# Patient Record
Sex: FEMALE | Race: WHITE | NOT HISPANIC OR LATINO | Employment: UNEMPLOYED | URBAN - NONMETROPOLITAN AREA
[De-identification: names, ages, dates, MRNs, and addresses within clinical notes are randomized per-mention and may not be internally consistent; named-entity substitution may affect disease eponyms.]

---

## 2017-08-22 ENCOUNTER — APPOINTMENT (OUTPATIENT)
Dept: ULTRASOUND IMAGING | Facility: HOSPITAL | Age: 27
End: 2017-08-22

## 2017-08-22 ENCOUNTER — HOSPITAL ENCOUNTER (EMERGENCY)
Facility: HOSPITAL | Age: 27
Discharge: HOME | End: 2017-08-22
Admitting: NURSE PRACTITIONER

## 2017-08-22 VITALS
DIASTOLIC BLOOD PRESSURE: 77 MMHG | BODY MASS INDEX: 31.25 KG/M2 | HEIGHT: 59 IN | WEIGHT: 155 LBS | TEMPERATURE: 98.3 F | SYSTOLIC BLOOD PRESSURE: 110 MMHG | OXYGEN SATURATION: 99 % | HEART RATE: 73 BPM | RESPIRATION RATE: 16 BRPM

## 2017-08-22 DIAGNOSIS — R10.11 RUQ ABDOMINAL PAIN: Primary | ICD-10-CM

## 2017-08-22 LAB
ALBUMIN SERPL-MCNC: 4.5 G/DL (ref 3.5–5)
ALBUMIN/GLOB SERPL: 1.3 G/DL (ref 1.5–2.5)
ALP SERPL-CCNC: 67 U/L (ref 35–104)
ALT SERPL W P-5'-P-CCNC: 19 U/L (ref 10–36)
AMYLASE SERPL-CCNC: 75 U/L (ref 28–100)
ANION GAP SERPL CALCULATED.3IONS-SCNC: 5.4 MMOL/L (ref 3.6–11.2)
AST SERPL-CCNC: 17 U/L (ref 10–30)
B-HCG UR QL: NEGATIVE
BACTERIA UR QL AUTO: ABNORMAL /HPF
BASOPHILS # BLD AUTO: 0.03 10*3/MM3 (ref 0–0.3)
BASOPHILS NFR BLD AUTO: 0.3 % (ref 0–2)
BILIRUB SERPL-MCNC: 0.2 MG/DL (ref 0.2–1.8)
BILIRUB UR QL STRIP: NEGATIVE
BUN BLD-MCNC: 12 MG/DL (ref 7–21)
BUN/CREAT SERPL: 11.7 (ref 7–25)
CALCIUM SPEC-SCNC: 10 MG/DL (ref 7.7–10)
CHLORIDE SERPL-SCNC: 108 MMOL/L (ref 99–112)
CLARITY UR: ABNORMAL
CO2 SERPL-SCNC: 24.6 MMOL/L (ref 24.3–31.9)
COLOR UR: YELLOW
CREAT BLD-MCNC: 1.03 MG/DL (ref 0.43–1.29)
DEPRECATED RDW RBC AUTO: 38.2 FL (ref 37–54)
EOSINOPHIL # BLD AUTO: 0.37 10*3/MM3 (ref 0–0.7)
EOSINOPHIL NFR BLD AUTO: 3.5 % (ref 0–5)
ERYTHROCYTE [DISTWIDTH] IN BLOOD BY AUTOMATED COUNT: 11.7 % (ref 11.5–14.5)
GFR SERPL CREATININE-BSD FRML MDRD: 63 ML/MIN/1.73
GLOBULIN UR ELPH-MCNC: 3.5 GM/DL
GLUCOSE BLD-MCNC: 88 MG/DL (ref 70–110)
GLUCOSE UR STRIP-MCNC: NEGATIVE MG/DL
HCT VFR BLD AUTO: 39.9 % (ref 37–47)
HGB BLD-MCNC: 13.5 G/DL (ref 12–16)
HGB UR QL STRIP.AUTO: NEGATIVE
HYALINE CASTS UR QL AUTO: ABNORMAL /LPF
IMM GRANULOCYTES # BLD: 0.02 10*3/MM3 (ref 0–0.03)
IMM GRANULOCYTES NFR BLD: 0.2 % (ref 0–0.5)
KETONES UR QL STRIP: NEGATIVE
LEUKOCYTE ESTERASE UR QL STRIP.AUTO: ABNORMAL
LIPASE SERPL-CCNC: 37 U/L (ref 13–60)
LYMPHOCYTES # BLD AUTO: 4.75 10*3/MM3 (ref 1–3)
LYMPHOCYTES NFR BLD AUTO: 44.6 % (ref 21–51)
MCH RBC QN AUTO: 30.4 PG (ref 27–33)
MCHC RBC AUTO-ENTMCNC: 33.8 G/DL (ref 33–37)
MCV RBC AUTO: 89.9 FL (ref 80–94)
MONOCYTES # BLD AUTO: 0.55 10*3/MM3 (ref 0.1–0.9)
MONOCYTES NFR BLD AUTO: 5.2 % (ref 0–10)
NEUTROPHILS # BLD AUTO: 4.92 10*3/MM3 (ref 1.4–6.5)
NEUTROPHILS NFR BLD AUTO: 46.2 % (ref 30–70)
NITRITE UR QL STRIP: NEGATIVE
OSMOLALITY SERPL CALC.SUM OF ELEC: 274.9 MOSM/KG (ref 273–305)
PH UR STRIP.AUTO: 6.5 [PH] (ref 5–8)
PLATELET # BLD AUTO: 273 10*3/MM3 (ref 130–400)
PMV BLD AUTO: 9.7 FL (ref 6–10)
POTASSIUM BLD-SCNC: 3.8 MMOL/L (ref 3.5–5.3)
PROT SERPL-MCNC: 8 G/DL (ref 6–8)
PROT UR QL STRIP: NEGATIVE
RBC # BLD AUTO: 4.44 10*6/MM3 (ref 4.2–5.4)
RBC # UR: ABNORMAL /HPF
REF LAB TEST METHOD: ABNORMAL
SODIUM BLD-SCNC: 138 MMOL/L (ref 135–153)
SP GR UR STRIP: 1.01 (ref 1–1.03)
SQUAMOUS #/AREA URNS HPF: ABNORMAL /HPF
UROBILINOGEN UR QL STRIP: ABNORMAL
WBC NRBC COR # BLD: 10.64 10*3/MM3 (ref 4.5–12.5)
WBC UR QL AUTO: ABNORMAL /HPF

## 2017-08-22 PROCEDURE — 25010000002 ONDANSETRON PER 1 MG: Performed by: NURSE PRACTITIONER

## 2017-08-22 PROCEDURE — 76705 ECHO EXAM OF ABDOMEN: CPT

## 2017-08-22 PROCEDURE — 25010000002 KETOROLAC TROMETHAMINE PER 15 MG: Performed by: NURSE PRACTITIONER

## 2017-08-22 PROCEDURE — 80053 COMPREHEN METABOLIC PANEL: CPT | Performed by: NURSE PRACTITIONER

## 2017-08-22 PROCEDURE — 76705 ECHO EXAM OF ABDOMEN: CPT | Performed by: RADIOLOGY

## 2017-08-22 PROCEDURE — 85025 COMPLETE CBC W/AUTO DIFF WBC: CPT | Performed by: NURSE PRACTITIONER

## 2017-08-22 PROCEDURE — 81001 URINALYSIS AUTO W/SCOPE: CPT | Performed by: NURSE PRACTITIONER

## 2017-08-22 PROCEDURE — 81025 URINE PREGNANCY TEST: CPT | Performed by: NURSE PRACTITIONER

## 2017-08-22 PROCEDURE — 96374 THER/PROPH/DIAG INJ IV PUSH: CPT

## 2017-08-22 PROCEDURE — 82150 ASSAY OF AMYLASE: CPT | Performed by: NURSE PRACTITIONER

## 2017-08-22 PROCEDURE — 83690 ASSAY OF LIPASE: CPT | Performed by: NURSE PRACTITIONER

## 2017-08-22 PROCEDURE — 96375 TX/PRO/DX INJ NEW DRUG ADDON: CPT

## 2017-08-22 PROCEDURE — 99283 EMERGENCY DEPT VISIT LOW MDM: CPT

## 2017-08-22 PROCEDURE — 96361 HYDRATE IV INFUSION ADD-ON: CPT

## 2017-08-22 PROCEDURE — 87086 URINE CULTURE/COLONY COUNT: CPT | Performed by: NURSE PRACTITIONER

## 2017-08-22 RX ORDER — ONDANSETRON 4 MG/1
4 TABLET, ORALLY DISINTEGRATING ORAL EVERY 6 HOURS PRN
Qty: 12 TABLET | Refills: 0 | Status: SHIPPED | OUTPATIENT
Start: 2017-08-22 | End: 2018-01-22

## 2017-08-22 RX ORDER — KETOROLAC TROMETHAMINE 30 MG/ML
30 INJECTION, SOLUTION INTRAMUSCULAR; INTRAVENOUS ONCE
Status: COMPLETED | OUTPATIENT
Start: 2017-08-22 | End: 2017-08-22

## 2017-08-22 RX ORDER — HYDROCODONE BITARTRATE AND ACETAMINOPHEN 7.5; 325 MG/1; MG/1
1 TABLET ORAL EVERY 6 HOURS PRN
Qty: 10 TABLET | Refills: 0 | Status: SHIPPED | OUTPATIENT
Start: 2017-08-22 | End: 2018-01-31

## 2017-08-22 RX ORDER — SODIUM CHLORIDE 0.9 % (FLUSH) 0.9 %
10 SYRINGE (ML) INJECTION AS NEEDED
Status: DISCONTINUED | OUTPATIENT
Start: 2017-08-22 | End: 2017-08-22 | Stop reason: HOSPADM

## 2017-08-22 RX ORDER — ONDANSETRON 2 MG/ML
4 INJECTION INTRAMUSCULAR; INTRAVENOUS ONCE
Status: COMPLETED | OUTPATIENT
Start: 2017-08-22 | End: 2017-08-22

## 2017-08-22 RX ADMIN — SODIUM CHLORIDE 1000 ML: 9 INJECTION, SOLUTION INTRAVENOUS at 14:01

## 2017-08-22 RX ADMIN — KETOROLAC TROMETHAMINE 30 MG: 30 INJECTION, SOLUTION INTRAMUSCULAR at 14:03

## 2017-08-22 RX ADMIN — ONDANSETRON 4 MG: 2 INJECTION, SOLUTION INTRAMUSCULAR; INTRAVENOUS at 14:02

## 2017-08-22 NOTE — ED NOTES
Discharge instructions reviewed with patient, patient instructed to return to ED if symptoms worsen or if any new problems arise. Patient verbalizes understanding of discharge instructions, patient ambulatory out of ED. No acute distress noted.       Dunia Rubio RN  08/22/17 [id number]

## 2017-08-22 NOTE — DISCHARGE INSTRUCTIONS
Stop Tramadol while taking Norco. You may return to taking Tramadol as prescribed when you stop taking Norco.    Follow up with Dr. Overton or Dr. Madrid in 1-2 days.    Return to the emergency room for worsening symptoms.

## 2017-08-22 NOTE — ED PROVIDER NOTES
Subjective   Patient is a 27 y.o. female presenting with abdominal pain.   History provided by:  Patient   used: No    Abdominal Pain   Pain location:  RUQ  Pain quality: aching    Pain radiates to:  Does not radiate  Pain severity:  Moderate  Onset quality:  Sudden  Timing:  Intermittent  Progression:  Waxing and waning  Chronicity:  New  Context: not alcohol use, not awakening from sleep, not diet changes, not medication withdrawal, not previous surgeries, not recent illness, not recent sexual activity, not recent travel, not retching, not sick contacts and not trauma    Relieved by:  Nothing  Worsened by:  Eating and movement  Ineffective treatments:  None tried  Associated symptoms: chills, diarrhea, fever, nausea and vomiting    Associated symptoms: no anorexia, no belching, no dysuria, no fatigue and no melena    Risk factors: no alcohol abuse, no aspirin use, has not had multiple surgeries, not obese, not pregnant and no recent hospitalization        Review of Systems   Constitutional: Positive for chills and fever. Negative for fatigue.   HENT: Negative.    Eyes: Negative.    Respiratory: Negative.    Cardiovascular: Negative.    Gastrointestinal: Positive for abdominal pain, diarrhea, nausea and vomiting. Negative for anorexia and melena.   Endocrine: Negative.    Genitourinary: Negative.  Negative for dysuria.   Musculoskeletal: Negative.    Skin: Negative.    Allergic/Immunologic: Negative.    Neurological: Negative.    Hematological: Negative.    Psychiatric/Behavioral: Negative.        Past Medical History:   Diagnosis Date   • Constipation    • Hemorrhoids        Allergies   Allergen Reactions   • Sulfa Antibiotics        Past Surgical History:   Procedure Laterality Date   • COLONOSCOPY         Family History   Problem Relation Age of Onset   • Family history unknown: Yes       Social History     Social History   • Marital status:      Spouse name: N/A   • Number of  children: N/A   • Years of education: N/A     Social History Main Topics   • Smoking status: Never Smoker   • Smokeless tobacco: None   • Alcohol use No   • Drug use: No   • Sexual activity: Not Asked     Other Topics Concern   • None     Social History Narrative           Objective   Physical Exam   Constitutional: She is oriented to person, place, and time. She appears well-developed and well-nourished.   HENT:   Head: Normocephalic.   Right Ear: External ear normal.   Left Ear: External ear normal.   Mouth/Throat: Oropharynx is clear and moist.   Eyes: EOM are normal. Pupils are equal, round, and reactive to light.   Neck: Normal range of motion. Neck supple.   Cardiovascular: Normal rate and regular rhythm.    Pulmonary/Chest: Effort normal and breath sounds normal.   Abdominal: Soft. Bowel sounds are normal. There is tenderness (Moderate RUQ).   Musculoskeletal: Normal range of motion.   Neurological: She is alert and oriented to person, place, and time.   Skin: Skin is warm and dry.   Psychiatric: She has a normal mood and affect. Her behavior is normal.   Nursing note and vitals reviewed.      Procedures         ED Course  ED Course                  MDM    Final diagnoses:   RUQ abdominal pain            Anatoliy Hagan, APRN  08/22/17 1533

## 2017-08-24 ENCOUNTER — OFFICE VISIT (OUTPATIENT)
Dept: SURGERY | Facility: CLINIC | Age: 27
End: 2017-08-24

## 2017-08-24 VITALS — BODY MASS INDEX: 31.25 KG/M2 | WEIGHT: 155 LBS | HEIGHT: 59 IN

## 2017-08-24 DIAGNOSIS — K81.9 CHOLECYSTITIS: Primary | ICD-10-CM

## 2017-08-24 LAB — BACTERIA SPEC AEROBE CULT: NORMAL

## 2017-08-24 PROCEDURE — 99214 OFFICE O/P EST MOD 30 MIN: CPT | Performed by: SURGERY

## 2017-08-24 NOTE — PROGRESS NOTES
Subjective   Pili Webster is a 27 y.o. female.     History of Present Illness She has had pain in her RUQ for a couple of weeks. She also has a lot of nausea and vomiting. She had a CT that was negative in the past and had a gallbladder US recently that showed a contracted gallbladder. She has occasional constipation but no bleeding.    The following portions of the patient's history were reviewed and updated as appropriate: allergies, current medications, past family history, past medical history, past social history, past surgical history and problem list.    Review of Systems   Constitutional: Negative for activity change, appetite change, chills, fever and unexpected weight change.   HENT: Negative for congestion, facial swelling and sore throat.    Eyes: Negative for photophobia and visual disturbance.   Respiratory: Negative for chest tightness, shortness of breath and wheezing.    Cardiovascular: Negative for chest pain, palpitations and leg swelling.   Gastrointestinal: Positive for abdominal distention, abdominal pain, nausea and vomiting. Negative for anal bleeding, blood in stool, constipation, diarrhea and rectal pain.   Endocrine: Negative for cold intolerance, heat intolerance, polydipsia and polyuria.   Genitourinary: Negative for difficulty urinating, dysuria, flank pain and urgency.   Musculoskeletal: Negative for back pain and myalgias.   Skin: Negative for rash and wound.   Allergic/Immunologic: Negative for immunocompromised state.   Neurological: Negative for dizziness, seizures, syncope, light-headedness, numbness and headaches.   Hematological: Negative for adenopathy. Does not bruise/bleed easily.   Psychiatric/Behavioral: Negative for behavioral problems and confusion. The patient is not nervous/anxious.        Objective   Physical Exam   Constitutional: She is oriented to person, place, and time. She appears well-developed and well-nourished. She does not appear ill. No  distress.       HENT:   Head: Normocephalic. Head is without laceration. Hair is normal.   Right Ear: Hearing and ear canal normal.   Left Ear: Hearing and ear canal normal.   Nose: Nose normal. No sinus tenderness. No epistaxis. Right sinus exhibits no maxillary sinus tenderness and no frontal sinus tenderness. Left sinus exhibits no maxillary sinus tenderness and no frontal sinus tenderness.   Eyes: Conjunctivae and lids are normal. Pupils are equal, round, and reactive to light.   Neck: Normal range of motion. No JVD present. No tracheal tenderness present. No tracheal deviation present. No thyroid mass and no thyromegaly present.   Cardiovascular: Normal rate and regular rhythm.  Exam reveals no gallop.    No murmur heard.  Pulmonary/Chest: Effort normal and breath sounds normal. No stridor. She has no wheezes. She exhibits no tenderness.   Abdominal: Soft. Bowel sounds are normal. She exhibits no distension, no ascites and no mass. There is tenderness. There is no rebound and no guarding. No hernia.   Musculoskeletal: She exhibits no edema or deformity.   Lymphadenopathy:     She has no cervical adenopathy.     She has no axillary adenopathy.        Right: No inguinal and no supraclavicular adenopathy present.        Left: No inguinal and no supraclavicular adenopathy present.   Neurological: She is alert and oriented to person, place, and time. She exhibits normal muscle tone.   Skin: Skin is warm, dry and intact. No rash noted. No erythema. No pallor.   Psychiatric: She has a normal mood and affect. Her behavior is normal. Thought content normal.   Vitals reviewed.      Assessment/Plan   Pili was seen today for follow-up.    Diagnoses and all orders for this visit:    Cholecystitis    Lap tyrone

## 2017-08-25 ENCOUNTER — HOSPITAL ENCOUNTER (OUTPATIENT)
Facility: HOSPITAL | Age: 27
Setting detail: HOSPITAL OUTPATIENT SURGERY
Discharge: HOME | End: 2017-08-25
Attending: SURGERY | Admitting: SURGERY

## 2017-08-25 ENCOUNTER — ANESTHESIA EVENT (OUTPATIENT)
Dept: PERIOP | Facility: HOSPITAL | Age: 27
End: 2017-08-25

## 2017-08-25 ENCOUNTER — APPOINTMENT (OUTPATIENT)
Dept: PREADMISSION TESTING | Facility: HOSPITAL | Age: 27
End: 2017-08-25

## 2017-08-25 ENCOUNTER — ANESTHESIA (OUTPATIENT)
Dept: PERIOP | Facility: HOSPITAL | Age: 27
End: 2017-08-25

## 2017-08-25 VITALS
WEIGHT: 155 LBS | DIASTOLIC BLOOD PRESSURE: 86 MMHG | RESPIRATION RATE: 18 BRPM | TEMPERATURE: 98.1 F | SYSTOLIC BLOOD PRESSURE: 129 MMHG | BODY MASS INDEX: 31.25 KG/M2 | HEIGHT: 59 IN | HEART RATE: 69 BPM | OXYGEN SATURATION: 100 %

## 2017-08-25 DIAGNOSIS — K81.9 CHOLECYSTITIS: ICD-10-CM

## 2017-08-25 PROCEDURE — 25010000002 FENTANYL CITRATE (PF) 100 MCG/2ML SOLUTION: Performed by: NURSE ANESTHETIST, CERTIFIED REGISTERED

## 2017-08-25 PROCEDURE — 25010000002 ONDANSETRON PER 1 MG: Performed by: ANESTHESIOLOGY

## 2017-08-25 PROCEDURE — 25010000002 ONDANSETRON PER 1 MG: Performed by: NURSE ANESTHETIST, CERTIFIED REGISTERED

## 2017-08-25 PROCEDURE — 47562 LAPAROSCOPIC CHOLECYSTECTOMY: CPT | Performed by: SURGERY

## 2017-08-25 PROCEDURE — 25010000002 MIDAZOLAM PER 1 MG: Performed by: ANESTHESIOLOGY

## 2017-08-25 PROCEDURE — 25010000002 PROPOFOL 10 MG/ML EMULSION: Performed by: NURSE ANESTHETIST, CERTIFIED REGISTERED

## 2017-08-25 PROCEDURE — 25010000002 DEXAMETHASONE PER 1 MG: Performed by: NURSE ANESTHETIST, CERTIFIED REGISTERED

## 2017-08-25 PROCEDURE — 25010000002 HYDROMORPHONE PER 4 MG: Performed by: SURGERY

## 2017-08-25 PROCEDURE — 25010000002 NEOSTIGMINE 10 MG/10ML SOLUTION: Performed by: NURSE ANESTHETIST, CERTIFIED REGISTERED

## 2017-08-25 RX ORDER — MIDAZOLAM HYDROCHLORIDE 1 MG/ML
1 INJECTION INTRAMUSCULAR; INTRAVENOUS
Status: DISCONTINUED | OUTPATIENT
Start: 2017-08-25 | End: 2017-08-25 | Stop reason: HOSPADM

## 2017-08-25 RX ORDER — ROCURONIUM BROMIDE 10 MG/ML
INJECTION, SOLUTION INTRAVENOUS AS NEEDED
Status: DISCONTINUED | OUTPATIENT
Start: 2017-08-25 | End: 2017-08-25 | Stop reason: SURG

## 2017-08-25 RX ORDER — ONDANSETRON 2 MG/ML
4 INJECTION INTRAMUSCULAR; INTRAVENOUS EVERY 6 HOURS PRN
Status: DISCONTINUED | OUTPATIENT
Start: 2017-08-25 | End: 2017-08-25 | Stop reason: HOSPADM

## 2017-08-25 RX ORDER — PROPOFOL 10 MG/ML
VIAL (ML) INTRAVENOUS AS NEEDED
Status: DISCONTINUED | OUTPATIENT
Start: 2017-08-25 | End: 2017-08-25 | Stop reason: SURG

## 2017-08-25 RX ORDER — LIDOCAINE HYDROCHLORIDE 20 MG/ML
INJECTION, SOLUTION EPIDURAL; INFILTRATION; INTRACAUDAL; PERINEURAL AS NEEDED
Status: DISCONTINUED | OUTPATIENT
Start: 2017-08-25 | End: 2017-08-25 | Stop reason: SURG

## 2017-08-25 RX ORDER — OXYCODONE HYDROCHLORIDE AND ACETAMINOPHEN 5; 325 MG/1; MG/1
1 TABLET ORAL ONCE AS NEEDED
Status: DISCONTINUED | OUTPATIENT
Start: 2017-08-25 | End: 2017-08-25 | Stop reason: HOSPADM

## 2017-08-25 RX ORDER — MEPERIDINE HYDROCHLORIDE 25 MG/ML
12.5 INJECTION INTRAMUSCULAR; INTRAVENOUS; SUBCUTANEOUS
Status: DISCONTINUED | OUTPATIENT
Start: 2017-08-25 | End: 2017-08-25 | Stop reason: HOSPADM

## 2017-08-25 RX ORDER — HYDROCODONE BITARTRATE AND ACETAMINOPHEN 5; 325 MG/1; MG/1
1 TABLET ORAL EVERY 4 HOURS PRN
Qty: 30 TABLET | Refills: 0 | Status: SHIPPED | OUTPATIENT
Start: 2017-08-25 | End: 2017-09-04

## 2017-08-25 RX ORDER — FENTANYL CITRATE 50 UG/ML
INJECTION, SOLUTION INTRAMUSCULAR; INTRAVENOUS AS NEEDED
Status: DISCONTINUED | OUTPATIENT
Start: 2017-08-25 | End: 2017-08-25 | Stop reason: SURG

## 2017-08-25 RX ORDER — SODIUM CHLORIDE 0.9 % (FLUSH) 0.9 %
1-10 SYRINGE (ML) INJECTION AS NEEDED
Status: DISCONTINUED | OUTPATIENT
Start: 2017-08-25 | End: 2017-08-25 | Stop reason: HOSPADM

## 2017-08-25 RX ORDER — FAMOTIDINE 10 MG/ML
INJECTION, SOLUTION INTRAVENOUS AS NEEDED
Status: DISCONTINUED | OUTPATIENT
Start: 2017-08-25 | End: 2017-08-25 | Stop reason: SURG

## 2017-08-25 RX ORDER — HYDROCODONE BITARTRATE AND ACETAMINOPHEN 5; 325 MG/1; MG/1
1 TABLET ORAL EVERY 4 HOURS PRN
Status: DISCONTINUED | OUTPATIENT
Start: 2017-08-25 | End: 2017-08-25 | Stop reason: HOSPADM

## 2017-08-25 RX ORDER — GLYCOPYRROLATE 0.2 MG/ML
INJECTION INTRAMUSCULAR; INTRAVENOUS AS NEEDED
Status: DISCONTINUED | OUTPATIENT
Start: 2017-08-25 | End: 2017-08-25 | Stop reason: SURG

## 2017-08-25 RX ORDER — MAGNESIUM HYDROXIDE 1200 MG/15ML
LIQUID ORAL AS NEEDED
Status: DISCONTINUED | OUTPATIENT
Start: 2017-08-25 | End: 2017-08-25 | Stop reason: HOSPADM

## 2017-08-25 RX ORDER — ONDANSETRON 2 MG/ML
4 INJECTION INTRAMUSCULAR; INTRAVENOUS ONCE AS NEEDED
Status: COMPLETED | OUTPATIENT
Start: 2017-08-25 | End: 2017-08-25

## 2017-08-25 RX ORDER — SCOLOPAMINE TRANSDERMAL SYSTEM 1 MG/1
1 PATCH, EXTENDED RELEASE TRANSDERMAL ONCE
Status: DISCONTINUED | OUTPATIENT
Start: 2017-08-25 | End: 2017-08-25 | Stop reason: HOSPADM

## 2017-08-25 RX ORDER — FENTANYL CITRATE 50 UG/ML
50 INJECTION, SOLUTION INTRAMUSCULAR; INTRAVENOUS
Status: DISCONTINUED | OUTPATIENT
Start: 2017-08-25 | End: 2017-08-25 | Stop reason: HOSPADM

## 2017-08-25 RX ORDER — ONDANSETRON 2 MG/ML
4 INJECTION INTRAMUSCULAR; INTRAVENOUS ONCE AS NEEDED
Status: DISCONTINUED | OUTPATIENT
Start: 2017-08-25 | End: 2017-08-25 | Stop reason: HOSPADM

## 2017-08-25 RX ORDER — MIDAZOLAM HYDROCHLORIDE 1 MG/ML
2 INJECTION INTRAMUSCULAR; INTRAVENOUS
Status: DISCONTINUED | OUTPATIENT
Start: 2017-08-25 | End: 2017-08-25 | Stop reason: HOSPADM

## 2017-08-25 RX ORDER — DEXAMETHASONE SODIUM PHOSPHATE 10 MG/ML
INJECTION INTRAMUSCULAR; INTRAVENOUS AS NEEDED
Status: DISCONTINUED | OUTPATIENT
Start: 2017-08-25 | End: 2017-08-25 | Stop reason: SURG

## 2017-08-25 RX ORDER — SODIUM CHLORIDE 9 MG/ML
INJECTION, SOLUTION INTRAVENOUS AS NEEDED
Status: DISCONTINUED | OUTPATIENT
Start: 2017-08-25 | End: 2017-08-25 | Stop reason: HOSPADM

## 2017-08-25 RX ORDER — BUPIVACAINE HYDROCHLORIDE AND EPINEPHRINE 2.5; 5 MG/ML; UG/ML
INJECTION, SOLUTION EPIDURAL; INFILTRATION; INTRACAUDAL; PERINEURAL AS NEEDED
Status: DISCONTINUED | OUTPATIENT
Start: 2017-08-25 | End: 2017-08-25 | Stop reason: HOSPADM

## 2017-08-25 RX ORDER — SODIUM CHLORIDE, SODIUM LACTATE, POTASSIUM CHLORIDE, CALCIUM CHLORIDE 600; 310; 30; 20 MG/100ML; MG/100ML; MG/100ML; MG/100ML
125 INJECTION, SOLUTION INTRAVENOUS CONTINUOUS
Status: DISCONTINUED | OUTPATIENT
Start: 2017-08-25 | End: 2017-08-25 | Stop reason: HOSPADM

## 2017-08-25 RX ORDER — NEOSTIGMINE METHYLSULFATE 1 MG/ML
INJECTION, SOLUTION INTRAVENOUS AS NEEDED
Status: DISCONTINUED | OUTPATIENT
Start: 2017-08-25 | End: 2017-08-25 | Stop reason: SURG

## 2017-08-25 RX ORDER — HYDROMORPHONE HYDROCHLORIDE 1 MG/ML
0.5 INJECTION, SOLUTION INTRAMUSCULAR; INTRAVENOUS; SUBCUTANEOUS
Status: DISCONTINUED | OUTPATIENT
Start: 2017-08-25 | End: 2017-08-25 | Stop reason: HOSPADM

## 2017-08-25 RX ORDER — ONDANSETRON 2 MG/ML
INJECTION INTRAMUSCULAR; INTRAVENOUS AS NEEDED
Status: DISCONTINUED | OUTPATIENT
Start: 2017-08-25 | End: 2017-08-25 | Stop reason: SURG

## 2017-08-25 RX ORDER — IPRATROPIUM BROMIDE AND ALBUTEROL SULFATE 2.5; .5 MG/3ML; MG/3ML
3 SOLUTION RESPIRATORY (INHALATION) ONCE AS NEEDED
Status: DISCONTINUED | OUTPATIENT
Start: 2017-08-25 | End: 2017-08-25 | Stop reason: HOSPADM

## 2017-08-25 RX ADMIN — OXYCODONE HYDROCHLORIDE AND ACETAMINOPHEN 1 TABLET: 5; 325 TABLET ORAL at 10:35

## 2017-08-25 RX ADMIN — FAMOTIDINE 20 MG: 10 INJECTION, SOLUTION INTRAVENOUS at 09:26

## 2017-08-25 RX ADMIN — FENTANYL CITRATE 100 MCG: 50 INJECTION INTRAMUSCULAR; INTRAVENOUS at 09:33

## 2017-08-25 RX ADMIN — HYDROMORPHONE HYDROCHLORIDE 0.5 MG: 1 INJECTION, SOLUTION INTRAMUSCULAR; INTRAVENOUS; SUBCUTANEOUS at 09:58

## 2017-08-25 RX ADMIN — ONDANSETRON 4 MG: 2 INJECTION, SOLUTION INTRAMUSCULAR; INTRAVENOUS at 09:26

## 2017-08-25 RX ADMIN — FENTANYL CITRATE 50 MCG: 50 INJECTION INTRAMUSCULAR; INTRAVENOUS at 10:09

## 2017-08-25 RX ADMIN — ONDANSETRON 4 MG: 2 INJECTION, SOLUTION INTRAMUSCULAR; INTRAVENOUS at 09:58

## 2017-08-25 RX ADMIN — ONDANSETRON 4 MG: 2 INJECTION, SOLUTION INTRAMUSCULAR; INTRAVENOUS at 09:10

## 2017-08-25 RX ADMIN — PROPOFOL 120 MG: 10 INJECTION, EMULSION INTRAVENOUS at 09:30

## 2017-08-25 RX ADMIN — MIDAZOLAM HYDROCHLORIDE 2 MG: 1 INJECTION, SOLUTION INTRAMUSCULAR; INTRAVENOUS at 09:11

## 2017-08-25 RX ADMIN — SODIUM CHLORIDE, POTASSIUM CHLORIDE, SODIUM LACTATE AND CALCIUM CHLORIDE 125 ML/HR: 600; 310; 30; 20 INJECTION, SOLUTION INTRAVENOUS at 09:10

## 2017-08-25 RX ADMIN — GLYCOPYRROLATE 0.8 MG: 0.2 INJECTION INTRAMUSCULAR; INTRAVENOUS at 09:46

## 2017-08-25 RX ADMIN — FENTANYL CITRATE 50 MCG: 50 INJECTION INTRAMUSCULAR; INTRAVENOUS at 10:04

## 2017-08-25 RX ADMIN — LIDOCAINE HYDROCHLORIDE 100 MG: 20 INJECTION, SOLUTION EPIDURAL; INFILTRATION; INTRACAUDAL; PERINEURAL at 09:30

## 2017-08-25 RX ADMIN — SCOPALAMINE 1 PATCH: 1 PATCH, EXTENDED RELEASE TRANSDERMAL at 09:10

## 2017-08-25 RX ADMIN — NEOSTIGMINE METHYLSULFATE 5 MG: 1 INJECTION, SOLUTION INTRAVENOUS at 09:46

## 2017-08-25 RX ADMIN — DEXAMETHASONE SODIUM PHOSPHATE 4 MG: 10 INJECTION INTRAMUSCULAR; INTRAVENOUS at 09:26

## 2017-08-25 RX ADMIN — ROCURONIUM BROMIDE 30 MG: 10 INJECTION INTRAVENOUS at 09:30

## 2017-08-25 NOTE — OP NOTE
CHOLECYSTECTOMY LAPAROSCOPIC  Procedure Note    Pili Webster  8/25/2017    Pre-op Diagnosis:   Cholecystitis [K81.9]    Post-op Diagnosis: same        Procedure(s):  CHOLECYSTECTOMY LAPAROSCOPIC    Surgeon(s):  Franco Mari MD    Anesthesia: Choice    Staff:   Circulator: Alysa Florence RN  Scrub Person: Sidney Pierce  Assistant: Evens Menjivar    Estimated Blood Loss: *No blood loss documented*    Specimens:                  Order Name Source Comment Collection Info Order Time   SURGICAL PATHOLOGY EXAM Gallbladder  Collected By: Franco aMri MD 8/25/2017  9:40 AM    Collection Date  8/25/2017       Collection Time   9:40 AM            Drains:           Procedure: An uncomplicated laparoscopic cholecystectomy was done with 3 5 mm ports. The cystic duct and artery were dissected out, clipped and divided. The gallbladder was dissected off the liver bed with cautery and removed from the upper midline incision. The incisions were closed with vicryl.     Findings: cholecystitis    Complications: none       Franco Mari MD     Date: 8/25/2017  Time: 9:49 AM

## 2017-08-25 NOTE — ANESTHESIA PREPROCEDURE EVALUATION
Anesthesia Evaluation     history of anesthetic complications: PONV  NPO Solid Status: > 8 hours  NPO Liquid Status: > 8 hours     Airway   Mallampati: II  TM distance: >3 FB  Neck ROM: full  no difficulty expected  Dental    (+) edentulous    Pulmonary - normal exam   Cardiovascular - normal exam        Neuro/Psych  (+) psychiatric history Anxiety,    GI/Hepatic/Renal/Endo    (+) obesity,      Musculoskeletal     Abdominal  - normal exam   Substance History      OB/GYN          Other                                        Anesthesia Plan    ASA 2     general     intravenous induction   Anesthetic plan and risks discussed with patient.

## 2017-08-25 NOTE — ANESTHESIA PROCEDURE NOTES
Airway  Urgency: elective    Date/Time: 8/25/2017 9:26 AM  Airway not difficult    General Information and Staff    Patient location during procedure: OR  Anesthesiologist: ALYSA SALEH  CRNA: ZINA LOPEZ    Indications and Patient Condition  Indications for airway management: airway protection    Preoxygenated: yes  MILS not maintained throughout  Mask difficulty assessment: 0 - not attempted    Final Airway Details  Final airway type: endotracheal airway      Successful airway: ETT  Cuffed: yes   Successful intubation technique: direct laryngoscopy  Endotracheal tube insertion site: oral  Blade: Paulino  Blade size: #2  ETT size: 7.5 mm  Cormack-Lehane Classification: grade I - full view of glottis  Placement verified by: chest auscultation and capnometry   Measured from: lips  ETT to lips (cm): 22  Number of attempts at approach: 1    Additional Comments  Atraumatic ETT placement, dentition unchanged.

## 2017-08-25 NOTE — PLAN OF CARE
Problem: Patient Care Overview (Adult)  Goal: Discharge Needs Assessment  Outcome: Ongoing (interventions implemented as appropriate)    08/25/17 0803   Discharge Needs Assessment   Readmission Within The Last 30 Days no previous admission in last 30 days

## 2017-08-25 NOTE — PLAN OF CARE
Problem: Perioperative Period (Adult)  Goal: Signs and Symptoms of Listed Potential Problems Will be Absent or Manageable (Perioperative Period)  Outcome: Ongoing (interventions implemented as appropriate)    08/25/17 0847   Perioperative Period   Problems Assessed (Perioperative Period) pain   Problems Present (Perioperative Period) pain

## 2017-08-25 NOTE — ANESTHESIA POSTPROCEDURE EVALUATION
Patient: Pili Webster    Procedure Summary     Date Anesthesia Start Anesthesia Stop Room / Location    08/25/17 0926 0950  [facility] OR 01 / [facility]       Procedure Diagnosis Surgeon Provider    CHOLECYSTECTOMY LAPAROSCOPIC (N/A Abdomen) Cholecystitis  (Cholecystitis [K81.9]) MD Zoltan Evans MD          Anesthesia Type: general  Last vitals  BP   118/76 (08/25/17 1020)    Temp   97.9 °F (36.6 °C) (08/25/17 1020)    Pulse   79 (08/25/17 1020)   Resp   16 (08/25/17 1020)    SpO2   99 % (08/25/17 1020)      Post Anesthesia Care and Evaluation    Patient location during evaluation: bedside  Patient participation: complete - patient participated  Level of consciousness: awake and alert  Pain score: 1  Pain management: adequate  Airway patency: patent  Anesthetic complications: No anesthetic complications  PONV Status: none  Cardiovascular status: acceptable  Respiratory status: acceptable  Hydration status: acceptable

## 2017-08-29 LAB
LAB AP CASE REPORT: NORMAL
Lab: NORMAL
PATH REPORT.FINAL DX SPEC: NORMAL

## 2017-08-31 ENCOUNTER — OFFICE VISIT (OUTPATIENT)
Dept: SURGERY | Facility: CLINIC | Age: 27
End: 2017-08-31

## 2017-08-31 VITALS — WEIGHT: 155 LBS | BODY MASS INDEX: 31.25 KG/M2 | HEIGHT: 59 IN

## 2017-08-31 DIAGNOSIS — Z90.49 STATUS POST LAPAROSCOPIC CHOLECYSTECTOMY: Primary | ICD-10-CM

## 2017-08-31 PROCEDURE — 99024 POSTOP FOLLOW-UP VISIT: CPT | Performed by: SURGERY

## 2017-12-27 ENCOUNTER — HOSPITAL ENCOUNTER (EMERGENCY)
Facility: HOSPITAL | Age: 27
Discharge: HOME | End: 2017-12-27
Attending: EMERGENCY MEDICINE | Admitting: EMERGENCY MEDICINE

## 2017-12-27 ENCOUNTER — APPOINTMENT (OUTPATIENT)
Dept: ULTRASOUND IMAGING | Facility: HOSPITAL | Age: 27
End: 2017-12-27

## 2017-12-27 VITALS
DIASTOLIC BLOOD PRESSURE: 85 MMHG | HEIGHT: 59 IN | OXYGEN SATURATION: 98 % | HEART RATE: 110 BPM | TEMPERATURE: 98 F | RESPIRATION RATE: 16 BRPM | SYSTOLIC BLOOD PRESSURE: 140 MMHG | BODY MASS INDEX: 31.25 KG/M2 | WEIGHT: 155 LBS

## 2017-12-27 DIAGNOSIS — Z3A.01 LESS THAN 8 WEEKS GESTATION OF PREGNANCY: Primary | ICD-10-CM

## 2017-12-27 LAB
ALBUMIN SERPL-MCNC: 4.6 G/DL (ref 3.5–5)
ALBUMIN/GLOB SERPL: 1.4 G/DL (ref 1.5–2.5)
ALP SERPL-CCNC: 64 U/L (ref 35–104)
ALT SERPL W P-5'-P-CCNC: 18 U/L (ref 10–36)
ANION GAP SERPL CALCULATED.3IONS-SCNC: 6.7 MMOL/L (ref 3.6–11.2)
AST SERPL-CCNC: 19 U/L (ref 10–30)
BACTERIA UR QL AUTO: ABNORMAL /HPF
BASOPHILS # BLD AUTO: 0.02 10*3/MM3 (ref 0–0.3)
BASOPHILS NFR BLD AUTO: 0.1 % (ref 0–2)
BILIRUB SERPL-MCNC: 0.4 MG/DL (ref 0.2–1.8)
BILIRUB UR QL STRIP: NEGATIVE
BUN BLD-MCNC: 8 MG/DL (ref 7–21)
BUN/CREAT SERPL: 9.9 (ref 7–25)
CALCIUM SPEC-SCNC: 10.2 MG/DL (ref 7.7–10)
CHLORIDE SERPL-SCNC: 109 MMOL/L (ref 99–112)
CLARITY UR: ABNORMAL
CO2 SERPL-SCNC: 21.3 MMOL/L (ref 24.3–31.9)
COLOR UR: YELLOW
CREAT BLD-MCNC: 0.81 MG/DL (ref 0.43–1.29)
DEPRECATED RDW RBC AUTO: 40.1 FL (ref 37–54)
EOSINOPHIL # BLD AUTO: 0.19 10*3/MM3 (ref 0–0.7)
EOSINOPHIL NFR BLD AUTO: 1.4 % (ref 0–5)
ERYTHROCYTE [DISTWIDTH] IN BLOOD BY AUTOMATED COUNT: 12.1 % (ref 11.5–14.5)
GFR SERPL CREATININE-BSD FRML MDRD: 84 ML/MIN/1.73
GLOBULIN UR ELPH-MCNC: 3.3 GM/DL
GLUCOSE BLD-MCNC: 88 MG/DL (ref 70–110)
GLUCOSE UR STRIP-MCNC: NEGATIVE MG/DL
HCG INTACT+B SERPL-ACNC: ABNORMAL MIU/ML (ref 0–5)
HCT VFR BLD AUTO: 41.6 % (ref 37–47)
HGB BLD-MCNC: 14.2 G/DL (ref 12–16)
HGB UR QL STRIP.AUTO: NEGATIVE
HYALINE CASTS UR QL AUTO: ABNORMAL /LPF
IMM GRANULOCYTES # BLD: 0.05 10*3/MM3 (ref 0–0.03)
IMM GRANULOCYTES NFR BLD: 0.4 % (ref 0–0.5)
KETONES UR QL STRIP: NEGATIVE
LEUKOCYTE ESTERASE UR QL STRIP.AUTO: ABNORMAL
LYMPHOCYTES # BLD AUTO: 3.22 10*3/MM3 (ref 1–3)
LYMPHOCYTES NFR BLD AUTO: 24.1 % (ref 21–51)
MCH RBC QN AUTO: 31.2 PG (ref 27–33)
MCHC RBC AUTO-ENTMCNC: 34.1 G/DL (ref 33–37)
MCV RBC AUTO: 91.4 FL (ref 80–94)
MONOCYTES # BLD AUTO: 0.56 10*3/MM3 (ref 0.1–0.9)
MONOCYTES NFR BLD AUTO: 4.2 % (ref 0–10)
NEUTROPHILS # BLD AUTO: 9.32 10*3/MM3 (ref 1.4–6.5)
NEUTROPHILS NFR BLD AUTO: 69.8 % (ref 30–70)
NITRITE UR QL STRIP: NEGATIVE
OSMOLALITY SERPL CALC.SUM OF ELEC: 271.6 MOSM/KG (ref 273–305)
PH UR STRIP.AUTO: 7.5 [PH] (ref 5–8)
PLATELET # BLD AUTO: 289 10*3/MM3 (ref 130–400)
PMV BLD AUTO: 9.5 FL (ref 6–10)
POTASSIUM BLD-SCNC: 4 MMOL/L (ref 3.5–5.3)
PROT SERPL-MCNC: 7.9 G/DL (ref 6–8)
PROT UR QL STRIP: NEGATIVE
RBC # BLD AUTO: 4.55 10*6/MM3 (ref 4.2–5.4)
RBC # UR: ABNORMAL /HPF
REF LAB TEST METHOD: ABNORMAL
SODIUM BLD-SCNC: 137 MMOL/L (ref 135–153)
SP GR UR STRIP: 1.01 (ref 1–1.03)
SQUAMOUS #/AREA URNS HPF: ABNORMAL /HPF
UROBILINOGEN UR QL STRIP: ABNORMAL
WBC NRBC COR # BLD: 13.36 10*3/MM3 (ref 4.5–12.5)
WBC UR QL AUTO: ABNORMAL /HPF

## 2017-12-27 PROCEDURE — 36415 COLL VENOUS BLD VENIPUNCTURE: CPT

## 2017-12-27 PROCEDURE — 81001 URINALYSIS AUTO W/SCOPE: CPT | Performed by: PHYSICIAN ASSISTANT

## 2017-12-27 PROCEDURE — 80053 COMPREHEN METABOLIC PANEL: CPT | Performed by: PHYSICIAN ASSISTANT

## 2017-12-27 PROCEDURE — 84702 CHORIONIC GONADOTROPIN TEST: CPT | Performed by: PHYSICIAN ASSISTANT

## 2017-12-27 PROCEDURE — 99283 EMERGENCY DEPT VISIT LOW MDM: CPT

## 2017-12-27 PROCEDURE — 25010000002 PROMETHAZINE PER 50 MG: Performed by: PHYSICIAN ASSISTANT

## 2017-12-27 PROCEDURE — 76801 OB US < 14 WKS SINGLE FETUS: CPT

## 2017-12-27 PROCEDURE — 76801 OB US < 14 WKS SINGLE FETUS: CPT | Performed by: RADIOLOGY

## 2017-12-27 PROCEDURE — 85025 COMPLETE CBC W/AUTO DIFF WBC: CPT | Performed by: PHYSICIAN ASSISTANT

## 2017-12-27 PROCEDURE — 96361 HYDRATE IV INFUSION ADD-ON: CPT

## 2017-12-27 PROCEDURE — 96374 THER/PROPH/DIAG INJ IV PUSH: CPT

## 2017-12-27 RX ORDER — SODIUM CHLORIDE 0.9 % (FLUSH) 0.9 %
10 SYRINGE (ML) INJECTION AS NEEDED
Status: DISCONTINUED | OUTPATIENT
Start: 2017-12-27 | End: 2017-12-27 | Stop reason: HOSPADM

## 2017-12-27 RX ORDER — PROMETHAZINE HYDROCHLORIDE 25 MG/ML
12.5 INJECTION, SOLUTION INTRAMUSCULAR; INTRAVENOUS ONCE
Status: COMPLETED | OUTPATIENT
Start: 2017-12-27 | End: 2017-12-27

## 2017-12-27 RX ADMIN — PROMETHAZINE HYDROCHLORIDE 12.5 MG: 25 INJECTION INTRAMUSCULAR; INTRAVENOUS at 18:37

## 2017-12-27 RX ADMIN — SODIUM CHLORIDE 1000 ML: 9 INJECTION, SOLUTION INTRAVENOUS at 18:37

## 2018-01-22 ENCOUNTER — HOSPITAL ENCOUNTER (EMERGENCY)
Facility: HOSPITAL | Age: 28
Discharge: HOME | End: 2018-01-22
Attending: EMERGENCY MEDICINE | Admitting: EMERGENCY MEDICINE

## 2018-01-22 VITALS
BODY MASS INDEX: 31.25 KG/M2 | HEART RATE: 75 BPM | DIASTOLIC BLOOD PRESSURE: 79 MMHG | OXYGEN SATURATION: 98 % | TEMPERATURE: 98.1 F | RESPIRATION RATE: 18 BRPM | SYSTOLIC BLOOD PRESSURE: 117 MMHG | WEIGHT: 155 LBS | HEIGHT: 59 IN

## 2018-01-22 DIAGNOSIS — Z98.890 STATUS POST DILATATION AND CURETTAGE: ICD-10-CM

## 2018-01-22 DIAGNOSIS — R10.30 LOWER ABDOMINAL PAIN: Primary | ICD-10-CM

## 2018-01-22 LAB
ALBUMIN SERPL-MCNC: 4.4 G/DL (ref 3.5–5)
ALBUMIN/GLOB SERPL: 1.4 G/DL (ref 1.5–2.5)
ALP SERPL-CCNC: 62 U/L (ref 35–104)
ALT SERPL W P-5'-P-CCNC: 24 U/L (ref 10–36)
ANION GAP SERPL CALCULATED.3IONS-SCNC: 6.6 MMOL/L (ref 3.6–11.2)
AST SERPL-CCNC: 22 U/L (ref 10–30)
BACTERIA UR QL AUTO: ABNORMAL /HPF
BASOPHILS # BLD AUTO: 0.04 10*3/MM3 (ref 0–0.3)
BASOPHILS NFR BLD AUTO: 0.3 % (ref 0–2)
BILIRUB SERPL-MCNC: 0.2 MG/DL (ref 0.2–1.8)
BILIRUB UR QL STRIP: NEGATIVE
BUN BLD-MCNC: 12 MG/DL (ref 7–21)
BUN/CREAT SERPL: 16.7 (ref 7–25)
CALCIUM SPEC-SCNC: 9.5 MG/DL (ref 7.7–10)
CHLORIDE SERPL-SCNC: 106 MMOL/L (ref 99–112)
CLARITY UR: ABNORMAL
CO2 SERPL-SCNC: 24.4 MMOL/L (ref 24.3–31.9)
COLOR UR: YELLOW
CREAT BLD-MCNC: 0.72 MG/DL (ref 0.43–1.29)
DEPRECATED RDW RBC AUTO: 38.5 FL (ref 37–54)
EOSINOPHIL # BLD AUTO: 0.38 10*3/MM3 (ref 0–0.7)
EOSINOPHIL NFR BLD AUTO: 2.5 % (ref 0–5)
ERYTHROCYTE [DISTWIDTH] IN BLOOD BY AUTOMATED COUNT: 11.8 % (ref 11.5–14.5)
GFR SERPL CREATININE-BSD FRML MDRD: 96 ML/MIN/1.73
GLOBULIN UR ELPH-MCNC: 3.1 GM/DL
GLUCOSE BLD-MCNC: 86 MG/DL (ref 70–110)
GLUCOSE UR STRIP-MCNC: NEGATIVE MG/DL
HCT VFR BLD AUTO: 39.3 % (ref 37–47)
HGB BLD-MCNC: 13.4 G/DL (ref 12–16)
HGB UR QL STRIP.AUTO: NEGATIVE
HYALINE CASTS UR QL AUTO: ABNORMAL /LPF
IMM GRANULOCYTES # BLD: 0.05 10*3/MM3 (ref 0–0.03)
IMM GRANULOCYTES NFR BLD: 0.3 % (ref 0–0.5)
KETONES UR QL STRIP: ABNORMAL
LEUKOCYTE ESTERASE UR QL STRIP.AUTO: ABNORMAL
LYMPHOCYTES # BLD AUTO: 4.52 10*3/MM3 (ref 1–3)
LYMPHOCYTES NFR BLD AUTO: 30.1 % (ref 21–51)
MCH RBC QN AUTO: 31.2 PG (ref 27–33)
MCHC RBC AUTO-ENTMCNC: 34.1 G/DL (ref 33–37)
MCV RBC AUTO: 91.6 FL (ref 80–94)
MONOCYTES # BLD AUTO: 0.97 10*3/MM3 (ref 0.1–0.9)
MONOCYTES NFR BLD AUTO: 6.5 % (ref 0–10)
NEUTROPHILS # BLD AUTO: 9.07 10*3/MM3 (ref 1.4–6.5)
NEUTROPHILS NFR BLD AUTO: 60.3 % (ref 30–70)
NITRITE UR QL STRIP: NEGATIVE
OSMOLALITY SERPL CALC.SUM OF ELEC: 272.9 MOSM/KG (ref 273–305)
PH UR STRIP.AUTO: 5.5 [PH] (ref 5–8)
PLATELET # BLD AUTO: 308 10*3/MM3 (ref 130–400)
PMV BLD AUTO: 9 FL (ref 6–10)
POTASSIUM BLD-SCNC: 4 MMOL/L (ref 3.5–5.3)
PROT SERPL-MCNC: 7.5 G/DL (ref 6–8)
PROT UR QL STRIP: NEGATIVE
RBC # BLD AUTO: 4.29 10*6/MM3 (ref 4.2–5.4)
RBC # UR: ABNORMAL /HPF
REF LAB TEST METHOD: ABNORMAL
SODIUM BLD-SCNC: 137 MMOL/L (ref 135–153)
SP GR UR STRIP: 1.02 (ref 1–1.03)
SQUAMOUS #/AREA URNS HPF: ABNORMAL /HPF
UROBILINOGEN UR QL STRIP: ABNORMAL
WBC NRBC COR # BLD: 15.03 10*3/MM3 (ref 4.5–12.5)
WBC UR QL AUTO: ABNORMAL /HPF

## 2018-01-22 PROCEDURE — 36415 COLL VENOUS BLD VENIPUNCTURE: CPT

## 2018-01-22 PROCEDURE — 99284 EMERGENCY DEPT VISIT MOD MDM: CPT

## 2018-01-22 PROCEDURE — 96372 THER/PROPH/DIAG INJ SC/IM: CPT

## 2018-01-22 PROCEDURE — 81001 URINALYSIS AUTO W/SCOPE: CPT | Performed by: PHYSICIAN ASSISTANT

## 2018-01-22 PROCEDURE — 25010000002 CEFTRIAXONE PER 250 MG: Performed by: PHYSICIAN ASSISTANT

## 2018-01-22 PROCEDURE — 85025 COMPLETE CBC W/AUTO DIFF WBC: CPT | Performed by: PHYSICIAN ASSISTANT

## 2018-01-22 PROCEDURE — 80053 COMPREHEN METABOLIC PANEL: CPT | Performed by: PHYSICIAN ASSISTANT

## 2018-01-22 PROCEDURE — 87086 URINE CULTURE/COLONY COUNT: CPT | Performed by: PHYSICIAN ASSISTANT

## 2018-01-22 PROCEDURE — 25010000002 KETOROLAC TROMETHAMINE PER 15 MG: Performed by: PHYSICIAN ASSISTANT

## 2018-01-22 RX ORDER — KETOROLAC TROMETHAMINE 30 MG/ML
60 INJECTION, SOLUTION INTRAMUSCULAR; INTRAVENOUS ONCE
Status: COMPLETED | OUTPATIENT
Start: 2018-01-22 | End: 2018-01-22

## 2018-01-22 RX ORDER — LIDOCAINE HYDROCHLORIDE 10 MG/ML
2.1 INJECTION, SOLUTION EPIDURAL; INFILTRATION; INTRACAUDAL; PERINEURAL ONCE
Status: COMPLETED | OUTPATIENT
Start: 2018-01-22 | End: 2018-01-22

## 2018-01-22 RX ORDER — PHENAZOPYRIDINE HYDROCHLORIDE 200 MG/1
200 TABLET, FILM COATED ORAL 3 TIMES DAILY PRN
Qty: 21 TABLET | Refills: 0 | Status: SHIPPED | OUTPATIENT
Start: 2018-01-22 | End: 2018-09-07

## 2018-01-22 RX ORDER — ONDANSETRON 4 MG/1
4 TABLET, ORALLY DISINTEGRATING ORAL ONCE
Status: COMPLETED | OUTPATIENT
Start: 2018-01-22 | End: 2018-01-22

## 2018-01-22 RX ORDER — AMOXICILLIN AND CLAVULANATE POTASSIUM 875; 125 MG/1; MG/1
1 TABLET, FILM COATED ORAL EVERY 12 HOURS SCHEDULED
Qty: 20 TABLET | Refills: 0 | Status: SHIPPED | OUTPATIENT
Start: 2018-01-22 | End: 2018-09-07

## 2018-01-22 RX ORDER — CEFTRIAXONE 1 G/1
1000 INJECTION, POWDER, FOR SOLUTION INTRAMUSCULAR; INTRAVENOUS ONCE
Status: COMPLETED | OUTPATIENT
Start: 2018-01-22 | End: 2018-01-22

## 2018-01-22 RX ORDER — ONDANSETRON 4 MG/1
4 TABLET, ORALLY DISINTEGRATING ORAL EVERY 6 HOURS PRN
Qty: 20 TABLET | Refills: 0 | Status: SHIPPED | OUTPATIENT
Start: 2018-01-22 | End: 2018-09-07

## 2018-01-22 RX ADMIN — CEFTRIAXONE 1000 MG: 1 INJECTION, POWDER, FOR SOLUTION INTRAMUSCULAR; INTRAVENOUS at 22:06

## 2018-01-22 RX ADMIN — ONDANSETRON 4 MG: 4 TABLET, ORALLY DISINTEGRATING ORAL at 21:31

## 2018-01-22 RX ADMIN — KETOROLAC TROMETHAMINE 60 MG: 60 INJECTION, SOLUTION INTRAMUSCULAR at 21:32

## 2018-01-22 RX ADMIN — LIDOCAINE HYDROCHLORIDE 2.1 ML: 10 INJECTION, SOLUTION EPIDURAL; INFILTRATION; INTRACAUDAL; PERINEURAL at 22:05

## 2018-01-23 NOTE — ED PROVIDER NOTES
Subjective   HPI Comments: 27-year-old white female presents to ER complaining of lower abdominal pain.  Patient admits that she underwent a D&C performed by Dr. Espinal 6 days prior to arrival.  Patient is denying any vaginal discharge or vaginal bleeding.    Patient is a 27 y.o. female presenting with abdominal pain.   History provided by:  Patient  Abdominal Pain   Pain location:  Suprapubic  Pain quality: aching and cramping    Pain radiates to:  Does not radiate  Pain severity:  Moderate  Onset quality:  Sudden  Duration:  1 day  Timing:  Constant  Progression:  Worsening  Chronicity:  New  Context: previous surgery    Relieved by:  Nothing  Ineffective treatments:  None tried  Associated symptoms: nausea    Associated symptoms: no chest pain, no dysuria, no fever, no vaginal bleeding, no vaginal discharge and no vomiting        Review of Systems   Constitutional: Negative.  Negative for fever.   HENT: Negative.    Respiratory: Negative.    Cardiovascular: Negative.  Negative for chest pain.   Gastrointestinal: Positive for abdominal pain and nausea. Negative for vomiting.   Endocrine: Negative.    Genitourinary: Negative.  Negative for dysuria, vaginal bleeding and vaginal discharge.   Skin: Negative.    Neurological: Negative.    Psychiatric/Behavioral: Negative.    All other systems reviewed and are negative.      Past Medical History:   Diagnosis Date   • Abdominal pain    • Anxiety and depression    • Arthritis    • Cholecystitis    • Constipation    • Frequent UTI    • Hemorrhoids    • Kidney stones    • Nausea & vomiting    • PCOS (polycystic ovarian syndrome)    • PONV (postoperative nausea and vomiting)    • Tachycardia        Allergies   Allergen Reactions   • Latex Hives   • Sulfa Antibiotics Rash       Past Surgical History:   Procedure Laterality Date   • ABDOMINAL SURGERY     • CHOLECYSTECTOMY N/A 8/25/2017    Procedure: CHOLECYSTECTOMY LAPAROSCOPIC;  Surgeon: Franco Mari MD;  Location: [facility]  OR;  Service:    • DIAGNOSTIC LAPAROSCOPY      x2   • DILATATION AND CURETTAGE     • WISDOM TOOTH EXTRACTION         Family History   Problem Relation Age of Onset   • Family history unknown: Yes       Social History     Social History   • Marital status:      Spouse name: N/A   • Number of children: N/A   • Years of education: N/A     Social History Main Topics   • Smoking status: Current Every Day Smoker     Packs/day: 0.25     Years: 10.00     Types: Cigarettes   • Smokeless tobacco: Never Used   • Alcohol use No   • Drug use: No   • Sexual activity: Defer     Other Topics Concern   • None     Social History Narrative   • None           Objective   Physical Exam   Constitutional: She is oriented to person, place, and time. She appears well-developed and well-nourished. No distress.   HENT:   Head: Normocephalic and atraumatic.   Nose: Nose normal.   Eyes: Conjunctivae are normal.   Neck: Normal range of motion. Neck supple. No JVD present. No tracheal deviation present.   Cardiovascular: Normal rate, regular rhythm and normal heart sounds.    No murmur heard.  Tachycardic.    Pulmonary/Chest: Effort normal and breath sounds normal. No respiratory distress. She has no wheezes.   Abdominal: Soft. Bowel sounds are normal. There is tenderness (suprapubic).   Musculoskeletal: Normal range of motion. She exhibits no edema or deformity.   Neurological: She is alert and oriented to person, place, and time. No cranial nerve deficit.   Skin: Skin is warm and dry. No rash noted. She is not diaphoretic. No erythema. No pallor.   Psychiatric: She has a normal mood and affect. Her behavior is normal. Thought content normal.   Nursing note and vitals reviewed.      Procedures         ED Course  ED Course                  MDM  Number of Diagnoses or Management Options  Lower abdominal pain: new and requires workup  Status post dilatation and curettage: established and worsening     Amount and/or Complexity of Data  Reviewed  Clinical lab tests: reviewed  Decide to obtain previous medical records or to obtain history from someone other than the patient: yes    Risk of Complications, Morbidity, and/or Mortality  Presenting problems: low  Diagnostic procedures: low  Management options: low    Patient Progress  Patient progress: stable      Final diagnoses:   Lower abdominal pain   Status post dilatation and curettage            RUBIO Dolan  01/22/18 [id number]

## 2018-01-23 NOTE — ED NOTES
Pt still awaiting room placement due to high pt volume, report to becca ivey in triage     Denise Patterson RN  01/22/18 1948

## 2018-01-25 ENCOUNTER — APPOINTMENT (OUTPATIENT)
Dept: PHYSICAL THERAPY | Facility: HOSPITAL | Age: 28
End: 2018-01-25

## 2018-01-25 LAB — BACTERIA SPEC AEROBE CULT: NORMAL

## 2018-01-30 ENCOUNTER — HOSPITAL ENCOUNTER (OUTPATIENT)
Dept: PHYSICAL THERAPY | Facility: HOSPITAL | Age: 28
Setting detail: THERAPIES SERIES
Discharge: HOME | End: 2018-01-30

## 2018-01-30 DIAGNOSIS — G89.29 CHRONIC PAIN OF RIGHT KNEE: Primary | ICD-10-CM

## 2018-01-30 DIAGNOSIS — M25.561 CHRONIC PAIN OF RIGHT KNEE: Primary | ICD-10-CM

## 2018-01-30 PROCEDURE — 97163 PT EVAL HIGH COMPLEX 45 MIN: CPT
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
## 2018-02-01 ENCOUNTER — APPOINTMENT (OUTPATIENT)
Dept: PHYSICAL THERAPY | Facility: HOSPITAL | Age: 28
End: 2018-02-01

## 2018-02-06 ENCOUNTER — HOSPITAL ENCOUNTER (OUTPATIENT)
Dept: PHYSICAL THERAPY | Facility: HOSPITAL | Age: 28
Setting detail: THERAPIES SERIES
Discharge: HOME | End: 2018-02-06

## 2018-02-06 DIAGNOSIS — G89.29 CHRONIC PAIN OF RIGHT KNEE: Primary | ICD-10-CM

## 2018-02-06 DIAGNOSIS — M25.561 CHRONIC PAIN OF RIGHT KNEE: Primary | ICD-10-CM

## 2018-02-06 PROCEDURE — 97110 THERAPEUTIC EXERCISES: CPT

## 2018-02-06 PROCEDURE — G0283 ELEC STIM OTHER THAN WOUND: HCPCS

## 2018-02-06 PROCEDURE — 97035 APP MDLTY 1+ULTRASOUND EA 15: CPT

## 2018-02-06 NOTE — THERAPY TREATMENT NOTE
Outpatient Physical Therapy Ortho Treatment Note   [place]     Patient Name: Pili Webster  : 1990  MRN: [id number]  Today's Date: 2018      Visit Date: 2018    Visit Dx:    ICD-10-CM ICD-9-CM   1. Chronic pain of right knee M25.561 719.46    G89.29 338.29       Patient Active Problem List   Diagnosis   • External hemorrhoid, thrombosed   • Status post laparoscopic cholecystectomy        Past Medical History:   Diagnosis Date   • Abdominal pain    • Anxiety and depression    • Arthritis    • Cholecystitis    • Constipation    • Frequent UTI    • Hemorrhoids    • Kidney stones    • Nausea & vomiting    • PCOS (polycystic ovarian syndrome)    • PONV (postoperative nausea and vomiting)    • Tachycardia         Past Surgical History:   Procedure Laterality Date   • ABDOMINAL SURGERY     • CHOLECYSTECTOMY N/A 2017    Procedure: CHOLECYSTECTOMY LAPAROSCOPIC;  Surgeon: Franco Mari MD;  Location: [facility];  Service:    • DIAGNOSTIC LAPAROSCOPY      x2   • DILATATION AND CURETTAGE     • WISDOM TOOTH EXTRACTION                               PT Assessment/Plan       18 1354       PT Assessment    Assessment Comments Tx today consisted of mh and estim to right med knee followed by ther ex and ended with US.  Pt reported mild pain with knee extension yet demonstrated good effort and reported decreased pain following session.  -RT     PT Plan    PT Plan Comments Will follow progressing knee stability and decreased pain in order to improve ADLs.  -RT       User Key  (r) = Recorded By, (t) = Taken By, (c) = Cosigned By    Initials Name Provider Type    RT Peter S William, PT Physical Therapist                Modalities       18 1300          Subjective Comments    Subjective Comments Pt reports her knee pain is less today.  Pt cont to have pain with knee extension  -RT      Subjective Pain    Able to rate subjective pain? yes  -RT      Pre-Treatment Pain Level 3  -RT       Post-Treatment Pain Level 2  -RT      Moist Heat    MH Applied Yes  -RT      Location right knee  -RT      Rx Minutes 10 mins  -RT      MH Prior to Rx Yes  -RT      Ultrasound 61151    Location right med knee  -RT      Rx Minutes 8  -RT      Duty Cycle 50  -RT      Frequency --   3.3  -RT      Intensity - Wts/cm 1.2  -RT      ELECTRICAL STIMULATION    Attended/Unattended Unattended  -RT      Stimulation Type Pre-Mod  -RT      Rx Minutes 10 mins  -RT        User Key  (r) = Recorded By, (t) = Taken By, (c) = Cosigned By    Initials Name Provider Type    RT Peter Thomas PT Physical Therapist                Exercises       02/06/18 1300          Subjective Comments    Subjective Comments Pt reports her knee pain is less today.  Pt cont to have pain with knee extension  -RT      Subjective Pain    Able to rate subjective pain? yes  -RT      Pre-Treatment Pain Level 3  -RT      Post-Treatment Pain Level 2  -RT      Exercise 1    Exercise Name 1 gastroc stretch 20sec x3, hs stretch, qs x20, saq 20, slr 20, laq 20, bike 5min lv 2  -RT      Cueing 1 Verbal;Tactile;Demo  -RT      Time (Minutes) 1 30  -RT        User Key  (r) = Recorded By, (t) = Taken By, (c) = Cosigned By    Initials Name Provider Type    RT Peter Thomas PT Physical Therapist                             Therapy Education  Given: HEP, Symptoms/condition management, Pain management, Posture/body mechanics  Program: Reinforced  How Provided: Verbal, Demonstration, Written  Provided to: Patient  Level of Understanding: Teach back education performed, Verbalized, Demonstrated              Time Calculation:   Start Time: 1302  Stop Time: 1355  Time Calculation (min): 53 min    Therapy Charges for Today     Code Description Service Date Service Provider Modifiers Qty    98571041928  PT THER PROC EA 15 MIN 2/6/2018 Peter Thomas, PT GP 2    09790040048 HC PT ELECTRICAL STIM UNATTENDED 2/6/2018 Peter Thomas, PT  1    [id number]  PT ULTRASOUND EA 15  MIN 2/6/2018 Peter Thomas, PT GP 1                    Peter Thomas, PT  2/6/2018

## 2018-02-13 ENCOUNTER — HOSPITAL ENCOUNTER (OUTPATIENT)
Dept: PHYSICAL THERAPY | Facility: HOSPITAL | Age: 28
Setting detail: THERAPIES SERIES
Discharge: HOME | End: 2018-02-13

## 2018-02-13 DIAGNOSIS — M25.561 CHRONIC PAIN OF RIGHT KNEE: Primary | ICD-10-CM

## 2018-02-13 DIAGNOSIS — G89.29 CHRONIC PAIN OF RIGHT KNEE: Primary | ICD-10-CM

## 2018-02-13 PROCEDURE — G0283 ELEC STIM OTHER THAN WOUND: HCPCS

## 2018-02-13 PROCEDURE — 97035 APP MDLTY 1+ULTRASOUND EA 15: CPT

## 2018-02-13 PROCEDURE — 97110 THERAPEUTIC EXERCISES: CPT

## 2018-02-13 NOTE — THERAPY TREATMENT NOTE
Outpatient Physical Therapy Ortho Treatment Note  [facility]     Patient Name: Pili Webster  : 1990  MRN: [id number]  Today's Date: 2018      Visit Date: 2018    Visit Dx:    ICD-10-CM ICD-9-CM   1. Chronic pain of right knee M25.561 719.46    G89.29 338.29       Patient Active Problem List   Diagnosis   • External hemorrhoid, thrombosed   • Status post laparoscopic cholecystectomy        Past Medical History:   Diagnosis Date   • Abdominal pain    • Anxiety and depression    • Arthritis    • Cholecystitis    • Constipation    • Frequent UTI    • Hemorrhoids    • Kidney stones    • Nausea & vomiting    • PCOS (polycystic ovarian syndrome)    • PONV (postoperative nausea and vomiting)    • Tachycardia         Past Surgical History:   Procedure Laterality Date   • ABDOMINAL SURGERY     • CHOLECYSTECTOMY N/A 2017    Procedure: CHOLECYSTECTOMY LAPAROSCOPIC;  Surgeon: Franco Mari MD;  Location: [facility];  Service:    • DIAGNOSTIC LAPAROSCOPY      x2   • DILATATION AND CURETTAGE     • WISDOM TOOTH EXTRACTION               PT Ortho       18 1400    Subjective Comments    Subjective Comments Patient arrives to therapy w/ reports of 6/10 R) knee pain.  Pt states on Saturday she got her foot tangled up in a rug, and aggravated her knee.  Pt states her knee is feeling better today.  -JEANNINE    Subjective Pain    Able to rate subjective pain? yes  -JEANNINE    Pre-Treatment Pain Level 6  -JEANNINE    Post-Treatment Pain Level 4  -JEANNINE      User Key  (r) = Recorded By, (t) = Taken By, (c) = Cosigned By    Initials Name Provider Type    JEANNINE Parikh PTA Physical Therapy Assistant                            PT Assessment/Plan       18 8157       PT Assessment    Assessment Comments Patient responded well to today's session w/ reports of decreased pain following, /10.  Therex progressed with additional range of motion and strengthening activities added to program w/ good response.   "Pt received cues as needed throughout session for improved feedback and mechanics.  Pt continues to progress w/ therapy as tolerated.  No complaints or adverse reactions observed during and/ or following tx.    -JEANNINE     PT Plan    PT Plan Comments Continue with PT's POC and progress tx as tolerated by patient.   -JEANNINE       User Key  (r) = Recorded By, (t) = Taken By, (c) = Cosigned By    Initials Name Provider Type    JEANNINE Parikh PTA Physical Therapy Assistant                Modalities       02/13/18 1400          Moist Heat    MH Applied Yes   no redness noted following MH  -JEANNINE      Location right knee  -JEANNINE      Rx Minutes 10 mins  -JEANNINE      MH Prior to Rx Yes   w/ estim  -JEANNINE      Ultrasound 19895    Location R) medial knee  -JEANNINE      Rx Minutes 8 min  -JEANNINE      Duty Cycle 50  -JEANNINE      Frequency --   3.3MHz  -JEANNINE      Intensity - Wts/cm 1.2  -JEANNINE      ELECTRICAL STIMULATION    Attended/Unattended Unattended   no skin irritation observed following modalities  -JEANNINE      Stimulation Type Pre-Mod  -JEANNINE      Max mAmp --   as to pt's tolerance  -JEANNINE      Location/Electrode Placement/Other R) knee  -JEANNINE      Rx Minutes 10 mins  -JEANNINE        User Key  (r) = Recorded By, (t) = Taken By, (c) = Cosigned By    Initials Name Provider Type    JEANNINE Parikh PTA Physical Therapy Assistant                Exercises       02/13/18 1400          Subjective Comments    Subjective Comments Patient arrives to therapy w/ reports of 6/10 R) knee pain.  Pt states on Saturday she got her foot tangled up in a rug, and aggrevated her R) knee.  Pt states it is feeling better today.  -JEANNINE      Subjective Pain    Able to rate subjective pain? yes  -JEANNINE      Pre-Treatment Pain Level 6  -JEANNINE      Post-Treatment Pain Level 4  -JEANNINE      Exercise 1    Exercise Name 1 gastroc stretch 3x20\", ham stretch 3x20\", QS 10x2, SAQ 10x2, SLR 10x2, LAQ 10x2, LE bike LV 2.2 x 5 min, AP 10x2, heel slides 10x2  -JEANNINE      Cueing 1 Verbal;Tactile;Demo  -JEANNINE   "    Time (Minutes) 1 35 min  -JEANNINE        User Key  (r) = Recorded By, (t) = Taken By, (c) = Cosigned By    Initials Name Provider Type    JEANNINE Parikh PTA Physical Therapy Assistant                             Therapy Education  Given: HEP, Symptoms/condition management, Pain management  Program: Reinforced  How Provided: Verbal, Demonstration  Provided to: Patient  Level of Understanding: Verbalized, Demonstrated              Time Calculation:   Start Time: 1300  Stop Time: 1400  Time Calculation (min): 60 min    Therapy Charges for Today     Code Description Service Date Service Provider Modifiers Qty    03643898046 HC PT THER PROC EA 15 MIN 2/13/2018 Janet Parikh PTA GP 2    35542827408 HC PT ULTRASOUND EA 15 MIN 2/13/2018 Janet Parikh PTA GP 1    83884673645 HC PT ELECTRICAL STIM UNATTENDED 2/13/2018 Janet Parikh PTA  1                    Janet Parikh PTA  2/13/2018

## 2018-02-15 ENCOUNTER — HOSPITAL ENCOUNTER (OUTPATIENT)
Dept: PHYSICAL THERAPY | Facility: HOSPITAL | Age: 28
Setting detail: THERAPIES SERIES
Discharge: HOME | End: 2018-02-15

## 2018-02-15 DIAGNOSIS — M25.561 CHRONIC PAIN OF RIGHT KNEE: Primary | ICD-10-CM

## 2018-02-15 DIAGNOSIS — G89.29 CHRONIC PAIN OF RIGHT KNEE: Primary | ICD-10-CM

## 2018-02-15 PROCEDURE — G0283 ELEC STIM OTHER THAN WOUND: HCPCS

## 2018-02-15 PROCEDURE — 97110 THERAPEUTIC EXERCISES: CPT

## 2018-02-15 NOTE — THERAPY TREATMENT NOTE
Outpatient Physical Therapy Ortho Treatment Note  [facility]     Patient Name: Pili Webster  : 1990  MRN: [id number]  Today's Date: 2/15/2018      Visit Date: 02/15/2018    Visit Dx:    ICD-10-CM ICD-9-CM   1. Chronic pain of right knee M25.561 719.46    G89.29 338.29       Patient Active Problem List   Diagnosis   • External hemorrhoid, thrombosed   • Status post laparoscopic cholecystectomy        Past Medical History:   Diagnosis Date   • Abdominal pain    • Anxiety and depression    • Arthritis    • Cholecystitis    • Constipation    • Frequent UTI    • Hemorrhoids    • Kidney stones    • Nausea & vomiting    • PCOS (polycystic ovarian syndrome)    • PONV (postoperative nausea and vomiting)    • Tachycardia         Past Surgical History:   Procedure Laterality Date   • ABDOMINAL SURGERY     • CHOLECYSTECTOMY N/A 2017    Procedure: CHOLECYSTECTOMY LAPAROSCOPIC;  Surgeon: Franco Mari MD;  Location: [facility];  Service:    • DIAGNOSTIC LAPAROSCOPY      x2   • DILATATION AND CURETTAGE     • WISDOM TOOTH EXTRACTION               PT Ortho       02/15/18 1400    Subjective Comments    Subjective Comments Patient reports 3-4/10 R) knee pain upon arrival to therapy.  Otherwise pt states of no complaints.   -JEANNINE    Subjective Pain    Able to rate subjective pain? yes  -JEANNINE    Pre-Treatment Pain Level 4  -JAENNINE    Post-Treatment Pain Level 2  -JEANNINE      18 1400    Subjective Comments    Subjective Comments Patient arrives to therapy w/ reports of 6/10 R) knee pain.  Pt states on Saturday she got her foot tangled up in a rug, and aggrevated her R) knee.  Pt states it is feeling better today.  -JEANNINE    Subjective Pain    Able to rate subjective pain? yes  -JEANNINE    Pre-Treatment Pain Level 6  -JEANNINE    Post-Treatment Pain Level 4  -JEANNINE      User Key  (r) = Recorded By, (t) = Taken By, (c) = Cosigned By    Initials Name Provider Type    JEANNINE Parikh PTA Physical Therapy Assistant     "                        PT Assessment/Plan       02/15/18 0106       PT Assessment    Assessment Comments Patient tolerated treatment well today w/ additional LE strengthening activities added to program.  Pt initiated standing strengthening actvities w/ good response.  Pt continues to perform program w/ focus on improved R) knee ROM, strength, stability, and improved pain control.  No adverse reactions observed following modalities.   -JEANNINE     PT Plan    PT Plan Comments Continue with PT's POC and will progress tx as tolerated by patient.   -JEANNINE       User Key  (r) = Recorded By, (t) = Taken By, (c) = Cosigned By    Initials Name Provider Type    JEANNINE Parikh PTA Physical Therapy Assistant                Modalities       02/15/18 1400          Moist Heat    MH Applied Yes   no redness noted following MH  -JEANNINE      Location right knee  -JEANNINE      Rx Minutes 10 mins  -JEANNINE      MH Prior to Rx Yes   w/ estim in supine position  -JEANNINE      Ultrasound 78583    Location R) medial knee  -JEANNINE      Rx Minutes 8 min  -JEANNINE      Duty Cycle 50  -JEANNINE      Frequency --   3.3MHz  -JEANNINE      Intensity - Wts/cm 1.2  -JEANNINE      ELECTRICAL STIMULATION    Attended/Unattended Unattended   no skin irritation observed following estim  -JEANNINE      Stimulation Type Pre-Mod  -JEANNINE      Max mAmp --   as to pt's tolerance  -JEANNINE      Location/Electrode Placement/Other R) knee  -JEANNINE      Rx Minutes 10 mins  -JEANNINE        User Key  (r) = Recorded By, (t) = Taken By, (c) = Cosigned By    Initials Name Provider Type    JEANNINE Parikh PTA Physical Therapy Assistant                Exercises       02/15/18 1400          Subjective Comments    Subjective Comments Patient reports 3-4/10 R) knee pain.  Otherwise pt states of no complaints.   -JEANNINE      Subjective Pain    Able to rate subjective pain? yes  -JEANNINE      Pre-Treatment Pain Level 4  -JEANNINE      Post-Treatment Pain Level 2  -JEANNINE      Exercise 1    Exercise Name 1 gastroc stretch 3x20\", ham stretch 3x20\", " QS 15x2, SAQ 15x2, SLR 10x2, LAQ 15x2, LE bike LV 2.5 x6 min, hip abd w/ tband (red) 10x2, heel slides 15x2, ball squeeze 15x2, knee flex w/ tband (red) 10x2  -JEANNINE      Cueing 1 Verbal;Tactile;Demo  -JEANNINE      Time (Minutes) 1 40 min  -JEANNINE        User Key  (r) = Recorded By, (t) = Taken By, (c) = Cosigned By    Initials Name Provider Type    JEANNINE Parikh PTA Physical Therapy Assistant                                            Time Calculation:   Start Time: 1300  Stop Time: 1404  Time Calculation (min): 64 min    Therapy Charges for Today     Code Description Service Date Service Provider Modifiers Qty    05551213767 HC PT THER PROC EA 15 MIN 2/15/2018 Janet Parikh PTA GP 3    71138988019 HC PT ELECTRICAL STIM UNATTENDED 2/15/2018 Janet Parikh PTA  1                    Janet Parikh PTA  2/15/2018

## 2018-02-20 ENCOUNTER — HOSPITAL ENCOUNTER (OUTPATIENT)
Dept: PHYSICAL THERAPY | Facility: HOSPITAL | Age: 28
Setting detail: THERAPIES SERIES
Discharge: HOME | End: 2018-02-20

## 2018-02-20 DIAGNOSIS — G89.29 CHRONIC PAIN OF RIGHT KNEE: Primary | ICD-10-CM

## 2018-02-20 DIAGNOSIS — M25.561 CHRONIC PAIN OF RIGHT KNEE: Primary | ICD-10-CM

## 2018-02-20 PROCEDURE — 97110 THERAPEUTIC EXERCISES: CPT | Performed by: PHYSICAL THERAPIST

## 2018-02-20 PROCEDURE — G0283 ELEC STIM OTHER THAN WOUND: HCPCS | Performed by: PHYSICAL THERAPIST

## 2018-02-22 ENCOUNTER — HOSPITAL ENCOUNTER (OUTPATIENT)
Dept: PHYSICAL THERAPY | Facility: HOSPITAL | Age: 28
Setting detail: THERAPIES SERIES
Discharge: HOME | End: 2018-02-22

## 2018-02-22 DIAGNOSIS — M25.561 CHRONIC PAIN OF RIGHT KNEE: Primary | ICD-10-CM

## 2018-02-22 DIAGNOSIS — G89.29 CHRONIC PAIN OF RIGHT KNEE: Primary | ICD-10-CM

## 2018-02-22 PROCEDURE — G0283 ELEC STIM OTHER THAN WOUND: HCPCS | Performed by: PHYSICAL THERAPIST

## 2018-02-22 PROCEDURE — 97110 THERAPEUTIC EXERCISES: CPT | Performed by: PHYSICAL THERAPIST

## 2018-02-22 NOTE — PROGRESS NOTES
Outpatient Physical Therapy Ortho Treatment Note  [facility]     Patient Name: Pili Webster  : 1990  MRN: [id number]  Today's Date: 2018      Visit Date: 2018    Visit Dx:    ICD-10-CM ICD-9-CM   1. Chronic pain of right knee M25.561 719.46    G89.29 338.29       Patient Active Problem List   Diagnosis   • External hemorrhoid, thrombosed   • Status post laparoscopic cholecystectomy        Past Medical History:   Diagnosis Date   • Abdominal pain    • Anxiety and depression    • Arthritis    • Cholecystitis    • Constipation    • Frequent UTI    • Hemorrhoids    • Kidney stones    • Nausea & vomiting    • PCOS (polycystic ovarian syndrome)    • PONV (postoperative nausea and vomiting)    • Tachycardia         Past Surgical History:   Procedure Laterality Date   • ABDOMINAL SURGERY     • CHOLECYSTECTOMY N/A 2017    Procedure: CHOLECYSTECTOMY LAPAROSCOPIC;  Surgeon: Franco Mari MD;  Location: [facility];  Service:    • DIAGNOSTIC LAPAROSCOPY      x2   • DILATATION AND CURETTAGE     • WISDOM TOOTH EXTRACTION               PT Ortho       18 1400    Subjective Comments    Subjective Comments Pt reports increased right knee pain prior to today's treatment session. She stated she is unsure what caused the increase in pain.  -AD    Subjective Pain    Able to rate subjective pain? yes  -AD    Pre-Treatment Pain Level 4  -AD    Post-Treatment Pain Level 2  -AD      18 1500    Subjective Comments    Subjective Comments Patient notes 3/10 pain today; she reports that therapy has been helping with her pain.  -BE    Subjective Pain    Able to rate subjective pain? yes  -BE    Pre-Treatment Pain Level 3  -BE    Post-Treatment Pain Level 3  -BE      User Key  (r) = Recorded By, (t) = Taken By, (c) = Cosigned By    Initials Name Provider Type    AD Ashley Claudene Dalton, PT Physical Therapist    BE Pili Walker, PT Physical Therapist                            PT  "Assessment/Plan       02/22/18 1440       PT Assessment    Assessment Comments Today's session began with moist heat combined with premod to the right knee, with no skin irritation observed. Therapeutic exercises addressed right knee range of motion and strength, with no pain reported. She tolerated 6 minutes on the LE bicycle, level 2.5, with reports of \"fatigue\" but not \"pain\". The session concluded with therapeutic ultrasound to the medial aspect of the left knee, with no skin irritation observed. She will be progressed as tolerated.  -AD     PT Plan    PT Plan Comments Progress as tolerated per POC.  -AD       User Key  (r) = Recorded By, (t) = Taken By, (c) = Cosigned By    Initials Name Provider Type    AD Ashley Claudene Dalton, PT Physical Therapist                Modalities       02/22/18 1400          Moist Heat    MH Applied Yes   With premod, no skin irritation observed  -AD      Location right knee  -AD      Rx Minutes 10 mins  -AD      MH Prior to Rx Yes   w/ estim in supine position  -AD      Ultrasound 98203    Location R) medial knee  -AD      Rx Minutes 8 min  -AD      Duty Cycle 50  -AD      Frequency --   3.3MHz  -AD      Intensity - Wts/cm 1.2  -AD      ELECTRICAL STIMULATION    Attended/Unattended Unattended   no skin irritation observed following estim  -AD      Stimulation Type Pre-Mod  -AD      Max mAmp --   as to pt's tolerance  -AD      Location/Electrode Placement/Other R) knee  -AD      Rx Minutes 10 mins  -AD        User Key  (r) = Recorded By, (t) = Taken By, (c) = Cosigned By    Initials Name Provider Type    AD Ashley Claudene Dalton, PT Physical Therapist                Exercises       02/22/18 1400          Subjective Comments    Subjective Comments Pt reports increased right knee pain prior to today's treatment session. She stated she is unsure what caused the increase in pain.  -AD      Subjective Pain    Able to rate subjective pain? yes  -AD      Pre-Treatment Pain Level 4  -AD "      Post-Treatment Pain Level 2  -AD      Exercise 1    Exercise Name 1 Gastroc stretch, hamstring stretch, soleus stretch, QS, SAQ, SLR, LAQ, LE bicycle lv 2.5 for 6 minutes, RTB hip abduction, RTB knee flexion  -AD      Cueing 1 Verbal;Tactile  -AD      Time (Minutes) 1 43 minutes  -AD        User Key  (r) = Recorded By, (t) = Taken By, (c) = Cosigned By    Initials Name Provider Type    AD Ashley Claudene Dalton, PT Physical Therapist                             Therapy Education  Given: HEP, Symptoms/condition management, Pain management  Program: Reinforced  How Provided: Verbal, Demonstration  Provided to: Patient  Level of Understanding: Verbalized, Demonstrated              Time Calculation:   Start Time: 1300  Stop Time: 1405  Time Calculation (min): 65 min    Therapy Charges for Today     Code Description Service Date Service Provider Modifiers Qty    53980952823  PT ELECTRICAL STIM UNATTENDED 2/22/2018 Ashley Claudene Dalton, PT  1    [id number]  PT THER PROC EA 15 MIN 2/22/2018 Ashley Claudene Dalton, PT GP 3                    Ashley Claudene Dalton, PT  2/22/2018

## 2018-02-27 ENCOUNTER — HOSPITAL ENCOUNTER (OUTPATIENT)
Dept: PHYSICAL THERAPY | Facility: HOSPITAL | Age: 28
Setting detail: THERAPIES SERIES
Discharge: HOME | End: 2018-02-27

## 2018-02-27 DIAGNOSIS — G89.29 CHRONIC PAIN OF RIGHT KNEE: Primary | ICD-10-CM

## 2018-02-27 DIAGNOSIS — M25.561 CHRONIC PAIN OF RIGHT KNEE: Primary | ICD-10-CM

## 2018-02-27 PROCEDURE — 97035 APP MDLTY 1+ULTRASOUND EA 15: CPT

## 2018-02-27 PROCEDURE — 97110 THERAPEUTIC EXERCISES: CPT

## 2018-02-27 PROCEDURE — G0283 ELEC STIM OTHER THAN WOUND: HCPCS

## 2018-02-27 NOTE — THERAPY DISCHARGE NOTE
Outpatient Physical Therapy Ortho Treatment Note/Discharge Summary   [place]     Patient Name: Pili Webster  : 1990  MRN: [id number]  Today's Date: 2018      Visit Date: 2018    Visit Dx:    ICD-10-CM ICD-9-CM   1. Chronic pain of right knee M25.561 719.46    G89.29 338.29       Patient Active Problem List   Diagnosis   • External hemorrhoid, thrombosed   • Status post laparoscopic cholecystectomy        Past Medical History:   Diagnosis Date   • Abdominal pain    • Anxiety and depression    • Arthritis    • Cholecystitis    • Constipation    • Frequent UTI    • Hemorrhoids    • Kidney stones    • Nausea & vomiting    • PCOS (polycystic ovarian syndrome)    • PONV (postoperative nausea and vomiting)    • Tachycardia         Past Surgical History:   Procedure Laterality Date   • ABDOMINAL SURGERY     • CHOLECYSTECTOMY N/A 2017    Procedure: CHOLECYSTECTOMY LAPAROSCOPIC;  Surgeon: Franco Mari MD;  Location: [facility];  Service:    • DIAGNOSTIC LAPAROSCOPY      x2   • DILATATION AND CURETTAGE     • WISDOM TOOTH EXTRACTION               PT Ortho       18 1200    Subjective Comments    Subjective Comments Patient arrives to therapy w/ reports of 5/10 R) knee pain.  Pt states she wishes to discharge from therapy following today's session and f/u with referring MD.  Pt states she feels comfortable w/ home program.   -JEANNINE    Subjective Pain    Able to rate subjective pain? yes  -JEANNINE    Pre-Treatment Pain Level 5  -JEANNINE    Post-Treatment Pain Level 3  -JEANNINE    Myotomal Screen- Lower Quarter Clearing    Hip flexion (L2) Right:;4 (Good)  -JEANNINE    Knee extension (L3) Right:;4- (Good -)  -JEANNINE    Knee flexion (S2) Right:;4- (Good -)  -JEANNINE      User Key  (r) = Recorded By, (t) = Taken By, (c) = Cosigned By    Initials Name Provider Type    JEANNINE Parikh, PTA Physical Therapy Assistant                            PT Assessment/Plan       18 3644       PT Assessment     Assessment Comments Patient tolerated treatment well today w/ reports of decreased pain following session, 3/10.  Patient wishes to discharge therapy services following today's session, continue home program, and f/u with referring MD.  Patient achieved 2/3 STG and 1/3 LTG while in therapy.  Pt displayed good mechanics during today's session, and reports being independent w/ home program.  Pt educated to perform home program w/ in pain free range; pt verbalized understanding.  Patient attended at total of 7 visits including Initial Evaluation.  Thank you for your referral.   -JEANNINE     PT Plan    PT Plan Comments Patient discharged from therapy services at this time per patient request, and secondary to pt utilizing approved insurance visits.   -JEANNINE       User Key  (r) = Recorded By, (t) = Taken By, (c) = Cosigned By    Initials Name Provider Type    JEANNINE Parikh PTA Physical Therapy Assistant                Modalities       02/27/18 1200          Moist Heat    MH Applied Yes   no redness noted following mH  -JEANNINE      Location right knee  -JEANNINE      Rx Minutes 10 mins  -JEANNINE      MH Prior to Rx Yes   w/ estim in supine   -JEANNINE      Ultrasound 38524    Location R) medial knee  -JEANNINE      Rx Minutes 8 min  -JEANNINE      Duty Cycle 50  -JEANNINE      Frequency --   3.3MHz  -JEANNINE      Intensity - Wts/cm 1.2  -JEANNINE      ELECTRICAL STIMULATION    Attended/Unattended Unattended   no skin irritation observed following estim  -JEANNINE      Stimulation Type Pre-Mod  -JEANNINE      Max mAmp --   as to pt's tolerance  -JEANNINE      Location/Electrode Placement/Other R) knee  -JEANNINE      Rx Minutes 10 mins  -JEANNINE        User Key  (r) = Recorded By, (t) = Taken By, (c) = Cosigned By    Initials Name Provider Type    JEANNINE Parikh PTA Physical Therapy Assistant                Exercises       02/27/18 1200          Subjective Comments    Subjective Comments Patient arrives to therapy w/ reports of 5/10 R) knee pain.  Pt states she wishes to discharge from  "therapy following today's session and f/u with referring MD.  Pt states she feels comfortable w/ home program.   -JEANNINE      Subjective Pain    Able to rate subjective pain? yes  -JEANNINE      Pre-Treatment Pain Level 5  -JEANNINE      Post-Treatment Pain Level 3  -JEANNINE      Exercise 1    Exercise Name 1 gastroc stretch 3x20\", ham stretch 3x20\", QS 15x2, SLR 10x2, LAQ 15x2, ball squeeze 15x2, knee flex w/ tband (red) 15x2  -JEANNINE      Cueing 1 Verbal;Tactile;Demo  -JEANNINE      Time (Minutes) 1 35 min  -JEANNINE        User Key  (r) = Recorded By, (t) = Taken By, (c) = Cosigned By    Initials Name Provider Type    JEANNINE Parikh PTA Physical Therapy Assistant                               PT OP Goals       02/27/18 1200       PT Short Term Goals    STG Date to Achieve 02/13/18  -JEANNINE     STG 1 Pt will be instructed in a HEP.  -JEANNINE     STG 1 Progress Met  -JEANNINE     STG 2 Pt will improve right knee flexion to 130degrees.  -JEANNINE     STG 2 Progress Met  -JEANNINE     STG 2 Progress Comments Patient displayed 135 degrees of R) knee flexion AROM.  -JEANNINE     STG 3 Pt will report pain no greater than 5/10.  -JEANNINE     STG 3 Progress Not Met  -JEANNINE     STG 3 Progress Comments Patient reports 5/10 current pain, 3/10 pain at best, and 9/10 pain at worst.   -JEANNINE     Long Term Goals    LTG 1 Pt will be able to stand for 30min without pain.  -JEANNINE     LTG 1 Progress Met  -JEANNINE     LTG 1 Progress Comments Patient reports she is able to stand approx 30-45 minutes before increase in knee pain.   -JEANNINE     LTG 2 Pt will improve LEFS to 30% or less to demonstrate improved mobility.  -JEANNINE     LTG 2 Progress Not Met  -JEANNINE     LTG 3 Pt will report pain no greater than 3/10.  -JEANNINE     LTG 3 Progress Not Met  -JEANNINE       User Key  (r) = Recorded By, (t) = Taken By, (c) = Cosigned By    Initials Name Provider Type    JEANNINE Parikh PTA Physical Therapy Assistant          Therapy Education  Given: HEP, Symptoms/condition management, Pain management  Program: Reinforced  How " Provided: Verbal, Demonstration  Provided to: Patient  Level of Understanding: Verbalized, Demonstrated, Teach back education performed    Outcome Measure Options: Lower Extremity Functional Scale (LEFS)  Lower Extremity Functional Index  Any of your usual work, housework or school activities: A little bit of difficulty  Your usual hobbies, recreational or sporting activities: Moderate difficulty  Getting into or out of the bath: A little bit of difficulty  Walking between rooms: No difficulty  Putting on your shoes or socks: A little bit of difficulty  Squatting: Moderate difficulty  Lifting an object, like a bag of groceries from the floor: Moderate difficulty  Performing light activities around your home: A little bit of difficulty  Performing heavy activities around your home: Quite a bit of difficulty  Getting into or out of a car: A little bit of difficulty  Walking 2 blocks: Moderate difficulty  Walking a mile: Quite a bit of difficulty  Going up or down 10 stairs (about 1 flight of stairs): Quite a bit of difficulty  Standing for 1 hour: Quite a bit of difficulty  Sitting for 1 hour: A little bit of difficulty  Running on even ground: Quite a bit of difficulty  Running on uneven ground: Quite a bit of difficulty  Making sharp turns while running fast: Quite a bit of difficulty  Hopping: Moderate difficulty  Rolling over in bed: A little bit of difficulty  Total: 42      Time Calculation:   Start Time: 1100  Stop Time: 1155  Time Calculation (min): 55 min    Therapy Charges for Today     Code Description Service Date Service Provider Modifiers Qty    64452515229 HC PT THER PROC EA 15 MIN 2/27/2018 Janet Parikh PTA GP 2    44878023554 HC PT ULTRASOUND EA 15 MIN 2/27/2018 Janet Parikh PTA GP 1    39087539046 HC PT ELECTRICAL STIM UNATTENDED 2/27/2018 Janet Parikh PTA  1          PT G-Codes  Outcome Measure Options: Lower Extremity Functional Scale (LEFS)     OP PT Discharge  Summary  Date of Discharge: 02/27/18  Reason for Discharge: Patient/Caregiver request, other (comment) (Patient utilized approved insurance visits)  Outcomes Achieved: Patient able to partially acheive established goals  Discharge Destination: Home with home program, Other (comment) (home with f/u with referring MD)      Janet Lisa. Kati, PTA  2/27/2018

## 2018-09-07 ENCOUNTER — HOSPITAL ENCOUNTER (EMERGENCY)
Facility: HOSPITAL | Age: 28
Discharge: HOME | End: 2018-09-07
Attending: EMERGENCY MEDICINE | Admitting: NURSE PRACTITIONER

## 2018-09-07 ENCOUNTER — APPOINTMENT (OUTPATIENT)
Dept: CT IMAGING | Facility: HOSPITAL | Age: 28
End: 2018-09-07

## 2018-09-07 VITALS
DIASTOLIC BLOOD PRESSURE: 80 MMHG | BODY MASS INDEX: 33.06 KG/M2 | SYSTOLIC BLOOD PRESSURE: 130 MMHG | HEART RATE: 90 BPM | HEIGHT: 59 IN | WEIGHT: 164 LBS | TEMPERATURE: 98.1 F | RESPIRATION RATE: 18 BRPM | OXYGEN SATURATION: 98 %

## 2018-09-07 DIAGNOSIS — N83.202 LEFT OVARIAN CYST: Primary | ICD-10-CM

## 2018-09-07 LAB
6-ACETYL MORPHINE: NEGATIVE
ALBUMIN SERPL-MCNC: 5 G/DL (ref 3.5–5)
ALBUMIN/GLOB SERPL: 1.4 G/DL (ref 1.5–2.5)
ALP SERPL-CCNC: 49 U/L (ref 35–104)
ALT SERPL W P-5'-P-CCNC: 15 U/L (ref 10–36)
AMPHET+METHAMPHET UR QL: NEGATIVE
ANION GAP SERPL CALCULATED.3IONS-SCNC: 6.2 MMOL/L (ref 3.6–11.2)
AST SERPL-CCNC: 45 U/L (ref 10–30)
B-HCG UR QL: NEGATIVE
BACTERIA UR QL AUTO: ABNORMAL /HPF
BARBITURATES UR QL SCN: NEGATIVE
BASOPHILS # BLD AUTO: 0.03 10*3/MM3 (ref 0–0.3)
BASOPHILS NFR BLD AUTO: 0.2 % (ref 0–2)
BENZODIAZ UR QL SCN: NEGATIVE
BILIRUB SERPL-MCNC: 0.4 MG/DL (ref 0.2–1.8)
BILIRUB UR QL STRIP: NEGATIVE
BUN BLD-MCNC: 10 MG/DL (ref 7–21)
BUN/CREAT SERPL: 11.4 (ref 7–25)
BUPRENORPHINE SERPL-MCNC: NEGATIVE NG/ML
CALCIUM SPEC-SCNC: 9.9 MG/DL (ref 7.7–10)
CANNABINOIDS SERPL QL: NEGATIVE
CHLORIDE SERPL-SCNC: 112 MMOL/L (ref 99–112)
CLARITY UR: ABNORMAL
CO2 SERPL-SCNC: 16.8 MMOL/L (ref 24.3–31.9)
COCAINE UR QL: NEGATIVE
COLOR UR: YELLOW
CREAT BLD-MCNC: 0.88 MG/DL (ref 0.43–1.29)
CRP SERPL-MCNC: 1.12 MG/DL (ref 0–0.99)
DEPRECATED RDW RBC AUTO: 38.7 FL (ref 37–54)
EOSINOPHIL # BLD AUTO: 0.28 10*3/MM3 (ref 0–0.7)
EOSINOPHIL NFR BLD AUTO: 2.2 % (ref 0–5)
ERYTHROCYTE [DISTWIDTH] IN BLOOD BY AUTOMATED COUNT: 11.8 % (ref 11.5–14.5)
GFR SERPL CREATININE-BSD FRML MDRD: 75 ML/MIN/1.73
GLOBULIN UR ELPH-MCNC: 3.5 GM/DL
GLUCOSE BLD-MCNC: 97 MG/DL (ref 70–110)
GLUCOSE UR STRIP-MCNC: NEGATIVE MG/DL
HCT VFR BLD AUTO: 40.4 % (ref 37–47)
HGB BLD-MCNC: 14.3 G/DL (ref 12–16)
HGB UR QL STRIP.AUTO: ABNORMAL
HYALINE CASTS UR QL AUTO: ABNORMAL /LPF
IMM GRANULOCYTES # BLD: 0.03 10*3/MM3 (ref 0–0.03)
IMM GRANULOCYTES NFR BLD: 0.2 % (ref 0–0.5)
KETONES UR QL STRIP: NEGATIVE
LEUKOCYTE ESTERASE UR QL STRIP.AUTO: ABNORMAL
LYMPHOCYTES # BLD AUTO: 4.32 10*3/MM3 (ref 1–3)
LYMPHOCYTES NFR BLD AUTO: 33.2 % (ref 21–51)
MCH RBC QN AUTO: 32.4 PG (ref 27–33)
MCHC RBC AUTO-ENTMCNC: 35.4 G/DL (ref 33–37)
MCV RBC AUTO: 91.4 FL (ref 80–94)
METHADONE UR QL SCN: NEGATIVE
MONOCYTES # BLD AUTO: 0.68 10*3/MM3 (ref 0.1–0.9)
MONOCYTES NFR BLD AUTO: 5.2 % (ref 0–10)
NEUTROPHILS # BLD AUTO: 7.67 10*3/MM3 (ref 1.4–6.5)
NEUTROPHILS NFR BLD AUTO: 59 % (ref 30–70)
NITRITE UR QL STRIP: NEGATIVE
OPIATES UR QL: NEGATIVE
OSMOLALITY SERPL CALC.SUM OF ELEC: 269.1 MOSM/KG (ref 273–305)
OXYCODONE UR QL SCN: NEGATIVE
PCP UR QL SCN: NEGATIVE
PH UR STRIP.AUTO: <=5 [PH] (ref 5–8)
PLATELET # BLD AUTO: 278 10*3/MM3 (ref 130–400)
PMV BLD AUTO: 9.4 FL (ref 6–10)
POTASSIUM BLD-SCNC: 5.7 MMOL/L (ref 3.5–5.3)
PROT SERPL-MCNC: 8.5 G/DL (ref 6–8)
PROT UR QL STRIP: NEGATIVE
RBC # BLD AUTO: 4.42 10*6/MM3 (ref 4.2–5.4)
RBC # UR: ABNORMAL /HPF
REF LAB TEST METHOD: ABNORMAL
SODIUM BLD-SCNC: 135 MMOL/L (ref 135–153)
SP GR UR STRIP: 1.01 (ref 1–1.03)
SQUAMOUS #/AREA URNS HPF: ABNORMAL /HPF
UROBILINOGEN UR QL STRIP: ABNORMAL
WBC NRBC COR # BLD: 13.01 10*3/MM3 (ref 4.5–12.5)
WBC UR QL AUTO: ABNORMAL /HPF

## 2018-09-07 PROCEDURE — 80307 DRUG TEST PRSMV CHEM ANLYZR: CPT | Performed by: NURSE PRACTITIONER

## 2018-09-07 PROCEDURE — 99283 EMERGENCY DEPT VISIT LOW MDM: CPT

## 2018-09-07 PROCEDURE — 96375 TX/PRO/DX INJ NEW DRUG ADDON: CPT

## 2018-09-07 PROCEDURE — 25010000002 BUTORPHANOL PER 1 MG: Performed by: NURSE PRACTITIONER

## 2018-09-07 PROCEDURE — 96374 THER/PROPH/DIAG INJ IV PUSH: CPT

## 2018-09-07 PROCEDURE — 74176 CT ABD & PELVIS W/O CONTRAST: CPT

## 2018-09-07 PROCEDURE — 36415 COLL VENOUS BLD VENIPUNCTURE: CPT

## 2018-09-07 PROCEDURE — 96361 HYDRATE IV INFUSION ADD-ON: CPT

## 2018-09-07 PROCEDURE — 25010000002 ONDANSETRON PER 1 MG: Performed by: NURSE PRACTITIONER

## 2018-09-07 PROCEDURE — 81025 URINE PREGNANCY TEST: CPT | Performed by: NURSE PRACTITIONER

## 2018-09-07 PROCEDURE — 25010000002 KETOROLAC TROMETHAMINE PER 15 MG: Performed by: NURSE PRACTITIONER

## 2018-09-07 PROCEDURE — 85025 COMPLETE CBC W/AUTO DIFF WBC: CPT | Performed by: NURSE PRACTITIONER

## 2018-09-07 PROCEDURE — 74176 CT ABD & PELVIS W/O CONTRAST: CPT | Performed by: RADIOLOGY

## 2018-09-07 PROCEDURE — 81001 URINALYSIS AUTO W/SCOPE: CPT | Performed by: NURSE PRACTITIONER

## 2018-09-07 PROCEDURE — 86140 C-REACTIVE PROTEIN: CPT | Performed by: NURSE PRACTITIONER

## 2018-09-07 PROCEDURE — 80053 COMPREHEN METABOLIC PANEL: CPT | Performed by: NURSE PRACTITIONER

## 2018-09-07 RX ORDER — ONDANSETRON 4 MG/1
4 TABLET, ORALLY DISINTEGRATING ORAL EVERY 6 HOURS PRN
Qty: 10 TABLET | Refills: 0 | Status: SHIPPED | OUTPATIENT
Start: 2018-09-07 | End: 2018-11-11

## 2018-09-07 RX ORDER — SODIUM CHLORIDE 0.9 % (FLUSH) 0.9 %
10 SYRINGE (ML) INJECTION AS NEEDED
Status: DISCONTINUED | OUTPATIENT
Start: 2018-09-07 | End: 2018-09-08 | Stop reason: HOSPADM

## 2018-09-07 RX ORDER — ONDANSETRON 2 MG/ML
4 INJECTION INTRAMUSCULAR; INTRAVENOUS ONCE
Status: COMPLETED | OUTPATIENT
Start: 2018-09-07 | End: 2018-09-07

## 2018-09-07 RX ORDER — KETOROLAC TROMETHAMINE 30 MG/ML
30 INJECTION, SOLUTION INTRAMUSCULAR; INTRAVENOUS ONCE
Status: COMPLETED | OUTPATIENT
Start: 2018-09-07 | End: 2018-09-07

## 2018-09-07 RX ORDER — BUTORPHANOL TARTRATE 1 MG/ML
0.5 INJECTION, SOLUTION INTRAMUSCULAR; INTRAVENOUS ONCE
Status: COMPLETED | OUTPATIENT
Start: 2018-09-07 | End: 2018-09-07

## 2018-09-07 RX ADMIN — SODIUM CHLORIDE 1000 ML: 9 INJECTION, SOLUTION INTRAVENOUS at 20:39

## 2018-09-07 RX ADMIN — ONDANSETRON 4 MG: 2 INJECTION INTRAMUSCULAR; INTRAVENOUS at 20:42

## 2018-09-07 RX ADMIN — KETOROLAC TROMETHAMINE 30 MG: 30 INJECTION, SOLUTION INTRAMUSCULAR; INTRAVENOUS at 20:40

## 2018-09-07 RX ADMIN — BUTORPHANOL TARTRATE 0.5 MG: 1 INJECTION, SOLUTION INTRAMUSCULAR; INTRAVENOUS at 21:58

## 2018-09-08 NOTE — ED PROVIDER NOTES
Subjective   Patient is a 28-year-old female that presents to the ED for complaint of left lower quadrant pain and left flank pain.  She said that this started 3 days ago but is gradually worsened in intensity.  She reports a history of kidney stones and diverticulitis.  She says that it feels similar to previous kidney stone.  She says that she has noticed blood in her urine and has had moderate nausea.  She denies any vomiting or diarrhea.  She has had intermittent mucousy stools but says that that is no different than baseline.  Her last menstrual period was one week ago.  She denies any vaginal discharge or dyspareunia.        History provided by:  Patient   used: No    Abdominal Pain   Pain location:  L flank and LLQ  Pain quality: cramping and sharp    Pain radiates to:  Does not radiate  Pain severity:  Moderate  Onset quality:  Gradual  Duration:  3 days  Timing:  Intermittent  Progression:  Waxing and waning  Chronicity:  New  Context: not alcohol use, not awakening from sleep, not medication withdrawal, not recent illness, not recent sexual activity, not retching and not sick contacts    Relieved by:  Nothing  Worsened by:  Nothing  Ineffective treatments:  None tried  Associated symptoms: chills, dysuria, fever, hematuria and nausea    Associated symptoms: no anorexia, no belching, no chest pain, no constipation, no vaginal bleeding, no vaginal discharge and no vomiting    Risk factors: no alcohol abuse, no aspirin use and not pregnant        Review of Systems   Constitutional: Positive for chills and fever.   HENT: Negative.    Eyes: Negative.    Respiratory: Negative.    Cardiovascular: Negative for chest pain.   Gastrointestinal: Positive for abdominal pain and nausea. Negative for anorexia, constipation and vomiting.   Genitourinary: Positive for dysuria, flank pain and hematuria. Negative for menstrual problem, pelvic pain, vaginal bleeding and vaginal discharge.   Skin: Negative.     Allergic/Immunologic: Negative.    Neurological: Negative.    Hematological: Negative.    Psychiatric/Behavioral: Negative.    All other systems reviewed and are negative.      Past Medical History:   Diagnosis Date   • Abdominal pain    • Anxiety and depression    • Arthritis    • Cholecystitis    • Constipation    • Frequent UTI    • Hemorrhoids    • Kidney stones    • Nausea & vomiting    • PCOS (polycystic ovarian syndrome)    • PONV (postoperative nausea and vomiting)    • Tachycardia        Allergies   Allergen Reactions   • Latex Hives   • Sulfa Antibiotics Rash       Past Surgical History:   Procedure Laterality Date   • ABDOMINAL SURGERY     • CHOLECYSTECTOMY N/A 8/25/2017    Procedure: CHOLECYSTECTOMY LAPAROSCOPIC;  Surgeon: Franco Mari MD;  Location: [facility];  Service:    • DIAGNOSTIC LAPAROSCOPY      x2   • DILATATION AND CURETTAGE     • WISDOM TOOTH EXTRACTION         Family History   Problem Relation Age of Onset   • Family history unknown: Yes       Social History     Social History   • Marital status:      Social History Main Topics   • Smoking status: Current Every Day Smoker     Packs/day: 0.25     Years: 10.00     Types: Cigarettes   • Smokeless tobacco: Never Used   • Alcohol use No   • Drug use: No   • Sexual activity: Defer     Other Topics Concern   • Not on file           Objective   Physical Exam   Constitutional: She is oriented to person, place, and time. She appears well-developed and well-nourished.   HENT:   Head: Normocephalic.   Eyes: Pupils are equal, round, and reactive to light. EOM are normal.   Neck: Normal range of motion. Neck supple.   Cardiovascular: Normal rate, regular rhythm, normal heart sounds and intact distal pulses.    Pulmonary/Chest: Effort normal and breath sounds normal.   Abdominal: Soft. Bowel sounds are normal. There is tenderness. There is guarding.   Tenderness LLQ   Musculoskeletal: Normal range of motion.   Neurological: She is alert and  oriented to person, place, and time.   Skin: Skin is warm and dry. Capillary refill takes less than 2 seconds.   Psychiatric: She has a normal mood and affect. Her behavior is normal. Judgment and thought content normal.   Nursing note and vitals reviewed.      Procedures           ED Course  ED Course as of Sep 07 2219   Fri Sep 07, 2018   2217 3.2 x 2.7 x 2.8 cm low density left ovarian simple cyst.  Trace free fluid in the pelvis.  No hydronephrosis.  Nonobstructing calcified stones in the lower pole of right kidney CT Abdomen Pelvis Without Contrast [KK]      ED Course User Index  [KK] Casie Bates, IVELISSE                  MDM  Number of Diagnoses or Management Options  Left ovarian cyst: new and requires workup     Amount and/or Complexity of Data Reviewed  Clinical lab tests: reviewed and ordered  Tests in the radiology section of CPT®: reviewed and ordered  Tests in the medicine section of CPT®: ordered and reviewed    Risk of Complications, Morbidity, and/or Mortality  Presenting problems: moderate  Diagnostic procedures: moderate  Management options: moderate    Patient Progress  Patient progress: improved        Final diagnoses:   Left ovarian cyst            Casie Bates APRN  09/07/18 2214

## 2018-09-08 NOTE — ED NOTES
Kathia in pharmacy was called to inform him we didn't have any Stadol in the accudose.  Aristides states to go to ER #2 and get medication.     Ceferino Bee RN  09/07/18 5828

## 2018-10-13 ENCOUNTER — APPOINTMENT (OUTPATIENT)
Dept: CT IMAGING | Facility: HOSPITAL | Age: 28
End: 2018-10-13

## 2018-10-13 ENCOUNTER — HOSPITAL ENCOUNTER (EMERGENCY)
Facility: HOSPITAL | Age: 28
Discharge: HOME | End: 2018-10-13
Attending: FAMILY MEDICINE | Admitting: FAMILY MEDICINE

## 2018-10-13 VITALS
TEMPERATURE: 98 F | WEIGHT: 159 LBS | SYSTOLIC BLOOD PRESSURE: 128 MMHG | HEART RATE: 78 BPM | DIASTOLIC BLOOD PRESSURE: 78 MMHG | BODY MASS INDEX: 31.22 KG/M2 | RESPIRATION RATE: 16 BRPM | HEIGHT: 60 IN | OXYGEN SATURATION: 97 %

## 2018-10-13 DIAGNOSIS — N20.0 KIDNEY STONE: Primary | ICD-10-CM

## 2018-10-13 LAB
ALBUMIN SERPL-MCNC: 4.6 G/DL (ref 3.5–5)
ALBUMIN/GLOB SERPL: 1.4 G/DL (ref 1.5–2.5)
ALP SERPL-CCNC: 59 U/L (ref 35–104)
ALT SERPL W P-5'-P-CCNC: 16 U/L (ref 10–36)
ANION GAP SERPL CALCULATED.3IONS-SCNC: 8.5 MMOL/L (ref 3.6–11.2)
AST SERPL-CCNC: 19 U/L (ref 10–30)
B-HCG UR QL: NEGATIVE
BACTERIA UR QL AUTO: ABNORMAL /HPF
BASOPHILS # BLD AUTO: 0.05 10*3/MM3 (ref 0–0.3)
BASOPHILS NFR BLD AUTO: 0.5 % (ref 0–2)
BILIRUB SERPL-MCNC: 0.3 MG/DL (ref 0.2–1.8)
BILIRUB UR QL STRIP: NEGATIVE
BUN BLD-MCNC: 13 MG/DL (ref 7–21)
BUN/CREAT SERPL: 13.7 (ref 7–25)
CALCIUM SPEC-SCNC: 9.4 MG/DL (ref 7.7–10)
CHLORIDE SERPL-SCNC: 111 MMOL/L (ref 99–112)
CLARITY UR: ABNORMAL
CO2 SERPL-SCNC: 19.5 MMOL/L (ref 24.3–31.9)
COD CRY URNS QL: ABNORMAL /HPF
COLOR UR: ABNORMAL
CREAT BLD-MCNC: 0.95 MG/DL (ref 0.43–1.29)
DEPRECATED RDW RBC AUTO: 38.9 FL (ref 37–54)
EOSINOPHIL # BLD AUTO: 0.15 10*3/MM3 (ref 0–0.7)
EOSINOPHIL NFR BLD AUTO: 1.4 % (ref 0–5)
ERYTHROCYTE [DISTWIDTH] IN BLOOD BY AUTOMATED COUNT: 11.6 % (ref 11.5–14.5)
GFR SERPL CREATININE-BSD FRML MDRD: 69 ML/MIN/1.73
GLOBULIN UR ELPH-MCNC: 3.2 GM/DL
GLUCOSE BLD-MCNC: 84 MG/DL (ref 70–110)
GLUCOSE UR STRIP-MCNC: NEGATIVE MG/DL
HCT VFR BLD AUTO: 38.5 % (ref 37–47)
HGB BLD-MCNC: 13.1 G/DL (ref 12–16)
HGB UR QL STRIP.AUTO: ABNORMAL
HYALINE CASTS UR QL AUTO: ABNORMAL /LPF
IMM GRANULOCYTES # BLD: 0.03 10*3/MM3 (ref 0–0.03)
IMM GRANULOCYTES NFR BLD: 0.3 % (ref 0–0.5)
KETONES UR QL STRIP: ABNORMAL
LEUKOCYTE ESTERASE UR QL STRIP.AUTO: ABNORMAL
LYMPHOCYTES # BLD AUTO: 3.61 10*3/MM3 (ref 1–3)
LYMPHOCYTES NFR BLD AUTO: 32.6 % (ref 21–51)
MCH RBC QN AUTO: 31.7 PG (ref 27–33)
MCHC RBC AUTO-ENTMCNC: 34 G/DL (ref 33–37)
MCV RBC AUTO: 93.2 FL (ref 80–94)
MONOCYTES # BLD AUTO: 0.54 10*3/MM3 (ref 0.1–0.9)
MONOCYTES NFR BLD AUTO: 4.9 % (ref 0–10)
MUCOUS THREADS URNS QL MICRO: ABNORMAL /HPF
NEUTROPHILS # BLD AUTO: 6.71 10*3/MM3 (ref 1.4–6.5)
NEUTROPHILS NFR BLD AUTO: 60.3 % (ref 30–70)
NITRITE UR QL STRIP: NEGATIVE
OSMOLALITY SERPL CALC.SUM OF ELEC: 276.8 MOSM/KG (ref 273–305)
PH UR STRIP.AUTO: <=5 [PH] (ref 5–8)
PLATELET # BLD AUTO: 282 10*3/MM3 (ref 130–400)
PMV BLD AUTO: 10.2 FL (ref 6–10)
POTASSIUM BLD-SCNC: 3.9 MMOL/L (ref 3.5–5.3)
PROT SERPL-MCNC: 7.8 G/DL (ref 6–8)
PROT UR QL STRIP: ABNORMAL
RBC # BLD AUTO: 4.13 10*6/MM3 (ref 4.2–5.4)
RBC # UR: ABNORMAL /HPF
REF LAB TEST METHOD: ABNORMAL
SODIUM BLD-SCNC: 139 MMOL/L (ref 135–153)
SP GR UR STRIP: >1.03 (ref 1–1.03)
SQUAMOUS #/AREA URNS HPF: ABNORMAL /HPF
UROBILINOGEN UR QL STRIP: ABNORMAL
WBC NRBC COR # BLD: 11.09 10*3/MM3 (ref 4.5–12.5)
WBC UR QL AUTO: ABNORMAL /HPF

## 2018-10-13 PROCEDURE — 96361 HYDRATE IV INFUSION ADD-ON: CPT

## 2018-10-13 PROCEDURE — 81001 URINALYSIS AUTO W/SCOPE: CPT | Performed by: FAMILY MEDICINE

## 2018-10-13 PROCEDURE — 74176 CT ABD & PELVIS W/O CONTRAST: CPT | Performed by: RADIOLOGY

## 2018-10-13 PROCEDURE — 80053 COMPREHEN METABOLIC PANEL: CPT | Performed by: FAMILY MEDICINE

## 2018-10-13 PROCEDURE — 85025 COMPLETE CBC W/AUTO DIFF WBC: CPT | Performed by: FAMILY MEDICINE

## 2018-10-13 PROCEDURE — 25010000002 KETOROLAC TROMETHAMINE PER 15 MG: Performed by: FAMILY MEDICINE

## 2018-10-13 PROCEDURE — 81025 URINE PREGNANCY TEST: CPT | Performed by: FAMILY MEDICINE

## 2018-10-13 PROCEDURE — 99284 EMERGENCY DEPT VISIT MOD MDM: CPT

## 2018-10-13 PROCEDURE — 25010000002 PROMETHAZINE PER 50 MG: Performed by: FAMILY MEDICINE

## 2018-10-13 PROCEDURE — 96376 TX/PRO/DX INJ SAME DRUG ADON: CPT

## 2018-10-13 PROCEDURE — 25010000002 MORPHINE SULFATE (PF) 2 MG/ML SOLUTION: Performed by: FAMILY MEDICINE

## 2018-10-13 PROCEDURE — 74176 CT ABD & PELVIS W/O CONTRAST: CPT

## 2018-10-13 PROCEDURE — 96375 TX/PRO/DX INJ NEW DRUG ADDON: CPT

## 2018-10-13 PROCEDURE — 25010000002 MORPHINE PER 10 MG: Performed by: FAMILY MEDICINE

## 2018-10-13 PROCEDURE — 96374 THER/PROPH/DIAG INJ IV PUSH: CPT

## 2018-10-13 RX ORDER — IBUPROFEN 800 MG/1
800 TABLET ORAL EVERY 6 HOURS PRN
Qty: 30 TABLET | Refills: 0 | Status: SHIPPED | OUTPATIENT
Start: 2018-10-13 | End: 2018-11-11

## 2018-10-13 RX ORDER — MORPHINE SULFATE 2 MG/ML
4 INJECTION, SOLUTION INTRAMUSCULAR; INTRAVENOUS ONCE
Status: COMPLETED | OUTPATIENT
Start: 2018-10-13 | End: 2018-10-13

## 2018-10-13 RX ORDER — PROMETHAZINE HYDROCHLORIDE 25 MG/1
25 TABLET ORAL EVERY 6 HOURS PRN
Qty: 30 TABLET | Refills: 0 | Status: SHIPPED | OUTPATIENT
Start: 2018-10-13 | End: 2018-11-11

## 2018-10-13 RX ORDER — ASPIRIN 81 MG/1
81 TABLET ORAL DAILY
COMMUNITY
End: 2022-07-12

## 2018-10-13 RX ORDER — MORPHINE SULFATE 2 MG/ML
2 INJECTION, SOLUTION INTRAMUSCULAR; INTRAVENOUS ONCE
Status: COMPLETED | OUTPATIENT
Start: 2018-10-13 | End: 2018-10-13

## 2018-10-13 RX ORDER — PRENATAL VIT/IRON FUM/FOLIC AC 27MG-0.8MG
TABLET ORAL DAILY
COMMUNITY
End: 2018-11-28

## 2018-10-13 RX ORDER — KETOROLAC TROMETHAMINE 30 MG/ML
30 INJECTION, SOLUTION INTRAMUSCULAR; INTRAVENOUS ONCE
Status: DISCONTINUED | OUTPATIENT
Start: 2018-10-13 | End: 2018-10-14 | Stop reason: HOSPADM

## 2018-10-13 RX ADMIN — MORPHINE SULFATE 4 MG: 2 INJECTION, SOLUTION INTRAMUSCULAR; INTRAVENOUS at 20:36

## 2018-10-13 RX ADMIN — PROMETHAZINE HYDROCHLORIDE 12.5 MG: 25 INJECTION, SOLUTION INTRAMUSCULAR; INTRAVENOUS at 20:23

## 2018-10-13 RX ADMIN — MORPHINE SULFATE 2 MG: 2 INJECTION, SOLUTION INTRAMUSCULAR; INTRAVENOUS at 21:42

## 2018-10-13 RX ADMIN — SODIUM CHLORIDE 1000 ML: 9 INJECTION, SOLUTION INTRAVENOUS at 20:24

## 2018-10-14 NOTE — ED NOTES
Informed dr chan that patient had received previous dose of toradol prior to arrival; order changed to morphine.      Lara Cueto, DEBBIE  10/13/18 2037

## 2018-10-14 NOTE — ED PROVIDER NOTES
Subjective   Patient is a 28-year-old female presents emergency department for right-sided flank pain that began around 3 PM this afternoon.  Patient states she went to an urgent care center to address the symptoms and was given 60 mg of IM Toradol.  Patient was advised to come to the emergency department if symptoms not improved.  She has had associated nausea without vomiting and cannot find a comfortable position.  She states that she's had many kidney stones in the past and is having similar symptoms now.  She denies any dysuria, fever, chills.  She does admit to some urinary hesitancy.            Review of Systems   Constitutional: Negative for activity change, appetite change, chills, diaphoresis and fever.   HENT: Negative for drooling, postnasal drip, rhinorrhea and sore throat.    Eyes: Negative for discharge and itching.   Respiratory: Negative for cough, choking, chest tightness, shortness of breath and wheezing.    Cardiovascular: Negative for chest pain and leg swelling.   Gastrointestinal: Positive for abdominal pain and nausea. Negative for abdominal distention, constipation, diarrhea and vomiting.   Endocrine: Negative for cold intolerance and heat intolerance.   Genitourinary: Negative for dysuria, flank pain and hematuria.   Musculoskeletal: Negative for back pain and neck pain.   Skin: Negative for color change and pallor.   Neurological: Negative for dizziness and light-headedness.   Psychiatric/Behavioral: Negative for agitation and behavioral problems.       Past Medical History:   Diagnosis Date   • Abdominal pain    • Anxiety and depression    • Arthritis    • Cholecystitis    • Constipation    • Frequent UTI    • Hemorrhoids    • Kidney stones    • Nausea & vomiting    • PCOS (polycystic ovarian syndrome)    • PONV (postoperative nausea and vomiting)    • Tachycardia        Allergies   Allergen Reactions   • Latex Hives   • Sulfa Antibiotics Rash       Past Surgical History:   Procedure  Laterality Date   • ABDOMINAL SURGERY     • CHOLECYSTECTOMY N/A 8/25/2017    Procedure: CHOLECYSTECTOMY LAPAROSCOPIC;  Surgeon: Fracno Mari MD;  Location: [facility];  Service:    • DIAGNOSTIC LAPAROSCOPY      x2   • DILATATION AND CURETTAGE     • WISDOM TOOTH EXTRACTION         Family History   Problem Relation Age of Onset   • Family history unknown: Yes       Social History     Social History   • Marital status:      Social History Main Topics   • Smoking status: Current Every Day Smoker     Packs/day: 0.25     Years: 10.00     Types: Cigarettes   • Smokeless tobacco: Never Used   • Alcohol use No   • Drug use: No   • Sexual activity: Defer     Other Topics Concern   • Not on file           Objective   Physical Exam   Constitutional: She is oriented to person, place, and time. She appears well-developed and well-nourished. No distress.   HENT:   Head: Normocephalic and atraumatic.   Right Ear: External ear normal.   Left Ear: External ear normal.   Mouth/Throat: Oropharynx is clear and moist. No oropharyngeal exudate.   Eyes: Pupils are equal, round, and reactive to light. Conjunctivae are normal. Right eye exhibits no discharge. Left eye exhibits no discharge. No scleral icterus.   Neck: Normal range of motion. Neck supple. No JVD present. No tracheal deviation present. No thyromegaly present.   Cardiovascular: Normal rate, regular rhythm and normal heart sounds.  Exam reveals no gallop and no friction rub.    No murmur heard.  Pulmonary/Chest: Effort normal and breath sounds normal. No stridor. No respiratory distress. She has no wheezes. She has no rales.   Abdominal: Soft. Bowel sounds are normal. She exhibits no distension. There is no tenderness. There is CVA tenderness. There is no rebound and no guarding.   Musculoskeletal: Normal range of motion. She exhibits no edema or deformity.   Neurological: She is alert and oriented to person, place, and time.   Skin: Skin is warm and dry. Capillary  refill takes less than 2 seconds. She is not diaphoretic. No erythema. No pallor.   Psychiatric: She has a normal mood and affect. Her behavior is normal.   Nursing note and vitals reviewed.      Procedures           ED Course  ED Course as of Oct 14 0535   Sun Oct 14, 2018   0534 Nonobstructing right renal calculus.  No acute abnormalities CT Abdomen Pelvis Without Contrast [EG]      ED Course User Index  [EG] Idalmis Phoenix, DO                  MDM  Number of Diagnoses or Management Options  Kidney stone: new and requires workup     Amount and/or Complexity of Data Reviewed  Clinical lab tests: ordered and reviewed  Tests in the radiology section of CPT®: ordered and reviewed  Tests in the medicine section of CPT®: ordered and reviewed  Obtain history from someone other than the patient: yes  Review and summarize past medical records: yes  Independent visualization of images, tracings, or specimens: yes    Risk of Complications, Morbidity, and/or Mortality  Presenting problems: high  Diagnostic procedures: high  Management options: high  General comments: Patient is a 28-year-old female presents emergency department for right-sided flank pain.  Patient has a history of kidney stones.  Patient was found to have close RBCs on urinalysis however CT scan of the abdomen pelvis showed no kidney stone.  It appears to be that she has passed the kidney stone while in the emergency department.  She is no longer any pain by the end of the visit and was discharged home in stable condition.    Critical Care  Total time providing critical care: < 30 minutes    Patient Progress  Patient progress: improved        Final diagnoses:   Kidney stone            Idalmis Phoenix DO  10/14/18 0218       Idalmis Phoenix,   10/14/18 0535

## 2018-11-05 ENCOUNTER — HOSPITAL ENCOUNTER (OUTPATIENT)
Dept: GENERAL RADIOLOGY | Facility: HOSPITAL | Age: 28
Discharge: HOME | End: 2018-11-05
Attending: UROLOGY | Admitting: UROLOGY

## 2018-11-05 ENCOUNTER — OFFICE VISIT (OUTPATIENT)
Dept: UROLOGY | Facility: CLINIC | Age: 28
End: 2018-11-05

## 2018-11-05 VITALS — BODY MASS INDEX: 31.22 KG/M2 | HEIGHT: 60 IN | WEIGHT: 159 LBS

## 2018-11-05 DIAGNOSIS — N20.0 KIDNEY STONE: ICD-10-CM

## 2018-11-05 DIAGNOSIS — N20.0 KIDNEY STONE: Primary | ICD-10-CM

## 2018-11-05 PROCEDURE — 74018 RADEX ABDOMEN 1 VIEW: CPT | Performed by: RADIOLOGY

## 2018-11-05 PROCEDURE — 99214 OFFICE O/P EST MOD 30 MIN: CPT | Performed by: UROLOGY

## 2018-11-05 PROCEDURE — 74018 RADEX ABDOMEN 1 VIEW: CPT

## 2018-11-05 RX ORDER — TAMSULOSIN HYDROCHLORIDE 0.4 MG/1
1 CAPSULE ORAL NIGHTLY
Qty: 30 CAPSULE | Refills: 3 | Status: SHIPPED | OUTPATIENT
Start: 2018-11-05 | End: 2018-11-20

## 2018-11-05 NOTE — PROGRESS NOTES
Chief Complaint:          Chief Complaint   Patient presents with   • Nephrolithiasis       HPI:   28 y.o. female.  28-year-old white female referred from the emergency room for follow-up, this is her 10th stone in 3 years, she is always passed them, she most recently had a right 4 mm lower pole stone characterized with no hydronephrosis and nausea, no vomiting and recurrent pain.  She is a  5 para 0 toe Maral 5.  She's had 3 laparoscopic surgeries for endometriosis.  I reviewed KUB obtained today that showed 2 punctate stones and a large amount of fecal material overlying the renal shadow I'm going to recommend observation, see her back in a month I don't believe she needs a surgical intervention at this time I discussed the indications for intervention    Past Medical History:        Past Medical History:   Diagnosis Date   • Abdominal pain    • Anxiety and depression    • Arthritis    • Cholecystitis    • Constipation    • Frequent UTI    • Hemorrhoids    • Kidney stones    • Nausea & vomiting    • PCOS (polycystic ovarian syndrome)    • PONV (postoperative nausea and vomiting)    • Tachycardia          Current Meds:     Current Outpatient Prescriptions   Medication Sig Dispense Refill   • aspirin 81 MG EC tablet Take 81 mg by mouth Daily.     • ibuprofen (ADVIL,MOTRIN) 800 MG tablet Take 1 tablet by mouth Every 6 (Six) Hours As Needed for Mild Pain . 30 tablet 0   • loratadine (CLARITIN) 10 MG tablet      • ondansetron ODT (ZOFRAN-ODT) 4 MG disintegrating tablet Take 1 tablet by mouth Every 6 (Six) Hours As Needed for Nausea or Vomiting. 10 tablet 0   • Prenatal Vit-Fe Fumarate-FA (PRENATAL VITAMIN 27-0.8) 27-0.8 MG tablet tablet Take  by mouth Daily.     • promethazine (PHENERGAN) 25 MG tablet Take 1 tablet by mouth Every 6 (Six) Hours As Needed for Nausea or Vomiting. 30 tablet 0     No current facility-administered medications for this visit.         Allergies:      Allergies   Allergen Reactions   •  Latex Hives   • Sulfa Antibiotics Rash        Past Surgical History:     Past Surgical History:   Procedure Laterality Date   • ABDOMINAL SURGERY     • CHOLECYSTECTOMY N/A 8/25/2017    Procedure: CHOLECYSTECTOMY LAPAROSCOPIC;  Surgeon: Franco Mari MD;  Location: [facility];  Service:    • DIAGNOSTIC LAPAROSCOPY      x2   • DILATATION AND CURETTAGE     • WISDOM TOOTH EXTRACTION           Social History:     Social History     Social History   • Marital status:      Spouse name: N/A   • Number of children: N/A   • Years of education: N/A     Occupational History   • Not on file.     Social History Main Topics   • Smoking status: Current Every Day Smoker     Packs/day: 0.25     Years: 10.00     Types: Cigarettes   • Smokeless tobacco: Never Used   • Alcohol use No   • Drug use: No   • Sexual activity: Defer     Other Topics Concern   • Not on file     Social History Narrative   • No narrative on file       Family History:     Family History   Problem Relation Age of Onset   • Family history unknown: Yes       Review of Systems:     Review of Systems   Constitutional: Positive for fatigue. Negative for chills and fever.   Respiratory: Negative for cough, shortness of breath and wheezing.    Cardiovascular: Negative for leg swelling.   Gastrointestinal: Positive for abdominal pain. Negative for nausea and vomiting.   Musculoskeletal: Positive for joint swelling. Negative for back pain.   Neurological: Negative for dizziness.   Psychiatric/Behavioral: Negative for confusion.       Physical Exam:     Physical Exam   Constitutional: She appears well-developed and well-nourished.   HENT:   Head: Normocephalic and atraumatic.   Right Ear: External ear normal.   Left Ear: External ear normal.   Mouth/Throat: Oropharynx is clear and moist.   Eyes: Pupils are equal, round, and reactive to light. Conjunctivae are normal.   Cardiovascular: Normal rate, regular rhythm, normal heart sounds and intact distal pulses.     Pulmonary/Chest: Effort normal and breath sounds normal.   Abdominal: Soft. Bowel sounds are normal. She exhibits no distension and no mass. There is no tenderness. There is no rebound and no guarding.   Genitourinary: No vaginal discharge found.   Musculoskeletal: Normal range of motion.   Neurological: She is alert. She has normal reflexes.   Skin: Skin is warm and dry.   Psychiatric: She has a normal mood and affect. Her behavior is normal. Judgment and thought content normal.       I have reviewed the following portions of the patient's history: allergies, current medications, past family history, past medical history, past social history, past surgical history, problem list and ROS and confirm it's accurate.      Procedure:       Assessment/Plan:   Renal calculus-we discussed the presence of the stone we discussed the various therapeutic options available including percutaneous nephrostolithotomy, lithotripsy.  We discussed the risks of lithotripsy including the passage of stones the development of a large string of stones in the distal ureter known as Steinstrasse.  In the 3% incidence of that we will need to proceed with a ureteroscopy for obstructing fragments.  Extremely rare incidence of renal hematoma.  And the significance of this.  We discussed the absolute relative indicators for intervention including the presence of sepsis, and pain we cannot control is the primary need for urgent intervention.  We discussed placement of a stent if indicated and the management of the stent as well.     Patient's Body mass index is 31.05 kg/m². BMI is above normal parameters. Recommendations include: educational material.          This document has been electronically signed by IMELDA THOMSON MD November 5, 2018 8:53 AM

## 2018-11-06 ENCOUNTER — OFFICE VISIT (OUTPATIENT)
Dept: UROLOGY | Facility: CLINIC | Age: 28
End: 2018-11-06

## 2018-11-06 ENCOUNTER — HOSPITAL ENCOUNTER (OUTPATIENT)
Dept: GENERAL RADIOLOGY | Facility: HOSPITAL | Age: 28
Discharge: HOME | End: 2018-11-06
Attending: UROLOGY | Admitting: UROLOGY

## 2018-11-06 VITALS — HEIGHT: 60 IN | BODY MASS INDEX: 31.22 KG/M2 | WEIGHT: 159 LBS

## 2018-11-06 DIAGNOSIS — N20.0 KIDNEY STONE: ICD-10-CM

## 2018-11-06 DIAGNOSIS — N20.0 KIDNEY STONE: Primary | ICD-10-CM

## 2018-11-06 DIAGNOSIS — N20.0 RENAL CALCULUS: Primary | ICD-10-CM

## 2018-11-06 PROCEDURE — 99213 OFFICE O/P EST LOW 20 MIN: CPT | Performed by: UROLOGY

## 2018-11-06 PROCEDURE — 74018 RADEX ABDOMEN 1 VIEW: CPT | Performed by: RADIOLOGY

## 2018-11-06 PROCEDURE — 74018 RADEX ABDOMEN 1 VIEW: CPT

## 2018-11-06 RX ORDER — HYDROCODONE BITARTRATE AND ACETAMINOPHEN 5; 325 MG/1; MG/1
TABLET ORAL
Qty: 12 TABLET | Refills: 0 | Status: SHIPPED | OUTPATIENT
Start: 2018-11-06 | End: 2018-11-11

## 2018-11-06 NOTE — PROGRESS NOTES
Chief Complaint:          Chief Complaint   Patient presents with   • Nephrolithiasis       HPI:   28 y.o. female.  28-year-old white female returns today having been seen yesterday.  I repeated her KUB .  I do not see a radio opaque stone in the renal fossa.  She says she's had nausea no vomiting chills but no fever and right flank pain I'm want to give her pain medication and alpha blockade see her back in a week if I can't see it again I'm I recommend a stone CT and probable intervention if she persists with pain    Past Medical History:        Past Medical History:   Diagnosis Date   • Abdominal pain    • Anxiety and depression    • Arthritis    • Cholecystitis    • Constipation    • Frequent UTI    • Hemorrhoids    • Kidney stones    • Nausea & vomiting    • PCOS (polycystic ovarian syndrome)    • PONV (postoperative nausea and vomiting)    • Tachycardia          Current Meds:     Current Outpatient Prescriptions   Medication Sig Dispense Refill   • aspirin 81 MG EC tablet Take 81 mg by mouth Daily.     • ibuprofen (ADVIL,MOTRIN) 800 MG tablet Take 1 tablet by mouth Every 6 (Six) Hours As Needed for Mild Pain . 30 tablet 0   • loratadine (CLARITIN) 10 MG tablet      • ondansetron ODT (ZOFRAN-ODT) 4 MG disintegrating tablet Take 1 tablet by mouth Every 6 (Six) Hours As Needed for Nausea or Vomiting. 10 tablet 0   • Prenatal Vit-Fe Fumarate-FA (PRENATAL VITAMIN 27-0.8) 27-0.8 MG tablet tablet Take  by mouth Daily.     • promethazine (PHENERGAN) 25 MG tablet Take 1 tablet by mouth Every 6 (Six) Hours As Needed for Nausea or Vomiting. 30 tablet 0   • tamsulosin (FLOMAX) 0.4 MG capsule 24 hr capsule Take 1 capsule by mouth Every Night. 30 capsule 3     No current facility-administered medications for this visit.         Allergies:      Allergies   Allergen Reactions   • Latex Hives   • Sulfa Antibiotics Rash        Past Surgical History:     Past Surgical History:   Procedure Laterality Date   • ABDOMINAL SURGERY      • CHOLECYSTECTOMY N/A 8/25/2017    Procedure: CHOLECYSTECTOMY LAPAROSCOPIC;  Surgeon: Franco Mari MD;  Location: [facility] OR;  Service:    • DIAGNOSTIC LAPAROSCOPY      x2   • DILATATION AND CURETTAGE     • WISDOM TOOTH EXTRACTION           Social History:     Social History     Social History   • Marital status:      Spouse name: N/A   • Number of children: N/A   • Years of education: N/A     Occupational History   • Not on file.     Social History Main Topics   • Smoking status: Current Every Day Smoker     Packs/day: 0.25     Years: 10.00     Types: Cigarettes   • Smokeless tobacco: Never Used   • Alcohol use No   • Drug use: No   • Sexual activity: Defer     Other Topics Concern   • Not on file     Social History Narrative   • No narrative on file       Family History:     Family History   Problem Relation Age of Onset   • Family history unknown: Yes       Review of Systems:     Review of Systems   Constitutional: Negative.  Negative for activity change, appetite change, chills, diaphoresis, fatigue and unexpected weight change.   HENT: Negative for congestion, dental problem, drooling, ear discharge, ear pain, facial swelling, hearing loss, mouth sores, nosebleeds, postnasal drip, rhinorrhea, sinus pressure, sneezing, sore throat, tinnitus, trouble swallowing and voice change.    Eyes: Negative.  Negative for photophobia, pain, discharge, redness, itching and visual disturbance.   Respiratory: Negative.  Negative for apnea, cough, choking, chest tightness, shortness of breath, wheezing and stridor.    Cardiovascular: Negative.  Negative for chest pain, palpitations and leg swelling.   Gastrointestinal: Negative.  Negative for abdominal distention, abdominal pain, anal bleeding, blood in stool, constipation, diarrhea, nausea, rectal pain and vomiting.   Endocrine: Negative.  Negative for cold intolerance, heat intolerance, polydipsia, polyphagia and polyuria.   Musculoskeletal: Negative.  Negative for  arthralgias, back pain, gait problem, joint swelling, myalgias, neck pain and neck stiffness.   Skin: Negative.  Negative for color change, pallor, rash and wound.   Allergic/Immunologic: Negative.  Negative for environmental allergies, food allergies and immunocompromised state.   Neurological: Negative.  Negative for dizziness, tremors, seizures, syncope, facial asymmetry, speech difficulty, weakness, light-headedness, numbness and headaches.   Hematological: Negative.  Negative for adenopathy. Does not bruise/bleed easily.   Psychiatric/Behavioral: Negative for agitation, behavioral problems, confusion, decreased concentration, dysphoric mood, hallucinations, self-injury, sleep disturbance and suicidal ideas. The patient is not nervous/anxious and is not hyperactive.    All other systems reviewed and are negative.      Physical Exam:     Physical Exam   Constitutional: She appears well-developed and well-nourished.   HENT:   Head: Normocephalic and atraumatic.   Right Ear: External ear normal.   Left Ear: External ear normal.   Mouth/Throat: Oropharynx is clear and moist.   Eyes: Pupils are equal, round, and reactive to light. Conjunctivae are normal.   Cardiovascular: Normal rate, regular rhythm, normal heart sounds and intact distal pulses.    Pulmonary/Chest: Effort normal and breath sounds normal.   Abdominal: Soft. Bowel sounds are normal. She exhibits no distension and no mass. There is no tenderness. There is no rebound and no guarding.   Genitourinary: No vaginal discharge found.   Musculoskeletal: Normal range of motion.   Neurological: She is alert. She has normal reflexes.   Skin: Skin is warm and dry.   Psychiatric: She has a normal mood and affect. Her behavior is normal. Judgment and thought content normal.       I have reviewed the following portions of the patient's history: allergies, current medications, past family history, past medical history, past social history, past surgical history,  problem list and ROS and confirm it's accurate.      Procedure:       Assessment/Plan:   Renal calculus-we discussed the presence of the stone we discussed the various therapeutic options available including percutaneous nephrostolithotomy, lithotripsy.  We discussed the risks of lithotripsy including the passage of stones the development of a large string of stones in the distal ureter known as Steinstrasse.  In the 3% incidence of that we will need to proceed with a ureteroscopy for obstructing fragments.  Extremely rare incidence of renal hematoma.  And the significance of this.  We discussed the absolute relative indicators for intervention including the presence of sepsis, and pain we cannot control is the primary need for urgent intervention.  We discussed placement of a stent if indicated and the management of the stent as well.     Patient's Body mass index is 31.05 kg/m². BMI is above normal parameters. Recommendations include: educational material.          This document has been electronically signed by IMELDA THOMSON MD November 6, 2018 1:30 PM

## 2018-11-11 ENCOUNTER — APPOINTMENT (OUTPATIENT)
Dept: CT IMAGING | Facility: HOSPITAL | Age: 28
End: 2018-11-11

## 2018-11-11 ENCOUNTER — HOSPITAL ENCOUNTER (EMERGENCY)
Facility: HOSPITAL | Age: 28
Discharge: HOME | End: 2018-11-12
Attending: EMERGENCY MEDICINE | Admitting: EMERGENCY MEDICINE

## 2018-11-11 DIAGNOSIS — G89.29 CHRONIC ABDOMINAL PAIN: Primary | ICD-10-CM

## 2018-11-11 DIAGNOSIS — R10.9 CHRONIC ABDOMINAL PAIN: Primary | ICD-10-CM

## 2018-11-11 DIAGNOSIS — R10.31 RIGHT LOWER QUADRANT ABDOMINAL PAIN: ICD-10-CM

## 2018-11-11 LAB
6-ACETYL MORPHINE: NEGATIVE
ALBUMIN SERPL-MCNC: 4.6 G/DL (ref 3.5–5)
ALBUMIN/GLOB SERPL: 1.4 G/DL (ref 1.5–2.5)
ALP SERPL-CCNC: 47 U/L (ref 35–104)
ALT SERPL W P-5'-P-CCNC: 20 U/L (ref 10–36)
AMPHET+METHAMPHET UR QL: NEGATIVE
ANION GAP SERPL CALCULATED.3IONS-SCNC: 6.1 MMOL/L (ref 3.6–11.2)
AST SERPL-CCNC: 50 U/L (ref 10–30)
B-HCG UR QL: NEGATIVE
BACTERIA UR QL AUTO: ABNORMAL /HPF
BARBITURATES UR QL SCN: NEGATIVE
BASOPHILS # BLD AUTO: 0.11 10*3/MM3 (ref 0–0.3)
BASOPHILS NFR BLD AUTO: 1.2 % (ref 0–2)
BENZODIAZ UR QL SCN: NEGATIVE
BILIRUB SERPL-MCNC: 0.3 MG/DL (ref 0.2–1.8)
BILIRUB UR QL STRIP: NEGATIVE
BUN BLD-MCNC: 11 MG/DL (ref 7–21)
BUN/CREAT SERPL: 11 (ref 7–25)
BUPRENORPHINE SERPL-MCNC: NEGATIVE NG/ML
CALCIUM SPEC-SCNC: 9.4 MG/DL (ref 7.7–10)
CANNABINOIDS SERPL QL: NEGATIVE
CHLORIDE SERPL-SCNC: 114 MMOL/L (ref 99–112)
CLARITY UR: CLEAR
CO2 SERPL-SCNC: 16.9 MMOL/L (ref 24.3–31.9)
COCAINE UR QL: NEGATIVE
COLOR UR: YELLOW
CREAT BLD-MCNC: 1 MG/DL (ref 0.43–1.29)
DEPRECATED RDW RBC AUTO: 39 FL (ref 37–54)
EOSINOPHIL # BLD AUTO: 0.29 10*3/MM3 (ref 0–0.7)
EOSINOPHIL NFR BLD AUTO: 3.3 % (ref 0–5)
ERYTHROCYTE [DISTWIDTH] IN BLOOD BY AUTOMATED COUNT: 11.5 % (ref 11.5–14.5)
GFR SERPL CREATININE-BSD FRML MDRD: 65 ML/MIN/1.73
GLOBULIN UR ELPH-MCNC: 3.2 GM/DL
GLUCOSE BLD-MCNC: 80 MG/DL (ref 70–110)
GLUCOSE UR STRIP-MCNC: NEGATIVE MG/DL
HCT VFR BLD AUTO: 39.7 % (ref 37–47)
HGB BLD-MCNC: 13.2 G/DL (ref 12–16)
HGB UR QL STRIP.AUTO: ABNORMAL
HYALINE CASTS UR QL AUTO: ABNORMAL /LPF
IMM GRANULOCYTES # BLD: 0.02 10*3/MM3 (ref 0–0.03)
IMM GRANULOCYTES NFR BLD: 0.2 % (ref 0–0.5)
KETONES UR QL STRIP: ABNORMAL
LEUKOCYTE ESTERASE UR QL STRIP.AUTO: ABNORMAL
LYMPHOCYTES # BLD AUTO: 3.59 10*3/MM3 (ref 1–3)
LYMPHOCYTES NFR BLD AUTO: 40.5 % (ref 21–51)
MCH RBC QN AUTO: 31.7 PG (ref 27–33)
MCHC RBC AUTO-ENTMCNC: 33.2 G/DL (ref 33–37)
MCV RBC AUTO: 95.4 FL (ref 80–94)
METHADONE UR QL SCN: NEGATIVE
MONOCYTES # BLD AUTO: 0.44 10*3/MM3 (ref 0.1–0.9)
MONOCYTES NFR BLD AUTO: 5 % (ref 0–10)
NEUTROPHILS # BLD AUTO: 4.41 10*3/MM3 (ref 1.4–6.5)
NEUTROPHILS NFR BLD AUTO: 49.8 % (ref 30–70)
NITRITE UR QL STRIP: NEGATIVE
OPIATES UR QL: NEGATIVE
OSMOLALITY SERPL CALC.SUM OF ELEC: 272.2 MOSM/KG (ref 273–305)
OXYCODONE UR QL SCN: NEGATIVE
PCP UR QL SCN: NEGATIVE
PH UR STRIP.AUTO: 5.5 [PH] (ref 5–8)
PLATELET # BLD AUTO: 261 10*3/MM3 (ref 130–400)
PMV BLD AUTO: 9.7 FL (ref 6–10)
POTASSIUM BLD-SCNC: 3.7 MMOL/L (ref 3.5–5.3)
POTASSIUM BLD-SCNC: 6.4 MMOL/L (ref 3.5–5.3)
PROT SERPL-MCNC: 7.8 G/DL (ref 6–8)
PROT UR QL STRIP: NEGATIVE
RBC # BLD AUTO: 4.16 10*6/MM3 (ref 4.2–5.4)
RBC # UR: ABNORMAL /HPF
REF LAB TEST METHOD: ABNORMAL
SODIUM BLD-SCNC: 137 MMOL/L (ref 135–153)
SP GR UR STRIP: >=1.03 (ref 1–1.03)
SQUAMOUS #/AREA URNS HPF: ABNORMAL /HPF
UROBILINOGEN UR QL STRIP: ABNORMAL
WBC NRBC COR # BLD: 8.86 10*3/MM3 (ref 4.5–12.5)
WBC UR QL AUTO: ABNORMAL /HPF

## 2018-11-11 PROCEDURE — 25010000002 MORPHINE PER 10 MG: Performed by: EMERGENCY MEDICINE

## 2018-11-11 PROCEDURE — 80307 DRUG TEST PRSMV CHEM ANLYZR: CPT | Performed by: NURSE PRACTITIONER

## 2018-11-11 PROCEDURE — 84132 ASSAY OF SERUM POTASSIUM: CPT | Performed by: NURSE PRACTITIONER

## 2018-11-11 PROCEDURE — 96375 TX/PRO/DX INJ NEW DRUG ADDON: CPT

## 2018-11-11 PROCEDURE — 80053 COMPREHEN METABOLIC PANEL: CPT | Performed by: NURSE PRACTITIONER

## 2018-11-11 PROCEDURE — 81025 URINE PREGNANCY TEST: CPT | Performed by: NURSE PRACTITIONER

## 2018-11-11 PROCEDURE — 96374 THER/PROPH/DIAG INJ IV PUSH: CPT

## 2018-11-11 PROCEDURE — 81001 URINALYSIS AUTO W/SCOPE: CPT | Performed by: NURSE PRACTITIONER

## 2018-11-11 PROCEDURE — 25010000002 ONDANSETRON PER 1 MG: Performed by: NURSE PRACTITIONER

## 2018-11-11 PROCEDURE — 74176 CT ABD & PELVIS W/O CONTRAST: CPT | Performed by: RADIOLOGY

## 2018-11-11 PROCEDURE — 74176 CT ABD & PELVIS W/O CONTRAST: CPT

## 2018-11-11 PROCEDURE — 99284 EMERGENCY DEPT VISIT MOD MDM: CPT

## 2018-11-11 PROCEDURE — 96361 HYDRATE IV INFUSION ADD-ON: CPT

## 2018-11-11 PROCEDURE — 25010000002 MORPHINE PER 10 MG

## 2018-11-11 PROCEDURE — 25010000002 KETOROLAC TROMETHAMINE PER 15 MG: Performed by: NURSE PRACTITIONER

## 2018-11-11 PROCEDURE — 85025 COMPLETE CBC W/AUTO DIFF WBC: CPT | Performed by: NURSE PRACTITIONER

## 2018-11-11 RX ORDER — MORPHINE SULFATE 2 MG/ML
INJECTION, SOLUTION INTRAMUSCULAR; INTRAVENOUS
Status: COMPLETED
Start: 2018-11-11 | End: 2018-11-11

## 2018-11-11 RX ORDER — DICLOFENAC SODIUM 75 MG/1
75 TABLET, DELAYED RELEASE ORAL 2 TIMES DAILY PRN
Qty: 20 TABLET | Refills: 0 | Status: SHIPPED | OUTPATIENT
Start: 2018-11-11 | End: 2018-11-28

## 2018-11-11 RX ORDER — ONDANSETRON 2 MG/ML
4 INJECTION INTRAMUSCULAR; INTRAVENOUS ONCE
Status: COMPLETED | OUTPATIENT
Start: 2018-11-11 | End: 2018-11-11

## 2018-11-11 RX ORDER — SODIUM CHLORIDE 0.9 % (FLUSH) 0.9 %
10 SYRINGE (ML) INJECTION AS NEEDED
Status: DISCONTINUED | OUTPATIENT
Start: 2018-11-11 | End: 2018-11-12 | Stop reason: HOSPADM

## 2018-11-11 RX ORDER — ONDANSETRON 4 MG/1
4 TABLET, ORALLY DISINTEGRATING ORAL EVERY 6 HOURS PRN
Qty: 10 TABLET | Refills: 0 | Status: SHIPPED | OUTPATIENT
Start: 2018-11-11 | End: 2019-08-13 | Stop reason: ALTCHOICE

## 2018-11-11 RX ORDER — KETOROLAC TROMETHAMINE 30 MG/ML
30 INJECTION, SOLUTION INTRAMUSCULAR; INTRAVENOUS ONCE
Status: COMPLETED | OUTPATIENT
Start: 2018-11-11 | End: 2018-11-11

## 2018-11-11 RX ADMIN — MORPHINE SULFATE 4 MG: 4 INJECTION, SOLUTION INTRAMUSCULAR; INTRAVENOUS at 22:13

## 2018-11-11 RX ADMIN — MORPHINE SULFATE 4 MG: 2 INJECTION, SOLUTION INTRAMUSCULAR; INTRAVENOUS at 22:10

## 2018-11-11 RX ADMIN — SODIUM CHLORIDE 1000 ML: 9 INJECTION, SOLUTION INTRAVENOUS at 20:56

## 2018-11-11 RX ADMIN — ONDANSETRON 4 MG: 2 INJECTION, SOLUTION INTRAMUSCULAR; INTRAVENOUS at 20:57

## 2018-11-11 RX ADMIN — KETOROLAC TROMETHAMINE 30 MG: 30 INJECTION, SOLUTION INTRAMUSCULAR; INTRAVENOUS at 20:56

## 2018-11-12 VITALS
OXYGEN SATURATION: 99 % | HEART RATE: 71 BPM | HEIGHT: 59 IN | RESPIRATION RATE: 18 BRPM | DIASTOLIC BLOOD PRESSURE: 88 MMHG | WEIGHT: 158 LBS | TEMPERATURE: 98.1 F | SYSTOLIC BLOOD PRESSURE: 123 MMHG | BODY MASS INDEX: 31.85 KG/M2

## 2018-11-12 NOTE — ED PROVIDER NOTES
Subjective     History provided by:  Patient   used: No    Flank Pain   Pain location:  R flank  Pain quality: aching, sharp and shooting    Pain radiates to:  RLQ  Pain severity:  Moderate  Onset quality:  Gradual  Duration:  4 weeks  Timing:  Intermittent  Chronicity:  Recurrent  Context: not alcohol use, not awakening from sleep, not medication withdrawal, not recent illness, not retching and not sick contacts    Relieved by:  Nothing  Worsened by:  Nothing  Ineffective treatments:  None tried  Associated symptoms: chills, dysuria and nausea    Risk factors: no alcohol abuse, no aspirin use, has not had multiple surgeries and no recent hospitalization        Review of Systems   Constitutional: Positive for chills.   HENT: Negative.    Eyes: Negative.    Respiratory: Negative.    Cardiovascular: Negative.    Gastrointestinal: Positive for nausea.   Endocrine: Negative.    Genitourinary: Positive for dysuria, flank pain and frequency.   Skin: Negative.    Allergic/Immunologic: Negative.    Neurological: Negative.    Hematological: Negative.    Psychiatric/Behavioral: Negative.    All other systems reviewed and are negative.      Past Medical History:   Diagnosis Date   • Abdominal pain    • Anxiety and depression    • Arthritis    • Cholecystitis    • Constipation    • Frequent UTI    • Hemorrhoids    • Kidney stones    • Nausea & vomiting    • PCOS (polycystic ovarian syndrome)    • PONV (postoperative nausea and vomiting)    • Tachycardia        Allergies   Allergen Reactions   • Latex Hives   • Sulfa Antibiotics Rash       Past Surgical History:   Procedure Laterality Date   • ABDOMINAL SURGERY     • DIAGNOSTIC LAPAROSCOPY      x2   • DILATATION AND CURETTAGE     • WISDOM TOOTH EXTRACTION         Family History   Family history unknown: Yes       Social History     Socioeconomic History   • Marital status:      Spouse name: Not on file   • Number of children: Not on file   • Years of  education: Not on file   • Highest education level: Not on file   Tobacco Use   • Smoking status: Current Every Day Smoker     Packs/day: 0.25     Years: 10.00     Pack years: 2.50     Types: Cigarettes   • Smokeless tobacco: Never Used   Substance and Sexual Activity   • Alcohol use: No   • Drug use: No   • Sexual activity: Defer     Birth control/protection: None           Objective   Physical Exam   Constitutional: She is oriented to person, place, and time. She appears well-developed and well-nourished.   HENT:   Head: Normocephalic.   Eyes: Pupils are equal, round, and reactive to light.   Neck: Normal range of motion. Neck supple.   Cardiovascular: Normal rate, regular rhythm, normal heart sounds and intact distal pulses.   Pulmonary/Chest: Effort normal and breath sounds normal.   Abdominal: Soft. Bowel sounds are normal. There is tenderness.   Right CVA tenderness   Musculoskeletal: Normal range of motion.   Neurological: She is alert and oriented to person, place, and time.   Skin: Skin is warm and dry. Capillary refill takes less than 2 seconds.   Psychiatric: She has a normal mood and affect. Her behavior is normal. Judgment and thought content normal.   Nursing note and vitals reviewed.      Procedures           ED Course  ED Course as of Nov 11 2338   Sun Nov 11, 2018   2333 Non obstructing right sided nephrolithiasis is noted. Single nonobstructing calyceal stone measures approximately 4 x 2 mm.  CT Abdomen Pelvis Stone Protocol [KK]      ED Course User Index  [KK] Casie Bates, IVELISSE                  MDM  Number of Diagnoses or Management Options  Chronic abdominal pain: new and requires workup  Right lower quadrant abdominal pain: new and requires workup     Amount and/or Complexity of Data Reviewed  Clinical lab tests: reviewed and ordered  Tests in the radiology section of CPT®: reviewed and ordered  Tests in the medicine section of CPT®: reviewed and ordered    Risk of Complications,  Morbidity, and/or Mortality  Presenting problems: moderate  Diagnostic procedures: moderate  Management options: moderate    Patient Progress  Patient progress: improved        Final diagnoses:   Chronic abdominal pain   Right lower quadrant abdominal pain            Casie Bates, APRN  11/11/18 9304

## 2018-11-14 ENCOUNTER — HOSPITAL ENCOUNTER (OUTPATIENT)
Dept: MAMMOGRAPHY | Facility: HOSPITAL | Age: 28
Discharge: HOME | End: 2018-11-14
Admitting: RADIOLOGY

## 2018-11-14 ENCOUNTER — HOSPITAL ENCOUNTER (OUTPATIENT)
Dept: ULTRASOUND IMAGING | Facility: HOSPITAL | Age: 28
Discharge: HOME | End: 2018-11-14

## 2018-11-14 DIAGNOSIS — N63.0 LUMP OR MASS IN BREAST: ICD-10-CM

## 2018-11-14 PROCEDURE — 77062 BREAST TOMOSYNTHESIS BI: CPT | Performed by: RADIOLOGY

## 2018-11-14 PROCEDURE — G0279 TOMOSYNTHESIS, MAMMO: HCPCS

## 2018-11-14 PROCEDURE — 77066 DX MAMMO INCL CAD BI: CPT

## 2018-11-14 PROCEDURE — 76642 ULTRASOUND BREAST LIMITED: CPT | Performed by: RADIOLOGY

## 2018-11-14 PROCEDURE — 76642 ULTRASOUND BREAST LIMITED: CPT

## 2018-11-14 PROCEDURE — 77066 DX MAMMO INCL CAD BI: CPT | Performed by: RADIOLOGY

## 2018-11-20 ENCOUNTER — OFFICE VISIT (OUTPATIENT)
Dept: GASTROENTEROLOGY | Facility: CLINIC | Age: 28
End: 2018-11-20

## 2018-11-20 ENCOUNTER — LAB (OUTPATIENT)
Dept: LAB | Facility: HOSPITAL | Age: 28
End: 2018-11-20

## 2018-11-20 VITALS
WEIGHT: 152.6 LBS | HEART RATE: 88 BPM | OXYGEN SATURATION: 98 % | DIASTOLIC BLOOD PRESSURE: 100 MMHG | SYSTOLIC BLOOD PRESSURE: 135 MMHG | BODY MASS INDEX: 29.96 KG/M2 | HEIGHT: 60 IN

## 2018-11-20 DIAGNOSIS — R11.2 NON-INTRACTABLE VOMITING WITH NAUSEA, UNSPECIFIED VOMITING TYPE: Primary | ICD-10-CM

## 2018-11-20 DIAGNOSIS — R19.7 DIARRHEA, UNSPECIFIED TYPE: ICD-10-CM

## 2018-11-20 DIAGNOSIS — R63.4 WEIGHT LOSS, ABNORMAL: ICD-10-CM

## 2018-11-20 DIAGNOSIS — K21.9 GASTROESOPHAGEAL REFLUX DISEASE, ESOPHAGITIS PRESENCE NOT SPECIFIED: ICD-10-CM

## 2018-11-20 DIAGNOSIS — R11.2 NON-INTRACTABLE VOMITING WITH NAUSEA, UNSPECIFIED VOMITING TYPE: ICD-10-CM

## 2018-11-20 DIAGNOSIS — N20.0 NEPHROLITHIASIS: ICD-10-CM

## 2018-11-20 LAB
ALBUMIN SERPL-MCNC: 4.6 G/DL (ref 3.5–5)
ALBUMIN/GLOB SERPL: 1.5 G/DL (ref 1.5–2.5)
ALP SERPL-CCNC: 64 U/L (ref 35–104)
ALT SERPL W P-5'-P-CCNC: 17 U/L (ref 10–36)
AMYLASE SERPL-CCNC: 84 U/L (ref 28–100)
ANION GAP SERPL CALCULATED.3IONS-SCNC: 6.1 MMOL/L (ref 3.6–11.2)
AST SERPL-CCNC: 13 U/L (ref 10–30)
BASOPHILS # BLD AUTO: 0.03 10*3/MM3 (ref 0–0.3)
BASOPHILS NFR BLD AUTO: 0.3 % (ref 0–2)
BILIRUB SERPL-MCNC: 0.3 MG/DL (ref 0.2–1.8)
BUN BLD-MCNC: 11 MG/DL (ref 7–21)
BUN/CREAT SERPL: 11.5 (ref 7–25)
CALCIUM SPEC-SCNC: 9.4 MG/DL (ref 7.7–10)
CHLORIDE SERPL-SCNC: 109 MMOL/L (ref 99–112)
CO2 SERPL-SCNC: 24.9 MMOL/L (ref 24.3–31.9)
CREAT BLD-MCNC: 0.96 MG/DL (ref 0.43–1.29)
CRP SERPL-MCNC: <0.5 MG/DL (ref 0–0.99)
DEPRECATED RDW RBC AUTO: 38.8 FL (ref 37–54)
EOSINOPHIL # BLD AUTO: 0.31 10*3/MM3 (ref 0–0.7)
EOSINOPHIL NFR BLD AUTO: 3.4 % (ref 0–5)
ERYTHROCYTE [DISTWIDTH] IN BLOOD BY AUTOMATED COUNT: 11.6 % (ref 11.5–14.5)
GFR SERPL CREATININE-BSD FRML MDRD: 68 ML/MIN/1.73
GLOBULIN UR ELPH-MCNC: 3 GM/DL
GLUCOSE BLD-MCNC: 94 MG/DL (ref 70–110)
HCT VFR BLD AUTO: 40.6 % (ref 37–47)
HGB BLD-MCNC: 13.7 G/DL (ref 12–16)
IMM GRANULOCYTES # BLD: 0.02 10*3/MM3 (ref 0–0.03)
IMM GRANULOCYTES NFR BLD: 0.2 % (ref 0–0.5)
LIPASE SERPL-CCNC: 55 U/L (ref 13–60)
LYMPHOCYTES # BLD AUTO: 3.22 10*3/MM3 (ref 1–3)
LYMPHOCYTES NFR BLD AUTO: 35.7 % (ref 21–51)
MCH RBC QN AUTO: 31.6 PG (ref 27–33)
MCHC RBC AUTO-ENTMCNC: 33.7 G/DL (ref 33–37)
MCV RBC AUTO: 93.5 FL (ref 80–94)
MONOCYTES # BLD AUTO: 0.42 10*3/MM3 (ref 0.1–0.9)
MONOCYTES NFR BLD AUTO: 4.7 % (ref 0–10)
NEUTROPHILS # BLD AUTO: 5.02 10*3/MM3 (ref 1.4–6.5)
NEUTROPHILS NFR BLD AUTO: 55.7 % (ref 30–70)
OSMOLALITY SERPL CALC.SUM OF ELEC: 278.6 MOSM/KG (ref 273–305)
PLATELET # BLD AUTO: 281 10*3/MM3 (ref 130–400)
PMV BLD AUTO: 10.1 FL (ref 6–10)
POTASSIUM BLD-SCNC: 3.9 MMOL/L (ref 3.5–5.3)
PROT SERPL-MCNC: 7.6 G/DL (ref 6–8)
RBC # BLD AUTO: 4.34 10*6/MM3 (ref 4.2–5.4)
SODIUM BLD-SCNC: 140 MMOL/L (ref 135–153)
WBC NRBC COR # BLD: 9.02 10*3/MM3 (ref 4.5–12.5)

## 2018-11-20 PROCEDURE — 99204 OFFICE O/P NEW MOD 45 MIN: CPT | Performed by: PHYSICIAN ASSISTANT

## 2018-11-20 PROCEDURE — 83516 IMMUNOASSAY NONANTIBODY: CPT

## 2018-11-20 PROCEDURE — 86003 ALLG SPEC IGE CRUDE XTRC EA: CPT

## 2018-11-20 PROCEDURE — 86255 FLUORESCENT ANTIBODY SCREEN: CPT

## 2018-11-20 PROCEDURE — 86140 C-REACTIVE PROTEIN: CPT

## 2018-11-20 PROCEDURE — 85025 COMPLETE CBC W/AUTO DIFF WBC: CPT

## 2018-11-20 PROCEDURE — 80053 COMPREHEN METABOLIC PANEL: CPT

## 2018-11-20 PROCEDURE — 82150 ASSAY OF AMYLASE: CPT

## 2018-11-20 PROCEDURE — 82784 ASSAY IGA/IGD/IGG/IGM EACH: CPT

## 2018-11-20 PROCEDURE — 36415 COLL VENOUS BLD VENIPUNCTURE: CPT

## 2018-11-20 PROCEDURE — 83690 ASSAY OF LIPASE: CPT

## 2018-11-20 RX ORDER — PANTOPRAZOLE SODIUM 40 MG/1
40 TABLET, DELAYED RELEASE ORAL DAILY
COMMUNITY
End: 2020-03-16

## 2018-11-20 NOTE — PROGRESS NOTES
: 1990    Chief Complaint   Patient presents with   • Nausea   • Vomiting   • Abdominal Pain   • Weight Loss       Pili Webster is a 28 y.o. female who presents to the office today as a new patient for evaluation of Nausea; Vomiting; Abdominal Pain; and Weight Loss.    History of Present Illness:  Over the past 6 weeks, she has lost her appetite and has diarrhea or vomiting within a few minutes of eating. She has lost about 10 lbs over the past 6 weeks. She was seen in the ED for evaluation recently and was told that she was dehydrated and related it to her kidney stones. She follows with Dr. Barrow and has planned follow up in a few weeks. Bowel movements are described as watery in consistency. During this time of her illness, she has skipped 1-2 days between bowel movements intermittently. No obvious rectal bleeding. Sometimes the stool will be very light or very dark. She will have 2-3 episodes of vomiting per day intermittently. Usually, the vomitus is the food that she has eaten. She takes Zofran as needed for relief of nausea and vomiting with only mild relief. Protonix 40 mg once daily was given by her PCP only a few days ago. Previously, she was taking ranitidine daily without good relief. Reports heartburn which is constant and worse with any PO intake. She states that she has had GERD complaints since age 10. No recent stool testing. S/p cholecystectomy over 1 year ago. Abdominal pain is generalized but mostly located on right side, described as stabbing in nature and constant. She points to the right flank as the area of the most discomfort. Bloating seems to cause the most discomfort per her report. Associated symptoms are back pain, chills, fatigue and intermittent fever. No known family history of GI disease including IBD, color or stomach cancer. No personal history of EGD or colonoscopy. She had miscarriage in 2018. She had surgery in May 2018 due to endometriosis and she was  told that her bowels were connected in areas which was abnormal and this was repaired. She does not currently take any oral contraceptive.    CT abd/pelv 11/11/2018:  1. Nonobstructing right intrarenal stone.  2. Appendicolith but no evidence of appendicitis.    Labs 11/11/2018:  elev AST 50  Hgb normal  WBC normal    Review of Systems   Constitutional: Positive for appetite change, chills, fatigue and fever.   HENT: Positive for ear pain. Negative for sinus pressure, sinus pain and sore throat.    Eyes: Negative.    Respiratory: Positive for cough, chest tightness and shortness of breath. Negative for choking.    Cardiovascular: Positive for palpitations. Negative for chest pain.   Gastrointestinal: Positive for abdominal distention, abdominal pain, anal bleeding, constipation, diarrhea, nausea, rectal pain and vomiting. Negative for blood in stool.   Endocrine: Negative.    Genitourinary: Positive for difficulty urinating.   Musculoskeletal: Positive for back pain and neck pain. Negative for joint swelling.   Skin: Positive for color change and rash.   Allergic/Immunologic: Positive for environmental allergies.   Neurological: Positive for weakness, light-headedness and headaches. Negative for tremors, speech difficulty and numbness.   Hematological: Bruises/bleeds easily.   Psychiatric/Behavioral: Positive for agitation. Negative for suicidal ideas. The patient is nervous/anxious.        Past Medical History:   Diagnosis Date   • Abdominal pain    • Anxiety and depression    • Arthritis    • Cholecystitis    • Constipation    • Frequent UTI    • Hemorrhoids    • Kidney stones    • Nausea & vomiting    • PCOS (polycystic ovarian syndrome)    • PONV (postoperative nausea and vomiting)    • Tachycardia        Past Surgical History:   Procedure Laterality Date   • ABDOMINAL SURGERY     • DIAGNOSTIC LAPAROSCOPY      x2   • DILATATION AND CURETTAGE     • WISDOM TOOTH EXTRACTION         Family History   Problem Relation  "Age of Onset   • Breast cancer Maternal Aunt    • Alcohol abuse Paternal Grandfather        Social History     Socioeconomic History   • Marital status:      Spouse name: Not on file   • Number of children: Not on file   • Years of education: Not on file   • Highest education level: Not on file   Tobacco Use   • Smoking status: Current Every Day Smoker     Packs/day: 0.25     Years: 10.00     Pack years: 2.50     Types: Cigarettes   • Smokeless tobacco: Never Used   Substance and Sexual Activity   • Alcohol use: No   • Drug use: No   • Sexual activity: Defer     Birth control/protection: None       Current Outpatient Medications:   •  aspirin 81 MG EC tablet, Take 81 mg by mouth Daily., Disp: , Rfl:   •  diclofenac (VOLTAREN) 75 MG EC tablet, Take 1 tablet by mouth 2 (Two) Times a Day As Needed (moderate pain)., Disp: 20 tablet, Rfl: 0  •  loratadine (CLARITIN) 10 MG tablet, , Disp: , Rfl:   •  ondansetron ODT (ZOFRAN-ODT) 4 MG disintegrating tablet, Take 1 tablet by mouth Every 6 (Six) Hours As Needed for Nausea or Vomiting., Disp: 10 tablet, Rfl: 0  •  pantoprazole (PROTONIX) 40 MG EC tablet, Take 40 mg by mouth Daily., Disp: , Rfl:   •  Prenatal Vit-Fe Fumarate-FA (PRENATAL VITAMIN 27-0.8) 27-0.8 MG tablet tablet, Take  by mouth Daily., Disp: , Rfl:     Allergies:   Latex and Sulfa antibiotics    Vitals:  /100 (BP Location: Right arm, Patient Position: Sitting, Cuff Size: Adult)   Pulse 88   Ht 152.4 cm (60\")   Wt 69.2 kg (152 lb 9.6 oz)   LMP 11/08/2018   SpO2 98%   BMI 29.80 kg/m²     Physical Exam   Constitutional: She is oriented to person, place, and time. She appears well-developed and well-nourished. No distress.   HENT:   Head: Normocephalic and atraumatic.   Nose: Nose normal.   Mouth/Throat: Oropharynx is clear and moist.   Eyes: Conjunctivae are normal. Right eye exhibits no discharge. Left eye exhibits no discharge. No scleral icterus.   Neck: Normal range of motion. No JVD present. "   Cardiovascular: Normal rate, regular rhythm and normal heart sounds. Exam reveals no gallop and no friction rub.   No murmur heard.  Pulmonary/Chest: Effort normal and breath sounds normal. No respiratory distress. She has no wheezes. She has no rales. She exhibits no tenderness.   Abdominal: Soft. Bowel sounds are normal. She exhibits no mass. There is tenderness (generalized (mild), moderate in RLQ and right flank). There is guarding (voluntary).   Musculoskeletal: Normal range of motion. She exhibits no edema or deformity.   Neurological: She is alert and oriented to person, place, and time. Coordination normal.   Skin: Skin is warm and dry. No rash noted. She is not diaphoretic. No erythema.   Psychiatric: She has a normal mood and affect. Her behavior is normal. Judgment and thought content normal.   Vitals reviewed.      Assessment/Plan:  1. Non-intractable vomiting with nausea, unspecified vomiting type    2. Gastroesophageal reflux disease, esophagitis presence not specified    3. Diarrhea, unspecified type    4. Nephrolithiasis    5. Weight loss, abnormal      Orders Placed This Encounter   Procedures   • Clostridium Difficile Toxin, PCR - Stool, Per Rectum   • Stool Culture - Stool, Per Rectum   • Ova & Parasite Examination - Stool, Per Rectum   • Amylase   • Comprehensive Metabolic Panel   • C-reactive Protein   • Lipase   • Celiac Comprehensive Panel   • Food Allergy Profile   • Calprotectin, Fecal - Stool, Per Rectum   • Fecal Lactoferrin - Stool, Per Rectum   • Occult Blood X 1, Stool - Stool, Per Rectum   • Pancreatic Elastase, Fecal - Stool, Per Rectum   • Follow Anesthesia Guidelines / Standing Orders   • CBC & Differential     ESOPHAGOGASTRODUODENOSCOPY WITH BIOPSY CPT CODE: 33693 (N/A), COLONOSCOPY CPT CODE: 13115 (N/A)  She will need an esophagogastroduodenoscopy and colonoscopy performed with IV general sedation. All of the risks, benefits and alternatives of these procedures have been  discussed with her, all of her questions have been answered and she has elected to proceed. She should follow up in the office after these procedures to discuss the results and further recommendations can be made at that time.    New Medications Ordered This Visit   Medications   • Pancrelipase, Lip-Prot-Amyl, (CREON) 42103 units capsule delayed-release particles     Sig: Take 2 caps with meals and 1 with snacks.     Dispense:  240 capsule     Refill:  3   • polyethylene glycol (GoLYTELY) 236 g solution     Sig: Starting at 6pm the day before procedure, drink 8 ounces every 30 minutes until all gone or stools are clear. May add flavor packet.     Dispense:  4000 mL     Refill:  0     I have asked her to complete labs and stool studies as directed. We will arrange procedures for a few weeks and pending other tests are normal, she will proceed with EGD and colonoscopy. I am concerned about her appendix but recent CT showed no signs of inflammation in that area, only appendicolith. If she worsens, she will report back to ED. Some symptoms may still be related to her kidney stone on right which is also to be considered. She will take Creon for now with all food intake as directed.           Return in about 3 weeks (around 12/11/2018) for recheck n/v/d.      Electronically signed 11/20/2018 4:37 PM  Naomi Aguiar PA-C, [facility]

## 2018-11-21 ENCOUNTER — OFFICE VISIT (OUTPATIENT)
Dept: SURGERY | Facility: CLINIC | Age: 28
End: 2018-11-21

## 2018-11-21 ENCOUNTER — LAB (OUTPATIENT)
Dept: LAB | Facility: HOSPITAL | Age: 28
End: 2018-11-21

## 2018-11-21 VITALS
SYSTOLIC BLOOD PRESSURE: 130 MMHG | HEART RATE: 72 BPM | BODY MASS INDEX: 29.84 KG/M2 | DIASTOLIC BLOOD PRESSURE: 74 MMHG | WEIGHT: 152 LBS | HEIGHT: 60 IN

## 2018-11-21 DIAGNOSIS — R11.2 NON-INTRACTABLE VOMITING WITH NAUSEA, UNSPECIFIED VOMITING TYPE: ICD-10-CM

## 2018-11-21 DIAGNOSIS — N20.0 NEPHROLITHIASIS: ICD-10-CM

## 2018-11-21 DIAGNOSIS — R19.7 DIARRHEA, UNSPECIFIED TYPE: ICD-10-CM

## 2018-11-21 DIAGNOSIS — K21.9 GASTROESOPHAGEAL REFLUX DISEASE, ESOPHAGITIS PRESENCE NOT SPECIFIED: ICD-10-CM

## 2018-11-21 DIAGNOSIS — N63.0 BREAST NODULE: Primary | ICD-10-CM

## 2018-11-21 LAB
027 TOXIN: NORMAL
C DIFF TOX GENS STL QL NAA+PROBE: NEGATIVE
HEMOCCULT STL QL: NEGATIVE
LACTOFERRIN STL QL LA: NEGATIVE

## 2018-11-21 PROCEDURE — 87493 C DIFF AMPLIFIED PROBE: CPT

## 2018-11-21 PROCEDURE — 82272 OCCULT BLD FECES 1-3 TESTS: CPT

## 2018-11-21 PROCEDURE — 87046 STOOL CULTR AEROBIC BACT EA: CPT

## 2018-11-21 PROCEDURE — 87899 AGENT NOS ASSAY W/OPTIC: CPT

## 2018-11-21 PROCEDURE — 83631 LACTOFERRIN FECAL (QUANT): CPT

## 2018-11-21 PROCEDURE — 87045 FECES CULTURE AEROBIC BACT: CPT

## 2018-11-21 PROCEDURE — 99406 BEHAV CHNG SMOKING 3-10 MIN: CPT | Performed by: SURGERY

## 2018-11-21 PROCEDURE — 83993 ASSAY FOR CALPROTECTIN FECAL: CPT

## 2018-11-21 PROCEDURE — 87209 SMEAR COMPLEX STAIN: CPT

## 2018-11-21 PROCEDURE — 99213 OFFICE O/P EST LOW 20 MIN: CPT | Performed by: SURGERY

## 2018-11-21 PROCEDURE — 87177 OVA AND PARASITES SMEARS: CPT

## 2018-11-21 PROCEDURE — 82656 EL-1 FECAL QUAL/SEMIQ: CPT

## 2018-11-21 NOTE — PROGRESS NOTES
Subjective   Pili Webster is a 28 y.o. female is being seen for consultation today at the request of Osvaldo Madrid MD    Pili Webster is a 28 y.o. female With palpable breast nodule that on imaging is not concerning for malignancy but is bothersome to the patient and causes mild tenderness.  No family history of breast cancer.  No personal history of breast cancer.  No previous lumpectomy reported.  No nipple discharge noted.        Past Medical History:   Diagnosis Date   • Abdominal pain    • Anxiety and depression    • Arthritis    • Cholecystitis    • Constipation    • Frequent UTI    • Hemorrhoids    • Kidney stones    • Nausea & vomiting    • PCOS (polycystic ovarian syndrome)    • PONV (postoperative nausea and vomiting)    • Tachycardia        Family History   Problem Relation Age of Onset   • Breast cancer Maternal Aunt    • Alcohol abuse Paternal Grandfather        Social History     Socioeconomic History   • Marital status:      Spouse name: Not on file   • Number of children: Not on file   • Years of education: Not on file   • Highest education level: Not on file   Social Needs   • Financial resource strain: Not on file   • Food insecurity - worry: Not on file   • Food insecurity - inability: Not on file   • Transportation needs - medical: Not on file   • Transportation needs - non-medical: Not on file   Occupational History   • Not on file   Tobacco Use   • Smoking status: Current Every Day Smoker     Packs/day: 0.25     Years: 10.00     Pack years: 2.50     Types: Cigarettes   • Smokeless tobacco: Never Used   Substance and Sexual Activity   • Alcohol use: No   • Drug use: No   • Sexual activity: Defer     Birth control/protection: None   Other Topics Concern   • Not on file   Social History Narrative   • Not on file       Past Surgical History:   Procedure Laterality Date   • ABDOMINAL SURGERY     • DIAGNOSTIC LAPAROSCOPY      x2   • DILATATION AND CURETTAGE     •  "WISDOM TOOTH EXTRACTION         Review of Systems   Constitutional: Negative for activity change, appetite change, chills and fever.   HENT: Negative for sore throat and trouble swallowing.    Eyes: Negative for visual disturbance.   Respiratory: Negative for cough and shortness of breath.    Cardiovascular: Negative for chest pain and palpitations.   Gastrointestinal: Negative for abdominal distention, abdominal pain, blood in stool, constipation, diarrhea, nausea and vomiting.   Endocrine: Negative for cold intolerance and heat intolerance.   Genitourinary: Negative for dysuria.   Musculoskeletal: Negative for joint swelling.   Skin: Negative for color change, rash and wound.   Allergic/Immunologic: Negative for immunocompromised state.   Neurological: Negative for dizziness, seizures, weakness and headaches.   Hematological: Negative for adenopathy. Does not bruise/bleed easily.   Psychiatric/Behavioral: Negative for agitation and confusion.         /74   Pulse 72   Ht 152.4 cm (60\")   Wt 68.9 kg (152 lb)   LMP 11/08/2018   BMI 29.69 kg/m²   Objective   Physical Exam   Constitutional: She is oriented to person, place, and time. She appears well-developed and well-nourished.   HENT:   Head: Normocephalic and atraumatic.   Mouth/Throat: Mucous membranes are normal.   Eyes: Conjunctivae and EOM are normal. Pupils are equal, round, and reactive to light. No scleral icterus.   Neck: Neck supple. No JVD present. No tracheal deviation present. No thyromegaly present.   Cardiovascular: Normal rate and regular rhythm. Exam reveals no gallop and no friction rub.   No murmur heard.  Pulmonary/Chest: Effort normal and breath sounds normal. She exhibits no mass, no tenderness and no retraction. Right breast exhibits no mass, no nipple discharge, no skin change and no tenderness. Left breast exhibits no mass, no nipple discharge, no skin change and no tenderness. Breasts are symmetrical.   BB sized firm palpable " nontender mobile nodule just below the nipple areolar complex at the 10 o'clock position.  Right breast       Abdominal: Soft. Bowel sounds are normal. She exhibits no distension and no mass. There is no splenomegaly or hepatomegaly. There is no tenderness. No hernia.   Musculoskeletal: Normal range of motion. She exhibits no deformity.   Lymphadenopathy:     She has no cervical adenopathy.     She has no axillary adenopathy.   Neurological: She is alert and oriented to person, place, and time.   Skin: Skin is warm and dry. No rash noted.   Psychiatric: She has a normal mood and affect.   Vitals reviewed.        Pili was seen today for right breast mass.    Diagnoses and all orders for this visit:    Breast nodule  -     Case Request; Standing  -     ceFAZolin (ANCEF) 1 g/100 mL 0.9% NS IVPB (mbp); Infuse 100 mL into a venous catheter 1 (One) Time.  -     Case Request    Other orders  -     Follow anesthesia standing orders.  -     Follow anesthesia standing orders.; Standing  -     Verify NPO Status; Standing  -     Obtain informed consent; Standing  -     SCD (sequential compression device)- to be placed on patient in Pre-op; Standing        Assessment     Pili Webster is a 28 y.o. female with palpable nodule of the right breast in the retroareolar region at the 10 o'clock position.  Excisional biopsy has been recommended and will be performed in the operating room.  Patient understands risks and benefits of surgery and we'll proceed.    Patient's Body mass index is 29.69 kg/m². BMI is above normal parameters. Recommendations include: educational material.    I advised Pili of the risks of continuing to use tobacco, and I provided her with tobacco cessation educational materials in the After Visit Summary.     During this visit, I spent 3 minutes counseling the patient regarding tobacco cessation.

## 2018-11-23 LAB
BACTERIA SPEC AEROBE CULT: NORMAL
ENDOMYSIUM IGA SER QL: NEGATIVE
GLIADIN PEPTIDE IGA SER-ACNC: 3 UNITS (ref 0–19)
GLIADIN PEPTIDE IGG SER-ACNC: 1 UNITS (ref 0–19)
IGA SERPL-MCNC: 240 MG/DL (ref 87–352)
TTG IGA SER-ACNC: <2 U/ML (ref 0–3)
TTG IGG SER-ACNC: <2 U/ML (ref 0–5)

## 2018-11-24 LAB
CALIF WALNUT POLN IGE QN: 0.17 KU/L
CLAM IGE QN: <0.1 KU/L
CODFISH IGE QN: 0.11 KU/L
CONV CLASS DESCRIPTION: ABNORMAL
CORN IGE QN: <0.1 KU/L
COW MILK IGE QN: 1.3 KU/L
EGG WHITE IGE QN: 0.17 KU/L
PEANUT IGE QN: 0.64 KU/L
SCALLOP IGE QN: 0.11 KU/L
SESAME SEED IGE: 0.12 KU/L
SHRIMP IGE: 4.24 KU/L
SOYBEAN IGE QN: <0.1 KU/L
WHEAT IGE QN: 0.19 KU/L

## 2018-11-26 LAB — ELASTASE PANC STL-MCNT: >500 UG ELAST./G

## 2018-11-27 ENCOUNTER — TELEPHONE (OUTPATIENT)
Dept: GASTROENTEROLOGY | Facility: CLINIC | Age: 28
End: 2018-11-27

## 2018-11-27 DIAGNOSIS — Z91.018 FOOD ALLERGY: Primary | ICD-10-CM

## 2018-11-28 ENCOUNTER — APPOINTMENT (OUTPATIENT)
Dept: PREADMISSION TESTING | Facility: HOSPITAL | Age: 28
End: 2018-11-28

## 2018-11-28 LAB
O+P SPEC MICRO: NORMAL
OVA + PARASITE RESULT 1: NORMAL

## 2018-11-29 ENCOUNTER — HOSPITAL ENCOUNTER (OUTPATIENT)
Facility: HOSPITAL | Age: 28
Setting detail: HOSPITAL OUTPATIENT SURGERY
Discharge: HOME | End: 2018-11-29
Attending: SURGERY | Admitting: SURGERY

## 2018-11-29 ENCOUNTER — ANESTHESIA EVENT (OUTPATIENT)
Dept: PERIOP | Facility: HOSPITAL | Age: 28
End: 2018-11-29

## 2018-11-29 ENCOUNTER — ANESTHESIA (OUTPATIENT)
Dept: PERIOP | Facility: HOSPITAL | Age: 28
End: 2018-11-29

## 2018-11-29 VITALS
OXYGEN SATURATION: 100 % | SYSTOLIC BLOOD PRESSURE: 130 MMHG | BODY MASS INDEX: 30.44 KG/M2 | RESPIRATION RATE: 16 BRPM | TEMPERATURE: 98 F | HEART RATE: 82 BPM | WEIGHT: 151 LBS | DIASTOLIC BLOOD PRESSURE: 76 MMHG | HEIGHT: 59 IN

## 2018-11-29 DIAGNOSIS — N63.0 BREAST NODULE: ICD-10-CM

## 2018-11-29 LAB
B-HCG UR QL: NEGATIVE
CALPROTECTIN STL-MCNT: <16 UG/G (ref 0–120)
INTERNAL NEGATIVE CONTROL: NEGATIVE
INTERNAL POSITIVE CONTROL: POSITIVE
Lab: NORMAL

## 2018-11-29 PROCEDURE — 25010000002 ONDANSETRON PER 1 MG: Performed by: NURSE ANESTHETIST, CERTIFIED REGISTERED

## 2018-11-29 PROCEDURE — 25010000002 FENTANYL CITRATE (PF) 100 MCG/2ML SOLUTION: Performed by: NURSE ANESTHETIST, CERTIFIED REGISTERED

## 2018-11-29 PROCEDURE — 19120 REMOVAL OF BREAST LESION: CPT | Performed by: SURGERY

## 2018-11-29 PROCEDURE — 25010000002 MIDAZOLAM PER 1 MG: Performed by: NURSE ANESTHETIST, CERTIFIED REGISTERED

## 2018-11-29 PROCEDURE — 81025 URINE PREGNANCY TEST: CPT | Performed by: ANESTHESIOLOGY

## 2018-11-29 PROCEDURE — 25010000002 ONDANSETRON PER 1 MG: Performed by: ANESTHESIOLOGY

## 2018-11-29 PROCEDURE — 25010000002 PROPOFOL 10 MG/ML EMULSION: Performed by: NURSE ANESTHETIST, CERTIFIED REGISTERED

## 2018-11-29 RX ORDER — ONDANSETRON 2 MG/ML
4 INJECTION INTRAMUSCULAR; INTRAVENOUS ONCE AS NEEDED
Status: DISCONTINUED | OUTPATIENT
Start: 2018-11-29 | End: 2018-11-29 | Stop reason: HOSPADM

## 2018-11-29 RX ORDER — OXYCODONE HYDROCHLORIDE AND ACETAMINOPHEN 5; 325 MG/1; MG/1
1 TABLET ORAL ONCE AS NEEDED
Status: DISCONTINUED | OUTPATIENT
Start: 2018-11-29 | End: 2018-11-29 | Stop reason: HOSPADM

## 2018-11-29 RX ORDER — ONDANSETRON 2 MG/ML
4 INJECTION INTRAMUSCULAR; INTRAVENOUS ONCE
Status: COMPLETED | OUTPATIENT
Start: 2018-11-29 | End: 2018-11-29

## 2018-11-29 RX ORDER — SODIUM CHLORIDE, SODIUM LACTATE, POTASSIUM CHLORIDE, CALCIUM CHLORIDE 600; 310; 30; 20 MG/100ML; MG/100ML; MG/100ML; MG/100ML
125 INJECTION, SOLUTION INTRAVENOUS CONTINUOUS
Status: DISCONTINUED | OUTPATIENT
Start: 2018-11-29 | End: 2018-11-29 | Stop reason: HOSPADM

## 2018-11-29 RX ORDER — MIDAZOLAM HYDROCHLORIDE 1 MG/ML
INJECTION INTRAMUSCULAR; INTRAVENOUS AS NEEDED
Status: DISCONTINUED | OUTPATIENT
Start: 2018-11-29 | End: 2018-11-29 | Stop reason: SURG

## 2018-11-29 RX ORDER — LIDOCAINE HYDROCHLORIDE 20 MG/ML
INJECTION, SOLUTION EPIDURAL; INFILTRATION; INTRACAUDAL; PERINEURAL AS NEEDED
Status: DISCONTINUED | OUTPATIENT
Start: 2018-11-29 | End: 2018-11-29 | Stop reason: SURG

## 2018-11-29 RX ORDER — FAMOTIDINE 10 MG/ML
INJECTION, SOLUTION INTRAVENOUS AS NEEDED
Status: DISCONTINUED | OUTPATIENT
Start: 2018-11-29 | End: 2018-11-29 | Stop reason: SURG

## 2018-11-29 RX ORDER — IBUPROFEN 600 MG/1
600 TABLET ORAL EVERY 6 HOURS PRN
Qty: 30 TABLET | Refills: 0 | Status: SHIPPED | OUTPATIENT
Start: 2018-11-29 | End: 2019-01-08

## 2018-11-29 RX ORDER — ONDANSETRON 2 MG/ML
INJECTION INTRAMUSCULAR; INTRAVENOUS AS NEEDED
Status: DISCONTINUED | OUTPATIENT
Start: 2018-11-29 | End: 2018-11-29 | Stop reason: SURG

## 2018-11-29 RX ORDER — SODIUM CHLORIDE 0.9 % (FLUSH) 0.9 %
3-10 SYRINGE (ML) INJECTION AS NEEDED
Status: DISCONTINUED | OUTPATIENT
Start: 2018-11-29 | End: 2018-11-29 | Stop reason: HOSPADM

## 2018-11-29 RX ORDER — MAGNESIUM HYDROXIDE 1200 MG/15ML
LIQUID ORAL AS NEEDED
Status: DISCONTINUED | OUTPATIENT
Start: 2018-11-29 | End: 2018-11-29 | Stop reason: HOSPADM

## 2018-11-29 RX ORDER — FENTANYL CITRATE 50 UG/ML
50 INJECTION, SOLUTION INTRAMUSCULAR; INTRAVENOUS
Status: DISCONTINUED | OUTPATIENT
Start: 2018-11-29 | End: 2018-11-29 | Stop reason: HOSPADM

## 2018-11-29 RX ORDER — SODIUM CHLORIDE 0.9 % (FLUSH) 0.9 %
3 SYRINGE (ML) INJECTION EVERY 12 HOURS SCHEDULED
Status: DISCONTINUED | OUTPATIENT
Start: 2018-11-29 | End: 2018-11-29 | Stop reason: HOSPADM

## 2018-11-29 RX ORDER — PROPOFOL 10 MG/ML
VIAL (ML) INTRAVENOUS AS NEEDED
Status: DISCONTINUED | OUTPATIENT
Start: 2018-11-29 | End: 2018-11-29 | Stop reason: SURG

## 2018-11-29 RX ORDER — FENTANYL CITRATE 50 UG/ML
INJECTION, SOLUTION INTRAMUSCULAR; INTRAVENOUS AS NEEDED
Status: DISCONTINUED | OUTPATIENT
Start: 2018-11-29 | End: 2018-11-29 | Stop reason: SURG

## 2018-11-29 RX ORDER — BUPIVACAINE HYDROCHLORIDE AND EPINEPHRINE 2.5; 5 MG/ML; UG/ML
INJECTION, SOLUTION EPIDURAL; INFILTRATION; INTRACAUDAL; PERINEURAL AS NEEDED
Status: DISCONTINUED | OUTPATIENT
Start: 2018-11-29 | End: 2018-11-29 | Stop reason: HOSPADM

## 2018-11-29 RX ORDER — IPRATROPIUM BROMIDE AND ALBUTEROL SULFATE 2.5; .5 MG/3ML; MG/3ML
3 SOLUTION RESPIRATORY (INHALATION) ONCE AS NEEDED
Status: DISCONTINUED | OUTPATIENT
Start: 2018-11-29 | End: 2018-11-29 | Stop reason: HOSPADM

## 2018-11-29 RX ORDER — MEPERIDINE HYDROCHLORIDE 25 MG/ML
12.5 INJECTION INTRAMUSCULAR; INTRAVENOUS; SUBCUTANEOUS
Status: DISCONTINUED | OUTPATIENT
Start: 2018-11-29 | End: 2018-11-29 | Stop reason: HOSPADM

## 2018-11-29 RX ADMIN — PROPOFOL 100 MG: 10 INJECTION, EMULSION INTRAVENOUS at 12:50

## 2018-11-29 RX ADMIN — SODIUM CHLORIDE, POTASSIUM CHLORIDE, SODIUM LACTATE AND CALCIUM CHLORIDE 125 ML/HR: 600; 310; 30; 20 INJECTION, SOLUTION INTRAVENOUS at 11:57

## 2018-11-29 RX ADMIN — FAMOTIDINE 20 MG: 10 INJECTION, SOLUTION INTRAVENOUS at 12:52

## 2018-11-29 RX ADMIN — ONDANSETRON 4 MG: 2 INJECTION, SOLUTION INTRAMUSCULAR; INTRAVENOUS at 12:00

## 2018-11-29 RX ADMIN — MIDAZOLAM HYDROCHLORIDE 2 MG: 1 INJECTION, SOLUTION INTRAMUSCULAR; INTRAVENOUS at 12:40

## 2018-11-29 RX ADMIN — FENTANYL CITRATE 50 MCG: 50 INJECTION INTRAMUSCULAR; INTRAVENOUS at 13:32

## 2018-11-29 RX ADMIN — LIDOCAINE HYDROCHLORIDE 50 MG: 20 INJECTION, SOLUTION EPIDURAL; INFILTRATION; INTRACAUDAL; PERINEURAL at 12:50

## 2018-11-29 RX ADMIN — ONDANSETRON 4 MG: 2 INJECTION, SOLUTION INTRAMUSCULAR; INTRAVENOUS at 12:40

## 2018-11-29 RX ADMIN — FENTANYL CITRATE 50 MCG: 50 INJECTION INTRAMUSCULAR; INTRAVENOUS at 12:42

## 2018-11-29 RX ADMIN — CEFAZOLIN 1 G: 1 INJECTION, POWDER, FOR SOLUTION INTRAMUSCULAR; INTRAVENOUS; PARENTERAL at 12:40

## 2018-11-29 RX ADMIN — FENTANYL CITRATE 50 MCG: 50 INJECTION INTRAMUSCULAR; INTRAVENOUS at 12:46

## 2018-11-29 NOTE — ANESTHESIA POSTPROCEDURE EVALUATION
Patient: Pili Webster    Procedure Summary     Date:  11/29/18 Room / Location:   [facility] 02 /  [facility]    Anesthesia Start:  1240 Anesthesia Stop:  1306    Procedure:  breast biopsy  (Right Breast) Diagnosis:       Breast nodule      (Breast nodule [N63.0])    Surgeon:  Shiv Overton MD Provider:  Zoltan Steve MD    Anesthesia Type:  general ASA Status:  2          Anesthesia Type: general  Last vitals  BP   121/80 (11/29/18 1105)   Temp   97.9 °F (36.6 °C) (11/29/18 1105)   Pulse   81 (11/29/18 1105)   Resp   18 (11/29/18 1105)     SpO2   100 % (11/29/18 1105)     Post Anesthesia Care and Evaluation    Patient location during evaluation: PHASE II  Patient participation: complete - patient participated  Level of consciousness: awake and alert  Pain score: 0  Pain management: adequate  Airway patency: patent  Anesthetic complications: No anesthetic complications    Cardiovascular status: acceptable  Respiratory status: acceptable  Hydration status: acceptable

## 2018-11-29 NOTE — ANESTHESIA PREPROCEDURE EVALUATION
Anesthesia Evaluation     history of anesthetic complications: PONV  NPO Solid Status: > 8 hours  NPO Liquid Status: > 8 hours           Airway   Mallampati: II  TM distance: >3 FB  Neck ROM: full  no difficulty expected  Dental    (+) edentulous    Pulmonary - normal exam   (+) a smoker Current Abstained day of surgery,   Cardiovascular - normal exam        Neuro/Psych  (+) psychiatric history Anxiety,     GI/Hepatic/Renal/Endo    (+) obesity,       Musculoskeletal     Abdominal  - normal exam   Substance History      OB/GYN          Other                        Anesthesia Plan    ASA 2     general     intravenous induction   Anesthetic plan, all risks, benefits, and alternatives have been provided, discussed and informed consent has been obtained with: patient.

## 2018-12-04 ENCOUNTER — HOSPITAL ENCOUNTER (OUTPATIENT)
Dept: GENERAL RADIOLOGY | Facility: HOSPITAL | Age: 28
Discharge: HOME | End: 2018-12-04
Attending: UROLOGY | Admitting: UROLOGY

## 2018-12-04 ENCOUNTER — OFFICE VISIT (OUTPATIENT)
Dept: UROLOGY | Facility: CLINIC | Age: 28
End: 2018-12-04

## 2018-12-04 VITALS — WEIGHT: 151 LBS | HEIGHT: 59 IN | BODY MASS INDEX: 30.44 KG/M2

## 2018-12-04 DIAGNOSIS — N20.0 KIDNEY STONE: Primary | ICD-10-CM

## 2018-12-04 DIAGNOSIS — N20.0 KIDNEY STONE: ICD-10-CM

## 2018-12-04 LAB
LAB AP CASE REPORT: NORMAL
PATH REPORT.FINAL DX SPEC: NORMAL

## 2018-12-04 PROCEDURE — 74018 RADEX ABDOMEN 1 VIEW: CPT | Performed by: RADIOLOGY

## 2018-12-04 PROCEDURE — 74018 RADEX ABDOMEN 1 VIEW: CPT

## 2018-12-04 PROCEDURE — 99213 OFFICE O/P EST LOW 20 MIN: CPT | Performed by: UROLOGY

## 2018-12-04 NOTE — PROGRESS NOTES
Chief Complaint:          Chief Complaint   Patient presents with   • Nephrolithiasis       HPI:   28 y.o. female.  28-year-old white female returns today having been seen yesterday.  I repeated her KUB .  I do not see a radio opaque stone in the renal fossa.  She says she's had nausea no vomiting chills but no fever and right flank pain I'm want to give her pain medication and alpha blockade see her back in a week if I can't see it again I'm I recommend a stone CT and probable intervention if she persists with pain.  She returns today.  She still says she has nausea and occasional vomiting, no fevers, no chills, there is no right flank pain and negative CT scan and negative KUB a month ago a recent breast biopsy I went ahead and repeated the KUB which is again negative I'll notify her of the results by telephone.  I don't believe this is related to the urological system    Past Medical History:        Past Medical History:   Diagnosis Date   • Abdominal pain    • Anxiety and depression    • Arthritis    • Cholecystitis    • Constipation    • Frequent UTI    • GERD (gastroesophageal reflux disease)    • Heartburn    • Hemorrhoids    • Kidney stones    • Lesion of breast    • Lump     RIGHT BREAST   • Nausea & vomiting    • PCOS (polycystic ovarian syndrome)    • PONV (postoperative nausea and vomiting)    • Tachycardia          Current Meds:     Current Outpatient Medications   Medication Sig Dispense Refill   • aspirin 81 MG EC tablet Take 81 mg by mouth Daily. LAS T DOSE 11/26/2018     • ibuprofen (ADVIL,MOTRIN) 600 MG tablet Take 1 tablet by mouth Every 6 (Six) Hours As Needed for Mild Pain . 30 tablet 0   • loratadine (CLARITIN) 10 MG tablet      • ondansetron ODT (ZOFRAN-ODT) 4 MG disintegrating tablet Take 1 tablet by mouth Every 6 (Six) Hours As Needed for Nausea or Vomiting. 10 tablet 0   • Pancrelipase, Lip-Prot-Amyl, (CREON) 14613 units capsule delayed-release particles Take 2 caps with meals and 1 with  snacks. 240 capsule 3   • pantoprazole (PROTONIX) 40 MG EC tablet Take 40 mg by mouth Daily.     • polyethylene glycol (GoLYTELY) 236 g solution Starting at 6pm the day before procedure, drink 8 ounces every 30 minutes until all gone or stools are clear. May add flavor packet. 4000 mL 0     No current facility-administered medications for this visit.         Allergies:      Allergies   Allergen Reactions   • Latex Hives   • Sulfa Antibiotics Rash        Past Surgical History:     Past Surgical History:   Procedure Laterality Date   • ABDOMINAL SURGERY     • DIAGNOSTIC LAPAROSCOPY      x2   • DILATATION AND CURETTAGE     • WISDOM TOOTH EXTRACTION           Social History:     Social History     Socioeconomic History   • Marital status:      Spouse name: Not on file   • Number of children: Not on file   • Years of education: Not on file   • Highest education level: Not on file   Social Needs   • Financial resource strain: Not on file   • Food insecurity - worry: Not on file   • Food insecurity - inability: Not on file   • Transportation needs - medical: Not on file   • Transportation needs - non-medical: Not on file   Occupational History   • Not on file   Tobacco Use   • Smoking status: Current Every Day Smoker     Packs/day: 0.25     Years: 12.00     Pack years: 3.00     Types: Cigarettes   • Smokeless tobacco: Never Used   Substance and Sexual Activity   • Alcohol use: No   • Drug use: No   • Sexual activity: Defer     Birth control/protection: None   Other Topics Concern   • Not on file   Social History Narrative   • Not on file       Family History:     Family History   Problem Relation Age of Onset   • Breast cancer Maternal Aunt    • Alcohol abuse Paternal Grandfather        Review of Systems:     Review of Systems   Constitutional: Negative.  Negative for activity change, appetite change, chills, diaphoresis, fatigue, fever and unexpected weight change.   HENT: Negative for congestion, dental problem,  drooling, ear discharge, ear pain, facial swelling, hearing loss, mouth sores, nosebleeds, postnasal drip, rhinorrhea, sinus pressure, sneezing, sore throat, tinnitus, trouble swallowing and voice change.    Eyes: Negative.  Negative for photophobia, pain, discharge, redness, itching and visual disturbance.   Respiratory: Negative.  Negative for apnea, cough, choking, chest tightness, shortness of breath, wheezing and stridor.    Cardiovascular: Negative.  Negative for chest pain, palpitations and leg swelling.   Gastrointestinal: Positive for abdominal pain and nausea. Negative for abdominal distention, anal bleeding, blood in stool, constipation, diarrhea, rectal pain and vomiting.   Endocrine: Negative.  Negative for cold intolerance, heat intolerance, polydipsia, polyphagia and polyuria.   Musculoskeletal: Negative.  Negative for arthralgias, back pain, gait problem, joint swelling, myalgias, neck pain and neck stiffness.   Skin: Negative.  Negative for color change, pallor, rash and wound.   Allergic/Immunologic: Negative.  Negative for environmental allergies, food allergies and immunocompromised state.   Neurological: Negative.  Negative for dizziness, tremors, seizures, syncope, facial asymmetry, speech difficulty, weakness, light-headedness, numbness and headaches.   Hematological: Negative.  Negative for adenopathy. Does not bruise/bleed easily.   Psychiatric/Behavioral: Negative for agitation, behavioral problems, confusion, decreased concentration, dysphoric mood, hallucinations, self-injury, sleep disturbance and suicidal ideas. The patient is not nervous/anxious and is not hyperactive.    All other systems reviewed and are negative.      Physical Exam:     Physical Exam   Constitutional: She appears well-developed and well-nourished.   HENT:   Head: Normocephalic and atraumatic.   Right Ear: External ear normal.   Left Ear: External ear normal.   Mouth/Throat: Oropharynx is clear and moist.   Eyes:  Conjunctivae are normal. Pupils are equal, round, and reactive to light.   Cardiovascular: Normal rate, regular rhythm, normal heart sounds and intact distal pulses.   Pulmonary/Chest: Effort normal and breath sounds normal.   Abdominal: Soft. Bowel sounds are normal. She exhibits no distension and no mass. There is no tenderness. There is no rebound and no guarding.   Genitourinary: No vaginal discharge found.   Genitourinary Comments: She has abdominal pain with no rigidity, guarding or tenderness.  The pain is in her right lower quadrant.   Musculoskeletal: Normal range of motion.   Neurological: She is alert. She has normal reflexes.   Skin: Skin is warm and dry.   Psychiatric: She has a normal mood and affect. Her behavior is normal. Judgment and thought content normal.       I have reviewed the following portions of the patient's history: allergies, current medications, past family history, past medical history, past social history, past surgical history, problem list and ROS and confirm it's accurate.      Procedure:       Assessment/Plan:   Renal calculus-I don't believe this is the source of her recurrent nausea and lower abdominal pain.  I will see her back on an as needed basis.     Patient's Body mass index is 30.48 kg/m². BMI is above normal parameters. Recommendations include: educational material.          This document has been electronically signed by IMELDA THOMSON MD December 4, 2018 8:12 AM

## 2018-12-11 ENCOUNTER — OFFICE VISIT (OUTPATIENT)
Dept: SURGERY | Facility: CLINIC | Age: 28
End: 2018-12-11

## 2018-12-11 VITALS
DIASTOLIC BLOOD PRESSURE: 74 MMHG | HEIGHT: 59 IN | BODY MASS INDEX: 30.44 KG/M2 | WEIGHT: 151 LBS | SYSTOLIC BLOOD PRESSURE: 120 MMHG | HEART RATE: 85 BPM

## 2018-12-11 DIAGNOSIS — N63.0 BREAST NODULE: Primary | ICD-10-CM

## 2018-12-11 PROCEDURE — 99024 POSTOP FOLLOW-UP VISIT: CPT | Performed by: SURGERY

## 2018-12-12 NOTE — PROGRESS NOTES
Subjective   Plii Webster is a 28 y.o. female  is here today for follow-up.         Pili Webster is a 28 y.o. female here for follow up after right breast lumpectomy.  A periareolar lesion was removed and final pathology is consistent with complex fibroadenoma in the setting of fibrocystic disease.  No evidence of atypia or malignancy.  Her incision has healed well.          Assessment     Pili was seen today for s/p right breast biopsy.    Diagnoses and all orders for this visit:    Breast nodule      Pili Webster is a 28 y.o. female status post right breast lumpectomy.  Final pathology shows complex fibroadenoma in the setting of fibrocystic disease.  No atypia or malignancy has been identified.  She will have new baseline mammogram in 6 months in follow-up as needed.  She will need to resume routine breast screening.

## 2018-12-19 ENCOUNTER — OFFICE VISIT (OUTPATIENT)
Dept: SURGERY | Facility: CLINIC | Age: 28
End: 2018-12-19

## 2018-12-19 VITALS — HEIGHT: 59 IN | BODY MASS INDEX: 30.44 KG/M2 | WEIGHT: 151 LBS

## 2018-12-19 DIAGNOSIS — N63.0 BREAST NODULE: Primary | ICD-10-CM

## 2018-12-19 PROCEDURE — 99024 POSTOP FOLLOW-UP VISIT: CPT | Performed by: SURGERY

## 2018-12-21 NOTE — PROGRESS NOTES
Subjective   Pili Webster is a 28 y.o. female  is here today for follow-up.         Pili Webster is a 28 y.o. female here for follow up after right breast biopsy.  Pathology was benign consistent with fibroadenoma.  She's had partial skin dehiscence but the underlying wound is closed with no drainage or complication.  No infection evident.  She is a smoker which makes this a high risk for wound dehiscence given her poor wound healing as a smoker.        Assessment     Pili was seen today for wound check.    Diagnoses and all orders for this visit:    Breast nodule      Pili Webster is a 28 y.o. female with superficial skin dehiscence of her incision on her right breast after lumpectomy.  The patient is doing well and will keep covered with a bandage daily.  She will follow-up as needed.

## 2018-12-28 ENCOUNTER — OFFICE VISIT (OUTPATIENT)
Dept: GASTROENTEROLOGY | Facility: CLINIC | Age: 28
End: 2018-12-28

## 2018-12-28 VITALS
OXYGEN SATURATION: 98 % | WEIGHT: 150.2 LBS | HEART RATE: 92 BPM | BODY MASS INDEX: 30.28 KG/M2 | DIASTOLIC BLOOD PRESSURE: 81 MMHG | SYSTOLIC BLOOD PRESSURE: 121 MMHG | HEIGHT: 59 IN

## 2018-12-28 DIAGNOSIS — R19.7 OVERFLOW DIARRHEA: Primary | ICD-10-CM

## 2018-12-28 DIAGNOSIS — K21.9 GASTROESOPHAGEAL REFLUX DISEASE, ESOPHAGITIS PRESENCE NOT SPECIFIED: ICD-10-CM

## 2018-12-28 DIAGNOSIS — R11.0 NAUSEA: ICD-10-CM

## 2018-12-28 PROCEDURE — 99213 OFFICE O/P EST LOW 20 MIN: CPT | Performed by: PHYSICIAN ASSISTANT

## 2018-12-28 NOTE — PROGRESS NOTES
: 1990    Chief Complaint   Patient presents with   • Nausea   • Diarrhea   • Abdominal Pain       Pili Webster is a 28 y.o. female who presents to the office today as a follow up appointment regarding Nausea; Diarrhea; and Abdominal Pain.    History of Present Illness:  She reports that she is still having fecal urgency with 3 stools per day which are soft and loose (points to #6 on the BSS). She will not have skip days between stools regularly, but may skip 1 day intermittently. Drinks 1-2 bottles of water daily. Diet is ok in fiber. Nausea is present at least 4 days of the week but without vomiting usually and will take Zofran only as needed. She has been taking Creon as directed with meals with some mild relief. She feels that sometimes her symptoms are related to her anxiety because it seems worse with stress or being busy. She has also noticed some improvement with avoiding milk. Still has some back pain which she relates to her previous kidney stones but was told at last urology visit that she had passed the larger one per her report. Takes protonix 40 mg once daily which has dramatically helps with her heartburn.     Labs 2018:  CBC normal, CMP normal, amylase/lipase normal, CRP normal, food allergy panel positive with several (peanuts, milk, shrimp)    Stool 2018:  Calprotectin normal  Pancreatic elastase normal  O&P negative  Stool culture negative  C diff neg    KUB 18 moderate stool in the colon    Review of Systems   Constitutional: Positive for appetite change, chills, fatigue and fever.   HENT: Positive for ear pain. Negative for sinus pressure, sinus pain and sore throat.    Eyes: Negative.    Respiratory: Positive for cough, chest tightness and shortness of breath. Negative for choking.    Cardiovascular: Positive for palpitations. Negative for chest pain.   Gastrointestinal: Positive for abdominal distention, abdominal pain, anal bleeding, constipation, diarrhea,  nausea, rectal pain and vomiting. Negative for blood in stool.   Endocrine: Negative.    Genitourinary: Positive for difficulty urinating.   Musculoskeletal: Positive for back pain and neck pain. Negative for joint swelling.   Skin: Positive for color change and rash.   Allergic/Immunologic: Positive for environmental allergies.   Neurological: Positive for weakness, light-headedness and headaches. Negative for tremors, speech difficulty and numbness.   Hematological: Bruises/bleeds easily.   Psychiatric/Behavioral: Positive for agitation. Negative for suicidal ideas. The patient is nervous/anxious.        Past Medical History:   Diagnosis Date   • Abdominal pain    • Anxiety and depression    • Arthritis    • Cholecystitis    • Constipation    • Frequent UTI    • GERD (gastroesophageal reflux disease)    • Heartburn    • Hemorrhoids    • Kidney stones    • Lesion of breast    • Lump     RIGHT BREAST   • Nausea & vomiting    • PCOS (polycystic ovarian syndrome)    • PONV (postoperative nausea and vomiting)    • Tachycardia        Past Surgical History:   Procedure Laterality Date   • ABDOMINAL SURGERY     • BREAST BIOPSY Right 11/29/2018    Procedure: breast biopsy ;  Surgeon: Shiv Overton MD;  Location: [facility] OR;  Service: General   • CHOLECYSTECTOMY N/A 8/25/2017    Procedure: CHOLECYSTECTOMY LAPAROSCOPIC;  Surgeon: Franco Mari MD;  Location: [facility] OR;  Service:    • DIAGNOSTIC LAPAROSCOPY      x2   • DILATATION AND CURETTAGE     • WISDOM TOOTH EXTRACTION         Family History   Problem Relation Age of Onset   • Breast cancer Maternal Aunt    • Alcohol abuse Paternal Grandfather        Social History     Socioeconomic History   • Marital status:      Spouse name: Not on file   • Number of children: Not on file   • Years of education: Not on file   • Highest education level: Not on file   Tobacco Use   • Smoking status: Current Every Day Smoker     Packs/day: 0.25     Years: 12.00     Pack  "years: 3.00     Types: Cigarettes   • Smokeless tobacco: Never Used   Substance and Sexual Activity   • Alcohol use: No   • Drug use: No   • Sexual activity: Defer     Birth control/protection: None       Current Outpatient Medications:   •  aspirin 81 MG EC tablet, Take 81 mg by mouth Daily. LAS T DOSE 11/26/2018, Disp: , Rfl:   •  ibuprofen (ADVIL,MOTRIN) 600 MG tablet, Take 1 tablet by mouth Every 6 (Six) Hours As Needed for Mild Pain ., Disp: 30 tablet, Rfl: 0  •  loratadine (CLARITIN) 10 MG tablet, , Disp: , Rfl:   •  ondansetron ODT (ZOFRAN-ODT) 4 MG disintegrating tablet, Take 1 tablet by mouth Every 6 (Six) Hours As Needed for Nausea or Vomiting., Disp: 10 tablet, Rfl: 0  •  Pancrelipase, Lip-Prot-Amyl, (CREON) 76488 units capsule delayed-release particles, Take 2 caps with meals and 1 with snacks., Disp: 240 capsule, Rfl: 3  •  pantoprazole (PROTONIX) 40 MG EC tablet, Take 40 mg by mouth Daily., Disp: , Rfl:     Allergies:   Latex and Sulfa antibiotics    Vitals:  /81 (BP Location: Left arm, Patient Position: Sitting, Cuff Size: Adult)   Pulse 92   Ht 149.9 cm (59.02\")   Wt 68.1 kg (150 lb 3.2 oz)   LMP 12/04/2018 (Exact Date)   SpO2 98%   BMI 30.32 kg/m²     Physical Exam   Constitutional: She is oriented to person, place, and time. She appears well-developed and well-nourished. No distress.   HENT:   Head: Normocephalic and atraumatic.   Nose: Nose normal.   Mouth/Throat: Oropharynx is clear and moist.   Eyes: Conjunctivae are normal. Right eye exhibits no discharge. Left eye exhibits no discharge. No scleral icterus.   Neck: Normal range of motion. No JVD present.   Cardiovascular: Normal rate, regular rhythm and normal heart sounds. Exam reveals no gallop and no friction rub.   No murmur heard.  Pulmonary/Chest: Effort normal and breath sounds normal. No respiratory distress. She has no wheezes. She has no rales. She exhibits no tenderness.   Abdominal: Soft. Bowel sounds are normal. She " exhibits no mass. There is no tenderness.   Musculoskeletal: Normal range of motion. She exhibits no edema or deformity.   Neurological: She is alert and oriented to person, place, and time. Coordination normal.   Skin: Skin is warm and dry. No rash noted. She is not diaphoretic. No erythema.   Psychiatric: She has a normal mood and affect. Her behavior is normal. Judgment and thought content normal.   Vitals reviewed.      Assessment/Plan:  1. Overflow diarrhea    2. Nausea    3. Gastroesophageal reflux disease, esophagitis presence not specified      Reviewed all of her recent results with her. Recent KUB ordered by urology shows constipation. She will complete magnesium citrate bowel cleanse then stop Creon for a few days, if no improvement in symptoms, she will resume the Creon for non-specific nausea and abdominal pain. Still consider EGD and colonoscopy for further evaluation. Continue Protonix 40 mg once daily for treatment of GERD.     Keep appointment with allergist for further food allergy testing due to abnormal serum testing.    New Medications Ordered This Visit   Medications   • magnesium citrate solution     Sig: Take 296 mL by mouth Take As Directed. Take 1 bottle now than 2nd bottle approx 6 hours after for clean out.     Dispense:  592 mL     Refill:  0           Return in about 1 month (around 1/28/2019) for recheck abdominal pain.      Electronically signed 12/28/2018 4:07 PM  Naomi Aguiar PA-C, [facility]

## 2018-12-30 ENCOUNTER — HOSPITAL ENCOUNTER (EMERGENCY)
Facility: HOSPITAL | Age: 28
Discharge: HOME | End: 2018-12-30
Attending: GENERAL ACUTE CARE HOSPITAL | Admitting: GENERAL ACUTE CARE HOSPITAL

## 2018-12-30 ENCOUNTER — APPOINTMENT (OUTPATIENT)
Dept: CT IMAGING | Facility: HOSPITAL | Age: 28
End: 2018-12-30

## 2018-12-30 VITALS
HEIGHT: 59 IN | BODY MASS INDEX: 30.04 KG/M2 | RESPIRATION RATE: 18 BRPM | WEIGHT: 149 LBS | DIASTOLIC BLOOD PRESSURE: 70 MMHG | TEMPERATURE: 98.1 F | OXYGEN SATURATION: 100 % | SYSTOLIC BLOOD PRESSURE: 117 MMHG | HEART RATE: 88 BPM

## 2018-12-30 DIAGNOSIS — N20.0 RENAL STONE: Primary | ICD-10-CM

## 2018-12-30 LAB
B-HCG UR QL: NEGATIVE
BACTERIA UR QL AUTO: ABNORMAL /HPF
BILIRUB UR QL STRIP: NEGATIVE
CLARITY UR: ABNORMAL
COLOR UR: YELLOW
GLUCOSE UR STRIP-MCNC: NEGATIVE MG/DL
HGB UR QL STRIP.AUTO: ABNORMAL
HYALINE CASTS UR QL AUTO: ABNORMAL /LPF
KETONES UR QL STRIP: NEGATIVE
LEUKOCYTE ESTERASE UR QL STRIP.AUTO: NEGATIVE
NITRITE UR QL STRIP: NEGATIVE
PH UR STRIP.AUTO: 5.5 [PH] (ref 5–8)
PROT UR QL STRIP: NEGATIVE
RBC # UR: ABNORMAL /HPF
REF LAB TEST METHOD: ABNORMAL
SP GR UR STRIP: 1.03 (ref 1–1.03)
SQUAMOUS #/AREA URNS HPF: ABNORMAL /HPF
UROBILINOGEN UR QL STRIP: ABNORMAL
WBC UR QL AUTO: ABNORMAL /HPF

## 2018-12-30 PROCEDURE — 99284 EMERGENCY DEPT VISIT MOD MDM: CPT

## 2018-12-30 PROCEDURE — 81025 URINE PREGNANCY TEST: CPT | Performed by: GENERAL ACUTE CARE HOSPITAL

## 2018-12-30 PROCEDURE — 96372 THER/PROPH/DIAG INJ SC/IM: CPT

## 2018-12-30 PROCEDURE — 74176 CT ABD & PELVIS W/O CONTRAST: CPT

## 2018-12-30 PROCEDURE — 81001 URINALYSIS AUTO W/SCOPE: CPT | Performed by: GENERAL ACUTE CARE HOSPITAL

## 2018-12-30 PROCEDURE — 25010000002 KETOROLAC TROMETHAMINE PER 15 MG: Performed by: GENERAL ACUTE CARE HOSPITAL

## 2018-12-30 PROCEDURE — 87086 URINE CULTURE/COLONY COUNT: CPT | Performed by: GENERAL ACUTE CARE HOSPITAL

## 2018-12-30 PROCEDURE — 74176 CT ABD & PELVIS W/O CONTRAST: CPT | Performed by: RADIOLOGY

## 2018-12-30 RX ORDER — KETOROLAC TROMETHAMINE 30 MG/ML
30 INJECTION, SOLUTION INTRAMUSCULAR; INTRAVENOUS ONCE
Status: COMPLETED | OUTPATIENT
Start: 2018-12-30 | End: 2018-12-30

## 2018-12-30 RX ORDER — DOCUSATE SODIUM 100 MG/1
100 CAPSULE, LIQUID FILLED ORAL ONCE
Status: COMPLETED | OUTPATIENT
Start: 2018-12-30 | End: 2018-12-30

## 2018-12-30 RX ORDER — OXYCODONE HYDROCHLORIDE AND ACETAMINOPHEN 5; 325 MG/1; MG/1
1 TABLET ORAL EVERY 6 HOURS PRN
Qty: 6 TABLET | Refills: 0 | Status: SHIPPED | OUTPATIENT
Start: 2018-12-30 | End: 2019-01-08

## 2018-12-30 RX ORDER — OXYCODONE HYDROCHLORIDE AND ACETAMINOPHEN 5; 325 MG/1; MG/1
1 TABLET ORAL ONCE
Status: DISCONTINUED | OUTPATIENT
Start: 2018-12-30 | End: 2018-12-30 | Stop reason: HOSPADM

## 2018-12-30 RX ORDER — ONDANSETRON 4 MG/1
4 TABLET, ORALLY DISINTEGRATING ORAL ONCE
Status: COMPLETED | OUTPATIENT
Start: 2018-12-30 | End: 2018-12-30

## 2018-12-30 RX ADMIN — KETOROLAC TROMETHAMINE 30 MG: 30 INJECTION, SOLUTION INTRAMUSCULAR; INTRAVENOUS at 05:17

## 2018-12-30 RX ADMIN — ONDANSETRON 4 MG: 4 TABLET, ORALLY DISINTEGRATING ORAL at 05:17

## 2018-12-30 RX ADMIN — DOCUSATE SODIUM 100 MG: 100 CAPSULE ORAL at 04:33

## 2018-12-30 RX ADMIN — GLYCERIN 1 G: 1 SUPPOSITORY RECTAL at 04:33

## 2019-01-01 LAB — BACTERIA SPEC AEROBE CULT: NORMAL

## 2019-01-04 ENCOUNTER — OFFICE VISIT (OUTPATIENT)
Dept: UROLOGY | Facility: CLINIC | Age: 29
End: 2019-01-04

## 2019-01-04 VITALS — WEIGHT: 149 LBS | HEIGHT: 59 IN | BODY MASS INDEX: 30.04 KG/M2

## 2019-01-04 DIAGNOSIS — N20.1 URETERAL CALCULUS: Primary | ICD-10-CM

## 2019-01-04 PROCEDURE — 99214 OFFICE O/P EST MOD 30 MIN: CPT | Performed by: UROLOGY

## 2019-01-04 RX ORDER — GENTAMICIN SULFATE 80 MG/100ML
80 INJECTION, SOLUTION INTRAVENOUS ONCE
Status: CANCELLED | OUTPATIENT
Start: 2019-01-09 | End: 2019-01-04

## 2019-01-04 RX ORDER — OXYCODONE AND ACETAMINOPHEN 10; 325 MG/1; MG/1
1 TABLET ORAL EVERY 6 HOURS PRN
Qty: 12 TABLET | Refills: 0 | Status: SHIPPED | OUTPATIENT
Start: 2019-01-04 | End: 2019-01-28

## 2019-01-04 RX ORDER — TAMSULOSIN HYDROCHLORIDE 0.4 MG/1
1 CAPSULE ORAL NIGHTLY
Qty: 30 CAPSULE | Refills: 0 | Status: SHIPPED | OUTPATIENT
Start: 2019-01-04 | End: 2019-01-28

## 2019-01-04 NOTE — H&P (VIEW-ONLY)
Chief Complaint:          Chief Complaint   Patient presents with   • Nephrolithiasis     E.R. f/u       HPI:   28 y.o. female.  28-year-old white female returns today having been seen yesterday.  I repeated her KUB .  I do not see a radio opaque stone in the renal fossa.  She says she's had nausea no vomiting chills but no fever and right flank pain I'm want to give her pain medication and alpha blockade see her back in a week if I can't see it again I'm I recommend a stone CT and probable intervention if she persists with pain.  She returns today.  She still says she has nausea and occasional vomiting, no fevers, no chills, there is no right flank pain and negative CT scan and negative KUB a month ago a recent breast biopsy I went ahead and repeated the KUB which is again negative I'll notify her of the results by telephone.  I don't believe this is related to the urological system.  She had severe pain and went to the emergency room she had a CT scan that showed a right proximal 4 mm stone.  She is desirous of surgical intervention.  I discussed the surgical treatment with her and well set this up for her as soon as possible she has both alpha blockade and pain medication for control until the time of the surgery.    Past Medical History:        Past Medical History:   Diagnosis Date   • Abdominal pain    • Anxiety and depression    • Arthritis    • Cholecystitis    • Constipation    • Frequent UTI    • GERD (gastroesophageal reflux disease)    • Heartburn    • Hemorrhoids    • Kidney stones    • Lesion of breast    • Lump     RIGHT BREAST   • Nausea & vomiting    • PCOS (polycystic ovarian syndrome)    • PONV (postoperative nausea and vomiting)    • Tachycardia          Current Meds:     Current Outpatient Medications   Medication Sig Dispense Refill   • aspirin 81 MG EC tablet Take 81 mg by mouth Daily. LAS T DOSE 11/26/2018     • ibuprofen (ADVIL,MOTRIN) 600 MG tablet Take 1 tablet by mouth Every 6 (Six) Hours  As Needed for Mild Pain . 30 tablet 0   • loratadine (CLARITIN) 10 MG tablet      • magnesium citrate solution Take 296 mL by mouth Take As Directed. Take 1 bottle now than 2nd bottle approx 6 hours after for clean out. 592 mL 0   • ondansetron ODT (ZOFRAN-ODT) 4 MG disintegrating tablet Take 1 tablet by mouth Every 6 (Six) Hours As Needed for Nausea or Vomiting. 10 tablet 0   • oxyCODONE-acetaminophen (PERCOCET) 5-325 MG per tablet Take 1 tablet by mouth Every 6 (Six) Hours As Needed for Severe Pain . 6 tablet 0   • Pancrelipase, Lip-Prot-Amyl, (CREON) 79437 units capsule delayed-release particles Take 2 caps with meals and 1 with snacks. 240 capsule 3   • pantoprazole (PROTONIX) 40 MG EC tablet Take 40 mg by mouth Daily.       No current facility-administered medications for this visit.         Allergies:      Allergies   Allergen Reactions   • Latex Hives   • Sulfa Antibiotics Rash        Past Surgical History:     Past Surgical History:   Procedure Laterality Date   • ABDOMINAL SURGERY     • BREAST BIOPSY Right 11/29/2018    Procedure: breast biopsy ;  Surgeon: Shiv Overton MD;  Location: [facility];  Service: General   • CHOLECYSTECTOMY N/A 8/25/2017    Procedure: CHOLECYSTECTOMY LAPAROSCOPIC;  Surgeon: Franco Mari MD;  Location: [facility];  Service:    • DIAGNOSTIC LAPAROSCOPY      x2   • DILATATION AND CURETTAGE     • WISDOM TOOTH EXTRACTION           Social History:     Social History     Socioeconomic History   • Marital status:      Spouse name: Not on file   • Number of children: Not on file   • Years of education: Not on file   • Highest education level: Not on file   Social Needs   • Financial resource strain: Not on file   • Food insecurity - worry: Not on file   • Food insecurity - inability: Not on file   • Transportation needs - medical: Not on file   • Transportation needs - non-medical: Not on file   Occupational History   • Not on file   Tobacco Use   • Smoking status: Current  Every Day Smoker     Packs/day: 0.25     Years: 12.00     Pack years: 3.00     Types: Cigarettes   • Smokeless tobacco: Never Used   Substance and Sexual Activity   • Alcohol use: No   • Drug use: No   • Sexual activity: Defer     Birth control/protection: None   Other Topics Concern   • Not on file   Social History Narrative   • Not on file       Family History:     Family History   Problem Relation Age of Onset   • Breast cancer Maternal Aunt    • Alcohol abuse Paternal Grandfather        Review of Systems:     Review of Systems   Constitutional: Negative.  Negative for activity change, appetite change, chills, diaphoresis, fatigue and unexpected weight change.   HENT: Negative for congestion, dental problem, drooling, ear discharge, ear pain, facial swelling, hearing loss, mouth sores, nosebleeds, postnasal drip, rhinorrhea, sinus pressure, sneezing, sore throat, tinnitus, trouble swallowing and voice change.    Eyes: Negative.  Negative for photophobia, pain, discharge, redness, itching and visual disturbance.   Respiratory: Negative.  Negative for apnea, cough, choking, chest tightness, shortness of breath, wheezing and stridor.    Cardiovascular: Negative.  Negative for chest pain, palpitations and leg swelling.   Gastrointestinal: Negative.  Negative for abdominal distention, abdominal pain, anal bleeding, blood in stool, constipation, diarrhea, nausea, rectal pain and vomiting.   Endocrine: Negative.  Negative for cold intolerance, heat intolerance, polydipsia, polyphagia and polyuria.   Musculoskeletal: Negative.  Negative for arthralgias, back pain, gait problem, joint swelling, myalgias, neck pain and neck stiffness.   Skin: Negative.  Negative for color change, pallor, rash and wound.   Allergic/Immunologic: Negative.  Negative for environmental allergies, food allergies and immunocompromised state.   Neurological: Negative.  Negative for dizziness, tremors, seizures, syncope, facial asymmetry, speech  difficulty, weakness, light-headedness, numbness and headaches.   Hematological: Negative.  Negative for adenopathy. Does not bruise/bleed easily.   Psychiatric/Behavioral: Negative for agitation, behavioral problems, confusion, decreased concentration, dysphoric mood, hallucinations, self-injury, sleep disturbance and suicidal ideas. The patient is not nervous/anxious and is not hyperactive.    All other systems reviewed and are negative.      Physical Exam:     Physical Exam   Constitutional: She appears well-developed and well-nourished.   HENT:   Head: Normocephalic and atraumatic.   Right Ear: External ear normal.   Left Ear: External ear normal.   Mouth/Throat: Oropharynx is clear and moist.   Eyes: Conjunctivae are normal. Pupils are equal, round, and reactive to light.   Cardiovascular: Normal rate, regular rhythm, normal heart sounds and intact distal pulses.   Pulmonary/Chest: Effort normal and breath sounds normal.   Abdominal: Soft. Bowel sounds are normal. She exhibits no distension and no mass. There is no tenderness. There is no rebound and no guarding.   Genitourinary: No vaginal discharge found.   Musculoskeletal: Normal range of motion.   Neurological: She is alert. She has normal reflexes.   Skin: Skin is warm and dry.   Psychiatric: She has a normal mood and affect. Her behavior is normal. Judgment and thought content normal.       I have reviewed the following portions of the patient's history: allergies, current medications, past family history, past medical history, past social history, past surgical history, problem list and ROS and confirm it's accurate.      Procedure:       Assessment/Plan:   Ureteral calculus-patient has been diagnosed with a ureteral calculus.  We have discussed the various parameters regarding spontaneous passage including the notion that a 2 mm stone has a high likelihood of spontaneous passage versus a larger stone being caught up in the upper areas of the urinary  tract.  We also discussed the medical management of stone disease and the use of medical expulsive therapy in the form of Flomax.  This is used in an off label setting.  We discussed the indicators for intervention including  absolute indicators such as sepsis and uncontrollable severe pain as well as  the relative indicators of moderate pain that is well-controlled with various analgesia.  I also talked about nonoperative management including ambulation and increasing fluids and hot tub as being an effective adjuncts in the treatment of a ureteral stone.  For ureteroscopy on 19     Patient's Body mass index is 30.09 kg/m². BMI is above normal parameters. Recommendations include: educational material.          This document has been electronically signed by IMELDA THOMSON MD January 4, 2019 8:44 AM

## 2019-01-04 NOTE — PROGRESS NOTES
Chief Complaint:          Chief Complaint   Patient presents with   • Nephrolithiasis     E.R. f/u       HPI:   28 y.o. female.  28-year-old white female returns today having been seen yesterday.  I repeated her KUB .  I do not see a radio opaque stone in the renal fossa.  She says she's had nausea no vomiting chills but no fever and right flank pain I'm want to give her pain medication and alpha blockade see her back in a week if I can't see it again I'm I recommend a stone CT and probable intervention if she persists with pain.  She returns today.  She still says she has nausea and occasional vomiting, no fevers, no chills, there is no right flank pain and negative CT scan and negative KUB a month ago a recent breast biopsy I went ahead and repeated the KUB which is again negative I'll notify her of the results by telephone.  I don't believe this is related to the urological system.  She had severe pain and went to the emergency room she had a CT scan that showed a right proximal 4 mm stone.  She is desirous of surgical intervention.  I discussed the surgical treatment with her and well set this up for her as soon as possible she has both alpha blockade and pain medication for control until the time of the surgery.    Past Medical History:        Past Medical History:   Diagnosis Date   • Abdominal pain    • Anxiety and depression    • Arthritis    • Cholecystitis    • Constipation    • Frequent UTI    • GERD (gastroesophageal reflux disease)    • Heartburn    • Hemorrhoids    • Kidney stones    • Lesion of breast    • Lump     RIGHT BREAST   • Nausea & vomiting    • PCOS (polycystic ovarian syndrome)    • PONV (postoperative nausea and vomiting)    • Tachycardia          Current Meds:     Current Outpatient Medications   Medication Sig Dispense Refill   • aspirin 81 MG EC tablet Take 81 mg by mouth Daily. LAS T DOSE 11/26/2018     • ibuprofen (ADVIL,MOTRIN) 600 MG tablet Take 1 tablet by mouth Every 6 (Six) Hours  As Needed for Mild Pain . 30 tablet 0   • loratadine (CLARITIN) 10 MG tablet      • magnesium citrate solution Take 296 mL by mouth Take As Directed. Take 1 bottle now than 2nd bottle approx 6 hours after for clean out. 592 mL 0   • ondansetron ODT (ZOFRAN-ODT) 4 MG disintegrating tablet Take 1 tablet by mouth Every 6 (Six) Hours As Needed for Nausea or Vomiting. 10 tablet 0   • oxyCODONE-acetaminophen (PERCOCET) 5-325 MG per tablet Take 1 tablet by mouth Every 6 (Six) Hours As Needed for Severe Pain . 6 tablet 0   • Pancrelipase, Lip-Prot-Amyl, (CREON) 81995 units capsule delayed-release particles Take 2 caps with meals and 1 with snacks. 240 capsule 3   • pantoprazole (PROTONIX) 40 MG EC tablet Take 40 mg by mouth Daily.       No current facility-administered medications for this visit.         Allergies:      Allergies   Allergen Reactions   • Latex Hives   • Sulfa Antibiotics Rash        Past Surgical History:     Past Surgical History:   Procedure Laterality Date   • ABDOMINAL SURGERY     • BREAST BIOPSY Right 11/29/2018    Procedure: breast biopsy ;  Surgeon: Shiv Overton MD;  Location: [facility];  Service: General   • CHOLECYSTECTOMY N/A 8/25/2017    Procedure: CHOLECYSTECTOMY LAPAROSCOPIC;  Surgeon: Franco Mari MD;  Location: [facility];  Service:    • DIAGNOSTIC LAPAROSCOPY      x2   • DILATATION AND CURETTAGE     • WISDOM TOOTH EXTRACTION           Social History:     Social History     Socioeconomic History   • Marital status:      Spouse name: Not on file   • Number of children: Not on file   • Years of education: Not on file   • Highest education level: Not on file   Social Needs   • Financial resource strain: Not on file   • Food insecurity - worry: Not on file   • Food insecurity - inability: Not on file   • Transportation needs - medical: Not on file   • Transportation needs - non-medical: Not on file   Occupational History   • Not on file   Tobacco Use   • Smoking status: Current  Every Day Smoker     Packs/day: 0.25     Years: 12.00     Pack years: 3.00     Types: Cigarettes   • Smokeless tobacco: Never Used   Substance and Sexual Activity   • Alcohol use: No   • Drug use: No   • Sexual activity: Defer     Birth control/protection: None   Other Topics Concern   • Not on file   Social History Narrative   • Not on file       Family History:     Family History   Problem Relation Age of Onset   • Breast cancer Maternal Aunt    • Alcohol abuse Paternal Grandfather        Review of Systems:     Review of Systems   Constitutional: Negative.  Negative for activity change, appetite change, chills, diaphoresis, fatigue and unexpected weight change.   HENT: Negative for congestion, dental problem, drooling, ear discharge, ear pain, facial swelling, hearing loss, mouth sores, nosebleeds, postnasal drip, rhinorrhea, sinus pressure, sneezing, sore throat, tinnitus, trouble swallowing and voice change.    Eyes: Negative.  Negative for photophobia, pain, discharge, redness, itching and visual disturbance.   Respiratory: Negative.  Negative for apnea, cough, choking, chest tightness, shortness of breath, wheezing and stridor.    Cardiovascular: Negative.  Negative for chest pain, palpitations and leg swelling.   Gastrointestinal: Negative.  Negative for abdominal distention, abdominal pain, anal bleeding, blood in stool, constipation, diarrhea, nausea, rectal pain and vomiting.   Endocrine: Negative.  Negative for cold intolerance, heat intolerance, polydipsia, polyphagia and polyuria.   Musculoskeletal: Negative.  Negative for arthralgias, back pain, gait problem, joint swelling, myalgias, neck pain and neck stiffness.   Skin: Negative.  Negative for color change, pallor, rash and wound.   Allergic/Immunologic: Negative.  Negative for environmental allergies, food allergies and immunocompromised state.   Neurological: Negative.  Negative for dizziness, tremors, seizures, syncope, facial asymmetry, speech  difficulty, weakness, light-headedness, numbness and headaches.   Hematological: Negative.  Negative for adenopathy. Does not bruise/bleed easily.   Psychiatric/Behavioral: Negative for agitation, behavioral problems, confusion, decreased concentration, dysphoric mood, hallucinations, self-injury, sleep disturbance and suicidal ideas. The patient is not nervous/anxious and is not hyperactive.    All other systems reviewed and are negative.      Physical Exam:     Physical Exam   Constitutional: She appears well-developed and well-nourished.   HENT:   Head: Normocephalic and atraumatic.   Right Ear: External ear normal.   Left Ear: External ear normal.   Mouth/Throat: Oropharynx is clear and moist.   Eyes: Conjunctivae are normal. Pupils are equal, round, and reactive to light.   Cardiovascular: Normal rate, regular rhythm, normal heart sounds and intact distal pulses.   Pulmonary/Chest: Effort normal and breath sounds normal.   Abdominal: Soft. Bowel sounds are normal. She exhibits no distension and no mass. There is no tenderness. There is no rebound and no guarding.   Genitourinary: No vaginal discharge found.   Musculoskeletal: Normal range of motion.   Neurological: She is alert. She has normal reflexes.   Skin: Skin is warm and dry.   Psychiatric: She has a normal mood and affect. Her behavior is normal. Judgment and thought content normal.       I have reviewed the following portions of the patient's history: allergies, current medications, past family history, past medical history, past social history, past surgical history, problem list and ROS and confirm it's accurate.      Procedure:       Assessment/Plan:   Ureteral calculus-patient has been diagnosed with a ureteral calculus.  We have discussed the various parameters regarding spontaneous passage including the notion that a 2 mm stone has a high likelihood of spontaneous passage versus a larger stone being caught up in the upper areas of the urinary  tract.  We also discussed the medical management of stone disease and the use of medical expulsive therapy in the form of Flomax.  This is used in an off label setting.  We discussed the indicators for intervention including  absolute indicators such as sepsis and uncontrollable severe pain as well as  the relative indicators of moderate pain that is well-controlled with various analgesia.  I also talked about nonoperative management including ambulation and increasing fluids and hot tub as being an effective adjuncts in the treatment of a ureteral stone.  For ureteroscopy on 19     Patient's Body mass index is 30.09 kg/m². BMI is above normal parameters. Recommendations include: educational material.          This document has been electronically signed by IMELDA THOMSON MD January 4, 2019 8:44 AM

## 2019-01-07 ENCOUNTER — TELEPHONE (OUTPATIENT)
Dept: UROLOGY | Facility: CLINIC | Age: 29
End: 2019-01-07

## 2019-01-07 NOTE — TELEPHONE ENCOUNTER
I called the patient with PAT and Surgery time. I also gave the patient detailed instructions for the night before and morning of procedure. Patient expressed understanding. Procedure having:  RIGHT USCOPE    PAT: Tuesday 01/08/19 @ 8:45 AM  SURGERY: Wednesday 01/09/19 @ 10:00 AM

## 2019-01-08 ENCOUNTER — APPOINTMENT (OUTPATIENT)
Dept: PREADMISSION TESTING | Facility: HOSPITAL | Age: 29
End: 2019-01-08

## 2019-01-08 LAB
ANION GAP SERPL CALCULATED.3IONS-SCNC: 3.7 MMOL/L (ref 3.6–11.2)
BUN BLD-MCNC: 12 MG/DL (ref 7–21)
BUN/CREAT SERPL: 13.6 (ref 7–25)
CALCIUM SPEC-SCNC: 9.5 MG/DL (ref 7.7–10)
CHLORIDE SERPL-SCNC: 108 MMOL/L (ref 99–112)
CO2 SERPL-SCNC: 25.3 MMOL/L (ref 24.3–31.9)
CREAT BLD-MCNC: 0.88 MG/DL (ref 0.43–1.29)
DEPRECATED RDW RBC AUTO: 38.7 FL (ref 37–54)
ERYTHROCYTE [DISTWIDTH] IN BLOOD BY AUTOMATED COUNT: 11.6 % (ref 11.5–14.5)
GFR SERPL CREATININE-BSD FRML MDRD: 75 ML/MIN/1.73
GLUCOSE BLD-MCNC: 94 MG/DL (ref 70–110)
HCT VFR BLD AUTO: 37.9 % (ref 37–47)
HGB BLD-MCNC: 12.9 G/DL (ref 12–16)
MCH RBC QN AUTO: 31.5 PG (ref 27–33)
MCHC RBC AUTO-ENTMCNC: 34 G/DL (ref 33–37)
MCV RBC AUTO: 92.4 FL (ref 80–94)
OSMOLALITY SERPL CALC.SUM OF ELEC: 273.3 MOSM/KG (ref 273–305)
PLATELET # BLD AUTO: 246 10*3/MM3 (ref 130–400)
PMV BLD AUTO: 9.7 FL (ref 6–10)
POTASSIUM BLD-SCNC: 3.9 MMOL/L (ref 3.5–5.3)
RBC # BLD AUTO: 4.1 10*6/MM3 (ref 4.2–5.4)
SODIUM BLD-SCNC: 137 MMOL/L (ref 135–153)
WBC NRBC COR # BLD: 10.96 10*3/MM3 (ref 4.5–12.5)

## 2019-01-08 PROCEDURE — 85027 COMPLETE CBC AUTOMATED: CPT | Performed by: UROLOGY

## 2019-01-08 PROCEDURE — 80048 BASIC METABOLIC PNL TOTAL CA: CPT | Performed by: UROLOGY

## 2019-01-08 PROCEDURE — 36415 COLL VENOUS BLD VENIPUNCTURE: CPT

## 2019-01-08 NOTE — DISCHARGE INSTRUCTIONS
Educational brochure given to patient regarding pain control and mindful breathing.    TAKE the following medications the morning of surgery:  All heart or blood pressure medications    Please discontinue all blood thinners and anticoagulants (except aspirin) prior to surgery as per your surgeon and cardiologist instructions.  Aspirin may be continued up to the day prior to surgery.    HOLD all diabetic medications the morning of surgery as order by physician.    Arrival time for surgery on 1/9/2019 will be called to you by Dr. Barrow's office    General Instructions:  • Do NOT eat or drink after midnight 1/8/2019 which includes water, mints, or gum.  • You may brush your teeth. Dental appliances that are removable must be taken out day of surgery.  • Do NOT smoke, chew tobacco, or drink alcohol within 24 hours prior to surgery.  • Bring medications in original bottles, any inhalers and if applicable your C-PAP/BI-PAP machine  • Bring any papers given to you in the doctor’s office  • Wear clean, comfortable clothes and socks  • Do NOT wear contact lenses or make-up or dark nail polish.  Bring a case for your glasses if applicable.  • Bring crutches or walker if applicable  • Leave all other valuables and jewelry at home  • If you were given a blood bank armband, continue to wear it until discharged.    Preventing a Surgical Site Infection:  • Shower the night before surgery (unless instructed otherwise) using a fresh bar of anti-bacterial soap (such as Dial) and clean washcloth.  Dry with a clean towel and dress in clean clothing.  • For 2 to 3 days before surgery, avoid shaving with a razor near where you will have surgery because the razor can irritate skin and make it easier to develop an infection.  Ask your surgeon if you will be receiving antibiotics prior to surgery.  • Make sure you, your family, and all healthcare providers clean their hands with soap and water or an alcohol-based hand  before  caring for you or your wound.  • If at all possible, quit smoking as many days before surgery as you can.    Day of Surgery:  Upon arrival, a pre-op nurse and anesthesiologist will review your health history, obtain vital signs, and answer questions you may have.  The only belongings needed at this time will be your home medications and if applicable you C-PAP/BI-PAP machine.  If you are staying overnight, your family can leave the rest of your belongings in the car and bring them to your room later.  A pre-op nurse will start an IV and you may receive medication in preparation for surgery.  Due to patient privacy and limited space, only one member of your family will be able to accompany you in the pre-op area.  While you are in surgery your family should notify the waiting room  if they leave the waiting room area and provide a contact number.  Please be aware that surgery does come with discomfort.  We want to make every effort to control your discomfort so please discuss any uncontrolled symptoms with your nurse.  Your doctor will most likely have prescribed pain medications.  If you are going home after surgery you will receive individualized written care instructions before being discharged.  A responsible adult must drive you to and from the hospital on the day of surgery and stay with you for 24 hours.  If you are staying overnight following surgery, you will be transported to your hospital room following the recovery period.

## 2019-01-09 ENCOUNTER — APPOINTMENT (OUTPATIENT)
Dept: GENERAL RADIOLOGY | Facility: HOSPITAL | Age: 29
End: 2019-01-09

## 2019-01-09 ENCOUNTER — APPOINTMENT (OUTPATIENT)
Dept: GENERAL RADIOLOGY | Facility: HOSPITAL | Age: 29
End: 2019-01-09
Attending: UROLOGY

## 2019-01-09 ENCOUNTER — ANESTHESIA EVENT (OUTPATIENT)
Dept: PERIOP | Facility: HOSPITAL | Age: 29
End: 2019-01-09

## 2019-01-09 ENCOUNTER — HOSPITAL ENCOUNTER (OUTPATIENT)
Facility: HOSPITAL | Age: 29
Discharge: HOME | End: 2019-01-09
Attending: UROLOGY | Admitting: UROLOGY

## 2019-01-09 ENCOUNTER — ANESTHESIA (OUTPATIENT)
Dept: PERIOP | Facility: HOSPITAL | Age: 29
End: 2019-01-09

## 2019-01-09 VITALS
WEIGHT: 148 LBS | HEART RATE: 84 BPM | RESPIRATION RATE: 18 BRPM | BODY MASS INDEX: 29.84 KG/M2 | HEIGHT: 59 IN | SYSTOLIC BLOOD PRESSURE: 123 MMHG | OXYGEN SATURATION: 99 % | TEMPERATURE: 97.8 F | DIASTOLIC BLOOD PRESSURE: 90 MMHG

## 2019-01-09 DIAGNOSIS — N20.1 URETERAL CALCULUS: ICD-10-CM

## 2019-01-09 LAB
B-HCG UR QL: NEGATIVE
INTERNAL NEGATIVE CONTROL: NEGATIVE
INTERNAL POSITIVE CONTROL: POSITIVE
Lab: NORMAL

## 2019-01-09 PROCEDURE — 74018 RADEX ABDOMEN 1 VIEW: CPT | Performed by: RADIOLOGY

## 2019-01-09 PROCEDURE — 25010000002 IOPAMIDOL 61 % SOLUTION

## 2019-01-09 PROCEDURE — 25010000002 FENTANYL CITRATE (PF) 100 MCG/2ML SOLUTION: Performed by: NURSE ANESTHETIST, CERTIFIED REGISTERED

## 2019-01-09 PROCEDURE — 81025 URINE PREGNANCY TEST: CPT | Performed by: ANESTHESIOLOGY

## 2019-01-09 PROCEDURE — 25010000002 DEXAMETHASONE PER 1 MG: Performed by: NURSE ANESTHETIST, CERTIFIED REGISTERED

## 2019-01-09 PROCEDURE — 25010000002 MIDAZOLAM PER 1 MG: Performed by: NURSE ANESTHETIST, CERTIFIED REGISTERED

## 2019-01-09 PROCEDURE — 76000 FLUOROSCOPY <1 HR PHYS/QHP: CPT

## 2019-01-09 PROCEDURE — C1769 GUIDE WIRE: HCPCS | Performed by: UROLOGY

## 2019-01-09 PROCEDURE — 52353 CYSTOURETERO W/LITHOTRIPSY: CPT | Performed by: UROLOGY

## 2019-01-09 PROCEDURE — 74018 RADEX ABDOMEN 1 VIEW: CPT

## 2019-01-09 PROCEDURE — 25010000002 GENTAMICIN PER 80 MG: Performed by: UROLOGY

## 2019-01-09 PROCEDURE — 25010000002 ONDANSETRON PER 1 MG: Performed by: NURSE ANESTHETIST, CERTIFIED REGISTERED

## 2019-01-09 PROCEDURE — 25010000002 PROPOFOL 10 MG/ML EMULSION: Performed by: NURSE ANESTHETIST, CERTIFIED REGISTERED

## 2019-01-09 PROCEDURE — 25010000002 IOPAMIDOL 61 % SOLUTION: Performed by: UROLOGY

## 2019-01-09 RX ORDER — ATROPA BELLADONNA AND OPIUM 16.2; 6 MG/1; MG/1
SUPPOSITORY RECTAL AS NEEDED
Status: DISCONTINUED | OUTPATIENT
Start: 2019-01-09 | End: 2019-01-09 | Stop reason: HOSPADM

## 2019-01-09 RX ORDER — LIDOCAINE HYDROCHLORIDE 20 MG/ML
INJECTION, SOLUTION EPIDURAL; INFILTRATION; INTRACAUDAL; PERINEURAL AS NEEDED
Status: DISCONTINUED | OUTPATIENT
Start: 2019-01-09 | End: 2019-01-09 | Stop reason: SURG

## 2019-01-09 RX ORDER — MONTELUKAST SODIUM 10 MG/1
10 TABLET ORAL NIGHTLY
COMMUNITY

## 2019-01-09 RX ORDER — OXYCODONE HYDROCHLORIDE AND ACETAMINOPHEN 5; 325 MG/1; MG/1
1 TABLET ORAL ONCE AS NEEDED
Status: DISCONTINUED | OUTPATIENT
Start: 2019-01-09 | End: 2019-01-09 | Stop reason: HOSPADM

## 2019-01-09 RX ORDER — ONDANSETRON 2 MG/ML
INJECTION INTRAMUSCULAR; INTRAVENOUS AS NEEDED
Status: DISCONTINUED | OUTPATIENT
Start: 2019-01-09 | End: 2019-01-09 | Stop reason: SURG

## 2019-01-09 RX ORDER — FENTANYL CITRATE 50 UG/ML
INJECTION, SOLUTION INTRAMUSCULAR; INTRAVENOUS AS NEEDED
Status: DISCONTINUED | OUTPATIENT
Start: 2019-01-09 | End: 2019-01-09 | Stop reason: SURG

## 2019-01-09 RX ORDER — ONDANSETRON 2 MG/ML
4 INJECTION INTRAMUSCULAR; INTRAVENOUS ONCE AS NEEDED
Status: DISCONTINUED | OUTPATIENT
Start: 2019-01-09 | End: 2019-01-09 | Stop reason: HOSPADM

## 2019-01-09 RX ORDER — MIDAZOLAM HYDROCHLORIDE 1 MG/ML
2 INJECTION INTRAMUSCULAR; INTRAVENOUS
Status: DISCONTINUED | OUTPATIENT
Start: 2019-01-09 | End: 2019-01-09 | Stop reason: HOSPADM

## 2019-01-09 RX ORDER — MAGNESIUM HYDROXIDE 1200 MG/15ML
LIQUID ORAL AS NEEDED
Status: DISCONTINUED | OUTPATIENT
Start: 2019-01-09 | End: 2019-01-09 | Stop reason: HOSPADM

## 2019-01-09 RX ORDER — IPRATROPIUM BROMIDE AND ALBUTEROL SULFATE 2.5; .5 MG/3ML; MG/3ML
3 SOLUTION RESPIRATORY (INHALATION) ONCE AS NEEDED
Status: DISCONTINUED | OUTPATIENT
Start: 2019-01-09 | End: 2019-01-09 | Stop reason: HOSPADM

## 2019-01-09 RX ORDER — GENTAMICIN SULFATE 80 MG/100ML
80 INJECTION, SOLUTION INTRAVENOUS ONCE
Status: COMPLETED | OUTPATIENT
Start: 2019-01-09 | End: 2019-01-09

## 2019-01-09 RX ORDER — PROPOFOL 10 MG/ML
VIAL (ML) INTRAVENOUS AS NEEDED
Status: DISCONTINUED | OUTPATIENT
Start: 2019-01-09 | End: 2019-01-09 | Stop reason: SURG

## 2019-01-09 RX ORDER — SODIUM CHLORIDE, SODIUM LACTATE, POTASSIUM CHLORIDE, CALCIUM CHLORIDE 600; 310; 30; 20 MG/100ML; MG/100ML; MG/100ML; MG/100ML
125 INJECTION, SOLUTION INTRAVENOUS CONTINUOUS
Status: DISCONTINUED | OUTPATIENT
Start: 2019-01-09 | End: 2019-01-09 | Stop reason: HOSPADM

## 2019-01-09 RX ORDER — MEPERIDINE HYDROCHLORIDE 25 MG/ML
12.5 INJECTION INTRAMUSCULAR; INTRAVENOUS; SUBCUTANEOUS
Status: DISCONTINUED | OUTPATIENT
Start: 2019-01-09 | End: 2019-01-09 | Stop reason: HOSPADM

## 2019-01-09 RX ORDER — MIDAZOLAM HYDROCHLORIDE 1 MG/ML
INJECTION INTRAMUSCULAR; INTRAVENOUS AS NEEDED
Status: DISCONTINUED | OUTPATIENT
Start: 2019-01-09 | End: 2019-01-09 | Stop reason: SURG

## 2019-01-09 RX ORDER — FENTANYL CITRATE 50 UG/ML
50 INJECTION, SOLUTION INTRAMUSCULAR; INTRAVENOUS
Status: DISCONTINUED | OUTPATIENT
Start: 2019-01-09 | End: 2019-01-09 | Stop reason: HOSPADM

## 2019-01-09 RX ORDER — MIDAZOLAM HYDROCHLORIDE 1 MG/ML
1 INJECTION INTRAMUSCULAR; INTRAVENOUS
Status: DISCONTINUED | OUTPATIENT
Start: 2019-01-09 | End: 2019-01-09 | Stop reason: HOSPADM

## 2019-01-09 RX ORDER — DEXAMETHASONE SODIUM PHOSPHATE 4 MG/ML
INJECTION, SOLUTION INTRA-ARTICULAR; INTRALESIONAL; INTRAMUSCULAR; INTRAVENOUS; SOFT TISSUE AS NEEDED
Status: DISCONTINUED | OUTPATIENT
Start: 2019-01-09 | End: 2019-01-09 | Stop reason: SURG

## 2019-01-09 RX ORDER — SODIUM CHLORIDE 0.9 % (FLUSH) 0.9 %
3-10 SYRINGE (ML) INJECTION AS NEEDED
Status: DISCONTINUED | OUTPATIENT
Start: 2019-01-09 | End: 2019-01-09 | Stop reason: HOSPADM

## 2019-01-09 RX ORDER — HYDROCODONE BITARTRATE AND ACETAMINOPHEN 10; 325 MG/1; MG/1
1 TABLET ORAL EVERY 4 HOURS PRN
Qty: 10 TABLET | Refills: 0 | Status: SHIPPED | OUTPATIENT
Start: 2019-01-09 | End: 2019-01-28

## 2019-01-09 RX ORDER — SODIUM CHLORIDE 0.9 % (FLUSH) 0.9 %
3 SYRINGE (ML) INJECTION EVERY 12 HOURS SCHEDULED
Status: DISCONTINUED | OUTPATIENT
Start: 2019-01-09 | End: 2019-01-09 | Stop reason: HOSPADM

## 2019-01-09 RX ADMIN — SODIUM CHLORIDE, POTASSIUM CHLORIDE, SODIUM LACTATE AND CALCIUM CHLORIDE: 600; 310; 30; 20 INJECTION, SOLUTION INTRAVENOUS at 12:53

## 2019-01-09 RX ADMIN — DEXAMETHASONE SODIUM PHOSPHATE 8 MG: 4 INJECTION, SOLUTION INTRAMUSCULAR; INTRAVENOUS at 13:01

## 2019-01-09 RX ADMIN — ONDANSETRON 4 MG: 2 INJECTION, SOLUTION INTRAMUSCULAR; INTRAVENOUS at 12:53

## 2019-01-09 RX ADMIN — MIDAZOLAM HYDROCHLORIDE 2 MG: 1 INJECTION, SOLUTION INTRAMUSCULAR; INTRAVENOUS at 12:53

## 2019-01-09 RX ADMIN — FENTANYL CITRATE 50 MCG: 50 INJECTION INTRAMUSCULAR; INTRAVENOUS at 12:55

## 2019-01-09 RX ADMIN — GENTAMICIN SULFATE 80 MG: 80 INJECTION, SOLUTION INTRAVENOUS at 12:53

## 2019-01-09 RX ADMIN — PROPOFOL 100 MG: 10 INJECTION, EMULSION INTRAVENOUS at 12:59

## 2019-01-09 RX ADMIN — FENTANYL CITRATE 50 MCG: 50 INJECTION INTRAMUSCULAR; INTRAVENOUS at 12:58

## 2019-01-09 RX ADMIN — LIDOCAINE HYDROCHLORIDE 60 MG: 20 INJECTION, SOLUTION EPIDURAL; INFILTRATION; INTRACAUDAL; PERINEURAL at 12:59

## 2019-01-09 NOTE — OP NOTE
URETEROSCOPY LASER LITHOTRIPSY WITH STENT INSERTION  Procedure Note    Pili Webster  1/9/2019    Pre-op Diagnosis:   Ureteral calculus [N20.1]    Post-op Diagnosis:     Post-Op Diagnosis Codes:     * Ureteral calculus [N20.1]    Procedure/CPT® Codes:   28-year-old white female with a right upper ureteral 4 mm stone causing significant pain, nausea vomiting she is desirous of a surgical solution.  Following an informed consent she is brought the operative suite and underwent induction of a general anesthetic after prep and drape in a sterile fashion I used the rigid ureteroscope which was the lateral fibula 4.5 Greek advanced into the meatus after anesthesia meatus with 22 cc of 2% viscous Xylocaine jelly I easily advanced up the ureter identified the stone was a soft calcium oxalate dihydrate stone broken numerous pieces with several small pulses in the laser I passed the scope easily into the pelvis no complications were encountered the fragments were washed out there is no obstruction retrograde showed no extravasation good free flow to the bladder and she tolerated it well a B&O was placed for postoperative discomfort    Procedure(s):  URETEROSCOPY LASER LITHOTRIPSY retrograde pyelogram    Surgeon(s):  Ahmet Barrow MD    Anesthesia: see anesthesia record    Staff:   Circulator: Chanda Pino RN  Scrub Person: Jennifer Flaherty LPN; Awilda Worrell    Estimated Blood Loss: none  Urine Voided: * No values recorded between 1/9/2019 12:55 PM and 1/9/2019  1:19 PM *    Specimens:                None      Drains: None    Findings: Right upper ureteral calcium oxalate dihydrate stone    Blood: N/A    Complications: None    Grafts and Implants: None    Ahmet Barrow MD     Date: 1/9/2019  Time: 1:28 PM

## 2019-01-09 NOTE — ANESTHESIA POSTPROCEDURE EVALUATION
Patient: Pili Webster    Procedure Summary     Date:  01/09/19 Room / Location:   [facility] /  [facility]    Anesthesia Start:  1253 Anesthesia Stop:  1319    Procedure:  URETEROSCOPY LASER LITHOTRIPSY retrograde pyelogram (Right ) Diagnosis:       Ureteral calculus      (Ureteral calculus [N20.1])    Surgeon:  Ahmet Barrow MD Provider:  Zoltan Steve MD    Anesthesia Type:  general ASA Status:  2          Anesthesia Type: general  Last vitals  BP   123/90 (01/09/19 1448)   Temp   97.8 °F (36.6 °C) (01/09/19 1448)   Pulse   84 (01/09/19 1448)   Resp   18 (01/09/19 1448)     SpO2   99 % (01/09/19 1448)     Post Anesthesia Care and Evaluation    Patient location during evaluation: PHASE II  Patient participation: complete - patient participated  Level of consciousness: awake and alert  Pain score: 1  Pain management: adequate  Airway patency: patent  Anesthetic complications: No anesthetic complications  PONV Status: controlled  Cardiovascular status: acceptable  Respiratory status: acceptable  Hydration status: acceptable

## 2019-01-09 NOTE — ANESTHESIA PREPROCEDURE EVALUATION
Anesthesia Evaluation     history of anesthetic complications: PONV  NPO Solid Status: > 8 hours  NPO Liquid Status: > 8 hours           Airway   Mallampati: II  TM distance: >3 FB  Neck ROM: full  no difficulty expected  Dental    (+) edentulous    Pulmonary - normal exam   (+) a smoker Current Abstained day of surgery,   Cardiovascular - normal exam        Neuro/Psych  (+) psychiatric history Anxiety,     GI/Hepatic/Renal/Endo    (+) obesity,  GERD,      Musculoskeletal     Abdominal  - normal exam   Substance History      OB/GYN          Other                          Anesthesia Plan    ASA 2     general     intravenous induction   Anesthetic plan, all risks, benefits, and alternatives have been provided, discussed and informed consent has been obtained with: patient.

## 2019-01-28 ENCOUNTER — OFFICE VISIT (OUTPATIENT)
Dept: SURGERY | Facility: CLINIC | Age: 29
End: 2019-01-28

## 2019-01-28 ENCOUNTER — OFFICE VISIT (OUTPATIENT)
Dept: GASTROENTEROLOGY | Facility: CLINIC | Age: 29
End: 2019-01-28

## 2019-01-28 ENCOUNTER — TRANSCRIBE ORDERS (OUTPATIENT)
Dept: PHYSICAL THERAPY | Facility: HOSPITAL | Age: 29
End: 2019-01-28

## 2019-01-28 VITALS
BODY MASS INDEX: 31.17 KG/M2 | WEIGHT: 154.6 LBS | OXYGEN SATURATION: 97 % | SYSTOLIC BLOOD PRESSURE: 122 MMHG | HEART RATE: 91 BPM | HEIGHT: 59 IN | DIASTOLIC BLOOD PRESSURE: 85 MMHG

## 2019-01-28 VITALS
HEART RATE: 88 BPM | WEIGHT: 154 LBS | SYSTOLIC BLOOD PRESSURE: 130 MMHG | DIASTOLIC BLOOD PRESSURE: 74 MMHG | BODY MASS INDEX: 31.04 KG/M2 | HEIGHT: 59 IN

## 2019-01-28 DIAGNOSIS — R19.7 OVERFLOW DIARRHEA: Primary | ICD-10-CM

## 2019-01-28 DIAGNOSIS — R11.0 NAUSEA: ICD-10-CM

## 2019-01-28 DIAGNOSIS — N63.0 BREAST NODULE: Primary | ICD-10-CM

## 2019-01-28 DIAGNOSIS — Z91.018 MULTIPLE FOOD ALLERGIES: ICD-10-CM

## 2019-01-28 DIAGNOSIS — M25.511 RIGHT SHOULDER PAIN, UNSPECIFIED CHRONICITY: Primary | ICD-10-CM

## 2019-01-28 PROCEDURE — 99213 OFFICE O/P EST LOW 20 MIN: CPT | Performed by: PHYSICIAN ASSISTANT

## 2019-01-28 PROCEDURE — 99024 POSTOP FOLLOW-UP VISIT: CPT | Performed by: SURGERY

## 2019-01-28 RX ORDER — POLYETHYLENE GLYCOL 3350 17 G/17G
17 POWDER, FOR SOLUTION ORAL DAILY
COMMUNITY
End: 2020-07-27

## 2019-01-29 NOTE — PROGRESS NOTES
Subjective   Pili Webster is a 28 y.o. female  is here today for follow-up.         Pili Webster is a 28 y.o. female here for follow up after right breast biopsy.  Pathology was benign consistent with fibroadenoma.  She's had partial skin dehiscence but the underlying wound is closed with no drainage or complication.  No infection evident.  She is a smoker which makes this a high risk for wound dehiscence given her poor wound healing as a smoker.        Assessment     Pili was seen today for breast drainage.    Diagnoses and all orders for this visit:    Breast nodule      Pili Webster is a 28 y.o. female with superficial skin dehiscence of her incision on her right breast after lumpectomy.  The patient is doing well and will keep covered with a bandage daily.  She will follow-up as needed.

## 2019-01-30 ENCOUNTER — HOSPITAL ENCOUNTER (OUTPATIENT)
Dept: PHYSICAL THERAPY | Facility: HOSPITAL | Age: 29
Setting detail: THERAPIES SERIES
Discharge: HOME | End: 2019-01-30

## 2019-01-30 DIAGNOSIS — M25.511 ACUTE PAIN OF RIGHT SHOULDER: Primary | ICD-10-CM

## 2019-01-30 PROCEDURE — 97162 PT EVAL MOD COMPLEX 30 MIN: CPT | Performed by: PHYSICAL THERAPIST

## 2019-01-30 NOTE — THERAPY EVALUATION
Outpatient Physical Therapy Ortho Initial Evaluation   [place]     Patient Name: Pili Webster  : 1990  MRN: [id number]  Today's Date: 2019      Visit Date: 2019    Patient Active Problem List   Diagnosis   • External hemorrhoid, thrombosed   • Status post laparoscopic cholecystectomy   • Kidney stone   • Breast nodule   • Ureteral calculus        Past Medical History:   Diagnosis Date   • Abdominal pain    • Anxiety and depression    • Arthritis    • Cholecystitis    • Constipation    • Frequent UTI    • GERD (gastroesophageal reflux disease)    • Heartburn    • Hemorrhoids    • Kidney stones    • Lesion of breast    • Lump     RIGHT BREAST   • Nausea & vomiting    • PCOS (polycystic ovarian syndrome)    • PONV (postoperative nausea and vomiting)    • Tachycardia         Past Surgical History:   Procedure Laterality Date   • ABDOMINAL SURGERY     • BREAST BIOPSY Right 2018    Procedure: breast biopsy ;  Surgeon: Shiv Overton MD;  Location: [facility];  Service: General   • CHOLECYSTECTOMY N/A 2017    Procedure: CHOLECYSTECTOMY LAPAROSCOPIC;  Surgeon: Franco Mari MD;  Location: [facility];  Service:    • DENTAL PROCEDURE     • DIAGNOSTIC LAPAROSCOPY      x2   • DILATATION AND CURETTAGE     • URETEROSCOPY LASER LITHOTRIPSY WITH STENT INSERTION Right 2019    Procedure: URETEROSCOPY LASER LITHOTRIPSY retrograde pyelogram;  Surgeon: Ahmet Barrow MD;  Location: [facility];  Service: Urology   • WISDOM TOOTH EXTRACTION         Visit Dx:     ICD-10-CM ICD-9-CM   1. Acute pain of right shoulder M25.511 719.41       Patient History     Row Name 19 1000             History    Chief Complaint  Difficulty with daily activities;Numbness;Tinglings;Joint stiffness;Muscle weakness  -BE      Type of Pain  Shoulder pain  -BE      Date Current Problem(s) Began  19  -BE      Brief Description of Current Complaint  Patient reports that she began  "experiencing shoulder on 1/20/2019.  She notes that she was attempting to move furniture, noting that she was lifting, pushing, and pulling.  She reports that felt instant pain, noting that she felt like she \"tore a muscle.\"  Patient reports that she initially thought shoulder pain would go away, but decided to see an orthopedic on 1/23/2019 due to continued pain.  She reports that x-rays were performed; she reports that she was diagnosed with a muscle strain.  She notes that she experiences numbness/tingling in the right UE, noting that it extends from the shoulder to the fingertips.    -BE      Patient/Caregiver Goals  Relieve pain;Improve mobility;Improve strength  -BE      Current Tobacco Use  yes  -BE      Smoking Status  1/2 pack per day  -BE      Patient's Rating of General Health  Good  -BE      Hand Dominance  right-handed  -BE      Occupation/sports/leisure activities  /liban  -BE      Patient seeing anyone else for problem(s)?  Orthopedic  -BE      How has patient tried to help current problem?  Heating pad, ice, medication, muscle rubs  -BE      What clinical tests have you had for this problem?  X-ray  -BE      Results of Clinical Tests  Negative for fractures  -BE      Are you or can you be pregnant  No  -BE         Pain     Pain Location  Shoulder  -BE      Pain at Present  8  -BE      Pain at Best  5  -BE      Pain at Worst  10  -BE      Pain Frequency  Constant/continuous varies in intensity  -BE      Pain Description  Sharp;Stabbing  -BE      What Performance Factors Make the Current Problem(s) WORSE?  Shoulder movements, lifting, reaching  -BE      What Performance Factors Make the Current Problem(s) BETTER?  Rest, ice, muscle rubs  -BE      Is your sleep disturbed?  Yes  -BE      Total hours of sleep per night  5-6 hours disturbed  -BE      Difficulties at work?  Patient reports that she can perform work tasks, but reports that she is modifying tasks.  She reports that she has increased " pain.  -BE      Difficulties with ADL's?  Independe with ADLs, but notes tasks are modified; she reports increased pain.  -BE         Fall Risk Assessment    Any falls in the past year:  Yes  -BE      Number of falls reported in the last 12 months  3  -BE      Factors that contributed to the fall:  Lost balance  -BE         Services    Are you currently receiving Home Health services  No  -BE         Daily Activities    Primary Language  English  -BE      Are you able to read  Yes  -BE      Are you able to write  Yes  -BE      How does patient learn best?  Listening;Reading;Demonstration;Pictures/Video  -BE      Teaching needs identified  Home Exercise Program  -BE      Does patient have problems with the following?  Depression;Anxiety reports MD is aware  -BE      Pt Participated in POC and Goals  Yes  -BE         Safety    Are you being hurt, hit, or frightened by anyone at home or in your life?  No  -BE      Are you being neglected by a caregiver  No  -BE        User Key  (r) = Recorded By, (t) = Taken By, (c) = Cosigned By    Initials Name Provider Type    BE Pili Walker, PT Physical Therapist          PT Ortho     Row Name 01/30/19 1000       DTR- Upper Quarter Clearing    Biceps (C5/6)  Bilateral:;2- Normal response  -BE    Brachioradialis (C6)  Bilateral:;2- Normal response  -BE    Triceps (C7)  Bilateral:;2- Normal response  -BE       Sensory Screen for Light Touch- Upper Quarter Clearing    C4 (posterior shoulder)  Bilateral:;Intact  -BE    C5 (lateral upper arm)  Bilateral:;Intact  -BE    C6 (tip of thumb)  Bilateral:;Intact  -BE    C7 (tip of 3rd finger)  Bilateral:;Intact  -BE    C8 (tip of 5th finger)  Bilateral:;Intact  -BE    T1 (medial lower arm)  Bilateral:;Intact  -BE       Myotomal Screen- Upper Quarter Clearing    Shoulder flexion (C5)  Left:;4+ (Good +);Right:;3- (Fair -)  -BE    Elbow flexion/wrist extension (C6)  Left:;4+ (Good +);Right:;4- (Good -)  -BE    Elbow extension/wrist  flexion (C7)  Left:;4+ (Good +);Right:;4- (Good -)  -BE       Special Tests/Palpation    Special Tests/Palpation  Shoulder;Cervical/Thoracic  -BE       Cervical/Thoracic Special Tests    Spurlings (Foraminal Compression)  Negative  -BE    Cervical Compression (Forarminal Compression vs. Facet Pain)  Negative  -BE    Cervical Distraction (Foraminal Compression vs. Facet Pain)  Negative  -BE       Shoulder Impingement/Rotator Cuff Special Tests    Garcia-Devin Test (RC Lesion vs. Bursitis)  Right:;Positive  -BE    Neer Impingement Test (RC Lesion vs. Bursitis)  Right:;Positive  -BE    Empty Can Test (RC Lesion)  Right:;Positive  -BE    Drop Arm Test (Full Thickness RC Lesion)  Right:;Negative  -BE    Speed's Test (LH of Biceps Lesion)  Right:;Positive  -BE       Biceps/Labral Special Tests    Clunk Test (Labral Test)  Right:;Negative  -BE       Shoulder Girdle Palpation    Shoulder Girdle Palpation?  Yes  -BE    Supraspinatus Insertion  Right:;Tender  -BE    Long Head of Biceps  Right:;Tender  -BE    Short Head of Biceps  Right:;Tender  -BE    Deltoid  Right:;Tender  -BE    Subscapularis  Right:;Tender  -BE    Infraspinatus  Right:;Tender  -BE    Teres Minor  Right:;Tender  -BE    Pect Minor  Right:;Tender  -BE    Upper Trap  Right:;Tender;Guarded/taut  -BE    Levator Scapula  Right:;Tender;Guarded/taut  -BE    Middle Trap  Right:;Tender  -BE    Rhomboid  Right:;Tender  -BE    Lower Trap  Right:;Tender  -BE       General ROM    RT Upper Ext  Rt Shoulder ABduction;Rt Shoulder Flexion;Rt Shoulder External Rotation;Rt Shoulder Internal Rotation  -BE    LT Upper Ext  Lt Shoulder ABduction;Lt Shoulder Flexion;Lt Shoulder External Rotation;Lt Shoulder Internal Rotation  -BE       Right Upper Ext    Rt Shoulder Abduction AROM  74  -BE    Rt Shoulder Flexion AROM  122  -BE    Rt Shoulder External Rotation AROM  46  -BE    Rt Shoulder Internal Rotation AROM  63  -BE       Left Upper Ext    Lt Shoulder Abduction AROM  165   -BE    Lt Shoulder Flexion AROM  165  -BE    Lt Shoulder External Rotation AROM  80  -BE    Lt Shoulder Internal Rotation AROM  80  -BE       MMT (Manual Muscle Testing)    Rt Upper Ext  Rt Shoulder Flexion;Rt Shoulder ABduction;Rt Shoulder Internal Rotation;Rt Shoulder External Rotation  -BE    Lt Upper Ext  Lt Shoulder Flexion;Lt Shoulder ABduction;Lt Shoulder Internal Rotation;Lt Shoulder External Rotation  -BE       MMT Right Upper Ext    Rt Shoulder Flexion MMT, Gross Movement  (3-/5) fair minus  -BE    Rt Shoulder ABduction MMT, Gross Movement  (3-/5) fair minus  -BE    Rt Shoulder Internal Rotation MMT, Gross Movement  (3-/5) fair minus  -BE    Rt Shoulder External Rotation MMT, Gross Movement  (3-/5) fair minus  -BE       MMT Left Upper Ext    Lt Shoulder Flexion MMT, Gross Movement  (4+/5) good plus  -BE    Lt Shoulder ABduction MMT, Gross Movement  (4+/5) good plus  -BE    Lt Shoulder Internal Rotation MMT, Gross Movement  (4+/5) good plus  -BE    Lt Shoulder External Rotation MMT, Gross Movement  (4+/5) good plus  -BE      User Key  (r) = Recorded By, (t) = Taken By, (c) = Cosigned By    Initials Name Provider Type    BE Pili Walker, PT Physical Therapist                      Therapy Education  Given: HEP, Symptoms/condition management, Pain management, Posture/body mechanics  Program: New  How Provided: Verbal, Demonstration  Provided to: Patient  Level of Understanding: Verbalized, Demonstrated, Teach back education performed     PT OP Goals     Row Name 01/30/19 1000          PT Short Term Goals    STG Date to Achieve  02/13/19  -BE     STG 1  Patient will be independent/compliant with HEP.  -BE     STG 2  Right shoulder ROM will improve by at least 10 degrees to allow for greater ease with ADLs.  -BE     STG 3  Patient will report pain no greater than 6/10 when performing self-care activities.  -BE     STG 4  Patient will report pain no greater than 6/10 when reaching into cabinets at shoulder  height.  -BE        Long Term Goals    LTG Date to Achieve  03/01/19  -BE     LTG 1  Right shoulder ROM will improve to within 5 degrees of unaffected shoulder to allow for greater ease with ADLs.  -BE     LTG 2  Right UE strength will improve to at least 4/5 to prevent reinjury.  -BE     LTG 3  QuickDash will improve by at least 15% to show improved functional mobility.  -BE     LTG 4  Patient will report pain no greater than 3/10 when performing work tasks.  -BE     LTG 5  Patient will report pain no greater than 2/10 when carrying groceries.  -BE        Time Calculation    PT Goal Re-Cert Due Date  03/01/19  -BE       User Key  (r) = Recorded By, (t) = Taken By, (c) = Cosigned By    Initials Name Provider Type    BE Pili Walker PT Physical Therapist          PT Assessment/Plan     Row Name 01/30/19 1100          PT Assessment    Functional Limitations  Limitation in home management;Limitations in community activities;Performance in leisure activities;Performance in self-care ADL;Performance in work activities  -BE     Impairments  Joint mobility;Muscle strength;Pain;Poor body mechanics;Posture;Range of motion  -BE     Assessment Comments  Patient is a 28 year old female referred to therapy with right shoulder pain.  Patient presents with decreased shoulder ROM, decreased UE strength, and increased pain.  Increased tenderness and muscle guarding noted throughout shoulder musculature.  Positive special tests indicate biceps tendon and supraspinatus pathology.  Patient reports that she has difficulty with self-care activities, noting that she is having to modify tasks due to lacking full ROM.  Patient reports a 72.73% functional mobility impairment, based on her response to the QuickDash.  She will benefit from skilled PT so that she can achieve her maximum level of function.  -BE     Please refer to paper survey for additional self-reported information  Yes  -BE     Rehab Potential  Good  -BE      Patient/caregiver participated in establishment of treatment plan and goals  Yes  -BE     Patient would benefit from skilled therapy intervention  Yes  -BE        PT Plan    PT Frequency  2x/week  -BE     Predicted Duration of Therapy Intervention (Therapy Eval)  4 weeks  -BE     Planned CPT's?  PT EVAL MOD COMPLELITY: 75285;PT RE-EVAL: 17119;PT THER PROC EA 15 MIN: 55235;PT THER ACT EA 15 MIN: 78256;PT MANUAL THERAPY EA 15 MIN: 03526;PT NEUROMUSC RE-EDUCATION EA 15 MIN: 74071;PT ELECTRICAL STIM UNATTEND: ;PT ELECTRICAL STIM ATTD EA 15 MIN: 97386;PT ULTRASOUND EA 15 MIN: 64044;PT HOT/COLD PACK WC NONMCARE: 01534;PT THER SUPP EA 15 MIN  -BE     PT Plan Comments  Above interventions to be used to promote improved functional mobility.  -BE       User Key  (r) = Recorded By, (t) = Taken By, (c) = Cosigned By    Initials Name Provider Type    BE Pili Walker, ISHA Physical Therapist            Exercises     Row Name 01/30/19 1000             Exercise 1    Exercise Name 1  HEP: table slides, scap squeeze, UT stretch  -BE      Cueing 1  Verbal;Tactile;Demo  -BE        User Key  (r) = Recorded By, (t) = Taken By, (c) = Cosigned By    Initials Name Provider Type    BE Pili Walker, PT Physical Therapist                        Outcome Measure Options: Quick DASH  Quick DASH  Open a tight or new jar.: Severe Difficulty  Do heavy household chores (e.g., wash walls, wash floors): Severe Difficulty  Carry a shopping bag or briefcase: Severe Difficulty  Wash your back: Severe Difficulty  Use a knife to cut food: Severe Difficulty  Recreational activities in which you take some force or impact through your arm, should or hand (e.g. golf, hammering, tennis, etc.): Unable  During the past week, to what extent has your arm, shoulder, or hand problem interfered with your normal social activites with family, friends, neighbors or groups?: Quite a bit  During the past week, were you limited in your work or other regular  daily activities as a result of your arm, shoulder or hand problem?: Moderately Limited  Arm, Shoulder, or hand pain: Severe  Tingling (pins and needles) in your arm, shoulder, or hand: Moderate  During the past week, how much difficulty have you had sleeping because of the pain in your arm, shoulder or hand?: Severe Difficulty  Number of Questions Answered: 11  Quick DASH Score: 72.73         Time Calculation:     Therapy Suggested Charges     Code   Minutes Charges    None             Start Time: 1000  Stop Time: 1100  Time Calculation (min): 60 min     Therapy Charges for Today     Code Description Service Date Service Provider Modifiers Qty    69380549458 HC PT EVAL MOD COMPLEXITY 4 1/30/2019 Pili Walker, PT GP 1          PT G-Codes  Outcome Measure Options: Quick DASH  Quick DASH Score: 72.73         Pili Walker, PT  1/30/2019

## 2019-01-31 ENCOUNTER — HOSPITAL ENCOUNTER (OUTPATIENT)
Dept: GENERAL RADIOLOGY | Facility: HOSPITAL | Age: 29
Discharge: HOME | End: 2019-01-31
Attending: UROLOGY | Admitting: UROLOGY

## 2019-01-31 ENCOUNTER — OFFICE VISIT (OUTPATIENT)
Dept: UROLOGY | Facility: CLINIC | Age: 29
End: 2019-01-31

## 2019-01-31 VITALS — HEIGHT: 59 IN | BODY MASS INDEX: 31.04 KG/M2 | WEIGHT: 154 LBS

## 2019-01-31 DIAGNOSIS — N20.0 KIDNEY STONE: Primary | ICD-10-CM

## 2019-01-31 DIAGNOSIS — N20.1 URETERAL CALCULUS: ICD-10-CM

## 2019-01-31 DIAGNOSIS — N20.0 KIDNEY STONE: ICD-10-CM

## 2019-01-31 PROCEDURE — 74018 RADEX ABDOMEN 1 VIEW: CPT | Performed by: RADIOLOGY

## 2019-01-31 PROCEDURE — 74018 RADEX ABDOMEN 1 VIEW: CPT

## 2019-01-31 PROCEDURE — 99214 OFFICE O/P EST MOD 30 MIN: CPT | Performed by: UROLOGY

## 2019-02-04 ENCOUNTER — TELEPHONE (OUTPATIENT)
Dept: UROLOGY | Facility: CLINIC | Age: 29
End: 2019-02-04

## 2019-02-04 ENCOUNTER — HOSPITAL ENCOUNTER (OUTPATIENT)
Dept: PHYSICAL THERAPY | Facility: HOSPITAL | Age: 29
Setting detail: THERAPIES SERIES
Discharge: HOME | End: 2019-02-04

## 2019-02-04 DIAGNOSIS — M25.511 ACUTE PAIN OF RIGHT SHOULDER: Primary | ICD-10-CM

## 2019-02-04 PROCEDURE — G0283 ELEC STIM OTHER THAN WOUND: HCPCS

## 2019-02-04 PROCEDURE — 97110 THERAPEUTIC EXERCISES: CPT

## 2019-02-04 PROCEDURE — 97035 APP MDLTY 1+ULTRASOUND EA 15: CPT

## 2019-02-04 NOTE — THERAPY TREATMENT NOTE
Outpatient Physical Therapy Ortho Treatment Note   [place]     Patient Name: Pili Webster  : 1990  MRN: [id number]  Today's Date: 2019      Visit Date: 2019    Visit Dx:    ICD-10-CM ICD-9-CM   1. Acute pain of right shoulder M25.511 719.41       Patient Active Problem List   Diagnosis   • External hemorrhoid, thrombosed   • Status post laparoscopic cholecystectomy   • Kidney stone   • Breast nodule   • Ureteral calculus        Past Medical History:   Diagnosis Date   • Abdominal pain    • Anxiety and depression    • Arthritis    • Cholecystitis    • Constipation    • Frequent UTI    • GERD (gastroesophageal reflux disease)    • Heartburn    • Hemorrhoids    • Kidney stones    • Lesion of breast    • Lump     RIGHT BREAST   • Nausea & vomiting    • PCOS (polycystic ovarian syndrome)    • PONV (postoperative nausea and vomiting)    • Tachycardia         Past Surgical History:   Procedure Laterality Date   • ABDOMINAL SURGERY     • BREAST BIOPSY Right 2018    Procedure: breast biopsy ;  Surgeon: Shiv Overton MD;  Location: [facility];  Service: General   • CHOLECYSTECTOMY N/A 2017    Procedure: CHOLECYSTECTOMY LAPAROSCOPIC;  Surgeon: Franco Mari MD;  Location: [facility];  Service:    • DENTAL PROCEDURE     • DIAGNOSTIC LAPAROSCOPY      x2   • DILATATION AND CURETTAGE     • URETEROSCOPY LASER LITHOTRIPSY WITH STENT INSERTION Right 2019    Procedure: URETEROSCOPY LASER LITHOTRIPSY retrograde pyelogram;  Surgeon: Ahmet Barrow MD;  Location: [facility];  Service: Urology   • WISDOM TOOTH EXTRACTION                         PT Assessment/Plan     Row Name 19 1257          PT Assessment    Assessment Comments  Tx today consisted of mh and estim to right shoulder followed by ther ex and ended wtih US and ice.  Pt responded well to exercises today with reports of a 2/10 decrease in pain following session.  Pt required cues for most exercises today.   -RT        PT Plan    PT Plan Comments  Will follow progressing shoulder stability and decreased pain.  -RT       User Key  (r) = Recorded By, (t) = Taken By, (c) = Cosigned By    Initials Name Provider Type    RT Peter Thomas, PT Physical Therapist          Modalities     Row Name 02/04/19 1100             Subjective Comments    Subjective Comments  Pt reports she slept wrong last night and has increased shoulder pain.  -RT         Subjective Pain    Able to rate subjective pain?  yes  -RT      Pre-Treatment Pain Level  8  -RT      Post-Treatment Pain Level  6  -RT         Moist Heat    MH Applied  Yes  -RT      Location  right UT  -RT      Rx Minutes  10 mins  -RT      MH Prior to Rx  Yes  -RT         Ice    Ice Applied  Yes  -RT      Location  right shoulder  -RT      Rx Minutes  -- 5min  -RT      Ice S/P Rx  Yes  -RT         Ultrasound 92324    Location  right UT  -RT      Duty Cycle  100  -RT      Frequency  -- 3.3  -RT      Intensity - Wts/cm  1.2  -RT      05813 - PT Ultrasound Minutes  8  -RT         ELECTRICAL STIMULATION    Attended/Unattended  Unattended  -RT      Stimulation Type  IFC  -RT      Location/Electrode Placement/Other  right UT  -RT       PT E-Stim Unattended (Manual) Minutes  10  -RT        User Key  (r) = Recorded By, (t) = Taken By, (c) = Cosigned By    Initials Name Provider Type    RT Peter Thomas, PT Physical Therapist          Exercises     Row Name 02/04/19 1100             Subjective Comments    Subjective Comments  Pt reports she slept wrong last night and has increased shoulder pain.  -RT         Subjective Pain    Able to rate subjective pain?  yes  -RT      Pre-Treatment Pain Level  8  -RT      Post-Treatment Pain Level  6  -RT         Total Minutes    72138 - PT Therapeutic Exercise Minutes  20  -RT         Exercise 1    Exercise Name 1  ut stretch 20sec x3, lev scap stretch 20sec x3, scap sq 20, table slides 20, walk aways 10, pulleys 2min  -RT      Cueing 1   Verbal;Tactile;Demo  -RT      Time 1  20  -RT        User Key  (r) = Recorded By, (t) = Taken By, (c) = Cosigned By    Initials Name Provider Type    RT Peter Thomas, PT Physical Therapist                             Therapy Education  Given: HEP, Symptoms/condition management, Pain management, Posture/body mechanics  Program: Reinforced  How Provided: Verbal, Demonstration  Provided to: Patient  Level of Understanding: Verbalized, Demonstrated, Teach back education performed              Time Calculation:   Start Time: 1006  Stop Time: 1055  Time Calculation (min): 49 min  Therapy Suggested Charges     Code   Minutes Charges    57323 (CPT®) Hc Pt Neuromusc Re Education Ea 15 Min      41722 (CPT®) Hc Pt Ther Proc Ea 15 Min 20 1    70749 (CPT®) Hc Gait Training Ea 15 Min      43051 (CPT®) Hc Pt Therapeutic Act Ea 15 Min      75698 (CPT®) Hc Pt Manual Therapy Ea 15 Min      41202 (CPT®) Hc Pt Ther Massage- Per 15 Min      49017 (CPT®) Hc Pt Iontophoresis Ea 15 Min      56943 (CPT®) Hc Pt Elec Stim Ea-Per 15 Min      22405 (CPT®) Hc Pt Ultrasound Ea 15 Min 8 1    72110 (CPT®) Hc Pt Self Care/Mgmt/Train Ea 15 Min      75844 (CPT®) Hc Pt Prosthetic (S) Train Initial Encounter, Each 15 Min      99366 (CPT®) Hc Orthotic(S) Mgmt/Train Initial Encounter, Each 15min      78879 (CPT®) Hc Pt Aquatic Therapy Ea 15 Min      27230 (CPT®) Hc Pt Orthotic(S)/Prosthetic(S) Encounter, Each 15 Min       (CPT®) Hc Pt Electrical Stim Unattended 10 1    Total  38 3        Therapy Charges for Today     Code Description Service Date Service Provider Modifiers Qty    64057605757 HC PT THER PROC EA 15 MIN 2/4/2019 Peter Thomas, PT GP 1    02345323235 HC PT ELECTRICAL STIM UNATTENDED 2/4/2019 Peter Thomas, PT  1    87986182147 HC PT ULTRASOUND EA 15 MIN 2/4/2019 Peter Thomas, PT GP 1                    Peter Thomas, PT  2/4/2019

## 2019-02-06 ENCOUNTER — HOSPITAL ENCOUNTER (OUTPATIENT)
Dept: PHYSICAL THERAPY | Facility: HOSPITAL | Age: 29
Setting detail: THERAPIES SERIES
Discharge: HOME | End: 2019-02-06

## 2019-02-06 DIAGNOSIS — M25.511 ACUTE PAIN OF RIGHT SHOULDER: Primary | ICD-10-CM

## 2019-02-06 PROCEDURE — G0283 ELEC STIM OTHER THAN WOUND: HCPCS | Performed by: PHYSICAL THERAPIST

## 2019-02-06 PROCEDURE — 97110 THERAPEUTIC EXERCISES: CPT | Performed by: PHYSICAL THERAPIST

## 2019-02-06 PROCEDURE — 97035 APP MDLTY 1+ULTRASOUND EA 15: CPT | Performed by: PHYSICAL THERAPIST

## 2019-02-06 NOTE — THERAPY TREATMENT NOTE
Outpatient Physical Therapy Ortho Treatment Note   [facility]     Patient Name: Pili Webster  : 1990  MRN: [id number]  Today's Date: 2019      Visit Date: 2019    Visit Dx:    ICD-10-CM ICD-9-CM   1. Acute pain of right shoulder M25.511 719.41       Patient Active Problem List   Diagnosis   • External hemorrhoid, thrombosed   • Status post laparoscopic cholecystectomy   • Kidney stone   • Breast nodule   • Ureteral calculus        Past Medical History:   Diagnosis Date   • Abdominal pain    • Anxiety and depression    • Arthritis    • Cholecystitis    • Constipation    • Frequent UTI    • GERD (gastroesophageal reflux disease)    • Heartburn    • Hemorrhoids    • Kidney stones    • Lesion of breast    • Lump     RIGHT BREAST   • Nausea & vomiting    • PCOS (polycystic ovarian syndrome)    • PONV (postoperative nausea and vomiting)    • Tachycardia         Past Surgical History:   Procedure Laterality Date   • ABDOMINAL SURGERY     • BREAST BIOPSY Right 2018    Procedure: breast biopsy ;  Surgeon: Shiv Overton MD;  Location: [facility];  Service: General   • CHOLECYSTECTOMY N/A 2017    Procedure: CHOLECYSTECTOMY LAPAROSCOPIC;  Surgeon: Fracno Mari MD;  Location: [facility];  Service:    • DENTAL PROCEDURE     • DIAGNOSTIC LAPAROSCOPY      x2   • DILATATION AND CURETTAGE     • URETEROSCOPY LASER LITHOTRIPSY WITH STENT INSERTION Right 2019    Procedure: URETEROSCOPY LASER LITHOTRIPSY retrograde pyelogram;  Surgeon: Ahmet Barrow MD;  Location: [facility];  Service: Urology   • WISDOM TOOTH EXTRACTION         PT Ortho     Row Name 19 1000       Subjective Comments    Subjective Comments  Patient reports that she has 6/10 shoulder pain today.  She reports that she had trouble sleeping because of shoulder pain, noting it can be hard to find a comfortable position.  -BE       Subjective Pain    Able to rate subjective pain?  yes  -BE    Pre-Treatment  Pain Level  6  -BE    Post-Treatment Pain Level  4  -BE      User Key  (r) = Recorded By, (t) = Taken By, (c) = Cosigned By    Initials Name Provider Type    BE Pili Walker PT Physical Therapist                      PT Assessment/Plan     Row Name 02/06/19 1422          PT Assessment    Assessment Comments  Patient tolerated therapy session well, with reports of decreased pain following session; she reported 6/10 pain pre-tx and 4/10 pain post-tx.  Session consisted of MH, ESTIM, ther ex, US, and cryotherapy; no adverse reactions were noted with modalities.  Ther ex progressed to include the addition of pulleys in scaption, red gripper, and finger ladder; increased repetitions of scap squeeze added.  Verbal and tactile cues provided to ensure correct form with exercises.  Patient will continue to be progressed per her tolerance and POC.  -BE        PT Plan    PT Plan Comments  Progress per patient's tolerance and POC.  -BE       User Key  (r) = Recorded By, (t) = Taken By, (c) = Cosigned By    Initials Name Provider Type    BE Pili Walker PT Physical Therapist          Modalities     Row Name 02/06/19 1300             Moist Heat    MH Applied  Yes no redness following MH  -BE      Location  right shoulder  -BE      Rx Minutes  15 mins  -BE      MH Prior to Rx  Yes with ESTIM in seated position  -BE         Ice    Ice Applied  Yes  -BE      Location  right shoulder  -BE      Rx Minutes  -- 5min  -BE      Ice S/P Rx  Yes  -BE         Ultrasound 53839    Location  right UT no skin irritation following US  -BE      Duty Cycle  100  -BE      Frequency  -- 3.3  -BE      Intensity - Wts/cm  1.2  -BE      23817 - PT Ultrasound Minutes  8  -BE         ELECTRICAL STIMULATION    Attended/Unattended  Unattended no skin irritation following ESTIM  -BE      Stimulation Type  IFC  -BE      Location/Electrode Placement/Other  Right shoulder  -BE       PT E-Stim Unattended (Manual) Minutes  15  -BE        User  "Key  (r) = Recorded By, (t) = Taken By, (c) = Cosigned By    Initials Name Provider Type    BE Pili Walker PT Physical Therapist          Exercises     Row Name 02/06/19 1000             Subjective Comments    Subjective Comments  Patient reports that she has 6/10 shoulder pain today.  She reports that she had trouble sleeping because of shoulder pain, noting it can be hard to find a comfortable position.  -BE         Subjective Pain    Able to rate subjective pain?  yes  -BE      Pre-Treatment Pain Level  6  -BE      Post-Treatment Pain Level  4  -BE         Total Minutes    45886 - PT Therapeutic Exercise Minutes  30  -BE         Exercise 1    Exercise Name 1  UT stretch 3x20\", Levator stretch 3x20\", Scap squeeze 3x10, Table slides 2x10 (flex, scap), Walkaways 10x, Red gripper 2 min, Finger ladder 3x, Pulleys 2 min ea (flex, scap)  -BE      Cueing 1  Verbal;Tactile;Demo  -BE      Time 1  30 min  -BE        User Key  (r) = Recorded By, (t) = Taken By, (c) = Cosigned By    Initials Name Provider Type    BE Pili Walker PT Physical Therapist                             Therapy Education  Given: HEP, Symptoms/condition management, Pain management, Posture/body mechanics  Program: Reinforced, Progressed  How Provided: Verbal, Demonstration  Provided to: Patient  Level of Understanding: Verbalized, Demonstrated, Teach back education performed              Time Calculation:   Start Time: 1000  Stop Time: 1104  Time Calculation (min): 64 min  Therapy Suggested Charges     Code   Minutes Charges    38640 (CPT®) Hc Pt Neuromusc Re Education Ea 15 Min      02368 (CPT®) Hc Pt Ther Proc Ea 15 Min 30 2    37034 (CPT®) Hc Gait Training Ea 15 Min      25380 (CPT®) Hc Pt Therapeutic Act Ea 15 Min      31178 (CPT®) Hc Pt Manual Therapy Ea 15 Min      39921 (CPT®) Hc Pt Ther Massage- Per 15 Min      47579 (CPT®) Hc Pt Iontophoresis Ea 15 Min      33574 (CPT®) Hc Pt Elec Stim Ea-Per 15 Min      59492 (CPT®)  Pt " Ultrasound Ea 15 Min 8 1    03235 (CPT®) Hc Pt Self Care/Mgmt/Train Ea 15 Min      94254 (CPT®) Hc Pt Prosthetic (S) Train Initial Encounter, Each 15 Min      95901 (CPT®) Hc Orthotic(S) Mgmt/Train Initial Encounter, Each 15min      45628 (CPT®) Hc Pt Aquatic Therapy Ea 15 Min      23888 (CPT®) Hc Pt Orthotic(S)/Prosthetic(S) Encounter, Each 15 Min       (CPT®) Hc Pt Electrical Stim Unattended 15 1    Total  53 4        Therapy Charges for Today     Code Description Service Date Service Provider Modifiers Qty    15712208529 HC PT THER PROC EA 15 MIN 2/6/2019 Pili Walker, PT GP 2    57908672039 HC PT ULTRASOUND EA 15 MIN 2/6/2019 Pili Walker, PT GP 1    72584187282 HC PT ELECTRICAL STIM UNATTENDED 2/6/2019 Pili Walker, PT  1                    Pili Walker, PT  2/6/2019

## 2019-02-11 ENCOUNTER — HOSPITAL ENCOUNTER (OUTPATIENT)
Dept: PHYSICAL THERAPY | Facility: HOSPITAL | Age: 29
Setting detail: THERAPIES SERIES
Discharge: HOME | End: 2019-02-11

## 2019-02-11 DIAGNOSIS — M25.511 ACUTE PAIN OF RIGHT SHOULDER: Primary | ICD-10-CM

## 2019-02-11 PROCEDURE — 97110 THERAPEUTIC EXERCISES: CPT

## 2019-02-11 PROCEDURE — 97035 APP MDLTY 1+ULTRASOUND EA 15: CPT

## 2019-02-11 PROCEDURE — G0283 ELEC STIM OTHER THAN WOUND: HCPCS

## 2019-02-11 NOTE — THERAPY TREATMENT NOTE
Outpatient Physical Therapy Ortho Treatment Note   [place]     Patient Name: Pili Webster  : 1990  MRN: [id number]  Today's Date: 2019      Visit Date: 2019    Visit Dx:    ICD-10-CM ICD-9-CM   1. Acute pain of right shoulder M25.511 719.41       Patient Active Problem List   Diagnosis   • External hemorrhoid, thrombosed   • Status post laparoscopic cholecystectomy   • Kidney stone   • Breast nodule   • Ureteral calculus        Past Medical History:   Diagnosis Date   • Abdominal pain    • Anxiety and depression    • Arthritis    • Cholecystitis    • Constipation    • Frequent UTI    • GERD (gastroesophageal reflux disease)    • Heartburn    • Hemorrhoids    • Kidney stones    • Lesion of breast    • Lump     RIGHT BREAST   • Nausea & vomiting    • PCOS (polycystic ovarian syndrome)    • PONV (postoperative nausea and vomiting)    • Tachycardia         Past Surgical History:   Procedure Laterality Date   • ABDOMINAL SURGERY     • BREAST BIOPSY Right 2018    Procedure: breast biopsy ;  Surgeon: Shiv Overton MD;  Location: [facility];  Service: General   • CHOLECYSTECTOMY N/A 2017    Procedure: CHOLECYSTECTOMY LAPAROSCOPIC;  Surgeon: Franco Mari MD;  Location: [facility];  Service:    • DENTAL PROCEDURE     • DIAGNOSTIC LAPAROSCOPY      x2   • DILATATION AND CURETTAGE     • URETEROSCOPY LASER LITHOTRIPSY WITH STENT INSERTION Right 2019    Procedure: URETEROSCOPY LASER LITHOTRIPSY retrograde pyelogram;  Surgeon: Ahmet Barrow MD;  Location: [facility];  Service: Urology   • WISDOM TOOTH EXTRACTION         PT Ortho     Row Name 19 1100       Subjective Comments    Subjective Comments  Patient states that she is able to move her shoulder more. Patient reports that she is working on her home exercises.  -AC       Subjective Pain    Able to rate subjective pain?  yes  -AC    Pre-Treatment Pain Level  4  -AC    Post-Treatment Pain Level  2  -AC       User Key  (r) = Recorded By, (t) = Taken By, (c) = Cosigned By    Initials Name Provider Type    Ina Castro PTA Physical Therapy Assistant                      PT Assessment/Plan     Row Name 02/11/19 1154          PT Assessment    Assessment Comments  New ther-ex added per the patient's tolerance, patient demonstrated and understood new the-ex with no increase in pain noted. Patient tolerated treatment session well with rest breaks taken as needed by the patient. No adverse reactions with modalities or treatment session. Reps increased per the patient's tolerance with no increase in pain. Decrease in pain noted following treatment session.  -AC        PT Plan    PT Plan Comments  Continue per PT's POC, progress per the patient's tolerance.  -AC       User Key  (r) = Recorded By, (t) = Taken By, (c) = Cosigned By    Initials Name Provider Type    AC Ina Paez PTA Physical Therapy Assistant          Modalities     Row Name 02/11/19 1100             Moist Heat    MH Applied  Yes No redness noted following moist heat  -AC      Location  right shoulder  -AC      Rx Minutes  15 mins  -AC      MH Prior to Rx  Yes  -AC         Ice    Ice Applied  Yes  -AC      Location  right shoulder  -AC      Rx Minutes  Other: 5 minutes  -AC      Ice S/P Rx  Yes  -AC         Ultrasound 63339    Location  right UT  -AC      Duty Cycle  100  -AC      Frequency  -- 3.3MHz  -AC      Intensity - Wts/cm  1.2  -AC      78863 - PT Ultrasound Minutes  8  -AC         ELECTRICAL STIMULATION    Attended/Unattended  Unattended No irritation noted following estim  -AC      Stimulation Type  IFC  -AC      Max mAmp  -- per the patient's tolerance, 15 minutes with MH  -AC      Location/Electrode Placement/Other  Right shoulder  -AC       PT E-Stim Unattended (Manual) Minutes  15  -AC        User Key  (r) = Recorded By, (t) = Taken By, (c) = Cosigned By    Initials Name Provider Type    Ina Castro PTA  "Physical Therapy Assistant          Exercises     Row Name 02/11/19 1100             Subjective Comments    Subjective Comments  Patient states that she is able to move her shoulder more. Patient reports that she is working on her home exercises.  -AC         Subjective Pain    Able to rate subjective pain?  yes  -AC      Pre-Treatment Pain Level  4  -AC      Post-Treatment Pain Level  2  -AC         Total Minutes    61159 - PT Therapeutic Exercise Minutes  30  -AC         Exercise 1    Exercise Name 1  UT stretch 3x20\", Levator stretch 3x20\", Scap squeeze 3x10, Table slides 2x10 (flex, scap), Walkaways 10x2, Red gripper 2 min, Finger ladder 5x, Pulleys 2 min ea (flex, scap),  x10, wall slides x15  -AC      Cueing 1  Verbal;Tactile;Demo  -AC      Time 1  30 min  -AC        User Key  (r) = Recorded By, (t) = Taken By, (c) = Cosigned By    Initials Name Provider Type    Ina Castro, DAVID Physical Therapy Assistant                             Therapy Education  Given: HEP, Symptoms/condition management, Pain management, Posture/body mechanics  Program: Reinforced, Progressed, New  How Provided: Verbal, Demonstration  Provided to: Patient  Level of Understanding: Verbalized, Demonstrated              Time Calculation:   Start Time: 1003  Stop Time: 1105  Time Calculation (min): 62 min  Therapy Suggested Charges     Code   Minutes Charges    14374 (CPT®) Hc Pt Neuromusc Re Education Ea 15 Min      48732 (CPT®) Hc Pt Ther Proc Ea 15 Min 30 2    99633 (CPT®) Hc Gait Training Ea 15 Min      41967 (CPT®) Hc Pt Therapeutic Act Ea 15 Min      39189 (CPT®) Hc Pt Manual Therapy Ea 15 Min      88492 (CPT®) Hc Pt Ther Massage- Per 15 Min      32980 (CPT®) Hc Pt Iontophoresis Ea 15 Min      92068 (CPT®) Hc Pt Elec Stim Ea-Per 15 Min      60137 (CPT®) Hc Pt Ultrasound Ea 15 Min 8 1    14026 (CPT®) Hc Pt Self Care/Mgmt/Train Ea 15 Min      25233 (CPT®) Hc Pt Prosthetic (S) Train Initial Encounter, Each 15 Min   "    53449 (CPT®) Hc Orthotic(S) Mgmt/Train Initial Encounter, Each 15min      12351 (CPT®) Hc Pt Aquatic Therapy Ea 15 Min      51597 (CPT®) Hc Pt Orthotic(S)/Prosthetic(S) Encounter, Each 15 Min       (CPT®) Hc Pt Electrical Stim Unattended 15 1    Total  53 4        Therapy Charges for Today     Code Description Service Date Service Provider Modifiers Qty    86202340167 HC PT THER PROC EA 15 MIN 2/11/2019 Ina Paez, PTA GP 2    36876543391 HC PT ULTRASOUND EA 15 MIN 2/11/2019 Ina Paez, PTA GP 1    65524035674 HC PT ELECTRICAL STIM UNATTENDED 2/11/2019 Ina Paez, PTA  1                    Ina Paez, DAVID  2/11/2019

## 2019-02-14 ENCOUNTER — HOSPITAL ENCOUNTER (OUTPATIENT)
Dept: PHYSICAL THERAPY | Facility: HOSPITAL | Age: 29
Setting detail: THERAPIES SERIES
Discharge: HOME | End: 2019-02-14

## 2019-02-14 DIAGNOSIS — M25.511 ACUTE PAIN OF RIGHT SHOULDER: Primary | ICD-10-CM

## 2019-02-14 PROCEDURE — G0283 ELEC STIM OTHER THAN WOUND: HCPCS | Performed by: PHYSICAL THERAPIST

## 2019-02-14 PROCEDURE — 97110 THERAPEUTIC EXERCISES: CPT | Performed by: PHYSICAL THERAPIST

## 2019-02-14 PROCEDURE — 97035 APP MDLTY 1+ULTRASOUND EA 15: CPT | Performed by: PHYSICAL THERAPIST

## 2019-02-14 NOTE — THERAPY TREATMENT NOTE
Outpatient Physical Therapy Ortho Treatment Note   [facility]     Patient Name: Pili Webster  : 1990  MRN: [id number]  Today's Date: 2019      Visit Date: 2019    Visit Dx:    ICD-10-CM ICD-9-CM   1. Acute pain of right shoulder M25.511 719.41       Patient Active Problem List   Diagnosis   • External hemorrhoid, thrombosed   • Status post laparoscopic cholecystectomy   • Kidney stone   • Breast nodule   • Ureteral calculus        Past Medical History:   Diagnosis Date   • Abdominal pain    • Anxiety and depression    • Arthritis    • Cholecystitis    • Constipation    • Frequent UTI    • GERD (gastroesophageal reflux disease)    • Heartburn    • Hemorrhoids    • Kidney stones    • Lesion of breast    • Lump     RIGHT BREAST   • Nausea & vomiting    • PCOS (polycystic ovarian syndrome)    • PONV (postoperative nausea and vomiting)    • Tachycardia         Past Surgical History:   Procedure Laterality Date   • ABDOMINAL SURGERY     • BREAST BIOPSY Right 2018    Procedure: breast biopsy ;  Surgeon: Shiv Overton MD;  Location: [facility];  Service: General   • CHOLECYSTECTOMY N/A 2017    Procedure: CHOLECYSTECTOMY LAPAROSCOPIC;  Surgeon: Franco Mari MD;  Location: [facility];  Service:    • DENTAL PROCEDURE     • DIAGNOSTIC LAPAROSCOPY      x2   • DILATATION AND CURETTAGE     • URETEROSCOPY LASER LITHOTRIPSY WITH STENT INSERTION Right 2019    Procedure: URETEROSCOPY LASER LITHOTRIPSY retrograde pyelogram;  Surgeon: Ahmet Barrow MD;  Location: [facility];  Service: Urology   • WISDOM TOOTH EXTRACTION         PT Ortho     Row Name 19 1500       Subjective Comments    Subjective Comments  Patient reports 6/10 pain today.  She reports that she has been visiting family at the hospital and had to sleep on the hospital couch, noting it was hard to get comfortable.  She notes that she has been unable to perform HEP very much this week.  She notes that  she is tired today.  -BE       Subjective Pain    Able to rate subjective pain?  yes  -BE    Pre-Treatment Pain Level  6  -BE    Post-Treatment Pain Level  4  -BE      User Key  (r) = Recorded By, (t) = Taken By, (c) = Cosigned By    Initials Name Provider Type    BE Pili Walker PT Physical Therapist                      PT Assessment/Plan     Row Name 02/14/19 1600          PT Assessment    Assessment Comments  Therapy session initiated with MH/ESTIM to the right shoulder to assist with pain control.  Patient performed ther ex, per flow sheet; exercises focused on stretching, shoulder ROM, and UE strengthening.  Verbal and tactile cues provided to ensure correct form with exercises; rest breaks provided as necessary.  Session concluded with continuous US, followed by cryotherapy.  Patient reported a slight decrease in pain, noting 6/10 pain pre-tx and 4/10 pian pre-tx.  She will continue to be progressed per her tolerance and POC.  -BE        PT Plan    PT Plan Comments  Progress per patient's tolerance and POC.  -BE       User Key  (r) = Recorded By, (t) = Taken By, (c) = Cosigned By    Initials Name Provider Type    BE Pili Walker PT Physical Therapist          Modalities     Row Name 02/14/19 1500             Moist Heat    MH Applied  Yes no redness following MH  -BE      Location  right shoulder  -BE      Rx Minutes  15 mins  -BE      MH Prior to Rx  Yes  -BE         Ice    Ice Applied  Yes  -BE      Location  right shoulder  -BE      Rx Minutes  Other: 5 minutes  -BE      Ice S/P Rx  Yes  -BE         Ultrasound 84468    Location  right UT  -BE      Duty Cycle  100  -BE      Frequency  -- 3.3MHz  -BE      Intensity - Wts/cm  1.2  -BE      41793 - PT Ultrasound Minutes  8  -BE         ELECTRICAL STIMULATION    Attended/Unattended  Unattended No irritation noted following estim  -BE      Stimulation Type  IFC  -BE      Max mAmp  -- per the patient's tolerance, 15 minutes with MH  -BE       "Location/Electrode Placement/Other  Right shoulder  -BE       PT E-Stim Unattended (Manual) Minutes  15  -BE        User Key  (r) = Recorded By, (t) = Taken By, (c) = Cosigned By    Initials Name Provider Type    BE Pili Walker PT Physical Therapist          Exercises     Row Name 02/14/19 1500             Subjective Comments    Subjective Comments  Patient reports 6/10 pain today.  She reports that she has been visiting family at the hospital and had to sleep on the hospital couch, noting it was hard to get comfortable.  She notes that she has been unable to perform HEP very much this week.  She notes that she is tired today.  -BE         Subjective Pain    Able to rate subjective pain?  yes  -BE      Pre-Treatment Pain Level  6  -BE      Post-Treatment Pain Level  4  -BE         Total Minutes    06310 - PT Therapeutic Exercise Minutes  30  -BE         Exercise 1    Exercise Name 1  UT stretch 3x20\", Levator stretch 3x20\", Scap squeeze 3x10, Table slides 2x10 (flex, scap), Walkaways 10x2, Red gripper 2 min, Finger ladder 5x, Pulleys 2 min ea (flex, scap)  -BE      Cueing 1  Verbal;Tactile;Demo  -BE      Time 1  30 min  -BE        User Key  (r) = Recorded By, (t) = Taken By, (c) = Cosigned By    Initials Name Provider Type    BE Pili Walker PT Physical Therapist                             Therapy Education  Given: HEP, Symptoms/condition management, Pain management, Posture/body mechanics  Program: Reinforced  How Provided: Verbal, Demonstration  Provided to: Patient  Level of Understanding: Verbalized, Demonstrated              Time Calculation:   Start Time: 1500  Stop Time: 1606  Time Calculation (min): 66 min  Therapy Suggested Charges     Code   Minutes Charges    76158 (CPT®)  Pt Neuromusc Re Education Ea 15 Min      16341 (CPT®) Hc Pt Ther Proc Ea 15 Min 30 2    55617 (CPT®) Hc Gait Training Ea 15 Min      06270 (CPT®) Hc Pt Therapeutic Act Ea 15 Min      91859 (CPT®) Hc Pt Manual " Therapy Ea 15 Min      93947 (CPT®) Hc Pt Ther Massage- Per 15 Min      84169 (CPT®) Hc Pt Iontophoresis Ea 15 Min      01500 (CPT®) Hc Pt Elec Stim Ea-Per 15 Min      78625 (CPT®) Hc Pt Ultrasound Ea 15 Min 8 1    35251 (CPT®) Hc Pt Self Care/Mgmt/Train Ea 15 Min      46438 (CPT®) Hc Pt Prosthetic (S) Train Initial Encounter, Each 15 Min      89541 (CPT®) Hc Orthotic(S) Mgmt/Train Initial Encounter, Each 15min      97313 (CPT®) Hc Pt Aquatic Therapy Ea 15 Min      89969 (CPT®) Hc Pt Orthotic(S)/Prosthetic(S) Encounter, Each 15 Min       (CPT®) Hc Pt Electrical Stim Unattended 15 1    Total  53 4        Therapy Charges for Today     Code Description Service Date Service Provider Modifiers Qty    10295731505 HC PT THER PROC EA 15 MIN 2/14/2019 Pili Walker, PT GP 2    94573516495 HC PT ULTRASOUND EA 15 MIN 2/14/2019 Pili Walker, PT GP 1    08738030731 HC PT ELECTRICAL STIM UNATTENDED 2/14/2019 Pili Walker, PT  1                    Pili Walker, PT  2/14/2019

## 2019-02-18 ENCOUNTER — HOSPITAL ENCOUNTER (OUTPATIENT)
Dept: PHYSICAL THERAPY | Facility: HOSPITAL | Age: 29
Setting detail: THERAPIES SERIES
Discharge: HOME | End: 2019-02-18

## 2019-02-18 DIAGNOSIS — M25.511 ACUTE PAIN OF RIGHT SHOULDER: Primary | ICD-10-CM

## 2019-02-18 PROCEDURE — 97035 APP MDLTY 1+ULTRASOUND EA 15: CPT

## 2019-02-18 PROCEDURE — 97110 THERAPEUTIC EXERCISES: CPT

## 2019-02-18 PROCEDURE — G0283 ELEC STIM OTHER THAN WOUND: HCPCS

## 2019-02-18 NOTE — THERAPY TREATMENT NOTE
Outpatient Physical Therapy Ortho Treatment Note   [place]     Patient Name: Pili Webster  : 1990  MRN: [id number]  Today's Date: 2019      Visit Date: 2019    Visit Dx:    ICD-10-CM ICD-9-CM   1. Acute pain of right shoulder M25.511 719.41       Patient Active Problem List   Diagnosis   • External hemorrhoid, thrombosed   • Status post laparoscopic cholecystectomy   • Kidney stone   • Breast nodule   • Ureteral calculus        Past Medical History:   Diagnosis Date   • Abdominal pain    • Anxiety and depression    • Arthritis    • Cholecystitis    • Constipation    • Frequent UTI    • GERD (gastroesophageal reflux disease)    • Heartburn    • Hemorrhoids    • Kidney stones    • Lesion of breast    • Lump     RIGHT BREAST   • Nausea & vomiting    • PCOS (polycystic ovarian syndrome)    • PONV (postoperative nausea and vomiting)    • Tachycardia         Past Surgical History:   Procedure Laterality Date   • ABDOMINAL SURGERY     • BREAST BIOPSY Right 2018    Procedure: breast biopsy ;  Surgeon: Shiv Overton MD;  Location: [facility];  Service: General   • CHOLECYSTECTOMY N/A 2017    Procedure: CHOLECYSTECTOMY LAPAROSCOPIC;  Surgeon: Franco Mari MD;  Location: [facility];  Service:    • DENTAL PROCEDURE     • DIAGNOSTIC LAPAROSCOPY      x2   • DILATATION AND CURETTAGE     • URETEROSCOPY LASER LITHOTRIPSY WITH STENT INSERTION Right 2019    Procedure: URETEROSCOPY LASER LITHOTRIPSY retrograde pyelogram;  Surgeon: Ahmet Barrow MD;  Location: [facility];  Service: Urology   • WISDOM TOOTH EXTRACTION         PT Ortho     Row Name 19 1000       Subjective Comments    Subjective Comments  Patient arrives to therapy w/ reports of 5/10 R) shoulder pain.  Pt states she feels her shoulder pain and mobility have improved since beginning therapy.    -JEANNINE       Subjective Pain    Able to rate subjective pain?  yes  -JEANNINE    Pre-Treatment Pain Level  5  -JEANNINE     Post-Treatment Pain Level  0  -JEANNINE      User Key  (r) = Recorded By, (t) = Taken By, (c) = Cosigned By    Initials Name Provider Type    Janet Mccormack PTA Physical Therapy Assistant                      PT Assessment/Plan     Row Name 02/18/19 1051          PT Assessment    Assessment Comments  Patient tolerated treatment well today w/ reports of overall improvement in R) shoulder pain and mobility w/ therapy.  Pt received MH and Estim to R) shoulder for pain control f/b therex as listed.  Pt initated scapular strengthening activity w/ tband w/ good response.  Pt required cues throughout session for improved feedback and postrual awareness.  Treatment concluded with continuous US and cryotherapy.  Pt continues to progress as tolerated.  No adverse reactions observed following modalities.   -JEANNINE        PT Plan    PT Plan Comments  Continue with PT's POC and progress tx as tolerated by patient.   -JEANNINE       User Key  (r) = Recorded By, (t) = Taken By, (c) = Cosigned By    Initials Name Provider Type    Janet Mccormack PTA Physical Therapy Assistant          Modalities     Row Name 02/18/19 1000             Moist Heat    MH Applied  Yes no redness observed following MH   -JEANNINE      Location  right shoulder  -JEANNINE      Rx Minutes  15 mins  -JEANNINE      MH Prior to Rx  Yes w/ estim in seated position  -JEANNINE         Ice    Ice Applied  Yes  -JEANNINE      Location  right shoulder  -JEANNINE      Rx Minutes  Other: 5 min  -JEANNINE      Ice S/P Rx  Yes  -JEANNINE         Ultrasound 44470    Location  right UT  -JEANNINE      Duty Cycle  100  -JEANNINE      Frequency  -- 3.3 MHz  -JEANNINE      Intensity - Wts/cm  1.2  -JEANNINE      03074 - PT Ultrasound Minutes  8  -JEANNINE         ELECTRICAL STIMULATION    Attended/Unattended  Unattended no skin irritation observed following estim  -JEANNINE      Stimulation Type  IFC  -JEANNINE      Max mAmp  -- as to pt's tolerance  -JEANNINE      Location/Electrode Placement/Other  Right shoulder  -JEANNINE       PT E-Stim Unattended (Manual)  "Minutes  15  -JENANINE        User Key  (r) = Recorded By, (t) = Taken By, (c) = Cosigned By    Initials Name Provider Type    Janet Mccormack PTA Physical Therapy Assistant          Exercises     Row Name 02/18/19 1000             Subjective Comments    Subjective Comments  Patient arrives to therapy w/ reports of 5/10 R) shoulder pain.  Pt states she feels her shoulder pain and mobility have improved since beginning therapy.    -JEANNINE         Subjective Pain    Able to rate subjective pain?  yes  -JEANNINE      Pre-Treatment Pain Level  5  -JEANNINE      Post-Treatment Pain Level  0  -JEANNINE         Total Minutes    20183 - PT Therapeutic Exercise Minutes  30  -JEANNINE         Exercise 1    Exercise Name 1  UT stretch 3x20\", lev scap stretch 3x20\", scap squeeze 15x2, table slides (flex, scap) 10x2, walk aways 10x2, gripper (red) x2min, wall wash (flex) x15, midrow w/tband (red) x15, lawnmower 10x2  -JEANNINE      Cueing 1  Verbal;Tactile;Demo  -JEANNINE      Time 1  30 min  -JEANNINE        User Key  (r) = Recorded By, (t) = Taken By, (c) = Cosigned By    Initials Name Provider Type    Janet Mccormack PTA Physical Therapy Assistant                             Therapy Education  Given: HEP, Symptoms/condition management, Pain management, Posture/body mechanics  Program: Reinforced  How Provided: Verbal, Demonstration  Provided to: Patient  Level of Understanding: Verbalized, Demonstrated              Time Calculation:   Start Time: 1000  Stop Time: 1100  Time Calculation (min): 60 min  Therapy Suggested Charges     Code   Minutes Charges    61642 (CPT®) Hc Pt Neuromusc Re Education Ea 15 Min      78300 (CPT®) Hc Pt Ther Proc Ea 15 Min 30 2    33839 (CPT®) Hc Gait Training Ea 15 Min      92953 (CPT®) Hc Pt Therapeutic Act Ea 15 Min      69903 (CPT®) Hc Pt Manual Therapy Ea 15 Min      31903 (CPT®) Hc Pt Ther Massage- Per 15 Min      86062 (CPT®) Hc Pt Iontophoresis Ea 15 Min      83745 (CPT®) Hc Pt Elec Stim Ea-Per 15 Min      02368 (CPT®) " Hc Pt Ultrasound Ea 15 Min 8 1    33691 (CPT®) Hc Pt Self Care/Mgmt/Train Ea 15 Min      95870 (CPT®) Hc Pt Prosthetic (S) Train Initial Encounter, Each 15 Min      01036 (CPT®) Hc Orthotic(S) Mgmt/Train Initial Encounter, Each 15min      19294 (CPT®) Hc Pt Aquatic Therapy Ea 15 Min      68250 (CPT®) Hc Pt Orthotic(S)/Prosthetic(S) Encounter, Each 15 Min       (CPT®) Hc Pt Electrical Stim Unattended 15 1    Total  53 4        Therapy Charges for Today     Code Description Service Date Service Provider Modifiers Qty    02656677297 HC PT THER PROC EA 15 MIN 2/18/2019 Janet Parikh, PTA GP 2    83459881677 HC PT ULTRASOUND EA 15 MIN 2/18/2019 Janet Parikh, PTA GP 1    62133333270 HC PT ELECTRICAL STIM UNATTENDED 2/18/2019 Janet Parikh, PTA  1                    Janet Lisa. Kati, DAVID  2/18/2019

## 2019-02-21 ENCOUNTER — HOSPITAL ENCOUNTER (OUTPATIENT)
Dept: PHYSICAL THERAPY | Facility: HOSPITAL | Age: 29
Setting detail: THERAPIES SERIES
Discharge: HOME | End: 2019-02-21

## 2019-02-21 DIAGNOSIS — M25.511 ACUTE PAIN OF RIGHT SHOULDER: Primary | ICD-10-CM

## 2019-02-21 PROCEDURE — G0283 ELEC STIM OTHER THAN WOUND: HCPCS | Performed by: PHYSICAL THERAPIST

## 2019-02-21 PROCEDURE — 97035 APP MDLTY 1+ULTRASOUND EA 15: CPT | Performed by: PHYSICAL THERAPIST

## 2019-02-21 PROCEDURE — 97110 THERAPEUTIC EXERCISES: CPT | Performed by: PHYSICAL THERAPIST

## 2019-02-21 NOTE — THERAPY TREATMENT NOTE
Outpatient Physical Therapy Ortho Treatment Note   [facility]     Patient Name: Pili Webster  : 1990  MRN: [id number]  Today's Date: 2019      Visit Date: 2019    Visit Dx:    ICD-10-CM ICD-9-CM   1. Acute pain of right shoulder M25.511 719.41       Patient Active Problem List   Diagnosis   • External hemorrhoid, thrombosed   • Status post laparoscopic cholecystectomy   • Kidney stone   • Breast nodule   • Ureteral calculus        Past Medical History:   Diagnosis Date   • Abdominal pain    • Anxiety and depression    • Arthritis    • Cholecystitis    • Constipation    • Frequent UTI    • GERD (gastroesophageal reflux disease)    • Heartburn    • Hemorrhoids    • Kidney stones    • Lesion of breast    • Lump     RIGHT BREAST   • Nausea & vomiting    • PCOS (polycystic ovarian syndrome)    • PONV (postoperative nausea and vomiting)    • Tachycardia         Past Surgical History:   Procedure Laterality Date   • ABDOMINAL SURGERY     • BREAST BIOPSY Right 2018    Procedure: breast biopsy ;  Surgeon: Shiv Overton MD;  Location: [facility];  Service: General   • CHOLECYSTECTOMY N/A 2017    Procedure: CHOLECYSTECTOMY LAPAROSCOPIC;  Surgeon: Franco Mari MD;  Location: [facility];  Service:    • DENTAL PROCEDURE     • DIAGNOSTIC LAPAROSCOPY      x2   • DILATATION AND CURETTAGE     • URETEROSCOPY LASER LITHOTRIPSY WITH STENT INSERTION Right 2019    Procedure: URETEROSCOPY LASER LITHOTRIPSY retrograde pyelogram;  Surgeon: Ahmet Barrow MD;  Location: [facility];  Service: Urology   • WISDOM TOOTH EXTRACTION         PT Ortho     Row Name 19 1200       Subjective Comments    Subjective Comments  Patient reports that she has 5/10 pain today.  She reports that she will be having a procedure performed and will hold on therapy until 3/4/2019.  -BE       Subjective Pain    Able to rate subjective pain?  yes  -BE    Pre-Treatment Pain Level  5  -BE     Post-Treatment Pain Level  4  -BE      User Key  (r) = Recorded By, (t) = Taken By, (c) = Cosigned By    Initials Name Provider Type    Pili Joshi PT Physical Therapist                      PT Assessment/Plan     Row Name 02/21/19 1215          PT Assessment    Assessment Comments  Patient tolerated today's session well, with reports of decreased pain following therapy.  Session consisted of MH, ESTIM, ther ex, and US.  No adverse reactions were noted with modalities.  Ther ex continued to focus on stretching, shoulder ROM, and scapular stabilization.  Patient displayed good form with exercises.  She will continue to be progressed per her tolerance and POC.  -BE        PT Plan    PT Plan Comments  Progress per patient's tolerance and POC.  -BE       User Key  (r) = Recorded By, (t) = Taken By, (c) = Cosigned By    Initials Name Provider Type    BE Pili Walker PT Physical Therapist          Modalities     Row Name 02/21/19 1200             Moist Heat    Location  right shoulder  -BE      Rx Minutes  15 mins  -BE      MH Prior to Rx  Yes w/ estim in seated position  -BE         Ultrasound 77891    Location  right UT  -BE      Duty Cycle  100  -BE      Frequency  -- 3.3 MHz  -BE      Intensity - Wts/cm  1.2  -BE      07081 - PT Ultrasound Minutes  8  -BE         ELECTRICAL STIMULATION    Attended/Unattended  Unattended no skin irritation observed following estim  -BE      Stimulation Type  IFC  -BE      Max mAmp  -- as to pt's tolerance  -BE      Location/Electrode Placement/Other  Right shoulder  -BE       PT E-Stim Unattended (Manual) Minutes  15  -BE        User Key  (r) = Recorded By, (t) = Taken By, (c) = Cosigned By    Initials Name Provider Type    BE Pili Walker PT Physical Therapist          Exercises     Row Name 02/21/19 1200             Subjective Comments    Subjective Comments  Patient reports that she has 5/10 pain today.  She reports that she will be having a  "procedure performed and will hold on therapy until 3/4/2019.  -BE         Subjective Pain    Able to rate subjective pain?  yes  -BE      Pre-Treatment Pain Level  5  -BE      Post-Treatment Pain Level  4  -BE         Total Minutes    34209 - PT Therapeutic Exercise Minutes  30  -BE         Exercise 1    Exercise Name 1  UT stretch 3x20\", lev scap stretch 3x20\", scap squeeze 15x2, table slides (flex, scap) 15x2, walk aways 10x2, gripper (red) x2min, wall wash (flex) x15, midrow w/tband (red) x15, lawnmower 10x2, lowrows w/ tband (red) x15  -BE      Cueing 1  Verbal;Tactile;Demo  -BE      Time 1  30 min  -BE        User Key  (r) = Recorded By, (t) = Taken By, (c) = Cosigned By    Initials Name Provider Type    BE Pili Walker, PT Physical Therapist                             Therapy Education  Education Details: (Instructed to receive a note for clearance (and any restrictions) for returning to PT following procedure.)  Given: HEP, Symptoms/condition management, Pain management, Posture/body mechanics  Program: Reinforced  How Provided: Verbal, Demonstration  Provided to: Patient  Level of Understanding: Verbalized, Demonstrated              Time Calculation:   Start Time: 1100  Stop Time: 1200  Time Calculation (min): 60 min  Therapy Suggested Charges     Code   Minutes Charges    50045 (CPT®) Hc Pt Neuromusc Re Education Ea 15 Min      55846 (CPT®) Hc Pt Ther Proc Ea 15 Min 30 2    85989 (CPT®) Hc Gait Training Ea 15 Min      63483 (CPT®) Hc Pt Therapeutic Act Ea 15 Min      82457 (CPT®) Hc Pt Manual Therapy Ea 15 Min      77576 (CPT®) Hc Pt Ther Massage- Per 15 Min      58663 (CPT®) Hc Pt Iontophoresis Ea 15 Min      58975 (CPT®) Hc Pt Elec Stim Ea-Per 15 Min      06460 (CPT®) Hc Pt Ultrasound Ea 15 Min 8 1    11607 (CPT®) Hc Pt Self Care/Mgmt/Train Ea 15 Min      41047 (CPT®) Hc Pt Prosthetic (S) Train Initial Encounter, Each 15 Min      35536 (CPT®) Hc Orthotic(S) Mgmt/Train Initial Encounter, Each 15min "      37106 (CPT®) Hc Pt Aquatic Therapy Ea 15 Min      83872 (CPT®) Hc Pt Orthotic(S)/Prosthetic(S) Encounter, Each 15 Min       (CPT®) Hc Pt Electrical Stim Unattended 15 1    Total  53 4        Therapy Charges for Today     Code Description Service Date Service Provider Modifiers Qty    27082795900 HC PT THER PROC EA 15 MIN 2/21/2019 Pili Walker, PT GP 2    09347981947 HC PT ULTRASOUND EA 15 MIN 2/21/2019 Pili Walker, PT GP 1    34005040884 HC PT ELECTRICAL STIM UNATTENDED 2/21/2019 Pili Walker, PT  1                    Pili Walker, PT  2/21/2019

## 2019-02-22 ENCOUNTER — OFFICE VISIT (OUTPATIENT)
Dept: SURGERY | Facility: CLINIC | Age: 29
End: 2019-02-22

## 2019-02-22 VITALS — WEIGHT: 154 LBS | BODY MASS INDEX: 31.04 KG/M2 | HEIGHT: 59 IN

## 2019-02-22 DIAGNOSIS — K64.5 EXTERNAL HEMORRHOID, THROMBOSED: Primary | ICD-10-CM

## 2019-02-22 PROCEDURE — 99212 OFFICE O/P EST SF 10 MIN: CPT | Performed by: SURGERY

## 2019-02-22 NOTE — PROGRESS NOTES
Subjective   Pili Webster is a 28 y.o. female  is here today for follow-up.         Pili Webster is a 28 y.o. female here for follow up for hemorrhoids.  The patient has a painful external hemorrhoid but no thrombosis.  The hemorrhoid is soft and located in the right posterior lateral position.  Grade 1 internal hemorrhoids on digital rectal examination.  No other abnormalities noted.  She is currently only using topical therapy and laxatives.        Assessment     Pili was seen today for hemorrhoids.    Diagnoses and all orders for this visit:    External hemorrhoid, thrombosed      Pili Webster is a 28 y.o. female with internal and external hemorrhoids.  She'll be prescribed Proctofoam and a bowel regimen.  She'll follow-up in one week.  If thrombosis occur she knows to return for symptoms and will remain in the office.

## 2019-03-06 ENCOUNTER — APPOINTMENT (OUTPATIENT)
Dept: PHYSICAL THERAPY | Facility: HOSPITAL | Age: 29
End: 2019-03-06

## 2019-03-19 ENCOUNTER — HOSPITAL ENCOUNTER (OUTPATIENT)
Dept: PHYSICAL THERAPY | Facility: HOSPITAL | Age: 29
Setting detail: THERAPIES SERIES
Discharge: HOME | End: 2019-03-19

## 2019-03-19 DIAGNOSIS — M25.511 ACUTE PAIN OF RIGHT SHOULDER: Primary | ICD-10-CM

## 2019-03-19 PROCEDURE — 97110 THERAPEUTIC EXERCISES: CPT | Performed by: PHYSICAL THERAPIST

## 2019-03-19 PROCEDURE — G0283 ELEC STIM OTHER THAN WOUND: HCPCS | Performed by: PHYSICAL THERAPIST

## 2019-03-19 NOTE — THERAPY DISCHARGE NOTE
"     Outpatient Physical Therapy Ortho Treatment Note/Discharge Summary   [facility]     Patient Name: Pili Webster  : 1990  MRN: [id number]  Today's Date: 3/19/2019      Visit Date: 2019    Visit Dx:    ICD-10-CM ICD-9-CM   1. Acute pain of right shoulder M25.511 719.41       Patient Active Problem List   Diagnosis   • External hemorrhoid, thrombosed   • Status post laparoscopic cholecystectomy   • Kidney stone   • Breast nodule   • Ureteral calculus        Past Medical History:   Diagnosis Date   • Abdominal pain    • Anxiety and depression    • Arthritis    • Cholecystitis    • Constipation    • Frequent UTI    • GERD (gastroesophageal reflux disease)    • Heartburn    • Hemorrhoids    • Kidney stones    • Lesion of breast    • Lump     RIGHT BREAST   • Nausea & vomiting    • PCOS (polycystic ovarian syndrome)    • PONV (postoperative nausea and vomiting)    • Tachycardia         Past Surgical History:   Procedure Laterality Date   • ABDOMINAL SURGERY     • BREAST BIOPSY Right 2018    Procedure: breast biopsy ;  Surgeon: Shiv Overton MD;  Location: [facility];  Service: General   • CHOLECYSTECTOMY N/A 2017    Procedure: CHOLECYSTECTOMY LAPAROSCOPIC;  Surgeon: Franco Mari MD;  Location: [facility];  Service:    • DENTAL PROCEDURE     • DIAGNOSTIC LAPAROSCOPY      x2   • DILATATION AND CURETTAGE     • URETEROSCOPY LASER LITHOTRIPSY WITH STENT INSERTION Right 2019    Procedure: URETEROSCOPY LASER LITHOTRIPSY retrograde pyelogram;  Surgeon: Ahmet Barrow MD;  Location: [facility];  Service: Urology   • WISDOM TOOTH EXTRACTION         PT Ortho     Row Name 19 1300       Subjective Comments    Subjective Comments  Pt reports 0/10 right shoulder pain. She stated she has been cleared for PT by physician who performed an unrelated procedure. She stated she continues to have difficulty with opening tight jars and \"gripping things\".  -AD       Subjective Pain "    Able to rate subjective pain?  yes  -AD    Pre-Treatment Pain Level  0  -AD    Post-Treatment Pain Level  0  -AD       Myotomal Screen- Upper Quarter Clearing    Shoulder flexion (C5)  Bilateral:;4+ (Good +)  -AD    Elbow flexion/wrist extension (C6)  Bilateral:;4+ (Good +)  -AD    Elbow extension/wrist flexion (C7)  Bilateral:;4+ (Good +)  -AD       Right Upper Ext    Rt Shoulder Abduction AROM  160  -AD    Rt Shoulder Flexion AROM  165  -AD    Rt Shoulder External Rotation AROM  80  -AD    Rt Shoulder Internal Rotation AROM  75  -AD       Left Upper Ext    Lt Shoulder Abduction AROM  165  -AD    Lt Shoulder Flexion AROM  165  -AD    Lt Shoulder External Rotation AROM  80  -AD    Lt Shoulder Internal Rotation AROM  80  -AD       MMT Right Upper Ext    Rt Shoulder Flexion MMT, Gross Movement  (4+/5) good plus  -AD    Rt Shoulder ABduction MMT, Gross Movement  (4+/5) good plus  -AD    Rt Shoulder Internal Rotation MMT, Gross Movement  (4+/5) good plus  -AD    Rt Shoulder External Rotation MMT, Gross Movement  (4+/5) good plus  -AD       MMT Left Upper Ext    Lt Shoulder Flexion MMT, Gross Movement  (4+/5) good plus  -AD    Lt Shoulder ABduction MMT, Gross Movement  (4+/5) good plus  -AD    Lt Shoulder Internal Rotation MMT, Gross Movement  (4+/5) good plus  -AD    Lt Shoulder External Rotation MMT, Gross Movement  (4+/5) good plus  -AD      User Key  (r) = Recorded By, (t) = Taken By, (c) = Cosigned By    Initials Name Provider Type    AD Dalton, Ashley Claudene, PT Physical Therapist                      PT Assessment/Plan     Row Name 03/19/19 0941          PT Assessment    Assessment Comments  A re-evaluation was performed during today's session, with patient achieving all established physical therapy goals. She demonstrated right shoulder range of motion and strength WNL in addition to reporting 11.36% impairment on the Quick Dash. She stated she has been unable to participate in physical therapy due to a  "procedure done on her uterus. However she stated she has been \"using the arm as much as possible\". Due to progress made, and no loss of progress while on hold from PT, the patient is being discharged at this time. Discharge was discussed with the patient, with patient agreeable at this time. She demonstrated proper understanding of her HEP. No skin irritation was observed following modalities performed during today's session.  -AD        PT Plan    PT Plan Comments  Due to progress made, the patient is being discharged at this time.  -AD       User Key  (r) = Recorded By, (t) = Taken By, (c) = Cosigned By    Initials Name Provider Type    AD Dalton, Ashley Claudene, PT Physical Therapist          Modalities     Row Name 03/19/19 1300             Moist Heat    MH Applied  Yes With IFC, no skin irritation observed.  -AD      Location  Right shoulder  -AD      Rx Minutes  15 mins  -AD      MH Prior to Rx  Yes  -AD         ELECTRICAL STIMULATION    Attended/Unattended  Unattended With MH, no skin irritation observed.  -AD      Stimulation Type  IFC  -AD      Max mAmp  -- as to pt's tolerance  -AD      Location/Electrode Placement/Other  Right shoulder  -AD       PT E-Stim Unattended (Manual) Minutes  15  -AD        User Key  (r) = Recorded By, (t) = Taken By, (c) = Cosigned By    Initials Name Provider Type    AD Dalton, Ashley Claudene, PT Physical Therapist          Exercises     Row Name 03/19/19 1300             Subjective Comments    Subjective Comments  Pt reports 0/10 right shoulder pain. She stated she has been cleared for PT by physician who performed an unrelated procedure. She stated she continues to have difficulty with opening tight jars and \"gripping things\".  -AD         Subjective Pain    Able to rate subjective pain?  yes  -AD      Pre-Treatment Pain Level  0  -AD      Post-Treatment Pain Level  0  -AD         Total Minutes    96947 - PT Therapeutic Exercise Minutes  10  -AD         Exercise 1    " Exercise Name 1  AROM, isometrics, HEP reviewed  -AD      Time 1  10  -AD        User Key  (r) = Recorded By, (t) = Taken By, (c) = Cosigned By    Initials Name Provider Type    AD Dalton, Ashley Claudene, ISHA Physical Therapist                         PT OP Goals     Row Name 03/19/19 1300          PT Short Term Goals    STG 1  Patient will be independent/compliant with HEP.  -AD     STG 1 Progress  Met  -AD     STG 2  Right shoulder ROM will improve by at least 10 degrees to allow for greater ease with ADLs.  -AD     STG 2 Progress  Met  -AD     STG 3  Patient will report pain no greater than 6/10 when performing self-care activities.  -AD     STG 3 Progress  Met  -AD     STG 4  Patient will report pain no greater than 6/10 when reaching into cabinets at shoulder height.  -AD     STG 4 Progress  Met  -AD        Long Term Goals    LTG 1  Right shoulder ROM will improve to within 5 degrees of unaffected shoulder to allow for greater ease with ADLs.  -AD     LTG 1 Progress  Met  -AD     LTG 2  Right UE strength will improve to at least 4/5 to prevent reinjury.  -AD     LTG 2 Progress  Met  -AD     LTG 3  QuickDash will improve by at least 15% to show improved functional mobility.  -AD     LTG 3 Progress  Met  -AD     LTG 4  Patient will report pain no greater than 3/10 when performing work tasks.  -AD     LTG 4 Progress  Met  -AD     LTG 5  Patient will report pain no greater than 2/10 when carrying groceries.  -AD     LTG 5 Progress  Met  -AD       User Key  (r) = Recorded By, (t) = Taken By, (c) = Cosigned By    Initials Name Provider Type    AD Dalton, Ashley Claudene, ISHA Physical Therapist          Therapy Education  Given: HEP, Symptoms/condition management, Pain management, Posture/body mechanics  Program: Reinforced  How Provided: Verbal, Demonstration  Provided to: Patient  Level of Understanding: Verbalized, Demonstrated    Outcome Measure Options: Quick DASH  Quick DASH  Open a tight or new jar.: Mild  Difficulty  Do heavy household chores (e.g., wash walls, wash floors): Mild Difficulty  Carry a shopping bag or briefcase: No Difficulty  Wash your back: Mild Difficulty  Use a knife to cut food: No Difficulty  Recreational activities in which you take some force or impact through your arm, should or hand (e.g. golf, hammering, tennis, etc.): Mild Difficulty  During the past week, to what extent has your arm, shoulder, or hand problem interfered with your normal social activites with family, friends, neighbors or groups?: Not at all  During the past week, were you limited in your work or other regular daily activities as a result of your arm, shoulder or hand problem?: Not limited at all  Arm, Shoulder, or hand pain: Mild  Tingling (pins and needles) in your arm, shoulder, or hand: None  During the past week, how much difficulty have you had sleeping because of the pain in your arm, shoulder or hand?: No difficulty  Number of Questions Answered: 11  Quick DASH Score: 11.36         Time Calculation:   Start Time: 1300  Stop Time: 1345  Time Calculation (min): 45 min  Therapy Charges for Today     Code Description Service Date Service Provider Modifiers Qty    51134290151 HC PT THER PROC EA 15 MIN 3/19/2019 Dalton, Ashley Claudene, PT GP 1    62337367421 HC PT ELECTRICAL STIM UNATTENDED 3/19/2019 Dalton, Ashley Claudene, PT  1    [id number]  PT CARE PLAN EACH 15 MIN 3/19/2019 Dalton, Ashley Claudene, PT GP 1          PT G-Codes  Outcome Measure Options: Quick DASH  Quick DASH Score: 11.36            Ashley Claudene Dalton, ISHA  3/19/2019

## 2019-03-19 NOTE — THERAPY DISCHARGE NOTE
Outpatient Physical Therapy Discharge Summary   [facility]       Patient Name: Pili Webster  : 1990  MRN: [id number]    Today's Date: 3/19/2019    Visit Dx:    ICD-10-CM ICD-9-CM   1. Acute pain of right shoulder M25.511 719.41       PT OP Goals     Row Name 19 1300          PT Short Term Goals    STG 1  Patient will be independent/compliant with HEP.  -AD     STG 1 Progress  Met  -AD     STG 2  Right shoulder ROM will improve by at least 10 degrees to allow for greater ease with ADLs.  -AD     STG 2 Progress  Met  -AD     STG 3  Patient will report pain no greater than 6/10 when performing self-care activities.  -AD     STG 3 Progress  Met  -AD     STG 4  Patient will report pain no greater than 6/10 when reaching into cabinets at shoulder height.  -AD     STG 4 Progress  Met  -AD        Long Term Goals    LTG 1  Right shoulder ROM will improve to within 5 degrees of unaffected shoulder to allow for greater ease with ADLs.  -AD     LTG 1 Progress  Met  -AD     LTG 2  Right UE strength will improve to at least 4/5 to prevent reinjury.  -AD     LTG 2 Progress  Met  -AD     LTG 3  QuickDash will improve by at least 15% to show improved functional mobility.  -AD     LTG 3 Progress  Met  -AD     LTG 4  Patient will report pain no greater than 3/10 when performing work tasks.  -AD     LTG 4 Progress  Met  -AD     LTG 5  Patient will report pain no greater than 2/10 when carrying groceries.  -AD     LTG 5 Progress  Met  -AD       User Key  (r) = Recorded By, (t) = Taken By, (c) = Cosigned By    Initials Name Provider Type    AD Dalton, Ashley Claudene, PT Physical Therapist          OP PT Discharge Summary  Date of Discharge: 19  Reason for Discharge: All goals achieved  Outcomes Achieved: Able to achieve all goals within established timeline  Discharge Destination: Home with home program  Discharge Instructions/Additional Comments: Due to progress achieved, the patient is being  discharged at this time. She demonstrated normal right shoulder range of motion and strength. She was agreeable to discharge at this time and demonstrated proper understanding of HEP. Thank you for the referral.      Time Calculation:   Start Time: 1300  Stop Time: 1345  Time Calculation (min): 45 min    Therapy Charges for Today     Code Description Service Date Service Provider Modifiers Qty    18247158604 HC PT THER PROC EA 15 MIN 3/19/2019 Dalton, Ashley Claudene, PT GP 1    49829601574 HC PT ELECTRICAL STIM UNATTENDED 3/19/2019 Dalton, Ashley Claudene, PT  1    [id number]  PT CARE PLAN EACH 15 MIN 3/19/2019 Dalton, Ashley Claudene, PT GP 1          PT G-Codes  Outcome Measure Options: Quick DASH  Quick DASH Score: 11.36       Ashley Claudene Dalton, PT  3/19/2019

## 2019-03-21 ENCOUNTER — APPOINTMENT (OUTPATIENT)
Dept: PHYSICAL THERAPY | Facility: HOSPITAL | Age: 29
End: 2019-03-21

## 2019-03-28 ENCOUNTER — HOSPITAL ENCOUNTER (EMERGENCY)
Facility: HOSPITAL | Age: 29
Discharge: HOME | End: 2019-03-29
Attending: EMERGENCY MEDICINE | Admitting: EMERGENCY MEDICINE

## 2019-03-28 ENCOUNTER — APPOINTMENT (OUTPATIENT)
Dept: ULTRASOUND IMAGING | Facility: HOSPITAL | Age: 29
End: 2019-03-28

## 2019-03-28 ENCOUNTER — APPOINTMENT (OUTPATIENT)
Dept: CT IMAGING | Facility: HOSPITAL | Age: 29
End: 2019-03-28

## 2019-03-28 DIAGNOSIS — K52.9 COLITIS: Primary | ICD-10-CM

## 2019-03-28 LAB
ALBUMIN SERPL-MCNC: 4.52 G/DL (ref 3.5–5.2)
ALBUMIN/GLOB SERPL: 1.3 G/DL
ALP SERPL-CCNC: 58 U/L (ref 39–117)
ALT SERPL W P-5'-P-CCNC: 14 U/L (ref 1–33)
ANION GAP SERPL CALCULATED.3IONS-SCNC: 12.3 MMOL/L
AST SERPL-CCNC: 16 U/L (ref 1–32)
BASOPHILS # BLD AUTO: 0.04 10*3/MM3 (ref 0–0.2)
BASOPHILS NFR BLD AUTO: 0.3 % (ref 0–1.5)
BILIRUB SERPL-MCNC: 0.2 MG/DL (ref 0.2–1.2)
BILIRUB UR QL STRIP: NEGATIVE
BUN BLD-MCNC: 14 MG/DL (ref 6–20)
BUN/CREAT SERPL: 15.9 (ref 7–25)
CALCIUM SPEC-SCNC: 9.4 MG/DL (ref 8.6–10.5)
CHLORIDE SERPL-SCNC: 105 MMOL/L (ref 98–107)
CLARITY UR: ABNORMAL
CO2 SERPL-SCNC: 22.7 MMOL/L (ref 22–29)
COLOR UR: YELLOW
CREAT BLD-MCNC: 0.88 MG/DL (ref 0.57–1)
DEPRECATED RDW RBC AUTO: 39 FL (ref 37–54)
EOSINOPHIL # BLD AUTO: 0.28 10*3/MM3 (ref 0–0.4)
EOSINOPHIL NFR BLD AUTO: 1.9 % (ref 0.3–6.2)
ERYTHROCYTE [DISTWIDTH] IN BLOOD BY AUTOMATED COUNT: 12 % (ref 12.3–15.4)
GFR SERPL CREATININE-BSD FRML MDRD: 75 ML/MIN/1.73
GLOBULIN UR ELPH-MCNC: 3.4 GM/DL
GLUCOSE BLD-MCNC: 96 MG/DL (ref 65–99)
GLUCOSE UR STRIP-MCNC: NEGATIVE MG/DL
HCG SERPL QL: NEGATIVE
HCT VFR BLD AUTO: 39.6 % (ref 34–46.6)
HGB BLD-MCNC: 13.8 G/DL (ref 12–15.9)
HGB UR QL STRIP.AUTO: NEGATIVE
HOLD SPECIMEN: NORMAL
HOLD SPECIMEN: NORMAL
IMM GRANULOCYTES # BLD AUTO: 0.04 10*3/MM3 (ref 0–0.05)
IMM GRANULOCYTES NFR BLD AUTO: 0.3 % (ref 0–0.5)
KETONES UR QL STRIP: ABNORMAL
LEUKOCYTE ESTERASE UR QL STRIP.AUTO: NEGATIVE
LIPASE SERPL-CCNC: 55 U/L (ref 13–60)
LYMPHOCYTES # BLD AUTO: 5.05 10*3/MM3 (ref 0.7–3.1)
LYMPHOCYTES NFR BLD AUTO: 33.8 % (ref 19.6–45.3)
MCH RBC QN AUTO: 31.8 PG (ref 26.6–33)
MCHC RBC AUTO-ENTMCNC: 34.8 G/DL (ref 31.5–35.7)
MCV RBC AUTO: 91.2 FL (ref 79–97)
MONOCYTES # BLD AUTO: 0.94 10*3/MM3 (ref 0.1–0.9)
MONOCYTES NFR BLD AUTO: 6.3 % (ref 5–12)
NEUTROPHILS # BLD AUTO: 8.57 10*3/MM3 (ref 1.4–7)
NEUTROPHILS NFR BLD AUTO: 57.4 % (ref 42.7–76)
NITRITE UR QL STRIP: NEGATIVE
PH UR STRIP.AUTO: 8 [PH] (ref 5–8)
PLATELET # BLD AUTO: 270 10*3/MM3 (ref 140–450)
PMV BLD AUTO: 9.3 FL (ref 6–12)
POTASSIUM BLD-SCNC: 4 MMOL/L (ref 3.5–5.2)
PROT SERPL-MCNC: 7.9 G/DL (ref 6–8.5)
PROT UR QL STRIP: NEGATIVE
RBC # BLD AUTO: 4.34 10*6/MM3 (ref 3.77–5.28)
SODIUM BLD-SCNC: 140 MMOL/L (ref 136–145)
SP GR UR STRIP: 1.03 (ref 1–1.03)
UROBILINOGEN UR QL STRIP: ABNORMAL
WBC NRBC COR # BLD: 14.92 10*3/MM3 (ref 3.4–10.8)
WHOLE BLOOD HOLD SPECIMEN: NORMAL
WHOLE BLOOD HOLD SPECIMEN: NORMAL

## 2019-03-28 PROCEDURE — 74176 CT ABD & PELVIS W/O CONTRAST: CPT | Performed by: RADIOLOGY

## 2019-03-28 PROCEDURE — 80053 COMPREHEN METABOLIC PANEL: CPT | Performed by: EMERGENCY MEDICINE

## 2019-03-28 PROCEDURE — 85025 COMPLETE CBC W/AUTO DIFF WBC: CPT | Performed by: EMERGENCY MEDICINE

## 2019-03-28 PROCEDURE — 99284 EMERGENCY DEPT VISIT MOD MDM: CPT

## 2019-03-28 PROCEDURE — 74176 CT ABD & PELVIS W/O CONTRAST: CPT

## 2019-03-28 PROCEDURE — 81003 URINALYSIS AUTO W/O SCOPE: CPT | Performed by: EMERGENCY MEDICINE

## 2019-03-28 PROCEDURE — 76856 US EXAM PELVIC COMPLETE: CPT

## 2019-03-28 PROCEDURE — 76856 US EXAM PELVIC COMPLETE: CPT | Performed by: RADIOLOGY

## 2019-03-28 PROCEDURE — 83690 ASSAY OF LIPASE: CPT | Performed by: EMERGENCY MEDICINE

## 2019-03-28 PROCEDURE — 84703 CHORIONIC GONADOTROPIN ASSAY: CPT | Performed by: EMERGENCY MEDICINE

## 2019-03-28 RX ORDER — METRONIDAZOLE 500 MG/1
500 TABLET ORAL 2 TIMES DAILY
Qty: 14 TABLET | Refills: 0 | Status: SHIPPED | OUTPATIENT
Start: 2019-03-28 | End: 2019-08-13

## 2019-03-28 RX ORDER — ONDANSETRON 4 MG/1
4 TABLET, ORALLY DISINTEGRATING ORAL ONCE
Status: COMPLETED | OUTPATIENT
Start: 2019-03-28 | End: 2019-03-28

## 2019-03-28 RX ORDER — SODIUM CHLORIDE 0.9 % (FLUSH) 0.9 %
10 SYRINGE (ML) INJECTION AS NEEDED
Status: DISCONTINUED | OUTPATIENT
Start: 2019-03-28 | End: 2019-03-29 | Stop reason: HOSPADM

## 2019-03-28 RX ORDER — ONDANSETRON 4 MG/1
4 TABLET, ORALLY DISINTEGRATING ORAL EVERY 6 HOURS PRN
Qty: 12 TABLET | Refills: 0 | Status: SHIPPED | OUTPATIENT
Start: 2019-03-28 | End: 2019-08-13 | Stop reason: ALTCHOICE

## 2019-03-28 RX ORDER — METRONIDAZOLE 250 MG/1
500 TABLET ORAL ONCE
Status: COMPLETED | OUTPATIENT
Start: 2019-03-28 | End: 2019-03-28

## 2019-03-28 RX ORDER — HYDROCODONE BITARTRATE AND ACETAMINOPHEN 10; 325 MG/1; MG/1
1 TABLET ORAL ONCE
Status: COMPLETED | OUTPATIENT
Start: 2019-03-28 | End: 2019-03-28

## 2019-03-28 RX ORDER — HYDROCODONE BITARTRATE AND ACETAMINOPHEN 7.5; 325 MG/1; MG/1
1 TABLET ORAL EVERY 6 HOURS PRN
Qty: 7 TABLET | Refills: 0 | Status: SHIPPED | OUTPATIENT
Start: 2019-03-28 | End: 2019-04-04

## 2019-03-28 RX ADMIN — ONDANSETRON 4 MG: 4 TABLET, ORALLY DISINTEGRATING ORAL at 20:54

## 2019-03-28 RX ADMIN — METRONIDAZOLE 500 MG: 250 TABLET ORAL at 23:00

## 2019-03-28 RX ADMIN — HYDROCODONE BITARTRATE AND ACETAMINOPHEN 1 TABLET: 10; 325 TABLET ORAL at 20:57

## 2019-03-29 VITALS
TEMPERATURE: 98.6 F | BODY MASS INDEX: 30.04 KG/M2 | SYSTOLIC BLOOD PRESSURE: 112 MMHG | WEIGHT: 149 LBS | DIASTOLIC BLOOD PRESSURE: 93 MMHG | OXYGEN SATURATION: 100 % | RESPIRATION RATE: 16 BRPM | HEIGHT: 59 IN | HEART RATE: 90 BPM

## 2019-04-03 ENCOUNTER — TELEPHONE (OUTPATIENT)
Dept: GASTROENTEROLOGY | Facility: CLINIC | Age: 29
End: 2019-04-03

## 2019-04-03 ENCOUNTER — HOSPITAL ENCOUNTER (EMERGENCY)
Facility: HOSPITAL | Age: 29
Discharge: HOME | End: 2019-04-03
Attending: EMERGENCY MEDICINE | Admitting: EMERGENCY MEDICINE

## 2019-04-03 VITALS
DIASTOLIC BLOOD PRESSURE: 88 MMHG | SYSTOLIC BLOOD PRESSURE: 142 MMHG | TEMPERATURE: 98.4 F | OXYGEN SATURATION: 100 % | WEIGHT: 149 LBS | RESPIRATION RATE: 18 BRPM | HEART RATE: 102 BPM | HEIGHT: 59 IN | BODY MASS INDEX: 30.04 KG/M2

## 2019-04-03 DIAGNOSIS — T78.1XXA ALLERGIC REACTION TO FOOD, INITIAL ENCOUNTER: Primary | ICD-10-CM

## 2019-04-03 DIAGNOSIS — R10.84 GENERALIZED ABDOMINAL PAIN: ICD-10-CM

## 2019-04-03 DIAGNOSIS — R14.0 BLOATING: Primary | ICD-10-CM

## 2019-04-03 PROCEDURE — 25010000002 DIPHENHYDRAMINE PER 50 MG: Performed by: PHYSICIAN ASSISTANT

## 2019-04-03 PROCEDURE — 25010000002 DEXAMETHASONE SODIUM PHOSPHATE 20 MG/5ML SOLUTION: Performed by: PHYSICIAN ASSISTANT

## 2019-04-03 PROCEDURE — 99283 EMERGENCY DEPT VISIT LOW MDM: CPT

## 2019-04-03 PROCEDURE — 96372 THER/PROPH/DIAG INJ SC/IM: CPT

## 2019-04-03 RX ORDER — FAMOTIDINE 20 MG/1
20 TABLET, FILM COATED ORAL ONCE
Status: COMPLETED | OUTPATIENT
Start: 2019-04-03 | End: 2019-04-03

## 2019-04-03 RX ORDER — DIPHENHYDRAMINE HYDROCHLORIDE 50 MG/ML
25 INJECTION INTRAMUSCULAR; INTRAVENOUS ONCE
Status: COMPLETED | OUTPATIENT
Start: 2019-04-03 | End: 2019-04-03

## 2019-04-03 RX ORDER — DEXAMETHASONE SODIUM PHOSPHATE 4 MG/ML
10 INJECTION, SOLUTION INTRA-ARTICULAR; INTRALESIONAL; INTRAMUSCULAR; INTRAVENOUS; SOFT TISSUE ONCE
Status: COMPLETED | OUTPATIENT
Start: 2019-04-03 | End: 2019-04-03

## 2019-04-03 RX ADMIN — DEXAMETHASONE SODIUM PHOSPHATE 10 MG: 4 INJECTION, SOLUTION INTRAMUSCULAR; INTRAVENOUS at 22:16

## 2019-04-03 RX ADMIN — DIPHENHYDRAMINE HYDROCHLORIDE 25 MG: 50 INJECTION INTRAMUSCULAR; INTRAVENOUS at 22:16

## 2019-04-03 RX ADMIN — FAMOTIDINE 20 MG: 20 TABLET, FILM COATED ORAL at 22:16

## 2019-04-04 ENCOUNTER — OFFICE VISIT (OUTPATIENT)
Dept: GASTROENTEROLOGY | Facility: CLINIC | Age: 29
End: 2019-04-04

## 2019-04-04 VITALS
HEIGHT: 59 IN | DIASTOLIC BLOOD PRESSURE: 83 MMHG | WEIGHT: 151.6 LBS | OXYGEN SATURATION: 98 % | HEART RATE: 102 BPM | SYSTOLIC BLOOD PRESSURE: 135 MMHG | BODY MASS INDEX: 30.56 KG/M2

## 2019-04-04 DIAGNOSIS — R19.7 DIARRHEA, UNSPECIFIED TYPE: Primary | ICD-10-CM

## 2019-04-04 DIAGNOSIS — R11.0 NAUSEA: ICD-10-CM

## 2019-04-04 PROCEDURE — 99213 OFFICE O/P EST LOW 20 MIN: CPT | Performed by: PHYSICIAN ASSISTANT

## 2019-04-04 NOTE — ED PROVIDER NOTES
Subjective     Allergic Reaction   Presenting symptoms: itching    Presenting symptoms: no difficulty breathing, no difficulty swallowing, no rash, no swelling and no wheezing    Severity:  Moderate  Duration:  45 minutes  Prior allergic episodes:  Food/nut allergies (Recent diagnosis of peanut allergy)  Context: nuts    Context comment:  Accidentally ingested peanuts  Relieved by:  None tried  Worsened by:  Nothing  Ineffective treatments:  None tried      Review of Systems   Constitutional: Negative.  Negative for fever.   HENT: Negative.  Negative for trouble swallowing.    Respiratory: Negative.  Negative for wheezing.    Cardiovascular: Negative.  Negative for chest pain.   Gastrointestinal: Negative.  Negative for abdominal pain.   Endocrine: Negative.    Genitourinary: Negative.  Negative for dysuria.   Skin: Positive for itching. Negative for rash.   Neurological: Negative.    Psychiatric/Behavioral: Negative.    All other systems reviewed and are negative.      Past Medical History:   Diagnosis Date   • Abdominal pain    • Anxiety and depression    • Arthritis    • Cholecystitis    • Constipation    • Frequent UTI    • GERD (gastroesophageal reflux disease)    • Heartburn    • Hemorrhoids    • Kidney stones    • Lesion of breast    • Lump     RIGHT BREAST   • Nausea & vomiting    • PCOS (polycystic ovarian syndrome)    • PONV (postoperative nausea and vomiting)    • Tachycardia        Allergies   Allergen Reactions   • Peanut-Containing Drug Products Anaphylaxis   • Latex Hives   • Shrimp Swelling     Facial swelling     • Milk-Related Compounds Nausea And Vomiting   • Sulfa Antibiotics Rash       Past Surgical History:   Procedure Laterality Date   • ABDOMINAL SURGERY     • BREAST BIOPSY Right 11/29/2018    Procedure: breast biopsy ;  Surgeon: Shiv Overton MD;  Location: [facility];  Service: General   • CHOLECYSTECTOMY N/A 8/25/2017    Procedure: CHOLECYSTECTOMY LAPAROSCOPIC;  Surgeon: Franco FOLEY  MD Kamaljit;  Location: [facility] OR;  Service:    • DENTAL PROCEDURE     • DIAGNOSTIC LAPAROSCOPY      x2   • DILATATION AND CURETTAGE     • URETEROSCOPY LASER LITHOTRIPSY WITH STENT INSERTION Right 1/9/2019    Procedure: URETEROSCOPY LASER LITHOTRIPSY retrograde pyelogram;  Surgeon: Ahmet Barrow MD;  Location: [facility] OR;  Service: Urology   • WISDOM TOOTH EXTRACTION         Family History   Problem Relation Age of Onset   • Breast cancer Maternal Aunt    • Alcohol abuse Paternal Grandfather    • Heart disease Paternal Grandfather    • Cancer Maternal Grandfather    • Hypertension Maternal Grandfather        Social History     Socioeconomic History   • Marital status:      Spouse name: Not on file   • Number of children: Not on file   • Years of education: Not on file   • Highest education level: Not on file   Tobacco Use   • Smoking status: Current Every Day Smoker     Packs/day: 0.50     Years: 15.00     Pack years: 7.50     Types: Cigarettes   • Smokeless tobacco: Never Used   Substance and Sexual Activity   • Alcohol use: Yes     Comment: ocassional    • Drug use: No   • Sexual activity: Defer     Birth control/protection: None           Objective   Physical Exam   Constitutional: She is oriented to person, place, and time. She appears well-developed and well-nourished. No distress.   HENT:   Head: Normocephalic and atraumatic.   Right Ear: External ear normal.   Left Ear: External ear normal.   Nose: Nose normal.   Mouth/Throat: Oropharynx is clear and moist. No oropharyngeal exudate.   No tonsillar or pharyngeal swelling.    Eyes: Conjunctivae and EOM are normal. Pupils are equal, round, and reactive to light.   Neck: Normal range of motion. Neck supple. No JVD present. No tracheal deviation present.   Cardiovascular: Normal rate, regular rhythm and normal heart sounds. Exam reveals no gallop and no friction rub.   No murmur heard.  Pulmonary/Chest: Effort normal and breath sounds normal. No  stridor. No respiratory distress. She has no wheezes. She has no rales. She exhibits no tenderness.   Abdominal: Soft. Bowel sounds are normal. She exhibits no distension and no mass. There is no tenderness. There is no rebound and no guarding. No hernia.   Musculoskeletal: Normal range of motion. She exhibits no edema or deformity.   Neurological: She is alert and oriented to person, place, and time. No cranial nerve deficit.   Skin: Skin is warm and dry. No rash noted. She is not diaphoretic. No erythema. No pallor.   Psychiatric: She has a normal mood and affect. Her behavior is normal. Thought content normal.   Nursing note and vitals reviewed.      Procedures           ED Course  ED Course as of Apr 03 2337 Wed Apr 03, 2019   2334 Patient diagnosed with allergic reaction. Will be d/c home with instructions to use benadryl over the next 2 days. Will f/u with PCP in 2 days or return to ER if symptoms worsen.   [MM]      ED Course User Index  [MM] Alem Frazier PA                  MDM  Number of Diagnoses or Management Options  Allergic reaction to food, initial encounter:         Final diagnoses:   Allergic reaction to food, initial encounter            Alem Frazier PA  04/03/19 2337

## 2019-04-04 NOTE — DISCHARGE INSTRUCTIONS
Please use benadryl over the next 48 hours. Please see your PCP in 2 days or return to ER if symptoms worsen.

## 2019-04-04 NOTE — PROGRESS NOTES
: 1990    Chief Complaint   Patient presents with   • Abdominal Pain   • Diarrhea       Pili Webster is a 28 y.o. female who presents to the office today as a follow up appointment regarding Abdominal Pain and Diarrhea.    History of Present Illness:  Today, she states that overall she is feeling better.  She is still having intermittent diarrhea which seems to be worse when eating certain foods such as salads.  She is taking MiraLAX 17 g once daily and has noticed better bowel habits with it.  She does have several food allergies and has been to the ER recently due to ingestion of peanuts.  She had itching related to this and some nausea.  She had another visit with the emergency department due to significant abdominal pain and was diagnosed with possible colitis and given Flagyl prescription.  CT scan was reviewed and showed under distention versus colitis.  Nausea still present about the same as previous and she takes Zofran as needed with good relief.    Review of Systems   Constitutional: Positive for appetite change and fatigue. Negative for chills and fever.   HENT: Negative for ear pain, sinus pressure, sinus pain and sore throat.    Eyes: Negative.    Respiratory: Positive for cough, chest tightness and shortness of breath. Negative for choking.    Cardiovascular: Positive for palpitations. Negative for chest pain.   Gastrointestinal: Positive for abdominal distention, abdominal pain, anal bleeding, blood in stool, constipation, diarrhea, nausea and rectal pain.   Endocrine: Negative.    Genitourinary: Positive for difficulty urinating.   Musculoskeletal: Positive for back pain and neck pain. Negative for joint swelling.   Skin: Positive for color change and rash.   Allergic/Immunologic: Positive for environmental allergies.   Neurological: Positive for weakness, light-headedness and headaches. Negative for tremors, speech difficulty and numbness.   Hematological: Bruises/bleeds easily.    Psychiatric/Behavioral: Positive for agitation. Negative for suicidal ideas. The patient is nervous/anxious.        Past Medical History:   Diagnosis Date   • Abdominal pain    • Anxiety and depression    • Arthritis    • Cholecystitis    • Constipation    • Frequent UTI    • GERD (gastroesophageal reflux disease)    • Heartburn    • Hemorrhoids    • Kidney stones    • Lesion of breast    • Lump     RIGHT BREAST   • Nausea & vomiting    • PCOS (polycystic ovarian syndrome)    • PONV (postoperative nausea and vomiting)    • Tachycardia        Past Surgical History:   Procedure Laterality Date   • ABDOMINAL SURGERY     • BREAST BIOPSY Right 11/29/2018    Procedure: breast biopsy ;  Surgeon: Shiv Overton MD;  Location: [facility];  Service: General   • CHOLECYSTECTOMY N/A 8/25/2017    Procedure: CHOLECYSTECTOMY LAPAROSCOPIC;  Surgeon: Franco Mari MD;  Location: [facility];  Service:    • DENTAL PROCEDURE     • DIAGNOSTIC LAPAROSCOPY      x2   • DILATATION AND CURETTAGE     • URETEROSCOPY LASER LITHOTRIPSY WITH STENT INSERTION Right 1/9/2019    Procedure: URETEROSCOPY LASER LITHOTRIPSY retrograde pyelogram;  Surgeon: Ahmet Barrow MD;  Location: [facility];  Service: Urology   • WISDOM TOOTH EXTRACTION         Family History   Problem Relation Age of Onset   • Breast cancer Maternal Aunt    • Alcohol abuse Paternal Grandfather    • Heart disease Paternal Grandfather    • Cancer Maternal Grandfather    • Hypertension Maternal Grandfather        Social History     Socioeconomic History   • Marital status:      Spouse name: Not on file   • Number of children: Not on file   • Years of education: Not on file   • Highest education level: Not on file   Tobacco Use   • Smoking status: Current Every Day Smoker     Packs/day: 0.50     Years: 15.00     Pack years: 7.50     Types: Cigarettes   • Smokeless tobacco: Never Used   Substance and Sexual Activity   • Alcohol use: Yes     Comment: ocassional   "  • Drug use: No   • Sexual activity: Defer     Birth control/protection: None       Current Outpatient Medications:   •  aspirin 81 MG EC tablet, Take 81 mg by mouth Daily. LAS T DOSE 11/26/2018, Disp: , Rfl:   •  loratadine (CLARITIN) 10 MG tablet, 10 mg Daily., Disp: , Rfl:   •  metroNIDAZOLE (FLAGYL) 500 MG tablet, Take 1 tablet by mouth 2 (Two) Times a Day., Disp: 14 tablet, Rfl: 0  •  montelukast (SINGULAIR) 10 MG tablet, Take 10 mg by mouth Every Night., Disp: , Rfl:   •  ondansetron ODT (ZOFRAN-ODT) 4 MG disintegrating tablet, Take 1 tablet by mouth Every 6 (Six) Hours As Needed for Nausea or Vomiting., Disp: 10 tablet, Rfl: 0  •  ondansetron ODT (ZOFRAN-ODT) 4 MG disintegrating tablet, Take 1 tablet by mouth Every 6 (Six) Hours As Needed for Nausea or Vomiting., Disp: 12 tablet, Rfl: 0  •  pantoprazole (PROTONIX) 40 MG EC tablet, Take 40 mg by mouth Daily., Disp: , Rfl:   •  polyethylene glycol (MIRALAX) packet, Take 17 g by mouth Daily., Disp: , Rfl:   •  Prenatal Vit-DSS-Fe Cbn-FA (PRENATAL AD PO), Take  by mouth., Disp: , Rfl:   No current facility-administered medications for this visit.     Allergies:   Peanut-containing drug products; Latex; Shrimp; Milk-related compounds; and Sulfa antibiotics    Vitals:  /83   Pulse 102   Ht 149.9 cm (59\")   Wt 68.8 kg (151 lb 9.6 oz)   LMP 03/14/2019   SpO2 98%   BMI 30.62 kg/m²     Physical Exam   Constitutional: She is oriented to person, place, and time. She appears well-developed and well-nourished. No distress.   HENT:   Head: Normocephalic and atraumatic.   Nose: Nose normal.   Mouth/Throat: Oropharynx is clear and moist.   Eyes: Conjunctivae are normal. Right eye exhibits no discharge. Left eye exhibits no discharge. No scleral icterus.   Neck: Normal range of motion. No JVD present.   Cardiovascular: Normal rate, regular rhythm and normal heart sounds. Exam reveals no gallop and no friction rub.   No murmur heard.  Pulmonary/Chest: Effort normal " and breath sounds normal. No respiratory distress. She has no wheezes. She has no rales. She exhibits no tenderness.   Abdominal: Soft. Bowel sounds are normal. She exhibits no mass. There is no tenderness.   Musculoskeletal: Normal range of motion. She exhibits no edema or deformity.   Neurological: She is alert and oriented to person, place, and time. Coordination normal.   Skin: Skin is warm and dry. No rash noted. She is not diaphoretic. No erythema.   Psychiatric: She has a normal mood and affect. Her behavior is normal. Judgment and thought content normal.   Vitals reviewed.      Assessment/Plan:  1. Diarrhea, unspecified type    2. Nausea      Continue to monitor symptoms and add fiber to current regimen.  She will continue taking MiraLAX 17 g once daily for treatment of overflow diarrhea related to constipation.  She will avoid all the foods that she has been told that she is allergic to as directed.  She can continue taking Zofran as needed for relief of nausea.  Call with any concerns.        Return in about 3 months (around 7/4/2019) for recheck abdominal pain.      Electronically signed 4/5/2019 11:56 AM  Naomi Aguiar PA-C, [facility]

## 2019-04-30 ENCOUNTER — TRANSCRIBE ORDERS (OUTPATIENT)
Dept: ADMINISTRATIVE | Facility: HOSPITAL | Age: 29
End: 2019-04-30

## 2019-04-30 ENCOUNTER — LAB (OUTPATIENT)
Dept: LAB | Facility: HOSPITAL | Age: 29
End: 2019-04-30

## 2019-04-30 DIAGNOSIS — N92.6 IRREGULAR MENSTRUAL CYCLE: ICD-10-CM

## 2019-04-30 DIAGNOSIS — N92.6 IRREGULAR MENSTRUAL CYCLE: Primary | ICD-10-CM

## 2019-04-30 LAB
DEPRECATED RDW RBC AUTO: 41 FL (ref 37–54)
ERYTHROCYTE [DISTWIDTH] IN BLOOD BY AUTOMATED COUNT: 12.1 % (ref 12.3–15.4)
HCG INTACT+B SERPL-ACNC: <0.5 MIU/ML
HCT VFR BLD AUTO: 41.4 % (ref 34–46.6)
HGB BLD-MCNC: 14.1 G/DL (ref 12–15.9)
MCH RBC QN AUTO: 31.9 PG (ref 26.6–33)
MCHC RBC AUTO-ENTMCNC: 34.1 G/DL (ref 31.5–35.7)
MCV RBC AUTO: 93.7 FL (ref 79–97)
PLATELET # BLD AUTO: 268 10*3/MM3 (ref 140–450)
PMV BLD AUTO: 10.1 FL (ref 6–12)
RBC # BLD AUTO: 4.42 10*6/MM3 (ref 3.77–5.28)
WBC NRBC COR # BLD: 9.76 10*3/MM3 (ref 3.4–10.8)

## 2019-04-30 PROCEDURE — 36415 COLL VENOUS BLD VENIPUNCTURE: CPT

## 2019-04-30 PROCEDURE — 85027 COMPLETE CBC AUTOMATED: CPT

## 2019-04-30 PROCEDURE — 84702 CHORIONIC GONADOTROPIN TEST: CPT

## 2019-05-10 ENCOUNTER — APPOINTMENT (OUTPATIENT)
Dept: CT IMAGING | Facility: HOSPITAL | Age: 29
End: 2019-05-10

## 2019-05-10 ENCOUNTER — HOSPITAL ENCOUNTER (EMERGENCY)
Facility: HOSPITAL | Age: 29
Discharge: HOME | End: 2019-05-10
Attending: EMERGENCY MEDICINE | Admitting: EMERGENCY MEDICINE

## 2019-05-10 VITALS
HEART RATE: 94 BPM | SYSTOLIC BLOOD PRESSURE: 115 MMHG | WEIGHT: 145 LBS | TEMPERATURE: 99.1 F | HEIGHT: 59 IN | BODY MASS INDEX: 29.23 KG/M2 | DIASTOLIC BLOOD PRESSURE: 71 MMHG | OXYGEN SATURATION: 97 % | RESPIRATION RATE: 18 BRPM

## 2019-05-10 DIAGNOSIS — N39.0 UTI (URINARY TRACT INFECTION), UNCOMPLICATED: Primary | ICD-10-CM

## 2019-05-10 DIAGNOSIS — N20.0 NEPHROLITHIASIS: ICD-10-CM

## 2019-05-10 LAB
ALBUMIN SERPL-MCNC: 4.09 G/DL (ref 3.5–5.2)
ALBUMIN/GLOB SERPL: 1.3 G/DL
ALP SERPL-CCNC: 57 U/L (ref 39–117)
ALT SERPL W P-5'-P-CCNC: 12 U/L (ref 1–33)
ANION GAP SERPL CALCULATED.3IONS-SCNC: 11.6 MMOL/L
AST SERPL-CCNC: 13 U/L (ref 1–32)
B-HCG UR QL: NEGATIVE
BACTERIA UR QL AUTO: ABNORMAL /HPF
BASOPHILS # BLD AUTO: 0.03 10*3/MM3 (ref 0–0.2)
BASOPHILS NFR BLD AUTO: 0.3 % (ref 0–1.5)
BILIRUB SERPL-MCNC: 0.3 MG/DL (ref 0.2–1.2)
BILIRUB UR QL STRIP: NEGATIVE
BUN BLD-MCNC: 9 MG/DL (ref 6–20)
BUN/CREAT SERPL: 9.6 (ref 7–25)
C TRACH RRNA CVX QL NAA+PROBE: NOT DETECTED
CALCIUM SPEC-SCNC: 9 MG/DL (ref 8.6–10.5)
CHLORIDE SERPL-SCNC: 108 MMOL/L (ref 98–107)
CLARITY UR: ABNORMAL
CO2 SERPL-SCNC: 20.4 MMOL/L (ref 22–29)
COD CRY URNS QL: ABNORMAL /HPF
COLOR UR: ABNORMAL
CREAT BLD-MCNC: 0.94 MG/DL (ref 0.57–1)
DEPRECATED RDW RBC AUTO: 39.6 FL (ref 37–54)
EOSINOPHIL # BLD AUTO: 0.27 10*3/MM3 (ref 0–0.4)
EOSINOPHIL NFR BLD AUTO: 2.7 % (ref 0.3–6.2)
ERYTHROCYTE [DISTWIDTH] IN BLOOD BY AUTOMATED COUNT: 11.9 % (ref 12.3–15.4)
GFR SERPL CREATININE-BSD FRML MDRD: 70 ML/MIN/1.73
GLOBULIN UR ELPH-MCNC: 3.2 GM/DL
GLUCOSE BLD-MCNC: 111 MG/DL (ref 65–99)
GLUCOSE UR STRIP-MCNC: NEGATIVE MG/DL
HCT VFR BLD AUTO: 37.5 % (ref 34–46.6)
HGB BLD-MCNC: 12.8 G/DL (ref 12–15.9)
HGB UR QL STRIP.AUTO: ABNORMAL
HYALINE CASTS UR QL AUTO: ABNORMAL /LPF
IMM GRANULOCYTES # BLD AUTO: 0.02 10*3/MM3 (ref 0–0.05)
IMM GRANULOCYTES NFR BLD AUTO: 0.2 % (ref 0–0.5)
KETONES UR QL STRIP: ABNORMAL
LEUKOCYTE ESTERASE UR QL STRIP.AUTO: ABNORMAL
LIPASE SERPL-CCNC: 45 U/L (ref 13–60)
LYMPHOCYTES # BLD AUTO: 3.12 10*3/MM3 (ref 0.7–3.1)
LYMPHOCYTES NFR BLD AUTO: 31.4 % (ref 19.6–45.3)
MCH RBC QN AUTO: 31.9 PG (ref 26.6–33)
MCHC RBC AUTO-ENTMCNC: 34.1 G/DL (ref 31.5–35.7)
MCV RBC AUTO: 93.5 FL (ref 79–97)
MONOCYTES # BLD AUTO: 0.56 10*3/MM3 (ref 0.1–0.9)
MONOCYTES NFR BLD AUTO: 5.6 % (ref 5–12)
MUCOUS THREADS URNS QL MICRO: ABNORMAL /HPF
N GONORRHOEA RRNA SPEC QL NAA+PROBE: NOT DETECTED
NEUTROPHILS # BLD AUTO: 5.93 10*3/MM3 (ref 1.7–7)
NEUTROPHILS NFR BLD AUTO: 59.8 % (ref 42.7–76)
NITRITE UR QL STRIP: NEGATIVE
PH UR STRIP.AUTO: 5.5 [PH] (ref 5–8)
PLATELET # BLD AUTO: 279 10*3/MM3 (ref 140–450)
PMV BLD AUTO: 9.4 FL (ref 6–12)
POTASSIUM BLD-SCNC: 4.3 MMOL/L (ref 3.5–5.2)
PROT SERPL-MCNC: 7.3 G/DL (ref 6–8.5)
PROT UR QL STRIP: NEGATIVE
RBC # BLD AUTO: 4.01 10*6/MM3 (ref 3.77–5.28)
RBC # UR: ABNORMAL /HPF
REF LAB TEST METHOD: ABNORMAL
SODIUM BLD-SCNC: 140 MMOL/L (ref 136–145)
SP GR UR STRIP: >1.03 (ref 1–1.03)
SQUAMOUS #/AREA URNS HPF: ABNORMAL /HPF
UROBILINOGEN UR QL STRIP: ABNORMAL
WBC NRBC COR # BLD: 9.93 10*3/MM3 (ref 3.4–10.8)
WBC UR QL AUTO: ABNORMAL /HPF

## 2019-05-10 PROCEDURE — 99283 EMERGENCY DEPT VISIT LOW MDM: CPT

## 2019-05-10 PROCEDURE — 81025 URINE PREGNANCY TEST: CPT | Performed by: PHYSICIAN ASSISTANT

## 2019-05-10 PROCEDURE — 96375 TX/PRO/DX INJ NEW DRUG ADDON: CPT

## 2019-05-10 PROCEDURE — 74176 CT ABD & PELVIS W/O CONTRAST: CPT | Performed by: RADIOLOGY

## 2019-05-10 PROCEDURE — 80053 COMPREHEN METABOLIC PANEL: CPT | Performed by: PHYSICIAN ASSISTANT

## 2019-05-10 PROCEDURE — 87086 URINE CULTURE/COLONY COUNT: CPT | Performed by: PHYSICIAN ASSISTANT

## 2019-05-10 PROCEDURE — 74176 CT ABD & PELVIS W/O CONTRAST: CPT

## 2019-05-10 PROCEDURE — 87491 CHLMYD TRACH DNA AMP PROBE: CPT | Performed by: PHYSICIAN ASSISTANT

## 2019-05-10 PROCEDURE — 25010000002 HYDROMORPHONE PER 4 MG: Performed by: EMERGENCY MEDICINE

## 2019-05-10 PROCEDURE — 81001 URINALYSIS AUTO W/SCOPE: CPT | Performed by: PHYSICIAN ASSISTANT

## 2019-05-10 PROCEDURE — 25010000002 ONDANSETRON PER 1 MG: Performed by: PHYSICIAN ASSISTANT

## 2019-05-10 PROCEDURE — 25010000002 CEFTRIAXONE: Performed by: PHYSICIAN ASSISTANT

## 2019-05-10 PROCEDURE — 87591 N.GONORRHOEAE DNA AMP PROB: CPT | Performed by: PHYSICIAN ASSISTANT

## 2019-05-10 PROCEDURE — 85025 COMPLETE CBC W/AUTO DIFF WBC: CPT | Performed by: PHYSICIAN ASSISTANT

## 2019-05-10 PROCEDURE — 96365 THER/PROPH/DIAG IV INF INIT: CPT

## 2019-05-10 PROCEDURE — 25010000002 KETOROLAC TROMETHAMINE PER 15 MG: Performed by: PHYSICIAN ASSISTANT

## 2019-05-10 PROCEDURE — 83690 ASSAY OF LIPASE: CPT | Performed by: PHYSICIAN ASSISTANT

## 2019-05-10 RX ORDER — ONDANSETRON 2 MG/ML
4 INJECTION INTRAMUSCULAR; INTRAVENOUS ONCE
Status: COMPLETED | OUTPATIENT
Start: 2019-05-10 | End: 2019-05-10

## 2019-05-10 RX ORDER — KETOROLAC TROMETHAMINE 30 MG/ML
30 INJECTION, SOLUTION INTRAMUSCULAR; INTRAVENOUS ONCE
Status: COMPLETED | OUTPATIENT
Start: 2019-05-10 | End: 2019-05-10

## 2019-05-10 RX ORDER — HYDROMORPHONE HYDROCHLORIDE 1 MG/ML
0.5 INJECTION, SOLUTION INTRAMUSCULAR; INTRAVENOUS; SUBCUTANEOUS ONCE
Status: COMPLETED | OUTPATIENT
Start: 2019-05-10 | End: 2019-05-10

## 2019-05-10 RX ORDER — NITROFURANTOIN 25; 75 MG/1; MG/1
100 CAPSULE ORAL 2 TIMES DAILY
Qty: 10 CAPSULE | Refills: 0 | Status: SHIPPED | OUTPATIENT
Start: 2019-05-10 | End: 2019-08-13 | Stop reason: ALTCHOICE

## 2019-05-10 RX ORDER — SODIUM CHLORIDE 0.9 % (FLUSH) 0.9 %
10 SYRINGE (ML) INJECTION AS NEEDED
Status: DISCONTINUED | OUTPATIENT
Start: 2019-05-10 | End: 2019-05-10 | Stop reason: HOSPADM

## 2019-05-10 RX ADMIN — SODIUM CHLORIDE 1000 ML: 9 INJECTION, SOLUTION INTRAVENOUS at 16:08

## 2019-05-10 RX ADMIN — KETOROLAC TROMETHAMINE 30 MG: 30 INJECTION, SOLUTION INTRAMUSCULAR; INTRAVENOUS at 16:08

## 2019-05-10 RX ADMIN — HYDROMORPHONE HYDROCHLORIDE 0.5 MG: 1 INJECTION, SOLUTION INTRAMUSCULAR; INTRAVENOUS; SUBCUTANEOUS at 17:48

## 2019-05-10 RX ADMIN — ONDANSETRON 4 MG: 2 INJECTION, SOLUTION INTRAMUSCULAR; INTRAVENOUS at 16:08

## 2019-05-10 RX ADMIN — CEFTRIAXONE 1 G: 1 INJECTION, POWDER, FOR SOLUTION INTRAMUSCULAR; INTRAVENOUS at 19:33

## 2019-05-11 LAB — BACTERIA SPEC AEROBE CULT: NO GROWTH

## 2019-05-14 ENCOUNTER — OFFICE VISIT (OUTPATIENT)
Dept: UROLOGY | Facility: CLINIC | Age: 29
End: 2019-05-14

## 2019-05-14 VITALS — HEIGHT: 59 IN | BODY MASS INDEX: 29.23 KG/M2 | WEIGHT: 145 LBS

## 2019-05-14 DIAGNOSIS — N20.0 KIDNEY STONE: Primary | ICD-10-CM

## 2019-05-14 PROCEDURE — 99213 OFFICE O/P EST LOW 20 MIN: CPT | Performed by: UROLOGY

## 2019-05-14 RX ORDER — HYDROCODONE BITARTRATE AND ACETAMINOPHEN 10; 325 MG/1; MG/1
1 TABLET ORAL EVERY 6 HOURS PRN
Qty: 12 TABLET | Refills: 0 | Status: SHIPPED | OUTPATIENT
Start: 2019-05-14 | End: 2019-08-13 | Stop reason: ALTCHOICE

## 2019-05-14 NOTE — PROGRESS NOTES
Chief Complaint:          Chief Complaint   Patient presents with   • Nephrolithiasis     6 month f/u       HPI:   28 y.o. female  returns today having been seen yesterday.  I repeated her KUB .  I do not see a radio opaque stone in the renal fossa.  She says she's had nausea no vomiting chills but no fever and right flank pain I'm want to give her pain medication and alpha blockade see her back in a week if I can't see it again I'm I recommend a stone CT and probable intervention if she persists with pain.  She returns today.  She still says she has nausea and occasional vomiting, no fevers, no chills, there is no right flank pain and negative CT scan and negative KUB a month ago a recent breast biopsy I went ahead and repeated the KUB which is again negative I'll notify her of the results by telephone.  I don't believe this is related to the urological system.  She had severe pain and went to the emergency room she had a CT scan that showed a right proximal 4 mm stone.  She is desirous of surgical intervention.  I discussed the surgical treatment with her and well set this up for her as soon as possible she has both alpha blockade and pain medication for control until the time of the surgery.  She returns today she is a lot better her pains gone she is having no fevers chills no nausea no vomiting.  She's getting get a KUB on notify her of the results at [phone].  Today she went to emergency room with a questionable UTI and had a CT the stone CT showed hyperdense pyramids but no other abnormality the pains both right and left and sequel.  There is nausea no vomiting she also has a left ovarian cyst which seems to be more the locus of her pain I am to give her some reassurance put her on pain medication use on a as needed basis and see her back in about 3 weeks if she continues with pain will recommend intervention but right now I do not find anything anatomic that requires urgent intervention      Past  Medical History:        Past Medical History:   Diagnosis Date   • Abdominal pain    • Anxiety and depression    • Arthritis    • Cholecystitis    • Constipation    • Frequent UTI    • GERD (gastroesophageal reflux disease)    • Heartburn    • Hemorrhoids    • Kidney stones    • Lesion of breast    • Lump     RIGHT BREAST   • Nausea & vomiting    • PCOS (polycystic ovarian syndrome)    • PONV (postoperative nausea and vomiting)    • Tachycardia          Current Meds:     Current Outpatient Medications   Medication Sig Dispense Refill   • aspirin 81 MG EC tablet Take 81 mg by mouth Daily. LAS T DOSE 11/26/2018     • loratadine (CLARITIN) 10 MG tablet 10 mg Daily.     • metroNIDAZOLE (FLAGYL) 500 MG tablet Take 1 tablet by mouth 2 (Two) Times a Day. 14 tablet 0   • montelukast (SINGULAIR) 10 MG tablet Take 10 mg by mouth Every Night.     • Pancrelipase, Lip-Prot-Amyl, (CREON) 54701 units capsule delayed-release particles Take 2 caps with meals and 1 with snacks. 240 capsule 3   • pantoprazole (PROTONIX) 40 MG EC tablet Take 40 mg by mouth Daily.     • polyethylene glycol (MIRALAX) packet Take 17 g by mouth Daily.     • Prenatal Vit-DSS-Fe Cbn-FA (PRENATAL AD PO) Take  by mouth.     • nitrofurantoin, macrocrystal-monohydrate, (MACROBID) 100 MG capsule Take 1 capsule by mouth 2 (Two) Times a Day. 10 capsule 0   • ondansetron ODT (ZOFRAN-ODT) 4 MG disintegrating tablet Take 1 tablet by mouth Every 6 (Six) Hours As Needed for Nausea or Vomiting. 10 tablet 0   • ondansetron ODT (ZOFRAN-ODT) 4 MG disintegrating tablet Take 1 tablet by mouth Every 6 (Six) Hours As Needed for Nausea or Vomiting. 12 tablet 0     No current facility-administered medications for this visit.         Allergies:      Allergies   Allergen Reactions   • Peanut-Containing Drug Products Anaphylaxis   • Latex Hives   • Shrimp Swelling     Facial swelling     • Milk-Related Compounds Nausea And Vomiting   • Sulfa Antibiotics Rash        Past Surgical  History:     Past Surgical History:   Procedure Laterality Date   • ABDOMINAL SURGERY     • BREAST BIOPSY Right 11/29/2018    Procedure: breast biopsy ;  Surgeon: Shiv Overton MD;  Location: [facility];  Service: General   • CHOLECYSTECTOMY N/A 8/25/2017    Procedure: CHOLECYSTECTOMY LAPAROSCOPIC;  Surgeon: Franco Mari MD;  Location: [facility];  Service:    • DENTAL PROCEDURE     • DIAGNOSTIC LAPAROSCOPY      x2   • DILATATION AND CURETTAGE     • URETEROSCOPY LASER LITHOTRIPSY WITH STENT INSERTION Right 1/9/2019    Procedure: URETEROSCOPY LASER LITHOTRIPSY retrograde pyelogram;  Surgeon: Ahmet Barrow MD;  Location: [facility];  Service: Urology   • WISDOM TOOTH EXTRACTION           Social History:     Social History     Socioeconomic History   • Marital status:      Spouse name: Not on file   • Number of children: Not on file   • Years of education: Not on file   • Highest education level: Not on file   Tobacco Use   • Smoking status: Current Every Day Smoker     Packs/day: 0.50     Years: 15.00     Pack years: 7.50     Types: Cigarettes   • Smokeless tobacco: Never Used   Substance and Sexual Activity   • Alcohol use: Yes     Comment: ocassional    • Drug use: No   • Sexual activity: Defer     Birth control/protection: None       Family History:     Family History   Problem Relation Age of Onset   • Breast cancer Maternal Aunt    • Alcohol abuse Paternal Grandfather    • Heart disease Paternal Grandfather    • Cancer Maternal Grandfather    • Hypertension Maternal Grandfather        Review of Systems:     Review of Systems   Constitutional: Negative.  Negative for activity change, appetite change, chills, diaphoresis, fatigue and unexpected weight change.   HENT: Negative for congestion, dental problem, drooling, ear discharge, ear pain, facial swelling, hearing loss, mouth sores, nosebleeds, postnasal drip, rhinorrhea, sinus pressure, sneezing, sore throat, tinnitus, trouble swallowing  and voice change.    Eyes: Negative.  Negative for photophobia, pain, discharge, redness, itching and visual disturbance.   Respiratory: Negative.  Negative for apnea, cough, choking, chest tightness, shortness of breath, wheezing and stridor.    Cardiovascular: Negative.  Negative for chest pain, palpitations and leg swelling.   Gastrointestinal: Negative.  Negative for abdominal distention, abdominal pain, anal bleeding, blood in stool, constipation, diarrhea, nausea, rectal pain and vomiting.   Endocrine: Negative.  Negative for cold intolerance, heat intolerance, polydipsia, polyphagia and polyuria.   Musculoskeletal: Negative.  Negative for arthralgias, back pain, gait problem, joint swelling, myalgias, neck pain and neck stiffness.   Skin: Negative.  Negative for color change, pallor, rash and wound.   Allergic/Immunologic: Negative.  Negative for environmental allergies, food allergies and immunocompromised state.   Neurological: Negative.  Negative for dizziness, tremors, seizures, syncope, facial asymmetry, speech difficulty, weakness, light-headedness, numbness and headaches.   Hematological: Negative.  Negative for adenopathy. Does not bruise/bleed easily.   Psychiatric/Behavioral: Negative for agitation, behavioral problems, confusion, decreased concentration, dysphoric mood, hallucinations, self-injury, sleep disturbance and suicidal ideas. The patient is not nervous/anxious and is not hyperactive.    All other systems reviewed and are negative.      Physical Exam:     Physical Exam   Constitutional: She appears well-developed and well-nourished.   HENT:   Head: Normocephalic and atraumatic.   Right Ear: External ear normal.   Left Ear: External ear normal.   Mouth/Throat: Oropharynx is clear and moist.   Eyes: Conjunctivae are normal. Pupils are equal, round, and reactive to light.   Cardiovascular: Normal rate, regular rhythm, normal heart sounds and intact distal pulses.   Pulmonary/Chest: Effort  normal and breath sounds normal.   Abdominal: Soft. Bowel sounds are normal. She exhibits no distension and no mass. There is no tenderness. There is no rebound and no guarding.   Genitourinary: No vaginal discharge found.   Musculoskeletal: Normal range of motion.   Neurological: She is alert. She has normal reflexes.   Skin: Skin is warm and dry.   Psychiatric: She has a normal mood and affect. Her behavior is normal. Judgment and thought content normal.       I have reviewed the following portions of the patient's history: allergies, current medications, past family history, past medical history, past social history, past surgical history, problem list and ROS and confirm it's accurate.      Procedure:       Assessment/Plan:   Ureteral calculus-patient has been diagnosed with a ureteral calculus.  We have discussed the various parameters regarding spontaneous passage including the notion that a 2 mm stone has a high likelihood of spontaneous passage versus a larger stone being caught up in the upper areas of the urinary tract.  We also discussed the medical management of stone disease and the use of medical expulsive therapy in the form of Flomax.  This is used in an off label setting.  We discussed the indicators for intervention including  absolute indicators such as sepsis and uncontrollable severe pain as well as  the relative indicators of moderate pain that is well-controlled with various analgesia.  I also talked about nonoperative management including ambulation and increasing fluids and hot tub as being an effective adjuncts in the treatment of a ureteral stone.  I personally reviewed the CT from the emergency room and it disclosed hyperdense pyramids but no radial opaque or lucent stone obstructing the ureter I gave her reassurance I will see her back in about 3 weeks.  She has no absolute indication for intervention at this juncture    Patient's Body mass index is 29.29 kg/m². BMI is above normal  parameters. Recommendations include: educational material.          This document has been electronically signed by IMELDA THOMSON MD May 14, 2019 9:22 AM

## 2019-05-17 DIAGNOSIS — Z98.890 HISTORY OF BREAST SURGERY: Primary | ICD-10-CM

## 2019-05-17 DIAGNOSIS — N63.10 BREAST MASS, RIGHT: ICD-10-CM

## 2019-05-30 ENCOUNTER — HOSPITAL ENCOUNTER (OUTPATIENT)
Dept: MAMMOGRAPHY | Facility: HOSPITAL | Age: 29
Discharge: HOME | End: 2019-05-30
Admitting: SURGERY

## 2019-05-30 DIAGNOSIS — Z09 FOLLOW-UP EXAM, 3-6 MONTHS SINCE PREVIOUS EXAM: ICD-10-CM

## 2019-05-30 PROCEDURE — 77061 BREAST TOMOSYNTHESIS UNI: CPT | Performed by: RADIOLOGY

## 2019-05-30 PROCEDURE — 77065 DX MAMMO INCL CAD UNI: CPT

## 2019-05-30 PROCEDURE — G0279 TOMOSYNTHESIS, MAMMO: HCPCS

## 2019-05-30 PROCEDURE — 77065 DX MAMMO INCL CAD UNI: CPT | Performed by: RADIOLOGY

## 2019-06-19 ENCOUNTER — OFFICE VISIT (OUTPATIENT)
Dept: SURGERY | Facility: CLINIC | Age: 29
End: 2019-06-19

## 2019-06-19 VITALS — HEIGHT: 59 IN | WEIGHT: 145.8 LBS | BODY MASS INDEX: 29.39 KG/M2

## 2019-06-19 DIAGNOSIS — N63.0 BREAST NODULE: Primary | ICD-10-CM

## 2019-06-19 PROCEDURE — 99212 OFFICE O/P EST SF 10 MIN: CPT | Performed by: SURGERY

## 2019-06-21 NOTE — PROGRESS NOTES
Subjective   Pili Webster is a 28 y.o. female  is here today for follow-up.         Pili Webster is a 28 y.o. female here for follow up after right breast biopsy.  Pathology was benign consistent with fibroadenoma.  She's had partial skin dehiscence but the underlying wound is closed with no drainage or complication.  No infection evident.  She is a smoker which makes this a high risk for wound dehiscence given her poor wound healing as a smoker.  Post lumpectomy mammogram stable.        Assessment     Pili was seen today for mammogram.    Diagnoses and all orders for this visit:    Breast nodule      Pili Webster is a 28 y.o. female with superficial skin dehiscence of her incision on her right breast after lumpectomy.  The patient is doing well and will keep covered with a bandage daily.  She will follow-up as needed.

## 2019-06-26 ENCOUNTER — HOSPITAL ENCOUNTER (OUTPATIENT)
Dept: PHYSICAL THERAPY | Facility: HOSPITAL | Age: 29
Setting detail: THERAPIES SERIES
Discharge: HOME | End: 2019-06-26

## 2019-06-26 DIAGNOSIS — M25.561 ACUTE PAIN OF RIGHT KNEE: ICD-10-CM

## 2019-06-26 DIAGNOSIS — S83.001D SUBLUXATION OF RIGHT PATELLA, SUBSEQUENT ENCOUNTER: Primary | ICD-10-CM

## 2019-06-26 PROCEDURE — 97162 PT EVAL MOD COMPLEX 30 MIN: CPT | Performed by: PHYSICAL THERAPIST

## 2019-06-26 NOTE — THERAPY EVALUATION
Outpatient Physical Therapy Ortho Initial Evaluation   [facility]     Patient Name: Pili Webster  : 1990  MRN: [id number]  Today's Date: 2019      Visit Date: 2019    Patient Active Problem List   Diagnosis   • External hemorrhoid, thrombosed   • Status post laparoscopic cholecystectomy   • Kidney stone   • Breast nodule   • Ureteral calculus        Past Medical History:   Diagnosis Date   • Abdominal pain    • Anxiety and depression    • Arthritis    • Cholecystitis    • Constipation    • Frequent UTI    • GERD (gastroesophageal reflux disease)    • Heartburn    • Hemorrhoids    • Kidney stones    • Lesion of breast    • Lump     RIGHT BREAST   • Nausea & vomiting    • PCOS (polycystic ovarian syndrome)    • PONV (postoperative nausea and vomiting)    • Tachycardia         Past Surgical History:   Procedure Laterality Date   • ABDOMINAL SURGERY     • BREAST BIOPSY Right 2018    Procedure: breast biopsy ;  Surgeon: Shiv Overton MD;  Location: [facility];  Service: General   • CHOLECYSTECTOMY N/A 2017    Procedure: CHOLECYSTECTOMY LAPAROSCOPIC;  Surgeon: Franco Mari MD;  Location: [facility];  Service:    • DENTAL PROCEDURE     • DIAGNOSTIC LAPAROSCOPY      x2   • DILATATION AND CURETTAGE     • URETEROSCOPY LASER LITHOTRIPSY WITH STENT INSERTION Right 2019    Procedure: URETEROSCOPY LASER LITHOTRIPSY retrograde pyelogram;  Surgeon: Ahmet Barrow MD;  Location: [facility];  Service: Urology   • WISDOM TOOTH EXTRACTION         Visit Dx:     ICD-10-CM ICD-9-CM   1. Subluxation of right patella, subsequent encounter S83.001D V54.89     836.3   2. Acute pain of right knee M25.561 719.46         Patient History     Row Name 19 1500             History    Chief Complaint  Difficulty with daily activities;Joint stiffness;Muscle weakness;Pain;Difficulty Walking  -BE      Type of Pain  Knee pain  -BE      Date Current Problem(s) Began  19  -BE       "Brief Description of Current Complaint  Patient reports that her patella dislocated on 6/13/2019.  She notes that after a few minutes, her patella moved back into place.  She notes that she continued to have pain and felt like her patella was going to dislocate again, so she saw orthopedic MD on 6/19/2019.  Patient reports that orthopedic performed x-rays, which was negative for fractures.  Patient reports that she was sent for PT to focus on strengthening the LE.  Patient reports that she has noticed increased swelling in the knee.  She also notes a \"grinding\" feeling when attempting to straighten the knee.  -BE      Patient/Caregiver Goals  Relieve pain;Improve mobility;Improve strength  -BE      Current Tobacco Use  Smoker  -BE      Smoking Status  Smoker  -BE      Patient's Rating of General Health  Good  -BE      Hand Dominance  right-handed  -BE      Occupation/sports/leisure activities    -BE      Patient seeing anyone else for problem(s)?  Orthopedic  -BE      How has patient tried to help current problem?  Medication, heating pad, ice, muscle rubs  -BE      What clinical tests have you had for this problem?  X-ray  -BE      Results of Clinical Tests  negative for fractures  -BE      Are you or can you be pregnant  No  -BE         Pain     Pain Location  Knee  -BE      Pain at Present  5  -BE      Pain at Best  4  -BE      Pain at Worst  10  -BE      Pain Frequency  Constant/continuous  -BE      Pain Description  Sharp;Stabbing;Throbbing  -BE      What Performance Factors Make the Current Problem(s) WORSE?  Standing, walking, squatting  -BE      What Performance Factors Make the Current Problem(s) BETTER?  Rest  -BE      Tolerance Time- Standing  5 min  -BE      Tolerance Time- Walking  3 min  -BE      Is your sleep disturbed?  Yes  -BE      Total hours of sleep per night  3-4 hours disturbed  -BE      Difficulties at work?  Patient reports increased pain when performing work tasks.    -BE      " Difficulties with ADL's?  Independent with ADLs; notes modification with tasks.  -BE         Fall Risk Assessment    Any falls in the past year:  Yes  -BE      Number of falls reported in the last 12 months  6-7  -BE      Factors that contributed to the fall:  Lost balance;Tripped  -BE         Services    Prior Rehab/Home Health Experiences  -- PT in 2019; not seeing a chiropractor  -BE      Are you currently receiving Home Health services  No  -BE         Daily Activities    Primary Language  English  -BE      Are you able to read  Yes  -BE      Are you able to write  Yes  -BE      How does patient learn best?  Listening;Reading;Demonstration;Pictures/Video  -BE      Teaching needs identified  Home Exercise Program  -BE      Does patient have problems with the following?  Depression;Anxiety;Panic Attack MD aware, per patient  -BE      Pt Participated in POC and Goals  Yes  -BE         Safety    Are you being hurt, hit, or frightened by anyone at home or in your life?  No  -BE      Are you being neglected by a caregiver  No  -BE        User Key  (r) = Recorded By, (t) = Taken By, (c) = Cosigned By    Initials Name Provider Type    BE Pili Walker PT Physical Therapist          PT Ortho     Row Name 06/26/19 1500       DTR- Lower Quarter Clearing    Patellar tendon (L2-4)  Bilateral:;2- Normal response  -BE    Achilles tendon (S1-2)  Bilateral:;2- Normal response  -BE       Sensory Screen for Light Touch- Lower Quarter Clearing    L1 (inguinal area)  Bilateral:;Intact  -BE    L2 (anterior mid thigh)  Bilateral:;Intact  -BE    L3 (distal anterior thigh)  Bilateral:;Intact  -BE    L4 (medial lower leg/foot)  Bilateral:;Intact  -BE    L5 (lateral lower leg/great toe)  Bilateral:;Intact  -BE    S1 (bottom of foot)  Bilateral:;Intact  -BE       Myotomal Screen- Lower Quarter Clearing    Hip flexion (L2)  Left:;4 (Good);Right:;4- (Good -)  -BE    Knee extension (L3)  Left:;4+ (Good +);Right:;4- (Good -)  -BE     Knee flexion (S2)  Left:;4+ (Good +);Right:;4- (Good -)  -BE       Special Tests/Palpation    Special Tests/Palpation  Knee  -BE       Knee Palpation    Knee Palpation?  Yes  -BE    Patella  Right:;Tender  -BE    Patella Tendon  Right:;Tender  -BE    Medial Joint Line  Right:;Tender  -BE    Lateral Joint Line  Right:;Tender  -BE       Knee Special Tests    Anterior drawer (ACL lesion)  Right:;Negative  -BE    Posterior drawer (PCL lesion)  Right:;Negative  -BE    Valgus stress (MCL lesion)  Right:;Negative  -BE    Varus stress (LCL lesion)  Right:;Negative  -BE    Gold’s test (meniscal lesion)  Right:;Negative  -BE    Knee Special Tests Comments  Lateral tracking of right patella when squatting  -BE       General ROM    RT Lower Ext  Rt Knee Extension/Flexion  -BE    LT Lower Ext  Lt Knee Extension/Flexion  -BE       Right Lower Ext    Rt Knee Extension/Flexion AROM  5-84  -BE       Left Lower Ext    Lt Knee Extension/Flexion AROM  0-135  -BE       RLE Quick Girth (cm)    Mid patella  36 cm  -BE       LLE Quick Girth (cm)    Mid patella  35 cm  -BE      User Key  (r) = Recorded By, (t) = Taken By, (c) = Cosigned By    Initials Name Provider Type    BE Pili Walker, PT Physical Therapist                      Therapy Education  Given: HEP, Symptoms/condition management, Pain management, Posture/body mechanics  Program: New  How Provided: Verbal, Demonstration  Provided to: Patient  Level of Understanding: Verbalized, Demonstrated     PT OP Goals     Row Name 06/26/19 1500          PT Short Term Goals    STG Date to Achieve  07/10/19  -BE     STG 1  Patient will be independent/compliant with HEP.  -BE     STG 2  Right knee ROM will improve to at least 0-105 to allow for greater ease with ADLs.  -BE     STG 3  Patient will report pain no greater than 6/10 when performing self-care activities.  -BE        Long Term Goals    LTG Date to Achieve  07/26/19  -BE     LTG 1  LEFS will improve by at least 15% to show  improved functional mobility.  -BE     LTG 2  LE strength will improve to at least 4/5 to prevent reinjury.  -BE     LTG 3  Right knee ROM will improve to at least 0-120 to allow for normalized gait pattern.  -BE     LTG 4  Patient will report pain no greater than 3/10 when walking for 20 minutes to shop for groceries.  -BE        Time Calculation    PT Goal Re-Cert Due Date  07/26/19  -BE       User Key  (r) = Recorded By, (t) = Taken By, (c) = Cosigned By    Initials Name Provider Type    BE Pili Walker PT Physical Therapist          PT Assessment/Plan     Row Name 06/26/19 1604          PT Assessment    Functional Limitations  Limitation in home management;Limitations in community activities;Performance in leisure activities;Performance in self-care ADL;Performance in work activities;Impaired gait  -BE     Impairments  Joint mobility;Muscle strength;Pain;Poor body mechanics;Posture;Range of motion;Gait  -BE     Assessment Comments  Patient is a 28 year old female referred to therapy with right knee pain and patellafemoral subluxation.  Patient presents with decreased knee ROM, decreased LE strength, and increased pain.  Patient reports 4/10 pain at best and 10/10 pain at worst.  Patient displayed 5-84 degrees of right knee ROM.  Patient displays lateral patella tracking when squatting.  Patient reports a 63.75% functional mobility impairment, based on her response to the LEFS.  She will benefit from skilled PT so that she can achieve her maximum level of function.  -BE     Please refer to paper survey for additional self-reported information  Yes  -BE     Rehab Potential  Good  -BE     Patient/caregiver participated in establishment of treatment plan and goals  Yes  -BE     Patient would benefit from skilled therapy intervention  Yes  -BE        PT Plan    PT Frequency  2x/week  -BE     Predicted Duration of Therapy Intervention (Therapy Eval)  4 weeks  -BE     Planned CPT's?  PT EVAL MOD COMPLELITY:  83538;PT RE-EVAL: 25346;PT THER PROC EA 15 MIN: 72009;PT THER ACT EA 15 MIN: 37395;PT MANUAL THERAPY EA 15 MIN: 21848;PT NEUROMUSC RE-EDUCATION EA 15 MIN: 22435;PT GAIT TRAINING EA 15 MIN: 41924;PT ELECTRICAL STIM UNATTEND: ;PT ELECTRICAL STIM ATTD EA 15 MIN: 65332;PT ULTRASOUND EA 15 MIN: 13092;PT HOT/COLD PACK WC NONMCARE: 82085;PT THER SUPP EA 15 MIN  -BE     PT Plan Comments  Above interventions to be used to promote improved functional mobility.  -BE       User Key  (r) = Recorded By, (t) = Taken By, (c) = Cosigned By    Initials Name Provider Type    BE Pili Walker PT Physical Therapist            Exercises     Row Name 06/26/19 1500             Exercise 1    Exercise Name 1  HEP: QS, heel slides  -BE      Cueing 1  Verbal;Tactile;Demo  -BE        User Key  (r) = Recorded By, (t) = Taken By, (c) = Cosigned By    Initials Name Provider Type    BE Pili Walker PT Physical Therapist                        Outcome Measure Options: Lower Extremity Functional Scale (LEFS)  Lower Extremity Functional Index  Any of your usual work, housework or school activities: A little bit of difficulty  Your usual hobbies, recreational or sporting activities: Quite a bit of difficulty  Getting into or out of the bath: Moderate difficulty  Walking between rooms: A little bit of difficulty  Putting on your shoes or socks: Moderate difficulty  Squatting: Quite a bit of difficulty  Lifting an object, like a bag of groceries from the floor: No difficulty  Performing light activities around your home: A little bit of difficulty  Performing heavy activities around your home: Quite a bit of difficulty  Getting into or out of a car: A little bit of difficulty  Walking 2 blocks: Extreme difficulty or unable to perform activity  Walking a mile: Extreme difficulty or unable to perform activity  Going up or down 10 stairs (about 1 flight of stairs): Extreme difficulty or unable to perform activity  Standing for 1 hour:  Extreme difficulty or unable to perform activity  Sitting for 1 hour: A little bit of difficulty  Running on even ground: Extreme difficulty or unable to perform activity  Running on uneven ground: Extreme difficulty or unable to perform activity  Making sharp turns while running fast: Extreme difficulty or unable to perform activity  Hopping: Quite a bit of difficulty  Rolling over in bed: Moderate difficulty  Total: 29      Time Calculation:     Start Time: 1503  Stop Time: 1603  Time Calculation (min): 60 min     Therapy Charges for Today     Code Description Service Date Service Provider Modifiers Qty    04993051294 HC PT EVAL MOD COMPLEXITY 4 6/26/2019 Pili Walker, PT GP 1          PT G-Codes  Outcome Measure Options: Lower Extremity Functional Scale (LEFS)  Total: 29         Pili Walker, PT  6/26/2019

## 2019-07-02 ENCOUNTER — HOSPITAL ENCOUNTER (OUTPATIENT)
Dept: PHYSICAL THERAPY | Facility: HOSPITAL | Age: 29
Setting detail: THERAPIES SERIES
Discharge: HOME | End: 2019-07-02

## 2019-07-02 DIAGNOSIS — M25.561 ACUTE PAIN OF RIGHT KNEE: Primary | ICD-10-CM

## 2019-07-02 DIAGNOSIS — S83.001D SUBLUXATION OF RIGHT PATELLA, SUBSEQUENT ENCOUNTER: ICD-10-CM

## 2019-07-02 PROCEDURE — 97110 THERAPEUTIC EXERCISES: CPT

## 2019-07-02 PROCEDURE — 97035 APP MDLTY 1+ULTRASOUND EA 15: CPT

## 2019-07-02 PROCEDURE — G0283 ELEC STIM OTHER THAN WOUND: HCPCS

## 2019-07-02 NOTE — THERAPY TREATMENT NOTE
Outpatient Physical Therapy Ortho Treatment Note  [facility]     Patient Name: Pili Webster  : 1990  MRN: [id number]  Today's Date: 2019      Visit Date: 2019    Visit Dx:    ICD-10-CM ICD-9-CM   1. Acute pain of right knee M25.561 719.46   2. Subluxation of right patella, subsequent encounter S83.001D V54.89     836.3       Patient Active Problem List   Diagnosis   • External hemorrhoid, thrombosed   • Status post laparoscopic cholecystectomy   • Kidney stone   • Breast nodule   • Ureteral calculus        Past Medical History:   Diagnosis Date   • Abdominal pain    • Anxiety and depression    • Arthritis    • Cholecystitis    • Constipation    • Frequent UTI    • GERD (gastroesophageal reflux disease)    • Heartburn    • Hemorrhoids    • Kidney stones    • Lesion of breast    • Lump     RIGHT BREAST   • Nausea & vomiting    • PCOS (polycystic ovarian syndrome)    • PONV (postoperative nausea and vomiting)    • Tachycardia         Past Surgical History:   Procedure Laterality Date   • ABDOMINAL SURGERY     • BREAST BIOPSY Right 2018    Procedure: breast biopsy ;  Surgeon: Shiv Overton MD;  Location: [facility];  Service: General   • CHOLECYSTECTOMY N/A 2017    Procedure: CHOLECYSTECTOMY LAPAROSCOPIC;  Surgeon: Franco Mari MD;  Location: [facility];  Service:    • DENTAL PROCEDURE     • DIAGNOSTIC LAPAROSCOPY      x2   • DILATATION AND CURETTAGE     • URETEROSCOPY LASER LITHOTRIPSY WITH STENT INSERTION Right 2019    Procedure: URETEROSCOPY LASER LITHOTRIPSY retrograde pyelogram;  Surgeon: Ahmet Barrow MD;  Location: [facility];  Service: Urology   • WISDOM TOOTH EXTRACTION         PT Ortho     Row Name 19 1400       Subjective Comments    Subjective Comments  Patient arrives to therapy w/ reports of 8/10 R) knee pain.  Pt verbalizes compliance of initiating home program w/ no complaints.   -JEANNINE       Subjective Pain    Able to rate subjective  pain?  yes  -JEANNINE    Pre-Treatment Pain Level  5  -JEANNINE    Post-Treatment Pain Level  4  -JEANNINE      User Key  (r) = Recorded By, (t) = Taken By, (c) = Cosigned By    Initials Name Provider Type    Janet Mccormack PTA Physical Therapy Assistant                      PT Assessment/Plan     Row Name 07/02/19 1419          PT Assessment    Assessment Comments  Patient tolerated treatment well today w/ reports of slight decrease in pain following, 4/10.  MH and Estim applied to R) knee for pain control f/b therex as listed w/ focus on improved knee range of motion, LE strength, and stability.  Treatment concluded with pulsed US and cryotherapy.  Pt received cues and demonstration as needed throughout session for improved mechanics and for max benefit w/ therex.  Pt will be progressed as tolerated.  No adverse reactions observed following modalities.   -JEANNINE        PT Plan    PT Plan Comments  Continue with PT's POC and progress tx as tolerated by patient.   -JEANNINE       User Key  (r) = Recorded By, (t) = Taken By, (c) = Cosigned By    Initials Name Provider Type    Janet Mccormack PTA Physical Therapy Assistant          Modalities     Row Name 07/02/19 1400             Moist Heat    MH Applied  Yes no redness observed following MH  -JEANNINE      Location  R) knee  -JEANNINE      Rx Minutes  10 mins  -JEANNINE      MH Prior to Rx  Yes w/ estim in supine position  -JEANNINE         Ice    Ice Applied  Yes  -JEANNINE      Location  R) knee  -JEANNINE      Rx Minutes  Other: 5 min  -JEANNINE      Ice S/P Rx  Yes  -JEANNINE         Ultrasound 90321    Location  R) knee no skin irritation observed following  -JEANNINE      Duty Cycle  50  -JEANNINE      Frequency  -- 3.3MHz  -JEANNINE      Intensity - Wts/cm  1.2  -JEANNINE      38924 - PT Ultrasound Minutes  8  -JEANNINE         ELECTRICAL STIMULATION    Attended/Unattended  Unattended no skin irritation observed following  -JEANNINE      Stimulation Type  IFC  -JEANNINE      Max mAmp  -- as to pt's tolerance  -JEANNINE      Location/Electrode Placement/Other   R) knee  -JEANNINE       PT E-Stim Unattended (Manual) Minutes  10  -JEANNINE        User Key  (r) = Recorded By, (t) = Taken By, (c) = Cosigned By    Initials Name Provider Type    Janet Mccormack PTA Physical Therapy Assistant        Exercises     Row Name 07/02/19 1400             Subjective Comments    Subjective Comments  Patient arrives to therapy w/ reports of 8/10 R) knee pain.  Pt verbalizes compliance of initiating home program w/ no complaints.   -JEANNINE         Subjective Pain    Able to rate subjective pain?  yes  -JEANNINE      Pre-Treatment Pain Level  5  -JEANNINE      Post-Treatment Pain Level  4  -JEANNINE         Total Minutes    01716 - PT Therapeutic Exercise Minutes  30  -JEANNINE         Exercise 1    Exercise Name 1  QS 10x2, heel slides 10x2, SLR 10x2, SAQ 10x2, ball squeeze 10x2, SL hip abd 10x2, GS 10x2, hip abd w/ tband (red) 10x2, knee flex w/ tband (red) 10x2  -JEANNINE      Cueing 1  Verbal;Tactile;Demo  -JEANNINE      Time 1  30 min  -JEANNINE        User Key  (r) = Recorded By, (t) = Taken By, (c) = Cosigned By    Initials Name Provider Type    Janet Mccormack PTA Physical Therapy Assistant                           Therapy Education  Given: HEP, Symptoms/condition management, Pain management, Posture/body mechanics, Mobility training, Edema management  Program: Reinforced  How Provided: Verbal, Demonstration  Provided to: Patient  Level of Understanding: Verbalized, Demonstrated, Teach back education performed              Time Calculation:   Start Time: 1305  Stop Time: 1400  Time Calculation (min): 55 min  Therapy Charges for Today     Code Description Service Date Service Provider Modifiers Qty    88306569841 HC PT THER PROC EA 15 MIN 7/2/2019 Janet Parikh PTA GP 2    34948189639 HC PT ELECTRICAL STIM UNATTENDED 7/2/2019 Janet Parikh PTA  1    28181588930 HC PT ULTRASOUND EA 15 MIN 7/2/2019 Janet Parikh PTA GP 1                    Janet Lisa. DAVID Parikh  7/2/2019

## 2019-07-05 ENCOUNTER — HOSPITAL ENCOUNTER (OUTPATIENT)
Dept: PHYSICAL THERAPY | Facility: HOSPITAL | Age: 29
Setting detail: THERAPIES SERIES
Discharge: HOME | End: 2019-07-05

## 2019-07-05 DIAGNOSIS — S83.001D SUBLUXATION OF RIGHT PATELLA, SUBSEQUENT ENCOUNTER: ICD-10-CM

## 2019-07-05 DIAGNOSIS — M25.561 ACUTE PAIN OF RIGHT KNEE: Primary | ICD-10-CM

## 2019-07-05 NOTE — THERAPY TREATMENT NOTE
Outpatient Physical Therapy Ortho Treatment Note  [facility]     Patient Name: Pili Webster  : 1990  MRN: [id number]  Today's Date: 2019      Visit Date: 2019    Visit Dx:    ICD-10-CM ICD-9-CM   1. Acute pain of right knee M25.561 719.46   2. Subluxation of right patella, subsequent encounter S83.001D V54.89     836.3       Patient Active Problem List   Diagnosis   • External hemorrhoid, thrombosed   • Status post laparoscopic cholecystectomy   • Kidney stone   • Breast nodule   • Ureteral calculus        Past Medical History:   Diagnosis Date   • Abdominal pain    • Anxiety and depression    • Arthritis    • Cholecystitis    • Constipation    • Frequent UTI    • GERD (gastroesophageal reflux disease)    • Heartburn    • Hemorrhoids    • Kidney stones    • Lesion of breast    • Lump     RIGHT BREAST   • Nausea & vomiting    • PCOS (polycystic ovarian syndrome)    • PONV (postoperative nausea and vomiting)    • Tachycardia         Past Surgical History:   Procedure Laterality Date   • ABDOMINAL SURGERY     • BREAST BIOPSY Right 2018    Procedure: breast biopsy ;  Surgeon: Shiv Overton MD;  Location: [facility];  Service: General   • CHOLECYSTECTOMY N/A 2017    Procedure: CHOLECYSTECTOMY LAPAROSCOPIC;  Surgeon: Franco Mari MD;  Location: [facility];  Service:    • DENTAL PROCEDURE     • DIAGNOSTIC LAPAROSCOPY      x2   • DILATATION AND CURETTAGE     • URETEROSCOPY LASER LITHOTRIPSY WITH STENT INSERTION Right 2019    Procedure: URETEROSCOPY LASER LITHOTRIPSY retrograde pyelogram;  Surgeon: Ahmet Barrow MD;  Location: [facility];  Service: Urology   • WISDOM TOOTH EXTRACTION         PT Ortho     Row Name 19 1400       Subjective Comments    Subjective Comments  Patient arrives to therapy w/ reports of 8/10 R) knee pain.  Pt verbalizes compliance of initiating home program w/ no complaints.   -JEANNINE       Subjective Pain    Able to rate subjective  pain?  yes  -JEANNINE    Pre-Treatment Pain Level  5  -JEANNINE    Post-Treatment Pain Level  4  -JEANNINE      User Key  (r) = Recorded By, (t) = Taken By, (c) = Cosigned By    Initials Name Provider Type    Janet Mccormack PTA Physical Therapy Assistant                      PT Assessment/Plan     Row Name 07/05/19 1256          PT Assessment    Assessment Comments  Tx today consisted of mh and estim to knee followed by ther ex and ended with US and ice.  Pt reported a similar pre and post pain score today.  Pt motivated to cont to ice at home.  -RT        PT Plan    PT Plan Comments  Will follow for optimal gains.  -RT       User Key  (r) = Recorded By, (t) = Taken By, (c) = Cosigned By    Initials Name Provider Type    RT Peter Thomas, PT Physical Therapist          Modalities     Row Name 07/05/19 1100             Subjective Pain    Able to rate subjective pain?  yes  -RT      Pre-Treatment Pain Level  5  -RT      Post-Treatment Pain Level  5  -RT         Moist Heat    MH Applied  Yes  -RT      Location  R) knee  -RT      Rx Minutes  10 mins  -RT      MH Prior to Rx  Yes  -RT         Ice    Ice Applied  Yes  -RT      Location  R) knee  -RT      Rx Minutes  Other: 6min  -RT      Ice S/P Rx  Yes  -RT         Ultrasound 85564    Location  R) knee  -RT      Duty Cycle  50  -RT      Frequency  -- 3.3  -RT      Intensity - Wts/cm  1.2  -RT      25899 - PT Ultrasound Minutes  8  -RT         ELECTRICAL STIMULATION    Attended/Unattended  Unattended  -RT      Stimulation Type  IFC  -RT      Location/Electrode Placement/Other  R) knee  -RT       PT E-Stim Unattended (Manual) Minutes  10  -RT        User Key  (r) = Recorded By, (t) = Taken By, (c) = Cosigned By    Initials Name Provider Type    RT Peter Thomas, PT Physical Therapist        Exercises     Row Name 07/05/19 1100             Subjective Comments    Subjective Comments  Pt reports having increased knee pain today due to increased stair climbing.  -RT          Subjective Pain    Able to rate subjective pain?  yes  -RT      Pre-Treatment Pain Level  5  -RT      Post-Treatment Pain Level  5  -RT         Total Minutes    17701 - PT Therapeutic Exercise Minutes  20  -RT         Exercise 1    Exercise Name 1  qs 20, hs 20, slr 20, saq 20, ball sq 20, sl hip abd 20, hip abd and knee flex rtb 20,   -RT      Cueing 1  Verbal;Tactile;Demo  -RT      Time 1  20 min  -RT        User Key  (r) = Recorded By, (t) = Taken By, (c) = Cosigned By    Initials Name Provider Type    RT Peter Thomas, PT Physical Therapist                           Therapy Education  Given: HEP, Symptoms/condition management, Pain management, Posture/body mechanics, Mobility training, Edema management  Program: Reinforced  How Provided: Verbal, Demonstration  Provided to: Patient  Level of Understanding: Verbalized, Demonstrated, Teach back education performed              Time Calculation:   Start Time: 1110  Stop Time: 1205  Time Calculation (min): 55 min                Peter Thomas, ISHA  7/5/2019

## 2019-07-09 ENCOUNTER — HOSPITAL ENCOUNTER (OUTPATIENT)
Dept: PHYSICAL THERAPY | Facility: HOSPITAL | Age: 29
Setting detail: THERAPIES SERIES
Discharge: HOME | End: 2019-07-09

## 2019-07-09 DIAGNOSIS — M25.561 ACUTE PAIN OF RIGHT KNEE: Primary | ICD-10-CM

## 2019-07-09 PROCEDURE — 97110 THERAPEUTIC EXERCISES: CPT | Performed by: PHYSICAL THERAPIST

## 2019-07-09 PROCEDURE — 97035 APP MDLTY 1+ULTRASOUND EA 15: CPT | Performed by: PHYSICAL THERAPIST

## 2019-07-09 PROCEDURE — G0283 ELEC STIM OTHER THAN WOUND: HCPCS | Performed by: PHYSICAL THERAPIST

## 2019-07-09 NOTE — THERAPY TREATMENT NOTE
Outpatient Physical Therapy Ortho Treatment Note   [place]     Patient Name: Pili Webster  : 1990  MRN: [id number]  Today's Date: 2019      Visit Date: 2019    Visit Dx:    ICD-10-CM ICD-9-CM   1. Acute pain of right knee M25.561 719.46       Patient Active Problem List   Diagnosis   • External hemorrhoid, thrombosed   • Status post laparoscopic cholecystectomy   • Kidney stone   • Breast nodule   • Ureteral calculus        Past Medical History:   Diagnosis Date   • Abdominal pain    • Anxiety and depression    • Arthritis    • Cholecystitis    • Constipation    • Frequent UTI    • GERD (gastroesophageal reflux disease)    • Heartburn    • Hemorrhoids    • Kidney stones    • Lesion of breast    • Lump     RIGHT BREAST   • Nausea & vomiting    • PCOS (polycystic ovarian syndrome)    • PONV (postoperative nausea and vomiting)    • Tachycardia         Past Surgical History:   Procedure Laterality Date   • ABDOMINAL SURGERY     • BREAST BIOPSY Right 2018    Procedure: breast biopsy ;  Surgeon: Shiv Overton MD;  Location: [facility];  Service: General   • CHOLECYSTECTOMY N/A 2017    Procedure: CHOLECYSTECTOMY LAPAROSCOPIC;  Surgeon: Franco Mari MD;  Location: [facility];  Service:    • DENTAL PROCEDURE     • DIAGNOSTIC LAPAROSCOPY      x2   • DILATATION AND CURETTAGE     • URETEROSCOPY LASER LITHOTRIPSY WITH STENT INSERTION Right 2019    Procedure: URETEROSCOPY LASER LITHOTRIPSY retrograde pyelogram;  Surgeon: Ahmet Barrow MD;  Location: [facility];  Service: Urology   • WISDOM TOOTH EXTRACTION         PT Ortho     Row Name 19 1300       Subjective Comments    Subjective Comments  Pt reports improvement in right knee pain prior to today's session.  -AD       Subjective Pain    Able to rate subjective pain?  yes  -AD    Pre-Treatment Pain Level  4  -AD    Post-Treatment Pain Level  3  -AD      User Key  (r) = Recorded By, (t) = Taken By, (c) =  Cosigned By    Initials Name Provider Type    AD Dalton, Ashley Claudene, PT Physical Therapist                      PT Assessment/Plan     Row Name 07/09/19 1411          PT Assessment    Assessment Comments  Pt tolerated today's session well, which included increased repetitions and the introduction of the LE bicycle. The patient reported no incease in right knee pain throughout the session. No skin irritation was observed following modalities. Today's session was assisted by Peter Thomas, PT, MSPT.  -AD        PT Plan    PT Plan Comments  Progress as tolerated per POC.  -AD       User Key  (r) = Recorded By, (t) = Taken By, (c) = Cosigned By    Initials Name Provider Type    AD Dalton, Ashley Claudene, PT Physical Therapist          Modalities     Row Name 07/09/19 1300             Moist Heat    MH Applied  Yes With IFC, no skin irritation observed.  -AD      Location  Right knee  -AD      Rx Minutes  10 mins  -AD      MH Prior to Rx  Yes  -AD         Ultrasound 72802    Location  R) knee No skin irritation observed.  -AD      Duty Cycle  50  -AD      Frequency  -- 3.3  -AD      Intensity - Wts/cm  1.2  -AD      66320 - PT Ultrasound Minutes  8  -AD         ELECTRICAL STIMULATION    Attended/Unattended  Unattended With MH, no skin irritation observed.  -AD      Stimulation Type  IFC  -AD      Max mAmp  -- as to pt's tolerance  -AD      Location/Electrode Placement/Other  R) knee  -AD       PT E-Stim Unattended (Manual) Minutes  10  -AD        User Key  (r) = Recorded By, (t) = Taken By, (c) = Cosigned By    Initials Name Provider Type    AD Dalton, Ashley Claudene, PT Physical Therapist        Exercises     Row Name 07/09/19 1300             Subjective Comments    Subjective Comments  Pt reports improvement in right knee pain prior to today's session.  -AD         Subjective Pain    Able to rate subjective pain?  yes  -AD      Pre-Treatment Pain Level  4  -AD      Post-Treatment Pain Level  3  -AD          Total Minutes    25092 - PT Therapeutic Exercise Minutes  32  -AD         Exercise 1    Exercise Name 1  QS, HS, SLR, SAQ, ball squeeze, sidelying hip abduction, GS, hiop abduction with RTB, knee flexion with RTB, LE bicycle level 1.0 for 6 minutes.  -AD      Cueing 1  Verbal;Tactile;Demo  -AD      Reps 1  30  -AD      Time 1  32 minutes  -AD      Additional Comments  Increased repetitions will all exercises during today's session.  -AD        User Key  (r) = Recorded By, (t) = Taken By, (c) = Cosigned By    Initials Name Provider Type    AD Dalton, Ashley Claudene, PT Physical Therapist                           Therapy Education  Program: Reinforced  How Provided: Verbal, Demonstration  Provided to: Patient  Level of Understanding: Verbalized, Demonstrated, Teach back education performed              Time Calculation:   Start Time: 1305  Stop Time: 1404  Time Calculation (min): 59 min  Therapy Charges for Today     Code Description Service Date Service Provider Modifiers Qty    08491635091 HC PT THER PROC EA 15 MIN 7/9/2019 Dalton, Ashley Claudene, PT GP 2    89097958298 HC PT ELECTRICAL STIM UNATTENDED 7/9/2019 Dalton, Ashley Claudene, PT  1    01605511618 HC PT ULTRASOUND EA 15 MIN 7/9/2019 Dalton, Ashley Claudene, PT GP 1                    Ashley Claudene Dalton, PT  7/9/2019

## 2019-07-12 ENCOUNTER — HOSPITAL ENCOUNTER (OUTPATIENT)
Dept: PHYSICAL THERAPY | Facility: HOSPITAL | Age: 29
Setting detail: THERAPIES SERIES
Discharge: HOME | End: 2019-07-12

## 2019-07-12 DIAGNOSIS — M25.561 ACUTE PAIN OF RIGHT KNEE: Primary | ICD-10-CM

## 2019-07-12 DIAGNOSIS — S83.001D SUBLUXATION OF RIGHT PATELLA, SUBSEQUENT ENCOUNTER: ICD-10-CM

## 2019-07-12 PROCEDURE — G0283 ELEC STIM OTHER THAN WOUND: HCPCS

## 2019-07-12 PROCEDURE — 97110 THERAPEUTIC EXERCISES: CPT

## 2019-07-12 NOTE — THERAPY TREATMENT NOTE
Outpatient Physical Therapy Ortho Treatment Note   [facility]     Patient Name: Pili Webster  : 1990  MRN: [id number]  Today's Date: 2019      Visit Date: 2019    Visit Dx:    ICD-10-CM ICD-9-CM   1. Acute pain of right knee M25.561 719.46   2. Subluxation of right patella, subsequent encounter S83.001D V54.89     836.3       Patient Active Problem List   Diagnosis   • External hemorrhoid, thrombosed   • Status post laparoscopic cholecystectomy   • Kidney stone   • Breast nodule   • Ureteral calculus        Past Medical History:   Diagnosis Date   • Abdominal pain    • Anxiety and depression    • Arthritis    • Cholecystitis    • Constipation    • Frequent UTI    • GERD (gastroesophageal reflux disease)    • Heartburn    • Hemorrhoids    • Kidney stones    • Lesion of breast    • Lump     RIGHT BREAST   • Nausea & vomiting    • PCOS (polycystic ovarian syndrome)    • PONV (postoperative nausea and vomiting)    • Tachycardia         Past Surgical History:   Procedure Laterality Date   • ABDOMINAL SURGERY     • BREAST BIOPSY Right 2018    Procedure: breast biopsy ;  Surgeon: Shiv Overton MD;  Location: [facility];  Service: General   • CHOLECYSTECTOMY N/A 2017    Procedure: CHOLECYSTECTOMY LAPAROSCOPIC;  Surgeon: Franco Mari MD;  Location: [facility];  Service:    • DENTAL PROCEDURE     • DIAGNOSTIC LAPAROSCOPY      x2   • DILATATION AND CURETTAGE     • URETEROSCOPY LASER LITHOTRIPSY WITH STENT INSERTION Right 2019    Procedure: URETEROSCOPY LASER LITHOTRIPSY retrograde pyelogram;  Surgeon: Ahmet Barrow MD;  Location: [facility];  Service: Urology   • WISDOM TOOTH EXTRACTION         PT Ortho     Row Name 19 1400       Subjective Comments    Subjective Comments  Patient states that she is having increased pain in the right knee due to the way she slept on it. Patient reports that she is having a locking feeling on the inside of her right knee at  times.  -AC       Subjective Pain    Able to rate subjective pain?  yes  -AC    Pre-Treatment Pain Level  6  -AC    Post-Treatment Pain Level  4  -AC      User Key  (r) = Recorded By, (t) = Taken By, (c) = Cosigned By    Initials Name Provider Type    Ina Castro PTA Physical Therapy Assistant                      PT Assessment/Plan     Row Name 07/12/19 1420          PT Assessment    Assessment Comments  Educated patient to perform ther-ex per her tolerance, patient verbalized understanding. Patient tolerated treatment session well with rest breaks taken as needed by the patient. Reps decreased on several exercises secondary to patient's reports of increased pain. No adverse reactions with modalities or treatment session.   -AC        PT Plan    PT Plan Comments  Continue per PT's POC, progress per the patient's tolerance.  -AC       User Key  (r) = Recorded By, (t) = Taken By, (c) = Cosigned By    Initials Name Provider Type    Ina Castro PTA Physical Therapy Assistant          Modalities     Row Name 07/12/19 1400             Moist Heat    MH Applied  Yes No redness noted following moist heat  -AC      Location  Right knee  -AC      Rx Minutes  15 mins  -AC      MH Prior to Rx  Yes  -AC         Ultrasound 32803    Location  R) knee  -AC      Duty Cycle  50  -AC      Frequency  -- 3.3MHz  -AC      Intensity - Wts/cm  1.2  -AC      00652 - PT Ultrasound Minutes  8  -AC         ELECTRICAL STIMULATION    Attended/Unattended  Unattended No irritation noted following estim  -AC      Stimulation Type  IFC  -AC      Max mAmp  -- per the patient's tolerance, 15 minutes with MH  -AC      Location/Electrode Placement/Other  R) knee  -AC       PT E-Stim Unattended (Manual) Minutes  15  -AC        User Key  (r) = Recorded By, (t) = Taken By, (c) = Cosigned By    Initials Name Provider Type    Ina Castro PTA Physical Therapy Assistant        Exercises     Row Name 07/12/19  1400             Subjective Comments    Subjective Comments  Patient states that she is having increased pain in the right knee due to the way she slept on it. Patient reports that she is having a locking feeling on the inside of her right knee at times.  -AC         Subjective Pain    Able to rate subjective pain?  yes  -AC      Pre-Treatment Pain Level  6  -AC      Post-Treatment Pain Level  4  -AC         Total Minutes    95992 - PT Therapeutic Exercise Minutes  25  -AC         Exercise 1    Exercise Name 1  QS 15x2, heel slides 10x2, SLR 10x2, ball squeeze 10x2, SL hip abd 10x2, GS 15x2, LE bike LV 1 5 min  -AC      Cueing 1  Verbal;Tactile;Demo  -AC      Time 1  25 minutes  -AC        User Key  (r) = Recorded By, (t) = Taken By, (c) = Cosigned By    Initials Name Provider Type    AC Ina Paez PTA Physical Therapy Assistant                           Therapy Education  Given: HEP, Symptoms/condition management, Pain management, Posture/body mechanics  Program: Reinforced  How Provided: Verbal, Demonstration  Provided to: Patient  Level of Understanding: Verbalized, Demonstrated              Time Calculation:   Start Time: 1300  Stop Time: 1400  Time Calculation (min): 60 min  Therapy Charges for Today     Code Description Service Date Service Provider Modifiers Qty    16695494502 HC PT THER PROC EA 15 MIN 7/12/2019 Ina Paez PTA GP 2    [id number]  PT ELECTRICAL STIM UNATTENDED 7/12/2019 Ina Paez PTA  1                    Ina Paez PTA  7/12/2019

## 2019-07-16 ENCOUNTER — HOSPITAL ENCOUNTER (OUTPATIENT)
Dept: PHYSICAL THERAPY | Facility: HOSPITAL | Age: 29
Setting detail: THERAPIES SERIES
Discharge: HOME | End: 2019-07-16

## 2019-07-16 ENCOUNTER — OFFICE VISIT (OUTPATIENT)
Dept: GASTROENTEROLOGY | Facility: CLINIC | Age: 29
End: 2019-07-16

## 2019-07-16 VITALS
WEIGHT: 143.6 LBS | HEART RATE: 88 BPM | OXYGEN SATURATION: 98 % | BODY MASS INDEX: 28.95 KG/M2 | DIASTOLIC BLOOD PRESSURE: 81 MMHG | HEIGHT: 59 IN | SYSTOLIC BLOOD PRESSURE: 116 MMHG

## 2019-07-16 DIAGNOSIS — S83.001D SUBLUXATION OF RIGHT PATELLA, SUBSEQUENT ENCOUNTER: ICD-10-CM

## 2019-07-16 DIAGNOSIS — M25.561 ACUTE PAIN OF RIGHT KNEE: Primary | ICD-10-CM

## 2019-07-16 DIAGNOSIS — K63.89 SMALL INTESTINAL BACTERIAL OVERGROWTH: Primary | ICD-10-CM

## 2019-07-16 PROCEDURE — 99213 OFFICE O/P EST LOW 20 MIN: CPT | Performed by: PHYSICIAN ASSISTANT

## 2019-07-16 PROCEDURE — 97110 THERAPEUTIC EXERCISES: CPT

## 2019-07-16 PROCEDURE — 97035 APP MDLTY 1+ULTRASOUND EA 15: CPT

## 2019-07-16 PROCEDURE — G0283 ELEC STIM OTHER THAN WOUND: HCPCS

## 2019-07-16 RX ORDER — NITAZOXANIDE 500 MG/1
500 TABLET ORAL 2 TIMES DAILY WITH MEALS
Qty: 20 TABLET | Refills: 0 | Status: SHIPPED | OUTPATIENT
Start: 2019-07-16 | End: 2019-07-26

## 2019-07-16 NOTE — THERAPY TREATMENT NOTE
Outpatient Physical Therapy Ortho Treatment Note   [place]     Patient Name: Pili Webster  : 1990  MRN: [id number]  Today's Date: 2019      Visit Date: 2019    Visit Dx:    ICD-10-CM ICD-9-CM   1. Acute pain of right knee M25.561 719.46   2. Subluxation of right patella, subsequent encounter S83.001D V54.89     836.3       Patient Active Problem List   Diagnosis   • External hemorrhoid, thrombosed   • Status post laparoscopic cholecystectomy   • Kidney stone   • Breast nodule   • Ureteral calculus        Past Medical History:   Diagnosis Date   • Abdominal pain    • Anxiety and depression    • Arthritis    • Cholecystitis    • Constipation    • Frequent UTI    • GERD (gastroesophageal reflux disease)    • Heartburn    • Hemorrhoids    • Kidney stones    • Lesion of breast    • Lump     RIGHT BREAST   • Nausea & vomiting    • PCOS (polycystic ovarian syndrome)    • PONV (postoperative nausea and vomiting)    • Tachycardia         Past Surgical History:   Procedure Laterality Date   • ABDOMINAL SURGERY     • BREAST BIOPSY Right 2018    Procedure: breast biopsy ;  Surgeon: Shiv Overton MD;  Location: [facility];  Service: General   • CHOLECYSTECTOMY N/A 2017    Procedure: CHOLECYSTECTOMY LAPAROSCOPIC;  Surgeon: Franco Mari MD;  Location: [facility];  Service:    • DENTAL PROCEDURE     • DIAGNOSTIC LAPAROSCOPY      x2   • DILATATION AND CURETTAGE     • URETEROSCOPY LASER LITHOTRIPSY WITH STENT INSERTION Right 2019    Procedure: URETEROSCOPY LASER LITHOTRIPSY retrograde pyelogram;  Surgeon: Ahmet Barrow MD;  Location: [facility];  Service: Urology   • WISDOM TOOTH EXTRACTION         PT Ortho     Row Name 19 1200       Subjective Comments    Subjective Comments  Patient reports that she is having soreness on the inside of her right knee.   -AC       Subjective Pain    Able to rate subjective pain?  yes  -AC    Pre-Treatment Pain Level  4  -AC     Post-Treatment Pain Level  4  -AC      User Key  (r) = Recorded By, (t) = Taken By, (c) = Cosigned By    Initials Name Provider Type    Ina Castro PTA Physical Therapy Assistant                      PT Assessment/Plan     Row Name 07/16/19 1232          PT Assessment    Assessment Comments  Patient tolerated treatment session well with rest breaks taken as needed by the patient. New sitting exercises added to treatment session with no increase in pain noted. Educated patient to perform ther-ex per her tolerance, patient verbalized understanding. No adverse reactions with modalities or treatment session.   -AC        PT Plan    PT Plan Comments  Continue per PT's POC, progress per the patient's tolerance.  -AC       User Key  (r) = Recorded By, (t) = Taken By, (c) = Cosigned By    Initials Name Provider Type    Ina Castro PTA Physical Therapy Assistant          Modalities     Row Name 07/16/19 1200             Moist Heat    MH Applied  Yes no redness notes following moist heat  -AC      Location  Right knee  -AC      Rx Minutes  15 mins  -AC      MH Prior to Rx  Yes  -AC         Ultrasound 49158    Location  R) knee  -AC      Duty Cycle  50  -AC      Frequency  -- 3.3MHz  -AC      Intensity - Wts/cm  1.2  -AC      27635 - PT Ultrasound Minutes  8  -AC         ELECTRICAL STIMULATION    Attended/Unattended  Unattended No irritation noted following estim  -AC      Stimulation Type  IFC  -AC      Max mAmp  -- per the patient's tolerance, 15 minutes with MH  -AC      Location/Electrode Placement/Other  R) knee  -AC       PT E-Stim Unattended (Manual) Minutes  15  -AC        User Key  (r) = Recorded By, (t) = Taken By, (c) = Cosigned By    Initials Name Provider Type    Ina Castro PTA Physical Therapy Assistant        Exercises     Row Name 07/16/19 1200             Subjective Comments    Subjective Comments  Patient reports that she is having soreness on the inside of  her right knee.   -AC         Subjective Pain    Able to rate subjective pain?  yes  -AC      Pre-Treatment Pain Level  4  -AC      Post-Treatment Pain Level  4  -AC         Total Minutes    80464 - PT Therapeutic Exercise Minutes  30  -AC         Exercise 1    Exercise Name 1  QS 15x2, heel slides 15x2, SLR 15x2, ball squeeze 15x2, SL hip abd 15x2, GS 15x2, knee flex with RTB 15x2, hip abd with RTB 15x2  -AC      Cueing 1  Verbal;Tactile;Demo  -AC      Time 1  30 minutes  -AC        User Key  (r) = Recorded By, (t) = Taken By, (c) = Cosigned By    Initials Name Provider Type    AC Ina Paez PTA Physical Therapy Assistant                           Therapy Education  Given: HEP, Symptoms/condition management, Pain management  Program: Reinforced  How Provided: Verbal, Demonstration  Provided to: Patient  Level of Understanding: Verbalized, Demonstrated              Time Calculation:   Start Time: 1005  Stop Time: 1100  Time Calculation (min): 55 min  Therapy Charges for Today     Code Description Service Date Service Provider Modifiers Qty    00494246891  PT THER PROC EA 15 MIN 7/16/2019 Ina Paez PTA GP 2    75553844558  PT ELECTRICAL STIM UNATTENDED 7/16/2019 Ina Paez, PTA  1    [id number]  PT ULTRASOUND EA 15 MIN 7/16/2019 Ina Paez PTA GP 1                    Ina Paez PTA  7/16/2019

## 2019-07-16 NOTE — PROGRESS NOTES
: 1990    Chief Complaint   Patient presents with   • Abdominal Pain       Pili Webster is a 28 y.o. female who presents to the office today as a follow up appointment regarding Abdominal Pain.    History of Present Illness:  Over the past couple of weeks, she has had a change in bowel habits to severe diarrhea. She has generalized abdominal discomfort with cramping which is intermittent and can be severe. Was previously taking Miralax daily due to constipation but had to stop taking it when diarrhea started. She did not have any skip days between bowel movements. Denies any straining or rectal bleeding. Has some food allergies which have been confirmed and she has been avoiding those foods. Nausea still present, zofran helps some. Has taken flagyl in the past without relief. She has been trying to add fiber to her diet, drinking more water recently.     Review of Systems   Constitutional: Positive for appetite change and fatigue. Negative for chills and fever.   HENT: Negative for ear pain, sinus pressure, sinus pain and sore throat.    Eyes: Negative.    Respiratory: Positive for cough, chest tightness and shortness of breath. Negative for choking.    Cardiovascular: Positive for palpitations. Negative for chest pain.   Gastrointestinal: Positive for abdominal distention, abdominal pain and diarrhea. Negative for anal bleeding, blood in stool, constipation, nausea, rectal pain and vomiting.   Endocrine: Negative.    Genitourinary: Positive for difficulty urinating.   Musculoskeletal: Positive for back pain and neck pain. Negative for joint swelling.   Skin: Positive for color change and rash.   Allergic/Immunologic: Positive for environmental allergies.   Neurological: Positive for weakness, light-headedness and headaches. Negative for tremors, speech difficulty and numbness.   Hematological: Bruises/bleeds easily.   Psychiatric/Behavioral: Positive for agitation. Negative for suicidal ideas.  The patient is nervous/anxious.        Past Medical History:   Diagnosis Date   • Abdominal pain    • Anxiety and depression    • Arthritis    • Cholecystitis    • Constipation    • Frequent UTI    • GERD (gastroesophageal reflux disease)    • Heartburn    • Hemorrhoids    • Kidney stones    • Lesion of breast    • Lump     RIGHT BREAST   • Nausea & vomiting    • PCOS (polycystic ovarian syndrome)    • PONV (postoperative nausea and vomiting)    • Tachycardia        Past Surgical History:   Procedure Laterality Date   • ABDOMINAL SURGERY     • BREAST BIOPSY Right 11/29/2018    Procedure: breast biopsy ;  Surgeon: Shiv Overton MD;  Location: [facility];  Service: General   • CHOLECYSTECTOMY N/A 8/25/2017    Procedure: CHOLECYSTECTOMY LAPAROSCOPIC;  Surgeon: Franco Mari MD;  Location: [facility];  Service:    • DENTAL PROCEDURE     • DIAGNOSTIC LAPAROSCOPY      x2   • DILATATION AND CURETTAGE     • URETEROSCOPY LASER LITHOTRIPSY WITH STENT INSERTION Right 1/9/2019    Procedure: URETEROSCOPY LASER LITHOTRIPSY retrograde pyelogram;  Surgeon: Ahmet Barrow MD;  Location: [facility];  Service: Urology   • WISDOM TOOTH EXTRACTION         Family History   Problem Relation Age of Onset   • Breast cancer Maternal Aunt    • Alcohol abuse Paternal Grandfather    • Heart disease Paternal Grandfather    • Cancer Maternal Grandfather    • Hypertension Maternal Grandfather        Social History     Socioeconomic History   • Marital status:      Spouse name: Not on file   • Number of children: Not on file   • Years of education: Not on file   • Highest education level: Not on file   Tobacco Use   • Smoking status: Current Every Day Smoker     Packs/day: 0.50     Years: 15.00     Pack years: 7.50     Types: Cigarettes   • Smokeless tobacco: Never Used   Substance and Sexual Activity   • Alcohol use: No     Frequency: Never   • Drug use: No   • Sexual activity: Defer     Birth control/protection: None  "      Current Outpatient Medications:   •  aspirin 81 MG EC tablet, Take 81 mg by mouth Daily. LAS T DOSE 11/26/2018, Disp: , Rfl:   •  HYDROcodone-acetaminophen (NORCO)  MG per tablet, Take 1 tablet by mouth Every 6 (Six) Hours As Needed for Moderate Pain ., Disp: 12 tablet, Rfl: 0  •  loratadine (CLARITIN) 10 MG tablet, 10 mg Daily., Disp: , Rfl:   •  metroNIDAZOLE (FLAGYL) 500 MG tablet, Take 1 tablet by mouth 2 (Two) Times a Day., Disp: 14 tablet, Rfl: 0  •  montelukast (SINGULAIR) 10 MG tablet, Take 10 mg by mouth Every Night., Disp: , Rfl:   •  nitrofurantoin, macrocrystal-monohydrate, (MACROBID) 100 MG capsule, Take 1 capsule by mouth 2 (Two) Times a Day., Disp: 10 capsule, Rfl: 0  •  ondansetron ODT (ZOFRAN-ODT) 4 MG disintegrating tablet, Take 1 tablet by mouth Every 6 (Six) Hours As Needed for Nausea or Vomiting., Disp: 10 tablet, Rfl: 0  •  ondansetron ODT (ZOFRAN-ODT) 4 MG disintegrating tablet, Take 1 tablet by mouth Every 6 (Six) Hours As Needed for Nausea or Vomiting., Disp: 12 tablet, Rfl: 0  •  Pancrelipase, Lip-Prot-Amyl, (CREON) 01545 units capsule delayed-release particles, Take 2 caps with meals and 1 with snacks., Disp: 240 capsule, Rfl: 3  •  pantoprazole (PROTONIX) 40 MG EC tablet, Take 40 mg by mouth Daily., Disp: , Rfl:   •  polyethylene glycol (MIRALAX) packet, Take 17 g by mouth Daily., Disp: , Rfl:   •  Prenatal Vit-DSS-Fe Cbn-FA (PRENATAL AD PO), Take  by mouth., Disp: , Rfl:     Allergies:   Peanut-containing drug products; Latex; Shrimp; Milk-related compounds; and Sulfa antibiotics    Vitals:  /81 (BP Location: Left arm, Patient Position: Sitting, Cuff Size: Adult)   Pulse 88   Ht 149.9 cm (59\")   Wt 65.1 kg (143 lb 9.6 oz)   LMP 06/16/2019   SpO2 98%   BMI 29.00 kg/m²     Physical Exam   Constitutional: She is oriented to person, place, and time. She appears well-developed and well-nourished. No distress.   HENT:   Head: Normocephalic and atraumatic.   Nose: Nose " normal.   Mouth/Throat: Oropharynx is clear and moist.   Eyes: Conjunctivae are normal. Right eye exhibits no discharge. Left eye exhibits no discharge. No scleral icterus.   Neck: Normal range of motion. No JVD present.   Cardiovascular: Normal rate, regular rhythm and normal heart sounds. Exam reveals no gallop and no friction rub.   No murmur heard.  Pulmonary/Chest: Effort normal and breath sounds normal. No respiratory distress. She has no wheezes. She has no rales. She exhibits no tenderness.   Abdominal: Soft. Bowel sounds are normal. She exhibits no mass. There is tenderness (generalized).   Musculoskeletal: Normal range of motion. She exhibits no edema or deformity.   Neurological: She is alert and oriented to person, place, and time. Coordination normal.   Skin: Skin is warm and dry. No rash noted. She is not diaphoretic. No erythema.   Psychiatric: She has a normal mood and affect. Her behavior is normal. Judgment and thought content normal.   Vitals reviewed.      Assessment/Plan:  1. Small intestinal bacterial overgrowth      For non-specific GI complaints of nausea, generalized abdominal cramping and diarrhea, she will have treatment for suspected SIBO with Alinia. Call with concerns.     New Medications Ordered This Visit   Medications   • nitazoxanide (ALINIA) 500 MG tablet     Sig: Take 1 tablet by mouth 2 (Two) Times a Day With Meals for 10 days.     Dispense:  20 tablet     Refill:  0           Return in about 1 month (around 8/16/2019) for recheck diarrhea.      Electronically signed 7/31/2019 2:40 PM  Naomi Aguiar PA-C, [facility]

## 2019-07-18 ENCOUNTER — HOSPITAL ENCOUNTER (OUTPATIENT)
Dept: PHYSICAL THERAPY | Facility: HOSPITAL | Age: 29
Setting detail: THERAPIES SERIES
Discharge: HOME | End: 2019-07-18

## 2019-07-18 DIAGNOSIS — M25.561 ACUTE PAIN OF RIGHT KNEE: Primary | ICD-10-CM

## 2019-07-18 DIAGNOSIS — S83.001D SUBLUXATION OF RIGHT PATELLA, SUBSEQUENT ENCOUNTER: ICD-10-CM

## 2019-07-18 PROCEDURE — G0283 ELEC STIM OTHER THAN WOUND: HCPCS

## 2019-07-18 PROCEDURE — 97110 THERAPEUTIC EXERCISES: CPT

## 2019-07-18 PROCEDURE — 97035 APP MDLTY 1+ULTRASOUND EA 15: CPT

## 2019-07-18 NOTE — THERAPY TREATMENT NOTE
Outpatient Physical Therapy Ortho Treatment Note   [place]     Patient Name: Pili Webster  : 1990  MRN: [id number]  Today's Date: 2019      Visit Date: 2019    Visit Dx:    ICD-10-CM ICD-9-CM   1. Acute pain of right knee M25.561 719.46   2. Subluxation of right patella, subsequent encounter S83.001D V54.89     836.3       Patient Active Problem List   Diagnosis   • External hemorrhoid, thrombosed   • Status post laparoscopic cholecystectomy   • Kidney stone   • Breast nodule   • Ureteral calculus        Past Medical History:   Diagnosis Date   • Abdominal pain    • Anxiety and depression    • Arthritis    • Cholecystitis    • Constipation    • Frequent UTI    • GERD (gastroesophageal reflux disease)    • Heartburn    • Hemorrhoids    • Kidney stones    • Lesion of breast    • Lump     RIGHT BREAST   • Nausea & vomiting    • PCOS (polycystic ovarian syndrome)    • PONV (postoperative nausea and vomiting)    • Tachycardia         Past Surgical History:   Procedure Laterality Date   • ABDOMINAL SURGERY     • BREAST BIOPSY Right 2018    Procedure: breast biopsy ;  Surgeon: Shiv Overton MD;  Location: [facility];  Service: General   • CHOLECYSTECTOMY N/A 2017    Procedure: CHOLECYSTECTOMY LAPAROSCOPIC;  Surgeon: Franco Mari MD;  Location: [facility];  Service:    • DENTAL PROCEDURE     • DIAGNOSTIC LAPAROSCOPY      x2   • DILATATION AND CURETTAGE     • URETEROSCOPY LASER LITHOTRIPSY WITH STENT INSERTION Right 2019    Procedure: URETEROSCOPY LASER LITHOTRIPSY retrograde pyelogram;  Surgeon: Ahmet Barrow MD;  Location: [facility];  Service: Urology   • WISDOM TOOTH EXTRACTION         PT Ortho     Row Name 19 1300       Subjective Comments    Subjective Comments  Patient states her knee is sore and stiff today w/ reports of 5-6/10 pain prior to tx.  Patient reports no change in activity.   -JEANNINE       Subjective Pain    Able to rate subjective pain?   yes  -JEANNINE    Pre-Treatment Pain Level  6 5-6/10  -JEANNINE    Post-Treatment Pain Level  4  -JEANNINE    Row Name 07/16/19 1200       Subjective Comments    Subjective Comments  Patient reports that she is having soreness on the inside of her right knee.   -AC       Subjective Pain    Able to rate subjective pain?  yes  -AC    Pre-Treatment Pain Level  4  -AC    Post-Treatment Pain Level  4  -AC      User Key  (r) = Recorded By, (t) = Taken By, (c) = Cosigned By    Initials Name Provider Type    Janet Mccormack PTA Physical Therapy Assistant    Ina Castro PTA Physical Therapy Assistant                      PT Assessment/Plan     Row Name 07/18/19 1430          PT Assessment    Assessment Comments  Patient completed today's session w/ decreased pain noted following, 4/10.  MH and Estim applied to R) knee f/b therex as listed w/ continued focus on improved knee range of motion, LE strength and stability.  Treatment concluded with pulsed US.  Pt initiated standing LE strengthening activities w/ good tolerance noted.  Pt will be progressed as tolerated.  No adverse reactions observed following modalities.   -JEANNINE        PT Plan    PT Plan Comments  Continue with PT's POC and progress tx as tolerated by patient.   -JEANNINE       User Key  (r) = Recorded By, (t) = Taken By, (c) = Cosigned By    Initials Name Provider Type    Janet Mccormack PTA Physical Therapy Assistant          Modalities     Row Name 07/18/19 1300             Moist Heat    MH Applied  Yes no redness observed following MH  -JEANNINE      Location  Right knee  -JEANNINE      Rx Minutes  10 mins  -JEANNINE      MH Prior to Rx  Yes w/ estim in supine position  -JEANNINE         Ice    Patient denies application of Ice  Yes  -JEANNINE         Ultrasound 17517    Location  R) knee  -JEANNINE      Duty Cycle  50  -JEANNINE      Frequency  -- 3.3MHz  -JEANNINE      Intensity - Wts/cm  1.2  -JEANNINE      26031 - PT Ultrasound Minutes  8  -JEANNINE         ELECTRICAL STIMULATION    Attended/Unattended   Unattended no skin irritation observed following  -JEANNINE      Stimulation Type  IFC  -JEANNINE      Max mAmp  -- as to pt's tolerance  -JEANNINE      Location/Electrode Placement/Other  R) knee  -JEANNINE       PT E-Stim Unattended (Manual) Minutes  10  -JEANNINE        User Key  (r) = Recorded By, (t) = Taken By, (c) = Cosigned By    Initials Name Provider Type    Janet Mccormack PTA Physical Therapy Assistant        Exercises     Row Name 07/18/19 1300             Subjective Comments    Subjective Comments  Patient states her knee is sore and stiff today w/ reports of 5-6/10 pain prior to tx.  Patient reports no change in activity.   -JEANNINE         Subjective Pain    Able to rate subjective pain?  yes  -JEANNINE      Pre-Treatment Pain Level  6 5-6/10  -JEANNINE      Post-Treatment Pain Level  4  -JEANNINE         Total Minutes    06427 - PT Therapeutic Exercise Minutes  35  -JEANNINE         Exercise 1    Exercise Name 1  QS 15x2, heel slides 15x2, SLR 10x3, SAQ 10x2 (2#), ball squeeze 15x2, SL hip abd 10x2, hip abd w/tband (red) 15x2, knee flex w/tband (red) 15x2, LAQ 10x2 (2#), St. HR 10x2, St. march 10x2, LE bike LV 1x 5min  -JEANNINE      Cueing 1  Verbal;Tactile;Demo  -JEANNINE      Time 1  35 min  -JEANNINE        User Key  (r) = Recorded By, (t) = Taken By, (c) = Cosigned By    Initials Name Provider Type    Janet Mccormack PTA Physical Therapy Assistant                           Therapy Education  Given: HEP, Symptoms/condition management, Pain management, Posture/body mechanics, Mobility training  Program: Reinforced  How Provided: Verbal, Demonstration  Provided to: Patient  Level of Understanding: Verbalized, Demonstrated, Teach back education performed              Time Calculation:   Start Time: 1300  Stop Time: 1400  Time Calculation (min): 60 min  Therapy Charges for Today     Code Description Service Date Service Provider Modifiers Qty    55376468641 HC PT THER PROC EA 15 MIN 7/18/2019 Janet Parikh PTA GP 2    [id number]  PT  ULTRASOUND EA 15 MIN 7/18/2019 Janet Parikh, PTA GP 1    37341482890 HC PT ELECTRICAL STIM UNATTENDED 7/18/2019 Janet Parikh, PTA  1                    Janet Lisa. Kati, DAVID  7/18/2019

## 2019-07-24 ENCOUNTER — HOSPITAL ENCOUNTER (EMERGENCY)
Facility: HOSPITAL | Age: 29
Discharge: HOME | End: 2019-07-24
Attending: EMERGENCY MEDICINE | Admitting: EMERGENCY MEDICINE

## 2019-07-24 ENCOUNTER — APPOINTMENT (OUTPATIENT)
Dept: CT IMAGING | Facility: HOSPITAL | Age: 29
End: 2019-07-24

## 2019-07-24 VITALS
BODY MASS INDEX: 28.83 KG/M2 | WEIGHT: 143 LBS | RESPIRATION RATE: 16 BRPM | DIASTOLIC BLOOD PRESSURE: 93 MMHG | SYSTOLIC BLOOD PRESSURE: 127 MMHG | HEART RATE: 74 BPM | TEMPERATURE: 98.3 F | OXYGEN SATURATION: 99 % | HEIGHT: 59 IN

## 2019-07-24 DIAGNOSIS — N23 RENAL COLIC ON LEFT SIDE: Primary | ICD-10-CM

## 2019-07-24 LAB
ALBUMIN SERPL-MCNC: 4.42 G/DL (ref 3.5–5.2)
ALBUMIN/GLOB SERPL: 1.3 G/DL
ALP SERPL-CCNC: 51 U/L (ref 39–117)
ALT SERPL W P-5'-P-CCNC: 10 U/L (ref 1–33)
ANION GAP SERPL CALCULATED.3IONS-SCNC: 12.1 MMOL/L (ref 5–15)
AST SERPL-CCNC: 14 U/L (ref 1–32)
BACTERIA UR QL AUTO: ABNORMAL /HPF
BASOPHILS # BLD AUTO: 0.02 10*3/MM3 (ref 0–0.2)
BASOPHILS NFR BLD AUTO: 0.3 % (ref 0–1.5)
BILIRUB SERPL-MCNC: 0.3 MG/DL (ref 0.2–1.2)
BILIRUB UR QL STRIP: NEGATIVE
BUN BLD-MCNC: 9 MG/DL (ref 6–20)
BUN/CREAT SERPL: 11 (ref 7–25)
CALCIUM SPEC-SCNC: 9.3 MG/DL (ref 8.6–10.5)
CHLORIDE SERPL-SCNC: 105 MMOL/L (ref 98–107)
CLARITY UR: CLEAR
CO2 SERPL-SCNC: 22.9 MMOL/L (ref 22–29)
COLOR UR: ABNORMAL
CREAT BLD-MCNC: 0.82 MG/DL (ref 0.57–1)
DEPRECATED RDW RBC AUTO: 41 FL (ref 37–54)
EOSINOPHIL # BLD AUTO: 0.12 10*3/MM3 (ref 0–0.4)
EOSINOPHIL NFR BLD AUTO: 1.7 % (ref 0.3–6.2)
ERYTHROCYTE [DISTWIDTH] IN BLOOD BY AUTOMATED COUNT: 12.1 % (ref 12.3–15.4)
GFR SERPL CREATININE-BSD FRML MDRD: 82 ML/MIN/1.73
GLOBULIN UR ELPH-MCNC: 3.4 GM/DL
GLUCOSE BLD-MCNC: 89 MG/DL (ref 65–99)
GLUCOSE UR STRIP-MCNC: NEGATIVE MG/DL
HCG SERPL QL: NEGATIVE
HCT VFR BLD AUTO: 38.3 % (ref 34–46.6)
HGB BLD-MCNC: 13 G/DL (ref 12–15.9)
HGB UR QL STRIP.AUTO: ABNORMAL
HOLD SPECIMEN: NORMAL
HYALINE CASTS UR QL AUTO: ABNORMAL /LPF
IMM GRANULOCYTES # BLD AUTO: 0.01 10*3/MM3 (ref 0–0.05)
IMM GRANULOCYTES NFR BLD AUTO: 0.1 % (ref 0–0.5)
KETONES UR QL STRIP: ABNORMAL
LEUKOCYTE ESTERASE UR QL STRIP.AUTO: NEGATIVE
LIPASE SERPL-CCNC: 29 U/L (ref 13–60)
LYMPHOCYTES # BLD AUTO: 3.07 10*3/MM3 (ref 0.7–3.1)
LYMPHOCYTES NFR BLD AUTO: 44.6 % (ref 19.6–45.3)
MCH RBC QN AUTO: 32 PG (ref 26.6–33)
MCHC RBC AUTO-ENTMCNC: 33.9 G/DL (ref 31.5–35.7)
MCV RBC AUTO: 94.3 FL (ref 79–97)
MONOCYTES # BLD AUTO: 0.37 10*3/MM3 (ref 0.1–0.9)
MONOCYTES NFR BLD AUTO: 5.4 % (ref 5–12)
NEUTROPHILS # BLD AUTO: 3.29 10*3/MM3 (ref 1.7–7)
NEUTROPHILS NFR BLD AUTO: 47.9 % (ref 42.7–76)
NITRITE UR QL STRIP: NEGATIVE
PH UR STRIP.AUTO: 5.5 [PH] (ref 5–8)
PLATELET # BLD AUTO: 239 10*3/MM3 (ref 140–450)
PMV BLD AUTO: 9.6 FL (ref 6–12)
POTASSIUM BLD-SCNC: 3.5 MMOL/L (ref 3.5–5.2)
PROT SERPL-MCNC: 7.8 G/DL (ref 6–8.5)
PROT UR QL STRIP: ABNORMAL
RBC # BLD AUTO: 4.06 10*6/MM3 (ref 3.77–5.28)
RBC # UR: ABNORMAL /HPF
REF LAB TEST METHOD: ABNORMAL
SODIUM BLD-SCNC: 140 MMOL/L (ref 136–145)
SP GR UR STRIP: 1.03 (ref 1–1.03)
SQUAMOUS #/AREA URNS HPF: ABNORMAL /HPF
UROBILINOGEN UR QL STRIP: ABNORMAL
WBC NRBC COR # BLD: 6.88 10*3/MM3 (ref 3.4–10.8)
WBC UR QL AUTO: ABNORMAL /HPF
WHOLE BLOOD HOLD SPECIMEN: NORMAL
WHOLE BLOOD HOLD SPECIMEN: NORMAL

## 2019-07-24 PROCEDURE — P9612 CATHETERIZE FOR URINE SPEC: HCPCS

## 2019-07-24 PROCEDURE — 25010000002 ONDANSETRON PER 1 MG: Performed by: NURSE PRACTITIONER

## 2019-07-24 PROCEDURE — 96361 HYDRATE IV INFUSION ADD-ON: CPT

## 2019-07-24 PROCEDURE — 83690 ASSAY OF LIPASE: CPT | Performed by: EMERGENCY MEDICINE

## 2019-07-24 PROCEDURE — 96375 TX/PRO/DX INJ NEW DRUG ADDON: CPT

## 2019-07-24 PROCEDURE — 74176 CT ABD & PELVIS W/O CONTRAST: CPT

## 2019-07-24 PROCEDURE — 25010000002 HYDROMORPHONE PER 4 MG: Performed by: NURSE PRACTITIONER

## 2019-07-24 PROCEDURE — 80053 COMPREHEN METABOLIC PANEL: CPT | Performed by: EMERGENCY MEDICINE

## 2019-07-24 PROCEDURE — 84703 CHORIONIC GONADOTROPIN ASSAY: CPT | Performed by: EMERGENCY MEDICINE

## 2019-07-24 PROCEDURE — 99284 EMERGENCY DEPT VISIT MOD MDM: CPT

## 2019-07-24 PROCEDURE — 85025 COMPLETE CBC W/AUTO DIFF WBC: CPT | Performed by: EMERGENCY MEDICINE

## 2019-07-24 PROCEDURE — 74176 CT ABD & PELVIS W/O CONTRAST: CPT | Performed by: RADIOLOGY

## 2019-07-24 PROCEDURE — 96374 THER/PROPH/DIAG INJ IV PUSH: CPT

## 2019-07-24 PROCEDURE — 81001 URINALYSIS AUTO W/SCOPE: CPT | Performed by: NURSE PRACTITIONER

## 2019-07-24 RX ORDER — OXYCODONE HYDROCHLORIDE AND ACETAMINOPHEN 5; 325 MG/1; MG/1
1 TABLET ORAL EVERY 4 HOURS PRN
Qty: 12 TABLET | Refills: 0 | Status: SHIPPED | OUTPATIENT
Start: 2019-07-24 | End: 2019-08-13 | Stop reason: ALTCHOICE

## 2019-07-24 RX ORDER — CEPHALEXIN 500 MG/1
500 CAPSULE ORAL 3 TIMES DAILY
Qty: 21 CAPSULE | Refills: 0 | Status: SHIPPED | OUTPATIENT
Start: 2019-07-24 | End: 2019-07-31

## 2019-07-24 RX ORDER — SODIUM CHLORIDE 0.9 % (FLUSH) 0.9 %
10 SYRINGE (ML) INJECTION AS NEEDED
Status: DISCONTINUED | OUTPATIENT
Start: 2019-07-24 | End: 2019-07-25 | Stop reason: HOSPADM

## 2019-07-24 RX ORDER — ONDANSETRON 2 MG/ML
4 INJECTION INTRAMUSCULAR; INTRAVENOUS ONCE
Status: COMPLETED | OUTPATIENT
Start: 2019-07-24 | End: 2019-07-24

## 2019-07-24 RX ORDER — ONDANSETRON 4 MG/1
4 TABLET, ORALLY DISINTEGRATING ORAL EVERY 6 HOURS PRN
Qty: 12 TABLET | Refills: 0 | Status: SHIPPED | OUTPATIENT
Start: 2019-07-24 | End: 2019-12-09

## 2019-07-24 RX ORDER — HYDROMORPHONE HYDROCHLORIDE 1 MG/ML
0.5 INJECTION, SOLUTION INTRAMUSCULAR; INTRAVENOUS; SUBCUTANEOUS ONCE
Status: COMPLETED | OUTPATIENT
Start: 2019-07-24 | End: 2019-07-24

## 2019-07-24 RX ADMIN — SODIUM CHLORIDE 1000 ML: 9 INJECTION, SOLUTION INTRAVENOUS at 18:00

## 2019-07-24 RX ADMIN — HYDROMORPHONE HYDROCHLORIDE 0.5 MG: 1 INJECTION, SOLUTION INTRAMUSCULAR; INTRAVENOUS; SUBCUTANEOUS at 18:06

## 2019-07-24 RX ADMIN — SODIUM CHLORIDE 1000 ML: 9 INJECTION, SOLUTION INTRAVENOUS at 18:01

## 2019-07-24 RX ADMIN — ONDANSETRON 4 MG: 2 INJECTION, SOLUTION INTRAMUSCULAR; INTRAVENOUS at 18:01

## 2019-07-26 ENCOUNTER — HOSPITAL ENCOUNTER (OUTPATIENT)
Dept: PHYSICAL THERAPY | Facility: HOSPITAL | Age: 29
Setting detail: THERAPIES SERIES
Discharge: HOME | End: 2019-07-26

## 2019-07-26 DIAGNOSIS — S83.001D SUBLUXATION OF RIGHT PATELLA, SUBSEQUENT ENCOUNTER: ICD-10-CM

## 2019-07-26 DIAGNOSIS — M25.561 ACUTE PAIN OF RIGHT KNEE: Primary | ICD-10-CM

## 2019-07-26 PROCEDURE — 97110 THERAPEUTIC EXERCISES: CPT

## 2019-07-26 PROCEDURE — G0283 ELEC STIM OTHER THAN WOUND: HCPCS

## 2019-07-26 PROCEDURE — 97035 APP MDLTY 1+ULTRASOUND EA 15: CPT

## 2019-07-26 NOTE — THERAPY DISCHARGE NOTE
Outpatient Physical Therapy Ortho Treatment Note/Discharge Summary   [facility]     Patient Name: Pili Webster  : 1990  MRN: [id number]  Today's Date: 2019      Visit Date: 2019    Visit Dx:    ICD-10-CM ICD-9-CM   1. Acute pain of right knee M25.561 719.46   2. Subluxation of right patella, subsequent encounter S83.001D V54.89     836.3       Patient Active Problem List   Diagnosis   • External hemorrhoid, thrombosed   • Status post laparoscopic cholecystectomy   • Kidney stone   • Breast nodule   • Ureteral calculus        Past Medical History:   Diagnosis Date   • Abdominal pain    • Anxiety and depression    • Arthritis    • Cholecystitis    • Constipation    • Frequent UTI    • GERD (gastroesophageal reflux disease)    • Heartburn    • Hemorrhoids    • Kidney stones    • Lesion of breast    • Lump     RIGHT BREAST   • Nausea & vomiting    • PCOS (polycystic ovarian syndrome)    • PONV (postoperative nausea and vomiting)    • Tachycardia         Past Surgical History:   Procedure Laterality Date   • ABDOMINAL SURGERY     • BREAST BIOPSY Right 2018    Procedure: breast biopsy ;  Surgeon: Shiv Overton MD;  Location: [facility];  Service: General   • CHOLECYSTECTOMY N/A 2017    Procedure: CHOLECYSTECTOMY LAPAROSCOPIC;  Surgeon: Franco Mari MD;  Location: [facility];  Service:    • DENTAL PROCEDURE     • DIAGNOSTIC LAPAROSCOPY      x2   • DILATATION AND CURETTAGE     • URETEROSCOPY LASER LITHOTRIPSY WITH STENT INSERTION Right 2019    Procedure: URETEROSCOPY LASER LITHOTRIPSY retrograde pyelogram;  Surgeon: Ahmet Barrow MD;  Location: [facility];  Service: Urology   • WISDOM TOOTH EXTRACTION         PT Ortho     Row Name 19 1400       Subjective Comments    Subjective Comments  Patient reports that she is doing good and working on her home exercises. Patient states that her motion and pain are better.   -AC       Subjective Pain    Able to  rate subjective pain?  yes  -AC    Pre-Treatment Pain Level  4  -AC    Post-Treatment Pain Level  4  -AC       Myotomal Screen- Lower Quarter Clearing    Hip flexion (L2)  Right:;5 (Normal)  -AC    Knee extension (L3)  Right:;5 (Normal)  -AC    Knee flexion (S2)  Right:;5 (Normal)  -AC       Right Lower Ext    Rt Knee Extension/Flexion AROM  0-140  -AC      User Key  (r) = Recorded By, (t) = Taken By, (c) = Cosigned By    Initials Name Provider Type    AC Ina Paez PTA Physical Therapy Assistant                          Modalities     Row Name 07/26/19 1400             Moist Heat    MH Applied  Yes No redness noted following moist heat  -AC      Location  Right knee  -AC      Rx Minutes  10 mins  -AC      MH Prior to Rx  Yes  -AC         Ice    Patient denies application of Ice  Yes  -AC         Ultrasound 19133    Location  R) knee  -AC      Duty Cycle  50  -AC      Frequency  -- 3.3MHz  -AC      Intensity - Wts/cm  1.2  -AC      88634 - PT Ultrasound Minutes  8  -AC         ELECTRICAL STIMULATION    Attended/Unattended  Unattended No irritation noted following estim  -AC      Stimulation Type  IFC  -AC      Max mAmp  -- per the patient's tolerance, 10 minutes with MH  -AC      Location/Electrode Placement/Other  R) knee  -AC       PT E-Stim Unattended (Manual) Minutes  10  -AC        User Key  (r) = Recorded By, (t) = Taken By, (c) = Cosigned By    Initials Name Provider Type    Ina Castro PTA Physical Therapy Assistant          Exercises     Row Name 07/26/19 1400             Subjective Comments    Subjective Comments  Patient reports that she is doing good and working on her home exercises.   -AC         Subjective Pain    Able to rate subjective pain?  yes  -AC      Pre-Treatment Pain Level  4  -AC      Post-Treatment Pain Level  4  -AC         Total Minutes    13285 - PT Therapeutic Exercise Minutes  35  -AC         Exercise 1    Exercise Name 1  QS 15x2, heel slides 15x2,  SLR 10x3, SAQ 10x2 (2#), ball squeeze 15x2, SL hip abd 10x2, hip abd w/tband (red) 15x2, knee flex w/tband (red) 15x2, LAQ 10x2 (2#), seated march (2#) 15x2, LE bike LV 1x 6min  -AC      Cueing 1  Verbal;Tactile;Demo  -AC      Time 1  35 min  -AC        User Key  (r) = Recorded By, (t) = Taken By, (c) = Cosigned By    Initials Name Provider Type    Ina Castro PTA Physical Therapy Assistant                         PT OP Goals     Row Name 07/26/19 1400          PT Short Term Goals    STG Date to Achieve  07/26/19  -AC     STG 1  Patient will be independent/compliant with HEP.  -AC     STG 1 Progress  Met  -AC     STG 2  Right knee ROM will improve to at least 0-105 to allow for greater ease with ADLs.  -AC     STG 2 Progress  Met  -AC     STG 3  Patient will report pain no greater than 6/10 when performing self-care activities.  -AC     STG 3 Progress  Met  -AC        Long Term Goals    LTG Date to Achieve  07/26/19  -AC     LTG 1  LEFS will improve by at least 15% to show improved functional mobility.  -AC     LTG 1 Progress  Met  -AC     LTG 2  LE strength will improve to at least 4/5 to prevent reinjury.  -AC     LTG 2 Progress  Met  -AC     LTG 3  Right knee ROM will improve to at least 0-120 to allow for normalized gait pattern.  -AC     LTG 3 Progress  Met  -AC     LTG 4  Patient will report pain no greater than 3/10 when walking for 20 minutes to shop for groceries.  -AC     LTG 4 Progress  Met  -AC       User Key  (r) = Recorded By, (t) = Taken By, (c) = Cosigned By    Initials Name Provider Type    Ina Castro PTA Physical Therapy Assistant          Therapy Education  Given: HEP, Symptoms/condition management  Program: Reinforced  How Provided: Verbal, Demonstration  Provided to: Patient  Level of Understanding: Verbalized, Demonstrated    Outcome Measure Options: Lower Extremity Functional Scale (LEFS)  Lower Extremity Functional Index  Any of your usual work, housework or  school activities: No difficulty  Your usual hobbies, recreational or sporting activities: A little bit of difficulty  Getting into or out of the bath: No difficulty  Walking between rooms: No difficulty  Putting on your shoes or socks: No difficulty  Squatting: No difficulty  Lifting an object, like a bag of groceries from the floor: No difficulty  Performing light activities around your home: No difficulty  Performing heavy activities around your home: A little bit of difficulty  Getting into or out of a car: No difficulty  Walking 2 blocks: No difficulty  Walking a mile: No difficulty  Going up or down 10 stairs (about 1 flight of stairs): A little bit of difficulty  Standing for 1 hour: No difficulty  Sitting for 1 hour: No difficulty  Running on even ground: A little bit of difficulty  Running on uneven ground: A little bit of difficulty  Making sharp turns while running fast: A little bit of difficulty  Hopping: No difficulty  Rolling over in bed: No difficulty  Total: 74      Time Calculation:   Start Time: 1305  Stop Time: 1415  Time Calculation (min): 70 min  Therapy Charges for Today     Code Description Service Date Service Provider Modifiers Qty    99908166393 HC PT THER PROC EA 15 MIN 7/26/2019 Ina Paez, DAVID GP 2    18705591306 HC PT ELECTRICAL STIM UNATTENDED 7/26/2019 Ina Paez PTA  1    40812278626 HC PT ULTRASOUND EA 15 MIN 7/26/2019 Ina Paez PTA GP 1          PT G-Codes  Outcome Measure Options: Lower Extremity Functional Scale (LEFS)  Total: 74     OP PT Discharge Summary  Date of Discharge: 07/26/19  Reason for Discharge: All goals achieved, Maximum functional potential achieved  Outcomes Achieved: Able to achieve all goals within established timeline  Discharge Destination: Home with home program  Discharge Instructions/Additional Comments: Patient attended 8 visits including eval from 6/26/19 to 7/26/19. Patient tolerated treatment session well with  rest breaks taken as needed by the patient. Educated on home exercises, patient demonstrated and understood HEP, patient verbalized understanding. Patient met 3/3 STG's and 4/4 LTG's. Patient's LEFS improved from 29/80 to 74/80. No adverse reactions with modalities or treatment session. Thank you for your referral.       Ina Paez, PTA  7/26/2019

## 2019-08-13 ENCOUNTER — OFFICE VISIT (OUTPATIENT)
Dept: GASTROENTEROLOGY | Facility: CLINIC | Age: 29
End: 2019-08-13

## 2019-08-13 ENCOUNTER — OFFICE VISIT (OUTPATIENT)
Dept: UROLOGY | Facility: CLINIC | Age: 29
End: 2019-08-13

## 2019-08-13 VITALS
HEIGHT: 59 IN | DIASTOLIC BLOOD PRESSURE: 78 MMHG | OXYGEN SATURATION: 98 % | HEART RATE: 96 BPM | BODY MASS INDEX: 28.79 KG/M2 | WEIGHT: 142.8 LBS | SYSTOLIC BLOOD PRESSURE: 121 MMHG

## 2019-08-13 VITALS
DIASTOLIC BLOOD PRESSURE: 70 MMHG | SYSTOLIC BLOOD PRESSURE: 122 MMHG | WEIGHT: 144 LBS | HEIGHT: 59 IN | BODY MASS INDEX: 29.03 KG/M2

## 2019-08-13 DIAGNOSIS — N20.1 URETERAL CALCULUS: Primary | ICD-10-CM

## 2019-08-13 DIAGNOSIS — K58.9 IRRITABLE BOWEL SYNDROME, UNSPECIFIED TYPE: Primary | ICD-10-CM

## 2019-08-13 DIAGNOSIS — K21.9 GASTROESOPHAGEAL REFLUX DISEASE, ESOPHAGITIS PRESENCE NOT SPECIFIED: ICD-10-CM

## 2019-08-13 PROCEDURE — 99213 OFFICE O/P EST LOW 20 MIN: CPT | Performed by: PHYSICIAN ASSISTANT

## 2019-08-13 PROCEDURE — 99213 OFFICE O/P EST LOW 20 MIN: CPT | Performed by: UROLOGY

## 2019-08-13 RX ORDER — TAMSULOSIN HYDROCHLORIDE 0.4 MG/1
CAPSULE ORAL
COMMUNITY
Start: 2019-08-07 | End: 2019-12-09 | Stop reason: SDUPTHER

## 2019-08-13 RX ORDER — ROPINIROLE 0.5 MG/1
0.5 TABLET, FILM COATED ORAL DAILY
Status: ON HOLD | COMMUNITY
Start: 2019-05-16 | End: 2021-02-01

## 2019-08-13 RX ORDER — OXYCODONE AND ACETAMINOPHEN 10; 325 MG/1; MG/1
1 TABLET ORAL EVERY 6 HOURS PRN
Qty: 8 TABLET | Refills: 0 | Status: SHIPPED | OUTPATIENT
Start: 2019-08-13 | End: 2019-09-16

## 2019-08-13 RX ORDER — TRAMADOL HYDROCHLORIDE 50 MG/1
TABLET ORAL
COMMUNITY
Start: 2019-08-07 | End: 2019-09-16

## 2019-08-13 RX ORDER — CEFDINIR 300 MG/1
CAPSULE ORAL
COMMUNITY
Start: 2019-08-07 | End: 2019-09-16

## 2019-08-13 NOTE — PROGRESS NOTES
Chief Complaint:          Chief Complaint   Patient presents with   • Flank Pain       HPI:   29 y.o. female returns today having been seen yesterday.  I repeated her KUB .  I do not see a radio opaque stone in the renal fossa.  She says she's had nausea no vomiting chills but no fever and right flank pain I'm want to give her pain medication and alpha blockade see her back in a week if I can't see it again I'm I recommend a stone CT and probable intervention if she persists with pain.  She returns today.  She still says she has nausea and occasional vomiting, no fevers, no chills, there is no right flank pain and negative CT scan and negative KUB a month ago a recent breast biopsy I went ahead and repeated the KUB which is again negative I'll notify her of the results by telephone.  I don't believe this is related to the urological system.  She had severe pain and went to the emergency room she had a CT scan that showed a right proximal 4 mm stone.  She is desirous of surgical intervention.  I discussed the surgical treatment with her and well set this up for her as soon as possible she has both alpha blockade and pain medication for control until the time of the surgery.  She returns today she is a lot better her pains gone she is having no fevers chills no nausea no vomiting.  She's getting get a KUB on notify her of the results at [phone].  Today she went to emergency room with a questionable UTI and had a CT the stone CT showed hyperdense pyramids but no other abnormality the pains both right and left and sequel.  There is nausea no vomiting she also has a left ovarian cyst which seems to be more the locus of her pain I am to give her some reassurance put her on pain medication use on a as needed basis and see her back in about 3 weeks if she continues with pain will recommend intervention but right now I do not find anything anatomic that requires urgent intervention.  She is passed 3 more stones.  This  is at work.  He has no fevers or chills no nausea vomiting or diarrhea.  She has irritable bowel syndrome with both constipation and diarrhea she has alpha blockade I gave her additional hydrocodone and get a 24 urine and then see her back based on this         Past Medical History:        Past Medical History:   Diagnosis Date   • Abdominal pain    • Anxiety and depression    • Arthritis    • Cholecystitis    • Constipation    • Frequent UTI    • GERD (gastroesophageal reflux disease)    • Heartburn    • Hemorrhoids    • Kidney stones    • Lesion of breast    • Lump     RIGHT BREAST   • Nausea & vomiting    • PCOS (polycystic ovarian syndrome)    • PONV (postoperative nausea and vomiting)    • Tachycardia          Current Meds:     Current Outpatient Medications   Medication Sig Dispense Refill   • aspirin 81 MG EC tablet Take 81 mg by mouth Daily. LAS T DOSE 11/26/2018     • cefdinir (OMNICEF) 300 MG capsule      • loratadine (CLARITIN) 10 MG tablet 10 mg Daily.     • metroNIDAZOLE (FLAGYL) 500 MG tablet Take 1 tablet by mouth 2 (Two) Times a Day. 14 tablet 0   • montelukast (SINGULAIR) 10 MG tablet Take 10 mg by mouth Every Night.     • ondansetron ODT (ZOFRAN-ODT) 4 MG disintegrating tablet Take 1 tablet by mouth Every 6 (Six) Hours As Needed for Nausea or Vomiting. 12 tablet 0   • pantoprazole (PROTONIX) 40 MG EC tablet Take 40 mg by mouth Daily.     • polyethylene glycol (MIRALAX) packet Take 17 g by mouth Daily.     • Prenatal Vit-DSS-Fe Cbn-FA (PRENATAL AD PO) Take  by mouth.     • rOPINIRole (REQUIP) 0.5 MG tablet      • tamsulosin (FLOMAX) 0.4 MG capsule 24 hr capsule      • traMADol (ULTRAM) 50 MG tablet        No current facility-administered medications for this visit.         Allergies:      Allergies   Allergen Reactions   • Peanut-Containing Drug Products Anaphylaxis   • Latex Hives   • Shrimp Swelling     Facial swelling     • Milk-Related Compounds Nausea And Vomiting   • Sulfa Antibiotics Rash         Past Surgical History:     Past Surgical History:   Procedure Laterality Date   • ABDOMINAL SURGERY     • BREAST BIOPSY Right 11/29/2018    Procedure: breast biopsy ;  Surgeon: Shiv Overton MD;  Location: [facility];  Service: General   • CHOLECYSTECTOMY N/A 8/25/2017    Procedure: CHOLECYSTECTOMY LAPAROSCOPIC;  Surgeon: Franco Mari MD;  Location: [facility];  Service:    • DENTAL PROCEDURE     • DIAGNOSTIC LAPAROSCOPY      x2   • DILATATION AND CURETTAGE     • URETEROSCOPY LASER LITHOTRIPSY WITH STENT INSERTION Right 1/9/2019    Procedure: URETEROSCOPY LASER LITHOTRIPSY retrograde pyelogram;  Surgeon: Ahmet Barrow MD;  Location: [facility];  Service: Urology   • WISDOM TOOTH EXTRACTION           Social History:     Social History     Socioeconomic History   • Marital status:      Spouse name: Not on file   • Number of children: Not on file   • Years of education: Not on file   • Highest education level: Not on file   Tobacco Use   • Smoking status: Current Every Day Smoker     Packs/day: 0.50     Years: 15.00     Pack years: 7.50     Types: Cigarettes   • Smokeless tobacco: Never Used   Substance and Sexual Activity   • Alcohol use: No     Frequency: Never   • Drug use: No   • Sexual activity: Defer     Birth control/protection: None       Family History:     Family History   Problem Relation Age of Onset   • Breast cancer Maternal Aunt    • Alcohol abuse Paternal Grandfather    • Heart disease Paternal Grandfather    • Cancer Maternal Grandfather    • Hypertension Maternal Grandfather        Review of Systems:     Review of Systems   Constitutional: Negative.  Negative for activity change, appetite change, chills, diaphoresis, fatigue and unexpected weight change.   HENT: Negative for congestion, dental problem, drooling, ear discharge, ear pain, facial swelling, hearing loss, mouth sores, nosebleeds, postnasal drip, rhinorrhea, sinus pressure, sneezing, sore throat, tinnitus,  trouble swallowing and voice change.    Eyes: Negative.  Negative for photophobia, pain, discharge, redness, itching and visual disturbance.   Respiratory: Negative.  Negative for apnea, cough, choking, chest tightness, shortness of breath, wheezing and stridor.    Cardiovascular: Negative.  Negative for chest pain, palpitations and leg swelling.   Gastrointestinal: Negative.  Negative for abdominal distention, abdominal pain, anal bleeding, blood in stool, constipation, diarrhea, nausea, rectal pain and vomiting.   Endocrine: Negative.  Negative for cold intolerance, heat intolerance, polydipsia, polyphagia and polyuria.   Musculoskeletal: Negative.  Negative for arthralgias, back pain, gait problem, joint swelling, myalgias, neck pain and neck stiffness.   Skin: Negative.  Negative for color change, pallor, rash and wound.   Allergic/Immunologic: Negative.  Negative for environmental allergies, food allergies and immunocompromised state.   Neurological: Negative.  Negative for dizziness, tremors, seizures, syncope, facial asymmetry, speech difficulty, weakness, light-headedness, numbness and headaches.   Hematological: Negative.  Negative for adenopathy. Does not bruise/bleed easily.   Psychiatric/Behavioral: Negative for agitation, behavioral problems, confusion, decreased concentration, dysphoric mood, hallucinations, self-injury, sleep disturbance and suicidal ideas. The patient is not nervous/anxious and is not hyperactive.    All other systems reviewed and are negative.      Physical Exam:     Physical Exam   Constitutional: She appears well-developed and well-nourished.   HENT:   Head: Normocephalic and atraumatic.   Right Ear: External ear normal.   Left Ear: External ear normal.   Mouth/Throat: Oropharynx is clear and moist.   Eyes: Conjunctivae are normal. Pupils are equal, round, and reactive to light.   Cardiovascular: Normal rate, regular rhythm, normal heart sounds and intact distal pulses.    Pulmonary/Chest: Effort normal and breath sounds normal.   Abdominal: Soft. Bowel sounds are normal. She exhibits no distension and no mass. There is no tenderness. There is no rebound and no guarding.   Genitourinary: No vaginal discharge found.   Musculoskeletal: Normal range of motion.   Neurological: She is alert. She has normal reflexes.   Skin: Skin is warm and dry.   Psychiatric: She has a normal mood and affect. Her behavior is normal. Judgment and thought content normal.       I have reviewed the following portions of the patient's history: allergies, current medications, past family history, past medical history, past social history, past surgical history, problem list and ROS and confirm it's accurate.      Procedure:       Assessment/Plan:   Ureteral calculus-patient has been diagnosed with a ureteral calculus.  We have discussed the various parameters regarding spontaneous passage including the notion that a 2 mm stone has a high likelihood of spontaneous passage versus a larger stone being caught up in the upper areas of the urinary tract.  We also discussed the medical management of stone disease and the use of medical expulsive therapy in the form of Flomax.  This is used in an off label setting.  We discussed the indicators for intervention including  absolute indicators such as sepsis and uncontrollable severe pain as well as  the relative indicators of moderate pain that is well-controlled with various analgesia.  I also talked about nonoperative management including ambulation and increasing fluids and hot tub as being an effective adjuncts in the treatment of a ureteral stone.  There is significant urinary lithiasis and I would initiate a metabolic work-up            Patient's Body mass index is 29.08 kg/m². BMI is above normal parameters. Recommendations include: educational material.              This document has been electronically signed by IMELDA THOMSON MD August 13, 2019 8:58  AM

## 2019-08-13 NOTE — PROGRESS NOTES
: 1990    Chief Complaint   Patient presents with   • Diarrhea       Pili Webster is a 29 y.o. female who presents to the office today as a follow up appointment regarding Diarrhea.    History of Present Illness:  Stools have been more firm recently. Insurance covered only partial treatment of Alinia for SIBO. She has kidney stones and has been on pain medications but taking Miralax.  No longer taking Creon. She is feeling better with GI complaints. Denies any straining or rectal bleeding. Has some food allergies which have been confirmed and she has been avoiding those foods. Nausea still present, zofran helps some. Has taken flagyl in the past without relief. She has been trying to add fiber to her diet, drinking more water recently.     Review of Systems   Constitutional: Positive for appetite change and fatigue. Negative for chills and fever.   HENT: Negative for ear pain, sinus pressure, sinus pain and sore throat.    Eyes: Negative.    Respiratory: Positive for cough, chest tightness and shortness of breath. Negative for choking.    Cardiovascular: Positive for palpitations. Negative for chest pain.   Gastrointestinal: Positive for nausea. Negative for abdominal distention, abdominal pain, anal bleeding, blood in stool, constipation, diarrhea, rectal pain and vomiting.   Endocrine: Negative.    Genitourinary: Positive for difficulty urinating.   Musculoskeletal: Positive for back pain and neck pain. Negative for joint swelling.   Skin: Positive for color change and rash.   Allergic/Immunologic: Positive for environmental allergies.   Neurological: Positive for weakness, light-headedness and headaches. Negative for tremors, speech difficulty and numbness.   Hematological: Bruises/bleeds easily.   Psychiatric/Behavioral: Positive for agitation. Negative for suicidal ideas. The patient is nervous/anxious.        Past Medical History:   Diagnosis Date   • Abdominal pain    • Anxiety and  depression    • Arthritis    • Cholecystitis    • Constipation    • Frequent UTI    • GERD (gastroesophageal reflux disease)    • Heartburn    • Hemorrhoids    • Kidney stones    • Lesion of breast    • Lump     RIGHT BREAST   • Nausea & vomiting    • PCOS (polycystic ovarian syndrome)    • PONV (postoperative nausea and vomiting)    • Tachycardia        Past Surgical History:   Procedure Laterality Date   • ABDOMINAL SURGERY     • BREAST BIOPSY Right 11/29/2018    Procedure: breast biopsy ;  Surgeon: Shiv Overton MD;  Location: [facility] OR;  Service: General   • CHOLECYSTECTOMY N/A 8/25/2017    Procedure: CHOLECYSTECTOMY LAPAROSCOPIC;  Surgeon: Franco Mari MD;  Location: [facility];  Service:    • DENTAL PROCEDURE     • DIAGNOSTIC LAPAROSCOPY      x2   • DILATATION AND CURETTAGE     • URETEROSCOPY LASER LITHOTRIPSY WITH STENT INSERTION Right 1/9/2019    Procedure: URETEROSCOPY LASER LITHOTRIPSY retrograde pyelogram;  Surgeon: Ahmet Barrow MD;  Location: [facility];  Service: Urology   • WISDOM TOOTH EXTRACTION         Family History   Problem Relation Age of Onset   • Breast cancer Maternal Aunt    • Alcohol abuse Paternal Grandfather    • Heart disease Paternal Grandfather    • Cancer Maternal Grandfather    • Hypertension Maternal Grandfather        Social History     Socioeconomic History   • Marital status:      Spouse name: Not on file   • Number of children: Not on file   • Years of education: Not on file   • Highest education level: Not on file   Tobacco Use   • Smoking status: Current Every Day Smoker     Packs/day: 0.50     Years: 15.00     Pack years: 7.50     Types: Cigarettes   • Smokeless tobacco: Never Used   Substance and Sexual Activity   • Alcohol use: No     Frequency: Never   • Drug use: No   • Sexual activity: Defer     Birth control/protection: None       Current Outpatient Medications:   •  aspirin 81 MG EC tablet, Take 81 mg by mouth Daily. LAS T DOSE 11/26/2018,  "Disp: , Rfl:   •  cefdinir (OMNICEF) 300 MG capsule, , Disp: , Rfl:   •  loratadine (CLARITIN) 10 MG tablet, 10 mg Daily., Disp: , Rfl:   •  metroNIDAZOLE (FLAGYL) 500 MG tablet, Take 1 tablet by mouth 2 (Two) Times a Day., Disp: 14 tablet, Rfl: 0  •  montelukast (SINGULAIR) 10 MG tablet, Take 10 mg by mouth Every Night., Disp: , Rfl:   •  ondansetron ODT (ZOFRAN-ODT) 4 MG disintegrating tablet, Take 1 tablet by mouth Every 6 (Six) Hours As Needed for Nausea or Vomiting., Disp: 12 tablet, Rfl: 0  •  oxyCODONE-acetaminophen (PERCOCET)  MG per tablet, Take 1 tablet by mouth Every 6 (Six) Hours As Needed for Moderate Pain ., Disp: 8 tablet, Rfl: 0  •  pantoprazole (PROTONIX) 40 MG EC tablet, Take 40 mg by mouth Daily., Disp: , Rfl:   •  polyethylene glycol (MIRALAX) packet, Take 17 g by mouth Daily., Disp: , Rfl:   •  Prenatal Vit-DSS-Fe Cbn-FA (PRENATAL AD PO), Take  by mouth., Disp: , Rfl:   •  rOPINIRole (REQUIP) 0.5 MG tablet, , Disp: , Rfl:   •  tamsulosin (FLOMAX) 0.4 MG capsule 24 hr capsule, , Disp: , Rfl:   •  traMADol (ULTRAM) 50 MG tablet, , Disp: , Rfl:     Allergies:   Peanut-containing drug products; Latex; Shrimp; Milk-related compounds; and Sulfa antibiotics    Vitals:  /78 (BP Location: Left arm, Patient Position: Sitting, Cuff Size: Adult)   Pulse 96   Ht 149.9 cm (59\")   Wt 64.8 kg (142 lb 12.8 oz)   SpO2 98%   BMI 28.84 kg/m²     Physical Exam   Constitutional: She is oriented to person, place, and time. She appears well-developed and well-nourished. No distress.   HENT:   Head: Normocephalic and atraumatic.   Nose: Nose normal.   Mouth/Throat: Oropharynx is clear and moist.   Eyes: Conjunctivae are normal. Right eye exhibits no discharge. Left eye exhibits no discharge. No scleral icterus.   Neck: Normal range of motion. No JVD present.   Cardiovascular: Normal rate, regular rhythm and normal heart sounds. Exam reveals no gallop and no friction rub.   No murmur " heard.  Pulmonary/Chest: Effort normal and breath sounds normal. No respiratory distress. She has no wheezes. She has no rales. She exhibits no tenderness.   Abdominal: Soft. Bowel sounds are normal. She exhibits no mass. There is tenderness (generalized).   Musculoskeletal: Normal range of motion. She exhibits no edema or deformity.   Neurological: She is alert and oriented to person, place, and time. Coordination normal.   Skin: Skin is warm and dry. No rash noted. She is not diaphoretic. No erythema.   Psychiatric: She has a normal mood and affect. Her behavior is normal. Judgment and thought content normal.   Vitals reviewed.    Assessment:  1. Irritable bowel syndrome, unspecified type    2. Gastroesophageal reflux disease, esophagitis presence not specified      Plan:  Continue 17g Miralax with 8oz liquid once daily for management of constipation. She was instructed to increase dietary fiber intake to 25-45g daily and a list of fiber foods was given. She has agreed to try to increase daily water intake and daily exercise as well.     She was instructed not to lie down immediately after eating (wait at least 3 hours after meals), elevate the head of the bed at night, avoid spicy foods, avoid mints, avoid caffeine, avoid nicotine and work on getting to a healthy weight. She will continue taking pantoprazole 40 mg once daily 30 minutes before a meal.           Return in about 6 months (around 2/13/2020) for recheck constipation and GERD.      Electronically signed 8/13/2019 2:16 PM  Naomi Aguiar PA-C, [facility]

## 2019-08-13 NOTE — PATIENT INSTRUCTIONS
Fiber Foods  It is recommended that you consume 25-45 grams daily.    Fresh & Dried Fruit  Serving Size Fiber (g)    Apples with skin  1 medium 5.0    Apricot  3 medium 1.0    Apricots, dried  4 pieces 2.9    Banana  1 medium 3.9    Blueberries  1 cup 4.2    Cantaloupe, cubes  1 cup 1.3    Figs, dried  2 medium 3.7    Grapefruit  1/2 medium 3.1    Orange, navel  1 medium 3.4    Peach  1 medium 2.0    Peaches, dried  3 pieces 3.2    Pear  1 medium 5.1    Plum  1 medium 1.1    Raisins  1.5 oz box 1.6    Raspberries  1 cup 6.4    Strawberries  1 cup 4.4      Grains, Beans, Nuts & Seeds  Serving Size Fiber (g)    Almonds  1 oz 4.2    Black beans, cooked  1 cup 13.9    Bran cereal  1 cup 19.9    Bread, whole wheat  1 slice 2.0    Brown rice, dry  1 cup 7.9    Cashews  1 oz 1.0    Flax seeds  3 Tbsp. 6.9    Garbanzo beans, cooked  1 cup 5.8    Kidney beans, cooked  1 cup 11.6    Lentils, red cooked  1 cup 13.6    Lima beans, cooked  1 cup 8.6    Oats, rolled dry  1 cup 12.0    Quinoa (seeds) dry  1/4 cup 6.2    Quinoa, cooked  1 cup 8.4    Pasta, whole wheat  1 cup 6.3    Peanuts  1 oz 2.3    Pistachio nuts  1 oz 3.1    Pumpkin seeds  1/4 cup 4.1    Soybeans, cooked  1 cup 8.6    Sunflower seeds  1/4 cup 3.0    Walnuts  1 oz 3.1            Vegetables  Serving Size Fiber (g)    Avocado (fruit)  1 medium 11.8    Beets, cooked  1 cup 2.8    Beet greens  1 cup 4.2    Bok josh, cooked  1 cup 2.8    Broccoli, cooked  1 cup 4.5    [place] sprouts, cooked  1 cup 3.6    Cabbage, cooked  1 cup 4.2    Carrot  1 medium 2.6    Carrot, cooked  1 cup 5.2    Cauliflower, cooked  1 cup 3.4    Francis slaw  1 cup 4.0    Kimber greens, cooked  1 cup 2.6    Corn, sweet  1 cup 4.6    Green beans  1 cup 4.0    Celery  1 stalk 1.1    Kale, cooked  1 cup 7.2    Onions, raw  1 cup 2.9    Peas, cooked  1 cup 8.8    Peppers, sweet  1 cup 2.6    Pop corn, air-popped  3 cups 3.6    Potato, baked w/ skin  1 medium 4.8    Spinach, cooked  1 cup 4.3     Summer squash, cooked  1 cup 2.5    Sweet potato, cooked  1 medium 4.9    Swiss chard, cooked  1 cup 3.7    Tomato  1 medium 1.0    Winter squash, cooked  1 cup 6.2    Zucchini, cooked  1 cup 2.6

## 2019-09-06 ENCOUNTER — TRANSCRIBE ORDERS (OUTPATIENT)
Dept: PHYSICAL THERAPY | Facility: CLINIC | Age: 29
End: 2019-09-06

## 2019-09-06 DIAGNOSIS — S63.501D SPRAIN OF RIGHT WRIST, SUBSEQUENT ENCOUNTER: Primary | ICD-10-CM

## 2019-09-09 ENCOUNTER — OFFICE VISIT (OUTPATIENT)
Dept: PHYSICAL THERAPY | Facility: CLINIC | Age: 29
End: 2019-09-09

## 2019-09-09 DIAGNOSIS — M25.631 DECREASED JOINT MOBILITY OF WRIST, RIGHT: ICD-10-CM

## 2019-09-09 DIAGNOSIS — S63.501D SPRAIN OF RIGHT WRIST, SUBSEQUENT ENCOUNTER: Primary | ICD-10-CM

## 2019-09-09 DIAGNOSIS — M25.531 RIGHT WRIST PAIN: ICD-10-CM

## 2019-09-09 PROCEDURE — 97035 APP MDLTY 1+ULTRASOUND EA 15: CPT

## 2019-09-09 PROCEDURE — 97161 PT EVAL LOW COMPLEX 20 MIN: CPT

## 2019-09-09 PROCEDURE — 97110 THERAPEUTIC EXERCISES: CPT

## 2019-09-09 NOTE — PROGRESS NOTES
Physical Therapy Initial Evaluation and Plan of Care        Patient: Pili Webster   : 1990  Diagnosis/ICD-10 Code:  Sprain of right wrist, subsequent encounter [S63.501D]  Referring practitioner: IVELISSE Jj  Date of Initial Visit: 2019  Today's Date: 2019  Patient seen for 1 sessions           Subjective Questionnaire:       Subjective Evaluation    History of Present Illness  Date of onset: 2019  Mechanism of injury: Pt reports while working at [employer], she reports while pulling a door, her wrist got stuck in a door handle.  She experienced immediate swelling and the patient was sent to an ER and receive x-rays that were negative for bony abnormalities.  The patient was later referred to an orthopedic MD and was diagnosed with a right wrist strain. She has now been referred to therapy for treatment of current symptoms.      Patient Occupation:    Precautions and Work Restrictions: restricted lifting, pushing and pulling and utilize wrsit brace as neededQuality of life: excellent    Pain  Current pain ratin  At best pain ratin  At worst pain ratin  Location: right wrist  Quality: throbbing  Relieving factors: ice, change in position, medications and rest  Aggravating factors: lifting, movement and repetitive movement  Progression: no change    Social Support  Lives in: one-story house  Lives with: spouse    Hand dominance: right    Diagnostic Tests  X-ray: normal    Treatments  Current treatment: physical therapy  Patient Goals  Patient goals for therapy: decreased edema, increased strength, decreased pain, independence with ADLs/IADLs, increased motion and return to work  Patient goal: Improve ADLs           Objective       Static Posture     Comments  Pt arrives with right wrist brace; no edema or contusion noted to right wrist    Observations     Right Wrist/Hand   Positive for edema.     Palpation     Additional Palpation  Details  Tenderness along the distal right radius and anatomical snuff box    Neurological Testing     Sensation     Wrist/Hand   Left   Intact: light touch    Right   Diminished: light touch    Active Range of Motion     Left Wrist   Wrist flexion: 79 degrees   Wrist extension: 82 degrees     Right Wrist   Wrist flexion: 50 degrees   Wrist extension: 56 degrees     Strength/Myotome Testing     Left Wrist/Hand   Wrist extension: 4+  Wrist flexion: 4+  Radial deviation: 4+  Ulnar deviation: 4+     (2nd hand position)     Trial 1: 40 lbs    Trial 2: 38 lbs    Trial 3: 38 lbs    Average: 38.67 lbs    Right Wrist/Hand   Wrist extension: 3+  Wrist flexion: 3+  Radial deviation: 3+  Ulnar deviation: 3+     (2nd hand position)     Trial 1: 2 lbs    Trial 2: 4 lbs    Trial 3: 3 lbs    Average: 3 lbs    Tests     Right Wrist/Hand   Positive Finkelstein's.   Negative Phalen's sign and Tinel's sign (medial nerve).          Assessment & Plan     Assessment  Impairments: abnormal muscle firing, abnormal or restricted ROM, activity intolerance, impaired physical strength, lacks appropriate home exercise program, pain with function and weight-bearing intolerance  Assessment details: Pt is a 30 y/o female referred to therapy for treatment of a right wrist strain.  Pt presents with decreased ROM, radial pain and decreased  strength impairing occupational tasks.  Barriers to therapy: none  Prognosis: good  Prognosis details: Pt has good prognosis to achieve goals.  Functional Limitations: carrying objects, lifting, pulling, pushing and uncomfortable because of pain  Goals  Plan Goals: STG (2 weeks)  1 Pt will be instructed in a HEP.  2 Pt will improve her right  strength to 30 pounds.  3 Pt will improve right wrist ROM to 70 degrees flexion and extension.    LTG (4 weeks)  1 Pt will improve her right wrist  to 40 pounds.  2 Pt will report pain no greater than 3/10 with lifting and housekeeping.  3 Pt will improve  right wrist strength to 4/5.          Plan  Therapy options: will be seen for skilled physical therapy services  Planned modality interventions: TENS, ultrasound, thermotherapy (paraffin bath), thermotherapy (hydrocollator packs), iontophoresis and cryotherapy  Planned therapy interventions: manual therapy, ADL retraining, motor coordination training, balance/weight-bearing training, body mechanics training, orthotic fitting/training, postural training, prosthetic fitting/training, soft tissue mobilization, functional ROM exercises, flexibility, strengthening, home exercise program, joint mobilization and transfer training  Duration in visits: 8  Duration in weeks: 4  Treatment plan discussed with: patient  Plan details: Will follow for improved wrist stability and safe return to occupational tasks.        Manual Therapy:         mins  37499;  Therapeutic Exercise:    11     mins  96791;     Neuromuscular Vladimir:        mins  32743;    Therapeutic Activity:          mins  60450;     Gait Training:           mins  73239;     Ultrasound:    8      mins  82703;    Electrical Stimulation:         mins  74241 ( );  Dry Needling          mins self-pay    Timed Treatment:      mins   Total Treatment:      55  mins    PT SIGNATURE: Peter Thomas, PT   DATE TREATMENT INITIATED: 9/9/2019    Initial Certification  Certification Period: 12/8/2019  I certify that the therapy services are furnished while this patient is under my care.  The services outlined above are required by this patient, and will be reviewed every 90 days.     PHYSICIAN: Diana Rothman APRN      DATE:     Please sign and return via fax to [phone].. Thank you, [facility].

## 2019-09-12 ENCOUNTER — OFFICE VISIT (OUTPATIENT)
Dept: PHYSICAL THERAPY | Facility: CLINIC | Age: 29
End: 2019-09-12

## 2019-09-12 DIAGNOSIS — S63.501D SPRAIN OF RIGHT WRIST, SUBSEQUENT ENCOUNTER: Primary | ICD-10-CM

## 2019-09-12 DIAGNOSIS — M25.531 RIGHT WRIST PAIN: ICD-10-CM

## 2019-09-12 DIAGNOSIS — M25.631 DECREASED JOINT MOBILITY OF WRIST, RIGHT: ICD-10-CM

## 2019-09-12 PROCEDURE — 97110 THERAPEUTIC EXERCISES: CPT | Performed by: PHYSICAL THERAPIST

## 2019-09-12 PROCEDURE — 97140 MANUAL THERAPY 1/> REGIONS: CPT | Performed by: PHYSICAL THERAPIST

## 2019-09-12 PROCEDURE — 97018 PARAFFIN BATH THERAPY: CPT | Performed by: PHYSICAL THERAPIST

## 2019-09-12 NOTE — PROGRESS NOTES
Patient: Pili Webster   : 1990  Referring practitioner: IVELISSE Jj  Date of Initial Visit: Type: THERAPY  Noted: 2019  Today's Date: 2019  Patient seen for 2 sessions           Subjective:  Patient reports 4/10 right wrist pain prior tx.  Otherwise pt states of no new complaints/ changes.       Objective   See Exercise, Manual, and Modality Logs for complete treatment.       Assessment/Plan:  Patient completed today's session w/ reports of slight decrease in pain following, 310.  Pt received MH w/ paraffin to R) wrist f/b therex as listed and manual rx.  Therex performed w/ focus on restoring right wrist range of motion, strength and functional mobility.  Treatment concluded pulsed US.  Pt given cues and demonstration throughout session for improved mechanics and for max benefit w/TE.  Pt will be progressed as tolerated.  No adverse reactions observed during and/ or following session.  Continue with PT's POC and progress tx as tolerated.        Visit Diagnoses:    ICD-10-CM ICD-9-CM   1. Sprain of right wrist, subsequent encounter S63.501D V58.89     842.00   2. Right wrist pain M25.531 719.43   3. Decreased joint mobility of wrist, right M25.631 719.53       Progress per Plan of Care and Progress strengthening /stabilization /functional activity           Timed:  Manual Therapy:    10     mins  47227;  Therapeutic Exercise:    24     mins  00891;     Neuromuscular Vladimir:        mins  71603;    Therapeutic Activity:          mins  73818;     Gait Training:           mins  22267;     Ultrasound:     8     mins  40855;    Electrical Stimulation:         mins  32812 ( );      Untimed:  Electrical Stimulation:         mins  65672 ( );  Mechanical Traction:         mins  18393;   Paraffin:  10min    Timed Treatment:   42   mins   Total Treatment:     52   mins  Janet Lisa. DAVID Parikh  Physical Therapist

## 2019-09-16 ENCOUNTER — OFFICE VISIT (OUTPATIENT)
Dept: PHYSICAL THERAPY | Facility: CLINIC | Age: 29
End: 2019-09-16

## 2019-09-16 ENCOUNTER — OFFICE VISIT (OUTPATIENT)
Dept: UROLOGY | Facility: CLINIC | Age: 29
End: 2019-09-16

## 2019-09-16 VITALS — HEIGHT: 59 IN | WEIGHT: 143.2 LBS | BODY MASS INDEX: 28.87 KG/M2

## 2019-09-16 DIAGNOSIS — N20.1 URETERAL CALCULUS: Primary | ICD-10-CM

## 2019-09-16 DIAGNOSIS — S63.501D SPRAIN OF RIGHT WRIST, SUBSEQUENT ENCOUNTER: Primary | ICD-10-CM

## 2019-09-16 DIAGNOSIS — M25.531 RIGHT WRIST PAIN: ICD-10-CM

## 2019-09-16 DIAGNOSIS — M25.631 DECREASED JOINT MOBILITY OF WRIST, RIGHT: ICD-10-CM

## 2019-09-16 PROCEDURE — 97018 PARAFFIN BATH THERAPY: CPT | Performed by: PHYSICAL THERAPIST

## 2019-09-16 PROCEDURE — 97140 MANUAL THERAPY 1/> REGIONS: CPT | Performed by: PHYSICAL THERAPIST

## 2019-09-16 PROCEDURE — 97110 THERAPEUTIC EXERCISES: CPT | Performed by: PHYSICAL THERAPIST

## 2019-09-16 PROCEDURE — 99213 OFFICE O/P EST LOW 20 MIN: CPT | Performed by: UROLOGY

## 2019-09-16 RX ORDER — ATENOLOL 25 MG/1
25 TABLET ORAL DAILY
COMMUNITY
End: 2021-07-29 | Stop reason: ALTCHOICE

## 2019-09-16 RX ORDER — POTASSIUM CITRATE 10 MEQ/1
10 TABLET, EXTENDED RELEASE ORAL
Qty: 60 TABLET | Refills: 3 | Status: SHIPPED | OUTPATIENT
Start: 2019-09-16 | End: 2020-01-07

## 2019-09-16 NOTE — PROGRESS NOTES
Patient: Pili Webster   : 1990  Referring practitioner: IVELISSE Jj  Date of Initial Visit: Type: THERAPY  Noted: 2019  Today's Date: 2019  Patient seen for 3 sessions           Subjective:  Patient states she tolerated previous session well w/ no complaints.  Pt reports 3/10 R) wrist pain prior to tx.     Objective   See Exercise, Manual, and Modality Logs for complete treatment.       Assessment/Plan:  Patient completed today's tx w/ reports of 2/10 pain post session.  Pt received MH w/ paraffin f/b manual rx and therex as listed.  Treatment concluded with pulsed US and cryotherapy.  Pt required cues and demonstration throughout for improved mechanics and for max benefit w/ activities.  Pt initiated velcro twist w/ good tolerance, however fatigue noted.  Pt will be progressed as tolerated. Continue with PT's POC and progress tx as tolerated by patient.       Visit Diagnoses:    ICD-10-CM ICD-9-CM   1. Sprain of right wrist, subsequent encounter S63.501D V58.89     842.00   2. Right wrist pain M25.531 719.43   3. Decreased joint mobility of wrist, right M25.631 719.53       Progress per Plan of Care and Progress strengthening /stabilization /functional activity           Timed:  Manual Therapy:    10     mins  02100;  Therapeutic Exercise:    25     mins  38204;     Neuromuscular Vladimir:        mins  99658;    Therapeutic Activity:          mins  24205;     Gait Training:           mins  97170;     Ultrasound:     8     mins  91443;    Electrical Stimulation:         mins  26948 ( );  Paraffin:  10     Untimed:  Electrical Stimulation:         mins  81752 ( );  Mechanical Traction:         mins  03995;     Timed Treatment:  53    mins   Total Treatment:     58   mins  Janet Lisa. DAVID Parikh  Physical Therapist

## 2019-09-16 NOTE — PROGRESS NOTES
Chief Complaint:          Chief Complaint   Patient presents with   • Nephrolithiasis     1 month fu        HPI:   29 y.o. returns today having been seen yesterday.  I repeated her KUB .  I do not see a radio opaque stone in the renal fossa.  She says she's had nausea no vomiting chills but no fever and right flank pain I'm want to give her pain medication and alpha blockade see her back in a week if I can't see it again I'm I recommend a stone CT and probable intervention if she persists with pain.  She returns today.  She still says she has nausea and occasional vomiting, no fevers, no chills, there is no right flank pain and negative CT scan and negative KUB a month ago a recent breast biopsy I went ahead and repeated the KUB which is again negative I'll notify her of the results by telephone.  I don't believe this is related to the urological system.  She had severe pain and went to the emergency room she had a CT scan that showed a right proximal 4 mm stone.  She is desirous of surgical intervention.  I discussed the surgical treatment with her and well set this up for her as soon as possible she has both alpha blockade and pain medication for control until the time of the surgery.  She returns today she is a lot better her pains gone she is having no fevers chills no nausea no vomiting.  She's getting get a KUB on notify her of the results at [phone].  Today she went to emergency room with a questionable UTI and had a CT the stone CT showed hyperdense pyramids but no other abnormality the pains both right and left and sequel.  There is nausea no vomiting she also has a left ovarian cyst which seems to be more the locus of her pain I am to give her some reassurance put her on pain medication use on a as needed basis and see her back in about 3 weeks if she continues with pain will recommend intervention but right now I do not find anything anatomic that requires urgent intervention.  She is passed 3 more  stones.  This is at work.  He has no fevers or chills no nausea vomiting or diarrhea.  She has irritable bowel syndrome with both constipation and diarrhea she has alpha blockade I gave her additional hydrocodone and get a 24 urine and then see her back based on this.  She returns today to review her 24-hour urine report was actually impressive.  She has low citrate and unfortunately a urine volume of  of 300 cc.  She absolutely needs to drink more water and this was impressed upon her she does not drink cola products but I want to put her on Urocit-K and strongly recommend that she      Past Medical History:        Past Medical History:   Diagnosis Date   • Abdominal pain    • Anxiety and depression    • Arthritis    • Cholecystitis    • Constipation    • Frequent UTI    • GERD (gastroesophageal reflux disease)    • Heartburn    • Hemorrhoids    • Kidney stones    • Lesion of breast    • Lump     RIGHT BREAST   • Nausea & vomiting    • PCOS (polycystic ovarian syndrome)    • PONV (postoperative nausea and vomiting)    • Tachycardia          Current Meds:     Current Outpatient Medications   Medication Sig Dispense Refill   • aspirin 81 MG EC tablet Take 81 mg by mouth Daily. LAS T DOSE 11/26/2018     • atenolol (TENORMIN) 25 MG tablet Take 25 mg by mouth Daily.     • loratadine (CLARITIN) 10 MG tablet 10 mg Daily.     • montelukast (SINGULAIR) 10 MG tablet Take 10 mg by mouth Every Night.     • ondansetron ODT (ZOFRAN-ODT) 4 MG disintegrating tablet Take 1 tablet by mouth Every 6 (Six) Hours As Needed for Nausea or Vomiting. 12 tablet 0   • pantoprazole (PROTONIX) 40 MG EC tablet Take 40 mg by mouth Daily.     • polyethylene glycol (MIRALAX) packet Take 17 g by mouth Daily.     • Prenatal Vit-DSS-Fe Cbn-FA (PRENATAL AD PO) Take  by mouth.     • rOPINIRole (REQUIP) 0.5 MG tablet      • tamsulosin (FLOMAX) 0.4 MG capsule 24 hr capsule        No current facility-administered medications for this visit.          Allergies:      Allergies   Allergen Reactions   • Peanut-Containing Drug Products Anaphylaxis   • Latex Hives   • Shrimp Swelling     Facial swelling     • Milk-Related Compounds Nausea And Vomiting   • Sulfa Antibiotics Rash        Past Surgical History:     Past Surgical History:   Procedure Laterality Date   • ABDOMINAL SURGERY     • BREAST BIOPSY Right 11/29/2018    Procedure: breast biopsy ;  Surgeon: Shiv Overton MD;  Location: [facility];  Service: General   • CHOLECYSTECTOMY N/A 8/25/2017    Procedure: CHOLECYSTECTOMY LAPAROSCOPIC;  Surgeon: Franco Mari MD;  Location: [facility];  Service:    • DENTAL PROCEDURE     • DIAGNOSTIC LAPAROSCOPY      x2   • DILATATION AND CURETTAGE     • URETEROSCOPY LASER LITHOTRIPSY WITH STENT INSERTION Right 1/9/2019    Procedure: URETEROSCOPY LASER LITHOTRIPSY retrograde pyelogram;  Surgeon: Ahmet Barrow MD;  Location: [facility];  Service: Urology   • WISDOM TOOTH EXTRACTION           Social History:     Social History     Socioeconomic History   • Marital status:      Spouse name: Not on file   • Number of children: Not on file   • Years of education: Not on file   • Highest education level: Not on file   Tobacco Use   • Smoking status: Current Every Day Smoker     Packs/day: 0.50     Years: 15.00     Pack years: 7.50     Types: Cigarettes   • Smokeless tobacco: Never Used   Substance and Sexual Activity   • Alcohol use: No     Frequency: Never   • Drug use: No   • Sexual activity: Defer     Birth control/protection: None       Family History:     Family History   Problem Relation Age of Onset   • Breast cancer Maternal Aunt    • Alcohol abuse Paternal Grandfather    • Heart disease Paternal Grandfather    • Cancer Maternal Grandfather    • Hypertension Maternal Grandfather        Review of Systems:     Review of Systems   Constitutional: Negative.  Negative for activity change, appetite change, chills, diaphoresis, fatigue and unexpected  weight change.   HENT: Negative for congestion, dental problem, drooling, ear discharge, ear pain, facial swelling, hearing loss, mouth sores, nosebleeds, postnasal drip, rhinorrhea, sinus pressure, sneezing, sore throat, tinnitus, trouble swallowing and voice change.    Eyes: Negative.  Negative for photophobia, pain, discharge, redness, itching and visual disturbance.   Respiratory: Negative.  Negative for apnea, cough, choking, chest tightness, shortness of breath, wheezing and stridor.    Cardiovascular: Negative.  Negative for chest pain, palpitations and leg swelling.   Gastrointestinal: Negative.  Negative for abdominal distention, abdominal pain, anal bleeding, blood in stool, constipation, diarrhea, nausea, rectal pain and vomiting.   Endocrine: Negative.  Negative for cold intolerance, heat intolerance, polydipsia, polyphagia and polyuria.   Musculoskeletal: Negative.  Negative for arthralgias, back pain, gait problem, joint swelling, myalgias, neck pain and neck stiffness.   Skin: Negative.  Negative for color change, pallor, rash and wound.   Allergic/Immunologic: Negative.  Negative for environmental allergies, food allergies and immunocompromised state.   Neurological: Negative.  Negative for dizziness, tremors, seizures, syncope, facial asymmetry, speech difficulty, weakness, light-headedness, numbness and headaches.   Hematological: Negative.  Negative for adenopathy. Does not bruise/bleed easily.   Psychiatric/Behavioral: Negative for agitation, behavioral problems, confusion, decreased concentration, dysphoric mood, hallucinations, self-injury, sleep disturbance and suicidal ideas. The patient is not nervous/anxious and is not hyperactive.    All other systems reviewed and are negative.      Physical Exam:     Physical Exam   Constitutional: She appears well-developed and well-nourished.   HENT:   Head: Normocephalic and atraumatic.   Right Ear: External ear normal.   Left Ear: External ear normal.    Mouth/Throat: Oropharynx is clear and moist.   Eyes: Conjunctivae are normal. Pupils are equal, round, and reactive to light.   Cardiovascular: Normal rate, regular rhythm, normal heart sounds and intact distal pulses.   Pulmonary/Chest: Effort normal and breath sounds normal.   Abdominal: Soft. Bowel sounds are normal. She exhibits no distension and no mass. There is no tenderness. There is no rebound and no guarding.   Genitourinary: No vaginal discharge found.   Musculoskeletal: Normal range of motion.   Neurological: She is alert. She has normal reflexes.   Skin: Skin is warm and dry.   Psychiatric: She has a normal mood and affect. Her behavior is normal. Judgment and thought content normal.       I have reviewed the following portions of the patient's history: allergies, current medications, past family history, past medical history, past social history, past surgical history, problem list and ROS and confirm it's accurate.      Procedure:       Assessment/Plan:   Metabolically active urolithiasis-clearly secondary to very substandard urine volume of 300 cc and significantly low citrate I will start her on Urocit-K and recommend strongly she increase her fluids            Patient's Body mass index is 28.92 kg/m². BMI is above normal parameters. Recommendations include: educational material.              This document has been electronically signed by IMELDA THOMSON MD September 16, 2019 10:54 AM

## 2019-09-19 ENCOUNTER — TREATMENT (OUTPATIENT)
Dept: PHYSICAL THERAPY | Facility: CLINIC | Age: 29
End: 2019-09-19

## 2019-09-19 DIAGNOSIS — M25.531 RIGHT WRIST PAIN: ICD-10-CM

## 2019-09-19 DIAGNOSIS — M25.631 DECREASED JOINT MOBILITY OF WRIST, RIGHT: ICD-10-CM

## 2019-09-19 DIAGNOSIS — S63.501D SPRAIN OF RIGHT WRIST, SUBSEQUENT ENCOUNTER: Primary | ICD-10-CM

## 2019-09-19 PROCEDURE — 97140 MANUAL THERAPY 1/> REGIONS: CPT | Performed by: PHYSICAL THERAPIST

## 2019-09-19 PROCEDURE — 97110 THERAPEUTIC EXERCISES: CPT | Performed by: PHYSICAL THERAPIST

## 2019-09-19 PROCEDURE — 97018 PARAFFIN BATH THERAPY: CPT | Performed by: PHYSICAL THERAPIST

## 2019-09-19 NOTE — PROGRESS NOTES
Physical Therapy Daily Progress Note      Patient: Pili Webster   : 1990  Referring practitioner: IVELISSE Jj  Date of Initial Visit: No linked episodes  Today's Date: 2019  Patient seen for Visit count could not be calculated. Make sure you are using a visit which is associated with an episode. sessions  Visit Diagnoses:    ICD-10-CM ICD-9-CM   1. Sprain of right wrist, subsequent encounter S63.501D V58.89     842.00   2. Right wrist pain M25.531 719.43   3. Decreased joint mobility of wrist, right M25.631 719.53              Subjective Evaluation    History of Present Illness    Subjective comment: Pt reports mild right hand pain prior to today's session, reporting improved stiffness.Pain  Current pain ratin           Objective   See Exercise, Manual, and Modality Logs for complete treatment.       Assessment & Plan     Assessment  Assessment details: Today's session included therapeutic exercises to address right wrist ROM,  strength, and fine motor skills. The session was initiated with moist heat combined with paraffin and concluded with therapeutic ultrasound to the right forearm. STM was also performed. The patient reported decreased pain upon conclusion.     Plan  Plan details: Increase  strengthening and ROM with increased resistance.          Progress per Plan of Care           Timed:  Manual Therapy:   13      mins  11064;  Therapeutic Exercise:    26     mins  58910;     Neuromuscular Vladimir:        mins  57035;    Therapeutic Activity:          mins  20261;     Gait Training:           mins  00603;     Ultrasound:    8    mins  47969;    Electrical Stimulation:        mins  09076 ( );    Untimed:  Paraffin:                     __10_ mins   40447  Electrical Stimulation:         mins  55361 ( );  Mechanical Traction:         mins  41472;     Ashley Claudene Dalton, PT       Timed Treatment:    47  mins   Total Treatment:     57   mins  Ina  Claudene Dalton, PT  Physical Therapist

## 2019-09-23 ENCOUNTER — TREATMENT (OUTPATIENT)
Dept: PHYSICAL THERAPY | Facility: CLINIC | Age: 29
End: 2019-09-23

## 2019-09-23 DIAGNOSIS — M25.631 DECREASED JOINT MOBILITY OF WRIST, RIGHT: ICD-10-CM

## 2019-09-23 DIAGNOSIS — S63.501D SPRAIN OF RIGHT WRIST, SUBSEQUENT ENCOUNTER: Primary | ICD-10-CM

## 2019-09-23 DIAGNOSIS — M25.531 RIGHT WRIST PAIN: ICD-10-CM

## 2019-09-23 PROCEDURE — 97035 APP MDLTY 1+ULTRASOUND EA 15: CPT | Performed by: PHYSICAL THERAPIST

## 2019-09-23 PROCEDURE — 97140 MANUAL THERAPY 1/> REGIONS: CPT | Performed by: PHYSICAL THERAPIST

## 2019-09-23 PROCEDURE — 97110 THERAPEUTIC EXERCISES: CPT | Performed by: PHYSICAL THERAPIST

## 2019-09-23 PROCEDURE — 97018 PARAFFIN BATH THERAPY: CPT | Performed by: PHYSICAL THERAPIST

## 2019-09-23 NOTE — PROGRESS NOTES
"  Patient: Pili Webster   : 1990  Referring practitioner: IVELISSE Jj  Date of Initial Visit: Type: THERAPY  Noted: 2019  Today's Date: 2019  Patient seen for 5 sessions           Subjective:  Patient arrives to therapy w/ reports of 4/10 R) wrist pain.  Pt states while performing her stretches at home yesterday, she felt a \"pop\" on the underside of her wrist.  Pt reports slight increase in pain following.       Objective   See Exercise, Manual, and Modality Logs for complete treatment.       Assessment/Plan:  Evaluating therapist, Peter Thomas, PT notified of pt's subjective reports.  PT performed assessment w/ no positive findings; no bruising or increased edema noted.  PT educated pt to perform conservative stretching and monitor symptoms; pt verbalized understanding.  Pt received MH w/ paraffin to R) wrist f/b gentle manual rx and therex as listed.  Therex reduced secondary to pt's report of increased pain, and per supervising PT recommendation.  Treatment concluded with pulsed US and cryotherapy.  Pt will be progressed as tolerated.  Will continue to monitor pt's symptoms.  Continue with PT's POC and progress tx as tolerated by patient.       Visit Diagnoses:    ICD-10-CM ICD-9-CM   1. Sprain of right wrist, subsequent encounter S63.501D V58.89     842.00   2. Right wrist pain M25.531 719.43   3. Decreased joint mobility of wrist, right M25.631 719.53       Progress per Plan of Care and Progress strengthening /stabilization /functional activity           Timed:  Manual Therapy:    10     mins  42115;  Therapeutic Exercise:     20    mins  34375;     Neuromuscular Vladimir:        mins  92115;    Therapeutic Activity:          mins  04584;     Gait Training:           mins  89458;     Ultrasound:   8       mins  13495;    Electrical Stimulation:         mins  58140 ( );    Untimed:  Electrical Stimulation:         mins  77946 ( );  Mechanical Traction:         " mins  75598;   Paraffin:  10    Timed Treatment: 38     mins   Total Treatment:     53   mins  Janet Lisa. DAVID Parikh  Physical Therapist

## 2019-09-24 ENCOUNTER — PRIOR AUTHORIZATION (OUTPATIENT)
Dept: UROLOGY | Facility: CLINIC | Age: 29
End: 2019-09-24

## 2019-09-24 NOTE — TELEPHONE ENCOUNTER
"[facility] called to find out if patient had tried any formulary alternatives before the potassium citrate was prescribed. I looked in the patient chart and did not see any previous medications. I asked what the formulary alternatives were and she stated \"finasteride\". After speaking with Dr. Barrow I told her no, potassium citrate is given to dissolve kidney stones and finasteride not only does not dissolve stones it also cannot be prescribed to women due to risk of birth defects. She is updating PA with this information to make a determination.   "

## 2019-09-26 ENCOUNTER — TREATMENT (OUTPATIENT)
Dept: PHYSICAL THERAPY | Facility: CLINIC | Age: 29
End: 2019-09-26

## 2019-09-26 DIAGNOSIS — M25.631 DECREASED JOINT MOBILITY OF WRIST, RIGHT: ICD-10-CM

## 2019-09-26 DIAGNOSIS — M25.561 ACUTE PAIN OF RIGHT KNEE: ICD-10-CM

## 2019-09-26 DIAGNOSIS — M25.531 RIGHT WRIST PAIN: ICD-10-CM

## 2019-09-26 DIAGNOSIS — S63.501D SPRAIN OF RIGHT WRIST, SUBSEQUENT ENCOUNTER: Primary | ICD-10-CM

## 2019-09-26 PROCEDURE — PTNOCHG PR CUSTOM PT NO CHARGE VISIT

## 2019-09-26 NOTE — PROGRESS NOTES
Patient: Pili Webster   : 1990  Referring practitioner: IVELISSE Jj  Date of Initial Visit: Type: THERAPY  Noted: 2019  Today's Date: 2019  Patient seen for 6 sessions           Subjective Evaluation    History of Present Illness    Subjective comment: Pt report her MD is concerned with alena wrist and has scheduled and MRI to r/o ligament or nerve injury.       Objective   See Exercise, Manual, and Modality Logs for complete treatment.       Assessment & Plan     Assessment  Assessment details: Pt placed on hold today due to pt reported her MD has scheduled and MRI to r/o ligament or nerve injury.  MD office contacted and reported they would contact therapy tomorrow to advise on continuation or discharge of patient at this time.        Visit Diagnoses:    ICD-10-CM ICD-9-CM   1. Sprain of right wrist, subsequent encounter S63.501D V58.89     842.00   2. Right wrist pain M25.531 719.43   3. Decreased joint mobility of wrist, right M25.631 719.53   4. Acute pain of right knee M25.561 719.46       Awaiting MD orders and Other         Ina Paez PTA  Physical Therapist

## 2019-10-08 ENCOUNTER — APPOINTMENT (OUTPATIENT)
Dept: GENERAL RADIOLOGY | Facility: HOSPITAL | Age: 29
End: 2019-10-08

## 2019-10-08 ENCOUNTER — APPOINTMENT (OUTPATIENT)
Dept: CT IMAGING | Facility: HOSPITAL | Age: 29
End: 2019-10-08

## 2019-10-08 ENCOUNTER — HOSPITAL ENCOUNTER (EMERGENCY)
Facility: HOSPITAL | Age: 29
Discharge: HOME | End: 2019-10-08
Attending: EMERGENCY MEDICINE | Admitting: FAMILY MEDICINE

## 2019-10-08 VITALS
TEMPERATURE: 97.6 F | RESPIRATION RATE: 18 BRPM | HEART RATE: 102 BPM | OXYGEN SATURATION: 96 % | HEIGHT: 59 IN | WEIGHT: 143 LBS | BODY MASS INDEX: 28.83 KG/M2 | SYSTOLIC BLOOD PRESSURE: 121 MMHG | DIASTOLIC BLOOD PRESSURE: 74 MMHG

## 2019-10-08 DIAGNOSIS — E86.0 MILD DEHYDRATION: ICD-10-CM

## 2019-10-08 DIAGNOSIS — N83.201 RIGHT OVARIAN CYST: ICD-10-CM

## 2019-10-08 DIAGNOSIS — R07.89 ATYPICAL CHEST PAIN: Primary | ICD-10-CM

## 2019-10-08 LAB
A-A DO2: 41.6 MMHG (ref 0–300)
ALBUMIN SERPL-MCNC: 4.77 G/DL (ref 3.5–5.2)
ALBUMIN/GLOB SERPL: 1.2 G/DL
ALP SERPL-CCNC: 69 U/L (ref 39–117)
ALT SERPL W P-5'-P-CCNC: 12 U/L (ref 1–33)
ANION GAP SERPL CALCULATED.3IONS-SCNC: 17.6 MMOL/L (ref 5–15)
ARTERIAL PATENCY WRIST A: ABNORMAL
AST SERPL-CCNC: 12 U/L (ref 1–32)
ATMOSPHERIC PRESS: 730 MMHG
BACTERIA UR QL AUTO: ABNORMAL /HPF
BASE EXCESS BLDA CALC-SCNC: -1.6 MMOL/L (ref 0–2)
BASOPHILS # BLD AUTO: 0.02 10*3/MM3 (ref 0–0.2)
BASOPHILS NFR BLD AUTO: 0.1 % (ref 0–1.5)
BDY SITE: ABNORMAL
BILIRUB SERPL-MCNC: 0.5 MG/DL (ref 0.2–1.2)
BILIRUB UR QL STRIP: NEGATIVE
BODY TEMPERATURE: 37 C
BUN BLD-MCNC: 18 MG/DL (ref 6–20)
BUN/CREAT SERPL: 22.2 (ref 7–25)
CALCIUM SPEC-SCNC: 9.5 MG/DL (ref 8.6–10.5)
CHLORIDE SERPL-SCNC: 97 MMOL/L (ref 98–107)
CLARITY UR: ABNORMAL
CO2 BLDA-SCNC: 19 MMOL/L (ref 22–33)
CO2 SERPL-SCNC: 21.4 MMOL/L (ref 22–29)
COHGB MFR BLD: 1.6 % (ref 0–5)
COLOR UR: YELLOW
CREAT BLD-MCNC: 0.81 MG/DL (ref 0.57–1)
CRP SERPL-MCNC: 1.03 MG/DL (ref 0–0.5)
D DIMER PPP FEU-MCNC: 0.46 MCGFEU/ML (ref 0–0.5)
D-LACTATE SERPL-SCNC: 2 MMOL/L (ref 0.5–2)
DEPRECATED RDW RBC AUTO: 41.5 FL (ref 37–54)
EOSINOPHIL # BLD AUTO: 0.06 10*3/MM3 (ref 0–0.4)
EOSINOPHIL NFR BLD AUTO: 0.3 % (ref 0.3–6.2)
ERYTHROCYTE [DISTWIDTH] IN BLOOD BY AUTOMATED COUNT: 12.5 % (ref 12.3–15.4)
GFR SERPL CREATININE-BSD FRML MDRD: 82 ML/MIN/1.73
GLOBULIN UR ELPH-MCNC: 3.9 GM/DL
GLUCOSE BLD-MCNC: 90 MG/DL (ref 65–99)
GLUCOSE UR STRIP-MCNC: NEGATIVE MG/DL
HCG SERPL QL: NEGATIVE
HCO3 BLDA-SCNC: 18.4 MMOL/L (ref 20–26)
HCT VFR BLD AUTO: 45.9 % (ref 34–46.6)
HCT VFR BLD CALC: 46.7 % (ref 38–51)
HGB BLD-MCNC: 15.3 G/DL (ref 12–15.9)
HGB BLDA-MCNC: 15.2 G/DL (ref 13.5–17.5)
HGB UR QL STRIP.AUTO: NEGATIVE
HOLD SPECIMEN: NORMAL
HOROWITZ INDEX BLD+IHG-RTO: 21 %
HYALINE CASTS UR QL AUTO: ABNORMAL /LPF
IMM GRANULOCYTES # BLD AUTO: 0.06 10*3/MM3 (ref 0–0.05)
IMM GRANULOCYTES NFR BLD AUTO: 0.3 % (ref 0–0.5)
KETONES UR QL STRIP: NEGATIVE
LEUKOCYTE ESTERASE UR QL STRIP.AUTO: ABNORMAL
LYMPHOCYTES # BLD AUTO: 1.73 10*3/MM3 (ref 0.7–3.1)
LYMPHOCYTES NFR BLD AUTO: 9.2 % (ref 19.6–45.3)
Lab: ABNORMAL
MAGNESIUM SERPL-MCNC: 2 MG/DL (ref 1.6–2.6)
MCH RBC QN AUTO: 31 PG (ref 26.6–33)
MCHC RBC AUTO-ENTMCNC: 33.3 G/DL (ref 31.5–35.7)
MCV RBC AUTO: 93.1 FL (ref 79–97)
METHGB BLD QL: 0.6 % (ref 0–3)
MODALITY: ABNORMAL
MONOCYTES # BLD AUTO: 0.67 10*3/MM3 (ref 0.1–0.9)
MONOCYTES NFR BLD AUTO: 3.6 % (ref 5–12)
NEUTROPHILS # BLD AUTO: 16.32 10*3/MM3 (ref 1.7–7)
NEUTROPHILS NFR BLD AUTO: 86.5 % (ref 42.7–76)
NITRITE UR QL STRIP: NEGATIVE
NOTE: ABNORMAL
OXYHGB MFR BLDV: 95.2 % (ref 94–99)
PCO2 BLDA: 21.1 MM HG (ref 35–45)
PCO2 TEMP ADJ BLD: 21.1 MM HG (ref 35–45)
PH BLDA: 7.55 PH UNITS (ref 7.35–7.45)
PH UR STRIP.AUTO: 6 [PH] (ref 5–8)
PH, TEMP CORRECTED: 7.55 PH UNITS
PLATELET # BLD AUTO: 322 10*3/MM3 (ref 140–450)
PMV BLD AUTO: 8.8 FL (ref 6–12)
PO2 BLDA: 78 MM HG (ref 83–108)
PO2 TEMP ADJ BLD: 78 MM HG (ref 83–108)
POTASSIUM BLD-SCNC: 4 MMOL/L (ref 3.5–5.2)
PROT SERPL-MCNC: 8.7 G/DL (ref 6–8.5)
PROT UR QL STRIP: NEGATIVE
RBC # BLD AUTO: 4.93 10*6/MM3 (ref 3.77–5.28)
RBC # UR: ABNORMAL /HPF
REF LAB TEST METHOD: ABNORMAL
SAO2 % BLDCOA: 97.3 % (ref 94–99)
SODIUM BLD-SCNC: 136 MMOL/L (ref 136–145)
SP GR UR STRIP: 1.02 (ref 1–1.03)
SQUAMOUS #/AREA URNS HPF: ABNORMAL /HPF
T4 FREE SERPL-MCNC: 1.16 NG/DL (ref 0.93–1.7)
TROPONIN T SERPL-MCNC: <0.01 NG/ML (ref 0–0.03)
TSH SERPL DL<=0.05 MIU/L-ACNC: 1.94 UIU/ML (ref 0.27–4.2)
UROBILINOGEN UR QL STRIP: ABNORMAL
VENTILATOR MODE: ABNORMAL
WBC NRBC COR # BLD: 18.86 10*3/MM3 (ref 3.4–10.8)
WBC UR QL AUTO: ABNORMAL /HPF
WHOLE BLOOD HOLD SPECIMEN: NORMAL
WHOLE BLOOD HOLD SPECIMEN: NORMAL

## 2019-10-08 PROCEDURE — 84439 ASSAY OF FREE THYROXINE: CPT | Performed by: EMERGENCY MEDICINE

## 2019-10-08 PROCEDURE — 71045 X-RAY EXAM CHEST 1 VIEW: CPT | Performed by: RADIOLOGY

## 2019-10-08 PROCEDURE — 96361 HYDRATE IV INFUSION ADD-ON: CPT

## 2019-10-08 PROCEDURE — 87040 BLOOD CULTURE FOR BACTERIA: CPT | Performed by: EMERGENCY MEDICINE

## 2019-10-08 PROCEDURE — 85379 FIBRIN DEGRADATION QUANT: CPT | Performed by: EMERGENCY MEDICINE

## 2019-10-08 PROCEDURE — 82805 BLOOD GASES W/O2 SATURATION: CPT

## 2019-10-08 PROCEDURE — 93005 ELECTROCARDIOGRAM TRACING: CPT | Performed by: FAMILY MEDICINE

## 2019-10-08 PROCEDURE — 74176 CT ABD & PELVIS W/O CONTRAST: CPT | Performed by: RADIOLOGY

## 2019-10-08 PROCEDURE — 82375 ASSAY CARBOXYHB QUANT: CPT

## 2019-10-08 PROCEDURE — 96375 TX/PRO/DX INJ NEW DRUG ADDON: CPT

## 2019-10-08 PROCEDURE — 86140 C-REACTIVE PROTEIN: CPT | Performed by: EMERGENCY MEDICINE

## 2019-10-08 PROCEDURE — 93005 ELECTROCARDIOGRAM TRACING: CPT | Performed by: EMERGENCY MEDICINE

## 2019-10-08 PROCEDURE — 25010000002 KETOROLAC TROMETHAMINE PER 15 MG: Performed by: EMERGENCY MEDICINE

## 2019-10-08 PROCEDURE — 83735 ASSAY OF MAGNESIUM: CPT | Performed by: EMERGENCY MEDICINE

## 2019-10-08 PROCEDURE — 84484 ASSAY OF TROPONIN QUANT: CPT | Performed by: EMERGENCY MEDICINE

## 2019-10-08 PROCEDURE — 99284 EMERGENCY DEPT VISIT MOD MDM: CPT

## 2019-10-08 PROCEDURE — 81001 URINALYSIS AUTO W/SCOPE: CPT | Performed by: EMERGENCY MEDICINE

## 2019-10-08 PROCEDURE — 25010000002 ONDANSETRON PER 1 MG: Performed by: EMERGENCY MEDICINE

## 2019-10-08 PROCEDURE — 84703 CHORIONIC GONADOTROPIN ASSAY: CPT | Performed by: EMERGENCY MEDICINE

## 2019-10-08 PROCEDURE — 93010 ELECTROCARDIOGRAM REPORT: CPT | Performed by: INTERNAL MEDICINE

## 2019-10-08 PROCEDURE — 96374 THER/PROPH/DIAG INJ IV PUSH: CPT

## 2019-10-08 PROCEDURE — 80050 GENERAL HEALTH PANEL: CPT | Performed by: EMERGENCY MEDICINE

## 2019-10-08 PROCEDURE — 25010000002 LORAZEPAM PER 2 MG: Performed by: EMERGENCY MEDICINE

## 2019-10-08 PROCEDURE — 36600 WITHDRAWAL OF ARTERIAL BLOOD: CPT

## 2019-10-08 PROCEDURE — 74176 CT ABD & PELVIS W/O CONTRAST: CPT

## 2019-10-08 PROCEDURE — 83050 HGB METHEMOGLOBIN QUAN: CPT

## 2019-10-08 PROCEDURE — 83605 ASSAY OF LACTIC ACID: CPT | Performed by: EMERGENCY MEDICINE

## 2019-10-08 PROCEDURE — 71045 X-RAY EXAM CHEST 1 VIEW: CPT

## 2019-10-08 RX ORDER — LORAZEPAM 2 MG/ML
1 INJECTION INTRAMUSCULAR ONCE
Status: COMPLETED | OUTPATIENT
Start: 2019-10-08 | End: 2019-10-08

## 2019-10-08 RX ORDER — ONDANSETRON 2 MG/ML
4 INJECTION INTRAMUSCULAR; INTRAVENOUS ONCE
Status: COMPLETED | OUTPATIENT
Start: 2019-10-08 | End: 2019-10-08

## 2019-10-08 RX ORDER — TRAMADOL HYDROCHLORIDE 50 MG/1
50 TABLET ORAL EVERY 6 HOURS PRN
Qty: 15 TABLET | Refills: 0 | Status: SHIPPED | OUTPATIENT
Start: 2019-10-08 | End: 2019-11-13

## 2019-10-08 RX ORDER — SODIUM CHLORIDE 9 MG/ML
125 INJECTION, SOLUTION INTRAVENOUS CONTINUOUS
Status: DISCONTINUED | OUTPATIENT
Start: 2019-10-08 | End: 2019-10-09 | Stop reason: HOSPADM

## 2019-10-08 RX ORDER — KETOROLAC TROMETHAMINE 30 MG/ML
15 INJECTION, SOLUTION INTRAMUSCULAR; INTRAVENOUS ONCE
Status: COMPLETED | OUTPATIENT
Start: 2019-10-08 | End: 2019-10-08

## 2019-10-08 RX ADMIN — SODIUM CHLORIDE 1947 ML: 9 INJECTION, SOLUTION INTRAVENOUS at 18:12

## 2019-10-08 RX ADMIN — ONDANSETRON 4 MG: 2 INJECTION, SOLUTION INTRAMUSCULAR; INTRAVENOUS at 18:22

## 2019-10-08 RX ADMIN — KETOROLAC TROMETHAMINE 15 MG: 30 INJECTION, SOLUTION INTRAMUSCULAR; INTRAVENOUS at 18:22

## 2019-10-08 RX ADMIN — SODIUM CHLORIDE 125 ML/HR: 9 INJECTION, SOLUTION INTRAVENOUS at 17:29

## 2019-10-08 RX ADMIN — SODIUM CHLORIDE 1000 ML: 9 INJECTION, SOLUTION INTRAVENOUS at 17:28

## 2019-10-08 RX ADMIN — LORAZEPAM 1 MG: 2 INJECTION INTRAMUSCULAR; INTRAVENOUS at 17:29

## 2019-10-08 NOTE — ED PROVIDER NOTES
Subjective   Patient is a 29-year-old female who presents complaining the onset approximately 1 hour prior to arrival of sharp stabbing left anterior chest pain associated with shortness of breath.  She states that yesterday she was nauseated and had 2 episodes of emesis; this was associated with left lower back pain, she feels that she may have a kidney stone.  She continues to have left lower back pain, but it is not as severe.  She denies fever, chills, diaphoresis, cough, hemoptysis, abdominal pain, hematemesis, hematochezia or melena, syncope or near syncope, focal numbness or weakness, other symptoms or other complaints.  Past medical history is notable for an unspecified tachycardia for which she takes atenolol.  She did have this today.  She states that she had normal menses a week and a half ago, denies pregnancy.            Review of Systems   Constitutional: Negative for chills, diaphoresis and fever.   HENT: Negative for ear pain, sore throat and trouble swallowing.    Eyes: Negative for photophobia and pain.   Respiratory: Positive for shortness of breath. Negative for wheezing and stridor.    Cardiovascular: Positive for chest pain. Negative for palpitations.   Gastrointestinal: Positive for nausea and vomiting. Negative for abdominal distention, abdominal pain, blood in stool and diarrhea.   Endocrine: Negative for polydipsia and polyphagia.   Genitourinary: Negative for difficulty urinating and flank pain.   Musculoskeletal: Negative for back pain, neck pain and neck stiffness.   Skin: Negative for color change and pallor.   Neurological: Negative for seizures, syncope and speech difficulty.   Psychiatric/Behavioral: Negative for confusion.   All other systems reviewed and are negative.      Past Medical History:   Diagnosis Date   • Abdominal pain    • Anxiety and depression    • Arthritis    • Cholecystitis    • Constipation    • Frequent UTI    • GERD (gastroesophageal reflux disease)    •  Heartburn    • Hemorrhoids    • Kidney stones    • Lesion of breast    • Lump     RIGHT BREAST   • Nausea & vomiting    • PCOS (polycystic ovarian syndrome)    • PONV (postoperative nausea and vomiting)    • Tachycardia        Allergies   Allergen Reactions   • Peanut-Containing Drug Products Anaphylaxis   • Latex Hives   • Shrimp Swelling     Facial swelling     • Milk-Related Compounds Nausea And Vomiting   • Sulfa Antibiotics Rash       Past Surgical History:   Procedure Laterality Date   • ABDOMINAL SURGERY     • BREAST BIOPSY Right 11/29/2018    Procedure: breast biopsy ;  Surgeon: Shiv Overton MD;  Location: [facility];  Service: General   • CHOLECYSTECTOMY N/A 8/25/2017    Procedure: CHOLECYSTECTOMY LAPAROSCOPIC;  Surgeon: Franco Mari MD;  Location: [facility];  Service:    • DENTAL PROCEDURE     • DIAGNOSTIC LAPAROSCOPY      x2   • DILATATION AND CURETTAGE     • URETEROSCOPY LASER LITHOTRIPSY WITH STENT INSERTION Right 1/9/2019    Procedure: URETEROSCOPY LASER LITHOTRIPSY retrograde pyelogram;  Surgeon: Ahmet Barrow MD;  Location: [facility];  Service: Urology   • WISDOM TOOTH EXTRACTION         Family History   Problem Relation Age of Onset   • Breast cancer Maternal Aunt    • Alcohol abuse Paternal Grandfather    • Heart disease Paternal Grandfather    • Cancer Maternal Grandfather    • Hypertension Maternal Grandfather        Social History     Socioeconomic History   • Marital status:      Spouse name: Not on file   • Number of children: Not on file   • Years of education: Not on file   • Highest education level: Not on file   Tobacco Use   • Smoking status: Current Every Day Smoker     Packs/day: 0.50     Years: 15.00     Pack years: 7.50     Types: Cigarettes   • Smokeless tobacco: Never Used   Substance and Sexual Activity   • Alcohol use: No     Frequency: Never   • Drug use: No   • Sexual activity: Defer     Birth control/protection: None           Objective   Physical  Exam   Constitutional: She is oriented to person, place, and time. She appears well-developed and well-nourished.   Well-developed well-nourished white female, appears quite anxious, not otherwise in apparent distress.   HENT:   Head: Normocephalic and atraumatic.   Eyes: EOM are normal. Pupils are equal, round, and reactive to light. No scleral icterus.   Neck: Normal range of motion. Neck supple. No neck rigidity. No tracheal deviation present.   Cardiovascular: Normal rate, regular rhythm and intact distal pulses.   Pulmonary/Chest: Effort normal and breath sounds normal. No respiratory distress. She exhibits tenderness (There is tenderness to palpation of the left sternal border costochondral junctions and left upper pectoral musculature.  This seems to reproduce her pain.).   Abdominal: Soft. Bowel sounds are normal. There is no tenderness. There is no rebound and no guarding.   Musculoskeletal: Normal range of motion. She exhibits no tenderness.        Right lower leg: She exhibits no tenderness and no edema.        Left lower leg: She exhibits no tenderness and no edema.   Neurological: She is alert and oriented to person, place, and time. She has normal strength. No sensory deficit. She exhibits normal muscle tone. Coordination normal. GCS eye subscore is 4. GCS verbal subscore is 5. GCS motor subscore is 6.   Skin: Skin is warm and dry. Capillary refill takes less than 2 seconds. No cyanosis. No pallor.   Psychiatric: Her behavior is normal. Her mood appears anxious.   Nursing note and vitals reviewed.      Procedures  EKG shows sinus tachycardia with a rate of 134.  No apparent acute ischemia.         ED Course  ED Course as of Oct 08 2124   Tue Oct 08, 2019   1851 Endorsed to Dr. Cruz.  [CM]      ED Course User Index  [CM] Matthew Pichardo MD                  MDM  Number of Diagnoses or Management Options  Atypical chest pain: new and requires workup  Mild dehydration: new and requires workup  Right  ovarian cyst: new and requires workup     Amount and/or Complexity of Data Reviewed  Clinical lab tests: ordered and reviewed  Tests in the radiology section of CPT®: reviewed and ordered  Tests in the medicine section of CPT®: reviewed and ordered  Independent visualization of images, tracings, or specimens: yes    Risk of Complications, Morbidity, and/or Mortality  Presenting problems: high  Diagnostic procedures: moderate  Management options: moderate    Patient Progress  Patient progress: stable      Final diagnoses:   Atypical chest pain   Right ovarian cyst   Mild dehydration              Marisela Cruz DO  10/08/19 9168

## 2019-10-08 NOTE — ED NOTES
Patient resting on stretcher at this time with family at bedside. Patient and family updated on wait times and plan of care. She reports her pain is better. No needs or concerns voiced at this time. NADA. VSS. Will continue to monitor and follow plan of care. Call light within reach and side rails up x2.        Dave Oden RN  10/08/19 195

## 2019-10-08 NOTE — ED NOTES
Patient resting on stretcher at this time with family at bedside. Patient and family updated on wait times and plan of care. No needs or concerns voiced at this time. Patient requested something for pain, Dr. Kylee FUCHS. VSS. Will continue to monitor and follow plan of care. Call light within reach and side rails up x2.        Dave Oden RN  10/08/19 3870

## 2019-10-09 NOTE — ED NOTES
Patient resting on stretcher at this time and family at bedside. Patient and family updated on wait times and plan of care. No needs or concerns voiced at this time. NADA. VSS. Will continue to monitor and follow plan of care. Call light within reach and side rails up x2.        Dave Oden, RN  10/08/19 1273

## 2019-10-09 NOTE — ED NOTES
Patient resting on stretcher at this time with family at bedside. Patient and family updated on wait times and plan of care. No needs or concerns voiced at this time. NADA. VSS. Will continue to monitor and follow plan of care. Call light within reach and side rails up x2.        Dave Oden, DEBBIE  10/08/19 2027

## 2019-10-09 NOTE — ED NOTES
Patient discharged from ED at this time. Patient verbalized understanding of discharge instructions. Patient verbalized understanding of medication and follow up care. No needs or concerns voiced at this time. VSS. NADA.       Dave Oden RN  10/08/19 3764

## 2019-10-13 LAB
BACTERIA SPEC AEROBE CULT: NORMAL
BACTERIA SPEC AEROBE CULT: NORMAL

## 2019-10-14 ENCOUNTER — OFFICE VISIT (OUTPATIENT)
Dept: SURGERY | Facility: CLINIC | Age: 29
End: 2019-10-14

## 2019-10-14 VITALS — BODY MASS INDEX: 28.83 KG/M2 | WEIGHT: 143 LBS | HEIGHT: 59 IN

## 2019-10-14 DIAGNOSIS — K64.5 THROMBOSED EXTERNAL HEMORRHOID: Primary | ICD-10-CM

## 2019-10-14 PROCEDURE — 99212 OFFICE O/P EST SF 10 MIN: CPT | Performed by: SURGERY

## 2019-10-14 NOTE — PROGRESS NOTES
Subjective   Pili Webster is a 29 y.o. female.     Chief Complaint: hemorrhoids    History of Present Illness She has had hemorrhoid pain for a week. She has had episodes in that had to be drained. She has alternating diarrhea and constipation.     The following portions of the patient's history were reviewed and updated as appropriate: current medications, past family history, past medical history, past social history, past surgical history and problem list.    Review of Systems hemorrhoids    Objective   Physical Exam I/D done a singe thrombosed external hemorrhoid.     Assessment/Plan   Pili was seen today for hemorrhoids.    Diagnoses and all orders for this visit:    Thrombosed external hemorrhoid    return 3 weeks.

## 2019-10-23 ENCOUNTER — TELEPHONE (OUTPATIENT)
Dept: UROLOGY | Facility: CLINIC | Age: 29
End: 2019-10-23

## 2019-10-23 NOTE — TELEPHONE ENCOUNTER
Patient called concerning her potassium citrate and I called [facility] to make sure her medication was ready to be picked up. Called patient back and patient verbalized understanding to  her medication.

## 2019-11-04 ENCOUNTER — OFFICE VISIT (OUTPATIENT)
Dept: SURGERY | Facility: CLINIC | Age: 29
End: 2019-11-04

## 2019-11-04 VITALS — WEIGHT: 143 LBS | BODY MASS INDEX: 28.83 KG/M2 | HEIGHT: 59 IN

## 2019-11-04 DIAGNOSIS — K64.1 GRADE II HEMORRHOIDS: Primary | ICD-10-CM

## 2019-11-04 PROCEDURE — 99213 OFFICE O/P EST LOW 20 MIN: CPT | Performed by: SURGERY

## 2019-11-04 NOTE — H&P (VIEW-ONLY)
Subjective   Pili Webster is a 29 y.o. female.     Chief Complaint: hemorrhoids    History of Present Illness She had a thrombosed hemorrhoid drained about 3 weeks ago. It is better but she still has pain with bowel movements and occasional bleeding. Her bowels move about every 1-3 days. She is on stool softeners. The symptoms do not seem to be resolving.     The following portions of the patient's history were reviewed and updated as appropriate: current medications, past family history, past medical history, past social history, past surgical history and problem list.    Review of Systems   Constitutional: Negative for activity change, appetite change, chills, fever and unexpected weight change.   HENT: Negative for congestion, facial swelling and sore throat.    Eyes: Negative for photophobia and visual disturbance.   Respiratory: Negative for chest tightness, shortness of breath and wheezing.    Cardiovascular: Negative for chest pain, palpitations and leg swelling.   Gastrointestinal: Positive for anal bleeding and rectal pain. Negative for abdominal distention, abdominal pain, constipation, diarrhea, nausea and vomiting.   Endocrine: Negative for cold intolerance, heat intolerance, polydipsia and polyuria.   Genitourinary: Negative for difficulty urinating, dysuria, flank pain and urgency.   Musculoskeletal: Negative for back pain and myalgias.   Skin: Negative for rash and wound.   Allergic/Immunologic: Negative for immunocompromised state.   Neurological: Negative for dizziness, seizures, syncope, light-headedness, numbness and headaches.   Hematological: Negative for adenopathy. Does not bruise/bleed easily.   Psychiatric/Behavioral: Negative for behavioral problems and confusion. The patient is not nervous/anxious.        Objective   Physical Exam   Constitutional: She is oriented to person, place, and time. She appears well-developed and well-nourished. She does not appear ill. No distress.           HENT:   Head: Normocephalic. Head is without laceration. Hair is normal.   Right Ear: Hearing and ear canal normal.   Left Ear: Hearing and ear canal normal.   Nose: Nose normal. No sinus tenderness. No epistaxis. Right sinus exhibits no maxillary sinus tenderness and no frontal sinus tenderness. Left sinus exhibits no maxillary sinus tenderness and no frontal sinus tenderness.   Eyes: Conjunctivae and lids are normal. Pupils are equal, round, and reactive to light.   Neck: Normal range of motion. No JVD present. No tracheal tenderness present. No tracheal deviation present. No thyroid mass and no thyromegaly present.   Cardiovascular: Normal rate and regular rhythm. Exam reveals no gallop.   No murmur heard.  Pulmonary/Chest: Effort normal and breath sounds normal. No stridor. She has no wheezes. She exhibits no tenderness.   Abdominal: Soft. Bowel sounds are normal. She exhibits no distension, no ascites and no mass. There is no tenderness. There is no rebound and no guarding. No hernia.   Musculoskeletal: She exhibits no edema or deformity.   Lymphadenopathy:     She has no cervical adenopathy.     She has no axillary adenopathy.        Right: No inguinal and no supraclavicular adenopathy present.        Left: No inguinal and no supraclavicular adenopathy present.   Neurological: She is alert and oriented to person, place, and time. She exhibits normal muscle tone.   Skin: Skin is warm, dry and intact. No rash noted. No erythema. No pallor.   Psychiatric: She has a normal mood and affect. Her behavior is normal. Thought content normal.   Vitals reviewed.      Assessment/Plan   Pili was seen today for follow-up.    Diagnoses and all orders for this visit:    Grade II hemorrhoids

## 2019-11-12 ENCOUNTER — TELEPHONE (OUTPATIENT)
Dept: SURGERY | Facility: CLINIC | Age: 29
End: 2019-11-12

## 2019-11-13 ENCOUNTER — APPOINTMENT (OUTPATIENT)
Dept: PREADMISSION TESTING | Facility: HOSPITAL | Age: 29
End: 2019-11-13

## 2019-11-13 LAB
ANION GAP SERPL CALCULATED.3IONS-SCNC: 13.7 MMOL/L (ref 5–15)
BUN BLD-MCNC: 13 MG/DL (ref 6–20)
BUN/CREAT SERPL: 14.4 (ref 7–25)
CALCIUM SPEC-SCNC: 9.6 MG/DL (ref 8.6–10.5)
CHLORIDE SERPL-SCNC: 103 MMOL/L (ref 98–107)
CO2 SERPL-SCNC: 23.3 MMOL/L (ref 22–29)
CREAT BLD-MCNC: 0.9 MG/DL (ref 0.57–1)
DEPRECATED RDW RBC AUTO: 40.9 FL (ref 37–54)
ERYTHROCYTE [DISTWIDTH] IN BLOOD BY AUTOMATED COUNT: 11.8 % (ref 12.3–15.4)
GFR SERPL CREATININE-BSD FRML MDRD: 73 ML/MIN/1.73
GLUCOSE BLD-MCNC: 88 MG/DL (ref 65–99)
HCT VFR BLD AUTO: 39.4 % (ref 34–46.6)
HGB BLD-MCNC: 12.9 G/DL (ref 12–15.9)
MCH RBC QN AUTO: 31 PG (ref 26.6–33)
MCHC RBC AUTO-ENTMCNC: 32.7 G/DL (ref 31.5–35.7)
MCV RBC AUTO: 94.7 FL (ref 79–97)
PLATELET # BLD AUTO: 305 10*3/MM3 (ref 140–450)
PMV BLD AUTO: 9.3 FL (ref 6–12)
POTASSIUM BLD-SCNC: 4.2 MMOL/L (ref 3.5–5.2)
RBC # BLD AUTO: 4.16 10*6/MM3 (ref 3.77–5.28)
SODIUM BLD-SCNC: 140 MMOL/L (ref 136–145)
WBC NRBC COR # BLD: 13.07 10*3/MM3 (ref 3.4–10.8)

## 2019-11-13 PROCEDURE — 36415 COLL VENOUS BLD VENIPUNCTURE: CPT

## 2019-11-13 PROCEDURE — 80048 BASIC METABOLIC PNL TOTAL CA: CPT | Performed by: ANESTHESIOLOGY

## 2019-11-13 PROCEDURE — 85027 COMPLETE CBC AUTOMATED: CPT | Performed by: ANESTHESIOLOGY

## 2019-11-13 RX ORDER — QUETIAPINE FUMARATE 100 MG/1
150 TABLET, FILM COATED ORAL NIGHTLY
COMMUNITY
End: 2022-04-08

## 2019-11-14 ENCOUNTER — HOSPITAL ENCOUNTER (OUTPATIENT)
Facility: HOSPITAL | Age: 29
Setting detail: HOSPITAL OUTPATIENT SURGERY
Discharge: HOME | End: 2019-11-14
Attending: SURGERY | Admitting: SURGERY

## 2019-11-14 ENCOUNTER — ANESTHESIA EVENT (OUTPATIENT)
Dept: PERIOP | Facility: HOSPITAL | Age: 29
End: 2019-11-14

## 2019-11-14 ENCOUNTER — ANESTHESIA (OUTPATIENT)
Dept: PERIOP | Facility: HOSPITAL | Age: 29
End: 2019-11-14

## 2019-11-14 VITALS
HEIGHT: 59 IN | SYSTOLIC BLOOD PRESSURE: 110 MMHG | RESPIRATION RATE: 14 BRPM | WEIGHT: 147.6 LBS | TEMPERATURE: 98 F | OXYGEN SATURATION: 98 % | DIASTOLIC BLOOD PRESSURE: 80 MMHG | HEART RATE: 75 BPM | BODY MASS INDEX: 29.76 KG/M2

## 2019-11-14 DIAGNOSIS — K64.1 GRADE II HEMORRHOIDS: ICD-10-CM

## 2019-11-14 PROCEDURE — 25010000002 MIDAZOLAM PER 1 MG: Performed by: NURSE ANESTHETIST, CERTIFIED REGISTERED

## 2019-11-14 PROCEDURE — 25010000002 ONDANSETRON PER 1 MG: Performed by: NURSE ANESTHETIST, CERTIFIED REGISTERED

## 2019-11-14 PROCEDURE — 25010000002 MIDAZOLAM PER 1MG: Performed by: ANESTHESIOLOGY

## 2019-11-14 PROCEDURE — 25010000002 FENTANYL CITRATE (PF) 100 MCG/2ML SOLUTION: Performed by: NURSE ANESTHETIST, CERTIFIED REGISTERED

## 2019-11-14 PROCEDURE — 46947 HEMORRHOIDOPEXY BY STAPLING: CPT | Performed by: SURGERY

## 2019-11-14 PROCEDURE — 25010000002 DEXAMETHASONE PER 1 MG: Performed by: NURSE ANESTHETIST, CERTIFIED REGISTERED

## 2019-11-14 PROCEDURE — C9290 INJ, BUPIVACAINE LIPOSOME: HCPCS | Performed by: SURGERY

## 2019-11-14 PROCEDURE — 25010000002 KETOROLAC TROMETHAMINE PER 15 MG: Performed by: NURSE ANESTHETIST, CERTIFIED REGISTERED

## 2019-11-14 PROCEDURE — 81025 URINE PREGNANCY TEST: CPT | Performed by: ANESTHESIOLOGY

## 2019-11-14 PROCEDURE — 25010000003 BUPIVACAINE LIPOSOME 1.3 % SUSPENSION: Performed by: SURGERY

## 2019-11-14 DEVICE — 33MM PROXIMATE PPH, PROCEDURE FOR PROLAPSE AND HEMORRHOIDS SET
Type: IMPLANTABLE DEVICE | Site: PERIANAL | Status: FUNCTIONAL
Brand: PROXIMATE

## 2019-11-14 RX ORDER — OXYCODONE HYDROCHLORIDE AND ACETAMINOPHEN 5; 325 MG/1; MG/1
1 TABLET ORAL EVERY 4 HOURS PRN
Qty: 30 TABLET | Refills: 0 | Status: SHIPPED | OUTPATIENT
Start: 2019-11-14 | End: 2019-11-24

## 2019-11-14 RX ORDER — ONDANSETRON 2 MG/ML
INJECTION INTRAMUSCULAR; INTRAVENOUS AS NEEDED
Status: DISCONTINUED | OUTPATIENT
Start: 2019-11-14 | End: 2019-11-14 | Stop reason: SURG

## 2019-11-14 RX ORDER — OXYCODONE HYDROCHLORIDE AND ACETAMINOPHEN 5; 325 MG/1; MG/1
1 TABLET ORAL EVERY 4 HOURS PRN
Status: DISCONTINUED | OUTPATIENT
Start: 2019-11-14 | End: 2019-11-14 | Stop reason: HOSPADM

## 2019-11-14 RX ORDER — MEPERIDINE HYDROCHLORIDE 25 MG/ML
12.5 INJECTION INTRAMUSCULAR; INTRAVENOUS; SUBCUTANEOUS
Status: DISCONTINUED | OUTPATIENT
Start: 2019-11-14 | End: 2019-11-14 | Stop reason: HOSPADM

## 2019-11-14 RX ORDER — MIDAZOLAM HYDROCHLORIDE 1 MG/ML
1 INJECTION INTRAMUSCULAR; INTRAVENOUS
Status: DISCONTINUED | OUTPATIENT
Start: 2019-11-14 | End: 2019-11-14 | Stop reason: HOSPADM

## 2019-11-14 RX ORDER — FAMOTIDINE 10 MG/ML
INJECTION, SOLUTION INTRAVENOUS AS NEEDED
Status: DISCONTINUED | OUTPATIENT
Start: 2019-11-14 | End: 2019-11-14 | Stop reason: SURG

## 2019-11-14 RX ORDER — IBUPROFEN 600 MG/1
600 TABLET ORAL EVERY 8 HOURS PRN
COMMUNITY
End: 2021-01-29

## 2019-11-14 RX ORDER — OXYCODONE HYDROCHLORIDE AND ACETAMINOPHEN 5; 325 MG/1; MG/1
1 TABLET ORAL ONCE AS NEEDED
Status: DISCONTINUED | OUTPATIENT
Start: 2019-11-14 | End: 2019-11-14 | Stop reason: HOSPADM

## 2019-11-14 RX ORDER — DEXAMETHASONE SODIUM PHOSPHATE 4 MG/ML
INJECTION, SOLUTION INTRA-ARTICULAR; INTRALESIONAL; INTRAMUSCULAR; INTRAVENOUS; SOFT TISSUE AS NEEDED
Status: DISCONTINUED | OUTPATIENT
Start: 2019-11-14 | End: 2019-11-14 | Stop reason: SURG

## 2019-11-14 RX ORDER — MAGNESIUM HYDROXIDE 1200 MG/15ML
LIQUID ORAL AS NEEDED
Status: DISCONTINUED | OUTPATIENT
Start: 2019-11-14 | End: 2019-11-14 | Stop reason: HOSPADM

## 2019-11-14 RX ORDER — SODIUM CHLORIDE, SODIUM LACTATE, POTASSIUM CHLORIDE, CALCIUM CHLORIDE 600; 310; 30; 20 MG/100ML; MG/100ML; MG/100ML; MG/100ML
125 INJECTION, SOLUTION INTRAVENOUS CONTINUOUS
Status: DISCONTINUED | OUTPATIENT
Start: 2019-11-14 | End: 2019-11-14 | Stop reason: HOSPADM

## 2019-11-14 RX ORDER — FENTANYL CITRATE 50 UG/ML
INJECTION, SOLUTION INTRAMUSCULAR; INTRAVENOUS AS NEEDED
Status: DISCONTINUED | OUTPATIENT
Start: 2019-11-14 | End: 2019-11-14 | Stop reason: SURG

## 2019-11-14 RX ORDER — MIDAZOLAM HYDROCHLORIDE 1 MG/ML
INJECTION INTRAMUSCULAR; INTRAVENOUS AS NEEDED
Status: DISCONTINUED | OUTPATIENT
Start: 2019-11-14 | End: 2019-11-14 | Stop reason: SURG

## 2019-11-14 RX ORDER — KETOROLAC TROMETHAMINE 30 MG/ML
INJECTION, SOLUTION INTRAMUSCULAR; INTRAVENOUS AS NEEDED
Status: DISCONTINUED | OUTPATIENT
Start: 2019-11-14 | End: 2019-11-14 | Stop reason: SURG

## 2019-11-14 RX ORDER — FENTANYL CITRATE 50 UG/ML
50 INJECTION, SOLUTION INTRAMUSCULAR; INTRAVENOUS
Status: COMPLETED | OUTPATIENT
Start: 2019-11-14 | End: 2019-11-14

## 2019-11-14 RX ORDER — MIDAZOLAM HYDROCHLORIDE 1 MG/ML
2 INJECTION INTRAMUSCULAR; INTRAVENOUS
Status: DISCONTINUED | OUTPATIENT
Start: 2019-11-14 | End: 2019-11-14 | Stop reason: HOSPADM

## 2019-11-14 RX ORDER — IPRATROPIUM BROMIDE AND ALBUTEROL SULFATE 2.5; .5 MG/3ML; MG/3ML
3 SOLUTION RESPIRATORY (INHALATION) ONCE AS NEEDED
Status: DISCONTINUED | OUTPATIENT
Start: 2019-11-14 | End: 2019-11-14 | Stop reason: HOSPADM

## 2019-11-14 RX ORDER — SODIUM CHLORIDE 0.9 % (FLUSH) 0.9 %
3 SYRINGE (ML) INJECTION EVERY 12 HOURS SCHEDULED
Status: DISCONTINUED | OUTPATIENT
Start: 2019-11-14 | End: 2019-11-14 | Stop reason: HOSPADM

## 2019-11-14 RX ORDER — SODIUM CHLORIDE 0.9 % (FLUSH) 0.9 %
3-10 SYRINGE (ML) INJECTION AS NEEDED
Status: DISCONTINUED | OUTPATIENT
Start: 2019-11-14 | End: 2019-11-14 | Stop reason: HOSPADM

## 2019-11-14 RX ORDER — LIDOCAINE HYDROCHLORIDE 20 MG/ML
INJECTION, SOLUTION EPIDURAL; INFILTRATION; INTRACAUDAL; PERINEURAL AS NEEDED
Status: DISCONTINUED | OUTPATIENT
Start: 2019-11-14 | End: 2019-11-14 | Stop reason: SURG

## 2019-11-14 RX ORDER — ONDANSETRON 2 MG/ML
4 INJECTION INTRAMUSCULAR; INTRAVENOUS AS NEEDED
Status: DISCONTINUED | OUTPATIENT
Start: 2019-11-14 | End: 2019-11-14 | Stop reason: HOSPADM

## 2019-11-14 RX ADMIN — FENTANYL CITRATE 50 MCG: 50 INJECTION INTRAMUSCULAR; INTRAVENOUS at 10:44

## 2019-11-14 RX ADMIN — KETOROLAC TROMETHAMINE 30 MG: 30 INJECTION, SOLUTION INTRAMUSCULAR; INTRAVENOUS at 10:48

## 2019-11-14 RX ADMIN — MIDAZOLAM HYDROCHLORIDE 2 MG: 1 INJECTION, SOLUTION INTRAMUSCULAR; INTRAVENOUS at 10:34

## 2019-11-14 RX ADMIN — LIDOCAINE HYDROCHLORIDE 3 ML: 20 INJECTION, SOLUTION EPIDURAL; INFILTRATION; INTRACAUDAL; PERINEURAL at 10:35

## 2019-11-14 RX ADMIN — FENTANYL CITRATE 50 MCG: 50 INJECTION INTRAMUSCULAR; INTRAVENOUS at 11:34

## 2019-11-14 RX ADMIN — ONDANSETRON 4 MG: 2 INJECTION INTRAMUSCULAR; INTRAVENOUS at 10:43

## 2019-11-14 RX ADMIN — MIDAZOLAM HYDROCHLORIDE 2 MG: 1 INJECTION, SOLUTION INTRAMUSCULAR; INTRAVENOUS at 09:44

## 2019-11-14 RX ADMIN — FAMOTIDINE 20 MG: 10 INJECTION INTRAVENOUS at 10:43

## 2019-11-14 RX ADMIN — DEXAMETHASONE SODIUM PHOSPHATE 8 MG: 4 INJECTION, SOLUTION INTRAMUSCULAR; INTRAVENOUS at 10:43

## 2019-11-14 RX ADMIN — SODIUM CHLORIDE, POTASSIUM CHLORIDE, SODIUM LACTATE AND CALCIUM CHLORIDE 125 ML/HR: 600; 310; 30; 20 INJECTION, SOLUTION INTRAVENOUS at 09:23

## 2019-11-14 RX ADMIN — FENTANYL CITRATE 50 MCG: 50 INJECTION INTRAMUSCULAR; INTRAVENOUS at 10:38

## 2019-11-14 RX ADMIN — FENTANYL CITRATE 50 MCG: 50 INJECTION INTRAMUSCULAR; INTRAVENOUS at 11:26

## 2019-11-14 NOTE — ANESTHESIA POSTPROCEDURE EVALUATION
Patient: Pili Webster    Procedure Summary     Date:  11/14/19 Room / Location:   [facility] /  [facility]    Anesthesia Start:  1034 Anesthesia Stop:  1111    Procedure:  HEMORRHOID STAPLING (N/A Rectum) Diagnosis:       Grade II hemorrhoids      (Grade II hemorrhoids [K64.1])    Surgeon:  Franco Mari MD Provider:  Zoltan Steve MD    Anesthesia Type:  general ASA Status:  2          Anesthesia Type: general  Last vitals  BP   110/80 (11/14/19 1210)   Temp   98 °F (36.7 °C) (11/14/19 1148)   Pulse   75 (11/14/19 1210)   Resp   14 (11/14/19 1210)     SpO2   98 % (11/14/19 1210)     Post Anesthesia Care and Evaluation    Patient location during evaluation: PHASE II  Patient participation: complete - patient participated  Level of consciousness: awake and alert  Pain score: 1  Pain management: adequate  Airway patency: patent  Anesthetic complications: No anesthetic complications  PONV Status: controlled  Cardiovascular status: acceptable  Respiratory status: acceptable  Hydration status: acceptable

## 2019-11-14 NOTE — OP NOTE
HEMORRHOID STAPLING  Procedure Note    Pili Webster  11/14/2019    Pre-op Diagnosis:   Grade II hemorrhoids [K64.1]    Post-op Diagnosis: same        Procedure(s):  HEMORRHOID STAPLING    Surgeon(s):  Franco Mari MD    Anesthesia: Anesthesia type not filed in the log.    Staff:   Circulator: Trae Flores RN  Scrub Person: Mandie Castillo  Assistant: Cuong Beckett CSA    Estimated Blood Loss: minimal    Specimens:                * No orders in the log *      Drains:      Procedure: The perineum was prepped and draped. The anal area was injected with exparel and the retractor placed. The purse string of prolene was placed and the stapler placed. The stapler was fired and she had a little oozing in the posterior staple line that was stopped with a chromic suture. A couple of external posterior tags were oversewn with chromic as well and surgicel was placed in the rectum. She was awakened and sent to recovery.    Findings: hemorrhoids    Complications: none   Grafts / Implants N/A    Franco Mari MD     Date: 11/14/2019  Time: 11:01 AM

## 2019-11-14 NOTE — ANESTHESIA PREPROCEDURE EVALUATION
Anesthesia Evaluation     no history of anesthetic complications:  NPO Solid Status: > 8 hours  NPO Liquid Status: > 8 hours           Airway   Mallampati: II  TM distance: >3 FB  Neck ROM: full  No difficulty expected  Dental    (+) edentulous    Pulmonary - normal exam   (+) a smoker Current Abstained day of surgery,   Cardiovascular - normal exam        Neuro/Psych  (+) psychiatric history Anxiety,     GI/Hepatic/Renal/Endo    (+) obesity,  GERD,  renal disease,     Musculoskeletal     Abdominal  - normal exam   Substance History      OB/GYN          Other                          Anesthesia Plan    ASA 2     general     intravenous induction     Anesthetic plan, all risks, benefits, and alternatives have been provided, discussed and informed consent has been obtained with: patient.

## 2019-11-14 NOTE — ANESTHESIA PROCEDURE NOTES
Airway  Urgency: elective    Date/Time: 11/14/2019 10:38 AM  End Time:11/14/2019 10:38 AM    General Information and Staff    Patient location during procedure: OR  CRNA: Tyron Hanna CRNA    Indications and Patient Condition  Indications for airway management: airway protection    Preoxygenated: yes  MILS maintained throughout  Mask difficulty assessment: 0 - not attempted    Final Airway Details  Final airway type: supraglottic airway      Successful airway: unique  Size 3  Airway Seal Pressure (cm H2O): 20    Number of attempts at approach: 1  Assessment: lips, teeth, and gum same as pre-op    Additional Comments  Atraumatic

## 2019-11-18 LAB
LAB AP CASE REPORT: NORMAL
PATH REPORT.FINAL DX SPEC: NORMAL

## 2019-11-21 ENCOUNTER — OFFICE VISIT (OUTPATIENT)
Dept: SURGERY | Facility: CLINIC | Age: 29
End: 2019-11-21

## 2019-11-21 VITALS — WEIGHT: 147 LBS | HEIGHT: 59 IN | BODY MASS INDEX: 29.64 KG/M2

## 2019-11-21 DIAGNOSIS — Z98.890 STATUS POST HEMORRHOIDECTOMY: Primary | ICD-10-CM

## 2019-11-21 DIAGNOSIS — Z87.19 STATUS POST HEMORRHOIDECTOMY: Primary | ICD-10-CM

## 2019-11-21 PROCEDURE — 99024 POSTOP FOLLOW-UP VISIT: CPT | Performed by: SURGERY

## 2019-11-21 NOTE — PROGRESS NOTES
Subjective   Pili Webster is a 29 y.o. female.     Chief Complaint: post hemorrhoid stapling    History of Present Illness She is sore but bowel are working. Minimal bleeding post hemorrhoid stapling.    The following portions of the patient's history were reviewed and updated as appropriate: current medications, past family history, past medical history, past social history, past surgical history and problem list.    Review of Systems    Objective   Physical Exam mild swelling and bruising    Assessment/Plan   Pili was seen today for post-op follow-up.    Diagnoses and all orders for this visit:    Status post hemorrhoidectomy    return prn

## 2019-11-23 ENCOUNTER — HOSPITAL ENCOUNTER (EMERGENCY)
Facility: HOSPITAL | Age: 29
Discharge: HOME | End: 2019-11-23
Attending: EMERGENCY MEDICINE | Admitting: EMERGENCY MEDICINE

## 2019-11-23 ENCOUNTER — APPOINTMENT (OUTPATIENT)
Dept: CT IMAGING | Facility: HOSPITAL | Age: 29
End: 2019-11-23

## 2019-11-23 VITALS
SYSTOLIC BLOOD PRESSURE: 128 MMHG | HEART RATE: 80 BPM | TEMPERATURE: 98.5 F | WEIGHT: 145 LBS | OXYGEN SATURATION: 100 % | HEIGHT: 59 IN | RESPIRATION RATE: 16 BRPM | DIASTOLIC BLOOD PRESSURE: 82 MMHG | BODY MASS INDEX: 29.23 KG/M2

## 2019-11-23 DIAGNOSIS — N39.0 ACUTE UTI: Primary | ICD-10-CM

## 2019-11-23 LAB
ALBUMIN SERPL-MCNC: 4.64 G/DL (ref 3.5–5.2)
ALBUMIN/GLOB SERPL: 1.3 G/DL
ALP SERPL-CCNC: 63 U/L (ref 39–117)
ALT SERPL W P-5'-P-CCNC: 13 U/L (ref 1–33)
ANION GAP SERPL CALCULATED.3IONS-SCNC: 13.9 MMOL/L (ref 5–15)
AST SERPL-CCNC: 17 U/L (ref 1–32)
B-HCG UR QL: NEGATIVE
BACTERIA UR QL AUTO: ABNORMAL /HPF
BILIRUB SERPL-MCNC: 0.2 MG/DL (ref 0.2–1.2)
BILIRUB UR QL STRIP: NEGATIVE
BUN BLD-MCNC: 12 MG/DL (ref 6–20)
BUN/CREAT SERPL: 10.5 (ref 7–25)
CALCIUM SPEC-SCNC: 9.7 MG/DL (ref 8.6–10.5)
CHLORIDE SERPL-SCNC: 101 MMOL/L (ref 98–107)
CLARITY UR: ABNORMAL
CO2 SERPL-SCNC: 23.1 MMOL/L (ref 22–29)
COLOR UR: YELLOW
CREAT BLD-MCNC: 1.14 MG/DL (ref 0.57–1)
DEPRECATED RDW RBC AUTO: 40.2 FL (ref 37–54)
EOSINOPHIL # BLD MANUAL: 0.26 10*3/MM3 (ref 0–0.4)
EOSINOPHIL NFR BLD MANUAL: 2 % (ref 0.3–6.2)
ERYTHROCYTE [DISTWIDTH] IN BLOOD BY AUTOMATED COUNT: 11.9 % (ref 12.3–15.4)
GFR SERPL CREATININE-BSD FRML MDRD: 56 ML/MIN/1.73
GLOBULIN UR ELPH-MCNC: 3.5 GM/DL
GLUCOSE BLD-MCNC: 80 MG/DL (ref 65–99)
GLUCOSE UR STRIP-MCNC: NEGATIVE MG/DL
HCT VFR BLD AUTO: 36.8 % (ref 34–46.6)
HGB BLD-MCNC: 12.5 G/DL (ref 12–15.9)
HGB UR QL STRIP.AUTO: NEGATIVE
HYALINE CASTS UR QL AUTO: ABNORMAL /LPF
KETONES UR QL STRIP: NEGATIVE
LEUKOCYTE ESTERASE UR QL STRIP.AUTO: ABNORMAL
LYMPHOCYTES # BLD MANUAL: 4.23 10*3/MM3 (ref 0.7–3.1)
LYMPHOCYTES NFR BLD MANUAL: 32 % (ref 19.6–45.3)
LYMPHOCYTES NFR BLD MANUAL: 9 % (ref 5–12)
MCH RBC QN AUTO: 31.7 PG (ref 26.6–33)
MCHC RBC AUTO-ENTMCNC: 34 G/DL (ref 31.5–35.7)
MCV RBC AUTO: 93.4 FL (ref 79–97)
MONOCYTES # BLD AUTO: 1.19 10*3/MM3 (ref 0.1–0.9)
NEUTROPHILS # BLD AUTO: 7.53 10*3/MM3 (ref 1.7–7)
NEUTROPHILS NFR BLD MANUAL: 56 % (ref 42.7–76)
NEUTS BAND NFR BLD MANUAL: 1 % (ref 0–5)
NITRITE UR QL STRIP: NEGATIVE
PH UR STRIP.AUTO: 5.5 [PH] (ref 5–8)
PLAT MORPH BLD: NORMAL
PLATELET # BLD AUTO: 310 10*3/MM3 (ref 140–450)
PMV BLD AUTO: 9.1 FL (ref 6–12)
POTASSIUM BLD-SCNC: 4.5 MMOL/L (ref 3.5–5.2)
PROT SERPL-MCNC: 8.1 G/DL (ref 6–8.5)
PROT UR QL STRIP: NEGATIVE
RBC # BLD AUTO: 3.94 10*6/MM3 (ref 3.77–5.28)
RBC # UR: ABNORMAL /HPF
RBC MORPH BLD: NORMAL
REF LAB TEST METHOD: ABNORMAL
SCAN SLIDE: NORMAL
SODIUM BLD-SCNC: 138 MMOL/L (ref 136–145)
SP GR UR STRIP: 1.01 (ref 1–1.03)
SQUAMOUS #/AREA URNS HPF: ABNORMAL /HPF
UROBILINOGEN UR QL STRIP: ABNORMAL
WBC NRBC COR # BLD: 13.21 10*3/MM3 (ref 3.4–10.8)
WBC UR QL AUTO: ABNORMAL /HPF

## 2019-11-23 PROCEDURE — 81001 URINALYSIS AUTO W/SCOPE: CPT | Performed by: NURSE PRACTITIONER

## 2019-11-23 PROCEDURE — 25010000002 ONDANSETRON PER 1 MG: Performed by: NURSE PRACTITIONER

## 2019-11-23 PROCEDURE — 99283 EMERGENCY DEPT VISIT LOW MDM: CPT

## 2019-11-23 PROCEDURE — 25010000002 CEFTRIAXONE: Performed by: NURSE PRACTITIONER

## 2019-11-23 PROCEDURE — 81025 URINE PREGNANCY TEST: CPT | Performed by: NURSE PRACTITIONER

## 2019-11-23 PROCEDURE — 85007 BL SMEAR W/DIFF WBC COUNT: CPT | Performed by: NURSE PRACTITIONER

## 2019-11-23 PROCEDURE — 25010000002 MORPHINE PER 10 MG: Performed by: NURSE PRACTITIONER

## 2019-11-23 PROCEDURE — 96365 THER/PROPH/DIAG IV INF INIT: CPT

## 2019-11-23 PROCEDURE — 80053 COMPREHEN METABOLIC PANEL: CPT | Performed by: NURSE PRACTITIONER

## 2019-11-23 PROCEDURE — 96361 HYDRATE IV INFUSION ADD-ON: CPT

## 2019-11-23 PROCEDURE — 85025 COMPLETE CBC W/AUTO DIFF WBC: CPT | Performed by: NURSE PRACTITIONER

## 2019-11-23 PROCEDURE — 74176 CT ABD & PELVIS W/O CONTRAST: CPT

## 2019-11-23 PROCEDURE — 96375 TX/PRO/DX INJ NEW DRUG ADDON: CPT

## 2019-11-23 RX ORDER — ONDANSETRON 4 MG/1
4 TABLET, ORALLY DISINTEGRATING ORAL EVERY 6 HOURS PRN
Qty: 12 TABLET | Refills: 0 | Status: SHIPPED | OUTPATIENT
Start: 2019-11-23 | End: 2019-12-09

## 2019-11-23 RX ORDER — ONDANSETRON 2 MG/ML
4 INJECTION INTRAMUSCULAR; INTRAVENOUS ONCE
Status: COMPLETED | OUTPATIENT
Start: 2019-11-23 | End: 2019-11-23

## 2019-11-23 RX ORDER — TAMSULOSIN HYDROCHLORIDE 0.4 MG/1
1 CAPSULE ORAL DAILY
Qty: 15 CAPSULE | Refills: 0 | Status: SHIPPED | OUTPATIENT
Start: 2019-11-23 | End: 2020-01-12

## 2019-11-23 RX ORDER — SODIUM CHLORIDE 0.9 % (FLUSH) 0.9 %
10 SYRINGE (ML) INJECTION AS NEEDED
Status: DISCONTINUED | OUTPATIENT
Start: 2019-11-23 | End: 2019-11-23 | Stop reason: HOSPADM

## 2019-11-23 RX ORDER — NITROFURANTOIN 25; 75 MG/1; MG/1
100 CAPSULE ORAL 2 TIMES DAILY
Qty: 14 CAPSULE | Refills: 0 | Status: SHIPPED | OUTPATIENT
Start: 2019-11-23 | End: 2019-11-30

## 2019-11-23 RX ORDER — HYDROCODONE BITARTRATE AND ACETAMINOPHEN 7.5; 325 MG/1; MG/1
1 TABLET ORAL EVERY 6 HOURS PRN
Qty: 12 TABLET | Refills: 0 | Status: SHIPPED | OUTPATIENT
Start: 2019-11-23 | End: 2020-01-12

## 2019-11-23 RX ADMIN — CEFTRIAXONE 1 G: 1 INJECTION, POWDER, FOR SOLUTION INTRAMUSCULAR; INTRAVENOUS at 20:12

## 2019-11-23 RX ADMIN — MORPHINE SULFATE 4 MG: 4 INJECTION, SOLUTION INTRAMUSCULAR; INTRAVENOUS at 20:28

## 2019-11-23 RX ADMIN — ONDANSETRON 4 MG: 2 INJECTION, SOLUTION INTRAMUSCULAR; INTRAVENOUS at 20:27

## 2019-11-23 RX ADMIN — SODIUM CHLORIDE 1000 ML: 9 INJECTION, SOLUTION INTRAVENOUS at 19:16

## 2019-11-24 NOTE — ED NOTES
Discharge instructions reviewed with patient, patient instructed to return to ED if symptoms worsen or if any new problems arise. Patient verbalizes understanding of discharge instructions, patient ambulatory out of ED with family. No acute distress noted.       Rekha Lundberg RN  11/23/19 [id number]

## 2019-11-24 NOTE — ED NOTES
Patient states she had hemorrhoid surgery last Thursday. States that since then she has been running a fever, her stomach is 'in knots', and she has had difficulty urinating     Rekha Lundberg RN  11/23/19 1928

## 2019-11-24 NOTE — ED PROVIDER NOTES
Subjective     History provided by:  Patient   used: No    Dysuria   Pain quality:  Aching  Pain severity:  Moderate  Onset quality:  Gradual  Duration:  3 days  Timing:  Constant  Progression:  Waxing and waning  Chronicity:  New  Recent urinary tract infections: yes    Relieved by:  Nothing  Worsened by:  Nothing  Ineffective treatments:  None tried  Urinary symptoms: hesitancy    Urinary symptoms: no discolored urine and no frequent urination    Associated symptoms: abdominal pain, fever and nausea    Associated symptoms: no flank pain, no genital lesions and no vomiting    Risk factors: no hx of pyelonephritis, no hx of urolithiasis, no recurrent urinary tract infections, not sexually active, no single kidney and no urinary catheter        Review of Systems   Constitutional: Positive for fever.   HENT: Negative.    Eyes: Negative.    Respiratory: Negative.    Cardiovascular: Negative.    Gastrointestinal: Positive for abdominal pain and nausea. Negative for vomiting.   Endocrine: Negative.    Genitourinary: Positive for dysuria. Negative for flank pain.   Musculoskeletal: Negative.    Skin: Negative.    Allergic/Immunologic: Negative.    Neurological: Negative.    Hematological: Negative.    Psychiatric/Behavioral: Negative.        Past Medical History:   Diagnosis Date   • Abdominal pain    • Abnormal uterine bleeding (AUB)    • Anxiety and depression    • Arthritis    • Cholecystitis    • Constipation    • Endometriosis    • Frequent UTI    • GERD (gastroesophageal reflux disease)    • Heartburn    • Hemorrhoids    • Kidney stones    • Lesion of breast    • Lump     RIGHT BREAST   • Nausea & vomiting    • PCOS (polycystic ovarian syndrome)    • Tachycardia    • Wrist fracture     RIGHT ARM      PATIENT UNSURE IF FRACTURED       Allergies   Allergen Reactions   • Peanut-Containing Drug Products Anaphylaxis     AND PEANUTS IN FOODS   • Latex Hives   • Shrimp Swelling     Facial swelling     •  Milk-Related Compounds Nausea And Vomiting   • Sulfa Antibiotics Rash       Past Surgical History:   Procedure Laterality Date   • ABDOMINAL SURGERY     • BREAST BIOPSY Right 11/29/2018    Procedure: breast biopsy ;  Surgeon: Shiv Overton MD;  Location: [facility] OR;  Service: General   • CHOLECYSTECTOMY N/A 8/25/2017    Procedure: CHOLECYSTECTOMY LAPAROSCOPIC;  Surgeon: Franco Mari MD;  Location: [facility] OR;  Service:    • DENTAL PROCEDURE     • DIAGNOSTIC LAPAROSCOPY      X5   • DILATATION AND CURETTAGE     • HEMORRHOIDECTOMY N/A 11/14/2019    Procedure: HEMORRHOID STAPLING;  Surgeon: Franco Mari MD;  Location: [facility] OR;  Service: General   • URETEROSCOPY LASER LITHOTRIPSY WITH STENT INSERTION Right 1/9/2019    Procedure: URETEROSCOPY LASER LITHOTRIPSY retrograde pyelogram;  Surgeon: Ahmet Barrow MD;  Location: [facility];  Service: Urology   • WISDOM TOOTH EXTRACTION         Family History   Problem Relation Age of Onset   • Breast cancer Maternal Aunt    • Alcohol abuse Paternal Grandfather    • Heart disease Paternal Grandfather    • Cancer Maternal Grandfather    • Hypertension Maternal Grandfather        Social History     Socioeconomic History   • Marital status:      Spouse name: Not on file   • Number of children: Not on file   • Years of education: Not on file   • Highest education level: Not on file   Tobacco Use   • Smoking status: Current Every Day Smoker     Packs/day: 0.50     Years: 15.00     Pack years: 7.50     Types: Cigarettes   • Smokeless tobacco: Never Used   Substance and Sexual Activity   • Alcohol use: Yes     Frequency: Never     Comment: RARELY   • Drug use: No   • Sexual activity: Defer     Birth control/protection: None           Objective   Physical Exam   Constitutional: She is oriented to person, place, and time. She appears well-developed and well-nourished.   HENT:   Head: Normocephalic.   Right Ear: External ear normal.   Left Ear: External ear  normal.   Mouth/Throat: Oropharynx is clear and moist.   Eyes: Conjunctivae and EOM are normal. Pupils are equal, round, and reactive to light.   Neck: Normal range of motion. Neck supple.   Cardiovascular: Normal rate, regular rhythm, normal heart sounds and intact distal pulses.   Pulmonary/Chest: Effort normal and breath sounds normal.   Abdominal: Soft. Bowel sounds are normal. There is tenderness (suprapubic).   Musculoskeletal: Normal range of motion.   Neurological: She is alert and oriented to person, place, and time.   Skin: Skin is warm and dry. Capillary refill takes less than 2 seconds.   Psychiatric: She has a normal mood and affect. Her behavior is normal. Thought content normal.   Nursing note and vitals reviewed.      Procedures           ED Course                  MDM    Final diagnoses:   Acute UTI              Anatoliy Hagan, APRN  11/23/19 2025

## 2019-12-09 ENCOUNTER — OFFICE VISIT (OUTPATIENT)
Dept: UROLOGY | Facility: CLINIC | Age: 29
End: 2019-12-09

## 2019-12-09 VITALS — BODY MASS INDEX: 28.39 KG/M2 | WEIGHT: 140.8 LBS | HEIGHT: 59 IN

## 2019-12-09 DIAGNOSIS — N20.0 KIDNEY STONE: Primary | ICD-10-CM

## 2019-12-09 PROCEDURE — 99213 OFFICE O/P EST LOW 20 MIN: CPT | Performed by: UROLOGY

## 2019-12-09 RX ORDER — RIZATRIPTAN BENZOATE 10 MG/1
10 TABLET ORAL ONCE AS NEEDED
COMMUNITY
Start: 2019-09-16 | End: 2020-07-09 | Stop reason: CLARIF

## 2019-12-09 RX ORDER — SUMATRIPTAN 100 MG/1
100 TABLET, FILM COATED ORAL
COMMUNITY
End: 2020-07-09

## 2019-12-09 RX ORDER — TRIAMCINOLONE ACETONIDE 1 MG/G
CREAM TOPICAL
COMMUNITY
Start: 2019-09-25 | End: 2020-01-12

## 2019-12-09 RX ORDER — HYDROXYCHLOROQUINE SULFATE 200 MG/1
200 TABLET, FILM COATED ORAL DAILY
COMMUNITY
End: 2021-01-25

## 2019-12-09 RX ORDER — ALBUTEROL SULFATE 90 UG/1
1 AEROSOL, METERED RESPIRATORY (INHALATION)
COMMUNITY
Start: 2019-09-25

## 2019-12-09 RX ORDER — NYSTATIN 100000 U/G
CREAM TOPICAL
COMMUNITY
Start: 2019-09-25 | End: 2020-01-12

## 2019-12-09 NOTE — PROGRESS NOTES
Chief Complaint:          Chief Complaint   Patient presents with   • Ureteral Stones     3 mnth f/u       HPI:   29 y.o. female  today having been seen yesterday.  I repeated her KUB .  I do not see a radio opaque stone in the renal fossa.  She says she's had nausea no vomiting chills but no fever and right flank pain I'm want to give her pain medication and alpha blockade see her back in a week if I can't see it again I'm I recommend a stone CT and probable intervention if she persists with pain.  She returns today.  She still says she has nausea and occasional vomiting, no fevers, no chills, there is no right flank pain and negative CT scan and negative KUB a month ago a recent breast biopsy I went ahead and repeated the KUB which is again negative I'll notify her of the results by telephone.  I don't believe this is related to the urological system.  She had severe pain and went to the emergency room she had a CT scan that showed a right proximal 4 mm stone.  She is desirous of surgical intervention.  I discussed the surgical treatment with her and well set this up for her as soon as possible she has both alpha blockade and pain medication for control until the time of the surgery.  She returns today she is a lot better her pains gone she is having no fevers chills no nausea no vomiting.  She's getting get a KUB on notify her of the results at [phone].  Today she went to emergency room with a questionable UTI and had a CT the stone CT showed hyperdense pyramids but no other abnormality the pains both right and left and sequel.  There is nausea no vomiting she also has a left ovarian cyst which seems to be more the locus of her pain I am to give her some reassurance put her on pain medication use on a as needed basis and see her back in about 3 weeks if she continues with pain will recommend intervention but right now I do not find anything anatomic that requires urgent intervention.  She is passed 3 more  stones.  This is at work.  He has no fevers or chills no nausea vomiting or diarrhea.  She has irritable bowel syndrome with both constipation and diarrhea she has alpha blockade I gave her additional hydrocodone and get a 24 urine and then see her back based on this.  She returns today to review her 24-hour urine report was actually impressive.  She has low citrate and unfortunately a urine volume of  of 300 cc.  She absolutely needs to drink more water and this was impressed upon her she does not drink cola products but I want to put her on Urocit-K and strongly recommend that she  She returns today she has intermittent stone pain.  She is changed her diet she is exercising.  She has a history of Sjogren's syndrome her film was negative I gave her reassurance I think she is doing great      Past Medical History:        Past Medical History:   Diagnosis Date   • Abdominal pain    • Abnormal uterine bleeding (AUB)    • Anxiety and depression    • Arthritis    • Cholecystitis    • Constipation    • Endometriosis    • Frequent UTI    • GERD (gastroesophageal reflux disease)    • Heartburn    • Hemorrhoids    • Kidney stones    • Lesion of breast    • Lump     RIGHT BREAST   • Nausea & vomiting    • PCOS (polycystic ovarian syndrome)    • Tachycardia    • Wrist fracture     RIGHT ARM      PATIENT UNSURE IF FRACTURED         Current Meds:     Current Outpatient Medications   Medication Sig Dispense Refill   • aspirin 81 MG EC tablet Take 81 mg by mouth Daily. LAS T DOSE 11/26/2018     • atenolol (TENORMIN) 25 MG tablet Take 25 mg by mouth Daily.     • hydroxychloroquine (PLAQUENIL) 200 MG tablet Take  by mouth Daily.     • loratadine (CLARITIN) 10 MG tablet 10 mg Daily.     • montelukast (SINGULAIR) 10 MG tablet Take 10 mg by mouth Every Night.     • pantoprazole (PROTONIX) 40 MG EC tablet Take 40 mg by mouth Daily.     • polyethylene glycol (MIRALAX) packet Take 17 g by mouth Daily.     • potassium citrate (UROCIT-K)  10 MEQ (1080 MG) CR tablet Take 1 tablet by mouth 3 (Three) Times a Day With Meals. 60 tablet 3   • QUEtiapine (SEROquel) 50 MG tablet Take 50 mg by mouth Every Night.     • rOPINIRole (REQUIP) 0.5 MG tablet      • SUMAtriptan (IMITREX) 100 MG tablet Take 100 mg by mouth Every 2 (Two) Hours As Needed for Migraine. Take one tablet at onset of headache. May repeat dose one time in 2 hours if headache not relieved.     • albuterol sulfate  (90 Base) MCG/ACT inhaler      • Albuterol Sulfate, sensor, 108 (90 Base) MCG/ACT aerosol powder  Inhale 2 puffs As Needed.     • HYDROcodone-acetaminophen (NORCO) 7.5-325 MG per tablet Take 1 tablet by mouth Every 6 (Six) Hours As Needed for Moderate Pain . 12 tablet 0   • ibuprofen (ADVIL,MOTRIN) 600 MG tablet Take 600 mg by mouth Every 8 (Eight) Hours As Needed for Mild Pain .     • nystatin (MYCOSTATIN) 571012 UNIT/GM cream      • Prenatal Vit-DSS-Fe Cbn-FA (PRENATAL AD PO) Take  by mouth.     • rizatriptan (MAXALT) 10 MG tablet      • tamsulosin (FLOMAX) 0.4 MG capsule 24 hr capsule Take 1 capsule by mouth Daily. 15 capsule 0   • triamcinolone (KENALOG) 0.1 % cream        No current facility-administered medications for this visit.         Allergies:      Allergies   Allergen Reactions   • Peanut-Containing Drug Products Anaphylaxis     AND PEANUTS IN FOODS   • Latex Hives   • Shrimp Swelling     Facial swelling     • Milk-Related Compounds Nausea And Vomiting   • Sulfa Antibiotics Rash        Past Surgical History:     Past Surgical History:   Procedure Laterality Date   • ABDOMINAL SURGERY     • BREAST BIOPSY Right 11/29/2018    Procedure: breast biopsy ;  Surgeon: Shiv Overton MD;  Location: [facility] OR;  Service: General   • CHOLECYSTECTOMY N/A 8/25/2017    Procedure: CHOLECYSTECTOMY LAPAROSCOPIC;  Surgeon: Franco Mari MD;  Location: [facility] OR;  Service:    • DENTAL PROCEDURE     • DIAGNOSTIC LAPAROSCOPY      X5   • DILATATION AND CURETTAGE     •  HEMORRHOIDECTOMY N/A 11/14/2019    Procedure: HEMORRHOID STAPLING;  Surgeon: Franco Mari MD;  Location: [facility] OR;  Service: General   • URETEROSCOPY LASER LITHOTRIPSY WITH STENT INSERTION Right 1/9/2019    Procedure: URETEROSCOPY LASER LITHOTRIPSY retrograde pyelogram;  Surgeon: Ahmet Barrow MD;  Location: [facility] OR;  Service: Urology   • WISDOM TOOTH EXTRACTION           Social History:     Social History     Socioeconomic History   • Marital status:      Spouse name: Not on file   • Number of children: Not on file   • Years of education: Not on file   • Highest education level: Not on file   Tobacco Use   • Smoking status: Current Every Day Smoker     Packs/day: 0.50     Years: 15.00     Pack years: 7.50     Types: Cigarettes   • Smokeless tobacco: Never Used   Substance and Sexual Activity   • Alcohol use: Yes     Frequency: Never     Comment: RARELY   • Drug use: No   • Sexual activity: Defer     Birth control/protection: None       Family History:     Family History   Problem Relation Age of Onset   • Breast cancer Maternal Aunt    • Alcohol abuse Paternal Grandfather    • Heart disease Paternal Grandfather    • Cancer Maternal Grandfather    • Hypertension Maternal Grandfather        Review of Systems:     Review of Systems   Constitutional: Negative.  Negative for activity change, appetite change, chills, diaphoresis, fatigue and unexpected weight change.   HENT: Negative for congestion, dental problem, drooling, ear discharge, ear pain, facial swelling, hearing loss, mouth sores, nosebleeds, postnasal drip, rhinorrhea, sinus pressure, sneezing, sore throat, tinnitus, trouble swallowing and voice change.    Eyes: Negative.  Negative for photophobia, pain, discharge, redness, itching and visual disturbance.   Respiratory: Negative.  Negative for apnea, cough, choking, chest tightness, shortness of breath, wheezing and stridor.    Cardiovascular: Negative.  Negative for chest pain,  palpitations and leg swelling.   Gastrointestinal: Negative.  Negative for abdominal distention, abdominal pain, anal bleeding, blood in stool, constipation, diarrhea, nausea, rectal pain and vomiting.   Endocrine: Negative.  Negative for cold intolerance, heat intolerance, polydipsia, polyphagia and polyuria.   Musculoskeletal: Negative.  Negative for arthralgias, back pain, gait problem, joint swelling, myalgias, neck pain and neck stiffness.   Skin: Negative.  Negative for color change, pallor, rash and wound.   Allergic/Immunologic: Negative.  Negative for environmental allergies, food allergies and immunocompromised state.   Neurological: Negative.  Negative for dizziness, tremors, seizures, syncope, facial asymmetry, speech difficulty, weakness, light-headedness, numbness and headaches.   Hematological: Negative.  Negative for adenopathy. Does not bruise/bleed easily.   Psychiatric/Behavioral: Negative for agitation, behavioral problems, confusion, decreased concentration, dysphoric mood, hallucinations, self-injury, sleep disturbance and suicidal ideas. The patient is not nervous/anxious and is not hyperactive.    All other systems reviewed and are negative.      Physical Exam:     Physical Exam   Constitutional: She appears well-developed and well-nourished.   HENT:   Head: Normocephalic and atraumatic.   Right Ear: External ear normal.   Left Ear: External ear normal.   Mouth/Throat: Oropharynx is clear and moist.   Eyes: Pupils are equal, round, and reactive to light. Conjunctivae are normal.   Cardiovascular: Normal rate, regular rhythm, normal heart sounds and intact distal pulses.   Pulmonary/Chest: Effort normal and breath sounds normal.   Abdominal: Soft. Bowel sounds are normal. She exhibits no distension and no mass. There is no tenderness. There is no rebound and no guarding.   Genitourinary: No vaginal discharge found.   Musculoskeletal: Normal range of motion.   Neurological: She is alert. She  has normal reflexes.   Skin: Skin is warm and dry.   Psychiatric: She has a normal mood and affect. Her behavior is normal. Judgment and thought content normal.       I have reviewed the following portions of the patient's history: allergies, current medications, past family history, past medical history, past social history, past surgical history, problem list and ROS and confirm it's accurate.      Procedure:       Assessment/Plan:   Renal calculus-we discussed the presence of the stone we discussed the various therapeutic options available including percutaneous nephrostolithotomy, lithotripsy.  We discussed the risks of lithotripsy including the passage of stones the development of a large string of stones in the distal ureter known as Steinstrasse.  In the 3% incidence of that we will need to proceed with a ureteroscopy for obstructing fragments.  Extremely rare incidence of renal hematoma.  And the significance of this.  We discussed the absolute relative indicators for intervention including the presence of sepsis, and pain we cannot control is the primary need for urgent intervention.  We discussed placement of a stent if indicated and the management of the stent as well.  Please stone free doing well            Patient's Body mass index is 28.44 kg/m². BMI is above normal parameters. Recommendations include: educational material.              This document has been electronically signed by IMELDA THOMSON MD December 9, 2019 4:14 PM

## 2020-01-07 DIAGNOSIS — N20.1 URETERAL CALCULUS: ICD-10-CM

## 2020-01-07 RX ORDER — POTASSIUM CITRATE 10 MEQ/1
TABLET, EXTENDED RELEASE ORAL
Qty: 60 EACH | Refills: 2 | Status: SHIPPED | OUTPATIENT
Start: 2020-01-07 | End: 2020-03-12

## 2020-01-12 ENCOUNTER — HOSPITAL ENCOUNTER (EMERGENCY)
Facility: HOSPITAL | Age: 30
Discharge: HOME | End: 2020-01-13
Attending: FAMILY MEDICINE | Admitting: FAMILY MEDICINE

## 2020-01-12 DIAGNOSIS — N39.0 URINARY TRACT INFECTION WITHOUT HEMATURIA, SITE UNSPECIFIED: Primary | ICD-10-CM

## 2020-01-12 PROCEDURE — 93010 ELECTROCARDIOGRAM REPORT: CPT | Performed by: INTERNAL MEDICINE

## 2020-01-12 PROCEDURE — 93005 ELECTROCARDIOGRAM TRACING: CPT | Performed by: FAMILY MEDICINE

## 2020-01-12 PROCEDURE — 99284 EMERGENCY DEPT VISIT MOD MDM: CPT

## 2020-01-12 RX ORDER — DOXYCYCLINE HYCLATE 100 MG
100 TABLET ORAL 2 TIMES DAILY
COMMUNITY
End: 2020-03-06

## 2020-01-12 RX ORDER — SODIUM CHLORIDE 0.9 % (FLUSH) 0.9 %
10 SYRINGE (ML) INJECTION AS NEEDED
Status: DISCONTINUED | OUTPATIENT
Start: 2020-01-12 | End: 2020-01-13 | Stop reason: HOSPADM

## 2020-01-12 RX ORDER — PROMETHAZINE HYDROCHLORIDE, PHENYLEPHRINE HYDROCHLORIDE AND CODEINE PHOSPHATE 6.25; 5; 1 MG/5ML; MG/5ML; MG/5ML
5-10 SOLUTION ORAL EVERY 4 HOURS PRN
Status: ON HOLD | COMMUNITY
End: 2021-02-01

## 2020-01-13 ENCOUNTER — APPOINTMENT (OUTPATIENT)
Dept: CT IMAGING | Facility: HOSPITAL | Age: 30
End: 2020-01-13

## 2020-01-13 VITALS
OXYGEN SATURATION: 100 % | BODY MASS INDEX: 30.44 KG/M2 | DIASTOLIC BLOOD PRESSURE: 64 MMHG | WEIGHT: 151 LBS | RESPIRATION RATE: 20 BRPM | HEIGHT: 59 IN | TEMPERATURE: 98.2 F | HEART RATE: 63 BPM | SYSTOLIC BLOOD PRESSURE: 107 MMHG

## 2020-01-13 LAB
ALBUMIN SERPL-MCNC: 4.5 G/DL (ref 3.5–5.2)
ALBUMIN/GLOB SERPL: 1.3 G/DL
ALP SERPL-CCNC: 56 U/L (ref 39–117)
ALT SERPL W P-5'-P-CCNC: 10 U/L (ref 1–33)
ANION GAP SERPL CALCULATED.3IONS-SCNC: 14.5 MMOL/L (ref 5–15)
AST SERPL-CCNC: 13 U/L (ref 1–32)
B-HCG UR QL: NEGATIVE
BACTERIA UR QL AUTO: ABNORMAL /HPF
BASOPHILS # BLD AUTO: 0.06 10*3/MM3 (ref 0–0.2)
BASOPHILS NFR BLD AUTO: 0.5 % (ref 0–1.5)
BILIRUB SERPL-MCNC: 0.2 MG/DL (ref 0.2–1.2)
BILIRUB UR QL STRIP: NEGATIVE
BUN BLD-MCNC: 13 MG/DL (ref 6–20)
BUN/CREAT SERPL: 13.5 (ref 7–25)
CALCIUM SPEC-SCNC: 9.4 MG/DL (ref 8.6–10.5)
CHLORIDE SERPL-SCNC: 102 MMOL/L (ref 98–107)
CLARITY UR: ABNORMAL
CO2 SERPL-SCNC: 22.5 MMOL/L (ref 22–29)
COLOR UR: YELLOW
CREAT BLD-MCNC: 0.96 MG/DL (ref 0.57–1)
CRP SERPL-MCNC: 0.06 MG/DL (ref 0–0.5)
DEPRECATED RDW RBC AUTO: 38.5 FL (ref 37–54)
EOSINOPHIL # BLD AUTO: 0.31 10*3/MM3 (ref 0–0.4)
EOSINOPHIL NFR BLD AUTO: 2.7 % (ref 0.3–6.2)
ERYTHROCYTE [DISTWIDTH] IN BLOOD BY AUTOMATED COUNT: 11.4 % (ref 12.3–15.4)
GFR SERPL CREATININE-BSD FRML MDRD: 68 ML/MIN/1.73
GLOBULIN UR ELPH-MCNC: 3.6 GM/DL
GLUCOSE BLD-MCNC: 83 MG/DL (ref 65–99)
GLUCOSE UR STRIP-MCNC: NEGATIVE MG/DL
HCG SERPL QL: NEGATIVE
HCT VFR BLD AUTO: 42.1 % (ref 34–46.6)
HGB BLD-MCNC: 14.1 G/DL (ref 12–15.9)
HGB UR QL STRIP.AUTO: NEGATIVE
HYALINE CASTS UR QL AUTO: ABNORMAL /LPF
IMM GRANULOCYTES # BLD AUTO: 0.04 10*3/MM3 (ref 0–0.05)
IMM GRANULOCYTES NFR BLD AUTO: 0.4 % (ref 0–0.5)
KETONES UR QL STRIP: NEGATIVE
LEUKOCYTE ESTERASE UR QL STRIP.AUTO: ABNORMAL
LIPASE SERPL-CCNC: 31 U/L (ref 13–60)
LYMPHOCYTES # BLD AUTO: 5.37 10*3/MM3 (ref 0.7–3.1)
LYMPHOCYTES NFR BLD AUTO: 47.4 % (ref 19.6–45.3)
MCH RBC QN AUTO: 30.9 PG (ref 26.6–33)
MCHC RBC AUTO-ENTMCNC: 33.5 G/DL (ref 31.5–35.7)
MCV RBC AUTO: 92.3 FL (ref 79–97)
MONOCYTES # BLD AUTO: 0.55 10*3/MM3 (ref 0.1–0.9)
MONOCYTES NFR BLD AUTO: 4.9 % (ref 5–12)
NEUTROPHILS # BLD AUTO: 4.99 10*3/MM3 (ref 1.7–7)
NEUTROPHILS NFR BLD AUTO: 44.1 % (ref 42.7–76)
NITRITE UR QL STRIP: NEGATIVE
NRBC BLD AUTO-RTO: 0 /100 WBC (ref 0–0.2)
PH UR STRIP.AUTO: 6.5 [PH] (ref 5–8)
PLATELET # BLD AUTO: 305 10*3/MM3 (ref 140–450)
PMV BLD AUTO: 9 FL (ref 6–12)
POTASSIUM BLD-SCNC: 4.1 MMOL/L (ref 3.5–5.2)
PROT SERPL-MCNC: 8.1 G/DL (ref 6–8.5)
PROT UR QL STRIP: NEGATIVE
RBC # BLD AUTO: 4.56 10*6/MM3 (ref 3.77–5.28)
RBC # UR: ABNORMAL /HPF
REF LAB TEST METHOD: ABNORMAL
SODIUM BLD-SCNC: 139 MMOL/L (ref 136–145)
SP GR UR STRIP: 1.02 (ref 1–1.03)
SQUAMOUS #/AREA URNS HPF: ABNORMAL /HPF
TROPONIN T SERPL-MCNC: <0.01 NG/ML (ref 0–0.03)
UROBILINOGEN UR QL STRIP: ABNORMAL
WBC NRBC COR # BLD: 11.32 10*3/MM3 (ref 3.4–10.8)
WBC UR QL AUTO: ABNORMAL /HPF

## 2020-01-13 PROCEDURE — 81025 URINE PREGNANCY TEST: CPT | Performed by: FAMILY MEDICINE

## 2020-01-13 PROCEDURE — 25010000002 KETOROLAC TROMETHAMINE PER 15 MG: Performed by: FAMILY MEDICINE

## 2020-01-13 PROCEDURE — 25010000002 CEFTRIAXONE: Performed by: FAMILY MEDICINE

## 2020-01-13 PROCEDURE — 96361 HYDRATE IV INFUSION ADD-ON: CPT

## 2020-01-13 PROCEDURE — 80053 COMPREHEN METABOLIC PANEL: CPT | Performed by: FAMILY MEDICINE

## 2020-01-13 PROCEDURE — 25010000002 ONDANSETRON PER 1 MG: Performed by: FAMILY MEDICINE

## 2020-01-13 PROCEDURE — 85025 COMPLETE CBC W/AUTO DIFF WBC: CPT | Performed by: FAMILY MEDICINE

## 2020-01-13 PROCEDURE — 84703 CHORIONIC GONADOTROPIN ASSAY: CPT | Performed by: FAMILY MEDICINE

## 2020-01-13 PROCEDURE — 84484 ASSAY OF TROPONIN QUANT: CPT | Performed by: FAMILY MEDICINE

## 2020-01-13 PROCEDURE — 83690 ASSAY OF LIPASE: CPT | Performed by: FAMILY MEDICINE

## 2020-01-13 PROCEDURE — 86140 C-REACTIVE PROTEIN: CPT | Performed by: FAMILY MEDICINE

## 2020-01-13 PROCEDURE — 96375 TX/PRO/DX INJ NEW DRUG ADDON: CPT

## 2020-01-13 PROCEDURE — 74176 CT ABD & PELVIS W/O CONTRAST: CPT

## 2020-01-13 PROCEDURE — 96365 THER/PROPH/DIAG IV INF INIT: CPT

## 2020-01-13 PROCEDURE — 87086 URINE CULTURE/COLONY COUNT: CPT | Performed by: FAMILY MEDICINE

## 2020-01-13 PROCEDURE — 81001 URINALYSIS AUTO W/SCOPE: CPT | Performed by: FAMILY MEDICINE

## 2020-01-13 RX ORDER — NITROFURANTOIN 25; 75 MG/1; MG/1
100 CAPSULE ORAL 2 TIMES DAILY
Qty: 14 CAPSULE | Refills: 0 | Status: SHIPPED | OUTPATIENT
Start: 2020-01-13 | End: 2020-01-20

## 2020-01-13 RX ORDER — ONDANSETRON 2 MG/ML
4 INJECTION INTRAMUSCULAR; INTRAVENOUS ONCE
Status: COMPLETED | OUTPATIENT
Start: 2020-01-13 | End: 2020-01-13

## 2020-01-13 RX ORDER — KETOROLAC TROMETHAMINE 30 MG/ML
15 INJECTION, SOLUTION INTRAMUSCULAR; INTRAVENOUS ONCE
Status: COMPLETED | OUTPATIENT
Start: 2020-01-13 | End: 2020-01-13

## 2020-01-13 RX ADMIN — KETOROLAC TROMETHAMINE 15 MG: 30 INJECTION, SOLUTION INTRAMUSCULAR at 01:17

## 2020-01-13 RX ADMIN — ONDANSETRON 4 MG: 2 INJECTION INTRAMUSCULAR; INTRAVENOUS at 01:16

## 2020-01-13 RX ADMIN — SODIUM CHLORIDE 1464 ML: 9 INJECTION, SOLUTION INTRAVENOUS at 00:05

## 2020-01-13 RX ADMIN — CEFTRIAXONE 2 G: 2 INJECTION, POWDER, FOR SOLUTION INTRAMUSCULAR; INTRAVENOUS at 02:42

## 2020-01-13 RX ADMIN — SODIUM CHLORIDE 1000 ML: 9 INJECTION, SOLUTION INTRAVENOUS at 02:42

## 2020-01-13 NOTE — ED PROVIDER NOTES
Subjective   29-year-old white female with past medical history of endometriosis, Sjogrens disease, PCOS, Migraines presents to the ED with complaints of dizziness, vomiting and low back pain.  Patient also has a history of recurrent kidney stones as well as UTIs.  Patient says today she started having dizziness and she is feels like the room is spinning nothing makes it better however its worse than normal and the longer she went the more her back started to hurt so she became concerned that she might have a stone or UTI and came in for further evaluation her last menstrual period was in November is unsure if she is pregnant.      History provided by:  Patient  Illness   Location:  Low back pain, dizzy a d vomiting  Severity:  Moderate  Onset quality:  Gradual  Timing:  Constant  Progression:  Unchanged  Chronicity:  New  Context:  See hpi  Relieved by:  Nothing  Worsened by:  Nothing  Associated symptoms: vomiting    Associated symptoms: no abdominal pain, no chest pain and no fever        Review of Systems   Constitutional: Negative.  Negative for fever.   HENT: Negative.    Respiratory: Negative.    Cardiovascular: Negative.  Negative for chest pain.   Gastrointestinal: Positive for vomiting. Negative for abdominal pain.   Endocrine: Negative.    Genitourinary: Negative.  Negative for dysuria.   Skin: Negative.    Neurological: Negative.    Psychiatric/Behavioral: Negative.    All other systems reviewed and are negative.      Past Medical History:   Diagnosis Date   • Abdominal pain    • Abnormal uterine bleeding (AUB)    • Anxiety and depression    • Arthritis    • Cholecystitis    • Constipation    • Endometriosis    • Frequent UTI    • GERD (gastroesophageal reflux disease)    • Heartburn    • Hemorrhoids    • Kidney stones    • Lesion of breast    • Lump     RIGHT BREAST   • Nausea & vomiting    • PCOS (polycystic ovarian syndrome)    • Sjogren's disease (CMS/HCC)    • Tachycardia    • Wrist fracture      RIGHT ARM      PATIENT UNSURE IF FRACTURED       Allergies   Allergen Reactions   • Peanut-Containing Drug Products Anaphylaxis     AND PEANUTS IN FOODS   • Latex Hives   • Shrimp Swelling     Facial swelling     • Milk-Related Compounds Nausea And Vomiting   • Sulfa Antibiotics Rash       Past Surgical History:   Procedure Laterality Date   • ABDOMINAL SURGERY     • BREAST BIOPSY Right 11/29/2018    Procedure: breast biopsy ;  Surgeon: Shiv Overton MD;  Location: [facility] OR;  Service: General   • CHOLECYSTECTOMY N/A 8/25/2017    Procedure: CHOLECYSTECTOMY LAPAROSCOPIC;  Surgeon: Franco Mari MD;  Location: [facility] OR;  Service:    • DENTAL PROCEDURE     • DIAGNOSTIC LAPAROSCOPY      X5   • DILATATION AND CURETTAGE     • HEMORRHOIDECTOMY N/A 11/14/2019    Procedure: HEMORRHOID STAPLING;  Surgeon: Franco Mari MD;  Location: [facility];  Service: General   • URETEROSCOPY LASER LITHOTRIPSY WITH STENT INSERTION Right 1/9/2019    Procedure: URETEROSCOPY LASER LITHOTRIPSY retrograde pyelogram;  Surgeon: Ahmet Barrow MD;  Location: [facility];  Service: Urology   • WISDOM TOOTH EXTRACTION         Family History   Problem Relation Age of Onset   • Breast cancer Maternal Aunt    • Alcohol abuse Paternal Grandfather    • Heart disease Paternal Grandfather    • Cancer Maternal Grandfather    • Hypertension Maternal Grandfather        Social History     Socioeconomic History   • Marital status:      Spouse name: Not on file   • Number of children: Not on file   • Years of education: Not on file   • Highest education level: Not on file   Tobacco Use   • Smoking status: Current Every Day Smoker     Packs/day: 0.50     Years: 15.00     Pack years: 7.50     Types: Cigarettes   • Smokeless tobacco: Never Used   Substance and Sexual Activity   • Alcohol use: Yes     Frequency: Never     Comment: RARELY   • Drug use: No   • Sexual activity: Defer     Birth control/protection: None           Objective    Physical Exam   Constitutional: She is oriented to person, place, and time. She appears well-developed and well-nourished.   HENT:   Head: Normocephalic and atraumatic.   Right Ear: External ear normal.   Left Ear: External ear normal.   Nose: Nose normal.   Mouth/Throat: Oropharynx is clear and moist.   Eyes: EOM are normal.   Neck: Neck supple.   Cardiovascular: Normal rate and regular rhythm.   Pulmonary/Chest: Effort normal.   Abdominal: Soft. Bowel sounds are normal.   Musculoskeletal: Normal range of motion.   Neurological: She is alert and oriented to person, place, and time.   Skin: Skin is warm and dry. Capillary refill takes less than 2 seconds.   Psychiatric: She has a normal mood and affect. Her behavior is normal. Thought content normal.   Nursing note and vitals reviewed.      Procedures           ED Course                                               MDM  Number of Diagnoses or Management Options  Urinary tract infection without hematuria, site unspecified: new and requires workup     Amount and/or Complexity of Data Reviewed  Clinical lab tests: ordered and reviewed  Tests in the radiology section of CPT®: ordered and reviewed  Tests in the medicine section of CPT®: reviewed and ordered  Independent visualization of images, tracings, or specimens: yes    Risk of Complications, Morbidity, and/or Mortality  Presenting problems: high  Diagnostic procedures: moderate  Management options: moderate    Patient Progress  Patient progress: stable      Final diagnoses:   Urinary tract infection without hematuria, site unspecified            Marisela Cruz DO  01/15/20 [id number]

## 2020-01-14 LAB — BACTERIA SPEC AEROBE CULT: NO GROWTH

## 2020-02-11 ENCOUNTER — OFFICE VISIT (OUTPATIENT)
Dept: GASTROENTEROLOGY | Facility: CLINIC | Age: 30
End: 2020-02-11

## 2020-02-11 VITALS
BODY MASS INDEX: 30.76 KG/M2 | DIASTOLIC BLOOD PRESSURE: 76 MMHG | HEART RATE: 76 BPM | WEIGHT: 152.6 LBS | SYSTOLIC BLOOD PRESSURE: 120 MMHG | HEIGHT: 59 IN | OXYGEN SATURATION: 98 %

## 2020-02-11 DIAGNOSIS — R10.9 LEFT SIDED ABDOMINAL PAIN: ICD-10-CM

## 2020-02-11 DIAGNOSIS — K21.9 GASTROESOPHAGEAL REFLUX DISEASE, ESOPHAGITIS PRESENCE NOT SPECIFIED: Primary | ICD-10-CM

## 2020-02-11 DIAGNOSIS — M35.00 SJOGREN'S SYNDROME, WITH UNSPECIFIED ORGAN INVOLVEMENT (HCC): ICD-10-CM

## 2020-02-11 DIAGNOSIS — R11.2 INTRACTABLE VOMITING WITH NAUSEA, UNSPECIFIED VOMITING TYPE: ICD-10-CM

## 2020-02-11 DIAGNOSIS — K59.00 CONSTIPATION, UNSPECIFIED CONSTIPATION TYPE: ICD-10-CM

## 2020-02-11 PROBLEM — R11.10 INTRACTABLE VOMITING: Status: ACTIVE | Noted: 2020-02-11

## 2020-02-11 PROCEDURE — 99214 OFFICE O/P EST MOD 30 MIN: CPT | Performed by: PHYSICIAN ASSISTANT

## 2020-02-11 NOTE — PATIENT INSTRUCTIONS
Fiber Foods  It is recommended that you consume 25-45 grams daily.    Fresh & Dried Fruit  Serving Size Fiber (g)    Apples with skin  1 medium 5.0    Apricot  3 medium 1.0    Apricots, dried  4 pieces 2.9    Banana  1 medium 3.9    Blueberries  1 cup 4.2    Cantaloupe, cubes  1 cup 1.3    Figs, dried  2 medium 3.7    Grapefruit  1/2 medium 3.1    Orange, navel  1 medium 3.4    Peach  1 medium 2.0    Peaches, dried  3 pieces 3.2    Pear  1 medium 5.1    Plum  1 medium 1.1    Raisins  1.5 oz box 1.6    Raspberries  1 cup 6.4    Strawberries  1 cup 4.4      Grains, Beans, Nuts & Seeds  Serving Size Fiber (g)    Almonds  1 oz 4.2    Black beans, cooked  1 cup 13.9    Bran cereal  1 cup 19.9    Bread, whole wheat  1 slice 2.0    Brown rice, dry  1 cup 7.9    Cashews  1 oz 1.0    Flax seeds  3 Tbsp. 6.9    Garbanzo beans, cooked  1 cup 5.8    Kidney beans, cooked  1 cup 11.6    Lentils, red cooked  1 cup 13.6    Lima beans, cooked  1 cup 8.6    Oats, rolled dry  1 cup 12.0    Quinoa (seeds) dry  1/4 cup 6.2    Quinoa, cooked  1 cup 8.4    Pasta, whole wheat  1 cup 6.3    Peanuts  1 oz 2.3    Pistachio nuts  1 oz 3.1    Pumpkin seeds  1/4 cup 4.1    Soybeans, cooked  1 cup 8.6    Sunflower seeds  1/4 cup 3.0    Walnuts  1 oz 3.1            Vegetables  Serving Size Fiber (g)    Avocado (fruit)  1 medium 11.8    Beets, cooked  1 cup 2.8    Beet greens  1 cup 4.2    Bok josh, cooked  1 cup 2.8    Broccoli, cooked  1 cup 4.5    [place] sprouts, cooked  1 cup 3.6    Cabbage, cooked  1 cup 4.2    Carrot  1 medium 2.6    Carrot, cooked  1 cup 5.2    Cauliflower, cooked  1 cup 3.4    Francis slaw  1 cup 4.0    Kimber greens, cooked  1 cup 2.6    Corn, sweet  1 cup 4.6    Green beans  1 cup 4.0    Celery  1 stalk 1.1    Kale, cooked  1 cup 7.2    Onions, raw  1 cup 2.9    Peas, cooked  1 cup 8.8    Peppers, sweet  1 cup 2.6    Pop corn, air-popped  3 cups 3.6    Potato, baked w/ skin  1 medium 4.8    Spinach, cooked  1 cup 4.3     Summer squash, cooked  1 cup 2.5    Sweet potato, cooked  1 medium 4.9    Swiss chard, cooked  1 cup 3.7    Tomato  1 medium 1.0    Winter squash, cooked  1 cup 6.2    Zucchini, cooked  1 cup 2.6

## 2020-03-06 RX ORDER — METHOCARBAMOL 500 MG/1
500 TABLET, FILM COATED ORAL NIGHTLY
COMMUNITY

## 2020-03-06 RX ORDER — ERGOCALCIFEROL 1.25 MG/1
50000 CAPSULE ORAL
COMMUNITY

## 2020-03-09 ENCOUNTER — ANESTHESIA EVENT (OUTPATIENT)
Dept: PERIOP | Facility: HOSPITAL | Age: 30
End: 2020-03-09

## 2020-03-09 ENCOUNTER — ANESTHESIA (OUTPATIENT)
Dept: PERIOP | Facility: HOSPITAL | Age: 30
End: 2020-03-09

## 2020-03-09 ENCOUNTER — HOSPITAL ENCOUNTER (OUTPATIENT)
Facility: HOSPITAL | Age: 30
Setting detail: HOSPITAL OUTPATIENT SURGERY
Discharge: HOME | End: 2020-03-09
Attending: INTERNAL MEDICINE | Admitting: INTERNAL MEDICINE

## 2020-03-09 VITALS
RESPIRATION RATE: 20 BRPM | HEIGHT: 59 IN | WEIGHT: 149 LBS | OXYGEN SATURATION: 100 % | HEART RATE: 82 BPM | SYSTOLIC BLOOD PRESSURE: 101 MMHG | DIASTOLIC BLOOD PRESSURE: 75 MMHG | TEMPERATURE: 98.2 F | BODY MASS INDEX: 30.04 KG/M2

## 2020-03-09 DIAGNOSIS — R11.2 INTRACTABLE VOMITING WITH NAUSEA, UNSPECIFIED VOMITING TYPE: ICD-10-CM

## 2020-03-09 DIAGNOSIS — R10.9 LEFT SIDED ABDOMINAL PAIN: ICD-10-CM

## 2020-03-09 DIAGNOSIS — M35.00 SJOGREN'S SYNDROME, WITH UNSPECIFIED ORGAN INVOLVEMENT (HCC): ICD-10-CM

## 2020-03-09 DIAGNOSIS — K59.00 CONSTIPATION, UNSPECIFIED CONSTIPATION TYPE: ICD-10-CM

## 2020-03-09 DIAGNOSIS — K21.9 GASTROESOPHAGEAL REFLUX DISEASE, ESOPHAGITIS PRESENCE NOT SPECIFIED: ICD-10-CM

## 2020-03-09 PROCEDURE — 25010000002 PROPOFOL 10 MG/ML EMULSION: Performed by: NURSE ANESTHETIST, CERTIFIED REGISTERED

## 2020-03-09 PROCEDURE — 43239 EGD BIOPSY SINGLE/MULTIPLE: CPT | Performed by: INTERNAL MEDICINE

## 2020-03-09 PROCEDURE — 45385 COLONOSCOPY W/LESION REMOVAL: CPT | Performed by: INTERNAL MEDICINE

## 2020-03-09 PROCEDURE — 81025 URINE PREGNANCY TEST: CPT | Performed by: INTERNAL MEDICINE

## 2020-03-09 RX ORDER — SODIUM CHLORIDE 0.9 % (FLUSH) 0.9 %
10 SYRINGE (ML) INJECTION EVERY 12 HOURS SCHEDULED
Status: DISCONTINUED | OUTPATIENT
Start: 2020-03-09 | End: 2020-03-09 | Stop reason: HOSPADM

## 2020-03-09 RX ORDER — LIDOCAINE HYDROCHLORIDE 20 MG/ML
INJECTION, SOLUTION INFILTRATION; PERINEURAL AS NEEDED
Status: DISCONTINUED | OUTPATIENT
Start: 2020-03-09 | End: 2020-03-09 | Stop reason: SURG

## 2020-03-09 RX ORDER — SODIUM CHLORIDE 0.9 % (FLUSH) 0.9 %
10 SYRINGE (ML) INJECTION AS NEEDED
Status: DISCONTINUED | OUTPATIENT
Start: 2020-03-09 | End: 2020-03-09 | Stop reason: HOSPADM

## 2020-03-09 RX ORDER — FENTANYL CITRATE 50 UG/ML
50 INJECTION, SOLUTION INTRAMUSCULAR; INTRAVENOUS
Status: DISCONTINUED | OUTPATIENT
Start: 2020-03-09 | End: 2020-03-09 | Stop reason: HOSPADM

## 2020-03-09 RX ORDER — ONDANSETRON 2 MG/ML
4 INJECTION INTRAMUSCULAR; INTRAVENOUS AS NEEDED
Status: DISCONTINUED | OUTPATIENT
Start: 2020-03-09 | End: 2020-03-09 | Stop reason: HOSPADM

## 2020-03-09 RX ORDER — IPRATROPIUM BROMIDE AND ALBUTEROL SULFATE 2.5; .5 MG/3ML; MG/3ML
3 SOLUTION RESPIRATORY (INHALATION) ONCE AS NEEDED
Status: DISCONTINUED | OUTPATIENT
Start: 2020-03-09 | End: 2020-03-09 | Stop reason: HOSPADM

## 2020-03-09 RX ORDER — SODIUM CHLORIDE, SODIUM LACTATE, POTASSIUM CHLORIDE, CALCIUM CHLORIDE 600; 310; 30; 20 MG/100ML; MG/100ML; MG/100ML; MG/100ML
125 INJECTION, SOLUTION INTRAVENOUS CONTINUOUS
Status: DISCONTINUED | OUTPATIENT
Start: 2020-03-09 | End: 2020-03-09 | Stop reason: HOSPADM

## 2020-03-09 RX ORDER — OXYCODONE HYDROCHLORIDE AND ACETAMINOPHEN 5; 325 MG/1; MG/1
1 TABLET ORAL ONCE AS NEEDED
Status: DISCONTINUED | OUTPATIENT
Start: 2020-03-09 | End: 2020-03-09 | Stop reason: HOSPADM

## 2020-03-09 RX ORDER — MEPERIDINE HYDROCHLORIDE 25 MG/ML
12.5 INJECTION INTRAMUSCULAR; INTRAVENOUS; SUBCUTANEOUS
Status: DISCONTINUED | OUTPATIENT
Start: 2020-03-09 | End: 2020-03-09 | Stop reason: HOSPADM

## 2020-03-09 RX ORDER — PROPOFOL 10 MG/ML
VIAL (ML) INTRAVENOUS AS NEEDED
Status: DISCONTINUED | OUTPATIENT
Start: 2020-03-09 | End: 2020-03-09 | Stop reason: SURG

## 2020-03-09 RX ADMIN — PROPOFOL 30 MG: 10 INJECTION, EMULSION INTRAVENOUS at 14:40

## 2020-03-09 RX ADMIN — SODIUM CHLORIDE, POTASSIUM CHLORIDE, SODIUM LACTATE AND CALCIUM CHLORIDE: 600; 310; 30; 20 INJECTION, SOLUTION INTRAVENOUS at 14:40

## 2020-03-09 RX ADMIN — PROPOFOL 200 MCG/KG/MIN: 10 INJECTION, EMULSION INTRAVENOUS at 14:40

## 2020-03-09 RX ADMIN — LIDOCAINE HYDROCHLORIDE 30 MG: 20 INJECTION, SOLUTION INFILTRATION; PERINEURAL at 14:37

## 2020-03-09 NOTE — ANESTHESIA POSTPROCEDURE EVALUATION
Patient: Pili Webster    Procedure Summary     Date:  03/09/20 Room / [facility]:   [facility]  /  [facility]    Anesthesia Start:  1437 Anesthesia Stop:  1510    Procedures:       ESOPHAGOGASTRODUODENOSCOPY WITH BIOPSY CPT CODE: 47860 (N/A Esophagus)      COLONOSCOPY CPT CODE: 15010 (N/A ) Diagnosis:       Gastroesophageal reflux disease, esophagitis presence not specified      Intractable vomiting with nausea, unspecified vomiting type      Left sided abdominal pain      Constipation, unspecified constipation type      Sjogren's syndrome, with unspecified organ involvement (CMS/HCC)      (Gastroesophageal reflux disease, esophagitis presence not specified [K21.9])      (Intractable vomiting with nausea, unspecified vomiting type [R11.2])      (Left sided abdominal pain [R10.9])      (Constipation, unspecified constipation type [K59.00])      (Sjogren's syndrome, with unspecified organ involvement (CMS/HCC) [M35.00])    Surgeon:  Jeremiah Murdock MD Provider:  Bronson Perez MD    Anesthesia Type:  general ASA Status:  2          Anesthesia Type: general    Vitals  Vitals Value Taken Time   /75 3/9/2020  3:42 PM   Temp 98.2 °F (36.8 °C) 3/9/2020  3:42 PM   Pulse 82 3/9/2020  3:42 PM   Resp 20 3/9/2020  3:42 PM   SpO2 100 % 3/9/2020  3:42 PM           Post Anesthesia Care and Evaluation    Patient location during evaluation: PHASE II  Patient participation: complete - patient participated  Level of consciousness: awake and alert  Pain score: 1  Pain management: adequate  Airway patency: patent  Anesthetic complications: No anesthetic complications  PONV Status: controlled  Cardiovascular status: acceptable  Respiratory status: acceptable  Hydration status: acceptable

## 2020-03-09 NOTE — OP NOTE
COLONOSCOPY PROCEDURE NOTE    Pili Webster  3/9/2020    PRE-PROCEDURE DIAGNOSIS:   Gastroesophageal reflux disease, esophagitis presence not specified [K21.9]  Intractable vomiting with nausea, unspecified vomiting type [R11.2]  Left sided abdominal pain [R10.9]  Constipation, unspecified constipation type [K59.00]  Sjogren's syndrome, with unspecified organ involvement (CMS/HCC) [M35.00]    POST-PROCEDURE DIAGNOSIS:  1.-  Normal terminal ileum  2.-  Evidence of prior rectal surgery (hemorrhoid stapling)  3.-  Status post cold snare polypectomy of transverse and sigmoid colon polyps both measuring approximately 6 mm.  Pathology pending  4.-  Left-sided diverticulosis.    INDICATION:  Chronic constipation  Abdominal pain    PROCEDURE:  COLONOSCOPY with polypectomy    GASTROENTEROLOGIST:  Jeremiah Murdock MD    ANESTHESIA:  Propofol administered by anesthesia.  See anesthesia notes for ASA classification    STAFF  Circulator: Sunita Irizarry RN  Endo Technician: Arnaldo Guillaume    Findings:  As noted in the post procedure diagnosis    OPERATIVE PROCEDURE   After proper informed consent was obtained, the patient was taken the operating suite and placed in left lateral decubitus position.  An Olympus video colonoscope 180 series was inserted in the rectum and advanced to the terminal ileum under direct visualization.  Cecum and terminal ileum were identified by visualization of the appendiceal orifice and ileocecal valve.  The colonoscope was then slowly withdrawn from the cecum to the rectum and passed a second time from rectum to cecum.  The colonoscope was retroflexed in the cecum and rectum. Scope was then withdrawn. Patient tolerated the procedure well. There were no immediate complications. Cecal withdrawal time was 9 minutes.    ESTIMATED BLOOD LOSS  None    SPECIMENS  Transverse and sigmoid colon polyps             COMPLICATIONS  None    RECOMMENDATIONS:  1.-  Await pathology  report  2.-  Surveillance colonoscopy in 5 years if polyps prove adenomatous  3.-  Keep upcoming GI clinic appointment    Jeremiah Murdock MD  03/09/20 3:07 PM

## 2020-03-09 NOTE — ANESTHESIA PREPROCEDURE EVALUATION
Anesthesia Evaluation     Patient summary reviewed and Nursing notes reviewed   no history of anesthetic complications:  NPO Solid Status: > 8 hours  NPO Liquid Status: > 8 hours           Airway   Mallampati: II  TM distance: >3 FB  Neck ROM: full  No difficulty expected  Dental    (+) edentulous    Pulmonary - normal exam   (+) a smoker Current Abstained day of surgery, asthma (very mild, allergic, rarely uses inhaler),  Cardiovascular - normal exam        Neuro/Psych  (+) headaches, psychiatric history Anxiety,     GI/Hepatic/Renal/Endo    (+) obesity,  GERD,  renal disease,     Musculoskeletal     Abdominal  - normal exam   Substance History      OB/GYN          Other                          Anesthesia Plan    ASA 2     general     intravenous induction     Anesthetic plan, all risks, benefits, and alternatives have been provided, discussed and informed consent has been obtained with: patient.    Plan discussed with CRNA.

## 2020-03-09 NOTE — OP NOTE
ESOPHAGOGASTRODUODENOSCOPY PROCEDURE REPORT    Pili Webster  3/9/2020    PRE-PROCEDURE DIAGNOSIS:  Gastroesophageal reflux disease, esophagitis presence not specified [K21.9]  Intractable vomiting with nausea, unspecified vomiting type [R11.2]  Left sided abdominal pain [R10.9]  Constipation, unspecified constipation type [K59.00]  Sjogren's syndrome, with unspecified organ involvement (CMS/HCC) [M35.00]    POST-PROCEDURE DIAGNOSIS:  1.-  Patchy salmon-colored mucosa in the distal 2 cm the esophagus pathology pending to confirm suspicion of Márquez's esophagus  2.-  EG junction at 35 cm  3.-  Small sliding hiatal hernia  4.-  Normal stomach.  Biopsied to rule out H. pylori  5.-  Normal duodenum.  Biopsied to rule out celiac disease    INDICATION:  Chronic gastroesophageal reflux disease.  Nausea vomiting.    Procedure(s):  ESOPHAGOGASTRODUODENOSCOPY WITH BIOPSY CPT CODE: 92067    GASTROENTEROLOGIST:  Jeremiah Murdock MD    ANESTHESIA:  Propofol administered by anesthesia.  See anesthesia notes for ASA classification    STAFF:  Circulator: Sunita Irizarry RN  Endo Technician: Arnaldo Guillaume    FINDINGS:  As noted in the post procedure diagnosis    OPERATIVE PROCEDURE:  After proper informed consent was obtained, patient was transferred to the OR/endoscopy suite.  Patient was then placed in left lateral decubitus position. The Olympus 180 series video gastroscope was inserted orally under direct visualization.  Esophagus, stomach, and duodenum were inspected.  The endoscope was passed to the third portion of the duodenum.  Scope was retroflexed for visualization of the cardia and incisuraThe endoscope was then withdrawn. Patient tolerated the procedure well. There were no immediate complications.    ESTIMATED BLOOD LOSS:  None    SPECIMENS:  Distal esophageal, rule out Márquez's esophagus  Gastric antrum rule out Márquez's esophagus  Duodenum, rule out celiac disease                  COMPLICATIONS;  None    RECOMMENDATIONS/ PLAN:  1.-  Await pathology report  2.-  GI clinic appointment  3.-  Proceed with colonoscopy    Jeremiah Murdock MD     03/09/20 2:53 PM

## 2020-03-12 DIAGNOSIS — N20.1 URETERAL CALCULUS: ICD-10-CM

## 2020-03-12 LAB
LAB AP CASE REPORT: NORMAL
PATH REPORT.FINAL DX SPEC: NORMAL

## 2020-03-12 RX ORDER — POTASSIUM CITRATE 10 MEQ/1
TABLET, EXTENDED RELEASE ORAL
Qty: 60 EACH | Refills: 1 | Status: SHIPPED | OUTPATIENT
Start: 2020-03-12 | End: 2020-04-21

## 2020-03-16 ENCOUNTER — OFFICE VISIT (OUTPATIENT)
Dept: GASTROENTEROLOGY | Facility: CLINIC | Age: 30
End: 2020-03-16

## 2020-03-16 VITALS
BODY MASS INDEX: 30.04 KG/M2 | HEART RATE: 98 BPM | OXYGEN SATURATION: 98 % | DIASTOLIC BLOOD PRESSURE: 75 MMHG | HEIGHT: 59 IN | SYSTOLIC BLOOD PRESSURE: 122 MMHG | WEIGHT: 149 LBS

## 2020-03-16 DIAGNOSIS — R63.0 POOR APPETITE: ICD-10-CM

## 2020-03-16 DIAGNOSIS — R10.12 LUQ ABDOMINAL PAIN: ICD-10-CM

## 2020-03-16 DIAGNOSIS — K44.9 HIATAL HERNIA: ICD-10-CM

## 2020-03-16 DIAGNOSIS — K21.00 GASTROESOPHAGEAL REFLUX DISEASE WITH ESOPHAGITIS: Primary | ICD-10-CM

## 2020-03-16 DIAGNOSIS — D12.6 SERRATED ADENOMA OF COLON: ICD-10-CM

## 2020-03-16 DIAGNOSIS — K22.70 BARRETT'S ESOPHAGUS WITHOUT DYSPLASIA: ICD-10-CM

## 2020-03-16 PROCEDURE — 99214 OFFICE O/P EST MOD 30 MIN: CPT | Performed by: PHYSICIAN ASSISTANT

## 2020-03-16 RX ORDER — DEXLANSOPRAZOLE 60 MG/1
60 CAPSULE, DELAYED RELEASE ORAL DAILY
Qty: 30 CAPSULE | Refills: 5 | Status: SHIPPED | OUTPATIENT
Start: 2020-03-16 | End: 2020-04-27 | Stop reason: SDUPTHER

## 2020-03-16 NOTE — PROGRESS NOTES
: 1990    Chief Complaint   Patient presents with   • EGD/Colonoscopy follow up       Pili Webster is a 29 y.o. female who presents to the office today as a follow up appointment regarding recent procedures.     History of Present Illness:  Still having LUQ abdominal pain which radiates downward. It is worse after eating. Seems to be relieved some when she eats less during meal times. She has been taking Protonix 40 mg once daily, previously controlling her symptoms but recently she still has heartburn all throughout the day. Her appetite is still decreased. This was her first EGD and colonoscopy. There is no known family history of colon cancer or colon polyps.    Review of Systems   Constitutional: Positive for fatigue. Negative for chills and fever.   HENT: Negative for trouble swallowing.    Eyes: Negative.    Respiratory: Positive for cough and shortness of breath. Negative for choking and chest tightness.    Cardiovascular: Negative for chest pain.   Gastrointestinal: Positive for abdominal distention, abdominal pain, diarrhea and nausea. Negative for anal bleeding, blood in stool, constipation, rectal pain and vomiting.   Endocrine: Negative.    Genitourinary: Negative for difficulty urinating.   Musculoskeletal: Positive for back pain. Negative for neck pain.   Skin: Negative for color change, pallor, rash and wound.   Allergic/Immunologic: Positive for environmental allergies and food allergies.   Neurological: Positive for headaches. Negative for dizziness and light-headedness.   Hematological: Bruises/bleeds easily.   Psychiatric/Behavioral: Negative.      I have reviewed and confirmed the accuracy of the ROS as documented by the MA/LPN/RN Naomi Aguiar PA-C    Past Medical History:   Diagnosis Date   • Abdominal pain    • Abnormal uterine bleeding (AUB)    • Anxiety and depression    • Arthritis    • Cholecystitis    • Constipation    • Endometriosis    • Frequent UTI    • GERD  (gastroesophageal reflux disease)    • Heartburn    • Hemorrhoids    • Kidney stones    • Lesion of breast    • Lump     RIGHT BREAST   • Nausea & vomiting    • PCOS (polycystic ovarian syndrome)    • Sjogren's disease (CMS/HCC)    • Tachycardia    • Wrist fracture     RIGHT ARM      PATIENT UNSURE IF FRACTURED       Past Surgical History:   Procedure Laterality Date   • ABDOMINAL SURGERY     • BREAST BIOPSY Right 11/29/2018    Procedure: breast biopsy ;  Surgeon: Shiv Overton MD;  Location: [facility] OR;  Service: General   • CHOLECYSTECTOMY N/A 8/25/2017    Procedure: CHOLECYSTECTOMY LAPAROSCOPIC;  Surgeon: Franco Mari MD;  Location: [facility] OR;  Service:    • COLONOSCOPY N/A 3/9/2020    Procedure: COLONOSCOPY CPT CODE: 02298;  Surgeon: Jeremiah Murdock MD;  Location: [facility] OR;  Service: Gastroenterology;  Laterality: N/A;   • DENTAL PROCEDURE     • DIAGNOSTIC LAPAROSCOPY      X5   • DILATATION AND CURETTAGE     • ENDOSCOPY N/A 3/9/2020    Procedure: ESOPHAGOGASTRODUODENOSCOPY WITH BIOPSY CPT CODE: 46883;  Surgeon: Jeremiah Murdock MD;  Location: [facility] OR;  Service: Gastroenterology;  Laterality: N/A;   • HEMORRHOIDECTOMY N/A 11/14/2019    Procedure: HEMORRHOID STAPLING;  Surgeon: Franco Mari MD;  Location: [facility] OR;  Service: General   • URETEROSCOPY LASER LITHOTRIPSY WITH STENT INSERTION Right 1/9/2019    Procedure: URETEROSCOPY LASER LITHOTRIPSY retrograde pyelogram;  Surgeon: Ahmet Barrow MD;  Location: [facility] OR;  Service: Urology   • WISDOM TOOTH EXTRACTION         Family History   Problem Relation Age of Onset   • Breast cancer Maternal Aunt    • Alcohol abuse Paternal Grandfather    • Heart disease Paternal Grandfather    • Cancer Maternal Grandfather    • Hypertension Maternal Grandfather        Social History     Socioeconomic History   • Marital status:      Spouse name: Not on file   • Number of children: Not on file   • Years of education:  Not on file   • Highest education level: Not on file   Tobacco Use   • Smoking status: Current Every Day Smoker     Packs/day: 0.25     Years: 16.00     Pack years: 4.00     Types: Cigarettes   • Smokeless tobacco: Never Used   Substance and Sexual Activity   • Alcohol use: Yes     Frequency: Never     Comment: RARELY   • Drug use: No   • Sexual activity: Defer     Birth control/protection: None       Current Outpatient Medications:   •  albuterol sulfate  (90 Base) MCG/ACT inhaler, , Disp: , Rfl:   •  Albuterol Sulfate, sensor, 108 (90 Base) MCG/ACT aerosol powder , Inhale 2 puffs As Needed., Disp: , Rfl:   •  aspirin 81 MG EC tablet, Take 81 mg by mouth Daily. LAS T DOSE 11/26/2018, Disp: , Rfl:   •  atenolol (TENORMIN) 25 MG tablet, Take 25 mg by mouth Daily., Disp: , Rfl:   •  hydroxychloroquine (PLAQUENIL) 200 MG tablet, Take  by mouth Daily., Disp: , Rfl:   •  ibuprofen (ADVIL,MOTRIN) 600 MG tablet, Take 600 mg by mouth Every 8 (Eight) Hours As Needed for Mild Pain ., Disp: , Rfl:   •  loratadine (CLARITIN) 10 MG tablet, 10 mg Daily., Disp: , Rfl:   •  methocarbamol (ROBAXIN) 500 MG tablet, Take 500 mg by mouth Every Night., Disp: , Rfl:   •  montelukast (SINGULAIR) 10 MG tablet, Take 10 mg by mouth Every Night., Disp: , Rfl:   •  pantoprazole (PROTONIX) 40 MG EC tablet, Take 40 mg by mouth Daily., Disp: , Rfl:   •  polyethylene glycol (MIRALAX) packet, Take 17 g by mouth Daily., Disp: , Rfl:   •  potassium citrate (UROCIT-K) 10 MEQ (1080 MG) CR tablet, TAKE ONE TABLET BY MOUTH THREE TIMES A DAY WITH MEALS, Disp: 60 each, Rfl: 1  •  Promethazine-Phenyleph-Codeine 6.25-5-10 MG/5ML syrup syrup, Take 5-10 mL by mouth Every 4 (Four) Hours As Needed., Disp: , Rfl:   •  QUEtiapine (SEROquel) 50 MG tablet, Take 50 mg by mouth Every Night., Disp: , Rfl:   •  rizatriptan (MAXALT) 10 MG tablet, , Disp: , Rfl:   •  rOPINIRole (REQUIP) 0.5 MG tablet, , Disp: , Rfl:   •  SUMAtriptan (IMITREX) 100 MG tablet, Take 100  "mg by mouth Every 2 (Two) Hours As Needed for Migraine. Take one tablet at onset of headache. May repeat dose one time in 2 hours if headache not relieved., Disp: , Rfl:   •  vitamin D (ERGOCALCIFEROL) 1.25 MG (68884 UT) capsule capsule, Take 50,000 Units by mouth Every 7 (Seven) Days., Disp: , Rfl:     Allergies:   Peanut-containing drug products; Latex; Shrimp; Milk-related compounds; and Sulfa antibiotics    Vitals:  /75 (BP Location: Left arm, Patient Position: Sitting, Cuff Size: Adult)   Pulse 98   Ht 149.9 cm (59\")   Wt 67.6 kg (149 lb)   SpO2 98%   BMI 30.09 kg/m²     Physical Exam   Constitutional: She is oriented to person, place, and time. She appears well-developed and well-nourished. No distress.   HENT:   Head: Normocephalic and atraumatic.   Nose: Nose normal.   Mouth/Throat: Oropharynx is clear and moist.   Eyes: Conjunctivae are normal. Right eye exhibits no discharge. Left eye exhibits no discharge. No scleral icterus.   Neck: Normal range of motion. No JVD present.   Cardiovascular: Normal rate, regular rhythm and normal heart sounds. Exam reveals no gallop and no friction rub.   No murmur heard.  Pulmonary/Chest: Effort normal and breath sounds normal. No respiratory distress. She has no wheezes. She has no rales. She exhibits no tenderness.   Abdominal: Soft. Bowel sounds are normal. She exhibits no mass. There is no tenderness.   Musculoskeletal: Normal range of motion. She exhibits no edema or deformity.   Neurological: She is alert and oriented to person, place, and time. Coordination normal.   Skin: Skin is warm and dry. No rash noted. She is not diaphoretic. No erythema.   Psychiatric: She has a normal mood and affect. Her behavior is normal. Judgment and thought content normal.   Vitals reviewed.    Results Review:  EGD and colonoscopy were completed on 3/9/2020 by Dr. Murdock.  Findings were patchy salmon-colored mucosa in the distal esophagus, small sliding hiatal hernia, " normal stomach, normal duodenum, normal terminal ileum, evidence of prior rectal surgery, colon polyp in the transverse and sigmoid colon, left-sided diverticulosis.  Pathology showed normal duodenum, minimal superficial chronic inflammation of the stomach, negative H. pylori, esophageal biopsy showed features of reflux, multifocal intestinal metaplasia negative for dysplasia, transverse colon polyp was sessile serrated adenoma and sigmoid colon polyp was hyperplastic.    Assessment:  1. Gastroesophageal reflux disease with esophagitis    2. Hiatal hernia    3. Márquez's esophagus without dysplasia    4. Serrated adenoma of colon    5. LUQ abdominal pain    6. Poor appetite      Plan:  She was instructed not to lie down immediately after eating (wait at least 3 hours after meals), elevate the head of the bed at night, avoid spicy foods, avoid mints, avoid caffeine, avoid nicotine and work on getting to a healthy weight. Discontinue Protonix for now. She will start taking dexilant 60 mg once daily for GERD and hiatal hernia.    She has a new diagnosis of Márquez's esophagus. This is considered a pre-cancerous condition that should be monitored with repeat EGD in 1 year for dysplasia of the distal esophagus. She will be placed on a recall list to be called for an appointment closer to that time. If no reminder call is made, she has been instructed to call the office to follow up. Good control of GERD is imperative.     She will need a repeat colonoscopy in 3 years due to personal history of tubular adenomatous colon polyps. We will place her on the recall list to be called to schedule an appointment closer to that date. She was instructed to call the office if no reminder call is made within the proper number of years. Also, the indications for a repeat colonoscopy sooner than the recall date were discussed; rectal bleeding, melena, change in bowel habits or significant abdominal pain.      LUQ abdominal pain and poor  appetite will be monitored. May improve after better GERD control. Continue with small portions when eating.    New Medications Ordered This Visit   Medications   • dexlansoprazole (DEXILANT) 60 MG capsule     Sig: Take 1 capsule by mouth Daily.     Dispense:  30 capsule     Refill:  5           Return in about 6 weeks (around 4/27/2020) for recheck abdominal pain.      Electronically signed 3/16/2020 15:30  Naomi Aguiar PA-C, [facility]

## 2020-04-21 DIAGNOSIS — N20.1 URETERAL CALCULUS: ICD-10-CM

## 2020-04-21 RX ORDER — POTASSIUM CITRATE 10 MEQ/1
TABLET, EXTENDED RELEASE ORAL
Qty: 60 EACH | Refills: 0 | Status: SHIPPED | OUTPATIENT
Start: 2020-04-21 | End: 2020-05-13

## 2020-04-27 ENCOUNTER — OFFICE VISIT (OUTPATIENT)
Dept: GASTROENTEROLOGY | Facility: CLINIC | Age: 30
End: 2020-04-27

## 2020-04-27 VITALS
TEMPERATURE: 97 F | WEIGHT: 154 LBS | HEIGHT: 59 IN | BODY MASS INDEX: 31.04 KG/M2 | DIASTOLIC BLOOD PRESSURE: 76 MMHG | OXYGEN SATURATION: 98 % | HEART RATE: 77 BPM | SYSTOLIC BLOOD PRESSURE: 108 MMHG

## 2020-04-27 DIAGNOSIS — K22.70 BARRETT'S ESOPHAGUS WITHOUT DYSPLASIA: ICD-10-CM

## 2020-04-27 DIAGNOSIS — K44.9 HIATAL HERNIA: ICD-10-CM

## 2020-04-27 DIAGNOSIS — K21.00 GASTROESOPHAGEAL REFLUX DISEASE WITH ESOPHAGITIS: Primary | ICD-10-CM

## 2020-04-27 DIAGNOSIS — K59.04 CHRONIC IDIOPATHIC CONSTIPATION: ICD-10-CM

## 2020-04-27 PROCEDURE — 99213 OFFICE O/P EST LOW 20 MIN: CPT | Performed by: PHYSICIAN ASSISTANT

## 2020-04-27 RX ORDER — DEXLANSOPRAZOLE 60 MG/1
60 CAPSULE, DELAYED RELEASE ORAL DAILY
Qty: 30 CAPSULE | Refills: 5 | Status: SHIPPED | OUTPATIENT
Start: 2020-04-27 | End: 2021-01-25 | Stop reason: SDUPTHER

## 2020-04-27 NOTE — PROGRESS NOTES
: 1990    Chief Complaint   Patient presents with   • Irritable Bowel Syndrome   • Abdominal Pain   • Nausea   • Heartburn     Pili Webster is a 29 y.o. female who presents to the office today as a follow up appointment regarding Irritable Bowel Syndrome; Abdominal Pain; Nausea; and Heartburn.    History of Present Illness:  She had been taking Dexilant samples, was taking 60 mg once daily for the past several weeks with great relief of GERD. She has taken several other medications without relief including Prevacid, Prilosec, Zantac, Pepcid, Nexium, etc. She is currently taking only protonix 40 mg up to BID due to severe GERD but still having daily reflux. She has been modifying her diet to avoid any spicy foods or heavy seasoning. She has history of both hiatal hernia and Márquez's esophagus. Her insurance has not covered Dexilant yet.     For constipation, she has been taking Miralax 17 g once daily. Her stools still fluctuate between hard and loose at times. She takes about 1/2 dose extra Miralax if she has hard stools. She has been drinking more water recently than she did in the past.     Review of Systems   Constitutional: Positive for fatigue. Negative for chills and fever.   HENT: Negative for trouble swallowing.    Eyes: Negative.    Respiratory: Positive for cough and shortness of breath. Negative for choking and chest tightness.    Cardiovascular: Negative for chest pain.   Gastrointestinal: Positive for abdominal distention, abdominal pain, diarrhea and nausea. Negative for anal bleeding, blood in stool, constipation, rectal pain and vomiting.   Endocrine: Negative.    Genitourinary: Negative for difficulty urinating.   Musculoskeletal: Positive for back pain. Negative for neck pain.   Skin: Negative for color change, pallor, rash and wound.   Allergic/Immunologic: Positive for environmental allergies and food allergies.   Neurological: Positive for headaches. Negative for dizziness  and light-headedness.   Hematological: Bruises/bleeds easily.   Psychiatric/Behavioral: Negative.      I have reviewed and confirmed the accuracy of the ROS as documented by the MA/LPN/RN Naomi Aguiar PA-C    Past Medical History:   Diagnosis Date   • Abdominal pain    • Abnormal uterine bleeding (AUB)    • Anxiety and depression    • Arthritis    • Cholecystitis    • Constipation    • Endometriosis    • Frequent UTI    • GERD (gastroesophageal reflux disease)    • Heartburn    • Hemorrhoids    • Kidney stones    • Lesion of breast    • Lump     RIGHT BREAST   • Nausea & vomiting    • PCOS (polycystic ovarian syndrome)    • Sjogren's disease (CMS/HCC)    • Tachycardia    • Wrist fracture     RIGHT ARM      PATIENT UNSURE IF FRACTURED       Past Surgical History:   Procedure Laterality Date   • ABDOMINAL SURGERY     • BREAST BIOPSY Right 11/29/2018    Procedure: breast biopsy ;  Surgeon: hSiv Overton MD;  Location: [facility] OR;  Service: General   • CHOLECYSTECTOMY N/A 8/25/2017    Procedure: CHOLECYSTECTOMY LAPAROSCOPIC;  Surgeon: Franco Mari MD;  Location: [facility] OR;  Service:    • COLONOSCOPY N/A 3/9/2020    Procedure: COLONOSCOPY CPT CODE: 03153;  Surgeon: Jeremiah Murdock MD;  Location: [facility] OR;  Service: Gastroenterology;  Laterality: N/A;   • DENTAL PROCEDURE     • DIAGNOSTIC LAPAROSCOPY      X5   • DILATATION AND CURETTAGE     • ENDOSCOPY N/A 3/9/2020    Procedure: ESOPHAGOGASTRODUODENOSCOPY WITH BIOPSY CPT CODE: 43764;  Surgeon: Jeremiah Murdock MD;  Location: [facility] OR;  Service: Gastroenterology;  Laterality: N/A;   • HEMORRHOIDECTOMY N/A 11/14/2019    Procedure: HEMORRHOID STAPLING;  Surgeon: Franco Mari MD;  Location: [facility] OR;  Service: General   • URETEROSCOPY LASER LITHOTRIPSY WITH STENT INSERTION Right 1/9/2019    Procedure: URETEROSCOPY LASER LITHOTRIPSY retrograde pyelogram;  Surgeon: Ahmet Barrow MD;  Location: [facility] OR;  Service: Urology    • WISDOM TOOTH EXTRACTION         Family History   Problem Relation Age of Onset   • Breast cancer Maternal Aunt    • Alcohol abuse Paternal Grandfather    • Heart disease Paternal Grandfather    • Cancer Maternal Grandfather    • Hypertension Maternal Grandfather        Social History     Socioeconomic History   • Marital status:      Spouse name: Not on file   • Number of children: Not on file   • Years of education: Not on file   • Highest education level: Not on file   Tobacco Use   • Smoking status: Current Every Day Smoker     Packs/day: 0.25     Years: 16.00     Pack years: 4.00     Types: Cigarettes   • Smokeless tobacco: Never Used   Substance and Sexual Activity   • Alcohol use: Yes     Frequency: Never     Comment: RARELY   • Drug use: No   • Sexual activity: Defer     Birth control/protection: None       Current Outpatient Medications:   •  albuterol sulfate  (90 Base) MCG/ACT inhaler, , Disp: , Rfl:   •  Albuterol Sulfate, sensor, 108 (90 Base) MCG/ACT aerosol powder , Inhale 2 puffs As Needed., Disp: , Rfl:   •  aspirin 81 MG EC tablet, Take 81 mg by mouth Daily. LAS T DOSE 11/26/2018, Disp: , Rfl:   •  atenolol (TENORMIN) 25 MG tablet, Take 25 mg by mouth Daily., Disp: , Rfl:   •  hydroxychloroquine (PLAQUENIL) 200 MG tablet, Take  by mouth Daily., Disp: , Rfl:   •  ibuprofen (ADVIL,MOTRIN) 600 MG tablet, Take 600 mg by mouth Every 8 (Eight) Hours As Needed for Mild Pain ., Disp: , Rfl:   •  loratadine (CLARITIN) 10 MG tablet, 10 mg Daily., Disp: , Rfl:   •  methocarbamol (ROBAXIN) 500 MG tablet, Take 500 mg by mouth Every Night., Disp: , Rfl:   •  montelukast (SINGULAIR) 10 MG tablet, Take 10 mg by mouth Every Night., Disp: , Rfl:   •  polyethylene glycol (MIRALAX) packet, Take 17 g by mouth Daily., Disp: , Rfl:   •  potassium citrate (UROCIT-K) 10 MEQ (1080 MG) CR tablet, TAKE ONE TABLET BY MOUTH THREE TIMES A DAY WITH MEALS, Disp: 60 each, Rfl: 0  •  QUEtiapine (SEROquel) 50 MG  "tablet, Take 50 mg by mouth Every Night., Disp: , Rfl:   •  rizatriptan (MAXALT) 10 MG tablet, , Disp: , Rfl:   •  rOPINIRole (REQUIP) 0.5 MG tablet, , Disp: , Rfl:   •  vitamin D (ERGOCALCIFEROL) 1.25 MG (89209 UT) capsule capsule, Take 50,000 Units by mouth Every 7 (Seven) Days., Disp: , Rfl:   •  dexlansoprazole (DEXILANT) 60 MG capsule, Take 1 capsule by mouth Daily., Disp: 30 capsule, Rfl: 5  •  Promethazine-Phenyleph-Codeine 6.25-5-10 MG/5ML syrup syrup, Take 5-10 mL by mouth Every 4 (Four) Hours As Needed., Disp: , Rfl:   •  SUMAtriptan (IMITREX) 100 MG tablet, Take 100 mg by mouth Every 2 (Two) Hours As Needed for Migraine. Take one tablet at onset of headache. May repeat dose one time in 2 hours if headache not relieved., Disp: , Rfl:     Allergies:   Peanut-containing drug products; Latex; Shrimp; Milk-related compounds; and Sulfa antibiotics    Vitals:  /76   Pulse 77   Temp 97 °F (36.1 °C)   Ht 149.9 cm (59\")   Wt 69.9 kg (154 lb)   SpO2 98%   BMI 31.10 kg/m²     Physical Exam   Constitutional: She is oriented to person, place, and time. She appears well-developed and well-nourished. No distress.   Wearing mask   HENT:   Head: Normocephalic and atraumatic.   Nose: Nose normal.   Mouth/Throat: Oropharynx is clear and moist.   Eyes: Conjunctivae are normal. Right eye exhibits no discharge. Left eye exhibits no discharge. No scleral icterus.   Neck: Normal range of motion. No JVD present.   Pulmonary/Chest: Effort normal. No respiratory distress. She has no rales. She exhibits no tenderness.   Abdominal: Soft. Bowel sounds are normal. She exhibits no mass. There is tenderness (LUQ, mild).   Musculoskeletal: Normal range of motion. She exhibits no edema or deformity.   Neurological: She is alert and oriented to person, place, and time. Coordination normal.   Skin: Skin is warm and dry. No rash noted. She is not diaphoretic. No erythema.   Psychiatric: She has a normal mood and affect. Her " behavior is normal. Judgment and thought content normal.   Vitals reviewed.    Results Review:  EGD and colonoscopy were completed on 3/9/2020 by Dr. Murdock.  Findings were patchy salmon-colored mucosa in the distal esophagus, small sliding hiatal hernia, normal stomach, normal duodenum, normal terminal ileum, evidence of prior rectal surgery, colon polyp in the transverse and sigmoid colon, left-sided diverticulosis.  Pathology showed normal duodenum, minimal superficial chronic inflammation of the stomach, negative H. pylori, esophageal biopsy showed features of reflux, multifocal intestinal metaplasia negative for dysplasia, transverse colon polyp was sessile serrated adenoma and sigmoid colon polyp was hyperplastic.    Assessment:  1. Gastroesophageal reflux disease with esophagitis    2. Hiatal hernia    3. Márquez's esophagus without dysplasia    4. Chronic idiopathic constipation      Plan:  More Dexilant samples will be given today. She was directed to continue taking Dexilant 60 mg once daily for GERD and hiatal hernia. It is imperative that she have good GERD control with her history of Márquez's esophagus. We will check again with her insurance regarding approval.     For constipation, continue with Miralax 17 g once daily and add more if needed. Continue with daily water intake of 3-4 bottles daily.     New Medications Ordered This Visit   Medications   • dexlansoprazole (Dexilant) 60 MG capsule     Sig: Take 1 capsule by mouth Daily.     Dispense:  30 capsule     Refill:  5           Return in about 3 months (around 7/27/2020).      Electronically signed 4/27/2020 09:10  Naomi Aguiar PA-C, [facility]

## 2020-05-13 DIAGNOSIS — N20.1 URETERAL CALCULUS: ICD-10-CM

## 2020-05-13 RX ORDER — POTASSIUM CITRATE 10 MEQ/1
TABLET, EXTENDED RELEASE ORAL
Qty: 60 EACH | Refills: 0 | Status: SHIPPED | OUTPATIENT
Start: 2020-05-13 | End: 2020-06-03

## 2020-06-03 DIAGNOSIS — N20.1 URETERAL CALCULUS: ICD-10-CM

## 2020-06-03 RX ORDER — POTASSIUM CITRATE 10 MEQ/1
TABLET, EXTENDED RELEASE ORAL
Qty: 60 EACH | Refills: 0 | Status: SHIPPED | OUTPATIENT
Start: 2020-06-03 | End: 2020-06-22

## 2020-06-04 ENCOUNTER — TELEPHONE (OUTPATIENT)
Dept: GASTROENTEROLOGY | Facility: CLINIC | Age: 30
End: 2020-06-04

## 2020-06-20 DIAGNOSIS — N20.1 URETERAL CALCULUS: ICD-10-CM

## 2020-06-22 RX ORDER — POTASSIUM CITRATE 10 MEQ/1
TABLET, EXTENDED RELEASE ORAL
Qty: 60 EACH | Refills: 0 | Status: SHIPPED | OUTPATIENT
Start: 2020-06-22 | End: 2020-07-10

## 2020-07-09 ENCOUNTER — HOSPITAL ENCOUNTER (OUTPATIENT)
Dept: GENERAL RADIOLOGY | Facility: HOSPITAL | Age: 30
Discharge: HOME | End: 2020-07-09
Admitting: NURSE PRACTITIONER

## 2020-07-09 ENCOUNTER — OFFICE VISIT (OUTPATIENT)
Dept: UROLOGY | Facility: CLINIC | Age: 30
End: 2020-07-09

## 2020-07-09 VITALS — BODY MASS INDEX: 32.25 KG/M2 | HEIGHT: 59 IN | WEIGHT: 160 LBS | TEMPERATURE: 98.5 F

## 2020-07-09 DIAGNOSIS — R10.9 RIGHT FLANK PAIN: Primary | ICD-10-CM

## 2020-07-09 DIAGNOSIS — N20.0 KIDNEY STONE: ICD-10-CM

## 2020-07-09 LAB
BILIRUB BLD-MCNC: NEGATIVE MG/DL
CLARITY, POC: CLEAR
COLOR UR: YELLOW
GLUCOSE UR STRIP-MCNC: NEGATIVE MG/DL
KETONES UR QL: NEGATIVE
LEUKOCYTE EST, POC: NEGATIVE
NITRITE UR-MCNC: NEGATIVE MG/ML
PH UR: 6.5 [PH] (ref 5–8)
PROT UR STRIP-MCNC: NEGATIVE MG/DL
RBC # UR STRIP: ABNORMAL /UL
SP GR UR: 1.01 (ref 1–1.03)
UROBILINOGEN UR QL: NORMAL

## 2020-07-09 PROCEDURE — 99214 OFFICE O/P EST MOD 30 MIN: CPT | Performed by: NURSE PRACTITIONER

## 2020-07-09 PROCEDURE — 74018 RADEX ABDOMEN 1 VIEW: CPT | Performed by: RADIOLOGY

## 2020-07-09 PROCEDURE — 74018 RADEX ABDOMEN 1 VIEW: CPT

## 2020-07-09 RX ORDER — FLUOXETINE HYDROCHLORIDE 20 MG/1
20 CAPSULE ORAL DAILY
COMMUNITY
Start: 2020-06-24 | End: 2021-01-08 | Stop reason: DRUGHIGH

## 2020-07-09 RX ORDER — HYDROXYZINE PAMOATE 25 MG/1
25 CAPSULE ORAL 3 TIMES DAILY PRN
COMMUNITY
Start: 2020-06-24

## 2020-07-09 RX ORDER — OFLOXACIN 3 MG/ML
SOLUTION AURICULAR (OTIC)
COMMUNITY
Start: 2020-06-15 | End: 2020-07-09

## 2020-07-09 RX ORDER — TOPIRAMATE 25 MG/1
25 TABLET ORAL NIGHTLY
COMMUNITY
Start: 2020-06-25

## 2020-07-09 RX ORDER — TAMSULOSIN HYDROCHLORIDE 0.4 MG/1
1 CAPSULE ORAL DAILY
Status: ON HOLD | COMMUNITY
Start: 2020-07-01 | End: 2021-02-01

## 2020-07-09 RX ORDER — BIOTIN 1 MG
1000 TABLET ORAL DAILY
Status: ON HOLD | COMMUNITY
End: 2021-02-01

## 2020-07-09 RX ORDER — FLUOXETINE 10 MG/1
CAPSULE ORAL
COMMUNITY
Start: 2020-06-24 | End: 2020-07-09 | Stop reason: SDUPTHER

## 2020-07-09 RX ORDER — METRONIDAZOLE 10 MG/G
GEL TOPICAL
Status: ON HOLD | COMMUNITY
Start: 2020-06-08 | End: 2021-02-01

## 2020-07-09 RX ORDER — PANTOPRAZOLE SODIUM 40 MG/1
TABLET, DELAYED RELEASE ORAL
COMMUNITY
Start: 2020-05-17 | End: 2020-07-09 | Stop reason: SDUPTHER

## 2020-07-09 NOTE — PROGRESS NOTES
Chief Complaint:          Chief Complaint   Patient presents with   • Flank Pain     follow up        HPI:   29 y.o. female.    Patient returns to clinic for follow-up.  She has had significant right flank pain, that has been colicky in nature, very consistent with her prior stones.  She reports her right flank pain is interfering with her daily routine, she had a CT scan back in January that her showed nonobstructing right intrarenal stones, with no obstructing ureteral stones or hydronephrosis.    She denies having any urinary symptoms with her discomfort.  She denies urinary frequency, urgency, dysuria.  She does not have gross hematuria, she denies nausea, vomiting, or diarrhea.  Denies any trauma to left flank area, she denies heavy lifting.     She has a significant past medical history as listed below.      We will repeat a KUB.        Past Medical History:        Past Medical History:   Diagnosis Date   • Abdominal pain    • Abnormal uterine bleeding (AUB)    • Anxiety and depression    • Arthritis    • Cholecystitis    • Constipation    • Endometriosis    • Frequent UTI    • GERD (gastroesophageal reflux disease)    • Heartburn    • Hemorrhoids    • Kidney stones    • Lesion of breast    • Lump     RIGHT BREAST   • Nausea & vomiting    • PCOS (polycystic ovarian syndrome)    • Sjogren's disease (CMS/HCC)    • Tachycardia    • Wrist fracture     RIGHT ARM      PATIENT UNSURE IF FRACTURED       The following portions of the patient's history were reviewed and updated as appropriate: allergies, current medications, past family history, past medical history, past social history, past surgical history and problem list.    Current Meds:     Current Outpatient Medications   Medication Sig Dispense Refill   • albuterol sulfate  (90 Base) MCG/ACT inhaler Inhale 1 puff 4 (Four) Times a Day.     • Albuterol Sulfate, sensor, 108 (90 Base) MCG/ACT aerosol powder  Inhale 2 puffs As Needed.     • aspirin 81 MG EC  tablet Take 81 mg by mouth Daily. LAS T DOSE 11/26/2018     • atenolol (TENORMIN) 25 MG tablet Take 25 mg by mouth Daily.     • Biotin 1000 MCG tablet Take 1,000 mcg by mouth Daily.     • dexlansoprazole (Dexilant) 60 MG capsule Take 1 capsule by mouth Daily. 30 capsule 5   • FLUoxetine (PROzac) 20 MG capsule Take 20 mg by mouth Daily.     • hydroxychloroquine (PLAQUENIL) 200 MG tablet Take 200 mg by mouth Daily.     • hydrOXYzine pamoate (VISTARIL) 25 MG capsule Take 25 mg by mouth 3 (Three) Times a Day As Needed.     • loratadine (CLARITIN) 10 MG tablet 10 mg Daily.     • methocarbamol (ROBAXIN) 500 MG tablet Take 500 mg by mouth Every Night.     • metroNIDAZOLE (METROGEL) 1 % gel Apply  topically to the appropriate area as directed.     • montelukast (SINGULAIR) 10 MG tablet Take 10 mg by mouth Every Night.     • polyethylene glycol (MIRALAX) packet Take 17 g by mouth Daily.     • potassium citrate (UROCIT-K) 10 MEQ (1080 MG) CR tablet TAKE ONE TABLET BY MOUTH THREE TIMES A DAY WITH MEALS 60 each 0   • QUEtiapine (SEROquel) 50 MG tablet Take 100 mg by mouth Every Night.     • rOPINIRole (REQUIP) 0.5 MG tablet Take 0.5 mg by mouth Daily.     • tamsulosin (FLOMAX) 0.4 MG capsule 24 hr capsule Take 1 capsule by mouth Daily.     • topiramate (TOPAMAX) 25 MG tablet Take 25 mg by mouth Every Night.     • vitamin D (ERGOCALCIFEROL) 1.25 MG (33476 UT) capsule capsule Take 50,000 Units by mouth Every 7 (Seven) Days.     • ibuprofen (ADVIL,MOTRIN) 600 MG tablet Take 600 mg by mouth Every 8 (Eight) Hours As Needed for Mild Pain .     • mupirocin (BACTROBAN) 2 % ointment Apply  topically to the appropriate area as directed.     • Promethazine-Phenyleph-Codeine 6.25-5-10 MG/5ML syrup syrup Take 5-10 mL by mouth Every 4 (Four) Hours As Needed.       No current facility-administered medications for this visit.         Allergies:      Allergies   Allergen Reactions   • Peanut-Containing Drug Products Anaphylaxis     AND PEANUTS  IN FOODS   • Latex Hives   • Shrimp Swelling     Facial swelling     • Milk-Related Compounds Nausea And Vomiting   • Sulfa Antibiotics Rash        Past Surgical History:     Past Surgical History:   Procedure Laterality Date   • ABDOMINAL SURGERY     • BREAST BIOPSY Right 11/29/2018    Procedure: breast biopsy ;  Surgeon: Shiv Overton MD;  Location: [facility] OR;  Service: General   • CHOLECYSTECTOMY N/A 8/25/2017    Procedure: CHOLECYSTECTOMY LAPAROSCOPIC;  Surgeon: Franco Mari MD;  Location: [facility] OR;  Service:    • COLONOSCOPY N/A 3/9/2020    Procedure: COLONOSCOPY CPT CODE: 81995;  Surgeon: Jeremiah Murdock MD;  Location: [facility] OR;  Service: Gastroenterology;  Laterality: N/A;   • DENTAL PROCEDURE     • DIAGNOSTIC LAPAROSCOPY      X5   • DILATATION AND CURETTAGE     • ENDOSCOPY N/A 3/9/2020    Procedure: ESOPHAGOGASTRODUODENOSCOPY WITH BIOPSY CPT CODE: 91328;  Surgeon: Jeremiah Murdock MD;  Location: [facility];  Service: Gastroenterology;  Laterality: N/A;   • HEMORRHOIDECTOMY N/A 11/14/2019    Procedure: HEMORRHOID STAPLING;  Surgeon: Franco Mari MD;  Location: [facility];  Service: General   • URETEROSCOPY LASER LITHOTRIPSY WITH STENT INSERTION Right 1/9/2019    Procedure: URETEROSCOPY LASER LITHOTRIPSY retrograde pyelogram;  Surgeon: Ahmet Barrow MD;  Location: [facility];  Service: Urology   • WISDOM TOOTH EXTRACTION           Social History:     Social History     Socioeconomic History   • Marital status:      Spouse name: Not on file   • Number of children: Not on file   • Years of education: Not on file   • Highest education level: Not on file   Tobacco Use   • Smoking status: Current Every Day Smoker     Packs/day: 0.25     Years: 16.00     Pack years: 4.00     Types: Cigarettes   • Smokeless tobacco: Never Used   Substance and Sexual Activity   • Alcohol use: Yes     Frequency: Never     Comment: RARELY   • Drug use: No   • Sexual activity:  Defer     Birth control/protection: None       Family History:     Family History   Problem Relation Age of Onset   • Breast cancer Maternal Aunt    • Alcohol abuse Paternal Grandfather    • Heart disease Paternal Grandfather    • Cancer Maternal Grandfather    • Hypertension Maternal Grandfather        Review of Systems:     Review of Systems   Constitutional: Positive for fatigue. Negative for activity change, chills and fever.   HENT: Negative for congestion.    Eyes: Negative for blurred vision.   Gastrointestinal: Positive for abdominal pain. Negative for nausea and vomiting.   Genitourinary: Positive for flank pain ( right > left). Negative for difficulty urinating, dyspareunia, dysuria, frequency, genital sores, hematuria, pelvic pain, pelvic pressure, urgency, urinary incontinence and vaginal discharge.   Musculoskeletal: Positive for back pain.   Neurological: Negative for dizziness, headache and confusion.   Psychiatric/Behavioral: Positive for stress. Negative for behavioral problems and decreased concentration.        Physical Exam:     Physical Exam   Constitutional: She is oriented to person, place, and time. She appears well-developed and well-nourished. She appears distressed.   HENT:   Head: Normocephalic and atraumatic.   Eyes: Pupils are equal, round, and reactive to light. EOM are normal.   Neck: Normal range of motion. No tracheal deviation present. No thyromegaly present.   Cardiovascular: Normal rate and regular rhythm.   No murmur heard.  Pulmonary/Chest: Effort normal and breath sounds normal. No stridor. No respiratory distress. She has no wheezes.   Abdominal: Soft. Bowel sounds are normal. There is tenderness.   Genitourinary: Vagina normal. There is no tenderness on the right labia. There is no tenderness on the left labia. No vaginal discharge found.   Musculoskeletal: Normal range of motion. She exhibits tenderness. She exhibits no edema or deformity.   Neurological: She is alert and  oriented to person, place, and time. No cranial nerve deficit or sensory deficit. Coordination normal.   Skin: Skin is warm and dry. Capillary refill takes less than 2 seconds. No rash noted. No erythema. No pallor.   Psychiatric: She has a normal mood and affect. Her behavior is normal. Judgment and thought content normal.         Procedure:       Assessment/Plan:      Right Flank Pain /Nephrocalcinosis:  The Patient has been diagnosed with a right renal calculus.  She presents with right colicky pain that has been very consistent, was not significant pressure and discomfort ongoing for 2 weeks.    Reviewed and discussed her KUB done today, which shows that they are No calcifications projecting over the kidneys or expected course of  Ureters. We have discussed the various parameters regarding spontaneous passage including the notion that a small 1-3 mm stone has a high likelihood of spontaneous passage versus a larger stone  being caught up in the upper areas of the urinary tract.      We also discussed the medical management of stone disease and the use of medical expulsive therapy in the form of Flomax. This is used in an off label setting. I also talked about nonoperative management including increasing ambulation, exercise and weight loss, and increasing fluids and hot tub as being an effective adjuncts in the treatment of a stones.    We discussed a kidney stone prevention diet to include increasing p.o. fluid intake, to at least 1 to 2 L of water daily.  She is to avoid caffeine products such as cola, coffee, and to avoid soft or soda drinks.  She is to decrease her sodium consumption as in  Fast foods, butt, salted nuts, canned foods, and smoked/cured foods. She is also to decrease her oxalate consumption, as in spinach, Kimber greens, and Rhubarb.  Also important is to decrease protein intake, as in red meats, peanut butter, and also avoid nuts.    Patient reports that she is not currently experiencing  any symptoms of urinary incontinence.    We will see her back in 6 months for follow-up and repeat a KUB at that time    Patient is agreeable plan of care      Patient's Body mass index is 32.32 kg/m². BMI is above normal parameters. Recommendations include: educational material, exercise counseling and nutrition counseling.    Smoking Cessation Counseling:  Current every day smoker. less than 3 minutes spent counseling. Will try to cut down.  I advised patient to quit tobacco use and offered support.  I provided patient with tobacco cessation educational material printed in the patient's After Visit Summary.     Counseling was given to patient for the following topics diagnostic results including: Right Flank Pain and instructions for management as follows: Warm compressees to flank, Motrin/tylenol PRN. The interim medical history and current results were reviewed.  A treatment plan with follow-up was made for Nephrocalcinosis  Right flank pain [R10.9].          This document has been electronically signed by Griselda Cheng-Akwa, APRN July 10, 2020 08:12

## 2020-07-10 DIAGNOSIS — N20.1 URETERAL CALCULUS: ICD-10-CM

## 2020-07-10 RX ORDER — POTASSIUM CITRATE 10 MEQ/1
TABLET, EXTENDED RELEASE ORAL
Qty: 60 EACH | Refills: 0 | Status: SHIPPED | OUTPATIENT
Start: 2020-07-10 | End: 2020-07-30

## 2020-07-27 ENCOUNTER — OFFICE VISIT (OUTPATIENT)
Dept: GASTROENTEROLOGY | Facility: CLINIC | Age: 30
End: 2020-07-27

## 2020-07-27 VITALS
WEIGHT: 155.6 LBS | DIASTOLIC BLOOD PRESSURE: 77 MMHG | TEMPERATURE: 98 F | OXYGEN SATURATION: 99 % | BODY MASS INDEX: 31.37 KG/M2 | HEIGHT: 59 IN | SYSTOLIC BLOOD PRESSURE: 110 MMHG | HEART RATE: 61 BPM

## 2020-07-27 DIAGNOSIS — K59.04 CHRONIC IDIOPATHIC CONSTIPATION: ICD-10-CM

## 2020-07-27 DIAGNOSIS — K21.9 GASTROESOPHAGEAL REFLUX DISEASE, ESOPHAGITIS PRESENCE NOT SPECIFIED: Primary | ICD-10-CM

## 2020-07-27 PROCEDURE — 99213 OFFICE O/P EST LOW 20 MIN: CPT | Performed by: PHYSICIAN ASSISTANT

## 2020-07-27 RX ORDER — POLYETHYLENE GLYCOL 3350 17 G/17G
17 POWDER, FOR SOLUTION ORAL DAILY
Qty: 510 G | Refills: 5 | Status: SHIPPED | OUTPATIENT
Start: 2020-07-27 | End: 2021-02-25

## 2020-07-30 DIAGNOSIS — N20.1 URETERAL CALCULUS: ICD-10-CM

## 2020-07-30 RX ORDER — POTASSIUM CITRATE 10 MEQ/1
TABLET, EXTENDED RELEASE ORAL
Qty: 60 EACH | Refills: 0 | Status: SHIPPED | OUTPATIENT
Start: 2020-07-30 | End: 2020-08-17

## 2020-08-15 ENCOUNTER — APPOINTMENT (OUTPATIENT)
Dept: CT IMAGING | Facility: HOSPITAL | Age: 30
End: 2020-08-15

## 2020-08-15 ENCOUNTER — HOSPITAL ENCOUNTER (EMERGENCY)
Facility: HOSPITAL | Age: 30
Discharge: HOME | End: 2020-08-15
Attending: EMERGENCY MEDICINE | Admitting: EMERGENCY MEDICINE

## 2020-08-15 VITALS
OXYGEN SATURATION: 100 % | RESPIRATION RATE: 20 BRPM | TEMPERATURE: 98.4 F | WEIGHT: 154 LBS | BODY MASS INDEX: 31.04 KG/M2 | HEIGHT: 59 IN | HEART RATE: 83 BPM | DIASTOLIC BLOOD PRESSURE: 82 MMHG | SYSTOLIC BLOOD PRESSURE: 114 MMHG

## 2020-08-15 DIAGNOSIS — M54.9 ACUTE BACK PAIN, UNSPECIFIED BACK LOCATION, UNSPECIFIED BACK PAIN LATERALITY: Primary | ICD-10-CM

## 2020-08-15 LAB
B-HCG UR QL: NEGATIVE
BILIRUB UR QL STRIP: NEGATIVE
CLARITY UR: CLEAR
COLOR UR: YELLOW
GLUCOSE UR STRIP-MCNC: NEGATIVE MG/DL
HGB UR QL STRIP.AUTO: NEGATIVE
KETONES UR QL STRIP: NEGATIVE
LEUKOCYTE ESTERASE UR QL STRIP.AUTO: NEGATIVE
NITRITE UR QL STRIP: NEGATIVE
PH UR STRIP.AUTO: 8.5 [PH] (ref 5–8)
PROT UR QL STRIP: NEGATIVE
SP GR UR STRIP: <=1.005 (ref 1–1.03)
UROBILINOGEN UR QL STRIP: ABNORMAL

## 2020-08-15 PROCEDURE — 96372 THER/PROPH/DIAG INJ SC/IM: CPT

## 2020-08-15 PROCEDURE — 72131 CT LUMBAR SPINE W/O DYE: CPT

## 2020-08-15 PROCEDURE — 25010000002 MORPHINE PER 10 MG: Performed by: EMERGENCY MEDICINE

## 2020-08-15 PROCEDURE — 81003 URINALYSIS AUTO W/O SCOPE: CPT | Performed by: PHYSICIAN ASSISTANT

## 2020-08-15 PROCEDURE — 25010000002 KETOROLAC TROMETHAMINE PER 15 MG: Performed by: PHYSICIAN ASSISTANT

## 2020-08-15 PROCEDURE — 25010000002 METHYLPREDNISOLONE PER 125 MG: Performed by: PHYSICIAN ASSISTANT

## 2020-08-15 PROCEDURE — 99283 EMERGENCY DEPT VISIT LOW MDM: CPT

## 2020-08-15 PROCEDURE — 81025 URINE PREGNANCY TEST: CPT | Performed by: PHYSICIAN ASSISTANT

## 2020-08-15 RX ORDER — KETOROLAC TROMETHAMINE 30 MG/ML
30 INJECTION, SOLUTION INTRAMUSCULAR; INTRAVENOUS ONCE
Status: COMPLETED | OUTPATIENT
Start: 2020-08-15 | End: 2020-08-15

## 2020-08-15 RX ORDER — METHYLPREDNISOLONE 4 MG/1
TABLET ORAL
Qty: 21 TABLET | Refills: 0 | Status: SHIPPED | OUTPATIENT
Start: 2020-08-15 | End: 2021-01-29

## 2020-08-15 RX ORDER — METHYLPREDNISOLONE SODIUM SUCCINATE 125 MG/2ML
80 INJECTION, POWDER, LYOPHILIZED, FOR SOLUTION INTRAMUSCULAR; INTRAVENOUS ONCE
Status: COMPLETED | OUTPATIENT
Start: 2020-08-15 | End: 2020-08-15

## 2020-08-15 RX ORDER — DICLOFENAC SODIUM 75 MG/1
75 TABLET, DELAYED RELEASE ORAL 2 TIMES DAILY
Qty: 14 TABLET | Refills: 0 | Status: ON HOLD | OUTPATIENT
Start: 2020-08-15 | End: 2021-02-01

## 2020-08-15 RX ORDER — CYCLOBENZAPRINE HCL 10 MG
10 TABLET ORAL ONCE
Status: COMPLETED | OUTPATIENT
Start: 2020-08-15 | End: 2020-08-15

## 2020-08-15 RX ORDER — CYCLOBENZAPRINE HCL 10 MG
10 TABLET ORAL 2 TIMES DAILY PRN
Qty: 14 TABLET | Refills: 0 | Status: SHIPPED | OUTPATIENT
Start: 2020-08-15 | End: 2021-01-29

## 2020-08-15 RX ADMIN — MORPHINE SULFATE 4 MG: 4 INJECTION, SOLUTION INTRAMUSCULAR; INTRAVENOUS at 16:17

## 2020-08-15 RX ADMIN — CYCLOBENZAPRINE 10 MG: 10 TABLET, FILM COATED ORAL at 14:30

## 2020-08-15 RX ADMIN — KETOROLAC TROMETHAMINE 30 MG: 30 INJECTION, SOLUTION INTRAMUSCULAR; INTRAVENOUS at 14:30

## 2020-08-15 RX ADMIN — METHYLPREDNISOLONE SODIUM SUCCINATE 80 MG: 125 INJECTION, POWDER, FOR SOLUTION INTRAMUSCULAR; INTRAVENOUS at 14:30

## 2020-08-15 NOTE — ED PROVIDER NOTES
Subjective   30-year-old female presents to the emergency room with back pain x3 days.  She states last night she felt like her spine was compressing and she heard and felt a loud pop.  She states pain is very intense and so intense that it is making her nauseated.  Denies vomiting, fever, urinary retention, fecal incontinence, or saddle anesthesia.  She reports hurting her back in 2009 but cannot remember the exact diagnosis.  Aggravating factors include movement.  Denies any alleviating factors.      History provided by:  Patient   used: No        Review of Systems   Constitutional: Negative.  Negative for fever.   HENT: Negative.    Respiratory: Negative.    Cardiovascular: Negative.  Negative for chest pain.   Gastrointestinal: Negative.  Negative for abdominal pain.        Denies fecal incontinence   Endocrine: Negative.    Genitourinary: Negative.  Negative for dysuria.        Denies urinary retention   Musculoskeletal: Positive for back pain.   Skin: Negative.    Neurological: Negative.         Denies saddle anesthesia   Psychiatric/Behavioral: Negative.    All other systems reviewed and are negative.      Past Medical History:   Diagnosis Date   • Abdominal pain    • Abnormal uterine bleeding (AUB)    • Anxiety and depression    • Arthritis    • Cholecystitis    • Constipation    • Endometriosis    • Frequent UTI    • GERD (gastroesophageal reflux disease)    • Heartburn    • Hemorrhoids    • Kidney stones    • Lesion of breast    • Lump     RIGHT BREAST   • Nausea & vomiting    • PCOS (polycystic ovarian syndrome)    • Sjogren's disease (CMS/HCC)    • Tachycardia    • Wrist fracture     RIGHT ARM      PATIENT UNSURE IF FRACTURED       Allergies   Allergen Reactions   • Peanut-Containing Drug Products Anaphylaxis     AND PEANUTS IN FOODS   • Latex Hives   • Shrimp Swelling     Facial swelling     • Milk-Related Compounds Nausea And Vomiting   • Sulfa Antibiotics Rash       Past Surgical  History:   Procedure Laterality Date   • ABDOMINAL SURGERY     • BREAST BIOPSY Right 11/29/2018    Procedure: breast biopsy ;  Surgeon: Shiv Overton MD;  Location: [facility] OR;  Service: General   • CHOLECYSTECTOMY N/A 8/25/2017    Procedure: CHOLECYSTECTOMY LAPAROSCOPIC;  Surgeon: Franco Mari MD;  Location: [facility] OR;  Service:    • COLONOSCOPY N/A 3/9/2020    Procedure: COLONOSCOPY CPT CODE: 75364;  Surgeon: Jeremiah Murdock MD;  Location: [facility] OR;  Service: Gastroenterology;  Laterality: N/A;   • DENTAL PROCEDURE     • DIAGNOSTIC LAPAROSCOPY      X5   • DILATATION AND CURETTAGE     • ENDOSCOPY N/A 3/9/2020    Procedure: ESOPHAGOGASTRODUODENOSCOPY WITH BIOPSY CPT CODE: 64014;  Surgeon: Jeremiah Murdock MD;  Location: [facility] OR;  Service: Gastroenterology;  Laterality: N/A;   • HEMORRHOIDECTOMY N/A 11/14/2019    Procedure: HEMORRHOID STAPLING;  Surgeon: Franco Mari MD;  Location: [facility] OR;  Service: General   • URETEROSCOPY LASER LITHOTRIPSY WITH STENT INSERTION Right 1/9/2019    Procedure: URETEROSCOPY LASER LITHOTRIPSY retrograde pyelogram;  Surgeon: Ahmet Barrow MD;  Location: [facility] OR;  Service: Urology   • WISDOM TOOTH EXTRACTION         Family History   Problem Relation Age of Onset   • Breast cancer Maternal Aunt    • Alcohol abuse Paternal Grandfather    • Heart disease Paternal Grandfather    • Cancer Maternal Grandfather    • Hypertension Maternal Grandfather        Social History     Socioeconomic History   • Marital status:      Spouse name: Not on file   • Number of children: Not on file   • Years of education: Not on file   • Highest education level: Not on file   Tobacco Use   • Smoking status: Current Every Day Smoker     Packs/day: 0.25     Years: 16.00     Pack years: 4.00     Types: Cigarettes   • Smokeless tobacco: Never Used   Substance and Sexual Activity   • Alcohol use: Yes     Frequency: Never     Comment: RARELY   • Drug use:  No   • Sexual activity: Defer     Birth control/protection: None           Objective   Physical Exam   Constitutional: She is oriented to person, place, and time. She appears well-developed and well-nourished. No distress.   HENT:   Head: Normocephalic and atraumatic.   Right Ear: External ear normal.   Left Ear: External ear normal.   Nose: Nose normal.   Eyes: Pupils are equal, round, and reactive to light. Conjunctivae and EOM are normal.   Neck: Normal range of motion. Neck supple. No JVD present. No tracheal deviation present.   Cardiovascular: Normal rate, regular rhythm and normal heart sounds.   No murmur heard.  Pulmonary/Chest: Effort normal and breath sounds normal. No respiratory distress. She has no wheezes.   Abdominal: Soft. Bowel sounds are normal. There is no tenderness.   Musculoskeletal: She exhibits no edema or deformity.        Thoracic back: Normal.        Lumbar back: She exhibits tenderness and bony tenderness. She exhibits normal range of motion and no pain.   Neurological: She is alert and oriented to person, place, and time. No cranial nerve deficit.   Skin: Skin is warm and dry. No rash noted. She is not diaphoretic. No erythema. No pallor.   Psychiatric: She has a normal mood and affect. Her behavior is normal. Thought content normal.   Nursing note and vitals reviewed.      Procedures           ED Course  ED Course as of Aug 15 1639   Sat Aug 15, 2020   1628 IMPRESSION:     1. No acute fracture or malalignment. No focal erosive change. Degenerative changes most prominent at L4-5.    Signer Name: Raul Rea MD  Signed: 8/15/2020 4:12 PM  Workstation Name: [email or web address]   [facility]    CT Lumbar Spine Without Contrast [TK]      ED Course User Index  [TK] Diaz Catherine PA-C                                           MDM  Number of Diagnoses or Management Options  Acute back pain, unspecified back location, unspecified back pain laterality: new and requires  workup     Amount and/or Complexity of Data Reviewed  Clinical lab tests: reviewed and ordered  Tests in the radiology section of CPT®: reviewed and ordered    Risk of Complications, Morbidity, and/or Mortality  Presenting problems: moderate  Diagnostic procedures: moderate  Management options: moderate    Patient Progress  Patient progress: stable      Final diagnoses:   Acute back pain, unspecified back location, unspecified back pain laterality            Diaz Catherine PA-C  08/15/20 1636

## 2020-08-16 DIAGNOSIS — N20.1 URETERAL CALCULUS: ICD-10-CM

## 2020-08-17 RX ORDER — POTASSIUM CITRATE 10 MEQ/1
TABLET, EXTENDED RELEASE ORAL
Qty: 60 EACH | Refills: 0 | Status: SHIPPED | OUTPATIENT
Start: 2020-08-17 | End: 2020-09-04

## 2020-09-04 DIAGNOSIS — N20.1 URETERAL CALCULUS: ICD-10-CM

## 2020-09-04 RX ORDER — POTASSIUM CITRATE 10 MEQ/1
TABLET, EXTENDED RELEASE ORAL
Qty: 60 EACH | Refills: 0 | Status: SHIPPED | OUTPATIENT
Start: 2020-09-04 | End: 2020-09-24

## 2020-09-14 ENCOUNTER — APPOINTMENT (OUTPATIENT)
Dept: ULTRASOUND IMAGING | Facility: HOSPITAL | Age: 30
End: 2020-09-14

## 2020-09-14 ENCOUNTER — HOSPITAL ENCOUNTER (EMERGENCY)
Facility: HOSPITAL | Age: 30
Discharge: HOME | End: 2020-09-14
Attending: EMERGENCY MEDICINE | Admitting: EMERGENCY MEDICINE

## 2020-09-14 VITALS
HEIGHT: 59 IN | WEIGHT: 154 LBS | TEMPERATURE: 99 F | RESPIRATION RATE: 20 BRPM | SYSTOLIC BLOOD PRESSURE: 111 MMHG | OXYGEN SATURATION: 98 % | DIASTOLIC BLOOD PRESSURE: 85 MMHG | HEART RATE: 97 BPM | BODY MASS INDEX: 31.04 KG/M2

## 2020-09-14 DIAGNOSIS — N93.8 DUB (DYSFUNCTIONAL UTERINE BLEEDING): Primary | ICD-10-CM

## 2020-09-14 LAB
ABO GROUP BLD: NORMAL
ALBUMIN SERPL-MCNC: 4.39 G/DL (ref 3.5–5.2)
ALBUMIN/GLOB SERPL: 1.3 G/DL
ALP SERPL-CCNC: 61 U/L (ref 39–117)
ALT SERPL W P-5'-P-CCNC: 7 U/L (ref 1–33)
ANION GAP SERPL CALCULATED.3IONS-SCNC: 11.8 MMOL/L (ref 5–15)
AST SERPL-CCNC: 14 U/L (ref 1–32)
BASOPHILS # BLD AUTO: 0.06 10*3/MM3 (ref 0–0.2)
BASOPHILS NFR BLD AUTO: 0.5 % (ref 0–1.5)
BILIRUB SERPL-MCNC: 0.3 MG/DL (ref 0–1.2)
BLD GP AB SCN SERPL QL: NEGATIVE
BUN SERPL-MCNC: 11 MG/DL (ref 6–20)
BUN/CREAT SERPL: 10.5 (ref 7–25)
CALCIUM SPEC-SCNC: 9.4 MG/DL (ref 8.6–10.5)
CHLORIDE SERPL-SCNC: 102 MMOL/L (ref 98–107)
CO2 SERPL-SCNC: 21.2 MMOL/L (ref 22–29)
CREAT SERPL-MCNC: 1.05 MG/DL (ref 0.57–1)
DEPRECATED RDW RBC AUTO: 36.5 FL (ref 37–54)
EOSINOPHIL # BLD AUTO: 0.08 10*3/MM3 (ref 0–0.4)
EOSINOPHIL NFR BLD AUTO: 0.7 % (ref 0.3–6.2)
ERYTHROCYTE [DISTWIDTH] IN BLOOD BY AUTOMATED COUNT: 11.4 % (ref 12.3–15.4)
GFR SERPL CREATININE-BSD FRML MDRD: 61 ML/MIN/1.73
GLOBULIN UR ELPH-MCNC: 3.3 GM/DL
GLUCOSE SERPL-MCNC: 101 MG/DL (ref 65–99)
HCG INTACT+B SERPL-ACNC: <0.5 MIU/ML
HCT VFR BLD AUTO: 40.1 % (ref 34–46.6)
HGB BLD-MCNC: 13.7 G/DL (ref 12–15.9)
IMM GRANULOCYTES # BLD AUTO: 0.05 10*3/MM3 (ref 0–0.05)
IMM GRANULOCYTES NFR BLD AUTO: 0.4 % (ref 0–0.5)
LYMPHOCYTES # BLD AUTO: 1.4 10*3/MM3 (ref 0.7–3.1)
LYMPHOCYTES NFR BLD AUTO: 12.2 % (ref 19.6–45.3)
MCH RBC QN AUTO: 29.9 PG (ref 26.6–33)
MCHC RBC AUTO-ENTMCNC: 34.2 G/DL (ref 31.5–35.7)
MCV RBC AUTO: 87.6 FL (ref 79–97)
MONOCYTES # BLD AUTO: 0.63 10*3/MM3 (ref 0.1–0.9)
MONOCYTES NFR BLD AUTO: 5.5 % (ref 5–12)
NEUTROPHILS NFR BLD AUTO: 80.7 % (ref 42.7–76)
NEUTROPHILS NFR BLD AUTO: 9.23 10*3/MM3 (ref 1.7–7)
NRBC BLD AUTO-RTO: 0 /100 WBC (ref 0–0.2)
NUMBER OF DOSES: NORMAL
PLATELET # BLD AUTO: 251 10*3/MM3 (ref 140–450)
PMV BLD AUTO: 8.5 FL (ref 6–12)
POTASSIUM SERPL-SCNC: 4.2 MMOL/L (ref 3.5–5.2)
PROT SERPL-MCNC: 7.7 G/DL (ref 6–8.5)
RBC # BLD AUTO: 4.58 10*6/MM3 (ref 3.77–5.28)
RH BLD: POSITIVE
SODIUM SERPL-SCNC: 135 MMOL/L (ref 136–145)
WBC # BLD AUTO: 11.45 10*3/MM3 (ref 3.4–10.8)

## 2020-09-14 PROCEDURE — 76830 TRANSVAGINAL US NON-OB: CPT | Performed by: RADIOLOGY

## 2020-09-14 PROCEDURE — 76817 TRANSVAGINAL US OBSTETRIC: CPT

## 2020-09-14 PROCEDURE — 99283 EMERGENCY DEPT VISIT LOW MDM: CPT

## 2020-09-14 PROCEDURE — 85025 COMPLETE CBC W/AUTO DIFF WBC: CPT | Performed by: PHYSICIAN ASSISTANT

## 2020-09-14 PROCEDURE — 80053 COMPREHEN METABOLIC PANEL: CPT | Performed by: PHYSICIAN ASSISTANT

## 2020-09-14 PROCEDURE — 84702 CHORIONIC GONADOTROPIN TEST: CPT | Performed by: PHYSICIAN ASSISTANT

## 2020-09-14 PROCEDURE — 86850 RBC ANTIBODY SCREEN: CPT | Performed by: PHYSICIAN ASSISTANT

## 2020-09-14 PROCEDURE — 86901 BLOOD TYPING SEROLOGIC RH(D): CPT | Performed by: PHYSICIAN ASSISTANT

## 2020-09-14 PROCEDURE — 86900 BLOOD TYPING SEROLOGIC ABO: CPT | Performed by: PHYSICIAN ASSISTANT

## 2020-09-14 NOTE — ED PROVIDER NOTES
Subjective   30-year-old white female presents secondary to vaginal bleeding.  Patient states that she had not had a menstrual cycle since March.  She was placed on birth control.  She states that she started bleeding approximately week ago.  She states that she has cramping and heavy bleeding at times.  She passed a large clot and was concerned that she could have potentially been pregnant.  She is here for further evaluation to ensure that she is not pregnant.  She denies any fever.          Review of Systems   Constitutional: Negative.  Negative for fever.   HENT: Negative.    Respiratory: Negative.    Cardiovascular: Negative.  Negative for chest pain.   Gastrointestinal: Negative.  Negative for abdominal pain.   Endocrine: Negative.    Genitourinary: Negative.  Negative for dysuria.   Skin: Negative.    Neurological: Negative.    Psychiatric/Behavioral: Negative.    All other systems reviewed and are negative.      Past Medical History:   Diagnosis Date   • Abdominal pain    • Abnormal uterine bleeding (AUB)    • Anxiety and depression    • Arthritis    • Cholecystitis    • Constipation    • Endometriosis    • Frequent UTI    • GERD (gastroesophageal reflux disease)    • Heartburn    • Hemorrhoids    • Kidney stones    • Lesion of breast    • Lump     RIGHT BREAST   • Nausea & vomiting    • PCOS (polycystic ovarian syndrome)    • Sjogren's disease (CMS/HCC)    • Tachycardia    • Wrist fracture     RIGHT ARM      PATIENT UNSURE IF FRACTURED       Allergies   Allergen Reactions   • Peanut-Containing Drug Products Anaphylaxis     AND PEANUTS IN FOODS   • Latex Hives   • Shrimp Swelling     Facial swelling     • Milk-Related Compounds Nausea And Vomiting   • Sulfa Antibiotics Rash       Past Surgical History:   Procedure Laterality Date   • ABDOMINAL SURGERY     • BREAST BIOPSY Right 11/29/2018    Procedure: breast biopsy ;  Surgeon: Shiv Overton MD;  Location: [facility];  Service: General   •  CHOLECYSTECTOMY N/A 8/25/2017    Procedure: CHOLECYSTECTOMY LAPAROSCOPIC;  Surgeon: Franco Mari MD;  Location: [facility] OR;  Service:    • COLONOSCOPY N/A 3/9/2020    Procedure: COLONOSCOPY CPT CODE: 06942;  Surgeon: Jeremiah Murdock MD;  Location: [facility] OR;  Service: Gastroenterology;  Laterality: N/A;   • DENTAL PROCEDURE     • DIAGNOSTIC LAPAROSCOPY      X5   • DILATATION AND CURETTAGE     • ENDOSCOPY N/A 3/9/2020    Procedure: ESOPHAGOGASTRODUODENOSCOPY WITH BIOPSY CPT CODE: 45809;  Surgeon: Jeremiah Murdock MD;  Location: [facility] OR;  Service: Gastroenterology;  Laterality: N/A;   • HEMORRHOIDECTOMY N/A 11/14/2019    Procedure: HEMORRHOID STAPLING;  Surgeon: Franco Mari MD;  Location: [facility] OR;  Service: General   • URETEROSCOPY LASER LITHOTRIPSY WITH STENT INSERTION Right 1/9/2019    Procedure: URETEROSCOPY LASER LITHOTRIPSY retrograde pyelogram;  Surgeon: Ahmet Barrow MD;  Location: [facility] OR;  Service: Urology   • WISDOM TOOTH EXTRACTION         Family History   Problem Relation Age of Onset   • Breast cancer Maternal Aunt    • Alcohol abuse Paternal Grandfather    • Heart disease Paternal Grandfather    • Cancer Maternal Grandfather    • Hypertension Maternal Grandfather        Social History     Socioeconomic History   • Marital status:      Spouse name: Not on file   • Number of children: Not on file   • Years of education: Not on file   • Highest education level: Not on file   Tobacco Use   • Smoking status: Current Every Day Smoker     Packs/day: 0.25     Years: 16.00     Pack years: 4.00     Types: Cigarettes   • Smokeless tobacco: Never Used   Substance and Sexual Activity   • Alcohol use: Yes     Frequency: Never     Comment: RARELY   • Drug use: No   • Sexual activity: Defer     Birth control/protection: None           Objective   Physical Exam  Vitals signs and nursing note reviewed.   Constitutional:       General: She is not in acute distress.      Appearance: She is well-developed. She is not diaphoretic.   HENT:      Head: Normocephalic and atraumatic.      Right Ear: External ear normal.      Left Ear: External ear normal.      Nose: Nose normal.   Eyes:      Conjunctiva/sclera: Conjunctivae normal.      Pupils: Pupils are equal, round, and reactive to light.   Neck:      Musculoskeletal: Normal range of motion and neck supple.      Vascular: No JVD.      Trachea: No tracheal deviation.   Cardiovascular:      Rate and Rhythm: Normal rate and regular rhythm.      Heart sounds: Normal heart sounds. No murmur.   Pulmonary:      Effort: Pulmonary effort is normal. No respiratory distress.      Breath sounds: Normal breath sounds. No wheezing.   Abdominal:      General: Bowel sounds are normal.      Palpations: Abdomen is soft.      Tenderness: There is no abdominal tenderness.   Musculoskeletal: Normal range of motion.         General: No deformity.   Skin:     General: Skin is warm and dry.      Coloration: Skin is not pale.      Findings: No erythema or rash.   Neurological:      Mental Status: She is alert and oriented to person, place, and time.      Cranial Nerves: No cranial nerve deficit.   Psychiatric:         Behavior: Behavior normal.         Thought Content: Thought content normal.         Procedures           ED Course                                           MDM  Number of Diagnoses or Management Options  DUB (dysfunctional uterine bleeding): new and requires workup     Amount and/or Complexity of Data Reviewed  Clinical lab tests: ordered and reviewed  Tests in the radiology section of CPT®: ordered and reviewed  Discuss the patient with other providers: yes    Risk of Complications, Morbidity, and/or Mortality  Presenting problems: moderate        Final diagnoses:   DUB (dysfunctional uterine bleeding)            Los Simpson PA  09/14/20 1983

## 2020-09-24 DIAGNOSIS — N20.1 URETERAL CALCULUS: ICD-10-CM

## 2020-09-24 RX ORDER — POTASSIUM CITRATE 10 MEQ/1
TABLET, EXTENDED RELEASE ORAL
Qty: 60 EACH | Refills: 0 | Status: SHIPPED | OUTPATIENT
Start: 2020-09-24 | End: 2020-10-14

## 2020-10-08 ENCOUNTER — LAB (OUTPATIENT)
Dept: LAB | Facility: HOSPITAL | Age: 30
End: 2020-10-08

## 2020-10-08 ENCOUNTER — TRANSCRIBE ORDERS (OUTPATIENT)
Dept: LAB | Facility: HOSPITAL | Age: 30
End: 2020-10-08

## 2020-10-08 DIAGNOSIS — Z20.828 VIRAL DISEASE EXPOSURE: Primary | ICD-10-CM

## 2020-10-08 DIAGNOSIS — Z20.828 VIRAL DISEASE EXPOSURE: ICD-10-CM

## 2020-10-08 PROCEDURE — C9803 HOPD COVID-19 SPEC COLLECT: HCPCS

## 2020-10-08 PROCEDURE — U0004 COV-19 TEST NON-CDC HGH THRU: HCPCS

## 2020-10-09 LAB — SARS-COV-2 RNA RESP QL NAA+PROBE: NOT DETECTED

## 2020-10-14 DIAGNOSIS — N20.1 URETERAL CALCULUS: ICD-10-CM

## 2020-10-14 RX ORDER — POTASSIUM CITRATE 10 MEQ/1
TABLET, EXTENDED RELEASE ORAL
Qty: 60 EACH | Refills: 0 | Status: SHIPPED | OUTPATIENT
Start: 2020-10-14 | End: 2020-11-02

## 2020-10-31 DIAGNOSIS — N20.1 URETERAL CALCULUS: ICD-10-CM

## 2020-11-02 RX ORDER — POTASSIUM CITRATE 10 MEQ/1
TABLET, EXTENDED RELEASE ORAL
Qty: 60 EACH | Refills: 0 | Status: SHIPPED | OUTPATIENT
Start: 2020-11-02 | End: 2020-11-19

## 2020-11-19 DIAGNOSIS — N20.1 URETERAL CALCULUS: ICD-10-CM

## 2020-11-19 RX ORDER — POTASSIUM CITRATE 10 MEQ/1
TABLET, EXTENDED RELEASE ORAL
Qty: 60 EACH | Refills: 0 | Status: SHIPPED | OUTPATIENT
Start: 2020-11-19 | End: 2021-07-29

## 2021-01-08 ENCOUNTER — OFFICE VISIT (OUTPATIENT)
Dept: UROLOGY | Facility: CLINIC | Age: 31
End: 2021-01-08

## 2021-01-08 ENCOUNTER — HOSPITAL ENCOUNTER (OUTPATIENT)
Dept: GENERAL RADIOLOGY | Facility: HOSPITAL | Age: 31
Discharge: HOME | End: 2021-01-08
Admitting: UROLOGY

## 2021-01-08 VITALS — BODY MASS INDEX: 34.27 KG/M2 | HEIGHT: 59 IN | WEIGHT: 170 LBS | TEMPERATURE: 97.5 F

## 2021-01-08 DIAGNOSIS — N20.0 KIDNEY STONE: ICD-10-CM

## 2021-01-08 DIAGNOSIS — N20.1 URETERAL CALCULUS: ICD-10-CM

## 2021-01-08 DIAGNOSIS — N20.1 URETERAL CALCULUS: Primary | ICD-10-CM

## 2021-01-08 PROCEDURE — 99213 OFFICE O/P EST LOW 20 MIN: CPT | Performed by: UROLOGY

## 2021-01-08 PROCEDURE — 74018 RADEX ABDOMEN 1 VIEW: CPT

## 2021-01-08 PROCEDURE — 74018 RADEX ABDOMEN 1 VIEW: CPT | Performed by: RADIOLOGY

## 2021-01-08 RX ORDER — FLUOXETINE HYDROCHLORIDE 40 MG/1
40 CAPSULE ORAL DAILY
COMMUNITY
Start: 2020-11-18

## 2021-01-08 RX ORDER — FOLIC ACID 1 MG/1
1 TABLET ORAL DAILY
COMMUNITY
Start: 2021-01-04

## 2021-01-08 NOTE — PROGRESS NOTES
Chief Complaint:          Chief Complaint   Patient presents with   • Flank Pain     6 mnth f/u       HPI:   30 y.o. female very well-known to me with a 6-month follow-up of flank pain off-and-on right upper quadrant pain bowel movements are okay she is on antibiotics per her primary MD.  KUB was negative she developed H. pylori.  I do not think this is a current stone plan to see her back prn she is just to call me      Past Medical History:        Past Medical History:   Diagnosis Date   • Abdominal pain    • Abnormal uterine bleeding (AUB)    • Anxiety and depression    • Arthritis    • Cholecystitis    • Constipation    • Endometriosis    • Frequent UTI    • GERD (gastroesophageal reflux disease)    • Heartburn    • Hemorrhoids    • Kidney stones    • Lesion of breast    • Lump     RIGHT BREAST   • Nausea & vomiting    • PCOS (polycystic ovarian syndrome)    • Sjogren's disease (CMS/HCC)    • Tachycardia    • Wrist fracture     RIGHT ARM      PATIENT UNSURE IF FRACTURED         Current Meds:     Current Outpatient Medications   Medication Sig Dispense Refill   • albuterol sulfate  (90 Base) MCG/ACT inhaler Inhale 1 puff 4 (Four) Times a Day.     • Albuterol Sulfate, sensor, 108 (90 Base) MCG/ACT aerosol powder  Inhale 2 puffs As Needed.     • aspirin 81 MG EC tablet Take 81 mg by mouth Daily. LAS T DOSE 11/26/2018     • atenolol (TENORMIN) 25 MG tablet Take 25 mg by mouth Daily.     • Biotin 1000 MCG tablet Take 1,000 mcg by mouth Daily.     • cyclobenzaprine (FLEXERIL) 10 MG tablet Take 1 tablet by mouth 2 (Two) Times a Day As Needed for Muscle Spasms. 14 tablet 0   • dexlansoprazole (Dexilant) 60 MG capsule Take 1 capsule by mouth Daily. 30 capsule 5   • diclofenac (VOLTAREN) 75 MG EC tablet Take 1 tablet by mouth 2 (Two) Times a Day. 14 tablet 0   • FLUoxetine (PROzac) 40 MG capsule      • folic acid (FOLVITE) 1 MG tablet      • hydroxychloroquine (PLAQUENIL) 200 MG tablet Take 200 mg by mouth Daily.      • hydrOXYzine pamoate (VISTARIL) 25 MG capsule Take 25 mg by mouth 3 (Three) Times a Day As Needed.     • ibuprofen (ADVIL,MOTRIN) 600 MG tablet Take 600 mg by mouth Every 8 (Eight) Hours As Needed for Mild Pain .     • loratadine (CLARITIN) 10 MG tablet 10 mg Daily.     • methocarbamol (ROBAXIN) 500 MG tablet Take 500 mg by mouth Every Night.     • methylPREDNISolone (MEDROL, MAGO,) 4 MG tablet Take as directed on package instructions. 21 tablet 0   • metroNIDAZOLE (METROGEL) 1 % gel Apply  topically to the appropriate area as directed.     • montelukast (SINGULAIR) 10 MG tablet Take 10 mg by mouth Every Night.     • mupirocin (BACTROBAN) 2 % ointment Apply  topically to the appropriate area as directed.     • polyethylene glycol (MIRALAX) 17 GM/SCOOP powder Take 17 g by mouth Daily. Mix with 8 oz liquid 510 g 5   • potassium citrate (UROCIT-K) 10 MEQ (1080 MG) CR tablet TAKE ONE TABLET BY MOUTH THREE TIMES A DAY WITH MEALS 60 each 0   • Promethazine-Phenyleph-Codeine 6.25-5-10 MG/5ML syrup syrup Take 5-10 mL by mouth Every 4 (Four) Hours As Needed.     • QUEtiapine (SEROquel) 50 MG tablet Take 100 mg by mouth Every Night.     • rOPINIRole (REQUIP) 0.5 MG tablet Take 0.5 mg by mouth Daily.     • tamsulosin (FLOMAX) 0.4 MG capsule 24 hr capsule Take 1 capsule by mouth Daily.     • topiramate (TOPAMAX) 25 MG tablet Take 25 mg by mouth Every Night.     • vitamin D (ERGOCALCIFEROL) 1.25 MG (09314 UT) capsule capsule Take 50,000 Units by mouth Every 7 (Seven) Days.       No current facility-administered medications for this visit.         Allergies:      Allergies   Allergen Reactions   • Peanut-Containing Drug Products Anaphylaxis     AND PEANUTS IN FOODS   • Latex Hives   • Shrimp Swelling     Facial swelling     • Milk-Related Compounds Nausea And Vomiting   • Sulfa Antibiotics Rash        Past Surgical History:     Past Surgical History:   Procedure Laterality Date   • ABDOMINAL SURGERY     • BREAST BIOPSY Right  11/29/2018    Procedure: breast biopsy ;  Surgeon: Shiv Overton MD;  Location: [facility] OR;  Service: General   • CHOLECYSTECTOMY N/A 8/25/2017    Procedure: CHOLECYSTECTOMY LAPAROSCOPIC;  Surgeon: Franco Mari MD;  Location: [facility] OR;  Service:    • COLONOSCOPY N/A 3/9/2020    Procedure: COLONOSCOPY CPT CODE: 78662;  Surgeon: Jeremiah Murdock MD;  Location: [facility] OR;  Service: Gastroenterology;  Laterality: N/A;   • DENTAL PROCEDURE     • DIAGNOSTIC LAPAROSCOPY      X5   • DILATATION AND CURETTAGE     • ENDOSCOPY N/A 3/9/2020    Procedure: ESOPHAGOGASTRODUODENOSCOPY WITH BIOPSY CPT CODE: 05043;  Surgeon: Jeremiah Murdock MD;  Location: [facility] OR;  Service: Gastroenterology;  Laterality: N/A;   • HEMORRHOIDECTOMY N/A 11/14/2019    Procedure: HEMORRHOID STAPLING;  Surgeon: Franco Mari MD;  Location: [facility] OR;  Service: General   • URETEROSCOPY LASER LITHOTRIPSY WITH STENT INSERTION Right 1/9/2019    Procedure: URETEROSCOPY LASER LITHOTRIPSY retrograde pyelogram;  Surgeon: Ahmet Barrow MD;  Location: [facility] OR;  Service: Urology   • WISDOM TOOTH EXTRACTION           Social History:     Social History     Socioeconomic History   • Marital status:      Spouse name: Not on file   • Number of children: Not on file   • Years of education: Not on file   • Highest education level: Not on file   Tobacco Use   • Smoking status: Current Every Day Smoker     Packs/day: 0.25     Years: 16.00     Pack years: 4.00     Types: Cigarettes   • Smokeless tobacco: Never Used   Substance and Sexual Activity   • Alcohol use: Yes     Frequency: Never     Comment: RARELY   • Drug use: No   • Sexual activity: Defer     Birth control/protection: None       Family History:     Family History   Problem Relation Age of Onset   • Breast cancer Maternal Aunt    • Alcohol abuse Paternal Grandfather    • Heart disease Paternal Grandfather    • Cancer Maternal Grandfather    • Hypertension  Maternal Grandfather        Review of Systems:     Review of Systems   Constitutional: Negative.  Negative for activity change, appetite change, chills, diaphoresis, fatigue and unexpected weight change.   HENT: Negative for congestion, dental problem, drooling, ear discharge, ear pain, facial swelling, hearing loss, mouth sores, nosebleeds, postnasal drip, rhinorrhea, sinus pressure, sneezing, sore throat, tinnitus, trouble swallowing and voice change.    Eyes: Negative.  Negative for photophobia, pain, discharge, redness, itching and visual disturbance.   Respiratory: Negative.  Negative for apnea, cough, choking, chest tightness, shortness of breath, wheezing and stridor.    Cardiovascular: Negative.  Negative for chest pain, palpitations and leg swelling.   Gastrointestinal: Negative.  Negative for abdominal distention, abdominal pain, anal bleeding, blood in stool, constipation, diarrhea, nausea, rectal pain and vomiting.   Endocrine: Negative.  Negative for cold intolerance, heat intolerance, polydipsia, polyphagia and polyuria.   Musculoskeletal: Negative.  Negative for arthralgias, back pain, gait problem, joint swelling, myalgias, neck pain and neck stiffness.   Skin: Negative.  Negative for color change, pallor, rash and wound.   Allergic/Immunologic: Negative.  Negative for environmental allergies, food allergies and immunocompromised state.   Neurological: Negative.  Negative for dizziness, tremors, seizures, syncope, facial asymmetry, speech difficulty, weakness, light-headedness, numbness and headaches.   Hematological: Negative.  Negative for adenopathy. Does not bruise/bleed easily.   Psychiatric/Behavioral: Negative for agitation, behavioral problems, confusion, decreased concentration, dysphoric mood, hallucinations, self-injury, sleep disturbance and suicidal ideas. The patient is not nervous/anxious and is not hyperactive.    All other systems reviewed and are negative.      Physical Exam:          Physical Exam  Constitutional:       Appearance: She is well-developed.   HENT:      Head: Normocephalic and atraumatic.      Right Ear: External ear normal.      Left Ear: External ear normal.   Eyes:      Conjunctiva/sclera: Conjunctivae normal.      Pupils: Pupils are equal, round, and reactive to light.   Cardiovascular:      Rate and Rhythm: Normal rate and regular rhythm.      Heart sounds: Normal heart sounds.   Pulmonary:      Effort: Pulmonary effort is normal.      Breath sounds: Normal breath sounds.   Abdominal:      General: Bowel sounds are normal. There is no distension.      Palpations: Abdomen is soft. There is no mass.      Tenderness: There is no abdominal tenderness. There is no guarding or rebound.   Genitourinary:     Vagina: No vaginal discharge.   Musculoskeletal: Normal range of motion.   Skin:     General: Skin is warm and dry.   Neurological:      Mental Status: She is alert.      Deep Tendon Reflexes: Reflexes are normal and symmetric.   Psychiatric:         Behavior: Behavior normal.         Thought Content: Thought content normal.         Judgment: Judgment normal.         I have reviewed the following portions of the patient's history: allergies, current medications, past family history, past medical history, past social history, past surgical history, problem list and ROS and confirm it's accurate.      Procedure:       Assessment/Plan:   Renal calculus-we discussed the presence of the stone we discussed the various therapeutic options available including percutaneous nephrostolithotomy, lithotripsy.  We discussed the risks of lithotripsy including the passage of stones the development of a large string of stones in the distal ureter known as Steinstrasse.  In the 3% incidence of that we will need to proceed with a ureteroscopy for obstructing fragments.  Extremely rare incidence of renal hematoma.  And the significance of this.  We discussed the absolute relative indicators for  intervention including the presence of sepsis, and pain we cannot control is the primary need for urgent intervention.  We discussed placement of a stent if indicated and the management of the stent as well.  KUB today shows no evidence of radiopaque stones along the course of the urinary tract            Patient's Body mass index is 31.1 kg/m². BMI is above normal parameters. Recommendations include: educational material.              This document has been electronically signed by IMELDA THOMSON MD January 8, 2021 13:54 EST

## 2021-01-25 ENCOUNTER — OFFICE VISIT (OUTPATIENT)
Dept: GASTROENTEROLOGY | Facility: CLINIC | Age: 31
End: 2021-01-25

## 2021-01-25 VITALS
OXYGEN SATURATION: 98 % | WEIGHT: 165.2 LBS | HEART RATE: 88 BPM | BODY MASS INDEX: 33.3 KG/M2 | DIASTOLIC BLOOD PRESSURE: 78 MMHG | TEMPERATURE: 96 F | HEIGHT: 59 IN | SYSTOLIC BLOOD PRESSURE: 113 MMHG

## 2021-01-25 DIAGNOSIS — R10.12 LUQ ABDOMINAL PAIN: Primary | ICD-10-CM

## 2021-01-25 DIAGNOSIS — K44.9 HIATAL HERNIA: ICD-10-CM

## 2021-01-25 DIAGNOSIS — K22.70 BARRETT'S ESOPHAGUS WITHOUT DYSPLASIA: ICD-10-CM

## 2021-01-25 DIAGNOSIS — K21.9 GASTROESOPHAGEAL REFLUX DISEASE, UNSPECIFIED WHETHER ESOPHAGITIS PRESENT: ICD-10-CM

## 2021-01-25 DIAGNOSIS — R68.81 EARLY SATIETY: ICD-10-CM

## 2021-01-25 PROBLEM — K59.00 CONSTIPATION: Status: RESOLVED | Noted: 2020-02-11 | Resolved: 2021-01-25

## 2021-01-25 PROCEDURE — 99214 OFFICE O/P EST MOD 30 MIN: CPT | Performed by: PHYSICIAN ASSISTANT

## 2021-01-25 RX ORDER — DROSPIRENONE AND ETHINYL ESTRADIOL 0.02-3(28)
1 KIT ORAL DAILY
COMMUNITY
End: 2022-04-08

## 2021-01-25 RX ORDER — DEXLANSOPRAZOLE 60 MG/1
60 CAPSULE, DELAYED RELEASE ORAL DAILY
Qty: 30 CAPSULE | Refills: 11 | Status: SHIPPED | OUTPATIENT
Start: 2021-01-25 | End: 2022-03-30

## 2021-01-25 RX ORDER — LIFITEGRAST 50 MG/ML
1 SOLUTION/ DROPS OPHTHALMIC 2 TIMES DAILY
COMMUNITY
End: 2022-04-08

## 2021-01-25 NOTE — PROGRESS NOTES
: 1990    Chief Complaint   Patient presents with   • Heartburn   • Constipation       Pili Webster is a 30 y.o. female who presents to the office today as a follow up appointment regarding Heartburn and Constipation.    History of Present Illness:  Over the past several months, she has noticed change in her GI complaints.  She now has left upper quadrant abdominal pain which is severe nature at times.  Also notices swelling in this area as well intermittently.  She has early satiety and generally only eats a few bites.  She has tried to change her diet in order to lose weight, eat consuming more raw vegetables such as salads recently.  She had an abdominal film with her PCP and she was told that she had severe constipation on it but also had a visit with urology recently and was told that she did not have constipation.  She takes MiraLAX 2-3 times a week only as needed for treatment of constipation.  She has been having diarrhea with fecal urgency immediately after eating.  Still taking Dexilant 60 mg once daily with good control of GERD. Has a history of Márquez's (new diagnosis) and last EGD 3/2020.     Review of Systems   Constitutional: Positive for appetite change and fatigue. Negative for chills and fever.   HENT: Negative for trouble swallowing.    Eyes: Negative.    Respiratory: Positive for cough and shortness of breath. Negative for choking and chest tightness.    Cardiovascular: Negative for chest pain.   Gastrointestinal: Positive for abdominal distention, abdominal pain, constipation, diarrhea, nausea and vomiting. Negative for anal bleeding, blood in stool and rectal pain.   Endocrine: Negative.    Genitourinary: Negative for difficulty urinating.   Musculoskeletal: Positive for back pain. Negative for neck pain.   Skin: Negative for color change, pallor, rash and wound.   Allergic/Immunologic: Positive for environmental allergies and food allergies.   Neurological: Positive for  headaches. Negative for dizziness and light-headedness.   Hematological: Bruises/bleeds easily.   Psychiatric/Behavioral: Negative.      I have reviewed and confirmed the accuracy of the ROS as documented by the MA/LPN/RN Naomi Aguiar PA-C    Past Medical History:   Diagnosis Date   • Abdominal pain    • Abnormal uterine bleeding (AUB)    • Anxiety and depression    • Arthritis    • Cholecystitis    • Constipation    • Endometriosis    • Frequent UTI    • GERD (gastroesophageal reflux disease)    • Heartburn    • Hemorrhoids    • Kidney stones    • Lesion of breast    • Lump     RIGHT BREAST   • Nausea & vomiting    • PCOS (polycystic ovarian syndrome)    • Sjogren's disease (CMS/HCC)    • Tachycardia    • Wrist fracture     RIGHT ARM      PATIENT UNSURE IF FRACTURED       Past Surgical History:   Procedure Laterality Date   • ABDOMINAL SURGERY     • BREAST BIOPSY Right 11/29/2018    Procedure: breast biopsy ;  Surgeon: Shiv Overton MD;  Location: [facility] OR;  Service: General   • CHOLECYSTECTOMY N/A 8/25/2017    Procedure: CHOLECYSTECTOMY LAPAROSCOPIC;  Surgeon: Franco Mari MD;  Location: [facility] OR;  Service:    • COLONOSCOPY N/A 3/9/2020    Procedure: COLONOSCOPY CPT CODE: 32693;  Surgeon: Jeremiah Murdock MD;  Location: [facility] OR;  Service: Gastroenterology;  Laterality: N/A;   • DENTAL PROCEDURE     • DIAGNOSTIC LAPAROSCOPY      X5   • DILATATION AND CURETTAGE     • ENDOSCOPY N/A 3/9/2020    Procedure: ESOPHAGOGASTRODUODENOSCOPY WITH BIOPSY CPT CODE: 69796;  Surgeon: Jeremiah Murdock MD;  Location: [facility] OR;  Service: Gastroenterology;  Laterality: N/A;   • HEMORRHOIDECTOMY N/A 11/14/2019    Procedure: HEMORRHOID STAPLING;  Surgeon: Franco Mari MD;  Location: [facility] OR;  Service: General   • URETEROSCOPY LASER LITHOTRIPSY WITH STENT INSERTION Right 1/9/2019    Procedure: URETEROSCOPY LASER LITHOTRIPSY retrograde pyelogram;  Surgeon: Ahmet Barrow MD;   Location: [facility] OR;  Service: Urology   • WISDOM TOOTH EXTRACTION         Family History   Problem Relation Age of Onset   • Breast cancer Maternal Aunt    • Alcohol abuse Paternal Grandfather    • Heart disease Paternal Grandfather    • Cancer Maternal Grandfather    • Hypertension Maternal Grandfather        Social History     Socioeconomic History   • Marital status:      Spouse name: Not on file   • Number of children: Not on file   • Years of education: Not on file   • Highest education level: Not on file   Tobacco Use   • Smoking status: Current Every Day Smoker     Packs/day: 0.25     Years: 16.00     Pack years: 4.00     Types: Cigarettes   • Smokeless tobacco: Never Used   Substance and Sexual Activity   • Alcohol use: Yes     Frequency: Never     Comment: RARELY   • Drug use: No   • Sexual activity: Defer     Birth control/protection: None       Current Outpatient Medications:   •  albuterol sulfate  (90 Base) MCG/ACT inhaler, Inhale 1 puff 4 (Four) Times a Day., Disp: , Rfl:   •  Albuterol Sulfate, sensor, 108 (90 Base) MCG/ACT aerosol powder , Inhale 2 puffs As Needed., Disp: , Rfl:   •  aspirin 81 MG EC tablet, Take 81 mg by mouth Daily. LAS T DOSE 11/26/2018, Disp: , Rfl:   •  atenolol (TENORMIN) 25 MG tablet, Take 25 mg by mouth Daily., Disp: , Rfl:   •  Biotin 1000 MCG tablet, Take 1,000 mcg by mouth Daily., Disp: , Rfl:   •  cyclobenzaprine (FLEXERIL) 10 MG tablet, Take 1 tablet by mouth 2 (Two) Times a Day As Needed for Muscle Spasms., Disp: 14 tablet, Rfl: 0  •  dexlansoprazole (Dexilant) 60 MG capsule, Take 1 capsule by mouth Daily., Disp: 30 capsule, Rfl: 5  •  diclofenac (VOLTAREN) 75 MG EC tablet, Take 1 tablet by mouth 2 (Two) Times a Day., Disp: 14 tablet, Rfl: 0  •  FLUoxetine (PROzac) 40 MG capsule, , Disp: , Rfl:   •  folic acid (FOLVITE) 1 MG tablet, , Disp: , Rfl:   •  hydroxychloroquine (PLAQUENIL) 200 MG tablet, Take 200 mg by mouth Daily., Disp: , Rfl:   •  hydrOXYzine  pamoate (VISTARIL) 25 MG capsule, Take 25 mg by mouth 3 (Three) Times a Day As Needed., Disp: , Rfl:   •  ibuprofen (ADVIL,MOTRIN) 600 MG tablet, Take 600 mg by mouth Every 8 (Eight) Hours As Needed for Mild Pain ., Disp: , Rfl:   •  loratadine (CLARITIN) 10 MG tablet, 10 mg Daily., Disp: , Rfl:   •  methocarbamol (ROBAXIN) 500 MG tablet, Take 500 mg by mouth Every Night., Disp: , Rfl:   •  methylPREDNISolone (MEDROL, MAGO,) 4 MG tablet, Take as directed on package instructions., Disp: 21 tablet, Rfl: 0  •  metroNIDAZOLE (METROGEL) 1 % gel, Apply  topically to the appropriate area as directed., Disp: , Rfl:   •  montelukast (SINGULAIR) 10 MG tablet, Take 10 mg by mouth Every Night., Disp: , Rfl:   •  mupirocin (BACTROBAN) 2 % ointment, Apply  topically to the appropriate area as directed., Disp: , Rfl:   •  polyethylene glycol (MIRALAX) 17 GM/SCOOP powder, Take 17 g by mouth Daily. Mix with 8 oz liquid, Disp: 510 g, Rfl: 5  •  potassium citrate (UROCIT-K) 10 MEQ (1080 MG) CR tablet, TAKE ONE TABLET BY MOUTH THREE TIMES A DAY WITH MEALS, Disp: 60 each, Rfl: 0  •  Promethazine-Phenyleph-Codeine 6.25-5-10 MG/5ML syrup syrup, Take 5-10 mL by mouth Every 4 (Four) Hours As Needed., Disp: , Rfl:   •  QUEtiapine (SEROquel) 50 MG tablet, Take 100 mg by mouth Every Night., Disp: , Rfl:   •  rOPINIRole (REQUIP) 0.5 MG tablet, Take 0.5 mg by mouth Daily., Disp: , Rfl:   •  tamsulosin (FLOMAX) 0.4 MG capsule 24 hr capsule, Take 1 capsule by mouth Daily., Disp: , Rfl:   •  topiramate (TOPAMAX) 25 MG tablet, Take 25 mg by mouth Every Night., Disp: , Rfl:   •  vitamin D (ERGOCALCIFEROL) 1.25 MG (25524 UT) capsule capsule, Take 50,000 Units by mouth Every 7 (Seven) Days., Disp: , Rfl:     Allergies:   Peanut-containing drug products, Latex, Shrimp, Milk-related compounds, and Sulfa antibiotics    Vitals:  /78 (BP Location: Left arm, Patient Position: Sitting, Cuff Size: Adult)   Pulse 88   Temp 96 °F (35.6 °C)   Ht 149.9 cm  "(59\")   Wt 74.9 kg (165 lb 3.2 oz)   SpO2 98%   BMI 33.37 kg/m²     Physical Exam  Vitals signs reviewed.   Constitutional:       General: She is not in acute distress.     Appearance: Normal appearance. She is well-developed. She is not ill-appearing or diaphoretic.   HENT:      Head: Normocephalic and atraumatic.      Right Ear: External ear normal.      Left Ear: External ear normal.      Nose: Nose normal.      Mouth/Throat:      Comments: Wearing a mask  Eyes:      General: No scleral icterus.        Right eye: No discharge.         Left eye: No discharge.      Conjunctiva/sclera: Conjunctivae normal.   Neck:      Musculoskeletal: Normal range of motion.      Vascular: No JVD.   Cardiovascular:      Rate and Rhythm: Normal rate and regular rhythm.      Heart sounds: Normal heart sounds. No murmur. No friction rub. No gallop.    Pulmonary:      Effort: Pulmonary effort is normal. No respiratory distress.      Breath sounds: Normal breath sounds. No wheezing or rales.   Chest:      Chest wall: No tenderness.   Abdominal:      General: Bowel sounds are normal. There is no distension.      Palpations: Abdomen is soft. There is no mass.      Tenderness: There is abdominal tenderness (LUQ, mild). There is no guarding.   Musculoskeletal: Normal range of motion.         General: No deformity.   Skin:     General: Skin is warm and dry.      Findings: No erythema or rash.   Neurological:      Mental Status: She is alert and oriented to person, place, and time.      Coordination: Coordination normal.   Psychiatric:         Behavior: Behavior normal.         Thought Content: Thought content normal.         Judgment: Judgment normal.      Comments: Depressed affect     Results Review:  Abd film 1/2021 normal, no increased stool on my review, note full stomach    Assessment:  1. LUQ abdominal pain    2. Early satiety    3. Gastroesophageal reflux disease, unspecified whether esophagitis present    4. Márquez's esophagus " without dysplasia    5. Hiatal hernia      Plan:  Orders Placed This Encounter   Procedures   • Follow Anesthesia Guidelines / Standing Orders   • Obtain Informed Consent     ESOPHAGOGASTRODUODENOSCOPY WITH BIOPSY CPT CODE: 92173 (N/A)  She will need an esophagogastroduodenoscopy performed with IV general sedation. All of the risks, benefits and alternatives of this procedure have been discussed with her, all of her questions have been answered and she has elected to proceed. She should follow up in the office after this procedure to discuss the results and further recommendations can be made at that time.    New Medications Ordered This Visit   Medications   • dexlansoprazole (Dexilant) 60 MG capsule     Sig: Take 1 capsule by mouth Daily.     Dispense:  30 capsule     Refill:  11     She will have heavy meal challenge day before her procedure. Seems she may have gastroparesis based on current complaints. I have asked her to avoid high fiber or high fat meals for now. Consume small portions. Continue Dexilant 60 mg once daily for GERD, will repeat EGD checking for ulcers, retained food in stomach and also for monitoring of Márquez's esophagus.         Return for follow up after procedure to discuss results.      Electronically signed 1/25/2021 12:20 JORGE Aguiar PA-C  [facility]

## 2021-01-26 DIAGNOSIS — R11.2 INTRACTABLE VOMITING WITH NAUSEA, UNSPECIFIED VOMITING TYPE: ICD-10-CM

## 2021-01-26 DIAGNOSIS — K44.9 HIATAL HERNIA: ICD-10-CM

## 2021-01-26 DIAGNOSIS — K21.9 GASTROESOPHAGEAL REFLUX DISEASE, UNSPECIFIED WHETHER ESOPHAGITIS PRESENT: ICD-10-CM

## 2021-01-26 DIAGNOSIS — R63.0 POOR APPETITE: ICD-10-CM

## 2021-01-26 DIAGNOSIS — K22.70 BARRETT'S ESOPHAGUS WITHOUT DYSPLASIA: ICD-10-CM

## 2021-01-26 DIAGNOSIS — R68.81 EARLY SATIETY: ICD-10-CM

## 2021-01-26 DIAGNOSIS — R10.12 LUQ ABDOMINAL PAIN: Primary | ICD-10-CM

## 2021-01-29 ENCOUNTER — LAB (OUTPATIENT)
Dept: LAB | Facility: HOSPITAL | Age: 31
End: 2021-01-29

## 2021-01-29 DIAGNOSIS — K44.9 HIATAL HERNIA: ICD-10-CM

## 2021-01-29 DIAGNOSIS — K22.70 BARRETT'S ESOPHAGUS WITHOUT DYSPLASIA: ICD-10-CM

## 2021-01-29 DIAGNOSIS — R68.81 EARLY SATIETY: ICD-10-CM

## 2021-01-29 DIAGNOSIS — R10.12 LUQ ABDOMINAL PAIN: ICD-10-CM

## 2021-01-29 DIAGNOSIS — R11.2 INTRACTABLE VOMITING WITH NAUSEA, UNSPECIFIED VOMITING TYPE: ICD-10-CM

## 2021-01-29 DIAGNOSIS — K21.9 GASTROESOPHAGEAL REFLUX DISEASE, UNSPECIFIED WHETHER ESOPHAGITIS PRESENT: ICD-10-CM

## 2021-01-29 DIAGNOSIS — R63.0 POOR APPETITE: ICD-10-CM

## 2021-01-29 LAB — SARS-COV-2 RNA RESP QL NAA+PROBE: NOT DETECTED

## 2021-01-29 PROCEDURE — U0004 COV-19 TEST NON-CDC HGH THRU: HCPCS

## 2021-01-29 PROCEDURE — C9803 HOPD COVID-19 SPEC COLLECT: HCPCS

## 2021-02-01 ENCOUNTER — HOSPITAL ENCOUNTER (OUTPATIENT)
Facility: HOSPITAL | Age: 31
Setting detail: HOSPITAL OUTPATIENT SURGERY
Discharge: HOME | End: 2021-02-01
Attending: INTERNAL MEDICINE | Admitting: INTERNAL MEDICINE

## 2021-02-01 ENCOUNTER — ANESTHESIA EVENT (OUTPATIENT)
Dept: PERIOP | Facility: HOSPITAL | Age: 31
End: 2021-02-01

## 2021-02-01 ENCOUNTER — ANESTHESIA (OUTPATIENT)
Dept: PERIOP | Facility: HOSPITAL | Age: 31
End: 2021-02-01

## 2021-02-01 VITALS
HEIGHT: 59 IN | DIASTOLIC BLOOD PRESSURE: 81 MMHG | SYSTOLIC BLOOD PRESSURE: 120 MMHG | HEART RATE: 87 BPM | OXYGEN SATURATION: 97 % | RESPIRATION RATE: 18 BRPM | WEIGHT: 157 LBS | TEMPERATURE: 97.5 F | BODY MASS INDEX: 31.65 KG/M2

## 2021-02-01 DIAGNOSIS — R10.12 LUQ ABDOMINAL PAIN: ICD-10-CM

## 2021-02-01 DIAGNOSIS — K44.9 HIATAL HERNIA: ICD-10-CM

## 2021-02-01 DIAGNOSIS — K22.70 BARRETT'S ESOPHAGUS WITHOUT DYSPLASIA: ICD-10-CM

## 2021-02-01 DIAGNOSIS — K21.9 GASTROESOPHAGEAL REFLUX DISEASE, UNSPECIFIED WHETHER ESOPHAGITIS PRESENT: ICD-10-CM

## 2021-02-01 DIAGNOSIS — R68.81 EARLY SATIETY: ICD-10-CM

## 2021-02-01 PROCEDURE — 43239 EGD BIOPSY SINGLE/MULTIPLE: CPT | Performed by: INTERNAL MEDICINE

## 2021-02-01 PROCEDURE — 81025 URINE PREGNANCY TEST: CPT | Performed by: INTERNAL MEDICINE

## 2021-02-01 PROCEDURE — 25010000002 PROPOFOL 10 MG/ML EMULSION: Performed by: NURSE ANESTHETIST, CERTIFIED REGISTERED

## 2021-02-01 RX ORDER — FENTANYL CITRATE 50 UG/ML
50 INJECTION, SOLUTION INTRAMUSCULAR; INTRAVENOUS
Status: DISCONTINUED | OUTPATIENT
Start: 2021-02-01 | End: 2021-02-01 | Stop reason: HOSPADM

## 2021-02-01 RX ORDER — SODIUM CHLORIDE 0.9 % (FLUSH) 0.9 %
10 SYRINGE (ML) INJECTION EVERY 12 HOURS SCHEDULED
Status: DISCONTINUED | OUTPATIENT
Start: 2021-02-01 | End: 2021-02-01 | Stop reason: HOSPADM

## 2021-02-01 RX ORDER — SODIUM CHLORIDE, SODIUM LACTATE, POTASSIUM CHLORIDE, CALCIUM CHLORIDE 600; 310; 30; 20 MG/100ML; MG/100ML; MG/100ML; MG/100ML
125 INJECTION, SOLUTION INTRAVENOUS ONCE
Status: COMPLETED | OUTPATIENT
Start: 2021-02-01 | End: 2021-02-01

## 2021-02-01 RX ORDER — MEPERIDINE HYDROCHLORIDE 25 MG/ML
12.5 INJECTION INTRAMUSCULAR; INTRAVENOUS; SUBCUTANEOUS
Status: DISCONTINUED | OUTPATIENT
Start: 2021-02-01 | End: 2021-02-01 | Stop reason: HOSPADM

## 2021-02-01 RX ORDER — OXYCODONE HYDROCHLORIDE AND ACETAMINOPHEN 5; 325 MG/1; MG/1
1 TABLET ORAL ONCE AS NEEDED
Status: DISCONTINUED | OUTPATIENT
Start: 2021-02-01 | End: 2021-02-01 | Stop reason: HOSPADM

## 2021-02-01 RX ORDER — PROPOFOL 10 MG/ML
VIAL (ML) INTRAVENOUS AS NEEDED
Status: DISCONTINUED | OUTPATIENT
Start: 2021-02-01 | End: 2021-02-01 | Stop reason: SURG

## 2021-02-01 RX ORDER — LIDOCAINE HYDROCHLORIDE 20 MG/ML
INJECTION, SOLUTION INFILTRATION; PERINEURAL AS NEEDED
Status: DISCONTINUED | OUTPATIENT
Start: 2021-02-01 | End: 2021-02-01 | Stop reason: SURG

## 2021-02-01 RX ORDER — DROPERIDOL 2.5 MG/ML
0.62 INJECTION, SOLUTION INTRAMUSCULAR; INTRAVENOUS ONCE AS NEEDED
Status: DISCONTINUED | OUTPATIENT
Start: 2021-02-01 | End: 2021-02-01 | Stop reason: HOSPADM

## 2021-02-01 RX ORDER — IPRATROPIUM BROMIDE AND ALBUTEROL SULFATE 2.5; .5 MG/3ML; MG/3ML
3 SOLUTION RESPIRATORY (INHALATION) ONCE AS NEEDED
Status: DISCONTINUED | OUTPATIENT
Start: 2021-02-01 | End: 2021-02-01 | Stop reason: HOSPADM

## 2021-02-01 RX ORDER — MIDAZOLAM HYDROCHLORIDE 1 MG/ML
2 INJECTION INTRAMUSCULAR; INTRAVENOUS
Status: DISCONTINUED | OUTPATIENT
Start: 2021-02-01 | End: 2021-02-01 | Stop reason: HOSPADM

## 2021-02-01 RX ORDER — MIDAZOLAM HYDROCHLORIDE 1 MG/ML
1 INJECTION INTRAMUSCULAR; INTRAVENOUS
Status: DISCONTINUED | OUTPATIENT
Start: 2021-02-01 | End: 2021-02-01 | Stop reason: HOSPADM

## 2021-02-01 RX ORDER — ONDANSETRON 2 MG/ML
4 INJECTION INTRAMUSCULAR; INTRAVENOUS AS NEEDED
Status: DISCONTINUED | OUTPATIENT
Start: 2021-02-01 | End: 2021-02-01 | Stop reason: HOSPADM

## 2021-02-01 RX ORDER — SODIUM CHLORIDE, SODIUM LACTATE, POTASSIUM CHLORIDE, CALCIUM CHLORIDE 600; 310; 30; 20 MG/100ML; MG/100ML; MG/100ML; MG/100ML
100 INJECTION, SOLUTION INTRAVENOUS ONCE AS NEEDED
Status: DISCONTINUED | OUTPATIENT
Start: 2021-02-01 | End: 2021-02-01 | Stop reason: HOSPADM

## 2021-02-01 RX ORDER — KETOROLAC TROMETHAMINE 30 MG/ML
30 INJECTION, SOLUTION INTRAMUSCULAR; INTRAVENOUS EVERY 6 HOURS PRN
Status: DISCONTINUED | OUTPATIENT
Start: 2021-02-01 | End: 2021-02-01 | Stop reason: HOSPADM

## 2021-02-01 RX ORDER — SODIUM CHLORIDE 0.9 % (FLUSH) 0.9 %
10 SYRINGE (ML) INJECTION AS NEEDED
Status: DISCONTINUED | OUTPATIENT
Start: 2021-02-01 | End: 2021-02-01 | Stop reason: HOSPADM

## 2021-02-01 RX ADMIN — PROPOFOL 30 MG: 10 INJECTION, EMULSION INTRAVENOUS at 13:21

## 2021-02-01 RX ADMIN — LIDOCAINE HYDROCHLORIDE 40 MG: 20 INJECTION, SOLUTION INFILTRATION; PERINEURAL at 13:21

## 2021-02-01 RX ADMIN — SODIUM CHLORIDE, POTASSIUM CHLORIDE, SODIUM LACTATE AND CALCIUM CHLORIDE: 600; 310; 30; 20 INJECTION, SOLUTION INTRAVENOUS at 13:21

## 2021-02-01 RX ADMIN — PROPOFOL 150 MCG/KG/MIN: 10 INJECTION, EMULSION INTRAVENOUS at 13:21

## 2021-02-01 NOTE — ANESTHESIA PREPROCEDURE EVALUATION
Anesthesia Evaluation     Patient summary reviewed and Nursing notes reviewed   no history of anesthetic complications:  NPO Solid Status: > 8 hours  NPO Liquid Status: > 8 hours           Airway   Mallampati: II  TM distance: >3 FB  Neck ROM: full  No difficulty expected  Dental    (+) edentulous    Pulmonary - normal exam   (+) a smoker Current Abstained day of surgery, asthma (very mild, allergic, rarely uses inhaler),  Cardiovascular - normal exam        Neuro/Psych  (+) headaches, psychiatric history Anxiety,     GI/Hepatic/Renal/Endo    (+) obesity,  hiatal hernia, GERD,  renal disease,     Musculoskeletal     Abdominal  - normal exam   Substance History      OB/GYN          Other   arthritis,        Other Comment: Sjorgens on methotrexate                  Anesthesia Plan    ASA 2     general     intravenous induction     Anesthetic plan, all risks, benefits, and alternatives have been provided, discussed and informed consent has been obtained with: patient.    Plan discussed with CRNA.

## 2021-02-01 NOTE — ANESTHESIA POSTPROCEDURE EVALUATION
Patient: Pili Webster    Procedure Summary     Date: 02/01/21 Room / Location:  [facility]  /  [facility]    Anesthesia Start: 1319 Anesthesia Stop: 1333    Procedure: ESOPHAGOGASTRODUODENOSCOPY WITH BIOPSY CPT CODE: 46613 (N/A Esophagus) Diagnosis:       LUQ abdominal pain      Early satiety      Gastroesophageal reflux disease, unspecified whether esophagitis present      Márquez's esophagus without dysplasia      Hiatal hernia      (LUQ abdominal pain [R10.12])      (Early satiety [R68.81])      (Gastroesophageal reflux disease, unspecified whether esophagitis present [K21.9])      (Márquez's esophagus without dysplasia [K22.70])      (Hiatal hernia [K44.9])    Surgeon: Jeremiah Murdock MD Provider: Akshat Jordan MD    Anesthesia Type: general ASA Status: 2          Anesthesia Type: general    Vitals  Vitals Value Taken Time   /81 02/01/21 1403   Temp 97.5 °F (36.4 °C) 02/01/21 1333   Pulse 87 02/01/21 1403   Resp 18 02/01/21 1403   SpO2 97 % 02/01/21 1403           Post Anesthesia Care and Evaluation    Patient location during evaluation: PHASE II  Patient participation: complete - patient participated  Level of consciousness: awake  Pain score: 1  Pain management: adequate  Airway patency: patent  Anesthetic complications: No anesthetic complications  PONV Status: none  Cardiovascular status: acceptable  Respiratory status: acceptable  Hydration status: acceptable

## 2021-02-01 NOTE — OP NOTE
ESOPHAGOGASTRODUODENOSCOPY PROCEDURE REPORT    Pili Webster  2/1/2021    GASTROENTEROLOGIST:  Jeremiah Murdock MD    PRE-PROCEDURE DIAGNOSIS:  LUQ abdominal pain [R10.12]  Early satiety [R68.81]  Gastroesophageal reflux disease, unspecified whether esophagitis present [K21.9]  Márquez's esophagus without dysplasia [K22.70]  Hiatal hernia [K44.9]    POST-PROCEDURE DIAGNOSIS:  1.-Short segment Márquez's esophagus with patchy involvement of the distal 1 to 2 cm the esophagus.  Biopsies pending  2.-Normal stomach except for small sliding hiatal hernia.  Antral biopsies taken to rule out helical back to pylori  3.-Normal duodenum.  Biopsies taken to rule out celiac disease    INDICATION:  As noted in the preprocedure diagnosis.  Suspected gastroparesis.  History of Márquez's esophagus    Procedure(s):  ESOPHAGOGASTRODUODENOSCOPY WITH BIOPSY CPT CODE: 98964    ANESTHESIA:  Propofol administered by anesthesia.  See anesthesia notes for ASA classification    STAFF:  Circulator: Ina Waller RN  Scrub Person: Arnaldo Guillaume    FINDINGS:  As noted in the post procedure diagnosis    OPERATIVE PROCEDURE:  After proper informed consent was obtained, patient was transferred to the OR/endoscopy suite.  Patient was then placed in left lateral decubitus position. The Olympus 180 series video gastroscope was inserted orally under direct visualization.  Esophagus, stomach, and duodenum were inspected.  The endoscope was passed to the third portion of the duodenum.  Scope was retroflexed for visualization of the cardia and incisuraThe endoscope was then withdrawn. Patient tolerated the procedure well. There were no immediate complications.    ESTIMATED BLOOD LOSS:  None    SPECIMENS:  Distal esophageal biopsies pending rule out Márquez's esophagus  Antral biopsies pending to rule out Helicobacter pylori  Duodenal biopsies pending to rule out celiac disease                COMPLICATIONS;  None    RECOMMENDATIONS/ PLAN:  1.-Await pathology report  2.-Continue proton pump inhibitor  3.-Surveillance EGD in 3 years pending review of final pathology report    Jeremiah Murdock MD     02/01/21 13:31 EST

## 2021-02-03 LAB
LAB AP CASE REPORT: NORMAL
PATH REPORT.FINAL DX SPEC: NORMAL

## 2021-02-04 ENCOUNTER — TELEPHONE (OUTPATIENT)
Dept: UROLOGY | Facility: CLINIC | Age: 31
End: 2021-02-04

## 2021-02-04 NOTE — TELEPHONE ENCOUNTER
Hello! I was just wondering if you could ask Dr. Murdock if this is anything worrisome or anything to do with her scope? Thank you!

## 2021-02-04 NOTE — TELEPHONE ENCOUNTER
Pt had a egd done on Monday and started vomiting and being nauseous yesterday.  She would like to know if it could have something to do with the procedure.

## 2021-02-13 ENCOUNTER — HOSPITAL ENCOUNTER (EMERGENCY)
Facility: HOSPITAL | Age: 31
Discharge: HOME | End: 2021-02-14
Attending: EMERGENCY MEDICINE | Admitting: EMERGENCY MEDICINE

## 2021-02-13 ENCOUNTER — APPOINTMENT (OUTPATIENT)
Dept: GENERAL RADIOLOGY | Facility: HOSPITAL | Age: 31
End: 2021-02-13

## 2021-02-13 DIAGNOSIS — J02.9 PHARYNGITIS, UNSPECIFIED ETIOLOGY: Primary | ICD-10-CM

## 2021-02-13 DIAGNOSIS — Z98.890 STATUS POST ENDOSCOPY: ICD-10-CM

## 2021-02-13 LAB
ANION GAP SERPL CALCULATED.3IONS-SCNC: 11.9 MMOL/L (ref 5–15)
BASOPHILS # BLD AUTO: 0.05 10*3/MM3 (ref 0–0.2)
BASOPHILS NFR BLD AUTO: 0.6 % (ref 0–1.5)
BUN SERPL-MCNC: 11 MG/DL (ref 6–20)
BUN/CREAT SERPL: 11 (ref 7–25)
CALCIUM SPEC-SCNC: 8.5 MG/DL (ref 8.6–10.5)
CHLORIDE SERPL-SCNC: 107 MMOL/L (ref 98–107)
CO2 SERPL-SCNC: 16.1 MMOL/L (ref 22–29)
CREAT SERPL-MCNC: 1 MG/DL (ref 0.57–1)
DEPRECATED RDW RBC AUTO: 43.7 FL (ref 37–54)
EOSINOPHIL # BLD AUTO: 0.35 10*3/MM3 (ref 0–0.4)
EOSINOPHIL NFR BLD AUTO: 4.1 % (ref 0.3–6.2)
ERYTHROCYTE [DISTWIDTH] IN BLOOD BY AUTOMATED COUNT: 12.8 % (ref 12.3–15.4)
FLUAV RNA RESP QL NAA+PROBE: NOT DETECTED
FLUBV RNA RESP QL NAA+PROBE: NOT DETECTED
GFR SERPL CREATININE-BSD FRML MDRD: 64 ML/MIN/1.73
GLUCOSE SERPL-MCNC: 111 MG/DL (ref 65–99)
HCG SERPL QL: NEGATIVE
HCT VFR BLD AUTO: 39.7 % (ref 34–46.6)
HGB BLD-MCNC: 13.2 G/DL (ref 12–15.9)
IMM GRANULOCYTES # BLD AUTO: 0.03 10*3/MM3 (ref 0–0.05)
IMM GRANULOCYTES NFR BLD AUTO: 0.4 % (ref 0–0.5)
LYMPHOCYTES # BLD AUTO: 3.83 10*3/MM3 (ref 0.7–3.1)
LYMPHOCYTES NFR BLD AUTO: 44.8 % (ref 19.6–45.3)
MCH RBC QN AUTO: 31.1 PG (ref 26.6–33)
MCHC RBC AUTO-ENTMCNC: 33.2 G/DL (ref 31.5–35.7)
MCV RBC AUTO: 93.4 FL (ref 79–97)
MONOCYTES # BLD AUTO: 0.48 10*3/MM3 (ref 0.1–0.9)
MONOCYTES NFR BLD AUTO: 5.6 % (ref 5–12)
NEUTROPHILS NFR BLD AUTO: 3.81 10*3/MM3 (ref 1.7–7)
NEUTROPHILS NFR BLD AUTO: 44.5 % (ref 42.7–76)
NRBC BLD AUTO-RTO: 0 /100 WBC (ref 0–0.2)
PLATELET # BLD AUTO: 305 10*3/MM3 (ref 140–450)
PMV BLD AUTO: 8.4 FL (ref 6–12)
POTASSIUM SERPL-SCNC: 3.8 MMOL/L (ref 3.5–5.2)
RBC # BLD AUTO: 4.25 10*6/MM3 (ref 3.77–5.28)
S PYO AG THROAT QL: NEGATIVE
SARS-COV-2 RNA RESP QL NAA+PROBE: NOT DETECTED
SODIUM SERPL-SCNC: 135 MMOL/L (ref 136–145)
WBC # BLD AUTO: 8.55 10*3/MM3 (ref 3.4–10.8)

## 2021-02-13 PROCEDURE — 96374 THER/PROPH/DIAG INJ IV PUSH: CPT

## 2021-02-13 PROCEDURE — 99284 EMERGENCY DEPT VISIT MOD MDM: CPT

## 2021-02-13 PROCEDURE — 87081 CULTURE SCREEN ONLY: CPT | Performed by: EMERGENCY MEDICINE

## 2021-02-13 PROCEDURE — 96375 TX/PRO/DX INJ NEW DRUG ADDON: CPT

## 2021-02-13 PROCEDURE — 85025 COMPLETE CBC W/AUTO DIFF WBC: CPT | Performed by: EMERGENCY MEDICINE

## 2021-02-13 PROCEDURE — 87636 SARSCOV2 & INF A&B AMP PRB: CPT | Performed by: EMERGENCY MEDICINE

## 2021-02-13 PROCEDURE — 25010000002 DIPHENHYDRAMINE PER 50 MG: Performed by: EMERGENCY MEDICINE

## 2021-02-13 PROCEDURE — 70360 X-RAY EXAM OF NECK: CPT

## 2021-02-13 PROCEDURE — 80048 BASIC METABOLIC PNL TOTAL CA: CPT | Performed by: EMERGENCY MEDICINE

## 2021-02-13 PROCEDURE — 25010000002 DEXAMETHASONE: Performed by: EMERGENCY MEDICINE

## 2021-02-13 PROCEDURE — 84703 CHORIONIC GONADOTROPIN ASSAY: CPT | Performed by: EMERGENCY MEDICINE

## 2021-02-13 PROCEDURE — 71045 X-RAY EXAM CHEST 1 VIEW: CPT

## 2021-02-13 PROCEDURE — 25010000002 KETOROLAC TROMETHAMINE PER 15 MG: Performed by: EMERGENCY MEDICINE

## 2021-02-13 PROCEDURE — 36415 COLL VENOUS BLD VENIPUNCTURE: CPT

## 2021-02-13 PROCEDURE — 87880 STREP A ASSAY W/OPTIC: CPT | Performed by: EMERGENCY MEDICINE

## 2021-02-13 RX ORDER — FAMOTIDINE 10 MG/ML
20 INJECTION, SOLUTION INTRAVENOUS ONCE
Status: COMPLETED | OUTPATIENT
Start: 2021-02-13 | End: 2021-02-13

## 2021-02-13 RX ORDER — KETOROLAC TROMETHAMINE 30 MG/ML
15 INJECTION, SOLUTION INTRAMUSCULAR; INTRAVENOUS ONCE
Status: COMPLETED | OUTPATIENT
Start: 2021-02-13 | End: 2021-02-13

## 2021-02-13 RX ORDER — SODIUM CHLORIDE 0.9 % (FLUSH) 0.9 %
10 SYRINGE (ML) INJECTION AS NEEDED
Status: DISCONTINUED | OUTPATIENT
Start: 2021-02-13 | End: 2021-02-14 | Stop reason: HOSPADM

## 2021-02-13 RX ORDER — DIPHENHYDRAMINE HYDROCHLORIDE 50 MG/ML
25 INJECTION INTRAMUSCULAR; INTRAVENOUS ONCE
Status: COMPLETED | OUTPATIENT
Start: 2021-02-13 | End: 2021-02-13

## 2021-02-13 RX ADMIN — FAMOTIDINE 20 MG: 10 INJECTION INTRAVENOUS at 21:31

## 2021-02-13 RX ADMIN — KETOROLAC TROMETHAMINE 15 MG: 30 INJECTION, SOLUTION INTRAMUSCULAR; INTRAVENOUS at 21:30

## 2021-02-13 RX ADMIN — DEXAMETHASONE SODIUM PHOSPHATE 10 MG: 10 INJECTION INTRAMUSCULAR; INTRAVENOUS at 22:35

## 2021-02-13 RX ADMIN — DIPHENHYDRAMINE HYDROCHLORIDE 25 MG: 50 INJECTION, SOLUTION INTRAMUSCULAR; INTRAVENOUS at 21:31

## 2021-02-14 ENCOUNTER — APPOINTMENT (OUTPATIENT)
Dept: CT IMAGING | Facility: HOSPITAL | Age: 31
End: 2021-02-14

## 2021-02-14 VITALS
DIASTOLIC BLOOD PRESSURE: 73 MMHG | WEIGHT: 156 LBS | HEIGHT: 59 IN | SYSTOLIC BLOOD PRESSURE: 117 MMHG | HEART RATE: 97 BPM | BODY MASS INDEX: 31.45 KG/M2 | RESPIRATION RATE: 16 BRPM | OXYGEN SATURATION: 99 % | TEMPERATURE: 98 F

## 2021-02-14 PROCEDURE — 70491 CT SOFT TISSUE NECK W/DYE: CPT

## 2021-02-14 PROCEDURE — 25010000002 IOPAMIDOL 61 % SOLUTION: Performed by: EMERGENCY MEDICINE

## 2021-02-14 RX ORDER — SUCRALFATE ORAL 1 G/10ML
1 SUSPENSION ORAL
Qty: 400 ML | Refills: 0 | Status: SHIPPED | OUTPATIENT
Start: 2021-02-14 | End: 2021-02-25

## 2021-02-14 RX ADMIN — IOPAMIDOL 90 ML: 612 INJECTION, SOLUTION INTRAVENOUS at 00:32

## 2021-02-14 NOTE — ED NOTES
Provider at bedside at this time updating pt on results and plan to discharge home, all questions and concerns addressed, understanding verbalized.     Selena Gomes RN  02/14/21 0695

## 2021-02-14 NOTE — ED PROVIDER NOTES
Subjective   30-year-old female presents complaining of sore throat and difficulty swallowing.  She states symptoms started approximately 48 hours ago.  She has had some sinus congestion and rhinorrhea as well as drainage down her throat.  She denies fevers.  Symptoms have worsened and she has discomfort swallowing, although she has been tolerating both solids and liquids.  She denies shortness of breath or chest pain.  She denies known ill exposures.  She does report an allergy to peanuts but no recent exposures. She did also have an EGD 2 weeks ago.      History provided by:  Patient   used: No        Review of Systems   Constitutional: Negative.  Negative for fever.   HENT: Positive for sore throat and trouble swallowing. Negative for drooling and facial swelling.    Eyes: Negative.    Respiratory: Negative.  Negative for cough and shortness of breath.    Cardiovascular: Negative.  Negative for chest pain.   Gastrointestinal: Negative.  Negative for abdominal pain.   Genitourinary: Negative.  Negative for dysuria.   Musculoskeletal: Negative.    Skin: Negative.    Neurological: Negative.    Psychiatric/Behavioral: Negative.    All other systems reviewed and are negative.      Past Medical History:   Diagnosis Date   • Abdominal pain    • Abnormal uterine bleeding (AUB)    • Anxiety and depression    • Arthritis    • Cholecystitis    • Constipation    • Endometriosis    • Frequent UTI    • GERD (gastroesophageal reflux disease)    • Heartburn    • Hemorrhoids    • Kidney stones    • Lesion of breast    • Lump     RIGHT BREAST   • Nausea & vomiting    • PCOS (polycystic ovarian syndrome)    • Sjogren's disease (CMS/HCC)    • Tachycardia    • Wrist fracture     RIGHT ARM      PATIENT UNSURE IF FRACTURED       Allergies   Allergen Reactions   • Peanut-Containing Drug Products Anaphylaxis     AND PEANUTS IN FOODS   • Latex Hives   • Shrimp Swelling     Facial swelling     • Milk-Related Compounds  Nausea And Vomiting   • Sulfa Antibiotics Rash       Past Surgical History:   Procedure Laterality Date   • ABDOMINAL SURGERY     • BREAST BIOPSY Right 11/29/2018    Procedure: breast biopsy ;  Surgeon: Shiv Overton MD;  Location: [facility] OR;  Service: General   • CHOLECYSTECTOMY N/A 8/25/2017    Procedure: CHOLECYSTECTOMY LAPAROSCOPIC;  Surgeon: Franco Mari MD;  Location: [facility] OR;  Service:    • COLONOSCOPY N/A 3/9/2020    Procedure: COLONOSCOPY CPT CODE: 48040;  Surgeon: Jeremiah Murdock MD;  Location: [facility] OR;  Service: Gastroenterology;  Laterality: N/A;   • DENTAL PROCEDURE     • DIAGNOSTIC LAPAROSCOPY      X5   • DILATATION AND CURETTAGE     • ENDOSCOPY N/A 3/9/2020    Procedure: ESOPHAGOGASTRODUODENOSCOPY WITH BIOPSY CPT CODE: 03544;  Surgeon: Jeremiah Murdock MD;  Location: [facility] OR;  Service: Gastroenterology;  Laterality: N/A;   • ENDOSCOPY N/A 2/1/2021    Procedure: ESOPHAGOGASTRODUODENOSCOPY WITH BIOPSY CPT CODE: 95834;  Surgeon: Jeremiah Murdock MD;  Location: [facility] OR;  Service: Gastroenterology;  Laterality: N/A;   • HEMORRHOIDECTOMY N/A 11/14/2019    Procedure: HEMORRHOID STAPLING;  Surgeon: Franco Mari MD;  Location: [facility] OR;  Service: General   • URETEROSCOPY LASER LITHOTRIPSY WITH STENT INSERTION Right 1/9/2019    Procedure: URETEROSCOPY LASER LITHOTRIPSY retrograde pyelogram;  Surgeon: Ahmet Barrow MD;  Location: [facility] OR;  Service: Urology   • WISDOM TOOTH EXTRACTION         Family History   Problem Relation Age of Onset   • Breast cancer Maternal Aunt    • Alcohol abuse Paternal Grandfather    • Heart disease Paternal Grandfather    • Cancer Maternal Grandfather    • Hypertension Maternal Grandfather        Social History     Socioeconomic History   • Marital status:      Spouse name: Not on file   • Number of children: Not on file   • Years of education: Not on file   • Highest education level: Not on file    Tobacco Use   • Smoking status: Current Every Day Smoker     Packs/day: 0.25     Years: 16.00     Pack years: 4.00     Types: Cigarettes   • Smokeless tobacco: Never Used   Substance and Sexual Activity   • Alcohol use: Yes     Frequency: Never     Comment: RARELY   • Drug use: No   • Sexual activity: Defer     Birth control/protection: None           Objective   Physical Exam  Vitals signs and nursing note reviewed.   Constitutional:       General: She is not in acute distress.     Appearance: She is well-developed. She is not ill-appearing or diaphoretic.   HENT:      Head: Normocephalic and atraumatic.      Right Ear: Tympanic membrane and external ear normal.      Left Ear: Tympanic membrane and external ear normal.      Nose: Nose normal.      Mouth/Throat:      Mouth: Mucous membranes are moist.      Pharynx: Uvula midline. Posterior oropharyngeal erythema (mild) present. No pharyngeal swelling, oropharyngeal exudate or uvula swelling.      Tonsils: No tonsillar abscesses.   Eyes:      Conjunctiva/sclera: Conjunctivae normal.      Pupils: Pupils are equal, round, and reactive to light.   Neck:      Musculoskeletal: Normal range of motion and neck supple.      Vascular: No JVD.      Trachea: No tracheal deviation.   Cardiovascular:      Rate and Rhythm: Normal rate and regular rhythm.      Heart sounds: Normal heart sounds. No murmur.   Pulmonary:      Effort: Pulmonary effort is normal. No respiratory distress.      Breath sounds: Normal breath sounds. No wheezing.   Abdominal:      General: Bowel sounds are normal.      Palpations: Abdomen is soft.      Tenderness: There is no abdominal tenderness.   Musculoskeletal: Normal range of motion.         General: No deformity.   Skin:     General: Skin is warm and dry.      Coloration: Skin is not pale.      Findings: No erythema or rash.   Neurological:      Mental Status: She is alert and oriented to person, place, and time.      Cranial Nerves: No cranial  nerve deficit.   Psychiatric:         Behavior: Behavior normal.         Thought Content: Thought content normal.         Procedures           ED Course                                           MDM  Number of Diagnoses or Management Options  Pharyngitis, unspecified etiology:   Status post endoscopy:   Diagnosis management comments: 30-year-old female presenting with complaints of sore throat.  She is tolerating oral intake.  Strep, flu, and Covid were negative.  I suspect she has some other viral pharyngitis versus inflammation from her recent EGD.  CT was obtained and ruled out any emergent complications or perforation.  On reassessment she is well-appearing without acute distress or any other concerns.  We discussed symptomatic management and close follow-up and she was happy with the plan.       Amount and/or Complexity of Data Reviewed  Clinical lab tests: reviewed and ordered  Tests in the radiology section of CPT®: reviewed and ordered  Independent visualization of images, tracings, or specimens: yes        Final diagnoses:   Pharyngitis, unspecified etiology   Status post endoscopy            Franco Carolina MD  02/14/21 0140

## 2021-02-15 LAB — BACTERIA SPEC AEROBE CULT: NORMAL

## 2021-02-25 ENCOUNTER — OFFICE VISIT (OUTPATIENT)
Dept: GASTROENTEROLOGY | Facility: CLINIC | Age: 31
End: 2021-02-25

## 2021-02-25 VITALS
HEART RATE: 83 BPM | DIASTOLIC BLOOD PRESSURE: 86 MMHG | TEMPERATURE: 96.8 F | RESPIRATION RATE: 16 BRPM | SYSTOLIC BLOOD PRESSURE: 114 MMHG | HEIGHT: 59 IN | WEIGHT: 170 LBS | BODY MASS INDEX: 34.27 KG/M2

## 2021-02-25 DIAGNOSIS — K58.0 IRRITABLE BOWEL SYNDROME WITH DIARRHEA: Primary | ICD-10-CM

## 2021-02-25 DIAGNOSIS — K22.70 BARRETT'S ESOPHAGUS WITHOUT DYSPLASIA: ICD-10-CM

## 2021-02-25 DIAGNOSIS — K44.9 HIATAL HERNIA: ICD-10-CM

## 2021-02-25 DIAGNOSIS — R63.5 WEIGHT GAIN, ABNORMAL: ICD-10-CM

## 2021-02-25 DIAGNOSIS — K21.9 GASTROESOPHAGEAL REFLUX DISEASE WITHOUT ESOPHAGITIS: ICD-10-CM

## 2021-02-25 PROCEDURE — 99214 OFFICE O/P EST MOD 30 MIN: CPT | Performed by: PHYSICIAN ASSISTANT

## 2021-02-25 RX ORDER — MULTIPLE VITAMINS W/ MINERALS TAB 9MG-400MCG
1 TAB ORAL DAILY
COMMUNITY
End: 2022-07-12

## 2021-02-25 RX ORDER — FLUOXETINE HYDROCHLORIDE 20 MG/1
20 CAPSULE ORAL DAILY
COMMUNITY
End: 2022-04-08 | Stop reason: DRUGHIGH

## 2021-02-25 RX ORDER — DICYCLOMINE HCL 20 MG
20 TABLET ORAL
Qty: 120 TABLET | Refills: 3 | Status: SHIPPED | OUTPATIENT
Start: 2021-02-25 | End: 2021-06-22

## 2021-02-25 NOTE — PROGRESS NOTES
Follow Up Note     Date: 2021   Patient Name: Pili Webster  MRN: [id number]  : 1990     Primary Care Provider: Osvaldo Madrid MD     Chief Complaint:    Chief Complaint   Patient presents with   • Difficulty Swallowing   • Abdominal Pain     History of present illness:   2021  Pili Webster is a 30 y.o. female who is here today for follow up regarding abdominal pain and dysphagia. She recently had EGD at [facility] with Dr. Murdock and would like to discuss those results. She is also having severe periumbilical abdominal pain which has become constant but now worse than previous. She feels that eating makes the pain worse, described as aching and cramping a times. She has a long history of abdominal pain with fluctuations in bowel habits but most recently with diarrhea. Her stools are yellowish in color currently and sometimes more than 3 times daily. She was previously taking Miralax for constipation but has not taken in a while. No rectal bleeding.     Currently taking Dexilant 60 mg once daily for GERD which is very severe without this medication. Has a history of known Márquez's esophagus. Still with acid coming into her mouth at times, still with current dysphagia. Had an incident in which she felt that her throat was swelling a couple of weeks ago and was evaluated in the ED for this. This has been gradually improving since then. Did try Carafate but this did not help. She has other autoimmune diseases that are also being evaluated as contributors by other specialists.     Has noticed weight gain over the past 1 year, gained more than 20 lbs. She has changed her diet to mostly healthy foods. Does not drink soda. Has difficulty exercising due to chronic fatigue and back pain.     Interval History:  2021  Over the past several months, she has noticed change in her GI complaints.  She now has left upper quadrant abdominal pain which is severe nature at  times.  Also notices swelling in this area as well intermittently.  She has early satiety and generally only eats a few bites.  She has tried to change her diet in order to lose weight, eat consuming more raw vegetables such as salads recently.  She had an abdominal film with her PCP and she was told that she had severe constipation on it but also had a visit with urology recently and was told that she did not have constipation.  She takes MiraLAX 2-3 times a week only as needed for treatment of constipation.  She has been having diarrhea with fecal urgency immediately after eating.  Still taking Dexilant 60 mg once daily with good control of GERD. Has a history of Márquez's (new diagnosis) and last EGD 3/2020.     Subjective     Past Medical History:   Diagnosis Date   • Abdominal pain    • Abnormal uterine bleeding (AUB)    • Anxiety and depression    • Arthritis    • Asthma     mild   • Cholecystitis    • Constipation    • Endometriosis    • Frequent UTI    • GERD (gastroesophageal reflux disease)    • Heartburn    • Hemorrhoids    • Kidney stones    • Lesion of breast    • Lump     RIGHT BREAST   • Nausea & vomiting    • PCOS (polycystic ovarian syndrome)    • Sjogren's disease (CMS/HCC)    • Snores    • Tachycardia    • Wrist fracture     RIGHT ARM      PATIENT UNSURE IF FRACTURED     Past Surgical History:   Procedure Laterality Date   • ABDOMINAL SURGERY     • BREAST BIOPSY Right 11/29/2018    Procedure: breast biopsy ;  Surgeon: Shiv Overton MD;  Location: [facility] OR;  Service: General   • CHOLECYSTECTOMY N/A 8/25/2017    Procedure: CHOLECYSTECTOMY LAPAROSCOPIC;  Surgeon: Franco Mari MD;  Location: [facility] OR;  Service:    • COLONOSCOPY N/A 3/9/2020    Procedure: COLONOSCOPY CPT CODE: 00498;  Surgeon: Jeremiah Murdock MD;  Location: [facility] OR;  Service: Gastroenterology;  Laterality: N/A;   • DENTAL PROCEDURE     • DIAGNOSTIC LAPAROSCOPY      X5   • DILATATION AND CURETTAGE     •  ENDOSCOPY N/A 3/9/2020    Procedure: ESOPHAGOGASTRODUODENOSCOPY WITH BIOPSY CPT CODE: 43858;  Surgeon: Jeremiah Murdock MD;  Location: [facility] OR;  Service: Gastroenterology;  Laterality: N/A;   • ENDOSCOPY N/A 2/1/2021    Procedure: ESOPHAGOGASTRODUODENOSCOPY WITH BIOPSY CPT CODE: 31423;  Surgeon: Jeremiah Murdock MD;  Location: [facility] OR;  Service: Gastroenterology;  Laterality: N/A;   • HEMORRHOIDECTOMY N/A 11/14/2019    Procedure: HEMORRHOID STAPLING;  Surgeon: Franco Mari MD;  Location: [facility] OR;  Service: General   • URETEROSCOPY LASER LITHOTRIPSY WITH STENT INSERTION Right 1/9/2019    Procedure: URETEROSCOPY LASER LITHOTRIPSY retrograde pyelogram;  Surgeon: Ahmet Barrow MD;  Location: [facility] OR;  Service: Urology   • WISDOM TOOTH EXTRACTION       Family History   Problem Relation Age of Onset   • Breast cancer Maternal Aunt    • Alcohol abuse Paternal Grandfather    • Heart disease Paternal Grandfather    • Cancer Maternal Grandfather    • Hypertension Maternal Grandfather    • Colon cancer Neg Hx    • Liver cancer Neg Hx      Social History     Socioeconomic History   • Marital status:      Spouse name: Not on file   • Number of children: Not on file   • Years of education: Not on file   • Highest education level: Not on file   Tobacco Use   • Smoking status: Current Every Day Smoker     Packs/day: 0.25     Years: 16.00     Pack years: 4.00     Types: Cigarettes     Start date: 2003   • Smokeless tobacco: Never Used   Substance and Sexual Activity   • Alcohol use: Yes     Frequency: Never     Comment: RARELY   • Drug use: No   • Sexual activity: Defer       Current Outpatient Medications:   •  albuterol sulfate  (90 Base) MCG/ACT inhaler, Inhale 1 puff 4 (Four) Times a Day., Disp: , Rfl:   •  Albuterol Sulfate, sensor, 108 (90 Base) MCG/ACT aerosol powder , Inhale 2 puffs As Needed., Disp: , Rfl:   •  aspirin 81 MG EC tablet, Take 81 mg by mouth Daily.  LAS T DOSE 11/26/2018, Disp: , Rfl:   •  atenolol (TENORMIN) 25 MG tablet, Take 25 mg by mouth Daily., Disp: , Rfl:   •  dexlansoprazole (Dexilant) 60 MG capsule, Take 1 capsule by mouth Daily., Disp: 30 capsule, Rfl: 11  •  drospirenone-ethinyl estradiol (Loretta) 3-0.02 MG per tablet, Take 1 tablet by mouth Daily., Disp: , Rfl:   •  FLUoxetine (PROzac) 20 MG capsule, Take 20 mg by mouth Daily., Disp: , Rfl:   •  FLUoxetine (PROzac) 40 MG capsule, Take 40 mg by mouth Daily., Disp: , Rfl:   •  folic acid (FOLVITE) 1 MG tablet, Take 1 mg by mouth Daily., Disp: , Rfl:   •  hydrOXYzine pamoate (VISTARIL) 25 MG capsule, Take 25 mg by mouth 3 (Three) Times a Day As Needed., Disp: , Rfl:   •  Lifitegrast (Xiidra) 5 % ophthalmic solution, Administer 1 drop to both eyes 2 (Two) Times a Day., Disp: , Rfl:   •  loratadine (CLARITIN) 10 MG tablet, 10 mg Daily., Disp: , Rfl:   •  methocarbamol (ROBAXIN) 500 MG tablet, Take 500 mg by mouth Every Night., Disp: , Rfl:   •  methotrexate 2.5 MG tablet, Take  by mouth 3 (Three) Doses Each Week. Take Doses 12 (Twelve) Hours Apart., Disp: , Rfl:   •  montelukast (SINGULAIR) 10 MG tablet, Take 10 mg by mouth Every Night., Disp: , Rfl:   •  multivitamin with minerals tablet tablet, Take 1 tablet by mouth Daily., Disp: , Rfl:   •  potassium citrate (UROCIT-K) 10 MEQ (1080 MG) CR tablet, TAKE ONE TABLET BY MOUTH THREE TIMES A DAY WITH MEALS, Disp: 60 each, Rfl: 0  •  QUEtiapine (SEROquel) 100 MG tablet, Take 150 mg by mouth Every Night., Disp: , Rfl:   •  sucralfate (CARAFATE) 1 GM/10ML suspension, Take 10 mL by mouth 4 (Four) Times a Day With Meals & at Bedtime., Disp: 400 mL, Rfl: 0  •  topiramate (TOPAMAX) 25 MG tablet, Take 25 mg by mouth Every Night., Disp: , Rfl:   •  vitamin D (ERGOCALCIFEROL) 1.25 MG (61765 UT) capsule capsule, Take 50,000 Units by mouth Every 7 (Seven) Days., Disp: , Rfl:   •  polyethylene glycol (MIRALAX) 17 GM/SCOOP powder, Take 17 g by mouth Daily. Mix with 8 oz  liquid, Disp: 510 g, Rfl: 5    Allergies   Allergen Reactions   • Peanut-Containing Drug Products Anaphylaxis     AND PEANUTS IN FOODS   • Latex Hives   • Shrimp Swelling     Facial swelling     • Milk-Related Compounds Nausea And Vomiting   • Sulfa Antibiotics Rash       The following portions of the patient's history were reviewed and updated as appropriate: allergies, current medications, past family history, past medical history, past social history, past surgical history and problem list.  Objective     Physical Exam  Vitals signs reviewed.   Constitutional:       General: She is in acute distress (mild, holding abdomen).      Appearance: Normal appearance. She is well-developed. She is not ill-appearing or diaphoretic.   HENT:      Head: Normocephalic and atraumatic.      Right Ear: External ear normal.      Left Ear: External ear normal.      Nose: Nose normal.      Mouth/Throat:      Comments: Wearing a mask  Eyes:      General: No scleral icterus.        Right eye: No discharge.         Left eye: No discharge.      Conjunctiva/sclera: Conjunctivae normal.   Neck:      Musculoskeletal: Normal range of motion.      Vascular: No JVD.   Cardiovascular:      Rate and Rhythm: Normal rate and regular rhythm.      Heart sounds: Normal heart sounds. No murmur. No friction rub. No gallop.    Pulmonary:      Effort: Pulmonary effort is normal. No respiratory distress.      Breath sounds: Normal breath sounds. No wheezing or rales.   Chest:      Chest wall: No tenderness.   Abdominal:      General: Bowel sounds are normal. There is no distension.      Palpations: Abdomen is soft. There is no mass.      Tenderness: There is no abdominal tenderness (generalized mild but moderate periumbilically). There is no guarding.   Musculoskeletal: Normal range of motion.         General: No deformity.   Skin:     General: Skin is warm and dry.      Findings: No erythema or rash.   Neurological:      Mental Status: She is alert and  "oriented to person, place, and time.      Coordination: Coordination normal.   Psychiatric:         Behavior: Behavior normal.         Thought Content: Thought content normal.         Judgment: Judgment normal.      Comments: Depressed affect       Vitals:    02/25/21 1408   BP: 114/86   Pulse: 83   Resp: 16  Comment: talking   Temp: 96.8 °F (36 °C)   Weight: 77.1 kg (170 lb)   Height: 149.9 cm (59\")       Results Review:   I reviewed the patient's new clinical results.    Admission on 02/13/2021, Discharged on 02/14/2021   Component Date Value Ref Range Status   • Strep A Ag 02/13/2021 Negative  Negative Final   • COVID19 02/13/2021 Not Detected  Not Detected - Ref. Range Final   • Influenza A PCR 02/13/2021 Not Detected  Not Detected Final   • Influenza B PCR 02/13/2021 Not Detected  Not Detected Final   • HCG Qualitative 02/13/2021 Negative  Negative Final   • Glucose 02/13/2021 111* 65 - 99 mg/dL Final   • BUN 02/13/2021 11  6 - 20 mg/dL Final   • Creatinine 02/13/2021 1.00  0.57 - 1.00 mg/dL Final   • Sodium 02/13/2021 135* 136 - 145 mmol/L Final   • Potassium 02/13/2021 3.8  3.5 - 5.2 mmol/L Final    Slight hemolysis detected by analyzer. Results may be affected.   • Chloride 02/13/2021 107  98 - 107 mmol/L Final   • CO2 02/13/2021 16.1* 22.0 - 29.0 mmol/L Final   • Calcium 02/13/2021 8.5* 8.6 - 10.5 mg/dL Final   • eGFR Non  Amer 02/13/2021 64  >60 mL/min/1.73 Final   • BUN/Creatinine Ratio 02/13/2021 11.0  7.0 - 25.0 Final   • Anion Gap 02/13/2021 11.9  5.0 - 15.0 mmol/L Final   • WBC 02/13/2021 8.55  3.40 - 10.80 10*3/mm3 Final   • RBC 02/13/2021 4.25  3.77 - 5.28 10*6/mm3 Final   • Hemoglobin 02/13/2021 13.2  12.0 - 15.9 g/dL Final   • Hematocrit 02/13/2021 39.7  34.0 - 46.6 % Final   • MCV 02/13/2021 93.4  79.0 - 97.0 fL Final   • MCH 02/13/2021 31.1  26.6 - 33.0 pg Final   • MCHC 02/13/2021 33.2  31.5 - 35.7 g/dL Final   • RDW 02/13/2021 12.8  12.3 - 15.4 % Final   • RDW-SD 02/13/2021 43.7  37.0 - " 54.0 fl Final   • MPV 02/13/2021 8.4  6.0 - 12.0 fL Final   • Platelets 02/13/2021 305  140 - 450 10*3/mm3 Final   • Neutrophil % 02/13/2021 44.5  42.7 - 76.0 % Final   • Lymphocyte % 02/13/2021 44.8  19.6 - 45.3 % Final   • Monocyte % 02/13/2021 5.6  5.0 - 12.0 % Final   • Eosinophil % 02/13/2021 4.1  0.3 - 6.2 % Final   • Basophil % 02/13/2021 0.6  0.0 - 1.5 % Final   • Immature Grans % 02/13/2021 0.4  0.0 - 0.5 % Final   • Neutrophils, Absolute 02/13/2021 3.81  1.70 - 7.00 10*3/mm3 Final   • Lymphocytes, Absolute 02/13/2021 3.83* 0.70 - 3.10 10*3/mm3 Final   • Monocytes, Absolute 02/13/2021 0.48  0.10 - 0.90 10*3/mm3 Final   • Eosinophils, Absolute 02/13/2021 0.35  0.00 - 0.40 10*3/mm3 Final   • Basophils, Absolute 02/13/2021 0.05  0.00 - 0.20 10*3/mm3 Final   • Immature Grans, Absolute 02/13/2021 0.03  0.00 - 0.05 10*3/mm3 Final   • nRBC 02/13/2021 0.0  0.0 - 0.2 /100 WBC Final   • Throat Culture, Beta Strep 02/13/2021 No Beta Hemolytic Streptococcus Isolated   Final   Admission on 02/01/2021, Discharged on 02/01/2021   Component Date Value Ref Range Status   • HCG, Urine, QL 02/01/2021 Negative  Negative Final   • Lot Number 02/01/2021 [id number]   Final   • Internal Positive Control 02/01/2021 Positive   Final   • Internal Negative Control 02/01/2021 Negative   Final   • Case Report 02/01/2021    Final                    Value:Surgical Pathology Report                         Case: [id number]                                  Authorizing Provider:  Jeremiah Murdock     Collected:           02/01/2021 01:27 PM                                 MD Dennis                                                                    Ordering Location:     [facility]      Received:            02/01/2021 01:46 PM                                 OPERATING ROOM DEPARTMENT                                                    Pathologist:           Pippa Morales MD                                                        Specimen:    Small Intestine, Duodenum                                                                 • Final Diagnosis 02/01/2021    Final                    Value:This result contains rich text formatting which cannot be displayed here.   Lab on 01/29/2021   Component Date Value Ref Range Status   • SARS-CoV-2 MATTHEW 01/29/2021 Not Detected  Not Detected Final      Xr Neck Soft Tissue    Result Date: 2/13/2021  Questionable soft tissue gas in the occipital region of the neck versus artifact. Consider follow-up CT soft tissue neck if clinically warranted. Signer Name: Jason Garcia MD  Signed: 2/13/2021 11:27 PM  Workstation Name: [facility]  Radiology [facility]    Ct Soft Tissue Neck With Contrast    Result Date: 2/14/2021  Normal CT of the neck soft tissue. Signer Name: Jason Garcia MD  Signed: 2/14/2021 12:39 AM  Workstation Name: [facility]    Xr Chest Ap    Result Date: 2/13/2021  No acute cardiopulmonary findings. Signer Name: Jason Garcia MD  Signed: 2/13/2021 11:20 PM  Workstation Name: [email or web address]     EGD was completed on 2/1/2021 by Dr. Murdock.  Findings were short segment Márquez's esophagus with patchy involvement of the distal 1 to 2 cm of the esophagus, normal stomach except for small sliding hiatal hernia, normal duodenum.  Pathology shows benign duodenal mucosa, no H. pylori, distal esophageal biopsy shows chronic inflammation, features of reflux and multifocal intestinal metaplasia, negative for dysplasia.    EGD and colonoscopy were completed on 3/9/2020 by Dr. Murdock.  Findings were patchy salmon-colored mucosa in the distal esophagus, small sliding hiatal hernia, normal stomach, normal duodenum, normal terminal ileum, evidence of prior rectal surgery, colon polyp in the transverse and sigmoid colon, left-sided diverticulosis.  Pathology showed normal duodenum, minimal superficial  chronic inflammation of the stomach, negative H. pylori, esophageal biopsy showed features of reflux, multifocal intestinal metaplasia negative for dysplasia, transverse colon polyp was sessile serrated adenoma and sigmoid colon polyp was hyperplastic.    Assessment / Plan      1. Irritable bowel syndrome with diarrhea  With long history of abdominal pain, may have some relief with dicyclomine for treatment of IBS. She has had non revealing imaging of the abdomen, past labs, EGD and colonoscopy. CBC recently completed reviewed normal.  Will have labs today including CMP, TSH, celiac testing. Start bentyl 20 mg before meals and before bed. Take with caution first dose due to possible side effect of drowsiness. Call with concerns.  - Comprehensive Metabolic Panel; Future  - TSH; Future  - Tissue Transglutaminase, IgA; Future  - IgA; Future  - dicyclomine (BENTYL) 20 MG tablet; Take 1 tablet by mouth 4 (Four) Times a Day Before Meals & at Bedtime.  Dispense: 120 tablet; Refill: 3    2. Weight gain, abnormal  Noted weight gain of 20 lbs over past 1 year. Will check TSH today.   - TSH; Future    3. Gastroesophageal reflux disease without esophagitis  4. Hiatal hernia  5. Márquez's esophagus without dysplasia  Long standing history of GERD, taking Dexilant 60 mg once daily with reasonable control. She needs to work on avoiding foods that make her GERD worse, discussed weight loss which may help with this. Will need another EGD for monitoring of dysplasia related to her Márquez's esophagus, due 2/2024.           Follow Up:   Return in about 3 months (around 5/25/2021) for recheck abdominal pain.      Naomi Aguiar PA-C  [facility] [place]  2/25/2021  16:59 EST    Please note that portions of this note may have been completed with a voice recognition program. Efforts were made to edit the dictations, but occasionally words are mistranscribed.

## 2021-02-26 ENCOUNTER — LAB (OUTPATIENT)
Dept: LAB | Facility: HOSPITAL | Age: 31
End: 2021-02-26

## 2021-02-26 DIAGNOSIS — R63.5 WEIGHT GAIN, ABNORMAL: ICD-10-CM

## 2021-02-26 DIAGNOSIS — K58.0 IRRITABLE BOWEL SYNDROME WITH DIARRHEA: ICD-10-CM

## 2021-02-26 PROCEDURE — 83516 IMMUNOASSAY NONANTIBODY: CPT

## 2021-02-26 PROCEDURE — 36415 COLL VENOUS BLD VENIPUNCTURE: CPT

## 2021-02-26 PROCEDURE — 80053 COMPREHEN METABOLIC PANEL: CPT

## 2021-02-26 PROCEDURE — 82784 ASSAY IGA/IGD/IGG/IGM EACH: CPT

## 2021-02-26 PROCEDURE — 84443 ASSAY THYROID STIM HORMONE: CPT

## 2021-02-27 LAB
ALBUMIN SERPL-MCNC: 3.6 G/DL (ref 3.5–5.2)
ALBUMIN/GLOB SERPL: 1.2 G/DL
ALP SERPL-CCNC: 45 U/L (ref 39–117)
ALT SERPL W P-5'-P-CCNC: 9 U/L (ref 1–33)
ANION GAP SERPL CALCULATED.3IONS-SCNC: 10.4 MMOL/L (ref 5–15)
AST SERPL-CCNC: 11 U/L (ref 1–32)
BILIRUB SERPL-MCNC: <0.2 MG/DL (ref 0–1.2)
BUN SERPL-MCNC: 10 MG/DL (ref 6–20)
BUN/CREAT SERPL: 10.1 (ref 7–25)
CALCIUM SPEC-SCNC: 8.4 MG/DL (ref 8.6–10.5)
CHLORIDE SERPL-SCNC: 109 MMOL/L (ref 98–107)
CO2 SERPL-SCNC: 16.6 MMOL/L (ref 22–29)
CREAT SERPL-MCNC: 0.99 MG/DL (ref 0.57–1)
GFR SERPL CREATININE-BSD FRML MDRD: 65 ML/MIN/1.73
GLOBULIN UR ELPH-MCNC: 3.1 GM/DL
GLUCOSE SERPL-MCNC: 114 MG/DL (ref 65–99)
IGA1 MFR SER: 199 MG/DL (ref 70–400)
POTASSIUM SERPL-SCNC: 4 MMOL/L (ref 3.5–5.2)
PROT SERPL-MCNC: 6.7 G/DL (ref 6–8.5)
SODIUM SERPL-SCNC: 136 MMOL/L (ref 136–145)
TSH SERPL DL<=0.05 MIU/L-ACNC: 0.89 UIU/ML (ref 0.27–4.2)
TTG IGA SER-ACNC: <2 U/ML (ref 0–3)

## 2021-03-16 ENCOUNTER — BULK ORDERING (OUTPATIENT)
Dept: CASE MANAGEMENT | Facility: OTHER | Age: 31
End: 2021-03-16

## 2021-03-16 DIAGNOSIS — Z23 IMMUNIZATION DUE: ICD-10-CM

## 2021-04-30 ENCOUNTER — HOSPITAL ENCOUNTER (EMERGENCY)
Facility: HOSPITAL | Age: 31
Discharge: HOME | End: 2021-04-30
Attending: FAMILY MEDICINE | Admitting: FAMILY MEDICINE

## 2021-04-30 ENCOUNTER — APPOINTMENT (OUTPATIENT)
Dept: GENERAL RADIOLOGY | Facility: HOSPITAL | Age: 31
End: 2021-04-30

## 2021-04-30 VITALS
BODY MASS INDEX: 33.67 KG/M2 | HEIGHT: 59 IN | DIASTOLIC BLOOD PRESSURE: 67 MMHG | OXYGEN SATURATION: 100 % | RESPIRATION RATE: 22 BRPM | WEIGHT: 167 LBS | SYSTOLIC BLOOD PRESSURE: 100 MMHG | TEMPERATURE: 99.1 F | HEART RATE: 87 BPM

## 2021-04-30 DIAGNOSIS — J06.9 UPPER RESPIRATORY TRACT INFECTION, UNSPECIFIED TYPE: Primary | ICD-10-CM

## 2021-04-30 LAB
ALBUMIN SERPL-MCNC: 4.03 G/DL (ref 3.5–5.2)
ALBUMIN/GLOB SERPL: 1.2 G/DL
ALP SERPL-CCNC: 52 U/L (ref 39–117)
ALT SERPL W P-5'-P-CCNC: 9 U/L (ref 1–33)
ANION GAP SERPL CALCULATED.3IONS-SCNC: 10.3 MMOL/L (ref 5–15)
AST SERPL-CCNC: 13 U/L (ref 1–32)
B-HCG UR QL: NEGATIVE
BACTERIA UR QL AUTO: ABNORMAL /HPF
BASOPHILS # BLD AUTO: 0.06 10*3/MM3 (ref 0–0.2)
BASOPHILS NFR BLD AUTO: 0.4 % (ref 0–1.5)
BILIRUB SERPL-MCNC: 0.2 MG/DL (ref 0–1.2)
BILIRUB UR QL STRIP: NEGATIVE
BUN SERPL-MCNC: 19 MG/DL (ref 6–20)
BUN/CREAT SERPL: 18.6 (ref 7–25)
CALCIUM SPEC-SCNC: 9.3 MG/DL (ref 8.6–10.5)
CHLORIDE SERPL-SCNC: 105 MMOL/L (ref 98–107)
CLARITY UR: ABNORMAL
CO2 SERPL-SCNC: 18.7 MMOL/L (ref 22–29)
COLOR UR: ABNORMAL
CREAT SERPL-MCNC: 1.02 MG/DL (ref 0.57–1)
DEPRECATED RDW RBC AUTO: 43.3 FL (ref 37–54)
EOSINOPHIL # BLD AUTO: 0.15 10*3/MM3 (ref 0–0.4)
EOSINOPHIL NFR BLD AUTO: 1.1 % (ref 0.3–6.2)
ERYTHROCYTE [DISTWIDTH] IN BLOOD BY AUTOMATED COUNT: 12.6 % (ref 12.3–15.4)
FLUAV RNA RESP QL NAA+PROBE: NOT DETECTED
FLUBV RNA RESP QL NAA+PROBE: NOT DETECTED
GFR SERPL CREATININE-BSD FRML MDRD: 63 ML/MIN/1.73
GLOBULIN UR ELPH-MCNC: 3.3 GM/DL
GLUCOSE SERPL-MCNC: 85 MG/DL (ref 65–99)
GLUCOSE UR STRIP-MCNC: NEGATIVE MG/DL
HCT VFR BLD AUTO: 40.3 % (ref 34–46.6)
HGB BLD-MCNC: 13.2 G/DL (ref 12–15.9)
HGB UR QL STRIP.AUTO: ABNORMAL
HOLD SPECIMEN: NORMAL
HOLD SPECIMEN: NORMAL
HYALINE CASTS UR QL AUTO: ABNORMAL /LPF
HYPOCHROMIA BLD QL: NORMAL
IMM GRANULOCYTES # BLD AUTO: 0.08 10*3/MM3 (ref 0–0.05)
IMM GRANULOCYTES NFR BLD AUTO: 0.6 % (ref 0–0.5)
KETONES UR QL STRIP: ABNORMAL
LEUKOCYTE ESTERASE UR QL STRIP.AUTO: ABNORMAL
LYMPHOCYTES # BLD AUTO: 5.62 10*3/MM3 (ref 0.7–3.1)
LYMPHOCYTES NFR BLD AUTO: 42 % (ref 19.6–45.3)
MCH RBC QN AUTO: 30.8 PG (ref 26.6–33)
MCHC RBC AUTO-ENTMCNC: 32.8 G/DL (ref 31.5–35.7)
MCV RBC AUTO: 93.9 FL (ref 79–97)
MONOCYTES # BLD AUTO: 0.85 10*3/MM3 (ref 0.1–0.9)
MONOCYTES NFR BLD AUTO: 6.3 % (ref 5–12)
NEUTROPHILS NFR BLD AUTO: 49.6 % (ref 42.7–76)
NEUTROPHILS NFR BLD AUTO: 6.63 10*3/MM3 (ref 1.7–7)
NITRITE UR QL STRIP: NEGATIVE
NRBC BLD AUTO-RTO: 0 /100 WBC (ref 0–0.2)
NT-PROBNP SERPL-MCNC: 10.6 PG/ML (ref 0–450)
PH UR STRIP.AUTO: 5.5 [PH] (ref 5–8)
PLAT MORPH BLD: NORMAL
PLATELET # BLD AUTO: 312 10*3/MM3 (ref 140–450)
PMV BLD AUTO: 8.5 FL (ref 6–12)
POTASSIUM SERPL-SCNC: 4.3 MMOL/L (ref 3.5–5.2)
PROT SERPL-MCNC: 7.3 G/DL (ref 6–8.5)
PROT UR QL STRIP: ABNORMAL
RBC # BLD AUTO: 4.29 10*6/MM3 (ref 3.77–5.28)
RBC # UR: ABNORMAL /HPF
REF LAB TEST METHOD: ABNORMAL
SARS-COV-2 RNA RESP QL NAA+PROBE: NOT DETECTED
SODIUM SERPL-SCNC: 134 MMOL/L (ref 136–145)
SP GR UR STRIP: 1.03 (ref 1–1.03)
SQUAMOUS #/AREA URNS HPF: ABNORMAL /HPF
UROBILINOGEN UR QL STRIP: ABNORMAL
WBC # BLD AUTO: 13.39 10*3/MM3 (ref 3.4–10.8)
WBC UR QL AUTO: ABNORMAL /HPF
WHOLE BLOOD HOLD SPECIMEN: NORMAL
WHOLE BLOOD HOLD SPECIMEN: NORMAL

## 2021-04-30 PROCEDURE — 96365 THER/PROPH/DIAG IV INF INIT: CPT

## 2021-04-30 PROCEDURE — 85025 COMPLETE CBC W/AUTO DIFF WBC: CPT | Performed by: PHYSICIAN ASSISTANT

## 2021-04-30 PROCEDURE — 80053 COMPREHEN METABOLIC PANEL: CPT | Performed by: PHYSICIAN ASSISTANT

## 2021-04-30 PROCEDURE — 81001 URINALYSIS AUTO W/SCOPE: CPT | Performed by: PHYSICIAN ASSISTANT

## 2021-04-30 PROCEDURE — 87636 SARSCOV2 & INF A&B AMP PRB: CPT | Performed by: PHYSICIAN ASSISTANT

## 2021-04-30 PROCEDURE — 25010000002 CEFTRIAXONE PER 250 MG: Performed by: PHYSICIAN ASSISTANT

## 2021-04-30 PROCEDURE — 81025 URINE PREGNANCY TEST: CPT | Performed by: PHYSICIAN ASSISTANT

## 2021-04-30 PROCEDURE — 71045 X-RAY EXAM CHEST 1 VIEW: CPT

## 2021-04-30 PROCEDURE — 85007 BL SMEAR W/DIFF WBC COUNT: CPT | Performed by: PHYSICIAN ASSISTANT

## 2021-04-30 PROCEDURE — 99284 EMERGENCY DEPT VISIT MOD MDM: CPT

## 2021-04-30 PROCEDURE — 83880 ASSAY OF NATRIURETIC PEPTIDE: CPT | Performed by: PHYSICIAN ASSISTANT

## 2021-04-30 PROCEDURE — 25010000002 DEXAMETHASONE PER 1 MG: Performed by: PHYSICIAN ASSISTANT

## 2021-04-30 PROCEDURE — 96375 TX/PRO/DX INJ NEW DRUG ADDON: CPT

## 2021-04-30 PROCEDURE — 71045 X-RAY EXAM CHEST 1 VIEW: CPT | Performed by: RADIOLOGY

## 2021-04-30 RX ORDER — SODIUM CHLORIDE 0.9 % (FLUSH) 0.9 %
10 SYRINGE (ML) INJECTION AS NEEDED
Status: DISCONTINUED | OUTPATIENT
Start: 2021-04-30 | End: 2021-04-30 | Stop reason: HOSPADM

## 2021-04-30 RX ORDER — DEXAMETHASONE SODIUM PHOSPHATE 4 MG/ML
4 INJECTION, SOLUTION INTRA-ARTICULAR; INTRALESIONAL; INTRAMUSCULAR; INTRAVENOUS; SOFT TISSUE ONCE
Status: COMPLETED | OUTPATIENT
Start: 2021-04-30 | End: 2021-04-30

## 2021-04-30 RX ADMIN — DEXAMETHASONE SODIUM PHOSPHATE 4 MG: 4 INJECTION, SOLUTION INTRA-ARTICULAR; INTRALESIONAL; INTRAMUSCULAR; INTRAVENOUS; SOFT TISSUE at 14:14

## 2021-04-30 RX ADMIN — CEFTRIAXONE 1 G: 1 INJECTION, POWDER, FOR SOLUTION INTRAMUSCULAR; INTRAVENOUS at 14:16

## 2021-05-11 ENCOUNTER — TRANSCRIBE ORDERS (OUTPATIENT)
Dept: ADMINISTRATIVE | Facility: HOSPITAL | Age: 31
End: 2021-05-11

## 2021-05-11 DIAGNOSIS — R06.02 SHORTNESS OF BREATH: Primary | ICD-10-CM

## 2021-05-12 ENCOUNTER — HOSPITAL ENCOUNTER (OUTPATIENT)
Dept: CT IMAGING | Facility: HOSPITAL | Age: 31
Discharge: HOME | End: 2021-05-12
Admitting: NURSE PRACTITIONER

## 2021-05-12 DIAGNOSIS — R06.02 SHORTNESS OF BREATH: ICD-10-CM

## 2021-05-12 PROCEDURE — 71275 CT ANGIOGRAPHY CHEST: CPT | Performed by: RADIOLOGY

## 2021-05-12 PROCEDURE — 25010000002 IOPAMIDOL 61 % SOLUTION: Performed by: NURSE PRACTITIONER

## 2021-05-12 PROCEDURE — 71275 CT ANGIOGRAPHY CHEST: CPT

## 2021-05-12 RX ADMIN — IOPAMIDOL 86 ML: 612 INJECTION, SOLUTION INTRAVENOUS at 08:47

## 2021-06-22 DIAGNOSIS — K58.0 IRRITABLE BOWEL SYNDROME WITH DIARRHEA: ICD-10-CM

## 2021-06-22 RX ORDER — DICYCLOMINE HCL 20 MG
TABLET ORAL
Qty: 120 TABLET | Refills: 2 | Status: SHIPPED | OUTPATIENT
Start: 2021-06-22 | End: 2021-09-27

## 2021-07-29 ENCOUNTER — OFFICE VISIT (OUTPATIENT)
Dept: GASTROENTEROLOGY | Facility: CLINIC | Age: 31
End: 2021-07-29

## 2021-07-29 VITALS
SYSTOLIC BLOOD PRESSURE: 122 MMHG | TEMPERATURE: 98.4 F | HEART RATE: 77 BPM | WEIGHT: 173 LBS | DIASTOLIC BLOOD PRESSURE: 91 MMHG | HEIGHT: 59 IN | BODY MASS INDEX: 34.88 KG/M2 | RESPIRATION RATE: 20 BRPM

## 2021-07-29 DIAGNOSIS — R10.11 RUQ ABDOMINAL PAIN: ICD-10-CM

## 2021-07-29 DIAGNOSIS — R11.0 NAUSEA: ICD-10-CM

## 2021-07-29 DIAGNOSIS — K58.0 IRRITABLE BOWEL SYNDROME WITH DIARRHEA: Primary | ICD-10-CM

## 2021-07-29 PROBLEM — R11.10 INTRACTABLE VOMITING: Status: RESOLVED | Noted: 2020-02-11 | Resolved: 2021-07-29

## 2021-07-29 PROBLEM — R10.9 LEFT SIDED ABDOMINAL PAIN: Status: RESOLVED | Noted: 2020-02-11 | Resolved: 2021-07-29

## 2021-07-29 PROCEDURE — 99214 OFFICE O/P EST MOD 30 MIN: CPT | Performed by: PHYSICIAN ASSISTANT

## 2021-07-29 RX ORDER — METOPROLOL TARTRATE 50 MG/1
50 TABLET, FILM COATED ORAL 2 TIMES DAILY
COMMUNITY
End: 2022-04-08

## 2021-07-29 RX ORDER — FAMOTIDINE 20 MG/1
20 TABLET, FILM COATED ORAL 2 TIMES DAILY
COMMUNITY
End: 2022-10-17

## 2021-07-29 NOTE — PATIENT INSTRUCTIONS
Low-FODMAP Eating Plan    FODMAPs (fermentable oligosaccharides, disaccharides, monosaccharides, and polyols) are sugars that are hard for some people to digest. A low-FODMAP eating plan may help some people who have bowel (intestinal) diseases to manage their symptoms.  This meal plan can be complicated to follow. Work with a diet and nutrition specialist (dietitian) to make a low-FODMAP eating plan that is right for you. A dietitian can make sure that you get enough nutrition from this diet.  What are tips for following this plan?  Reading food labels  · Check labels for hidden FODMAPs such as:  ? High-fructose syrup.  ? Honey.  ? Agave.  ? Natural fruit flavors.  ? Onion or garlic powder.  · Choose low-FODMAP foods that contain 3-4 grams of fiber per serving.  · Check food labels for serving sizes. Eat only one serving at a time to make sure FODMAP levels stay low.  Meal planning  · Follow a low-FODMAP eating plan for up to 6 weeks, or as told by your health care provider or dietitian.  · To follow the eating plan:  1. Eliminate high-FODMAP foods from your diet completely.  2. Gradually reintroduce high-FODMAP foods into your diet one at a time. Most people should wait a few days after introducing one high-FODMAP food before they introduce the next high-FODMAP food. Your dietitian can recommend how quickly you may reintroduce foods.  3. Keep a daily record of what you eat and drink, and make note of any symptoms that you have after eating.  4. Review your daily record with a dietitian regularly. Your dietitian can help you identify which foods you can eat and which foods you should avoid.  General tips  · Drink enough fluid each day to keep your urine pale yellow.  · Avoid processed foods. These often have added sugar and may be high in FODMAPs.  · Avoid most dairy products, whole grains, and sweeteners.  · Work with a dietitian to make sure you get enough fiber in your diet.  Recommended  "foods  Grains  · Gluten-free grains, such as rice, oats, buckwheat, quinoa, corn, polenta, and millet. Gluten-free pasta, bread, or cereal. Rice noodles. Corn tortillas.  Vegetables  · Eggplant, zucchini, cucumber, peppers, green beans, [place] sprouts, bean sprouts, lettuce, arugula, kale, Swiss chard, spinach, giuliana greens, bok josh, summer squash, potato, and tomato. Limited amounts of corn, carrot, and sweet potato. Green parts of scallions.  Fruits  · Bananas, oranges, cinthia, limes, blueberries, raspberries, strawberries, grapes, cantaloupe, honeydew melon, kiwi, papaya, passion fruit, and pineapple. Limited amounts of dried cranberries, banana chips, and shredded coconut.  Dairy  · Lactose-free milk, yogurt, and kefir. Lactose-free cottage cheese and ice cream. Non-dairy milks, such as almond, coconut, hemp, and rice milk. Yogurts made of non-dairy milks. Limited amounts of goat cheese, brie, mozzarella, parmesan, swiss, and other hard cheeses.  Meats and other protein foods  · Unseasoned beef, pork, poultry, or fish. Eggs. Perales. Tofu (firm) and tempeh. Limited amounts of nuts and seeds, such as almonds, walnuts, brazil nuts, pecans, peanuts, pumpkin seeds, naresh seeds, and sunflower seeds.  Fats and oils  · Butter-free spreads. Vegetable oils, such as olive, canola, and sunflower oil.  Seasoning and other foods  · Artificial sweeteners with names that do not end in \"ol\" such as aspartame, saccharine, and stevia. Maple syrup, white table sugar, raw sugar, brown sugar, and molasses. Fresh basil, coriander, parsley, rosemary, and thyme.  Beverages  · Water and mineral water. Sugar-sweetened soft drinks. Small amounts of orange juice or cranberry juice. Black and green tea. Most dry rina. Coffee.  This may not be a complete list of low-FODMAP foods. Talk with your dietitian for more information.  Foods to avoid  Grains  · Wheat, including kamut, durum, and semolina. Barley and bulgur. Couscous. Wheat-based " cereals. Wheat noodles, bread, crackers, and pastries.  Vegetables  · Chicory root, artichoke, asparagus, cabbage, snow peas, sugar snap peas, mushrooms, and cauliflower. Onions, garlic, leeks, and the white part of scallions.  Fruits  · Fresh, dried, and juiced forms of apple, pear, watermelon, peach, plum, cherries, apricots, blackberries, boysenberries, figs, nectarines, and valeriano. Avocado.  Dairy  · Milk, yogurt, ice cream, and soft cheese. Cream and sour cream. Milk-based sauces. Custard.  Meats and other protein foods  · Fried or fatty meat. Sausage. Cashews and pistachios. Soybeans, baked beans, black beans, chickpeas, kidney beans, niyah beans, navy beans, lentils, and split peas.  Seasoning and other foods  · Any sugar-free gum or candy. Foods that contain artificial sweeteners such as sorbitol, mannitol, isomalt, or xylitol. Foods that contain honey, high-fructose corn syrup, or agave. Bouillon, vegetable stock, beef stock, and chicken stock. Garlic and onion powder. Condiments made with onion, such as hummus, chutney, pickles, relish, salad dressing, and salsa. Tomato paste.  Beverages  · Chicory-based drinks. Coffee substitutes. Chamomile tea. Fennel tea. Sweet or fortified rina such as port or meilee. Diet soft drinks made with isomalt, mannitol, maltitol, sorbitol, or xylitol. Apple, pear, and valeriano juice. Juices with high-fructose corn syrup.  This may not be a complete list of high-FODMAP foods. Talk with your dietitian to discuss what dietary choices are best for you.   Summary  · A low-FODMAP eating plan is a short-term diet that eliminates FODMAPs from your diet to help ease symptoms of certain bowel diseases.  · The eating plan usually lasts up to 6 weeks. After that, high-FODMAP foods are restarted gradually, one at a time, so you can find out which may be causing symptoms.  · A low-FODMAP eating plan can be complicated. It is best to work with a dietitian who has experience with this type of  plan.  This information is not intended to replace advice given to you by your health care provider. Make sure you discuss any questions you have with your health care provider.  Document Revised: 11/30/2018 Document Reviewed: 08/14/2018  Elsevier Patient Education © 2021 [employer]

## 2021-07-29 NOTE — PROGRESS NOTES
Follow Up Note     Date: 2021   Patient Name: Pili Webster  MRN: [id number]  : 1990     Primary Care Provider: Osvaldo Madrid MD     Chief Complaint   Patient presents with   • Follow-up   • Irritable Bowel Syndrome   • Diarrhea   • Heartburn     History of present illness:   2021  Pili Webster is a 30 y.o. female who is here today for follow up regarding Irritable Bowel Syndrome, Diarrhea, and Heartburn.    She has noticed some improvement of previously reported LUQ abdominal pain with dicyclomine as needed. Now developed a RUQ abdominal pain which is sharp and aching in nature. Started in right flank and moved around to her RUQ. Nausea is present and occurring daily in association with the abdominal pain. no vomiting. She had labs completed after last visit as directed. She has been diagnosed with Sjogren's recently.     Interval History:  2021  Pili Webster is a 30 y.o. female who is here today for follow up regarding abdominal pain and dysphagia. She recently had EGD at [facility] with Dr. Murdock and would like to discuss those results. She is also having severe periumbilical abdominal pain which has become constant but now worse than previous. She feels that eating makes the pain worse, described as aching and cramping a times. She has a long history of abdominal pain with fluctuations in bowel habits but most recently with diarrhea. Her stools are yellowish in color currently and sometimes more than 3 times daily. She was previously taking Miralax for constipation but has not taken in a while. No rectal bleeding.      Currently taking Dexilant 60 mg once daily for GERD which is very severe without this medication. Has a history of known Márquez's esophagus. Still with acid coming into her mouth at times, still with current dysphagia. Had an incident in which she felt that her throat was swelling a couple of weeks ago and was evaluated in the  ED for this. This has been gradually improving since then. Did try Carafate but this did not help. She has other autoimmune diseases that are also being evaluated as contributors by other specialists.      Has noticed weight gain over the past 1 year, gained more than 20 lbs. She has changed her diet to mostly healthy foods. Does not drink soda. Has difficulty exercising due to chronic fatigue and back pain.      2/25/2021  Over the past several months, she has noticed change in her GI complaints.  She now has left upper quadrant abdominal pain which is severe nature at times.  Also notices swelling in this area as well intermittently.  She has early satiety and generally only eats a few bites.  She has tried to change her diet in order to lose weight, eat consuming more raw vegetables such as salads recently.  She had an abdominal film with her PCP and she was told that she had severe constipation on it but also had a visit with urology recently and was told that she did not have constipation.  She takes MiraLAX 2-3 times a week only as needed for treatment of constipation.  She has been having diarrhea with fecal urgency immediately after eating.  Still taking Dexilant 60 mg once daily with good control of GERD. Has a history of Márquez's (new diagnosis) and last EGD 3/2020.     Subjective     Past Medical History:   Diagnosis Date   • Abdominal pain    • Abnormal uterine bleeding (AUB)    • Anxiety and depression    • Arthritis    • Asthma     mild   • Cholecystitis    • Constipation    • Endometriosis    • Frequent UTI    • GERD (gastroesophageal reflux disease)    • Heartburn    • Hemorrhoids    • Kidney stones    • Lesion of breast    • Lump     RIGHT BREAST   • Nausea & vomiting    • PCOS (polycystic ovarian syndrome)    • Sjogren's disease (CMS/HCC)    • Snores    • Tachycardia    • Wrist fracture     RIGHT ARM      PATIENT UNSURE IF FRACTURED     Past Surgical History:   Procedure Laterality Date   •  ABDOMINAL SURGERY     • BREAST BIOPSY Right 11/29/2018    Procedure: breast biopsy ;  Surgeon: Shiv Overton MD;  Location: [facility] OR;  Service: General   • CHOLECYSTECTOMY N/A 8/25/2017    Procedure: CHOLECYSTECTOMY LAPAROSCOPIC;  Surgeon: Franco Mari MD;  Location: [facility] OR;  Service:    • COLONOSCOPY N/A 3/9/2020    Procedure: COLONOSCOPY CPT CODE: 16773;  Surgeon: Jeremiah Murdock MD;  Location: [facility] OR;  Service: Gastroenterology;  Laterality: N/A;   • DENTAL PROCEDURE     • DIAGNOSTIC LAPAROSCOPY      X5   • DILATATION AND CURETTAGE     • ENDOSCOPY N/A 3/9/2020    Procedure: ESOPHAGOGASTRODUODENOSCOPY WITH BIOPSY CPT CODE: 53410;  Surgeon: Jeremiah Murdock MD;  Location: [facility] OR;  Service: Gastroenterology;  Laterality: N/A;   • ENDOSCOPY N/A 2/1/2021    Procedure: ESOPHAGOGASTRODUODENOSCOPY WITH BIOPSY CPT CODE: 54677;  Surgeon: Jeremiah Murdock MD;  Location: [facility] OR;  Service: Gastroenterology;  Laterality: N/A;   • HEMORRHOIDECTOMY N/A 11/14/2019    Procedure: HEMORRHOID STAPLING;  Surgeon: Franco Mari MD;  Location: [facility] OR;  Service: General   • URETEROSCOPY LASER LITHOTRIPSY WITH STENT INSERTION Right 1/9/2019    Procedure: URETEROSCOPY LASER LITHOTRIPSY retrograde pyelogram;  Surgeon: Ahmet Barrow MD;  Location: [facility] OR;  Service: Urology   • WISDOM TOOTH EXTRACTION       Family History   Problem Relation Age of Onset   • Breast cancer Maternal Aunt    • Alcohol abuse Paternal Grandfather    • Heart disease Paternal Grandfather    • Cancer Maternal Grandfather    • Hypertension Maternal Grandfather    • Colon cancer Neg Hx    • Liver cancer Neg Hx      Social History     Socioeconomic History   • Marital status:      Spouse name: Not on file   • Number of children: Not on file   • Years of education: Not on file   • Highest education level: Not on file   Tobacco Use   • Smoking status: Current Every Day Smoker      Packs/day: 0.25     Years: 16.00     Pack years: 4.00     Types: Cigarettes     Start date: 2003   • Smokeless tobacco: Never Used   Vaping Use   • Vaping Use: Former   • Substances: Nicotine, Flavoring   Substance and Sexual Activity   • Alcohol use: Yes     Comment: RARELY   • Drug use: No   • Sexual activity: Defer       Current Outpatient Medications:   •  albuterol sulfate  (90 Base) MCG/ACT inhaler, Inhale 1 puff 4 (Four) Times a Day., Disp: , Rfl:   •  Albuterol Sulfate, sensor, 108 (90 Base) MCG/ACT aerosol powder , Inhale 2 puffs As Needed., Disp: , Rfl:   •  aspirin 81 MG EC tablet, Take 81 mg by mouth Daily. LAS T DOSE 11/26/2018, Disp: , Rfl:   •  dexlansoprazole (Dexilant) 60 MG capsule, Take 1 capsule by mouth Daily., Disp: 30 capsule, Rfl: 11  •  dicyclomine (BENTYL) 20 MG tablet, TAKE ONE TABLET BY MOUTH FOUR TIMES A DAY BEFORE MEALS AND AT BEDTIME, Disp: 120 tablet, Rfl: 2  •  DOXYCYCLINE MONOHYDRATE PO, Take 100 mg by mouth 4 (Four) Times a Day., Disp: , Rfl:   •  drospirenone-ethinyl estradiol (Loretta) 3-0.02 MG per tablet, Take 1 tablet by mouth Daily., Disp: , Rfl:   •  famotidine (PEPCID) 20 MG tablet, Take 20 mg by mouth 2 (Two) Times a Day., Disp: , Rfl:   •  FLUoxetine (PROzac) 20 MG capsule, Take 20 mg by mouth Daily., Disp: , Rfl:   •  FLUoxetine (PROzac) 40 MG capsule, Take 40 mg by mouth Daily., Disp: , Rfl:   •  folic acid (FOLVITE) 1 MG tablet, Take 1 mg by mouth Daily., Disp: , Rfl:   •  hydrOXYzine pamoate (VISTARIL) 25 MG capsule, Take 25 mg by mouth 3 (Three) Times a Day As Needed., Disp: , Rfl:   •  Lifitegrast (Xiidra) 5 % ophthalmic solution, Administer 1 drop to both eyes 2 (Two) Times a Day., Disp: , Rfl:   •  loratadine (CLARITIN) 10 MG tablet, 10 mg Daily., Disp: , Rfl:   •  methocarbamol (ROBAXIN) 500 MG tablet, Take 500 mg by mouth Every Night., Disp: , Rfl:   •  methotrexate 2.5 MG tablet, Take  by mouth 3 (Three) Doses Each Week. Take Doses 12 (Twelve) Hours  Apart., Disp: , Rfl:   •  metoprolol tartrate (LOPRESSOR) 50 MG tablet, Take 50 mg by mouth 2 (Two) Times a Day., Disp: , Rfl:   •  montelukast (SINGULAIR) 10 MG tablet, Take 10 mg by mouth Every Night., Disp: , Rfl:   •  multivitamin with minerals tablet tablet, Take 1 tablet by mouth Daily., Disp: , Rfl:   •  QUEtiapine (SEROquel) 100 MG tablet, Take 150 mg by mouth Every Night., Disp: , Rfl:   •  topiramate (TOPAMAX) 25 MG tablet, Take 25 mg by mouth Every Night., Disp: , Rfl:   •  vitamin D (ERGOCALCIFEROL) 1.25 MG (82393 UT) capsule capsule, Take 50,000 Units by mouth Every 7 (Seven) Days., Disp: , Rfl:     Allergies   Allergen Reactions   • Peanut-Containing Drug Products Anaphylaxis     AND PEANUTS IN FOODS   • Latex Hives   • Shrimp Swelling     Facial swelling     • Milk-Related Compounds Nausea And Vomiting   • Sulfa Antibiotics Rash       The following portions of the patient's history were reviewed and updated as appropriate: allergies, current medications, past family history, past medical history, past social history, past surgical history and problem list.    Objective     Physical Exam  Vitals reviewed.   Constitutional:       General: She is in acute distress (mild, holding abdomen).      Appearance: Normal appearance. She is well-developed. She is not ill-appearing or diaphoretic.   HENT:      Head: Normocephalic and atraumatic.      Right Ear: External ear normal.      Left Ear: External ear normal.      Nose: Nose normal.      Mouth/Throat:      Comments: Wearing a mask  Eyes:      General: No scleral icterus.        Right eye: No discharge.         Left eye: No discharge.      Conjunctiva/sclera: Conjunctivae normal.   Neck:      Vascular: No JVD.   Cardiovascular:      Rate and Rhythm: Normal rate and regular rhythm.      Heart sounds: Normal heart sounds. No murmur heard.   No friction rub. No gallop.    Pulmonary:      Effort: Pulmonary effort is normal. No respiratory distress.      Breath  "sounds: Normal breath sounds. No wheezing or rales.   Chest:      Chest wall: No tenderness.   Abdominal:      General: Bowel sounds are normal. There is no distension.      Palpations: Abdomen is soft. There is no mass.      Tenderness: There is abdominal tenderness (generalized, worst in the RUQ). There is no guarding.   Musculoskeletal:         General: No deformity. Normal range of motion.      Cervical back: Normal range of motion.   Skin:     General: Skin is warm and dry.      Findings: No erythema or rash.   Neurological:      Mental Status: She is alert and oriented to person, place, and time.      Coordination: Coordination normal.   Psychiatric:         Behavior: Behavior normal.         Thought Content: Thought content normal.         Judgment: Judgment normal.      Comments: Depressed affect       Vitals:    07/29/21 1416   BP: 122/91   Pulse: 77   Resp: 20   Temp: 98.4 °F (36.9 °C)   TempSrc: Infrared   Weight: 78.5 kg (173 lb)   Height: 149.9 cm (59\")     Results Review:   I reviewed the patient's new clinical results.    • Glucose 04/30/2021 85  65 - 99 mg/dL Final   • BUN 04/30/2021 19  6 - 20 mg/dL Final   • Creatinine 04/30/2021 1.02* 0.57 - 1.00 mg/dL Final   • Sodium 04/30/2021 134* 136 - 145 mmol/L Final   • Potassium 04/30/2021 4.3  3.5 - 5.2 mmol/L Final    Slight hemolysis detected by analyzer. Results may be affected.   • Chloride 04/30/2021 105  98 - 107 mmol/L Final   • CO2 04/30/2021 18.7* 22.0 - 29.0 mmol/L Final   • Calcium 04/30/2021 9.3  8.6 - 10.5 mg/dL Final   • Total Protein 04/30/2021 7.3  6.0 - 8.5 g/dL Final   • Albumin 04/30/2021 4.03  3.50 - 5.20 g/dL Final   • ALT (SGPT) 04/30/2021 9  1 - 33 U/L Final   • AST (SGOT) 04/30/2021 13  1 - 32 U/L Final   • Alkaline Phosphatase 04/30/2021 52  39 - 117 U/L Final   • Total Bilirubin 04/30/2021 0.2  0.0 - 1.2 mg/dL Final   • eGFR Non African Amer 04/30/2021 63  >60 mL/min/1.73 Final   • Globulin 04/30/2021 3.3  gm/dL Final   • A/G " Ratio 04/30/2021 1.2  g/dL Final   • BUN/Creatinine Ratio 04/30/2021 18.6  7.0 - 25.0 Final   • Anion Gap 04/30/2021 10.3  5.0 - 15.0 mmol/L Final   • HCG, Urine QL 04/30/2021 Negative  Negative Final   • Color, UA 04/30/2021 Dark Yellow* Yellow, Straw Final   • Appearance, UA 04/30/2021 Cloudy* Clear Final   • pH, UA 04/30/2021 5.5  5.0 - 8.0 Final   • Specific Gravity, UA 04/30/2021 1.029  1.005 - 1.030 Final   • Glucose, UA 04/30/2021 Negative  Negative Final   • Ketones, UA 04/30/2021 Trace* Negative Final   • Bilirubin, UA 04/30/2021 Negative  Negative Final   • Blood, UA 04/30/2021 Large (3+)* Negative Final   • Protein, UA 04/30/2021 Trace* Negative Final   • Leuk Esterase, UA 04/30/2021 Moderate (2+)* Negative Final   • Nitrite, UA 04/30/2021 Negative  Negative Final   • Urobilinogen, UA 04/30/2021 0.2 E.U./dL  0.2 - 1.0 E.U./dL Final   • proBNP 04/30/2021 10.6  0.0 - 450.0 pg/mL Final   • WBC 04/30/2021 13.39* 3.40 - 10.80 10*3/mm3 Final   • RBC 04/30/2021 4.29  3.77 - 5.28 10*6/mm3 Final   • Hemoglobin 04/30/2021 13.2  12.0 - 15.9 g/dL Final   • Hematocrit 04/30/2021 40.3  34.0 - 46.6 % Final   • MCV 04/30/2021 93.9  79.0 - 97.0 fL Final   • MCH 04/30/2021 30.8  26.6 - 33.0 pg Final   • MCHC 04/30/2021 32.8  31.5 - 35.7 g/dL Final   • RDW 04/30/2021 12.6  12.3 - 15.4 % Final   • RDW-SD 04/30/2021 43.3  37.0 - 54.0 fl Final   • MPV 04/30/2021 8.5  6.0 - 12.0 fL Final   • Platelets 04/30/2021 312  140 - 450 10*3/mm3 Final      EGD was completed on 2/1/2021 by Dr. Murdock.  Findings were short segment Márquez's esophagus with patchy involvement of the distal 1 to 2 cm of the esophagus, normal stomach except for small sliding hiatal hernia, normal duodenum.  Pathology shows benign duodenal mucosa, no H. pylori, distal esophageal biopsy shows chronic inflammation, features of reflux and multifocal intestinal metaplasia, negative for dysplasia.     EGD and colonoscopy were completed on 3/9/2020 by   Murdock.  Findings were patchy salmon-colored mucosa in the distal esophagus, small sliding hiatal hernia, normal stomach, normal duodenum, normal terminal ileum, evidence of prior rectal surgery, colon polyp in the transverse and sigmoid colon, left-sided diverticulosis.  Pathology showed normal duodenum, minimal superficial chronic inflammation of the stomach, negative H. pylori, esophageal biopsy showed features of reflux, multifocal intestinal metaplasia negative for dysplasia, transverse colon polyp was sessile serrated adenoma and sigmoid colon polyp was hyperplastic.    Assessment / Plan      1. Irritable bowel syndrome with diarrhea  7/29/2021  She is still having symptoms of abdominal pain and diarrhea.  Dicyclomine has helped some to relieve left upper quadrant abdominal pain and her stools are starting to feel more formed.  She has now developed acute onset right upper quadrant abdominal pain associated with nausea which is not similar to her previous abdominal pain complaints.  She had labs last visit, CMP, TSH and celiac testing all unremarkable.  Low FODMAP diet recommended.    2/25/2021  With long history of abdominal pain, may have some relief with dicyclomine for treatment of IBS. She has had non revealing imaging of the abdomen, past labs, EGD and colonoscopy. CBC recently completed reviewed normal.  Will have labs today including CMP, TSH, celiac testing. Start bentyl 20 mg before meals and before bed. Take with caution first dose due to possible side effect of drowsiness. Call with concerns.     2. RUQ abdominal pain  3. Nausea  7/29/2021  Due to new onset acute right upper quadrant abdominal pain associated with nausea, will have CT scan of the abdomen pelvis for evaluation.  Can take antinausea medication as needed for relief.  We will call her with results of CT scan and more recommendations can be made at that time.  - CT Abdomen Pelvis With Contrast; Future             Prior  History:  Gastroesophageal reflux disease without esophagitis  Hiatal hernia  Márquez's esophagus without dysplasia  Long standing history of GERD, taking Dexilant 60 mg once daily with reasonable control. She needs to work on avoiding foods that make her GERD worse, discussed weight loss which may help with this. Will need another EGD for monitoring of dysplasia related to her Márquez's esophagus, due 2/2024.    Weight gain, abnormal  Noted weight gain of 20 lbs over past 1 year. Will check TSH today.         Follow Up:   Return in about 3 months (around 10/29/2021) for recheck IBS.      Naomi Aguiar PA-C  [facility]  7/30/2021  14:33 EDT    Please note that portions of this note may have been completed with a voice recognition program. Efforts were made to edit the dictations, but occasionally words are mistranscribed.

## 2021-08-17 ENCOUNTER — HOSPITAL ENCOUNTER (OUTPATIENT)
Dept: CT IMAGING | Facility: HOSPITAL | Age: 31
Discharge: HOME | End: 2021-08-17
Admitting: PHYSICIAN ASSISTANT

## 2021-08-17 DIAGNOSIS — R11.0 NAUSEA: ICD-10-CM

## 2021-08-17 DIAGNOSIS — R10.11 RUQ ABDOMINAL PAIN: ICD-10-CM

## 2021-08-17 PROCEDURE — 25010000002 IOPAMIDOL 61 % SOLUTION: Performed by: PHYSICIAN ASSISTANT

## 2021-08-17 PROCEDURE — 74177 CT ABD & PELVIS W/CONTRAST: CPT | Performed by: RADIOLOGY

## 2021-08-17 PROCEDURE — 74177 CT ABD & PELVIS W/CONTRAST: CPT

## 2021-08-17 RX ADMIN — IOPAMIDOL 80 ML: 612 INJECTION, SOLUTION INTRAVENOUS at 13:42

## 2021-08-20 ENCOUNTER — PATIENT MESSAGE (OUTPATIENT)
Dept: GASTROENTEROLOGY | Facility: CLINIC | Age: 31
End: 2021-08-20

## 2021-08-20 DIAGNOSIS — K58.0 IRRITABLE BOWEL SYNDROME WITH DIARRHEA: Primary | ICD-10-CM

## 2021-08-23 ENCOUNTER — PRIOR AUTHORIZATION (OUTPATIENT)
Dept: GASTROENTEROLOGY | Facility: CLINIC | Age: 31
End: 2021-08-23

## 2021-08-23 ENCOUNTER — TELEPHONE (OUTPATIENT)
Dept: GASTROENTEROLOGY | Facility: CLINIC | Age: 31
End: 2021-08-23

## 2021-09-06 DIAGNOSIS — K58.0 IRRITABLE BOWEL SYNDROME WITH DIARRHEA: ICD-10-CM

## 2021-09-07 RX ORDER — RIFAXIMIN 550 MG/1
TABLET ORAL
Qty: 42 TABLET | Refills: 0 | OUTPATIENT
Start: 2021-09-07

## 2021-09-27 DIAGNOSIS — K58.0 IRRITABLE BOWEL SYNDROME WITH DIARRHEA: ICD-10-CM

## 2021-09-27 RX ORDER — DICYCLOMINE HCL 20 MG
TABLET ORAL
Qty: 120 TABLET | Refills: 2 | Status: SHIPPED | OUTPATIENT
Start: 2021-09-27 | End: 2022-01-17 | Stop reason: SDUPTHER

## 2021-10-18 ENCOUNTER — TRANSCRIBE ORDERS (OUTPATIENT)
Dept: PHYSICAL THERAPY | Facility: CLINIC | Age: 31
End: 2021-10-18

## 2021-10-18 DIAGNOSIS — S89.91XA INJURY OF RIGHT KNEE, INITIAL ENCOUNTER: Primary | ICD-10-CM

## 2021-10-19 ENCOUNTER — TREATMENT (OUTPATIENT)
Dept: PHYSICAL THERAPY | Facility: CLINIC | Age: 31
End: 2021-10-19

## 2021-10-19 DIAGNOSIS — M25.661 DECREASED RANGE OF MOTION (ROM) OF RIGHT KNEE: ICD-10-CM

## 2021-10-19 DIAGNOSIS — M25.561 CHRONIC PAIN OF RIGHT KNEE: Primary | ICD-10-CM

## 2021-10-19 DIAGNOSIS — R26.89 ANTALGIC GAIT: ICD-10-CM

## 2021-10-19 DIAGNOSIS — G89.29 CHRONIC PAIN OF RIGHT KNEE: Primary | ICD-10-CM

## 2021-10-19 PROCEDURE — 97162 PT EVAL MOD COMPLEX 30 MIN: CPT | Performed by: PHYSICAL THERAPIST
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
## 2021-10-21 ENCOUNTER — TREATMENT (OUTPATIENT)
Dept: PHYSICAL THERAPY | Facility: CLINIC | Age: 31
End: 2021-10-21

## 2021-10-21 DIAGNOSIS — G89.29 CHRONIC PAIN OF RIGHT KNEE: Primary | ICD-10-CM

## 2021-10-21 DIAGNOSIS — R26.89 ANTALGIC GAIT: ICD-10-CM

## 2021-10-21 DIAGNOSIS — M25.561 CHRONIC PAIN OF RIGHT KNEE: Primary | ICD-10-CM

## 2021-10-21 DIAGNOSIS — M25.661 DECREASED RANGE OF MOTION (ROM) OF RIGHT KNEE: ICD-10-CM

## 2021-10-21 PROCEDURE — 97140 MANUAL THERAPY 1/> REGIONS: CPT | Performed by: PHYSICAL THERAPIST

## 2021-10-21 PROCEDURE — 97110 THERAPEUTIC EXERCISES: CPT | Performed by: PHYSICAL THERAPIST

## 2021-10-21 PROCEDURE — 97014 ELECTRIC STIMULATION THERAPY: CPT | Performed by: PHYSICAL THERAPIST

## 2021-10-21 NOTE — PROGRESS NOTES
Physical Therapy Daily Treatment Note      Patient: Pili Webster   : 1990  Referring practitioner: Ronald S Dubin, MD  Date of Initial Visit: Type: THERAPY  Noted: 10/19/2021  Today's Date: 10/21/2021  Patient seen for 2 sessions         Subjective Evaluation    History of Present Illness    Subjective comment: Patient reports 5/10 pain today.  She notes that she has been performing her HEP and applying ice to her knee at home.Pain  Current pain ratin (pre-5/10, post-4/10)           Objective   See Exercise, Manual, and Modality Logs for complete treatment.       Assessment & Plan     Assessment  Assessment details: Therapy session initiated with manual therapy to the right knee to assist with pain control.  Patient reported tenderness along medial joint line during soft tissue mobilization.  There ex focused on LE strengthening and knee ROM.  Patient was instructed to perform exercises per her tolerance, with patient verbalizing understanding.  Session was concluded with cryotherapy and ESTIM, with no adverse reactions following.  She will continue to be progressed per her tolerance and POC.        Visit Diagnoses:    ICD-10-CM ICD-9-CM   1. Chronic pain of right knee  M25.561 719.46    G89.29 338.29   2. Antalgic gait  R26.89 781.2   3. Decreased range of motion (ROM) of right knee  M25.661 719.56       Progress per Plan of Care and Progress strengthening /stabilization /functional activity           Timed:  Manual Therapy:    15     mins  56482;  Therapeutic Exercise:    29     mins  64277;     Neuromuscular Vladimir:        mins  77904;    Therapeutic Activity:          mins  24774;     Gait Training:           mins  02572;     Ultrasound:         mins  23387;    Electrical Stimulation:         mins  18407 ( );    Untimed:  Electrical Stimulation:    10     mins  48889 ( );  Mechanical Traction:         mins  44957;     Timed Treatment:   44   mins   Total Treatment:     54    farhad Walker, PT  Physical Therapist

## 2021-10-26 ENCOUNTER — TREATMENT (OUTPATIENT)
Dept: PHYSICAL THERAPY | Facility: CLINIC | Age: 31
End: 2021-10-26

## 2021-10-26 DIAGNOSIS — M25.661 DECREASED RANGE OF MOTION (ROM) OF RIGHT KNEE: ICD-10-CM

## 2021-10-26 DIAGNOSIS — R26.89 ANTALGIC GAIT: ICD-10-CM

## 2021-10-26 DIAGNOSIS — G89.29 CHRONIC PAIN OF RIGHT KNEE: Primary | ICD-10-CM

## 2021-10-26 DIAGNOSIS — M25.561 CHRONIC PAIN OF RIGHT KNEE: Primary | ICD-10-CM

## 2021-10-26 PROCEDURE — 97110 THERAPEUTIC EXERCISES: CPT | Performed by: PHYSICAL THERAPIST

## 2021-10-26 PROCEDURE — 97140 MANUAL THERAPY 1/> REGIONS: CPT | Performed by: PHYSICAL THERAPIST

## 2021-10-26 PROCEDURE — 97014 ELECTRIC STIMULATION THERAPY: CPT | Performed by: PHYSICAL THERAPIST

## 2021-10-26 NOTE — PROGRESS NOTES
Physical Therapy Daily Treatment Note      Patient: Pili Webster   : 1990  Referring practitioner: Ronald S Dubin, MD  Date of Initial Visit: Type: THERAPY  Noted: 10/19/2021  Today's Date: 10/26/2021  Patient seen for 3 sessions         Pili Webster reports that she is having 6/10 pain in her right knee. Patient states that she was muscle spasms all night down her right leg.      Objective   See Exercise, Manual, and Modality Logs for complete treatment.       Assessment/Plan  Patient tolerated treatment session well with rest breaks taken as needed by the patient. Educated patient to perform therex per her tolerance, patient verbalized understanding. STM performed to the right knee to improve swelling in the medial region of the right knee. Treatment session consisted of exercises to improve strength and ROM in the right knee. New therex added per the patient's tolerance, patient demonstrated and understood new therex with no increase in pain noted. Estim and ice applied to the right knee post treatment session. Continue per PT's POC, progress exercises per the patient's tolerance.     Visit Diagnoses:    ICD-10-CM ICD-9-CM   1. Chronic pain of right knee  M25.561 719.46    G89.29 338.29   2. Antalgic gait  R26.89 781.2   3. Decreased range of motion (ROM) of right knee  M25.661 719.56       Progress strengthening /stabilization /functional activity           Timed:  Manual Therapy:    15     mins  30938;  Therapeutic Exercise:    30     mins  73656;     Neuromuscular Vladimir:        mins  63424;    Therapeutic Activity:          mins  93075;     Gait Training:           mins  55139;     Ultrasound:          mins  76599;    Electrical Stimulation:         mins  77724 ( );    Untimed:  Electrical Stimulation:    10     mins  33006 ( );  Mechanical Traction:         mins  45562;     Timed Treatment:   45   mins   Total Treatment:     55   mins  Ina Paez  PTA

## 2021-10-28 ENCOUNTER — TREATMENT (OUTPATIENT)
Dept: PHYSICAL THERAPY | Facility: CLINIC | Age: 31
End: 2021-10-28

## 2021-10-28 DIAGNOSIS — G89.29 CHRONIC PAIN OF RIGHT KNEE: Primary | ICD-10-CM

## 2021-10-28 DIAGNOSIS — M25.561 CHRONIC PAIN OF RIGHT KNEE: Primary | ICD-10-CM

## 2021-10-28 DIAGNOSIS — R26.89 ANTALGIC GAIT: ICD-10-CM

## 2021-10-28 DIAGNOSIS — M25.661 DECREASED RANGE OF MOTION (ROM) OF RIGHT KNEE: ICD-10-CM

## 2021-10-28 PROCEDURE — 97014 ELECTRIC STIMULATION THERAPY: CPT | Performed by: PHYSICAL THERAPIST

## 2021-10-28 PROCEDURE — 97110 THERAPEUTIC EXERCISES: CPT | Performed by: PHYSICAL THERAPIST

## 2021-10-28 PROCEDURE — 97140 MANUAL THERAPY 1/> REGIONS: CPT | Performed by: PHYSICAL THERAPIST

## 2021-10-28 NOTE — PROGRESS NOTES
Physical Therapy Daily Treatment Note      Patient: Pili Webster   : 1990  Referring practitioner: Ronald S Dubin, MD  Date of Initial Visit: Type: THERAPY  Noted: 10/19/2021  Today's Date: 10/28/2021  Patient seen for 4 sessions         Pili Webster reports that she is having 5/10 pain in her right knee. Patient states that she is having getting positioned at night due to pain in her knee. Patient reports of continued swelling in her right knee.    Objective   See Exercise, Manual, and Modality Logs for complete treatment.       Assessment/Plan  Patient tolerated treatment session well with rest breaks taken as needed by the patient. Educated patient to perform therex per her tolerance, patient verbalized understanding. Treatment session consisted of exercises to improve strength and ROM in the right knee. Reps increased on several exercises with no increase in pain noted. New therex added per the patient's tolerance, patient demonstrated and understood new therex with no increase in pain noted. Decrease in pain noted following treatment session, 4/10 pain. Continue per PT's POC, progress exercises per the patient's tolerance.    Visit Diagnoses:    ICD-10-CM ICD-9-CM   1. Chronic pain of right knee  M25.561 719.46    G89.29 338.29   2. Antalgic gait  R26.89 781.2   3. Decreased range of motion (ROM) of right knee  M25.661 719.56       Progress strengthening /stabilization /functional activity           Timed:  Manual Therapy:    11     mins  97665;  Therapeutic Exercise:    33     mins  72776;     Neuromuscular Vladimir:        mins  59323;    Therapeutic Activity:          mins  24557;     Gait Training:           mins  74951;     Ultrasound:          mins  45701;    Electrical Stimulation:         mins  11279 ( );    Untimed:  Electrical Stimulation:    10     mins  07050 ( );  Mechanical Traction:         mins  32890;     Timed Treatment:   44   mins   Total Treatment:     54    farhad Paez, PTA

## 2021-11-02 ENCOUNTER — TELEPHONE (OUTPATIENT)
Dept: PHYSICAL THERAPY | Facility: CLINIC | Age: 31
End: 2021-11-02

## 2021-11-09 ENCOUNTER — HOSPITAL ENCOUNTER (EMERGENCY)
Facility: HOSPITAL | Age: 31
Discharge: HOME | End: 2021-11-09
Attending: STUDENT IN AN ORGANIZED HEALTH CARE EDUCATION/TRAINING PROGRAM | Admitting: STUDENT IN AN ORGANIZED HEALTH CARE EDUCATION/TRAINING PROGRAM

## 2021-11-09 ENCOUNTER — APPOINTMENT (OUTPATIENT)
Dept: GENERAL RADIOLOGY | Facility: HOSPITAL | Age: 31
End: 2021-11-09

## 2021-11-09 ENCOUNTER — TREATMENT (OUTPATIENT)
Dept: PHYSICAL THERAPY | Facility: CLINIC | Age: 31
End: 2021-11-09

## 2021-11-09 VITALS
WEIGHT: 170 LBS | TEMPERATURE: 97.2 F | DIASTOLIC BLOOD PRESSURE: 79 MMHG | BODY MASS INDEX: 34.27 KG/M2 | HEIGHT: 59 IN | SYSTOLIC BLOOD PRESSURE: 117 MMHG | RESPIRATION RATE: 16 BRPM | HEART RATE: 72 BPM | OXYGEN SATURATION: 99 %

## 2021-11-09 DIAGNOSIS — R07.9 CHEST PAIN, UNSPECIFIED TYPE: Primary | ICD-10-CM

## 2021-11-09 DIAGNOSIS — M25.561 CHRONIC PAIN OF RIGHT KNEE: Primary | ICD-10-CM

## 2021-11-09 DIAGNOSIS — M25.661 DECREASED RANGE OF MOTION (ROM) OF RIGHT KNEE: ICD-10-CM

## 2021-11-09 DIAGNOSIS — G89.29 CHRONIC PAIN OF RIGHT KNEE: Primary | ICD-10-CM

## 2021-11-09 DIAGNOSIS — R26.89 ANTALGIC GAIT: ICD-10-CM

## 2021-11-09 LAB
ALBUMIN SERPL-MCNC: 4.39 G/DL (ref 3.5–5.2)
ALBUMIN/GLOB SERPL: 1.3 G/DL
ALP SERPL-CCNC: 68 U/L (ref 39–117)
ALT SERPL W P-5'-P-CCNC: 16 U/L (ref 1–33)
AMPHET+METHAMPHET UR QL: NEGATIVE
AMPHETAMINES UR QL: NEGATIVE
ANION GAP SERPL CALCULATED.3IONS-SCNC: 10.8 MMOL/L (ref 5–15)
AST SERPL-CCNC: 14 U/L (ref 1–32)
B-HCG UR QL: NEGATIVE
BARBITURATES UR QL SCN: NEGATIVE
BASOPHILS # BLD AUTO: 0.07 10*3/MM3 (ref 0–0.2)
BASOPHILS NFR BLD AUTO: 0.7 % (ref 0–1.5)
BENZODIAZ UR QL SCN: NEGATIVE
BILIRUB SERPL-MCNC: 0.2 MG/DL (ref 0–1.2)
BILIRUB UR QL STRIP: NEGATIVE
BUN SERPL-MCNC: 15 MG/DL (ref 6–20)
BUN/CREAT SERPL: 14.4 (ref 7–25)
BUPRENORPHINE SERPL-MCNC: NEGATIVE NG/ML
CALCIUM SPEC-SCNC: 9 MG/DL (ref 8.6–10.5)
CANNABINOIDS SERPL QL: NEGATIVE
CHLORIDE SERPL-SCNC: 105 MMOL/L (ref 98–107)
CLARITY UR: ABNORMAL
CO2 SERPL-SCNC: 18.2 MMOL/L (ref 22–29)
COCAINE UR QL: NEGATIVE
COLOR UR: YELLOW
CREAT SERPL-MCNC: 1.04 MG/DL (ref 0.57–1)
D DIMER PPP FEU-MCNC: 0.41 MCGFEU/ML (ref 0–0.5)
DEPRECATED RDW RBC AUTO: 40.4 FL (ref 37–54)
EOSINOPHIL # BLD AUTO: 0.32 10*3/MM3 (ref 0–0.4)
EOSINOPHIL NFR BLD AUTO: 3.1 % (ref 0.3–6.2)
ERYTHROCYTE [DISTWIDTH] IN BLOOD BY AUTOMATED COUNT: 11.8 % (ref 12.3–15.4)
GFR SERPL CREATININE-BSD FRML MDRD: 61 ML/MIN/1.73
GLOBULIN UR ELPH-MCNC: 3.5 GM/DL
GLUCOSE SERPL-MCNC: 102 MG/DL (ref 65–99)
GLUCOSE UR STRIP-MCNC: NEGATIVE MG/DL
HCT VFR BLD AUTO: 40.7 % (ref 34–46.6)
HGB BLD-MCNC: 13.4 G/DL (ref 12–15.9)
HGB UR QL STRIP.AUTO: NEGATIVE
IMM GRANULOCYTES # BLD AUTO: 0.03 10*3/MM3 (ref 0–0.05)
IMM GRANULOCYTES NFR BLD AUTO: 0.3 % (ref 0–0.5)
KETONES UR QL STRIP: NEGATIVE
LEUKOCYTE ESTERASE UR QL STRIP.AUTO: NEGATIVE
LYMPHOCYTES # BLD AUTO: 3.87 10*3/MM3 (ref 0.7–3.1)
LYMPHOCYTES NFR BLD AUTO: 37 % (ref 19.6–45.3)
MAGNESIUM SERPL-MCNC: 2.2 MG/DL (ref 1.6–2.6)
MCH RBC QN AUTO: 30.6 PG (ref 26.6–33)
MCHC RBC AUTO-ENTMCNC: 32.9 G/DL (ref 31.5–35.7)
MCV RBC AUTO: 92.9 FL (ref 79–97)
METHADONE UR QL SCN: NEGATIVE
MONOCYTES # BLD AUTO: 0.36 10*3/MM3 (ref 0.1–0.9)
MONOCYTES NFR BLD AUTO: 3.4 % (ref 5–12)
NEUTROPHILS NFR BLD AUTO: 5.8 10*3/MM3 (ref 1.7–7)
NEUTROPHILS NFR BLD AUTO: 55.5 % (ref 42.7–76)
NITRITE UR QL STRIP: NEGATIVE
NRBC BLD AUTO-RTO: 0 /100 WBC (ref 0–0.2)
OPIATES UR QL: NEGATIVE
OXYCODONE UR QL SCN: NEGATIVE
PCP UR QL SCN: NEGATIVE
PH UR STRIP.AUTO: 6 [PH] (ref 5–8)
PLATELET # BLD AUTO: 362 10*3/MM3 (ref 140–450)
PMV BLD AUTO: 8.7 FL (ref 6–12)
POTASSIUM SERPL-SCNC: 4.3 MMOL/L (ref 3.5–5.2)
PROPOXYPH UR QL: NEGATIVE
PROT SERPL-MCNC: 7.9 G/DL (ref 6–8.5)
PROT UR QL STRIP: NEGATIVE
RBC # BLD AUTO: 4.38 10*6/MM3 (ref 3.77–5.28)
SODIUM SERPL-SCNC: 134 MMOL/L (ref 136–145)
SP GR UR STRIP: 1.02 (ref 1–1.03)
TRICYCLICS UR QL SCN: NEGATIVE
TROPONIN T SERPL-MCNC: <0.01 NG/ML (ref 0–0.03)
TROPONIN T SERPL-MCNC: <0.01 NG/ML (ref 0–0.03)
TSH SERPL DL<=0.05 MIU/L-ACNC: 1.11 UIU/ML (ref 0.27–4.2)
UROBILINOGEN UR QL STRIP: ABNORMAL
WBC # BLD AUTO: 10.45 10*3/MM3 (ref 3.4–10.8)

## 2021-11-09 PROCEDURE — 81003 URINALYSIS AUTO W/O SCOPE: CPT | Performed by: PHYSICIAN ASSISTANT

## 2021-11-09 PROCEDURE — 97110 THERAPEUTIC EXERCISES: CPT | Performed by: PHYSICAL THERAPIST

## 2021-11-09 PROCEDURE — 93005 ELECTROCARDIOGRAM TRACING: CPT | Performed by: STUDENT IN AN ORGANIZED HEALTH CARE EDUCATION/TRAINING PROGRAM

## 2021-11-09 PROCEDURE — 93010 ELECTROCARDIOGRAM REPORT: CPT | Performed by: INTERNAL MEDICINE

## 2021-11-09 PROCEDURE — 96375 TX/PRO/DX INJ NEW DRUG ADDON: CPT

## 2021-11-09 PROCEDURE — 80050 GENERAL HEALTH PANEL: CPT | Performed by: PHYSICIAN ASSISTANT

## 2021-11-09 PROCEDURE — 71045 X-RAY EXAM CHEST 1 VIEW: CPT | Performed by: RADIOLOGY

## 2021-11-09 PROCEDURE — 85379 FIBRIN DEGRADATION QUANT: CPT | Performed by: PHYSICIAN ASSISTANT

## 2021-11-09 PROCEDURE — 99283 EMERGENCY DEPT VISIT LOW MDM: CPT

## 2021-11-09 PROCEDURE — 25010000002 KETOROLAC TROMETHAMINE PER 15 MG: Performed by: PHYSICIAN ASSISTANT

## 2021-11-09 PROCEDURE — 96374 THER/PROPH/DIAG INJ IV PUSH: CPT

## 2021-11-09 PROCEDURE — 81025 URINE PREGNANCY TEST: CPT | Performed by: PHYSICIAN ASSISTANT

## 2021-11-09 PROCEDURE — 80306 DRUG TEST PRSMV INSTRMNT: CPT | Performed by: PHYSICIAN ASSISTANT

## 2021-11-09 PROCEDURE — 36415 COLL VENOUS BLD VENIPUNCTURE: CPT

## 2021-11-09 PROCEDURE — 71045 X-RAY EXAM CHEST 1 VIEW: CPT

## 2021-11-09 PROCEDURE — 25010000002 ONDANSETRON PER 1 MG: Performed by: STUDENT IN AN ORGANIZED HEALTH CARE EDUCATION/TRAINING PROGRAM

## 2021-11-09 PROCEDURE — 84484 ASSAY OF TROPONIN QUANT: CPT | Performed by: PHYSICIAN ASSISTANT

## 2021-11-09 PROCEDURE — 83735 ASSAY OF MAGNESIUM: CPT | Performed by: PHYSICIAN ASSISTANT

## 2021-11-09 RX ORDER — KETOROLAC TROMETHAMINE 30 MG/ML
30 INJECTION, SOLUTION INTRAMUSCULAR; INTRAVENOUS ONCE
Status: COMPLETED | OUTPATIENT
Start: 2021-11-09 | End: 2021-11-09

## 2021-11-09 RX ORDER — ONDANSETRON 2 MG/ML
INJECTION INTRAMUSCULAR; INTRAVENOUS
Status: DISCONTINUED
Start: 2021-11-09 | End: 2021-11-09 | Stop reason: HOSPADM

## 2021-11-09 RX ORDER — SODIUM CHLORIDE 0.9 % (FLUSH) 0.9 %
10 SYRINGE (ML) INJECTION AS NEEDED
Status: DISCONTINUED | OUTPATIENT
Start: 2021-11-09 | End: 2021-11-09 | Stop reason: HOSPADM

## 2021-11-09 RX ORDER — ASPIRIN 81 MG/1
324 TABLET, CHEWABLE ORAL ONCE
Status: COMPLETED | OUTPATIENT
Start: 2021-11-09 | End: 2021-11-09

## 2021-11-09 RX ORDER — ONDANSETRON 2 MG/ML
4 INJECTION INTRAMUSCULAR; INTRAVENOUS ONCE
Status: COMPLETED | OUTPATIENT
Start: 2021-11-09 | End: 2021-11-09

## 2021-11-09 RX ADMIN — SODIUM CHLORIDE 1000 ML: 9 INJECTION, SOLUTION INTRAVENOUS at 16:58

## 2021-11-09 RX ADMIN — ASPIRIN 324 MG: 81 TABLET, CHEWABLE ORAL at 18:31

## 2021-11-09 RX ADMIN — KETOROLAC TROMETHAMINE 30 MG: 30 INJECTION, SOLUTION INTRAMUSCULAR at 16:59

## 2021-11-09 RX ADMIN — ONDANSETRON 4 MG: 2 INJECTION INTRAMUSCULAR; INTRAVENOUS at 17:18

## 2021-11-09 NOTE — ED NOTES
Pt from [facility] c/o cp, and htn at home since yesterday. Pt reports taking all of her medications today. Labs and ekg collected per protocol. Pt educated on wait times and placed back in [facility]. Merly Flores, RN  11/09/21 4219

## 2021-11-09 NOTE — ED NOTES
Pt left the ER with stress test order form in hand with verbalized understanding of follow up instructions.      Ina De Santiago RN  11/09/21 7000

## 2021-11-09 NOTE — PROGRESS NOTES
Physical Therapy Daily Treatment Note      Patient: Pili Webster   : 1990  Referring practitioner: Ronald S Dubin, MD  Date of Initial Visit: Type: THERAPY  Noted: 10/19/2021  Today's Date: 2021  Patient seen for 5 sessions           Subjective Evaluation    History of Present Illness    Subjective comment: Patient reports 7/10 knee pain today, stating that she thinks she overdid it yesterday with standing and walking while they are remodeling their house.         Objective   See Exercise, Manual, and Modality Logs for complete treatment.       Assessment & Plan     Assessment  Assessment details: Therapy session consisted of there ex, with patient in supine and seated position.  There ex continues to focus on LE strengthening and knee ROM.  Exercises not progressed at today's session, secondary to complaints of elevated pain levels.  Near completion of there ex, patient reported that she felt hot and was having chest pains.  Vitals assessed at 115/95 mmHg and heart rate of 81 bpm.  Patient reported that she has been experiencing chest pain since yesterday.   She also noted shortness of breath, nausea, and tingling in the left UE; however, patient noted that she sometimes experiences SOA due to having pneumonia recently and nausea related to medications.  Therapy session placed on hold and patient recommended to seek immediate medical attention, with patient agreeable.  Patient declined for an ambulance to be called or to call family, with patient stating that she felt fine to drive to the ER since it is a short distance.  PT staff called and notified ER that patient would be arriving with complaints of chest pain and was notified of patient's vital signs.  PT escorted patient to her car and notified her to contact PT clinic following ER visit; she verbalized understanding.  Patient will continue to benefit from skilled PT, once patient receives clearance from MD.          Visit  Diagnoses:    ICD-10-CM ICD-9-CM   1. Chronic pain of right knee  M25.561 719.46    G89.29 338.29   2. Antalgic gait  R26.89 781.2   3. Decreased range of motion (ROM) of right knee  M25.661 719.56       Progress per Plan of Care and Progress strengthening /stabilization /functional activity           Timed:  Manual Therapy:         mins  04771;  Therapeutic Exercise:    30     mins  67613;     Neuromuscular Vladimir:        mins  98912;    Therapeutic Activity:          mins  63024;     Gait Training:           mins  93212;     Ultrasound:          mins  55492;    Electrical Stimulation:         mins  76389 ( );    Untimed:  Electrical Stimulation:         mins  49814 ( );  Mechanical Traction:         mins  06580;     Timed Treatment:   30   mins   Total Treatment:     40   mins  Pili Walker, PT  Physical Therapist

## 2021-11-09 NOTE — ED PROVIDER NOTES
Subjective   32 yo female patient presents to the ED with complaints of mid chest pain that radiates into left ribs and left arm. PT states pain started yesterday. Patient states her BP has been elevated too. She has hx of tachycardia, but no CAD. Positive family hx. She is not a smoker. She denies any alleviating factors, patient reports Physical Therapy made her symptoms worsened her symptoms which led her to the ED.       History provided by:  Patient   used: No        Review of Systems   Constitutional: Negative.    HENT: Negative.    Eyes: Negative.    Respiratory: Negative.    Cardiovascular: Positive for chest pain.   Gastrointestinal: Negative.    Endocrine: Negative.    Genitourinary: Negative.    Musculoskeletal: Negative.    Skin: Negative.    Allergic/Immunologic: Negative.    Neurological: Negative.    Hematological: Negative.    Psychiatric/Behavioral: Negative.    All other systems reviewed and are negative.      Past Medical History:   Diagnosis Date   • Abdominal pain    • Abnormal uterine bleeding (AUB)    • Anxiety and depression    • Arthritis    • Asthma     mild   • Cholecystitis    • Constipation    • Endometriosis    • Frequent UTI    • GERD (gastroesophageal reflux disease)    • Heartburn    • Hemorrhoids    • Kidney stones    • Lesion of breast    • Lump     RIGHT BREAST   • Nausea & vomiting    • PCOS (polycystic ovarian syndrome)    • Sjogren's disease (HCC)    • Snores    • Tachycardia    • Wrist fracture     RIGHT ARM      PATIENT UNSURE IF FRACTURED       Allergies   Allergen Reactions   • Peanut-Containing Drug Products Anaphylaxis     AND PEANUTS IN FOODS   • Latex Hives   • Shrimp Swelling     Facial swelling     • Milk-Related Compounds Nausea And Vomiting   • Sulfa Antibiotics Rash       Past Surgical History:   Procedure Laterality Date   • ABDOMINAL SURGERY     • BREAST BIOPSY Right 11/29/2018    Procedure: breast biopsy ;  Surgeon: Shiv Overton MD;   Location: [facility] OR;  Service: General   • CHOLECYSTECTOMY N/A 8/25/2017    Procedure: CHOLECYSTECTOMY LAPAROSCOPIC;  Surgeon: Franco Mari MD;  Location: [facility] OR;  Service:    • COLONOSCOPY N/A 3/9/2020    Procedure: COLONOSCOPY CPT CODE: 89259;  Surgeon: Jeremiah Murdock MD;  Location: [facility] OR;  Service: Gastroenterology;  Laterality: N/A;   • DENTAL PROCEDURE     • DIAGNOSTIC LAPAROSCOPY      X5   • DILATATION AND CURETTAGE     • ENDOSCOPY N/A 3/9/2020    Procedure: ESOPHAGOGASTRODUODENOSCOPY WITH BIOPSY CPT CODE: 38971;  Surgeon: Jeremiah Murdock MD;  Location: [facility] OR;  Service: Gastroenterology;  Laterality: N/A;   • ENDOSCOPY N/A 2/1/2021    Procedure: ESOPHAGOGASTRODUODENOSCOPY WITH BIOPSY CPT CODE: 11679;  Surgeon: Jeremiah Murdock MD;  Location: [facility] OR;  Service: Gastroenterology;  Laterality: N/A;   • HEMORRHOIDECTOMY N/A 11/14/2019    Procedure: HEMORRHOID STAPLING;  Surgeon: Franco Mari MD;  Location: [facility] OR;  Service: General   • URETEROSCOPY LASER LITHOTRIPSY WITH STENT INSERTION Right 1/9/2019    Procedure: URETEROSCOPY LASER LITHOTRIPSY retrograde pyelogram;  Surgeon: Ahmet Barrow MD;  Location: [facility] OR;  Service: Urology   • WISDOM TOOTH EXTRACTION         Family History   Problem Relation Age of Onset   • Breast cancer Maternal Aunt    • Alcohol abuse Paternal Grandfather    • Heart disease Paternal Grandfather    • Cancer Maternal Grandfather    • Hypertension Maternal Grandfather    • Colon cancer Neg Hx    • Liver cancer Neg Hx        Social History     Socioeconomic History   • Marital status:    Tobacco Use   • Smoking status: Current Every Day Smoker     Packs/day: 0.25     Years: 16.00     Pack years: 4.00     Types: Cigarettes     Start date: 2003   • Smokeless tobacco: Never Used   Vaping Use   • Vaping Use: Former   • Substances: Nicotine, Flavoring   Substance and Sexual Activity   • Alcohol use: Yes      Comment: RARELY   • Drug use: No   • Sexual activity: Defer           Objective   Physical Exam  Vitals and nursing note reviewed.   Constitutional:       General: She is not in acute distress.     Appearance: She is well-developed and normal weight. She is not ill-appearing, toxic-appearing or diaphoretic.   HENT:      Head: Normocephalic and atraumatic.   Eyes:      Extraocular Movements: Extraocular movements intact.      Pupils: Pupils are equal, round, and reactive to light.   Cardiovascular:      Rate and Rhythm: Normal rate and regular rhythm.      Heart sounds: Normal heart sounds.   Pulmonary:      Effort: Pulmonary effort is normal.      Breath sounds: Normal breath sounds. No decreased breath sounds, wheezing, rhonchi or rales.   Abdominal:      General: Bowel sounds are normal.      Palpations: Abdomen is soft.   Musculoskeletal:         General: Normal range of motion.      Cervical back: Normal range of motion and neck supple.   Skin:     General: Skin is warm and dry.      Capillary Refill: Capillary refill takes less than 2 seconds.   Neurological:      General: No focal deficit present.      Mental Status: She is alert and oriented to person, place, and time.   Psychiatric:         Mood and Affect: Mood normal.         Behavior: Behavior normal.         Procedures           ED Course  ED Course as of 11/09/21 2346   Tue Nov 09, 2021   1251 EKG interpretation, ventricular rate 85, , QRS 72, QTc 473, normal sinus rhythm.  No ST elevation. [JM]   1616 XR Chest 1 View  IMPRESSION:  No evidence of active or acute cardiopulmonary disease on today's chest  radiograph.     This report was finalized on 11/9/2021 3:13 PM by Dr. Sidney Pepe MD.             Specimen Collected: 11/09/21 15:13 Last Resulted: 11/09/21 15:13         [ML]   1900 Pt states she is feeling better. Patient vitals stable and WNL. I offered admission to the patient. She declines.. States she will go home and f/u with PCP.  I  discussed sx that would warrant return to the ED. Outpatiient stress test ordered.  Heart Score 1 [ML]      ED Course User Index  [JM] You Perez, DO  [ML] Josiane Delvalle PA                                           MDM  Number of Diagnoses or Management Options     Amount and/or Complexity of Data Reviewed  Clinical lab tests: ordered and reviewed  Tests in the radiology section of CPT®: ordered and reviewed    Risk of Complications, Morbidity, and/or Mortality  Presenting problems: low  Diagnostic procedures: low  Management options: low    Patient Progress  Patient progress: improved      Final diagnoses:   Chest pain, unspecified type       ED Disposition  ED Disposition     ED Disposition Condition Comment    Discharge Stable           Osvaldo Madrid MD  [address] [place] [address]  [phone]    Schedule an appointment as soon as possible for a visit in 1 day           Medication List      No changes were made to your prescriptions during this visit.          Josiane Delvalle PA  11/09/21 0257

## 2021-11-09 NOTE — ED NOTES
MEDICAL SCREENING:    Reason for Visit: chest pain     Patient initially seen in triage.  The patient was advised further evaluation and diagnostic testing will be needed, some of the treatment and testing will be initiated in the lobby in order to begin the process.  The patient will be returned to the waiting area for the time being and possibly be re-assessed by a subsequent ED provider.  The patient will be brought back to the treatment area in as timely manner as possible.       Germania Thompson PA  11/09/21 5893

## 2021-11-09 NOTE — ED NOTES
Pt moved from [facility] to hallway due to emergency and needing room. No monitoring available in hallway, all zolls monitors in use. Provider made aware.      Ina De Santiago RN  11/09/21 2497

## 2021-11-10 ENCOUNTER — TRANSCRIBE ORDERS (OUTPATIENT)
Dept: ADMINISTRATIVE | Facility: HOSPITAL | Age: 31
End: 2021-11-10

## 2021-11-10 DIAGNOSIS — R07.9 CHEST PAIN, UNSPECIFIED TYPE: Primary | ICD-10-CM

## 2021-11-11 ENCOUNTER — HOSPITAL ENCOUNTER (OUTPATIENT)
Dept: CARDIOLOGY | Facility: HOSPITAL | Age: 31
Discharge: HOME | End: 2021-11-11
Admitting: STUDENT IN AN ORGANIZED HEALTH CARE EDUCATION/TRAINING PROGRAM

## 2021-11-11 DIAGNOSIS — R07.9 CHEST PAIN, UNSPECIFIED TYPE: ICD-10-CM

## 2021-11-11 PROCEDURE — 93017 CV STRESS TEST TRACING ONLY: CPT

## 2021-11-12 LAB
BH CV STRESS BP STAGE 1: NORMAL
BH CV STRESS DURATION MIN STAGE 1: 3
BH CV STRESS DURATION MIN STAGE 2: 0
BH CV STRESS DURATION SEC STAGE 1: 0
BH CV STRESS DURATION SEC STAGE 2: 20
BH CV STRESS GRADE STAGE 1: 10
BH CV STRESS GRADE STAGE 2: 12
BH CV STRESS HR STAGE 1: 157
BH CV STRESS HR STAGE 2: 160
BH CV STRESS METS STAGE 1: 5
BH CV STRESS METS STAGE 2: 7.5
BH CV STRESS PROTOCOL 1: NORMAL
BH CV STRESS RECOVERY BP: NORMAL MMHG
BH CV STRESS RECOVERY HR: 105 BPM
BH CV STRESS SPEED STAGE 1: 1.7
BH CV STRESS SPEED STAGE 2: 2.5
BH CV STRESS STAGE 1: 1
BH CV STRESS STAGE 2: 2
MAXIMAL PREDICTED HEART RATE: 187 BPM
PERCENT MAX PREDICTED HR: 85.56 %
QT INTERVAL: 398 MS
QTC INTERVAL: 473 MS
STRESS BASELINE BP: NORMAL MMHG
STRESS BASELINE HR: 102 BPM
STRESS PERCENT HR: 101 %
STRESS POST ESTIMATED WORKLOAD: 4.6 METS
STRESS POST EXERCISE DUR MIN: 3 MIN
STRESS POST EXERCISE DUR SEC: 20 SEC
STRESS POST PEAK BP: NORMAL MMHG
STRESS POST PEAK HR: 160 BPM
STRESS TARGET HR: 159 BPM

## 2021-11-16 ENCOUNTER — TREATMENT (OUTPATIENT)
Dept: PHYSICAL THERAPY | Facility: CLINIC | Age: 31
End: 2021-11-16

## 2021-11-16 DIAGNOSIS — M25.561 CHRONIC PAIN OF RIGHT KNEE: Primary | ICD-10-CM

## 2021-11-16 DIAGNOSIS — R26.89 ANTALGIC GAIT: ICD-10-CM

## 2021-11-16 DIAGNOSIS — G89.29 CHRONIC PAIN OF RIGHT KNEE: Primary | ICD-10-CM

## 2021-11-16 DIAGNOSIS — M25.661 DECREASED RANGE OF MOTION (ROM) OF RIGHT KNEE: ICD-10-CM

## 2021-11-16 PROCEDURE — 97110 THERAPEUTIC EXERCISES: CPT | Performed by: PHYSICAL THERAPIST

## 2021-11-16 PROCEDURE — 97014 ELECTRIC STIMULATION THERAPY: CPT | Performed by: PHYSICAL THERAPIST

## 2021-11-16 PROCEDURE — 97140 MANUAL THERAPY 1/> REGIONS: CPT | Performed by: PHYSICAL THERAPIST

## 2021-11-16 NOTE — PROGRESS NOTES
Physical Therapy Daily Treatment Note      Patient: Pili Webster   : 1990  Referring practitioner: Ronald S Dubin, MD  Date of Initial Visit: Type: THERAPY  Noted: 10/19/2021  Today's Date: 2021  Patient seen for 6 sessions       Visit Diagnoses:    ICD-10-CM ICD-9-CM   1. Chronic pain of right knee  M25.561 719.46    G89.29 338.29   2. Antalgic gait  R26.89 781.2   3. Decreased range of motion (ROM) of right knee  M25.661 719.56       Subjective:  Patient arrives to therapy w/ reports of 3/10 right knee pain.  Pt states she was seen in the ER on 21, in regards to discomfort in the chest, and slight SOB. Pt reports everything came back clear in the ER, and she has had a stress test done since.  Pt reports she is awaiting results.  Pt also states she was seen by referring MD yesterday, and received an injection in the knee; pt was cleared to continue w/ PT today.       Objective   See Exercise, Manual, and Modality Logs for complete treatment.       Assessment/Plan:  Pt is asymptomatic prior to today's tx.  Pt appears in no signs of distress.  Supervising therapist, Pili Walker, PT, DPT notified and aware of pt's subjective reports.  PT cleared patient to continue w/ treatment and educated to monitor symptoms. Treatment initiated w/ therex as listed for improved right knee ROM, improved right LE strength, and stability, as tolerated.  Pt provided w/ cues and demonstration while performing exercise for proper form, and max benefit.  Manual STM performed to right knee to assist w/ pain control and improve mobility.  Cryotherapy w/ Estim applied at conclusion.  Pt continues to benefit from therapy services and will be progressed as tolerated.  No signs of distress or adverse reactions observed during, and/ of following tx.  Continue w/ PT's POC.       Timed:         Manual Therapy:    11     mins  10867;     Therapeutic Exercise:   36      mins  01700;     Neuromuscular Vladimir:         mins  64838;    Therapeutic Activity:          mins  80414;     Gait Training:           mins  61017;     Ultrasound:          mins  63186;    Ionto                                   mins   27160  Self Care                            mins   57608  Canalith Repos         mins 50415      Un-Timed:  Electrical Stimulation:     10    mins  22643 ( );  Dry Needling          mins self-pay  Traction          mins 32615      Timed Treatment:  47    mins   Total Treatment:    57    mins    Janet Lisa. DAVID Parikh  [place] License: [id number]

## 2021-11-18 ENCOUNTER — TREATMENT (OUTPATIENT)
Dept: PHYSICAL THERAPY | Facility: CLINIC | Age: 31
End: 2021-11-18

## 2021-11-18 DIAGNOSIS — M25.561 CHRONIC PAIN OF RIGHT KNEE: Primary | ICD-10-CM

## 2021-11-18 DIAGNOSIS — R26.89 ANTALGIC GAIT: ICD-10-CM

## 2021-11-18 DIAGNOSIS — G89.29 CHRONIC PAIN OF RIGHT KNEE: Primary | ICD-10-CM

## 2021-11-18 DIAGNOSIS — M25.661 DECREASED RANGE OF MOTION (ROM) OF RIGHT KNEE: ICD-10-CM

## 2021-11-18 PROCEDURE — 97140 MANUAL THERAPY 1/> REGIONS: CPT | Performed by: PHYSICAL THERAPIST

## 2021-11-18 PROCEDURE — 97014 ELECTRIC STIMULATION THERAPY: CPT | Performed by: PHYSICAL THERAPIST

## 2021-11-18 PROCEDURE — 97110 THERAPEUTIC EXERCISES: CPT | Performed by: PHYSICAL THERAPIST

## 2021-11-18 NOTE — PROGRESS NOTES
Physical Therapy Re Certification Of Plan of Care  Patient: Pili Webster   : 1990  Diagnosis/ICD-10 Code:  Chronic pain of right knee [M25.561, G89.29]  Referring practitioner: Ronald S Dubin, MD  Date of Initial Visit: 2021  Today's Date: 2021  Patient seen for 7 sessions         Visit Diagnoses:    ICD-10-CM ICD-9-CM   1. Chronic pain of right knee  M25.561 719.46    G89.29 338.29   2. Antalgic gait  R26.89 781.2   3. Decreased range of motion (ROM) of right knee  M25.661 719.56         Pili Webster reports:   Subjective Questionnaire: LEFS: 75%  Clinical Progress: improved  Home Program Compliance: Yes  Treatment has included: therapeutic exercise, neuromuscular re-education, manual therapy, therapeutic activity, gait training, electrical stimulation, ultrasound, moist heat and cryotherapy      Subjective Evaluation    History of Present Illness    Subjective comment: Pt reports she received a cortisone shot with mild improvments.  Pt reports having mild improvement with therapy.Pain  Current pain ratin  At best pain ratin  At worst pain ratin  Location: rigth knee             Objective          Active Range of Motion     Right Knee   Flexion: 112 degrees   Extension: 0 degrees     Strength/Myotome Testing     Right Knee   Flexion: 3  Extension: 3+          Assessment & Plan     Assessment  Impairments: abnormal coordination, abnormal gait, abnormal muscle firing, abnormal or restricted ROM, activity intolerance, impaired balance, impaired physical strength, lacks appropriate home exercise program, pain with function and weight-bearing intolerance  Assessment details: Pt is a 30 y/o female referred to therapy for treatment of right knee pain. Pt has demonstrated good gains with decreased pain and improved ROM.  Pt cont to present with increased weakness and pt score less on her LEFs today.  Pt responded well to tx today with 2/10 post pain.  Prognosis:  fair  Functional Limitations: walking, uncomfortable because of pain, standing and unable to perform repetitive tasks  Goals  Plan Goals: STG 6 weeks    1 Pt will be instructed in a HEP.met  2 Pt will report pain no greater than 5/10 with ADLs.progressing  3 Pt will demonstrate 10 degrees from full right knee extension.met      LTG 12 weeks    1 Pt will improve her LEFs to less than 20%.no tmet  2 Pt will improve her right knee ROM to 5-120 degrees.partially met  3 Pt will report pain no greater than 2/10.not met  4 Pt will demonstrate 4/5 gross right knee strength.not met        Plan  Therapy options: will be seen for skilled physical therapy services  Planned modality interventions: cryotherapy, TENS, thermotherapy (hydrocollator packs) and ultrasound  Planned therapy interventions: ADL retraining, body mechanics training, balance/weight-bearing training, fine motor coordination training, flexibility, functional ROM exercises, gait training, home exercise program, IADL retraining, joint mobilization, manual therapy, neuromuscular re-education, postural training, soft tissue mobilization, strengthening, stretching and therapeutic activities  Duration in visits: 16  Duration in weeks: 8  Treatment plan discussed with: patient  Plan details: Will follow for optimal gains.  Moderate Evaluation  02959  Re-evaluation   84692  Therapeutic exercise  53745  Therapeutic activity    41412  Neuromuscular re-education   75479  Manual therapy   30681  Gait training  30283  Attended e-stim  84428  Unattended e-stim (Medicaid/Medicare)     Moist heat/cryotherapy 63556   Ultrasound   26575               Recommendations: Continue as planned  Timeframe: 2 months  Prognosis to achieve goals: good      Timed:         Manual Therapy:    12     mins  21593;     Therapeutic Exercise:    31     mins  53469;     Neuromuscular Vladimir:        mins  61791;    Therapeutic Activity:          mins  40810;     Gait Training:           mins   03689;     Ultrasound:          mins  90539;    Ionto                                   mins   64019  Self Care                            mins   27819  Canalith Repos         mins 72142      Un-Timed:  Electrical Stimulation:    10     mins  29172 ( );  Dry Needling          mins self-pay  Traction          mins 27503  Re-Eval                               mins  11074      Timed Treatment:   43   mins   Total Treatment:     53   mins          PT: ISHA Temple License:  [id number]    Electronically signed by Peter Thomas PT, 11/18/21, 10:11 AM EST    Certification Period: 11/18/2021 thru 2/15/2022  I certify that the therapy services are furnished while this patient is under my care.  The services outlined above are required by this patient, and will be reviewed every 90 days.         Physician Signature:__________________________________________________    PHYSICIAN: Dubin, Ronald S, MD      DATE:     Please sign and return via fax to [email or web address] . Thank you, [facility]

## 2021-11-23 ENCOUNTER — TREATMENT (OUTPATIENT)
Dept: PHYSICAL THERAPY | Facility: CLINIC | Age: 31
End: 2021-11-23

## 2021-11-23 DIAGNOSIS — M25.561 CHRONIC PAIN OF RIGHT KNEE: Primary | ICD-10-CM

## 2021-11-23 DIAGNOSIS — R26.89 ANTALGIC GAIT: ICD-10-CM

## 2021-11-23 DIAGNOSIS — G89.29 CHRONIC PAIN OF RIGHT KNEE: Primary | ICD-10-CM

## 2021-11-23 DIAGNOSIS — M25.661 DECREASED RANGE OF MOTION (ROM) OF RIGHT KNEE: ICD-10-CM

## 2021-11-23 PROCEDURE — 97110 THERAPEUTIC EXERCISES: CPT | Performed by: PHYSICAL THERAPIST

## 2021-11-23 PROCEDURE — 97140 MANUAL THERAPY 1/> REGIONS: CPT | Performed by: PHYSICAL THERAPIST

## 2021-11-23 PROCEDURE — 97014 ELECTRIC STIMULATION THERAPY: CPT | Performed by: PHYSICAL THERAPIST

## 2021-11-23 NOTE — PROGRESS NOTES
Physical Therapy Daily Treatment Note      Patient: Pili Webster   : 1990  Referring practitioner: Ronald S Dubin, MD  Date of Initial Visit: Type: THERAPY  Noted: 10/19/2021  Today's Date: 2021  Patient seen for 8 sessions       Visit Diagnoses:    ICD-10-CM ICD-9-CM   1. Chronic pain of right knee  M25.561 719.46    G89.29 338.29   2. Antalgic gait  R26.89 781.2   3. Decreased range of motion (ROM) of right knee  M25.661 719.56       Subjective   Patient reports of having 4/10 pain in her right knee. Patient states that she is having pain on the inside of her right knee. Patient reports that cortisone shot has helped her knee some.    Objective   See Exercise, Manual, and Modality Logs for complete treatment.       Assessment/Plan  Patient tolerated treatment session well with rest breaks taken as needed by the patient. Educated patient to perform therex per her tolerance, patient verbalized understanding. Treatment session consisted of exercises to improve strength and ROM in the right knee. No adverse reactions with modalities or treatment session. STM/retrograde performed to the right knee; swelling noted. Crepitus noted when performing heel slides but no increase in pain noted. Estim and moist heat applied to the right knee post treatment session. Continue per PT's POC, progress exercises per the patient's tolerance.     Timed:         Manual Therapy:    13     mins  54201;     Therapeutic Exercise:     31    mins  45062;     Neuromuscular Vladimir:        mins  91584;    Therapeutic Activity:          mins  22320;     Gait Training:           mins  44234;     Ultrasound:          mins  23175;    Ionto                                   mins   68159  Self Care                            mins   38476  Canalith Repos         mins 36898      Un-Timed:  Electrical Stimulation:    10     mins  29449 ( );  Dry Needling          mins self-pay  Traction          mins 30411      Timed  Treatment:   44   mins   Total Treatment:     54   mins    Ina Paez, PTA  [place] License: [id number]

## 2021-11-30 ENCOUNTER — TREATMENT (OUTPATIENT)
Dept: PHYSICAL THERAPY | Facility: CLINIC | Age: 31
End: 2021-11-30

## 2021-11-30 DIAGNOSIS — M25.561 CHRONIC PAIN OF RIGHT KNEE: Primary | ICD-10-CM

## 2021-11-30 DIAGNOSIS — R26.89 ANTALGIC GAIT: ICD-10-CM

## 2021-11-30 DIAGNOSIS — G89.29 CHRONIC PAIN OF RIGHT KNEE: Primary | ICD-10-CM

## 2021-11-30 DIAGNOSIS — M25.661 DECREASED RANGE OF MOTION (ROM) OF RIGHT KNEE: ICD-10-CM

## 2021-11-30 PROCEDURE — 97110 THERAPEUTIC EXERCISES: CPT | Performed by: PHYSICAL THERAPIST

## 2021-11-30 PROCEDURE — 97014 ELECTRIC STIMULATION THERAPY: CPT | Performed by: PHYSICAL THERAPIST

## 2021-11-30 PROCEDURE — 97140 MANUAL THERAPY 1/> REGIONS: CPT | Performed by: PHYSICAL THERAPIST

## 2021-11-30 NOTE — PROGRESS NOTES
Physical Therapy Daily Treatment Note      Patient: Pili Webster   : 1990  Referring practitioner: Ronald S Dubin, MD  Date of Initial Visit: Type: THERAPY  Noted: 10/19/2021  Today's Date: 2021  Patient seen for 9 sessions       Visit Diagnoses:    ICD-10-CM ICD-9-CM   1. Chronic pain of right knee  M25.561 719.46    G89.29 338.29   2. Antalgic gait  R26.89 781.2   3. Decreased range of motion (ROM) of right knee  M25.661 719.56       Subjective Evaluation    History of Present Illness    Subjective comment: Patient reports 5/10 pain today.  She states that she feels like her knee pain is improving, but notes that improvements are minimal.Pain  Current pain ratin           Objective   See Exercise, Manual, and Modality Logs for complete treatment.       Assessment & Plan     Assessment    Assessment details: Therapy session consisted of there ex, manual therapy, moist heat, and ESTIM; no adverse reactions were noted with modalities.  Patient reported tenderness at right medial and lateral joint line during STM.  There ex continued to focus on LE strengthening and knee ROM.  Patient slow to perform exercises, with patient requiring occasional rest break due to pain/discomfort.  Patient reported decrease in pain noting 3/10 pain post-tx.  She will continue to be progressed per her tolerance and POC.          Timed:         Manual Therapy:    15     mins  69776;     Therapeutic Exercise:    34     mins  15923;     Neuromuscular Vladimir:        mins  66424;    Therapeutic Activity:          mins  51020;     Gait Training:           mins  00245;     Ultrasound:          mins  91129;    Ionto                                   mins   07934  Self Care                            mins   98313  Canalith Repos         mins 73781      Un-Timed:  Electrical Stimulation:    10     mins  02285 ( );  Dry Needling          mins self-pay  Traction          mins 55366      Timed Treatment:   49   mins    Total Treatment:     59   mins    Pili Walker, PT  [place] License: [id number]

## 2021-12-02 ENCOUNTER — HOSPITAL ENCOUNTER (OUTPATIENT)
Dept: ULTRASOUND IMAGING | Facility: HOSPITAL | Age: 31
Discharge: HOME | End: 2021-12-02

## 2021-12-02 ENCOUNTER — TRANSCRIBE ORDERS (OUTPATIENT)
Dept: ADMINISTRATIVE | Facility: HOSPITAL | Age: 31
End: 2021-12-02

## 2021-12-02 DIAGNOSIS — D17.9 MULTIPLE LIPOMAS: Primary | ICD-10-CM

## 2021-12-02 DIAGNOSIS — D17.9 MULTIPLE LIPOMAS: ICD-10-CM

## 2021-12-14 ENCOUNTER — TREATMENT (OUTPATIENT)
Dept: PHYSICAL THERAPY | Facility: CLINIC | Age: 31
End: 2021-12-14

## 2021-12-14 DIAGNOSIS — G89.29 CHRONIC PAIN OF RIGHT KNEE: Primary | ICD-10-CM

## 2021-12-14 DIAGNOSIS — M25.561 CHRONIC PAIN OF RIGHT KNEE: Primary | ICD-10-CM

## 2021-12-14 DIAGNOSIS — M25.661 DECREASED RANGE OF MOTION (ROM) OF RIGHT KNEE: ICD-10-CM

## 2021-12-14 DIAGNOSIS — R26.89 ANTALGIC GAIT: ICD-10-CM

## 2021-12-14 PROCEDURE — 97014 ELECTRIC STIMULATION THERAPY: CPT | Performed by: PHYSICAL THERAPIST

## 2021-12-14 PROCEDURE — 97110 THERAPEUTIC EXERCISES: CPT | Performed by: PHYSICAL THERAPIST

## 2021-12-14 NOTE — PROGRESS NOTES
Physical Therapy Re Certification Of Plan of Care  Patient: Pili Webster   : 1990  Diagnosis/ICD-10 Code:  Chronic pain of right knee [M25.561, G89.29]  Referring practitioner: Ronald S Dubin, MD  Date of Initial Visit: 2021  Today's Date: 2021  Patient seen for 10 sessions         Visit Diagnoses:    ICD-10-CM ICD-9-CM   1. Chronic pain of right knee  M25.561 719.46    G89.29 338.29   2. Antalgic gait  R26.89 781.2   3. Decreased range of motion (ROM) of right knee  M25.661 719.56       Subjective Questionnaire: LEFS:   Clinical Progress: improved  Home Program Compliance: Yes      Subjective Evaluation    History of Present Illness    Subjective comment: Patient reports that she feels like her knee is moving better, but it continues to hurt and feel weak.  She notes that she continues to perform her HEP, with focus on improving ROM.  She states that she wore a 48 heart monitor, beginning on 2021 and is awaiting results; she denies any chest pains or unexplained shortness of breath.Pain  Current pain ratin  At best pain ratin  At worst pain ratin             Objective          Active Range of Motion     Right Knee   Flexion: 124 degrees   Extension: 0 degrees     Strength/Myotome Testing     Right Hip   Planes of Motion   Flexion: 4-    Right Knee   Flexion: 4-  Extension: 4          Assessment & Plan     Assessment  Impairments: abnormal coordination, abnormal gait, abnormal muscle firing, abnormal or restricted ROM, activity intolerance, impaired balance, impaired physical strength, lacks appropriate home exercise program, pain with function and weight-bearing intolerance  Functional Limitations: walking, uncomfortable because of pain, standing and unable to perform repetitive tasks  Assessment details: Patient has been attending physical therapy for treatment of right knee pain; she has attended therapy for a total of 10 sessions, dating from 10/19/2021 to  12/14/2021. Patient has shown improvements in knee ROM and LE strength, but continues to report elevated pain levels.  Right knee ROM has improved to 0-124 degrees AROM.  Minimal improvements reported with functional mobility on the LEFS, with score improving from 20/80 to 21/80 since last progress note.  Patient hast met 2/3 STGs and 1/4 LTGs.  Patient will continue to benefit from skilled PT for 2 weeks, with progression towards HEP.  Prognosis: fair    Goals  Plan Goals: STG 6 weeks    1 Pt will be instructed in a HEP.  Met  2 Pt will report pain no greater than 5/10 with ADLs.  Not met-6/10  3 Pt will demonstrate 10 degrees from full right knee extension.  Met      LTG 12 weeks  1 Pt will improve her LEFs to less than 20%.  Not met  2 Pt will improve her right knee ROM to 5-120 degrees.  Met  3 Pt will report pain no greater than 2/10.  Not met  4 Pt will demonstrate 4/5 gross right knee strength.  Not met        Plan  Therapy options: will be seen for skilled therapy services  Planned modality interventions: cryotherapy, TENS, thermotherapy (hydrocollator packs) and ultrasound  Planned therapy interventions: ADL retraining, body mechanics training, balance/weight-bearing training, fine motor coordination training, flexibility, functional ROM exercises, gait training, home exercise program, IADL retraining, joint mobilization, manual therapy, neuromuscular re-education, postural training, soft tissue mobilization, strengthening, stretching and therapeutic activities  Frequency: 2x week  Duration in weeks: 2  Treatment plan discussed with: patient  Plan details: Re-evaluation   18504  Therapeutic exercise  65193  Therapeutic activity    25854  Neuromuscular re-education   31353  Manual therapy   71058  Gait training  15645  Attended e-stim  47725  Unattended e-stim (Medicaid/Medicare)     Moist heat/cryotherapy 44103   Ultrasound   29391               Recommendations: Continue as planned  Timeframe: 2  weeks  Prognosis to achieve goals: fair      Timed:         Manual Therapy:         mins  52416;     Therapeutic Exercise:    44     mins  58514;     Neuromuscular Vladimir:        mins  37813;    Therapeutic Activity:          mins  93160;     Gait Training:           mins  93993;     Ultrasound:          mins  54982;    Ionto                                   mins   22976  Self Care                            mins   86486  Canalith Repos         mins 05263      Un-Timed:  Electrical Stimulation:    10     mins  79287 (MC );  Dry Needling          mins self-pay  Traction          mins 94314  Re-Eval                               mins  15911      Timed Treatment:   44   mins   Total Treatment:     54   mins          PT: Pili Walker PT     [place] License:  [id number]    Electronically signed by Pili Walker PT, 12/14/21, 2:25 PM EST    Certification Period: 12/14/2021 thru 3/13/2022  I certify that the therapy services are furnished while this patient is under my care.  The services outlined above are required by this patient, and will be reviewed every 90 days.         Physician Signature:__________________________________________________    PHYSICIAN: Dubin, Ronald S, MD      DATE:     Please sign and return via fax to [email or web address] . Thank you, [facility]

## 2021-12-15 ENCOUNTER — OFFICE VISIT (OUTPATIENT)
Dept: GASTROENTEROLOGY | Facility: CLINIC | Age: 31
End: 2021-12-15

## 2021-12-15 VITALS
DIASTOLIC BLOOD PRESSURE: 93 MMHG | BODY MASS INDEX: 34.68 KG/M2 | HEIGHT: 59 IN | RESPIRATION RATE: 24 BRPM | SYSTOLIC BLOOD PRESSURE: 126 MMHG | TEMPERATURE: 98 F | WEIGHT: 172 LBS | HEART RATE: 93 BPM

## 2021-12-15 DIAGNOSIS — K58.0 IRRITABLE BOWEL SYNDROME WITH DIARRHEA: Primary | ICD-10-CM

## 2021-12-15 PROCEDURE — 99213 OFFICE O/P EST LOW 20 MIN: CPT | Performed by: PHYSICIAN ASSISTANT

## 2021-12-15 RX ORDER — TRIAMCINOLONE ACETONIDE 1 MG/G
CREAM TOPICAL
COMMUNITY
Start: 2021-12-06

## 2021-12-15 RX ORDER — BETAMETHASONE DIPROPIONATE 0.5 MG/G
LOTION TOPICAL
COMMUNITY
Start: 2021-12-06 | End: 2022-07-12

## 2021-12-15 NOTE — PROGRESS NOTES
Follow Up Note     Date: 12/15/2021   Patient Name: Pili Webster  MRN: [id number]  : 1990     Primary Care Provider: Osvaldo Madrid MD     Chief Complaint   Patient presents with   • Follow-up   • Abdominal Pain   • Nausea   • Irritable Bowel Syndrome   • Diarrhea     History of present illness:   2021  Pili Webster is a 31 y.o. female who is here today for follow up regarding Abdominal Pain, Nausea, Irritable Bowel Syndrome, and Diarrhea.    All symptoms same as previous except improved diarrhea. No other changes since last visit. Still with intermittent generalized abdominal pain, always worst in RUQ. Nausea is intermittent as well but not severe and without vomiting. She has been trying to modify her diet to help with symptoms. Had weight gain over the past couple of years of unknown reason. Diarrhea improved after Xifaxan therapy several months ago, now intermittent and more regular bowel habits.  She had CTAP after last visit and would like to discuss those results.     Interval History:  2021  She has noticed some improvement of previously reported LUQ abdominal pain with dicyclomine as needed. Now developed a RUQ abdominal pain which is sharp and aching in nature. Started in right flank and moved around to her RUQ. Nausea is present and occurring daily in association with the abdominal pain. no vomiting. She had labs completed after last visit as directed. She has been diagnosed with Sjogren's recently.      2021  Pili Webster is a 30 y.o. female who is here today for follow up regarding abdominal pain and dysphagia. She recently had EGD at [facility] with Dr. Murdock and would like to discuss those results. She is also having severe periumbilical abdominal pain which has become constant but now worse than previous. She feels that eating makes the pain worse, described as aching and cramping a times. She has a long history of abdominal pain  with fluctuations in bowel habits but most recently with diarrhea. Her stools are yellowish in color currently and sometimes more than 3 times daily. She was previously taking Miralax for constipation but has not taken in a while. No rectal bleeding.      Currently taking Dexilant 60 mg once daily for GERD which is very severe without this medication. Has a history of known Márquez's esophagus. Still with acid coming into her mouth at times, still with current dysphagia. Had an incident in which she felt that her throat was swelling a couple of weeks ago and was evaluated in the ED for this. This has been gradually improving since then. Did try Carafate but this did not help. She has other autoimmune diseases that are also being evaluated as contributors by other specialists.      Has noticed weight gain over the past 1 year, gained more than 20 lbs. She has changed her diet to mostly healthy foods. Does not drink soda. Has difficulty exercising due to chronic fatigue and back pain.      2/25/2021  Over the past several months, she has noticed change in her GI complaints.  She now has left upper quadrant abdominal pain which is severe nature at times.  Also notices swelling in this area as well intermittently.  She has early satiety and generally only eats a few bites.  She has tried to change her diet in order to lose weight, eat consuming more raw vegetables such as salads recently.  She had an abdominal film with her PCP and she was told that she had severe constipation on it but also had a visit with urology recently and was told that she did not have constipation.  She takes MiraLAX 2-3 times a week only as needed for treatment of constipation.  She has been having diarrhea with fecal urgency immediately after eating.  Still taking Dexilant 60 mg once daily with good control of GERD. Has a history of Márquez's (new diagnosis) and last EGD 3/2020.     Subjective     Past Medical History:   Diagnosis Date   •  Abdominal pain    • Abnormal uterine bleeding (AUB)    • Anxiety and depression    • Arthritis    • Asthma     mild   • Cholecystitis    • Constipation    • Endometriosis    • Frequent UTI    • GERD (gastroesophageal reflux disease)    • Heartburn    • Hemorrhoids    • Kidney stones    • Lesion of breast    • Lump     RIGHT BREAST   • Nausea & vomiting    • PCOS (polycystic ovarian syndrome)    • Sjogren's disease (HCC)    • Snores    • Tachycardia    • Wrist fracture     RIGHT ARM      PATIENT UNSURE IF FRACTURED     Past Surgical History:   Procedure Laterality Date   • ABDOMINAL SURGERY     • BREAST BIOPSY Right 11/29/2018    Procedure: breast biopsy ;  Surgeon: Shiv Overton MD;  Location: [facility] OR;  Service: General   • CHOLECYSTECTOMY N/A 8/25/2017    Procedure: CHOLECYSTECTOMY LAPAROSCOPIC;  Surgeon: Franco Mari MD;  Location: [facility] OR;  Service:    • COLONOSCOPY N/A 3/9/2020    Procedure: COLONOSCOPY CPT CODE: 75489;  Surgeon: Jeremiah Murdock MD;  Location: [facility] OR;  Service: Gastroenterology;  Laterality: N/A;   • DENTAL PROCEDURE     • DIAGNOSTIC LAPAROSCOPY      X5   • DILATATION AND CURETTAGE     • ENDOSCOPY N/A 3/9/2020    Procedure: ESOPHAGOGASTRODUODENOSCOPY WITH BIOPSY CPT CODE: 31595;  Surgeon: Jeremiah Murdock MD;  Location: [facility] OR;  Service: Gastroenterology;  Laterality: N/A;   • ENDOSCOPY N/A 2/1/2021    Procedure: ESOPHAGOGASTRODUODENOSCOPY WITH BIOPSY CPT CODE: 16745;  Surgeon: Jeremiah Murdock MD;  Location: [facility] OR;  Service: Gastroenterology;  Laterality: N/A;   • HEMORRHOIDECTOMY N/A 11/14/2019    Procedure: HEMORRHOID STAPLING;  Surgeon: Franco Mari MD;  Location: [facility] OR;  Service: General   • URETEROSCOPY LASER LITHOTRIPSY WITH STENT INSERTION Right 1/9/2019    Procedure: URETEROSCOPY LASER LITHOTRIPSY retrograde pyelogram;  Surgeon: Ahmet Barrow MD;  Location: [facility] OR;  Service: Urology   • WISDOM TOOTH  EXTRACTION       Family History   Problem Relation Age of Onset   • Breast cancer Maternal Aunt    • Alcohol abuse Paternal Grandfather    • Heart disease Paternal Grandfather    • Cancer Maternal Grandfather    • Hypertension Maternal Grandfather    • Colon cancer Neg Hx    • Liver cancer Neg Hx      Social History     Socioeconomic History   • Marital status:    Tobacco Use   • Smoking status: Current Every Day Smoker     Packs/day: 0.50     Years: 16.00     Pack years: 8.00     Types: Cigarettes     Start date: 2003   • Smokeless tobacco: Never Used   Vaping Use   • Vaping Use: Former   • Substances: Nicotine, Flavoring   Substance and Sexual Activity   • Alcohol use: Yes     Comment: RARELY   • Drug use: No   • Sexual activity: Defer       Current Outpatient Medications:   •  albuterol sulfate  (90 Base) MCG/ACT inhaler, Inhale 1 puff 4 (Four) Times a Day., Disp: , Rfl:   •  Albuterol Sulfate, sensor, 108 (90 Base) MCG/ACT aerosol powder , Inhale 2 puffs As Needed., Disp: , Rfl:   •  aspirin 81 MG EC tablet, Take 81 mg by mouth Daily. LAS T DOSE 11/26/2018, Disp: , Rfl:   •  betamethasone dipropionate (DIPROLENE) 0.05 % lotion, , Disp: , Rfl:   •  dexlansoprazole (Dexilant) 60 MG capsule, Take 1 capsule by mouth Daily., Disp: 30 capsule, Rfl: 11  •  dicyclomine (BENTYL) 20 MG tablet, TAKE ONE TABLET BY MOUTH FOUR TIMES A DAY BEFORE MEALS AND AT BEDTIME, Disp: 120 tablet, Rfl: 2  •  DOXYCYCLINE MONOHYDRATE PO, Take 100 mg by mouth 4 (Four) Times a Day., Disp: , Rfl:   •  drospirenone-ethinyl estradiol (Loretta) 3-0.02 MG per tablet, Take 1 tablet by mouth Daily., Disp: , Rfl:   •  famotidine (PEPCID) 20 MG tablet, Take 20 mg by mouth 2 (Two) Times a Day., Disp: , Rfl:   •  FLUoxetine (PROzac) 20 MG capsule, Take 20 mg by mouth Daily., Disp: , Rfl:   •  FLUoxetine (PROzac) 40 MG capsule, Take 40 mg by mouth Daily., Disp: , Rfl:   •  folic acid (FOLVITE) 1 MG tablet, Take 1 mg by mouth Daily., Disp: ,  Rfl:   •  hydrOXYzine pamoate (VISTARIL) 25 MG capsule, Take 25 mg by mouth 3 (Three) Times a Day As Needed., Disp: , Rfl:   •  loratadine (CLARITIN) 10 MG tablet, 10 mg Daily., Disp: , Rfl:   •  methocarbamol (ROBAXIN) 500 MG tablet, Take 500 mg by mouth Every Night., Disp: , Rfl:   •  methotrexate 2.5 MG tablet, Take  by mouth 3 (Three) Doses Each Week. Take Doses 12 (Twelve) Hours Apart., Disp: , Rfl:   •  metoprolol tartrate (LOPRESSOR) 50 MG tablet, Take 50 mg by mouth 2 (Two) Times a Day., Disp: , Rfl:   •  montelukast (SINGULAIR) 10 MG tablet, Take 10 mg by mouth Every Night., Disp: , Rfl:   •  multivitamin with minerals tablet tablet, Take 1 tablet by mouth Daily., Disp: , Rfl:   •  QUEtiapine (SEROquel) 100 MG tablet, Take 150 mg by mouth Every Night., Disp: , Rfl:   •  topiramate (TOPAMAX) 25 MG tablet, Take 25 mg by mouth Every Night., Disp: , Rfl:   •  triamcinolone (KENALOG) 0.1 % cream, , Disp: , Rfl:   •  vitamin D (ERGOCALCIFEROL) 1.25 MG (55436 UT) capsule capsule, Take 50,000 Units by mouth Every 7 (Seven) Days., Disp: , Rfl:   •  Lifitegrast (Xiidra) 5 % ophthalmic solution, Administer 1 drop to both eyes 2 (Two) Times a Day., Disp: , Rfl:     Allergies   Allergen Reactions   • Peanut-Containing Drug Products Anaphylaxis     AND PEANUTS IN FOODS   • Latex Hives   • Shrimp Swelling     Facial swelling     • Milk-Related Compounds Nausea And Vomiting   • Sulfa Antibiotics Rash     The following portions of the patient's history were reviewed and updated as appropriate: allergies, current medications, past family history, past medical history, past social history, past surgical history and problem list.    Objective     Physical Exam  Constitutional:       General: She is not in acute distress.     Appearance: Normal appearance. She is well-developed. She is not diaphoretic.   HENT:      Head: Normocephalic and atraumatic.      Right Ear: External ear normal.      Left Ear: External ear normal.       "Nose: Nose normal.      Mouth/Throat:      Comments: Wearing a mask  Eyes:      General: No scleral icterus.        Right eye: No discharge.         Left eye: No discharge.      Conjunctiva/sclera: Conjunctivae normal.   Neck:      Trachea: No tracheal deviation.   Pulmonary:      Effort: Pulmonary effort is normal. No respiratory distress.   Musculoskeletal:         General: Normal range of motion.      Cervical back: Normal range of motion.   Skin:     Coloration: Skin is not pale.      Findings: No erythema or rash.   Neurological:      Mental Status: She is alert and oriented to person, place, and time.      Coordination: Coordination normal.   Psychiatric:         Mood and Affect: Mood normal.         Behavior: Behavior normal.         Thought Content: Thought content normal.         Judgment: Judgment normal.       Vitals:    12/15/21 1010   BP: 126/93   Pulse: 93   Resp: 24   Temp: 98 °F (36.7 °C)   TempSrc: Infrared   Weight: 78 kg (172 lb)   Height: 149.9 cm (59\")       Results Review:   I reviewed the patient's new clinical results.    Admission on 11/09/2021, Discharged on 11/09/2021   Component Date Value Ref Range Status   • QT Interval 11/09/2021 398  ms Final   • QTC Interval 11/09/2021 473  ms Final   • Glucose 11/09/2021 102* 65 - 99 mg/dL Final   • BUN 11/09/2021 15  6 - 20 mg/dL Final   • Creatinine 11/09/2021 1.04* 0.57 - 1.00 mg/dL Final   • Sodium 11/09/2021 134* 136 - 145 mmol/L Final   • Potassium 11/09/2021 4.3  3.5 - 5.2 mmol/L Final   • Chloride 11/09/2021 105  98 - 107 mmol/L Final   • CO2 11/09/2021 18.2* 22.0 - 29.0 mmol/L Final   • Calcium 11/09/2021 9.0  8.6 - 10.5 mg/dL Final   • Total Protein 11/09/2021 7.9  6.0 - 8.5 g/dL Final   • Albumin 11/09/2021 4.39  3.50 - 5.20 g/dL Final   • ALT (SGPT) 11/09/2021 16  1 - 33 U/L Final   • AST (SGOT) 11/09/2021 14  1 - 32 U/L Final   • Alkaline Phosphatase 11/09/2021 68  39 - 117 U/L Final   • Total Bilirubin 11/09/2021 0.2  0.0 - 1.2 mg/dL " Final   • eGFR Non  Amer 11/09/2021 61  >60 mL/min/1.73 Final   • Globulin 11/09/2021 3.5  gm/dL Final   • A/G Ratio 11/09/2021 1.3  g/dL Final   • BUN/Creatinine Ratio 11/09/2021 14.4  7.0 - 25.0 Final   • Anion Gap 11/09/2021 10.8  5.0 - 15.0 mmol/L Final   • Troponin T 11/09/2021 <0.010  0.000 - 0.030 ng/mL Final   • Troponin T 11/09/2021 <0.010  0.000 - 0.030 ng/mL Final   • WBC 11/09/2021 10.45  3.40 - 10.80 10*3/mm3 Final   • RBC 11/09/2021 4.38  3.77 - 5.28 10*6/mm3 Final   • Hemoglobin 11/09/2021 13.4  12.0 - 15.9 g/dL Final   • Hematocrit 11/09/2021 40.7  34.0 - 46.6 % Final   • MCV 11/09/2021 92.9  79.0 - 97.0 fL Final   • MCH 11/09/2021 30.6  26.6 - 33.0 pg Final   • MCHC 11/09/2021 32.9  31.5 - 35.7 g/dL Final   • RDW 11/09/2021 11.8* 12.3 - 15.4 % Final   • RDW-SD 11/09/2021 40.4  37.0 - 54.0 fl Final   • MPV 11/09/2021 8.7  6.0 - 12.0 fL Final   • Platelets 11/09/2021 362  140 - 450 10*3/mm3 Final   • HCG, Urine QL 11/09/2021 Negative  Negative Final   • THC, Screen, Urine 11/09/2021 Negative  Negative Final   • Phencyclidine (PCP), Urine 11/09/2021 Negative  Negative Final   • Cocaine Screen, Urine 11/09/2021 Negative  Negative Final   • Methamphetamine, Ur 11/09/2021 Negative  Negative Final   • Opiate Screen 11/09/2021 Negative  Negative Final   • Amphetamine Screen, Urine 11/09/2021 Negative  Negative Final   • Benzodiazepine Screen, Urine 11/09/2021 Negative  Negative Final   • Tricyclic Antidepressants Screen 11/09/2021 Negative  Negative Final   • Methadone Screen, Urine 11/09/2021 Negative  Negative Final   • Barbiturates Screen, Urine 11/09/2021 Negative  Negative Final   • Oxycodone Screen, Urine 11/09/2021 Negative  Negative Final   • Propoxyphene Screen 11/09/2021 Negative  Negative Final   • Buprenorphine, Screen, Urine 11/09/2021 Negative  Negative Final   • Magnesium 11/09/2021 2.2  1.6 - 2.6 mg/dL Final   • TSH 11/09/2021 1.110  0.270 - 4.200 uIU/mL Final      EGD was completed  on 2/1/2021 by Dr. Murdock.  Findings were short segment Márquez's esophagus with patchy involvement of the distal 1 to 2 cm of the esophagus, normal stomach except for small sliding hiatal hernia, normal duodenum.  Pathology shows benign duodenal mucosa, no H. pylori, distal esophageal biopsy shows chronic inflammation, features of reflux and multifocal intestinal metaplasia, negative for dysplasia.     EGD and colonoscopy were completed on 3/9/2020 by Dr. Murdock.  Findings were patchy salmon-colored mucosa in the distal esophagus, small sliding hiatal hernia, normal stomach, normal duodenum, normal terminal ileum, evidence of prior rectal surgery, 6 mm colon polyp in the transverse and sigmoid colon, left-sided diverticulosis.  Pathology showed normal duodenum, minimal superficial chronic inflammation of the stomach, negative H. pylori, esophageal biopsy showed features of reflux, multifocal intestinal metaplasia negative for dysplasia, transverse colon polyp was sessile serrated adenoma and sigmoid colon polyp was hyperplastic.    CTAP with contrast 8/2021   FINDINGS:    LUNG BASES:  Unremarkable.  No mass.  No consolidation.   ABDOMEN:    LIVER:  Unremarkable.  No mass.    GALLBLADDER AND BILE DUCTS:  Cholecystectomy clips.  No ductal  dilation.    PANCREAS:  Unremarkable.  No mass.  No ductal dilation.    SPLEEN:  Unremarkable.  No splenomegaly.    ADRENALS:  Unremarkable.  No mass.    KIDNEYS AND URETERS:  Unremarkable.  No solid mass.  No  hydronephrosis.    STOMACH AND BOWEL:  Unremarkable.  No obstruction.  No mucosal  thickening.   PELVIS:    APPENDIX:  No findings to suggest acute appendicitis.    BLADDER:  Unremarkable.  No mass.    REPRODUCTIVE:  Unremarkable as visualized.   ABDOMEN and PELVIS:    INTRAPERITONEAL SPACE:  Unremarkable.  No free air.  No significant  fluid collection.    BONES/JOINTS:  No acute fracture.  No dislocation.    SOFT TISSUES:  Unremarkable.    VASCULATURE:   Unremarkable.  No abdominal aortic aneurysm.    LYMPH NODES:  Unremarkable.  No enlarged lymph nodes.  IMPRESSION:    No acute findings in the abdomen or pelvis.    Assessment / Plan      1. Irritable bowel syndrome with diarrhea  She is still having symptoms of generalized abdominal pain but diarrhea improved since Xifaxan therapy for IBS.  Dicyclomine has helped some to relieve abdominal pain, will continue.  Labs earlier this year showed CMP, TSH and celiac testing all unremarkable. CTAP 8/2021 showed no GI pathology. She has IBS based on history. Colonoscopy in 2020 showed left sided diverticulosis and colon polyps. Can continue taking bentyl as needed. If diarrhea worsens, will continue another treatment with Xifaxan for IBS- D. Low FODMAP diet recommended with strict adherence for the next 6 weeks, then gradually add foods back in to test tolerance. Work on weight loss with diet modification and exercise as tolerated. Call with concerns.                      Prior History:  Gastroesophageal reflux disease without esophagitis  Hiatal hernia  Márquez's esophagus without dysplasia  Long standing history of GERD, taking Dexilant 60 mg once daily with reasonable control. She needs to work on avoiding foods that make her GERD worse, discussed weight loss which may help with this. Did have multifocal intestinal metaplasia of the esophageal biopsy. Will need another EGD for monitoring of dysplasia related to her Márquez's esophagus, due 2/2024.     Weight gain, abnormal  Noted weight gain of 20 lbs over past 1 year. Will check TSH today.     History of adenomatous colon polyp  Colonoscopy in 3/2020 showed sessile serrated adenoma of transverse colon polyp, repeat colonoscopy recommended in 5 years, due 03/2025.     Follow Up:   Return in about 6 months (around 6/15/2022) for recheck IBS.      Naomi Aguiar PA-C  [facility]  12/17/2021  13:59 EST    Please note that portions of this note may have been  completed with a voice recognition program. Efforts were made to edit the dictations, but occasionally words are mistranscribed.

## 2021-12-16 ENCOUNTER — HOSPITAL ENCOUNTER (OUTPATIENT)
Dept: ULTRASOUND IMAGING | Facility: HOSPITAL | Age: 31
Discharge: HOME | End: 2021-12-16
Admitting: SPECIALIST

## 2021-12-16 PROCEDURE — 76882 US LMTD JT/FCL EVL NVASC XTR: CPT

## 2021-12-16 PROCEDURE — 76882 US LMTD JT/FCL EVL NVASC XTR: CPT | Performed by: RADIOLOGY

## 2021-12-22 ENCOUNTER — TREATMENT (OUTPATIENT)
Dept: PHYSICAL THERAPY | Facility: CLINIC | Age: 31
End: 2021-12-22

## 2021-12-22 DIAGNOSIS — M25.561 CHRONIC PAIN OF RIGHT KNEE: Primary | ICD-10-CM

## 2021-12-22 DIAGNOSIS — M25.661 DECREASED RANGE OF MOTION (ROM) OF RIGHT KNEE: ICD-10-CM

## 2021-12-22 DIAGNOSIS — G89.29 CHRONIC PAIN OF RIGHT KNEE: Primary | ICD-10-CM

## 2021-12-22 DIAGNOSIS — R26.89 ANTALGIC GAIT: ICD-10-CM

## 2021-12-22 PROCEDURE — 97110 THERAPEUTIC EXERCISES: CPT | Performed by: PHYSICAL THERAPIST

## 2021-12-22 PROCEDURE — 97014 ELECTRIC STIMULATION THERAPY: CPT | Performed by: PHYSICAL THERAPIST

## 2021-12-22 NOTE — PROGRESS NOTES
Physical Therapy Daily Treatment Note      Patient: Pili Webster   : 1990  Referring practitioner: Ronald S Dubin, MD  Date of Initial Visit: Type: THERAPY  Noted: 10/19/2021  Today's Date: 2021  Patient seen for 11 sessions       Visit Diagnoses:    ICD-10-CM ICD-9-CM   1. Chronic pain of right knee  M25.561 719.46    G89.29 338.29   2. Antalgic gait  R26.89 781.2   3. Decreased range of motion (ROM) of right knee  M25.661 719.56       Subjective Evaluation    History of Present Illness    Subjective comment: Patient reports that she saw referring MD on 2021 and received cortisone injection in her knee; she returns for a follow-up on 2022, but was told that if injection doesn't help she may have surgery to remove the fat pad in her knee.  Patient also notes that she saw a cardiologist this morning and was told that her heart monitor she wore a few weeks ago showed palpitations; she also notes medication changes.  Pain  Current pain ratin           Objective   See Exercise, Manual, and Modality Logs for complete treatment.       Assessment & Plan     Assessment    Assessment details: Therapy session shortened due to patient's late arrival to therapy.  Therapy session consisted of there ex, cryotherapy, and ESTIM.  No adverse reactions were noted with modalities. There ex progressed to include increased repetitions and increased bicycle resistance.  Vital signs were assessed during session at109/90 mmHg and heart rate of 86 BPM.  Patient reported no change in pain at conclusion of session.  She will continue to be progressed, per her tolerance and POC.          Timed:         Manual Therapy:         mins  56941;     Therapeutic Exercise:    32     mins  28190;     Neuromuscular Vladimir:        mins  76346;    Therapeutic Activity:          mins  18395;     Gait Training:           mins  56551;     Ultrasound:          mins  31641;    Ionto                                   mins    63319  Self Care                            mins   52207  Canalith Repos         mins 73905      Un-Timed:  Electrical Stimulation:    10     mins  58843 ( );  Dry Needling          mins self-pay  Traction          mins 69528      Timed Treatment:   32   mins   Total Treatment:     42   mins    Pili Walker, PT  [place] License: [id number]

## 2022-01-03 ENCOUNTER — HOSPITAL ENCOUNTER (OUTPATIENT)
Dept: HOSPITAL 79 - HEART CORB | Age: 32
End: 2022-01-03
Attending: INTERNAL MEDICINE
Payer: COMMERCIAL

## 2022-01-03 ENCOUNTER — TREATMENT (OUTPATIENT)
Dept: PHYSICAL THERAPY | Facility: CLINIC | Age: 32
End: 2022-01-03

## 2022-01-03 DIAGNOSIS — I10: ICD-10-CM

## 2022-01-03 DIAGNOSIS — M25.661 DECREASED RANGE OF MOTION (ROM) OF RIGHT KNEE: ICD-10-CM

## 2022-01-03 DIAGNOSIS — R26.89 ANTALGIC GAIT: ICD-10-CM

## 2022-01-03 DIAGNOSIS — M25.561 CHRONIC PAIN OF RIGHT KNEE: Primary | ICD-10-CM

## 2022-01-03 DIAGNOSIS — R07.89: Primary | ICD-10-CM

## 2022-01-03 DIAGNOSIS — G89.29 CHRONIC PAIN OF RIGHT KNEE: Primary | ICD-10-CM

## 2022-01-03 DIAGNOSIS — R00.0: ICD-10-CM

## 2022-01-03 PROCEDURE — 97014 ELECTRIC STIMULATION THERAPY: CPT | Performed by: PHYSICAL THERAPIST

## 2022-01-03 PROCEDURE — 97110 THERAPEUTIC EXERCISES: CPT | Performed by: PHYSICAL THERAPIST

## 2022-01-03 NOTE — PROGRESS NOTES
Physical Therapy Daily Treatment Note      Patient: Pili Webster   : 1990  Referring practitioner: Ronald S Dubin, MD  Date of Initial Visit: Type: THERAPY  Noted: 10/19/2021  Today's Date: 1/3/2022  Patient seen for 12 sessions       Visit Diagnoses:    ICD-10-CM ICD-9-CM   1. Chronic pain of right knee  M25.561 719.46    G89.29 338.29   2. Antalgic gait  R26.89 781.2   3. Decreased range of motion (ROM) of right knee  M25.661 719.56       Subjective:  Patient states her knee is stiff and achy this morning, w/ reports of 6-7/10 pain prior to tx.      Objective   See Exercise, Manual, and Modality Logs for complete treatment.       Assessment/Plan: Patient completed today's session w/ reports of slight decrease in pain following, 5-610.  Treatment initiated w/ therex as listed w/ continued focus on improved VMO/ quad strength, improved knee stability, and ROM, as tolerated.  Exercise progressed w/ resistance of tband increased from red to green, w/ good tolerance observed.  Pt limited w/ progression of standing activities secondary to continued complaints of increased pain ratings.  Pt continues to require cues and demonstration while performing exercise for good form, and for max benefit.  MH w/ Estim applied to right knee at conclusion.  Pt continues to benefit from therapy services, and will be progressed as tolerated to address goals, decrease pain, and restore function.  Continue w/ PT's POC.         Timed:         Manual Therapy:         mins  61725;     Therapeutic Exercise:    44     mins  15798;     Neuromuscular Vladimir:        mins  17307;    Therapeutic Activity:          mins  86938;     Gait Training:           mins  40854;     Ultrasound:          mins  90331;    Ionto                                   mins   85517  Self Care                            mins   83538  Canalith Repos         mins 85457      Un-Timed:  Electrical Stimulation:   10      mins  11835 ( );  Dry  Needling          mins self-pay  Traction          mins 71732      Timed Treatment:   44   mins   Total Treatment:     54   mins    Janet Lisa. DAVID Parikh  [place] License: [id number]

## 2022-01-06 ENCOUNTER — TREATMENT (OUTPATIENT)
Dept: PHYSICAL THERAPY | Facility: CLINIC | Age: 32
End: 2022-01-06

## 2022-01-06 DIAGNOSIS — R26.89 ANTALGIC GAIT: ICD-10-CM

## 2022-01-06 DIAGNOSIS — M25.561 CHRONIC PAIN OF RIGHT KNEE: Primary | ICD-10-CM

## 2022-01-06 DIAGNOSIS — G89.29 CHRONIC PAIN OF RIGHT KNEE: Primary | ICD-10-CM

## 2022-01-06 DIAGNOSIS — M25.661 DECREASED RANGE OF MOTION (ROM) OF RIGHT KNEE: ICD-10-CM

## 2022-01-06 PROCEDURE — 97110 THERAPEUTIC EXERCISES: CPT | Performed by: PHYSICAL THERAPIST

## 2022-01-06 NOTE — PROGRESS NOTES
Physical Therapy Daily Treatment Note      Patient: Pili Webster   : 1990  Referring practitioner: Ronald S Dubin, MD  Date of Initial Visit: Type: THERAPY  Noted: 10/19/2021  Today's Date: 2022  Patient seen for 13 sessions       Visit Diagnoses:    ICD-10-CM ICD-9-CM   1. Chronic pain of right knee  M25.561 719.46    G89.29 338.29   2. Antalgic gait  R26.89 781.2   3. Decreased range of motion (ROM) of right knee  M25.661 719.56       Subjective Evaluation    Pain  Current pain ratin  At best pain ratin  At worst pain ratin      :  Patient arrives to therapy w/ reports of 6/10 right knee pain.  Pt states she continues to have popping underneath her knee cap.  Pt reports she is scheduled to f/u with referring provider on  or .     Objective          Active Range of Motion     Right Knee   Flexion: 125 degrees   Extension: 0 degrees     Strength/Myotome Testing     Right Hip   Planes of Motion   Flexion: 4    Right Knee   Flexion: 4  Extension: 4      See Exercise, Manual, and Modality Logs for complete treatment.       Assessment/Plan:  Patient completed today's session w/ no complaints of pain increase noted following.  Pt performed  therex as listed w/ continued focus on improved right knee ROM, improved right LE strength, and knee stability, as tolerated.  Pt provided w/ intermittent cues, however demonstrated good mechanics w/ home program.  Pt demonstrated improved right LE strength, and right knee range of motion since initiating therapy.  Pt continues to report higher pain scores. Supervising therapist, Pili Walker PT, DPT present during today's session, and recommended for patient to discharge secondary to max gains achieved at this time. Pt was advised to f/u with referring provider.  Pt agreeable.  Pt will be discharge from therapy services at this time. Pili Walker PT, DPT will complete discharge.  See record for details.          Timed:          Manual Therapy:         mins  04446;     Therapeutic Exercise:   38      mins  03312;     Neuromuscular Vladimir:        mins  15830;    Therapeutic Activity:          mins  51033;     Gait Training:           mins  70727;     Ultrasound:          mins  38253;    Ionto                                   mins   44331  Self Care                            mins   88101  Canalith Repos         mins 59438      Un-Timed:  Electrical Stimulation:         mins  75425 ( );  Dry Needling          mins self-pay  Traction          mins 28560      Timed Treatment:   38   mins   Total Treatment:    38    mins    Janet Lisa. DAVID Parikh  [place] License: [id number]

## 2022-01-15 DIAGNOSIS — K58.0 IRRITABLE BOWEL SYNDROME WITH DIARRHEA: ICD-10-CM

## 2022-01-17 RX ORDER — DICYCLOMINE HCL 20 MG
TABLET ORAL
Qty: 120 TABLET | Refills: 2 | Status: SHIPPED | OUTPATIENT
Start: 2022-01-17 | End: 2022-04-25

## 2022-01-24 NOTE — ANESTHESIA PROCEDURE NOTES
Airway  Urgency: elective    Airway not difficult    General Information and Staff    Patient location during procedure: OR    Indications and Patient Condition  Indications for airway management: airway protection    Preoxygenated: yes  MILS maintained throughout  Mask difficulty assessment: 0 - not attempted    Final Airway Details  Final airway type: supraglottic airway      Successful airway: unique  Size 4    Number of attempts at approach: 1               Caller: America Nix    Relationship to patient: Self    Best call back number: [phone]    Chief complaint: PATIENT CALLED STATING THAT SHE NEEDS TO RESCHEDULE HER CT BUT CANNOT UNTIL SHE HAS APPROVAL FROM HER INSURANCE COMPANY.    SHE IS CURRENTLY SCHEDULED FOR A FU ON 2/2/22    Type of visit: CT    Additional notes: PLEASE CALL PATIENT TO ADVISE

## 2022-02-08 ENCOUNTER — TELEPHONE (OUTPATIENT)
Dept: GASTROENTEROLOGY | Facility: CLINIC | Age: 32
End: 2022-02-08

## 2022-02-08 DIAGNOSIS — K58.0 IRRITABLE BOWEL SYNDROME WITH DIARRHEA: Primary | ICD-10-CM

## 2022-02-08 NOTE — TELEPHONE ENCOUNTER
----- Message from Mary May MA sent at 2/8/2022  9:42 AM EST -----  Patient called today. She is starting to have some issues with IBS again. She wanted to see if she could try another round of Xifaxan

## 2022-02-19 DIAGNOSIS — K58.0 IRRITABLE BOWEL SYNDROME WITH DIARRHEA: ICD-10-CM

## 2022-02-21 RX ORDER — RIFAXIMIN 550 MG/1
TABLET ORAL
Qty: 42 TABLET | Refills: 0 | OUTPATIENT
Start: 2022-02-21

## 2022-02-23 ENCOUNTER — HOSPITAL ENCOUNTER (OUTPATIENT)
Dept: ULTRASOUND IMAGING | Facility: HOSPITAL | Age: 32
End: 2022-02-23

## 2022-02-23 ENCOUNTER — APPOINTMENT (OUTPATIENT)
Dept: MAMMOGRAPHY | Facility: HOSPITAL | Age: 32
End: 2022-02-23

## 2022-03-09 ENCOUNTER — HOSPITAL ENCOUNTER (OUTPATIENT)
Dept: MAMMOGRAPHY | Facility: HOSPITAL | Age: 32
Discharge: HOME | End: 2022-03-09

## 2022-03-09 ENCOUNTER — HOSPITAL ENCOUNTER (OUTPATIENT)
Dept: ULTRASOUND IMAGING | Facility: HOSPITAL | Age: 32
Discharge: HOME | End: 2022-03-09

## 2022-03-09 DIAGNOSIS — N64.4 PAINFUL BREASTS: ICD-10-CM

## 2022-03-09 PROCEDURE — G0279 TOMOSYNTHESIS, MAMMO: HCPCS

## 2022-03-09 PROCEDURE — 77066 DX MAMMO INCL CAD BI: CPT

## 2022-03-09 PROCEDURE — 77066 DX MAMMO INCL CAD BI: CPT | Performed by: RADIOLOGY

## 2022-03-09 PROCEDURE — 77062 BREAST TOMOSYNTHESIS BI: CPT | Performed by: RADIOLOGY

## 2022-03-30 DIAGNOSIS — K44.9 HIATAL HERNIA: ICD-10-CM

## 2022-03-30 DIAGNOSIS — K22.70 BARRETT'S ESOPHAGUS WITHOUT DYSPLASIA: ICD-10-CM

## 2022-03-30 RX ORDER — DEXLANSOPRAZOLE 60 MG/1
CAPSULE, DELAYED RELEASE ORAL
Qty: 90 CAPSULE | Refills: 3 | Status: SHIPPED | OUTPATIENT
Start: 2022-03-30 | End: 2023-04-03

## 2022-03-31 ENCOUNTER — TRANSCRIBE ORDERS (OUTPATIENT)
Dept: ADMINISTRATIVE | Facility: HOSPITAL | Age: 32
End: 2022-03-31

## 2022-03-31 DIAGNOSIS — Z01.818 PRE-OPERATIVE CLEARANCE: Primary | ICD-10-CM

## 2022-04-08 ENCOUNTER — PRE-ADMISSION TESTING (OUTPATIENT)
Dept: PREADMISSION TESTING | Facility: HOSPITAL | Age: 32
End: 2022-04-08

## 2022-04-08 ENCOUNTER — LAB (OUTPATIENT)
Dept: LAB | Facility: HOSPITAL | Age: 32
End: 2022-04-08

## 2022-04-08 ENCOUNTER — ANESTHESIA EVENT (OUTPATIENT)
Dept: PERIOP | Facility: HOSPITAL | Age: 32
End: 2022-04-08

## 2022-04-08 DIAGNOSIS — Z01.818 PRE-OPERATIVE CLEARANCE: ICD-10-CM

## 2022-04-08 LAB
ANION GAP SERPL CALCULATED.3IONS-SCNC: 13.1 MMOL/L (ref 5–15)
BUN SERPL-MCNC: 11 MG/DL (ref 6–20)
BUN/CREAT SERPL: 13.1 (ref 7–25)
CALCIUM SPEC-SCNC: 9 MG/DL (ref 8.6–10.5)
CHLORIDE SERPL-SCNC: 105 MMOL/L (ref 98–107)
CO2 SERPL-SCNC: 19.9 MMOL/L (ref 22–29)
CREAT SERPL-MCNC: 0.84 MG/DL (ref 0.57–1)
DEPRECATED RDW RBC AUTO: 43.8 FL (ref 37–54)
EGFRCR SERPLBLD CKD-EPI 2021: 94.2 ML/MIN/1.73
ERYTHROCYTE [DISTWIDTH] IN BLOOD BY AUTOMATED COUNT: 12.7 % (ref 12.3–15.4)
GLUCOSE SERPL-MCNC: 96 MG/DL (ref 65–99)
HCG SERPL QL: NEGATIVE
HCT VFR BLD AUTO: 39.3 % (ref 34–46.6)
HGB BLD-MCNC: 12.9 G/DL (ref 12–15.9)
MCH RBC QN AUTO: 30.8 PG (ref 26.6–33)
MCHC RBC AUTO-ENTMCNC: 32.8 G/DL (ref 31.5–35.7)
MCV RBC AUTO: 93.8 FL (ref 79–97)
PLATELET # BLD AUTO: 316 10*3/MM3 (ref 140–450)
PMV BLD AUTO: 9.4 FL (ref 6–12)
POTASSIUM SERPL-SCNC: 4 MMOL/L (ref 3.5–5.2)
RBC # BLD AUTO: 4.19 10*6/MM3 (ref 3.77–5.28)
SARS-COV-2 RNA PNL SPEC NAA+PROBE: NOT DETECTED
SODIUM SERPL-SCNC: 138 MMOL/L (ref 136–145)
WBC NRBC COR # BLD: 10.16 10*3/MM3 (ref 3.4–10.8)

## 2022-04-08 PROCEDURE — 80048 BASIC METABOLIC PNL TOTAL CA: CPT

## 2022-04-08 PROCEDURE — 84703 CHORIONIC GONADOTROPIN ASSAY: CPT

## 2022-04-08 PROCEDURE — 36415 COLL VENOUS BLD VENIPUNCTURE: CPT

## 2022-04-08 PROCEDURE — U0004 COV-19 TEST NON-CDC HGH THRU: HCPCS | Performed by: GENERAL PRACTICE

## 2022-04-08 PROCEDURE — 85027 COMPLETE CBC AUTOMATED: CPT

## 2022-04-08 PROCEDURE — C9803 HOPD COVID-19 SPEC COLLECT: HCPCS

## 2022-04-08 NOTE — DISCHARGE INSTRUCTIONS

## 2022-04-11 ENCOUNTER — ANESTHESIA (OUTPATIENT)
Dept: PERIOP | Facility: HOSPITAL | Age: 32
End: 2022-04-11

## 2022-04-11 ENCOUNTER — APPOINTMENT (OUTPATIENT)
Dept: GENERAL RADIOLOGY | Facility: HOSPITAL | Age: 32
End: 2022-04-11

## 2022-04-11 ENCOUNTER — HOSPITAL ENCOUNTER (OUTPATIENT)
Facility: HOSPITAL | Age: 32
Setting detail: HOSPITAL OUTPATIENT SURGERY
Discharge: HOME | End: 2022-04-11
Attending: GENERAL PRACTICE | Admitting: GENERAL PRACTICE

## 2022-04-11 VITALS
RESPIRATION RATE: 16 BRPM | WEIGHT: 173 LBS | HEIGHT: 59 IN | BODY MASS INDEX: 34.88 KG/M2 | DIASTOLIC BLOOD PRESSURE: 88 MMHG | TEMPERATURE: 97.8 F | OXYGEN SATURATION: 96 % | SYSTOLIC BLOOD PRESSURE: 115 MMHG | HEART RATE: 100 BPM

## 2022-04-11 DIAGNOSIS — Z98.890 H/O ARTHROSCOPIC KNEE SURGERY: Primary | ICD-10-CM

## 2022-04-11 LAB
B-HCG UR QL: NEGATIVE
EXPIRATION DATE: NORMAL
INTERNAL NEGATIVE CONTROL: NEGATIVE
INTERNAL POSITIVE CONTROL: POSITIVE
Lab: NORMAL

## 2022-04-11 PROCEDURE — 25010000002 FENTANYL CITRATE (PF) 50 MCG/ML SOLUTION: Performed by: NURSE ANESTHETIST, CERTIFIED REGISTERED

## 2022-04-11 PROCEDURE — 25010000002 KETOROLAC TROMETHAMINE PER 15 MG: Performed by: NURSE ANESTHETIST, CERTIFIED REGISTERED

## 2022-04-11 PROCEDURE — 81025 URINE PREGNANCY TEST: CPT | Performed by: ANESTHESIOLOGY

## 2022-04-11 PROCEDURE — 25010000002 PHENYLEPHRINE 10 MG/ML SOLUTION: Performed by: NURSE ANESTHETIST, CERTIFIED REGISTERED

## 2022-04-11 PROCEDURE — 25010000002 PROPOFOL 10 MG/ML EMULSION: Performed by: NURSE ANESTHETIST, CERTIFIED REGISTERED

## 2022-04-11 PROCEDURE — 0 MEPERIDINE PER 100 MG: Performed by: NURSE ANESTHETIST, CERTIFIED REGISTERED

## 2022-04-11 PROCEDURE — 25010000002 ONDANSETRON PER 1 MG: Performed by: NURSE ANESTHETIST, CERTIFIED REGISTERED

## 2022-04-11 PROCEDURE — 0 CEFAZOLIN SODIUM-DEXTROSE 2-3 GM-%(50ML) RECONSTITUTED SOLUTION: Performed by: GENERAL PRACTICE

## 2022-04-11 PROCEDURE — 25010000002 MIDAZOLAM PER 1 MG: Performed by: NURSE ANESTHETIST, CERTIFIED REGISTERED

## 2022-04-11 PROCEDURE — 25010000002 DEXAMETHASONE PER 1 MG: Performed by: NURSE ANESTHETIST, CERTIFIED REGISTERED

## 2022-04-11 RX ORDER — LIDOCAINE HYDROCHLORIDE 20 MG/ML
INJECTION, SOLUTION INFILTRATION; PERINEURAL AS NEEDED
Status: DISCONTINUED | OUTPATIENT
Start: 2022-04-11 | End: 2022-04-11 | Stop reason: SURG

## 2022-04-11 RX ORDER — ONDANSETRON 2 MG/ML
4 INJECTION INTRAMUSCULAR; INTRAVENOUS AS NEEDED
Status: DISCONTINUED | OUTPATIENT
Start: 2022-04-11 | End: 2022-04-11 | Stop reason: HOSPADM

## 2022-04-11 RX ORDER — FENTANYL CITRATE 50 UG/ML
INJECTION, SOLUTION INTRAMUSCULAR; INTRAVENOUS AS NEEDED
Status: DISCONTINUED | OUTPATIENT
Start: 2022-04-11 | End: 2022-04-11 | Stop reason: SURG

## 2022-04-11 RX ORDER — KETOROLAC TROMETHAMINE 30 MG/ML
INJECTION, SOLUTION INTRAMUSCULAR; INTRAVENOUS AS NEEDED
Status: DISCONTINUED | OUTPATIENT
Start: 2022-04-11 | End: 2022-04-11 | Stop reason: SURG

## 2022-04-11 RX ORDER — IPRATROPIUM BROMIDE AND ALBUTEROL SULFATE 2.5; .5 MG/3ML; MG/3ML
3 SOLUTION RESPIRATORY (INHALATION) ONCE AS NEEDED
Status: DISCONTINUED | OUTPATIENT
Start: 2022-04-11 | End: 2022-04-11 | Stop reason: HOSPADM

## 2022-04-11 RX ORDER — SODIUM CHLORIDE 0.9 % (FLUSH) 0.9 %
10 SYRINGE (ML) INJECTION EVERY 12 HOURS SCHEDULED
Status: DISCONTINUED | OUTPATIENT
Start: 2022-04-11 | End: 2022-04-11 | Stop reason: HOSPADM

## 2022-04-11 RX ORDER — LIDOCAINE HYDROCHLORIDE 10 MG/ML
INJECTION, SOLUTION EPIDURAL; INFILTRATION; INTRACAUDAL; PERINEURAL AS NEEDED
Status: DISCONTINUED | OUTPATIENT
Start: 2022-04-11 | End: 2022-04-11 | Stop reason: HOSPADM

## 2022-04-11 RX ORDER — CEFAZOLIN SODIUM 2 G/50ML
2 SOLUTION INTRAVENOUS ONCE
Status: COMPLETED | OUTPATIENT
Start: 2022-04-11 | End: 2022-04-11

## 2022-04-11 RX ORDER — OXYCODONE HYDROCHLORIDE AND ACETAMINOPHEN 5; 325 MG/1; MG/1
1 TABLET ORAL ONCE AS NEEDED
Status: DISCONTINUED | OUTPATIENT
Start: 2022-04-11 | End: 2022-04-11 | Stop reason: HOSPADM

## 2022-04-11 RX ORDER — DROPERIDOL 2.5 MG/ML
0.62 INJECTION, SOLUTION INTRAMUSCULAR; INTRAVENOUS ONCE AS NEEDED
Status: DISCONTINUED | OUTPATIENT
Start: 2022-04-11 | End: 2022-04-11 | Stop reason: HOSPADM

## 2022-04-11 RX ORDER — DEXAMETHASONE SODIUM PHOSPHATE 4 MG/ML
INJECTION, SOLUTION INTRA-ARTICULAR; INTRALESIONAL; INTRAMUSCULAR; INTRAVENOUS; SOFT TISSUE AS NEEDED
Status: DISCONTINUED | OUTPATIENT
Start: 2022-04-11 | End: 2022-04-11 | Stop reason: SURG

## 2022-04-11 RX ORDER — FAMOTIDINE 10 MG/ML
INJECTION, SOLUTION INTRAVENOUS AS NEEDED
Status: DISCONTINUED | OUTPATIENT
Start: 2022-04-11 | End: 2022-04-11 | Stop reason: SURG

## 2022-04-11 RX ORDER — BUPIVACAINE HYDROCHLORIDE 2.5 MG/ML
INJECTION, SOLUTION EPIDURAL; INFILTRATION; INTRACAUDAL AS NEEDED
Status: DISCONTINUED | OUTPATIENT
Start: 2022-04-11 | End: 2022-04-11 | Stop reason: HOSPADM

## 2022-04-11 RX ORDER — SCOLOPAMINE TRANSDERMAL SYSTEM 1 MG/1
1 PATCH, EXTENDED RELEASE TRANSDERMAL ONCE
Status: DISCONTINUED | OUTPATIENT
Start: 2022-04-11 | End: 2022-04-11 | Stop reason: HOSPADM

## 2022-04-11 RX ORDER — FENTANYL CITRATE 50 UG/ML
50 INJECTION, SOLUTION INTRAMUSCULAR; INTRAVENOUS
Status: DISCONTINUED | OUTPATIENT
Start: 2022-04-11 | End: 2022-04-11 | Stop reason: HOSPADM

## 2022-04-11 RX ORDER — PROPOFOL 10 MG/ML
VIAL (ML) INTRAVENOUS AS NEEDED
Status: DISCONTINUED | OUTPATIENT
Start: 2022-04-11 | End: 2022-04-11 | Stop reason: SURG

## 2022-04-11 RX ORDER — MIDAZOLAM HYDROCHLORIDE 1 MG/ML
1 INJECTION INTRAMUSCULAR; INTRAVENOUS
Status: DISCONTINUED | OUTPATIENT
Start: 2022-04-11 | End: 2022-04-11 | Stop reason: HOSPADM

## 2022-04-11 RX ORDER — PHENYLEPHRINE HYDROCHLORIDE 10 MG/ML
INJECTION INTRAVENOUS AS NEEDED
Status: DISCONTINUED | OUTPATIENT
Start: 2022-04-11 | End: 2022-04-11 | Stop reason: SURG

## 2022-04-11 RX ORDER — SODIUM CHLORIDE, SODIUM LACTATE, POTASSIUM CHLORIDE, CALCIUM CHLORIDE 600; 310; 30; 20 MG/100ML; MG/100ML; MG/100ML; MG/100ML
INJECTION, SOLUTION INTRAVENOUS CONTINUOUS PRN
Status: DISCONTINUED | OUTPATIENT
Start: 2022-04-11 | End: 2022-04-11 | Stop reason: SURG

## 2022-04-11 RX ORDER — MIDAZOLAM HYDROCHLORIDE 1 MG/ML
INJECTION INTRAMUSCULAR; INTRAVENOUS AS NEEDED
Status: DISCONTINUED | OUTPATIENT
Start: 2022-04-11 | End: 2022-04-11 | Stop reason: SURG

## 2022-04-11 RX ORDER — SODIUM CHLORIDE, SODIUM LACTATE, POTASSIUM CHLORIDE, CALCIUM CHLORIDE 600; 310; 30; 20 MG/100ML; MG/100ML; MG/100ML; MG/100ML
125 INJECTION, SOLUTION INTRAVENOUS ONCE
Status: COMPLETED | OUTPATIENT
Start: 2022-04-11 | End: 2022-04-11

## 2022-04-11 RX ORDER — SODIUM CHLORIDE 0.9 % (FLUSH) 0.9 %
10 SYRINGE (ML) INJECTION AS NEEDED
Status: DISCONTINUED | OUTPATIENT
Start: 2022-04-11 | End: 2022-04-11 | Stop reason: HOSPADM

## 2022-04-11 RX ORDER — ONDANSETRON 4 MG/1
4 TABLET, FILM COATED ORAL EVERY 8 HOURS PRN
Qty: 20 TABLET | Refills: 0 | Status: SHIPPED | OUTPATIENT
Start: 2022-04-11 | End: 2022-07-12

## 2022-04-11 RX ORDER — ONDANSETRON 2 MG/ML
INJECTION INTRAMUSCULAR; INTRAVENOUS AS NEEDED
Status: DISCONTINUED | OUTPATIENT
Start: 2022-04-11 | End: 2022-04-11 | Stop reason: SURG

## 2022-04-11 RX ORDER — OXYCODONE HYDROCHLORIDE 5 MG/1
5 TABLET ORAL EVERY 6 HOURS PRN
Qty: 30 TABLET | Refills: 0 | Status: SHIPPED | OUTPATIENT
Start: 2022-04-11 | End: 2022-07-12

## 2022-04-11 RX ORDER — MAGNESIUM HYDROXIDE 1200 MG/15ML
LIQUID ORAL AS NEEDED
Status: DISCONTINUED | OUTPATIENT
Start: 2022-04-11 | End: 2022-04-11 | Stop reason: HOSPADM

## 2022-04-11 RX ORDER — SODIUM CHLORIDE, SODIUM LACTATE, POTASSIUM CHLORIDE, CALCIUM CHLORIDE 600; 310; 30; 20 MG/100ML; MG/100ML; MG/100ML; MG/100ML
100 INJECTION, SOLUTION INTRAVENOUS ONCE AS NEEDED
Status: DISCONTINUED | OUTPATIENT
Start: 2022-04-11 | End: 2022-04-11 | Stop reason: HOSPADM

## 2022-04-11 RX ORDER — MEPERIDINE HYDROCHLORIDE 25 MG/ML
12.5 INJECTION INTRAMUSCULAR; INTRAVENOUS; SUBCUTANEOUS
Status: COMPLETED | OUTPATIENT
Start: 2022-04-11 | End: 2022-04-11

## 2022-04-11 RX ORDER — KETOROLAC TROMETHAMINE 30 MG/ML
30 INJECTION, SOLUTION INTRAMUSCULAR; INTRAVENOUS EVERY 6 HOURS PRN
Status: DISCONTINUED | OUTPATIENT
Start: 2022-04-11 | End: 2022-04-11 | Stop reason: HOSPADM

## 2022-04-11 RX ORDER — MELOXICAM 15 MG/1
15 TABLET ORAL DAILY
Qty: 14 TABLET | Refills: 0 | Status: SHIPPED | OUTPATIENT
Start: 2022-04-11

## 2022-04-11 RX ADMIN — FAMOTIDINE 20 MG: 10 INJECTION INTRAVENOUS at 09:10

## 2022-04-11 RX ADMIN — PROPOFOL 150 MG: 10 INJECTION, EMULSION INTRAVENOUS at 09:13

## 2022-04-11 RX ADMIN — SODIUM CHLORIDE, POTASSIUM CHLORIDE, SODIUM LACTATE AND CALCIUM CHLORIDE: 600; 310; 30; 20 INJECTION, SOLUTION INTRAVENOUS at 09:10

## 2022-04-11 RX ADMIN — FENTANYL CITRATE 50 MCG: 50 INJECTION INTRAMUSCULAR; INTRAVENOUS at 09:17

## 2022-04-11 RX ADMIN — FENTANYL CITRATE 50 MCG: 50 INJECTION INTRAMUSCULAR; INTRAVENOUS at 09:13

## 2022-04-11 RX ADMIN — MEPERIDINE HYDROCHLORIDE 12.5 MG: 25 INJECTION INTRAMUSCULAR; INTRAVENOUS; SUBCUTANEOUS at 10:33

## 2022-04-11 RX ADMIN — KETOROLAC TROMETHAMINE 30 MG: 30 INJECTION, SOLUTION INTRAMUSCULAR at 09:34

## 2022-04-11 RX ADMIN — FENTANYL CITRATE 50 MCG: 50 INJECTION INTRAMUSCULAR; INTRAVENOUS at 10:07

## 2022-04-11 RX ADMIN — SODIUM CHLORIDE, POTASSIUM CHLORIDE, SODIUM LACTATE AND CALCIUM CHLORIDE 125 ML/HR: 600; 310; 30; 20 INJECTION, SOLUTION INTRAVENOUS at 08:13

## 2022-04-11 RX ADMIN — ONDANSETRON 4 MG: 2 INJECTION INTRAMUSCULAR; INTRAVENOUS at 10:34

## 2022-04-11 RX ADMIN — LIDOCAINE HYDROCHLORIDE 60 MG: 20 INJECTION, SOLUTION INFILTRATION; PERINEURAL at 09:13

## 2022-04-11 RX ADMIN — ONDANSETRON 4 MG: 2 INJECTION INTRAMUSCULAR; INTRAVENOUS at 09:34

## 2022-04-11 RX ADMIN — MIDAZOLAM 2 MG: 1 INJECTION INTRAMUSCULAR; INTRAVENOUS at 09:10

## 2022-04-11 RX ADMIN — SCOPALAMINE 1 PATCH: 1 PATCH, EXTENDED RELEASE TRANSDERMAL at 08:04

## 2022-04-11 RX ADMIN — FENTANYL CITRATE 50 MCG: 50 INJECTION INTRAMUSCULAR; INTRAVENOUS at 09:34

## 2022-04-11 RX ADMIN — DEXAMETHASONE SODIUM PHOSPHATE 4 MG: 4 INJECTION, SOLUTION INTRA-ARTICULAR; INTRALESIONAL; INTRAMUSCULAR; INTRAVENOUS; SOFT TISSUE at 09:34

## 2022-04-11 RX ADMIN — FENTANYL CITRATE 50 MCG: 50 INJECTION INTRAMUSCULAR; INTRAVENOUS at 10:32

## 2022-04-11 RX ADMIN — MEPERIDINE HYDROCHLORIDE 12.5 MG: 25 INJECTION INTRAMUSCULAR; INTRAVENOUS; SUBCUTANEOUS at 10:28

## 2022-04-11 RX ADMIN — PHENYLEPHRINE HYDROCHLORIDE 100 MCG: 10 INJECTION INTRAVENOUS at 09:58

## 2022-04-11 RX ADMIN — FENTANYL CITRATE 50 MCG: 50 INJECTION INTRAMUSCULAR; INTRAVENOUS at 10:27

## 2022-04-11 RX ADMIN — PHENYLEPHRINE HYDROCHLORIDE 100 MCG: 10 INJECTION INTRAVENOUS at 10:01

## 2022-04-11 RX ADMIN — CEFAZOLIN SODIUM 2 G: 2 SOLUTION INTRAVENOUS at 09:20

## 2022-04-11 NOTE — ANESTHESIA PROCEDURE NOTES
Airway  Urgency: elective    Date/Time: 4/11/2022 9:15 AM    General Information and Staff    Patient location during procedure: OR    Indications and Patient Condition    Preoxygenated: yes  Mask difficulty assessment: 0 - not attempted    Final Airway Details  Final airway type: supraglottic airway      Successful airway: unique  Size 4    Number of attempts at approach: 1  Assessment: lips, teeth, and gum same as pre-op

## 2022-04-11 NOTE — BRIEF OP NOTE
KNEE ARTHROSCOPY WITH MENISCECTOMY  Progress Note    Pili Webster  4/11/2022    Pre-op Diagnosis:   M25.61       Post-Op Diagnosis Codes:  Right knee patella tendinitis  Right knee medial femoral condyle defect  No ACL, PCL, meniscal tear  No loose body    Procedure/CPT® Codes:  1.  Right knee arthroscopic debridement of medial femoral condyle and patella tendon, CPT code 97707      Procedure(s):  KNEE ARTHROSCOPIC DEBRIDEMENT, PRP INJECTION AND medial femoral condraplasty MENISCAL DEBRIDEMENT    Surgeon(s):  Musa Yoon MD    Anesthesia: General    Staff:   Circulator: Trae Flores RN  Scrub Person: Purvi Lane  Assistant: Ryan Mendoza  Orientee: Ruma Phillips RN  Assistant: Ryan Mendoza      Estimated Blood Loss: none    Urine Voided: * No values recorded between 4/11/2022  9:09 AM and 4/11/2022 10:02 AM *    Specimens:                None          Drains: * No LDAs found *    Findings: See operative note    Complications: None apparent    Assistant: Ryan Mendoza  was responsible for performing the following activities: Retraction, Suction, Irrigation, Suturing, Closing and Placing Dressing and their skilled assistance was necessary for the success of this case.    Musa Yoon MD     Date: 4/11/2022  Time: 10:02 EDT

## 2022-04-11 NOTE — INTERVAL H&P NOTE
H&P reviewed. The patient was examined and there are no changes to the H&P.    Right knee anterior knee pain, patella tendinitis    To the operating room for right knee diagnostic arthroscopy, debridement and PRP injection.    Anticipate discharge to home today    Musa Yoon MD

## 2022-04-11 NOTE — ANESTHESIA PREPROCEDURE EVALUATION
Anesthesia Evaluation     Patient summary reviewed and Nursing notes reviewed   history of anesthetic complications: PONV  NPO Solid Status: > 8 hours  NPO Liquid Status: > 8 hours           Airway   Mallampati: II  TM distance: >3 FB  Neck ROM: full  Dental    (+) edentulous    Pulmonary     breath sounds clear to auscultation  (+) a smoker Current Abstained day of surgery, asthma,sleep apnea,   Cardiovascular   Exercise tolerance: good (4-7 METS)    Rhythm: regular  Rate: normal        Neuro/Psych  (+) psychiatric history Anxiety,    GI/Hepatic/Renal/Endo    (+) obesity,  hiatal hernia, GERD,  renal disease stones,     Musculoskeletal     Abdominal   (+) obese,     Abdomen: soft.   Substance History - negative use     OB/GYN negative ob/gyn ROS         Other   arthritis,                      Anesthesia Plan    ASA 2     general     intravenous induction     Anesthetic plan, all risks, benefits, and alternatives have been provided, discussed and informed consent has been obtained with: patient.  Use of blood products discussed with consented to blood products.   Plan discussed with CRNA.        CODE STATUS:

## 2022-04-11 NOTE — ANESTHESIA POSTPROCEDURE EVALUATION
Patient: Pili Webster    Procedure Summary     Date: 04/11/22 Room / Location:  [facility] OR 03 /  [facility]    Anesthesia Start: 0910 Anesthesia Stop: 1008    Procedure: KNEE ARTHROSCOPIC DEBRIDEMENT, PRP INJECTION AND medial femoral condraplasty MENISCAL DEBRIDEMENT (Right Knee) Diagnosis: (M25.61)    Surgeons: Musa Yoon MD Provider: Dominick Arredondo MD    Anesthesia Type: general ASA Status: 2          Anesthesia Type: general    Vitals  Vitals Value Taken Time   /91 04/11/22 1035   Temp 97.2 °F (36.2 °C) 04/11/22 1009   Pulse 101 04/11/22 1035   Resp 16 04/11/22 1035   SpO2 96 % 04/11/22 1035           Post Anesthesia Care and Evaluation    Patient location during evaluation: PHASE II  Patient participation: complete - patient participated  Level of consciousness: awake  Pain score: 0  Pain management: adequate  Airway patency: patent  Anesthetic complications: No anesthetic complications  PONV Status: none  Cardiovascular status: hemodynamically stable  Respiratory status: room air  Hydration status: acceptable

## 2022-04-11 NOTE — OP NOTE
Pili Webster  4/11/2022      Operative Note:    Surgeon: ROE Yoon MD    Assistant: KIP Menjivar    Pre-Operative Diagnosis:   Right knee patella tendinitis    Post-Operative Diagnosis:   Right knee patella tendinitis  Right knee medial femoral condyle defect  No ACL, PCL, meniscal tear  No loose body    Procedure(s):    1.  Right knee arthroscopic debridement of medial femoral condyle and patella tendon/fat pad debridement, CPT code 56023    Anesthesia: General with local field block    Estimated Blood Loss: 0 cc    Specimen Obtained:  None    Complication(s):  None apparent.     Implants: None.    Operative Indication:     Pili Webster is a 31-year-old female with a right knee anterior based pain that has been refractory to corticosteroid injections and activity modification.  Despite these conservative measures she continues to have pain  with activities of daily living.  Once here the risk benefits alternatives complications she elected proceed with surgery    Arthroscopic Findings:    There is a small effusion within the joint.  No loose body.  No medial or lateral meniscal tears.  No ACL or PCL tear.  No patellofemoral arthrosis noted.  No lateral compartment arthrosis noted.  The medial femoral condyle had OCD for defect with loose hyaline cartilage.    Operative Details:    The patient was met in the pre operative suite where the correct operative site was marked. The patient was brought to the operating room where anesthesia was initiated. The patient was positioned appropriately with all bony prominences well padded. The patient was prepped and draped in the usual sterile fashion and prior to incision a timeout was observed to verify the correct operative site, procedure and antibiotics.    Anterior lateral portal was created and arthroscope was introduced into the knee joint and a thorough diagnostic arthroscopy was performed with the findings noted above.    A medial portal was then  created and the anterior fat pad was debrided with arthroscopic shaver.  Attention was then brought to the medial femoral condyle where the shaver was utilized to debride the loose hyaline cartilage that was noted on the medial femoral condyle.  Electrocautery was then utilized to amend the peripheral edges to stable hyaline cartilage.    Attention was then brought to the posterior aspect of the patella tendon where electrocautery was utilized to debride the fat pad adhesed to the posterior aspect of the patella tendon.  Careful attention was not to resect any of the patella tendon fibers.    Whole blood was harvested from the patient and spun for PRP preparation.  Under arthroscopic guidance trephination of the proximal patella tendon were point of maximum tenderness was located was performed percutaneously.  The PRP was then injected into the tendon without any extravasation into the joint.    All instruments were removed from the knee.  Portal sites were closed with nylon suture once injecting within the joint and the portal sites with local block.  A soft dressing was applied.  The patient was extubated, transferred to hospital bed in the PACU in stable condition.  The patient tolerated procedure well without any complications.    Post-operative Plan:    Discharge to home today  Dressing-May remove dressing in 3 to 4 days  Weight Bear/Lifting Status-as tolerated  DVT PPx-aspirin 81 mg twice daily for 14 days  Follow up-2 weeks in the office    Electronically Signed by: Musa Yoon MD

## 2022-04-24 DIAGNOSIS — K58.0 IRRITABLE BOWEL SYNDROME WITH DIARRHEA: ICD-10-CM

## 2022-04-25 RX ORDER — DICYCLOMINE HCL 20 MG
TABLET ORAL
Qty: 120 TABLET | Refills: 2 | Status: SHIPPED | OUTPATIENT
Start: 2022-04-25 | End: 2022-07-25

## 2022-04-27 ENCOUNTER — TRANSCRIBE ORDERS (OUTPATIENT)
Dept: PHYSICAL THERAPY | Facility: CLINIC | Age: 32
End: 2022-04-27

## 2022-04-27 DIAGNOSIS — Z98.890 S/P ARTHROSCOPY OF RIGHT KNEE: Primary | ICD-10-CM

## 2022-04-29 ENCOUNTER — TREATMENT (OUTPATIENT)
Dept: PHYSICAL THERAPY | Facility: CLINIC | Age: 32
End: 2022-04-29

## 2022-04-29 DIAGNOSIS — R29.898 DECREASED STRENGTH OF LOWER EXTREMITY: ICD-10-CM

## 2022-04-29 DIAGNOSIS — Z98.890 S/P RIGHT KNEE ARTHROSCOPY: Primary | ICD-10-CM

## 2022-04-29 DIAGNOSIS — M25.661 DECREASED RANGE OF MOTION (ROM) OF RIGHT KNEE: ICD-10-CM

## 2022-04-29 PROCEDURE — 97162 PT EVAL MOD COMPLEX 30 MIN: CPT | Performed by: PHYSICAL THERAPIST

## 2022-04-29 NOTE — PROGRESS NOTES
Physical Therapy Initial Evaluation and Plan of Care    Patient: Pili Webster   : 1990  Diagnosis/ICD-10 Code:  S/P right knee arthroscopy [Z98.890]  Referring practitioner: IVELISSE Jj  Date of Initial Visit: 2022  Today's Date: 2022  Patient seen for 1 session         Visit Diagnoses:    ICD-10-CM ICD-9-CM   1. S/P right knee arthroscopy  Z98.890 V45.89   2. Decreased range of motion (ROM) of right knee  M25.661 719.56   3. Decreased strength of lower extremity  R29.898 729.89         Subjective Questionnaire: LEFS:       Subjective Evaluation    History of Present Illness  Date of surgery: 2022  Mechanism of injury: Patient underwent right knee surgery on 2022.  Patient had a right knee arthroscopic debridement of medial femoral condyle and patella tendon/fat pad debridement.  Patient reports that she has been experiencing swelling and pain since the surgery.  She notes limited flexion and extension ROM.  She also reports weakness in the right LE.  Patient reports that is having difficulty with ambulating and notes a 15 minute walking tolerance.  Patient notes a 5 minute standing tolerance.        Patient Occupation: Unemployed Pain  Current pain ratin  At best pain ratin  At worst pain ratin  Location: R) knee  Quality: sharp and knife-like  Relieving factors: ice, rest, change in position and medications  Aggravating factors: standing, movement, ambulation, stairs, squatting, repetitive movement and sleeping    Patient Goals  Patient goals for therapy: decreased pain, increased motion and increased strength             Objective          Observations     Right Knee   Positive for edema and incision. Negative for drainage.     Additional Knee Observation Details  Bruising noted throughout inferior to right knee on lateral portion of leg.    No redness or drainage noted; good healing of incisions noted.  Patient educated to continue to monitor  incision site for healing.  Increased edema in circular shape noted directly under incision on lateral side of knee; patient reports that MD is aware of increased edema and reported that it was normal at her appt on 4/26/2022.    Palpation     Right Tenderness of the medial gastrocnemius, rectus femoris, vastus lateralis and vastus medialis.     Tenderness     Right Knee   Tenderness in the inferior patella, ITB, lateral joint line, lateral patella, medial patella, patellar tendon and superior patella.     Active Range of Motion   Left Knee   Flexion: 130 degrees   Extension: 0 degrees     Right Knee   Flexion: 100 degrees   Extension: Right knee active extension: lacking 10 degrees from full extension.     Strength/Myotome Testing     Left Hip   Planes of Motion   Flexion: 4    Left Knee   Flexion: 4+  Extension: 4+    Left Ankle/Foot   Dorsiflexion: 4+  Plantar flexion: 4+    Right Ankle/Foot   Dorsiflexion: 3+  Plantar flexion: 3+    Additional Strength Details  R) hip and knee MMT not performed due to recent surgery.  Patient displayed difficulty achieving good quad contraction on right LE.    Swelling     Left Knee Girth Measurement (cm)   Joint line: 36 cm    Right Knee Girth Measurement (cm)   Joint line: 39 cm    Ambulation     Observational Gait   Gait: antalgic   Decreased walking speed, stride length, right stance time and right step length.           Assessment & Plan     Assessment  Impairments: abnormal gait, abnormal or restricted ROM, activity intolerance, impaired physical strength, lacks appropriate home exercise program, pain with function and weight-bearing intolerance  Functional Limitations: sleeping, walking, pulling, pushing, uncomfortable because of pain, moving in bed, sitting, standing, stooping and unable to perform repetitive tasks  Assessment details: Patient is a 31 year old female who comes to physical therapy for rehabilitation following right arthroscopic knee surgery. The patient  presents with increased pain, decreased right knee ROM, decreased right LE strength, increased edema, and impaired gait. Patient reports a 72.5% functional mobility impairment, based on the patient's response to the LEFS.  Patient will benefit from skilled PT, so that patient can achieve maximum level of function.     Prognosis: good    Goals  Plan Goals: SHORT TERM GOALS:  4 weeks       1. Patient will be independent/complaint with HEP.  2. Patient will report pain no greater than 6/10 when performing self-care activities.  3. Pt to report being able to stand for 15 minutes to prepare a small meal with pain no greater than 6/10 in the right knee.  4. Patient will demonstrate 5-0-115 right knee ROM to allow for greater ease with ADLs.    LONG TERM GOALS:   12 weeks  1. Patient will report at least 40/80 on LEFS for improved independence.  2. Patient to demonstrate right LE strength to at least 4/5 or greater to improve safety with ambulation on uneven surfaces.  3. Patient to report being able to walk for 30 minutes without increasing pain in the right knee.  4. Patient will report no more than 4/10 right knee pain for improved function.  5. Patient will demonstrate 0-120 degrees right knee ROM for improved gait.      Plan  Therapy options: will be seen for skilled therapy services  Planned modality interventions: cryotherapy, electrical stimulation/Russian stimulation, TENS, thermotherapy (hydrocollator packs) and ultrasound  Planned therapy interventions: manual therapy, ADL retraining, balance/weight-bearing training, neuromuscular re-education, body mechanics training, postural training, soft tissue mobilization, strengthening, functional ROM exercises, gait training, stretching, home exercise program, therapeutic activities, IADL retraining, transfer training and joint mobilization  Frequency: 3x week  Duration in weeks: 12  Treatment plan discussed with: patient  Plan details: Moderate Evaluation   69194  Re-evaluation   52334    Therapeutic exercise  67907  Therapeutic activity    45698  Neuromuscular re-education   26231  Manual therapy   02857  Gait training  89069    Attended e-stim  12336  Unattended e-stim (Medicaid/Medicare)     Moist heat/cryotherapy 97119   Ultrasound   66521          History # of Personal Factors and/or Comorbidities: MODERATE (1-2)  Examination of Body System(s): # of elements: MODERATE (3)  Clinical Presentation: STABLE   Clinical Decision Making: MODERATE      Timed:         Manual Therapy:         mins  66285;     Therapeutic Exercise:         mins  37079;     Neuromuscular Vladimir:        mins  87136;    Therapeutic Activity:          mins  62718;     Gait Training:           mins  63991;     Ultrasound:          mins  43945;    Ionto                                   mins   78910  Self Care                           mins   02621  Canalith Repos         mins 47076      Un-Timed:  Electrical Stimulation:         mins  60776 ( );  Dry Needling          mins self-pay  Traction          mins 90687  Low Eval          Mins  49725  Mod Eval     32     Mins  71067  High Eval                            Mins  74952        Timed Treatment:      mins   Total Treatment:     32   mins          PT: Pili Walker PT     License Number: [id number]  Electronically signed by Pili Walker PT, 04/29/22, 9:08 AM EDT    Certification Period: 4/29/2022 thru 7/27/2022  I certify that the therapy services are furnished while this patient is under my care.  The services outlined above are required by this patient, and will be reviewed every 90 days.         Physician Signature:__________________________________________________    PHYSICIAN: Diana Rothman APRN  NPI: [id number]                                      DATE:      Please sign and return via fax to [email or web address] . Thank you, [facility].

## 2022-05-02 ENCOUNTER — TREATMENT (OUTPATIENT)
Dept: PHYSICAL THERAPY | Facility: CLINIC | Age: 32
End: 2022-05-02

## 2022-05-02 DIAGNOSIS — R29.898 DECREASED STRENGTH OF LOWER EXTREMITY: ICD-10-CM

## 2022-05-02 DIAGNOSIS — M25.661 DECREASED RANGE OF MOTION (ROM) OF RIGHT KNEE: ICD-10-CM

## 2022-05-02 DIAGNOSIS — Z98.890 S/P RIGHT KNEE ARTHROSCOPY: Primary | ICD-10-CM

## 2022-05-02 PROCEDURE — 97014 ELECTRIC STIMULATION THERAPY: CPT | Performed by: PHYSICAL THERAPIST

## 2022-05-02 PROCEDURE — 97110 THERAPEUTIC EXERCISES: CPT | Performed by: PHYSICAL THERAPIST

## 2022-05-02 NOTE — PROGRESS NOTES
Physical Therapy Daily Treatment Note      Patient: Pili Webster   : 1990  Referring practitioner: IVELISSE Jj  Date of Initial Visit: Type: THERAPY  Noted: 2022  Today's Date: 2022  Patient seen for 2 sessions       Visit Diagnoses:    ICD-10-CM ICD-9-CM   1. S/P right knee arthroscopy  Z98.890 V45.89   2. Decreased range of motion (ROM) of right knee  M25.661 719.56   3. Decreased strength of lower extremity  R29.898 729.89       Subjective Evaluation    History of Present Illness    Subjective comment: Patient reports that she was walking past someone that was moving a shelf yesterday; she states that the shelf fell and hit her leg.  She suffered a cut to her leg and is sore, but notes no increased difficulty with weightbearing; she mentions a little increased difficulty with ROM, but states that it is because of where the cut is located.  Patient saw PCP this morning and was told that she was ok to participate with PT.Pain  Current pain ratin           Objective   See Exercise, Manual, and Modality Logs for complete treatment.       Assessment & Plan     Assessment    Assessment details: Right knee assessed prior to start of therapy session.  Patient had right knee bandaged with ACE wrap and a gauze.  Bandage was removed and showed a cut approximately 1.5-2 inch long posterior to lateral joint line on the right knee; no drainage was noted.  Patient was educated to continue to monitor cut for any signs of possible infection and seek medical treatment if necessary; patient verbalized understanding.  Today's treatment session consisted of there ex, ESTIM, and cryotherapy; no adverse reactions were noted with modalities.  There ex focused on knee ROM and LE strengthening.  Patient instructed to perform exercises per her tolerance, with patient verbalizing understanding.  Patient noted slight decrease in pain at conclusion of session, noting 6/10 pain.  She will continue to  be progressed per her tolerance and POC.            Timed:         Manual Therapy:         mins  14021;     Therapeutic Exercise:    40     mins  56775;     Neuromuscular Vladimir:        mins  27328;    Therapeutic Activity:          mins  29011;     Gait Training:           mins  96265;     Ultrasound:          mins  56485;    Ionto                                   mins   16317  Self Care                            mins   27242  Canalith Repos         mins 53988      Un-Timed:  Electrical Stimulation:    10     mins  23580 ( );  Dry Needling          mins self-pay  Traction          mins 59781      Timed Treatment:   40   mins   Total Treatment:     50   mins    Pili Walker, PT  [place] License: [id number]

## 2022-05-04 ENCOUNTER — TREATMENT (OUTPATIENT)
Dept: PHYSICAL THERAPY | Facility: CLINIC | Age: 32
End: 2022-05-04

## 2022-05-04 DIAGNOSIS — M25.661 DECREASED RANGE OF MOTION (ROM) OF RIGHT KNEE: ICD-10-CM

## 2022-05-04 DIAGNOSIS — R29.898 DECREASED STRENGTH OF LOWER EXTREMITY: ICD-10-CM

## 2022-05-04 DIAGNOSIS — Z98.890 S/P RIGHT KNEE ARTHROSCOPY: Primary | ICD-10-CM

## 2022-05-04 PROCEDURE — 97014 ELECTRIC STIMULATION THERAPY: CPT | Performed by: PHYSICAL THERAPIST

## 2022-05-04 PROCEDURE — 97110 THERAPEUTIC EXERCISES: CPT | Performed by: PHYSICAL THERAPIST

## 2022-05-04 NOTE — PROGRESS NOTES
Physical Therapy Daily Treatment Note      Patient: Pili Webster   : 1990  Referring practitioner: IVELISSE Jj  Date of Initial Visit: Type: THERAPY  Noted: 2022  Today's Date: 2022  Patient seen for 3 sessions       Visit Diagnoses:    ICD-10-CM ICD-9-CM   1. S/P right knee arthroscopy  Z98.890 V45.89   2. Decreased range of motion (ROM) of right knee  M25.661 719.56   3. Decreased strength of lower extremity  R29.898 729.89       Subjective Evaluation    History of Present Illness    Subjective comment: Patient reports that she tolerated last session well.  She notes that she has 6/10 pain today.Pain  Current pain ratin           Objective   See Exercise, Manual, and Modality Logs for complete treatment.       Assessment & Plan     Assessment    Assessment details: Today's session consisted of there ex, ESTIM, and cryotherapy.  No adverse reactions were noted with modalities.  There ex progressed to include increased repetitions and the addition of stretches.  Patient reported no pain with exercises, but did note fatigue with SLR.  Patient noted 5/10 pain at conclusion of treatment session.  She will continue to be progressed per her tolerance and POC.          Timed:         Manual Therapy:         mins  53361;     Therapeutic Exercise:    42     mins  76146;     Neuromuscular Vladimir:        mins  16292;    Therapeutic Activity:          mins  43281;     Gait Training:           mins  15357;     Ultrasound:          mins  77503;    Ionto                                   mins   11553  Self Care                            mins   26011  Canalith Repos         mins 26572      Un-Timed:  Electrical Stimulation:    10     mins  85382 ( );  Dry Needling          mins self-pay  Traction          mins 95005      Timed Treatment:   42   mins   Total Treatment:     52   mins    Pili Walker, PT  [place] License: [id number]

## 2022-05-09 ENCOUNTER — TELEPHONE (OUTPATIENT)
Dept: PHYSICAL THERAPY | Facility: CLINIC | Age: 32
End: 2022-05-09

## 2022-05-11 ENCOUNTER — TREATMENT (OUTPATIENT)
Dept: PHYSICAL THERAPY | Facility: CLINIC | Age: 32
End: 2022-05-11

## 2022-05-11 DIAGNOSIS — R29.898 DECREASED STRENGTH OF LOWER EXTREMITY: ICD-10-CM

## 2022-05-11 DIAGNOSIS — Z98.890 S/P RIGHT KNEE ARTHROSCOPY: Primary | ICD-10-CM

## 2022-05-11 DIAGNOSIS — M25.661 DECREASED RANGE OF MOTION (ROM) OF RIGHT KNEE: ICD-10-CM

## 2022-05-11 PROCEDURE — 97110 THERAPEUTIC EXERCISES: CPT | Performed by: PHYSICAL THERAPIST

## 2022-05-11 PROCEDURE — 97014 ELECTRIC STIMULATION THERAPY: CPT | Performed by: PHYSICAL THERAPIST

## 2022-05-11 NOTE — PROGRESS NOTES
Physical Therapy Daily Treatment Note      Patient: Pili Webster   : 1990  Referring practitioner: IVELISSE Jj  Date of Initial Visit: Type: THERAPY  Noted: 2022  Today's Date: 2022  Patient seen for 4 sessions       Visit Diagnoses:    ICD-10-CM ICD-9-CM   1. S/P right knee arthroscopy  Z98.890 V45.89   2. Decreased range of motion (ROM) of right knee  M25.661 719.56   3. Decreased strength of lower extremity  R29.898 729.89       Subjective:  Patient arrives to therapy w/ reports of 7/10 right knee pain.  Patient states she had difficulty sleeping last night due to muscle spasms in her right leg.        Objective          Active Range of Motion     Right Knee   Extension: 0 degrees       See Exercise, Manual, and Modality Logs for complete treatment.       Assessment/Plan:  Patient completed today's session w/ reports of slight decrease in pain following, 5/10.  Treatment consisted of therex as listed w/ continued focus on improved right knee ROM, improved right LE strength, and knee stability f/b modalities.  Exercise progressed w/ strengthening reps increased, as to pt's tolerance, as well as bridges initiated.  Pt observed w/ good tolerance, however reported some soreness w/ SLR.  Pt continues to demonstrate increased edema/ fluid under lateral incision of knee. Pt demonstrated improved knee extension ROM, and was able to achieve full extension.  Pt also educated to use ice at home to assist w/ pain control, and swelling w/ pt verbalizing understanding.  Cryotherapy w/ Estim applied at conclusion.  No signs of distress or adverse reactions observed during, and/ or following tx.  Pt continues to benefit from therapy services, and will be progressed as tolerated to address goals, reduce pain, and restore mobility.  Continue w/ PT's POC.       Timed:         Manual Therapy:         mins  33433;     Therapeutic Exercise:    44     mins  04906;     Neuromuscular Vladimir:         mins  56790;    Therapeutic Activity:          mins  19209;     Gait Training:           mins  79067;     Ultrasound:          mins  41788;    Ionto                                   mins   60682  Self Care                            mins   19631  Canalith Repos         mins 77910      Un-Timed:  Electrical Stimulation:    10     mins  38011 ( );  Dry Needling          mins self-pay  Traction          mins 84622      Timed Treatment:  44    mins   Total Treatment:    54    mins    Janet Lisa. DAVID Parikh  [place] License: [id number]

## 2022-05-16 ENCOUNTER — TREATMENT (OUTPATIENT)
Dept: PHYSICAL THERAPY | Facility: CLINIC | Age: 32
End: 2022-05-16

## 2022-05-16 DIAGNOSIS — Z98.890 S/P RIGHT KNEE ARTHROSCOPY: Primary | ICD-10-CM

## 2022-05-16 DIAGNOSIS — R29.898 DECREASED STRENGTH OF LOWER EXTREMITY: ICD-10-CM

## 2022-05-16 DIAGNOSIS — M25.661 DECREASED RANGE OF MOTION (ROM) OF RIGHT KNEE: ICD-10-CM

## 2022-05-16 PROCEDURE — 97110 THERAPEUTIC EXERCISES: CPT | Performed by: PHYSICAL THERAPIST

## 2022-05-16 PROCEDURE — 97014 ELECTRIC STIMULATION THERAPY: CPT | Performed by: PHYSICAL THERAPIST

## 2022-05-16 NOTE — PROGRESS NOTES
Physical Therapy Daily Treatment Note      Patient: Pili Webster   : 1990  Referring practitioner: IVELISSE Jj  Date of Initial Visit: Type: THERAPY  Noted: 2022  Today's Date: 2022  Patient seen for 5 sessions       Visit Diagnoses:    ICD-10-CM ICD-9-CM   1. S/P right knee arthroscopy  Z98.890 V45.89   2. Decreased range of motion (ROM) of right knee  M25.661 719.56   3. Decreased strength of lower extremity  R29.898 729.89       Subjective:  Patient reports 7/10 right knee pain prior to tx.  Pt states she spent the weekend in [place] w/ her sister- in- law, due to sickness.  Pt reports she feels sitting and riding for longer periods of time, has increased her pain.      Objective   See Exercise, Manual, and Modality Logs for complete treatment.       Assessment/Plan:  Patient responded well to 's session, w/ reports of slight decrease in knee pain following, 5/10.  Treatment consisted of therex as listed for improved right knee range of motion, improved LE strength, and stability f/b cryotherapy w/ Estim at conclusion.  Exercise progressed w/ additional supine hip strengthening activities initiated, as well as standing heel raises to assist w/ strength.  Pt observed w/ some fatigue, however noted tolerable.  Patient was provided w/ cues and demonstration to maintain form.  No signs of distress or adverse reactions observed during, and/ or following tx.  Pt continues to benefit from therapy services, and will be progressed as tolerated to address goals, reduce pain, and restore mobility.  Continue w/ PT's POC.       Timed:         Manual Therapy:         mins  81106;     Therapeutic Exercise:    44     mins  91150;     Neuromuscular Vladimir:        mins  79038;    Therapeutic Activity:          mins  86148;     Gait Training:           mins  48382;     Ultrasound:          mins  88389;    Ionto                                   mins   01135  Self Care                             mins   86638  [facility]         mins 54475      Un-Timed:  Electrical Stimulation:    10     mins  94904 ( );  Dry Needling          mins self-pay  Traction          mins 33937      Timed Treatment:  44    mins   Total Treatment:    54    mins    Janet Lisa. DAVID Parikh  [place] License: [id number]

## 2022-05-25 ENCOUNTER — TREATMENT (OUTPATIENT)
Dept: PHYSICAL THERAPY | Facility: CLINIC | Age: 32
End: 2022-05-25

## 2022-05-25 DIAGNOSIS — R29.898 DECREASED STRENGTH OF LOWER EXTREMITY: ICD-10-CM

## 2022-05-25 DIAGNOSIS — M25.661 DECREASED RANGE OF MOTION (ROM) OF RIGHT KNEE: ICD-10-CM

## 2022-05-25 DIAGNOSIS — Z98.890 S/P RIGHT KNEE ARTHROSCOPY: Primary | ICD-10-CM

## 2022-05-25 PROCEDURE — 97110 THERAPEUTIC EXERCISES: CPT | Performed by: PHYSICAL THERAPIST

## 2022-05-25 PROCEDURE — 97014 ELECTRIC STIMULATION THERAPY: CPT | Performed by: PHYSICAL THERAPIST

## 2022-05-25 NOTE — PROGRESS NOTES
Physical Therapy Re Certification Of Plan of Care  Patient: Pili Webster   : 1990  Diagnosis/ICD-10 Code:  S/P right knee arthroscopy [Z98.890]  Referring practitioner: IVELISSE Jj  Date of Initial Visit: Type: THERAPY  Noted: 2022  Today's Date: 2022  Patient seen for 6 sessions         Visit Diagnoses:    ICD-10-CM ICD-9-CM   1. S/P right knee arthroscopy  Z98.890 V45.89   2. Decreased range of motion (ROM) of right knee  M25.661 719.56   3. Decreased strength of lower extremity  R29.898 729.89         Pili Webster reports: She has been performing her HEP several times a day.  She notes that she feels like her knee is moving better, but does still hurt with standing/walking.  Subjective Questionnaire: LEFS:   Clinical Progress: improved  Home Program Compliance: Yes      Subjective Evaluation    Pain  Current pain ratin  At best pain ratin  At worst pain ratin             Objective          Active Range of Motion   Left Knee   Flexion: 130 degrees   Extension: 0 degrees     Right Knee   Flexion: 111 degrees   Extension: 0 degrees     Strength/Myotome Testing     Left Hip   Planes of Motion   Flexion: 4    Right Hip   Planes of Motion   Flexion: 4-    Left Knee   Flexion: 4+  Extension: 4+    Right Knee   Flexion: 4-  Extension: 4-    Left Ankle/Foot   Dorsiflexion: 4+  Plantar flexion: 4+    Right Ankle/Foot   Dorsiflexion: 4  Plantar flexion: 4    Ambulation     Observational Gait   Gait: antalgic   Decreased walking speed, stride length, right stance time and right step length.           Assessment & Plan     Assessment  Impairments: abnormal gait, abnormal or restricted ROM, activity intolerance, impaired physical strength, lacks appropriate home exercise program, pain with function and weight-bearing intolerance  Functional Limitations: sleeping, walking, pulling, pushing, uncomfortable because of pain, moving in bed, sitting, standing, stooping  and unable to perform repetitive tasks  Assessment details: Patient has been attending physical therapy for rehabilitation following right arthroscopic knee surgery; she has attended therapy for a total of 6 sessions, dating from 4/29/2022 to 5/25/2022.  Patient has shown improvements with right knee ROM, with patient currently displaying 0-111 degrees AROM.  Patient continues to have weakness throughout the right LE strength with MMT.  Patient will continue to benefit from skilled PT so that she can achieve her maximum level of function.     Prognosis: good    Goals  Plan Goals: SHORT TERM GOALS:  4 weeks       1. Patient will be independent/complaint with HEP.  Met  2. Patient will report pain no greater than 6/10 when performing self-care activities.  Ongoing-up to 8/10 with self-care activities  3. Pt to report being able to stand for 15 minutes to prepare a small meal with pain no greater than 6/10 in the right knee.  Ongoing-estimates 5-10 minute standing tolerance  4. Patient will demonstrate 5-0-115 right knee ROM to allow for greater ease with ADLs.  Ongoing, progressing    LONG TERM GOALS:   12 weeks  1. Patient will report at least 40/80 on LEFS for improved independence.  Ongoing-17/80  2. Patient to demonstrate right LE strength to at least 4/5 or greater to improve safety with ambulation on uneven surfaces.  Ongoing, progressing  3. Patient to report being able to walk for 30 minutes without increasing pain in the right knee.  Ongoing-reports a 10 minute walking tolerance  4. Patient will report no more than 4/10 right knee pain for improved function.  Ongoing-worst pain of 9/10  5. Patient will demonstrate 0-120 degrees right knee ROM for improved gait.  Ongoing, progressing    Plan  Therapy options: will be seen for skilled therapy services  Planned modality interventions: cryotherapy, electrical stimulation/Russian stimulation, TENS, thermotherapy (hydrocollator packs) and ultrasound  Planned  therapy interventions: manual therapy, ADL retraining, balance/weight-bearing training, neuromuscular re-education, body mechanics training, postural training, soft tissue mobilization, strengthening, functional ROM exercises, gait training, stretching, home exercise program, therapeutic activities, IADL retraining, transfer training and joint mobilization  Frequency: 3x week  Duration in weeks: 8  Treatment plan discussed with: patient  Plan details: Re-evaluation   39422    Therapeutic exercise  00796  Therapeutic activity    20273  Neuromuscular re-education   33898  Manual therapy   35039  Gait training  65840    Attended e-stim  23673  Unattended e-stim (Medicaid/Medicare)     Moist heat/cryotherapy 86848   Ultrasound   67196             Recommendations: Continue as planned  Timeframe: 2 months  Prognosis to achieve goals: good      Timed:         Manual Therapy:         mins  37759;     Therapeutic Exercise:    45     mins  60520;     Neuromuscular Vladimir:        mins  55064;    Therapeutic Activity:          mins  97290;     Gait Training:           mins  77488;     Ultrasound:          mins  24783;    Ionto                                   mins   50101  Self Care                            mins   28705  Canalith Repos         mins 19304      Un-Timed:  Electrical Stimulation:    10     mins  38781 ( );  Dry Needling         mins self-pay  Traction          mins 16634  Re-Eval                               mins  09676      Timed Treatment:   45   mins   Total Treatment:     55   mins          PT: Pili Walker PT     [place] License:  [id number]    Electronically signed by Pili Walker PT, 05/25/22, 8:47 AM EDT    Certification Period: 5/25/2022 thru 8/22/2022  I certify that the therapy services are furnished while this patient is under my care.  The services outlined above are required by this patient, and will be reviewed every 90 days.         Physician  Signature:__________________________________________________    PHYSICIAN: Diana Rothman APRN  NPI: [id number]                                      DATE:  :     Please sign and return via fax to [email or web address] . Thank you, [facility] Physical Therapy

## 2022-05-27 ENCOUNTER — TREATMENT (OUTPATIENT)
Dept: PHYSICAL THERAPY | Facility: CLINIC | Age: 32
End: 2022-05-27

## 2022-05-27 DIAGNOSIS — M25.661 DECREASED RANGE OF MOTION (ROM) OF RIGHT KNEE: ICD-10-CM

## 2022-05-27 DIAGNOSIS — R29.898 DECREASED STRENGTH OF LOWER EXTREMITY: ICD-10-CM

## 2022-05-27 DIAGNOSIS — Z98.890 S/P RIGHT KNEE ARTHROSCOPY: Primary | ICD-10-CM

## 2022-05-27 PROCEDURE — 97014 ELECTRIC STIMULATION THERAPY: CPT | Performed by: PHYSICAL THERAPIST

## 2022-05-27 PROCEDURE — 97110 THERAPEUTIC EXERCISES: CPT | Performed by: PHYSICAL THERAPIST

## 2022-05-27 PROCEDURE — 97530 THERAPEUTIC ACTIVITIES: CPT | Performed by: PHYSICAL THERAPIST

## 2022-05-27 NOTE — PROGRESS NOTES
Physical Therapy Daily Treatment Note      Patient: Pili Webster   : 1990  Referring practitioner: IVELISSE Jj  Date of Initial Visit: Type: THERAPY  Noted: 2022  Today's Date: 2022  Patient seen for 7 sessions       Visit Diagnoses:    ICD-10-CM ICD-9-CM   1. S/P right knee arthroscopy  Z98.890 V45.89   2. Decreased range of motion (ROM) of right knee  M25.661 719.56   3. Decreased strength of lower extremity  R29.898 729.89       Subjective:  Patient arrives to therapy w/ reports of 5/10 right knee and quad pain.  Pt states she is having some numbness around the knee, which she notes had improved since surgery.  Pt reports of no change in activity or no injury.      Objective   See Exercise, Manual, and Modality Logs for complete treatment.       Assessment/Plan:  Supervising therapist, ePter Thomas, PT notified and aware of pt's subjective reports.  PT assessed pt's knee w/ no additional findings noted.  Pt reported of no numbness in other areas.  PT and PTA educated patient to continue to monitor symptoms, and notify referring ortho on  at f/u.  Pt educated if symptoms worsen to seek immediate medical attention.  Pt verbalized understanding. Today's treatment consisted of therex and therapeutic activities as listed w/ continued focus on improved right knee ROM, improved right LE strength, and knee stability, as tolerated.  Exercise progressed w/ step ups and LE bike initiated.  Pt observed w/ good tolerance, and was provided w/ cues and demonstration as needed for form.  Cryotherapy w/ Estim applied at conclusion; 4 min w/ static knee extension.  No signs of distress or adverse reactions observed during, and/ or following tx.  Pt continues to benefit from therapy services, and will be progressed as tolerated to address goals, and decrease pain.  Continue w/ PT's POC.       Timed:         Manual Therapy:         mins  45054;     Therapeutic Exercise:   36      mins   58693;     Neuromuscular Vladimir:        mins  30175;    Therapeutic Activity:     12     mins  04419;     Gait Training:           mins  41745;     Ultrasound:          mins  87886;    Ionto                                   mins   19897  Self Care                            mins   70211  Canalith Repos         mins 13890      Un-Timed:  Electrical Stimulation:   10      mins  82442 ( );  Dry Needling          mins self-pay  Traction          mins 39233      Timed Treatment:   48   mins   Total Treatment:    58    mins    Janet Lisa. DAVID Parikh  [place] License: [id number]

## 2022-06-03 ENCOUNTER — TREATMENT (OUTPATIENT)
Dept: PHYSICAL THERAPY | Facility: CLINIC | Age: 32
End: 2022-06-03

## 2022-06-03 ENCOUNTER — LAB (OUTPATIENT)
Dept: LAB | Facility: HOSPITAL | Age: 32
End: 2022-06-03

## 2022-06-03 ENCOUNTER — TRANSCRIBE ORDERS (OUTPATIENT)
Dept: ADMINISTRATIVE | Facility: HOSPITAL | Age: 32
End: 2022-06-03

## 2022-06-03 DIAGNOSIS — R20.2 BILATERAL ARM NUMBNESS AND TINGLING WHILE SLEEPING: ICD-10-CM

## 2022-06-03 DIAGNOSIS — G56.03 BILATERAL CARPAL TUNNEL SYNDROME: ICD-10-CM

## 2022-06-03 DIAGNOSIS — R20.2 BILATERAL ARM NUMBNESS AND TINGLING WHILE SLEEPING: Primary | ICD-10-CM

## 2022-06-03 DIAGNOSIS — R20.0 BILATERAL ARM NUMBNESS AND TINGLING WHILE SLEEPING: ICD-10-CM

## 2022-06-03 DIAGNOSIS — M25.661 DECREASED RANGE OF MOTION (ROM) OF RIGHT KNEE: ICD-10-CM

## 2022-06-03 DIAGNOSIS — Z98.890 S/P RIGHT KNEE ARTHROSCOPY: Primary | ICD-10-CM

## 2022-06-03 DIAGNOSIS — R29.898 DECREASED STRENGTH OF LOWER EXTREMITY: ICD-10-CM

## 2022-06-03 DIAGNOSIS — R20.0 BILATERAL ARM NUMBNESS AND TINGLING WHILE SLEEPING: Primary | ICD-10-CM

## 2022-06-03 LAB
FOLATE SERPL-MCNC: 16.3 NG/ML (ref 4.78–24.2)
TSH SERPL DL<=0.05 MIU/L-ACNC: 2.36 UIU/ML (ref 0.27–4.2)
VIT B12 BLD-MCNC: 799 PG/ML (ref 211–946)

## 2022-06-03 PROCEDURE — 82607 VITAMIN B-12: CPT

## 2022-06-03 PROCEDURE — 84443 ASSAY THYROID STIM HORMONE: CPT

## 2022-06-03 PROCEDURE — 36415 COLL VENOUS BLD VENIPUNCTURE: CPT

## 2022-06-03 PROCEDURE — 97014 ELECTRIC STIMULATION THERAPY: CPT | Performed by: PHYSICAL THERAPIST

## 2022-06-03 PROCEDURE — 97530 THERAPEUTIC ACTIVITIES: CPT | Performed by: PHYSICAL THERAPIST

## 2022-06-03 PROCEDURE — 97110 THERAPEUTIC EXERCISES: CPT | Performed by: PHYSICAL THERAPIST

## 2022-06-03 PROCEDURE — 82746 ASSAY OF FOLIC ACID SERUM: CPT

## 2022-06-03 PROCEDURE — 86592 SYPHILIS TEST NON-TREP QUAL: CPT

## 2022-06-03 PROCEDURE — 86618 LYME DISEASE ANTIBODY: CPT

## 2022-06-03 NOTE — PROGRESS NOTES
Physical Therapy Daily Treatment Note      Patient: Pili Webster   : 1990  Referring practitioner: IVELISSE Jj  Date of Initial Visit: Type: THERAPY  Noted: 2022  Today's Date: 6/3/2022  Patient seen for 8 sessions       Visit Diagnoses:    ICD-10-CM ICD-9-CM   1. S/P right knee arthroscopy  Z98.890 V45.89   2. Decreased range of motion (ROM) of right knee  M25.661 719.56   3. Decreased strength of lower extremity  R29.898 729.89       Subjective:  Patient arrives to therapy w/ reports of 6/10 right knee pain.  Pt states she had f/u appointment with referring ortho yesterday, and received recommendation to continue w/ therapy services.  Pt reports she discussed the numbness in her right knee w/ MD, and he reported with the surgery it was normal, and shoulder improve.       Objective   See Exercise, Manual, and Modality Logs for complete treatment.       Assessment/Plan:  Patient responded well to today's session w/ reports of slight decrease in knee pain following, -5/10.  Treatment consisted of therex and therapeutic as listed w/ continued focus on improved right knee ROM, improved right LE strength, and knee stability f/b cryotherapy w/ Estim at conclusion.  Exercise progressed w/ resistance on LE bike increased from LV 1 to LV 2, and step ups advanced from 4 to 6 inch.  Pt observed w/ good tolerance, and was provided w/ cues and demonstration for max benefit and to maintain form.  Pt continues to benefit from therapy services, and will be progressed as tolerated to address goals, reduce pain, and restore ROM.  No signs of distress or adverse reactions observed during, and/ or following tx.  Continue w/ PT's POC.       Timed:         Manual Therapy:         mins  01655;     Therapeutic Exercise:   36      mins  07152;     Neuromuscular Vladimir:        mins  54586;    Therapeutic Activity:     11     mins  59304;     Gait Training:           mins  28112;     Ultrasound:           mins  63248;    Ionto                                   mins   70770  Self Care                            mins   37193  Canalith Repos         mins 19552      Un-Timed:  Electrical Stimulation:   10      mins  09362 ( );  Dry Needling          mins self-pay  Traction          mins 72779      Timed Treatment:  47    mins   Total Treatment:   57     mins    Janet Lisa. DAVID Parikh  [place] License: [id number]

## 2022-06-04 LAB — B BURGDOR IGG+IGM SER QL IA: NEGATIVE

## 2022-06-05 LAB — RPR SER QL: NON REACTIVE

## 2022-06-07 ENCOUNTER — TREATMENT (OUTPATIENT)
Dept: PHYSICAL THERAPY | Facility: CLINIC | Age: 32
End: 2022-06-07

## 2022-06-07 DIAGNOSIS — Z98.890 S/P RIGHT KNEE ARTHROSCOPY: Primary | ICD-10-CM

## 2022-06-07 DIAGNOSIS — M25.661 DECREASED RANGE OF MOTION (ROM) OF RIGHT KNEE: ICD-10-CM

## 2022-06-07 DIAGNOSIS — R29.898 DECREASED STRENGTH OF LOWER EXTREMITY: ICD-10-CM

## 2022-06-07 PROCEDURE — 97530 THERAPEUTIC ACTIVITIES: CPT | Performed by: PHYSICAL THERAPIST

## 2022-06-07 PROCEDURE — 97110 THERAPEUTIC EXERCISES: CPT | Performed by: PHYSICAL THERAPIST

## 2022-06-07 PROCEDURE — 97014 ELECTRIC STIMULATION THERAPY: CPT | Performed by: PHYSICAL THERAPIST

## 2022-06-07 NOTE — PROGRESS NOTES
Physical Therapy Daily Treatment Note      Patient: Pili Webster   : 1990  Referring practitioner: IVELISSE Jj  Date of Initial Visit: Type: THERAPY  Noted: 2022  Today's Date: 2022  Patient seen for 9 sessions       Visit Diagnoses:    ICD-10-CM ICD-9-CM   1. S/P right knee arthroscopy  Z98.890 V45.89   2. Decreased range of motion (ROM) of right knee  M25.661 719.56   3. Decreased strength of lower extremity  R29.898 729.89       Subjective:  Patient arrives to therapy w/ reports of 7.5/10 right knee pain.  Pt states of no change in activity, and reports she feels her pain increased due to the rainy weather.      Objective   See Exercise, Manual, and Modality Logs for complete treatment.       Assessment/Plan:  Patient completed today's session w/ reports of slight decrease in pain following, 6/10.  Pt performed therex and therapeutic activities as listed to assist w/ improved right knee ROM, improved right LE strength, and to assist w/ return and ease of difficulty performing ADL's.  Exercise progressed w/ standing hip abduction initiated, and strengthening repetitions increased, as to pt's tolerance.  Pt continues to require cues and demonstration to maintain form, and for max benefit.  Cryotherapy w/ Estim applied at conclusion.  Pt continues to benefit from therapy services, and will be progressed as tolerated to address goals, reduce pain, and improve function.  Continue w/ PT's POC.       Timed:         Manual Therapy:         mins  60410;     Therapeutic Exercise:     36    mins  84237;     Neuromuscular Vladimir:        mins  42978;    Therapeutic Activity:     12     mins  93859;     Gait Training:           mins  17150;     Ultrasound:          mins  84674;    Ionto                                   mins   02732  Self Care                            mins   17320  Canalith Repos         mins 80386      Un-Timed:  Electrical Stimulation:    10     mins  05262 (  );  Dry Needling          mins self-pay  Traction          mins 49412      Timed Treatment:  48    mins   Total Treatment:    58    mins    Janet Lisa. DAVID Parikh  [place] License: [id number]

## 2022-06-09 ENCOUNTER — TRANSCRIBE ORDERS (OUTPATIENT)
Dept: ADMINISTRATIVE | Facility: HOSPITAL | Age: 32
End: 2022-06-09

## 2022-06-09 ENCOUNTER — HOSPITAL ENCOUNTER (OUTPATIENT)
Dept: GENERAL RADIOLOGY | Facility: HOSPITAL | Age: 32
Discharge: HOME | End: 2022-06-09
Admitting: PSYCHIATRY & NEUROLOGY

## 2022-06-09 DIAGNOSIS — R20.2 TINGLING SENSATION: ICD-10-CM

## 2022-06-09 DIAGNOSIS — G56.03 CARPAL TUNNEL SYNDROME, BILATERAL: ICD-10-CM

## 2022-06-09 DIAGNOSIS — R20.2 TINGLING SENSATION: Primary | ICD-10-CM

## 2022-06-09 PROCEDURE — 73100 X-RAY EXAM OF WRIST: CPT

## 2022-06-09 PROCEDURE — 73100 X-RAY EXAM OF WRIST: CPT | Performed by: RADIOLOGY

## 2022-06-22 ENCOUNTER — TREATMENT (OUTPATIENT)
Dept: PHYSICAL THERAPY | Facility: CLINIC | Age: 32
End: 2022-06-22

## 2022-06-22 DIAGNOSIS — Z98.890 S/P RIGHT KNEE ARTHROSCOPY: Primary | ICD-10-CM

## 2022-06-22 DIAGNOSIS — R29.898 DECREASED STRENGTH OF LOWER EXTREMITY: ICD-10-CM

## 2022-06-22 DIAGNOSIS — M25.661 DECREASED RANGE OF MOTION (ROM) OF RIGHT KNEE: ICD-10-CM

## 2022-06-22 PROCEDURE — 97110 THERAPEUTIC EXERCISES: CPT | Performed by: PHYSICAL THERAPIST

## 2022-06-22 PROCEDURE — 97014 ELECTRIC STIMULATION THERAPY: CPT | Performed by: PHYSICAL THERAPIST

## 2022-06-22 PROCEDURE — 97530 THERAPEUTIC ACTIVITIES: CPT | Performed by: PHYSICAL THERAPIST

## 2022-06-22 NOTE — PROGRESS NOTES
"  Physical Therapy Progress Note  Patient: Pili Webster   : 1990  Diagnosis/ICD-10 Code:  S/P right knee arthroscopy [Z98.890]  Referring practitioner: IVELISSE Jj  Date of Initial Visit: Type: THERAPY  Noted: 2022  Today's Date: 2022  Patient seen for 10 sessions         Visit Diagnoses:    ICD-10-CM ICD-9-CM   1. S/P right knee arthroscopy  Z98.890 V45.89   2. Decreased range of motion (ROM) of right knee  M25.661 719.56   3. Decreased strength of lower extremity  R29.898 729.89       Subjective Questionnaire: LEFS:   Clinical Progress: worse  Home Program Compliance: Yes      Subjective Evaluation    History of Present Illness    Subjective comment: Patient reports that she is having increased pain today, noting 9/10 pain; she is unsure why her knee is hurting so much, but mentions that it has been sore for the past 3 days.  Patient reports that she had been noticing improvements in her knee before the flare-up, stating that she had been able to sit \"Turkmen style\" for 5 minutes.Pain  Current pain ratin  At best pain ratin  At worst pain rating: 10             Objective          Active Range of Motion   Left Knee   Flexion: 130 degrees   Extension: 0 degrees     Right Knee   Flexion: 105 degrees   Extension: 0 degrees     Strength/Myotome Testing     Left Hip   Planes of Motion   Flexion: 4    Right Hip   Planes of Motion   Flexion: 3+    Left Knee   Flexion: 4+  Extension: 4+    Right Knee   Flexion: 3+  Extension: 4-    Left Ankle/Foot   Dorsiflexion: 4+  Plantar flexion: 4+    Right Ankle/Foot   Dorsiflexion: 4-  Plantar flexion: 4          Assessment & Plan     Assessment  Impairments: abnormal gait, abnormal or restricted ROM, activity intolerance, impaired physical strength, lacks appropriate home exercise program, pain with function and weight-bearing intolerance  Functional Limitations: sleeping, walking, pulling, pushing, uncomfortable because of pain, " moving in bed, sitting, standing, stooping and unable to perform repetitive tasks  Assessment details: Patient has been attending physical therapy for rehabilitation following right arthroscopic knee surgery; she has attended therapy for a total of 10 sessions, dating from 4/29/2022 to 6/22/2022.  Patient continues to report elevated pain levels, noting 10/10 pain at worst and 6/10 pain at best.  Patient displays worse right knee ROM and right LE strength than at last progress note.  She also reports a decrease in functional mobility since last progress note; patient currently reports an 86.25% impairment on the LEFS.  Patient will continue to benefit from skilled PT so that she can achieve her maximum level of function.    Prognosis: good    Goals  Plan Goals: SHORT TERM GOALS:  4 weeks       1. Patient will be independent/complaint with HEP.  Met  2. Patient will report pain no greater than 6/10 when performing self-care activities.  Ongoing-up to 8/10 with self-care activities  3. Pt to report being able to stand for 15 minutes to prepare a small meal with pain no greater than 6/10 in the right knee.  Ongoing-estimates 5-7 minute standing tolerance  4. Patient will demonstrate 5-0-115 right knee ROM to allow for greater ease with ADLs.  Ongoing, progressing    LONG TERM GOALS:   12 weeks  1. Patient will report at least 40/80 on LEFS for improved independence.  Ongoing-11/80  2. Patient to demonstrate right LE strength to at least 4/5 or greater to improve safety with ambulation on uneven surfaces.  Ongoing, progressing  3. Patient to report being able to walk for 30 minutes without increasing pain in the right knee.  Ongoing-reports a 10-15 minute walking tolerance  4. Patient will report no more than 4/10 right knee pain for improved function.  Ongoing-worst pain of 10/10  5. Patient will demonstrate 0-120 degrees right knee ROM for improved gait.  Ongoing, progressing    Plan  Therapy options: will be seen for  skilled therapy services  Planned modality interventions: cryotherapy, electrical stimulation/Russian stimulation, TENS, thermotherapy (hydrocollator packs) and ultrasound  Planned therapy interventions: manual therapy, ADL retraining, balance/weight-bearing training, neuromuscular re-education, body mechanics training, postural training, soft tissue mobilization, strengthening, functional ROM exercises, gait training, stretching, home exercise program, therapeutic activities, IADL retraining, transfer training and joint mobilization  Frequency: 2x week  Duration in weeks: 4  Treatment plan discussed with: patient  Plan details: Re-evaluation   76347    Therapeutic exercise  78224  Therapeutic activity    94598  Neuromuscular re-education   86151  Manual therapy   37869  Gait training  85516    Attended e-stim  50368  Unattended e-stim (Medicaid/Medicare)     Moist heat/cryotherapy 12304   Ultrasound   97876             Recommendations: Continue as planned  Timeframe: 1 month  Prognosis to achieve goals: good      Timed:         Manual Therapy:         mins  70073;     Therapeutic Exercise:    36     mins  03046;     Neuromuscular Vladimir:        mins  97825;    Therapeutic Activity:     13     mins  48323;     Gait Training:           mins  92465;     Ultrasound:          mins  83017;    Ionto                                   mins   07155  Self Care                            mins   32133  Canalith Repos         mins 37898      Un-Timed:  Electrical Stimulation:    10     mins  80508 ( );  Dry Needling          mins self-pay  Traction          mins 05415  Re-Eval                               mins  17761      Timed Treatment:   49   mins   Total Treatment:    59    mins          PT: Pili Walker PT     [place] License:  [id number]    Electronically signed by Pili Walker PT, 06/22/22, 9:38 AM EDT    Certification Period: 6/22/2022 thru 9/19/2022  I certify that the therapy services are furnished  while this patient is under my care.  The services outlined above are required by this patient, and will be reviewed every 90 days.         Physician Signature:__________________________________________________    PHYSICIAN: Diana Rothman APRN  NPI: [id number]                                      DATE:  :     Please sign and return via fax to [email or web address] . Thank you, [facility]

## 2022-06-24 ENCOUNTER — TREATMENT (OUTPATIENT)
Dept: PHYSICAL THERAPY | Facility: CLINIC | Age: 32
End: 2022-06-24

## 2022-06-24 DIAGNOSIS — R29.898 DECREASED STRENGTH OF LOWER EXTREMITY: ICD-10-CM

## 2022-06-24 DIAGNOSIS — M25.661 DECREASED RANGE OF MOTION (ROM) OF RIGHT KNEE: ICD-10-CM

## 2022-06-24 DIAGNOSIS — Z98.890 S/P RIGHT KNEE ARTHROSCOPY: Primary | ICD-10-CM

## 2022-06-24 PROCEDURE — 97110 THERAPEUTIC EXERCISES: CPT | Performed by: PHYSICAL THERAPIST

## 2022-06-24 PROCEDURE — 97530 THERAPEUTIC ACTIVITIES: CPT | Performed by: PHYSICAL THERAPIST

## 2022-06-24 PROCEDURE — 97014 ELECTRIC STIMULATION THERAPY: CPT | Performed by: PHYSICAL THERAPIST

## 2022-06-24 NOTE — PROGRESS NOTES
"Physical Therapy Daily Treatment Note      Patient: Pili Webster   : 1990  Referring practitioner: IVELISSE Jj  Date of Initial Visit: Type: THERAPY  Noted: 2022  Today's Date: 2022  Patient seen for 11 sessions       Visit Diagnoses:    ICD-10-CM ICD-9-CM   1. S/P right knee arthroscopy  Z98.890 V45.89   2. Decreased range of motion (ROM) of right knee  M25.661 719.56   3. Decreased strength of lower extremity  R29.898 729.89       Subjective Evaluation    History of Present Illness    Subjective comment: Patient reports that she saw MD on Thursday regarding elevated knee pain.  X-rays were performed and patient states that \"they looked good.\"  Patient reports that she was told to continue with therapy; patient will follow-up with MD in 6 weeks and may have an MRI if knee pain continues.Pain  Current pain ratin           Objective   See Exercise, Manual, and Modality Logs for complete treatment.       Assessment & Plan     Assessment    Assessment details: Patient tolerated today's session well, with no reports of increased pain.  There ex focused on LE strengthening, knee ROM, and stabilization.  Patient noted mild increase in discomfort/pain with standing exercises; however, it eased with rest.  Treatment session was concluded with ice/ESTIM, with no adverse reactions following.  She will continue to be progressed per her tolerance and POC.          Timed:         Manual Therapy:         mins  76322;     Therapeutic Exercise:    34     mins  04639;     Neuromuscular Vladimir:        mins  45079;    Therapeutic Activity:     12     mins  88233;     Gait Training:           mins  50198;     Ultrasound:          mins  41151;    Ionto                                   mins   55759  Self Care                            mins   66467  Canalith Repos         mins 58171      Un-Timed:  Electrical Stimulation:    10     mins  48752 ( );  Dry Needling          mins " self-pay  Traction          mins 64328      Timed Treatment:   46   mins   Total Treatment:     56   mins    Pili Walker, PT  [place] License: [id number]  Electronically signed by Pili Walker PT, 06/24/22, 1:02 PM EDT.

## 2022-07-01 ENCOUNTER — TREATMENT (OUTPATIENT)
Dept: PHYSICAL THERAPY | Facility: CLINIC | Age: 32
End: 2022-07-01

## 2022-07-01 DIAGNOSIS — R29.898 DECREASED STRENGTH OF LOWER EXTREMITY: ICD-10-CM

## 2022-07-01 DIAGNOSIS — Z98.890 S/P RIGHT KNEE ARTHROSCOPY: Primary | ICD-10-CM

## 2022-07-01 DIAGNOSIS — M25.661 DECREASED RANGE OF MOTION (ROM) OF RIGHT KNEE: ICD-10-CM

## 2022-07-01 PROCEDURE — 97014 ELECTRIC STIMULATION THERAPY: CPT | Performed by: PHYSICAL THERAPIST

## 2022-07-01 PROCEDURE — 97110 THERAPEUTIC EXERCISES: CPT | Performed by: PHYSICAL THERAPIST

## 2022-07-01 PROCEDURE — 97530 THERAPEUTIC ACTIVITIES: CPT | Performed by: PHYSICAL THERAPIST

## 2022-07-01 NOTE — PROGRESS NOTES
Physical Therapy Daily Treatment Note      Patient: Pili Webster   : 1990  Referring practitioner: IVELISSE Jj  Date of Initial Visit: Type: THERAPY  Noted: 2022  Today's Date: 2022  Patient seen for 12 sessions       Visit Diagnoses:    ICD-10-CM ICD-9-CM   1. S/P right knee arthroscopy  Z98.890 V45.89   2. Decreased range of motion (ROM) of right knee  M25.661 719.56   3. Decreased strength of lower extremity  R29.898 729.89       Subjective:  Patient arrives to therapy w/ reports of 7/10 right knee pain.  Pt states since she strained her knee on , it has bothered her more.  Pt reports she has had a f/u with orthopedic since strain, and MD plans to possibly order an MRI if pain persist.    Objective   See Exercise, Manual, and Modality Logs for complete treatment.       Assessment/Plan:  Patient completed today's session w/ reports of slight decrease in pain following, 6/10.  Treatment initiated w/ therex and therapeutic activities as listed w/ continued focus on improved right knee ROM, improved right LE strength, and knee stability f/b cryotherapy w/ Estim at conclusion.   Pt limited w/ overall progression of exercise secondary to higher pain ratings upon arrival.  Pt will be progressed with therapy as tolerated to address goals, reduce pain, and restore function.  No adverse reactions observed during, and/ or following tx.  Continue w/ PT's POC.       Timed:         Manual Therapy:         mins  76329;     Therapeutic Exercise:     34    mins  99347;     Neuromuscular Vladimir:        mins  42878;    Therapeutic Activity:    11      mins  97445;     Gait Training:           mins  12670;     Ultrasound:          mins  98186;    Ionto                                   mins   61284  Self Care                            mins   70149  Canalith Repos         mins 84893      Un-Timed:  Electrical Stimulation:   10      mins  15826 ( );  Dry Needling          mins  self-pay  Traction          mins 86100      Timed Treatment:   45   mins   Total Treatment:    55    mins    Janet Lisa. Kati, DAVID  [place] License: [id number]

## 2022-07-08 ENCOUNTER — TREATMENT (OUTPATIENT)
Dept: PHYSICAL THERAPY | Facility: CLINIC | Age: 32
End: 2022-07-08

## 2022-07-08 DIAGNOSIS — M25.661 DECREASED RANGE OF MOTION (ROM) OF RIGHT KNEE: ICD-10-CM

## 2022-07-08 DIAGNOSIS — Z98.890 S/P RIGHT KNEE ARTHROSCOPY: Primary | ICD-10-CM

## 2022-07-08 DIAGNOSIS — R29.898 DECREASED STRENGTH OF LOWER EXTREMITY: ICD-10-CM

## 2022-07-08 PROCEDURE — 97110 THERAPEUTIC EXERCISES: CPT | Performed by: PHYSICAL THERAPIST

## 2022-07-08 PROCEDURE — 97014 ELECTRIC STIMULATION THERAPY: CPT | Performed by: PHYSICAL THERAPIST

## 2022-07-08 PROCEDURE — 97530 THERAPEUTIC ACTIVITIES: CPT | Performed by: PHYSICAL THERAPIST

## 2022-07-08 NOTE — PROGRESS NOTES
Physical Therapy Daily Treatment Note      Patient: Pili Webster   : 1990  Referring practitioner: IVELISSE Jj  Date of Initial Visit: Type: THERAPY  Noted: 2022  Today's Date: 2022  Patient seen for 13 sessions       Visit Diagnoses:    ICD-10-CM ICD-9-CM   1. S/P right knee arthroscopy  Z98.890 V45.89   2. Decreased range of motion (ROM) of right knee  M25.661 719.56   3. Decreased strength of lower extremity  R29.898 729.89       Subjective:  Patient arrives to therapy w/ reports of 7/10 right knee pain.  Pt states she had a burning sensation that started on the inside of her right knee yesterday, however she notes of no injury or change in activity.  Pt reports if the pain persist she may contact her referring ortho.     Objective   See Exercise, Manual, and Modality Logs for complete treatment.       Assessment/Plan:  Supervising therapist, Pili Walker, PT, DPT notified and aware of pt's subjective reports.  PT advised patient to continue to monitor symptoms and contact referring MD if pain persist or worsens.  Pt verbalized understanding.  Treatment initiated w/ therex and therapeutic activities as listed w/ continued focus on improved right knee ROM, improved right LE strength, and to assist w/ ease of difficulty and return of performing ADL's.  Pt initiated 4 inch lateral step ups w/ good tolerance, however pt was limited w/ overall progression due to increased pain ratings prior to arrival.  Pt provided w/ cues and demonstration as needed while doing exercise to maintain form, and for max benefit.  Cryotherapy w/ Estim applied at conclusion.  No signs of distress or adverse reactions observe during, and/ or following tx.  Pt continues to benefit from therapy services as tolerated to address goals, reduce pain, and restore mobility.  Continue w/ PT's POC.       Timed:         Manual Therapy:         mins  13859;     Therapeutic Exercise:    33     mins  90870;      Neuromuscular Vladimir:        mins  02396;    Therapeutic Activity:    12      mins  09226;     Gait Training:           mins  65890;     Ultrasound:          mins  58531;    Ionto                                   mins   82180  Self Care                            mins   68611  Canalith Repos         mins 88103      Un-Timed:  Electrical Stimulation:    10    mins  16467 ( );  Dry Needling          mins self-pay  Traction          mins 35027      Timed Treatment:  45    mins   Total Treatment:    55    mins    Janet Lisa. DAVID Parikh  [place] License: [id number]

## 2022-07-12 ENCOUNTER — OFFICE VISIT (OUTPATIENT)
Dept: GASTROENTEROLOGY | Facility: CLINIC | Age: 32
End: 2022-07-12

## 2022-07-12 VITALS
SYSTOLIC BLOOD PRESSURE: 118 MMHG | DIASTOLIC BLOOD PRESSURE: 78 MMHG | BODY MASS INDEX: 34.31 KG/M2 | WEIGHT: 170.2 LBS | HEIGHT: 59 IN

## 2022-07-12 DIAGNOSIS — K58.0 IRRITABLE BOWEL SYNDROME WITH DIARRHEA: Primary | ICD-10-CM

## 2022-07-12 PROCEDURE — 99214 OFFICE O/P EST MOD 30 MIN: CPT | Performed by: PHYSICIAN ASSISTANT

## 2022-07-12 RX ORDER — RIZATRIPTAN BENZOATE 10 MG/1
10 TABLET ORAL ONCE AS NEEDED
COMMUNITY

## 2022-07-12 RX ORDER — MONTELUKAST SODIUM 4 MG/1
1 TABLET, CHEWABLE ORAL DAILY
Qty: 30 TABLET | Refills: 2 | Status: SHIPPED | OUTPATIENT
Start: 2022-07-12 | End: 2022-10-10

## 2022-07-12 RX ORDER — GABAPENTIN 100 MG/1
100 CAPSULE ORAL 3 TIMES DAILY
COMMUNITY

## 2022-07-12 NOTE — PROGRESS NOTES
Follow Up Note     Date: 2022   Patient Name: Pili Webster  MRN: [id number]  : 1990     Primary Care Provider: Osvaldo Madrid MD     Chief Complaint   Patient presents with   • Follow-up   • Irritable Bowel Syndrome     History of present illness:   2022  Pili Webster is a 31 y.o. female who is here today for follow up regarding Irritable Bowel Syndrome.    She took Xifaxan in Dec 2021 then again in 2022 due to diarrhea. Each time that she took it she had about a 50% improvement in diarrhea after therapy completed. Diarrhea always returned back to baseline a few weeks later. She is having 6-7 stools per day, no skip days between, watery stool. She has fecal urgency and fecal incontinence after a meal. She avoids leaving the house and does not eat at restaurants due to this problem. Having fecal incontinence several times per month and carries extra clothing in her car at all times. Has diffuse joint pains and severe fatigue. She is unable to work currently due to her medical problems. Abdominal pain is constant and described as generalized. No rectal bleeding.     Interval History:  2018  Over the past 6 weeks, she has lost her appetite and has diarrhea or vomiting within a few minutes of eating. She has lost about 10 lbs over the past 6 weeks. She was seen in the ED for evaluation recently and was told that she was dehydrated and related it to her kidney stones. She follows with Dr. Barrow and has planned follow up in a few weeks. Bowel movements are described as watery in consistency. During this time of her illness, she has skipped 1-2 days between bowel movements intermittently. No obvious rectal bleeding. Sometimes the stool will be very light or very dark. She will have 2-3 episodes of vomiting per day intermittently. Usually, the vomitus is the food that she has eaten. She takes Zofran as needed for relief of nausea and vomiting with only mild  relief. Protonix 40 mg once daily was given by her PCP only a few days ago. Previously, she was taking ranitidine daily without good relief. Reports heartburn which is constant and worse with any PO intake. She states that she has had GERD complaints since age 10. No recent stool testing. S/p cholecystectomy over 1 year ago. Abdominal pain is generalized but mostly located on right side, described as stabbing in nature and constant. She points to the right flank as the area of the most discomfort. Bloating seems to cause the most discomfort per her report. Associated symptoms are back pain, chills, fatigue and intermittent fever. No known family history of GI disease including IBD, color or stomach cancer. No personal history of EGD or colonoscopy. She had miscarriage in Jan 2018. She had surgery in May 2018 due to endometriosis and she was told that her bowels were connected in areas which was abnormal and this was repaired. She does not currently take any oral contraceptive    Subjective     Past Medical History:   Diagnosis Date   • Abdominal pain    • Abnormal uterine bleeding (AUB)    • Anxiety and depression    • Arthritis    • Asthma     mild   • Cholecystitis    • Constipation    • Endometriosis    • Frequent UTI    • GERD (gastroesophageal reflux disease)    • Heartburn    • Hemorrhoids    • IBS (irritable bowel syndrome)    • Kidney stones    • Lesion of breast    • Lump     RIGHT BREAST   • Nausea & vomiting    • PCOS (polycystic ovarian syndrome)    • PONV (postoperative nausea and vomiting)    • Sjogren's disease (HCC)    • Sleep apnea     no cpap   • Snores    • Tachycardia    • Wrist fracture     RIGHT ARM      PATIENT UNSURE IF FRACTURED     Past Surgical History:   Procedure Laterality Date   • ABDOMINAL SURGERY     • BREAST BIOPSY Right 11/29/2018    Procedure: breast biopsy ;  Surgeon: Shiv Overton MD;  Location: [facility];  Service: General   • CHOLECYSTECTOMY N/A 8/25/2017    Procedure:  CHOLECYSTECTOMY LAPAROSCOPIC;  Surgeon: Franco Mari MD;  Location: [facility];  Service:    • COLONOSCOPY N/A 3/9/2020    Procedure: COLONOSCOPY CPT CODE: 00328;  Surgeon: Jeremiah Murdock MD;  Location: [facility] OR;  Service: Gastroenterology;  Laterality: N/A;   • DENTAL PROCEDURE     • DIAGNOSTIC LAPAROSCOPY      X5   • DILATATION AND CURETTAGE     • ENDOSCOPY N/A 3/9/2020    Procedure: ESOPHAGOGASTRODUODENOSCOPY WITH BIOPSY CPT CODE: 21718;  Surgeon: Jeremiah Murdock MD;  Location: [facility] OR;  Service: Gastroenterology;  Laterality: N/A;   • ENDOSCOPY N/A 2/1/2021    Procedure: ESOPHAGOGASTRODUODENOSCOPY WITH BIOPSY CPT CODE: 33984;  Surgeon: Jeremiah Murdock MD;  Location: [facility];  Service: Gastroenterology;  Laterality: N/A;   • HEMORRHOIDECTOMY N/A 11/14/2019    Procedure: HEMORRHOID STAPLING;  Surgeon: Franco Mari MD;  Location: [facility];  Service: General   • KNEE ARTHROSCOPY W/ MENISCECTOMY Right 4/11/2022    Procedure: KNEE ARTHROSCOPIC DEBRIDEMENT, PRP INJECTION AND medial femoral condraplasty MENISCAL DEBRIDEMENT;  Surgeon: Musa Yoon MD;  Location: [facility];  Service: Orthopedics;  Laterality: Right;   • URETEROSCOPY LASER LITHOTRIPSY WITH STENT INSERTION Right 1/9/2019    Procedure: URETEROSCOPY LASER LITHOTRIPSY retrograde pyelogram;  Surgeon: Ahmet Barrow MD;  Location: [facility];  Service: Urology   • WISDOM TOOTH EXTRACTION       Family History   Problem Relation Age of Onset   • Breast cancer Maternal Aunt    • Alcohol abuse Paternal Grandfather    • Heart disease Paternal Grandfather    • Cancer Maternal Grandfather    • Hypertension Maternal Grandfather    • Colon cancer Neg Hx    • Liver cancer Neg Hx      Social History     Socioeconomic History   • Marital status:    Tobacco Use   • Smoking status: Current Every Day Smoker     Packs/day: 0.25     Years: 16.00     Pack years: 4.00     Types: Cigarettes     Start date:  2003   • Smokeless tobacco: Never Used   Vaping Use   • Vaping Use: Former   • Substances: Nicotine, Flavoring   Substance and Sexual Activity   • Alcohol use: Yes     Comment: RARELY   • Drug use: No   • Sexual activity: Defer       Current Outpatient Medications:   •  albuterol sulfate  (90 Base) MCG/ACT inhaler, Inhale 1 puff 4 (Four) Times a Day., Disp: , Rfl:   •  Dexilant 60 MG capsule, TAKE ONE CAPSULE BY MOUTH DAILY, Disp: 90 capsule, Rfl: 3  •  dicyclomine (BENTYL) 20 MG tablet, TAKE ONE TABLET BY MOUTH FOUR TIMES A DAY (BEFORE MEALS AND AT BEDTIME), Disp: 120 tablet, Rfl: 2  •  Erenumab-aooe (AIMOVIG) 140 MG/ML prefilled syringe, Inject 140 mg under the skin into the appropriate area as directed 1 (One) Time., Disp: , Rfl:   •  famotidine (PEPCID) 20 MG tablet, Take 20 mg by mouth 2 (Two) Times a Day., Disp: , Rfl:   •  FLUoxetine (PROzac) 40 MG capsule, Take 40 mg by mouth Daily., Disp: , Rfl:   •  folic acid (FOLVITE) 1 MG tablet, Take 1 mg by mouth Daily., Disp: , Rfl:   •  gabapentin (NEURONTIN) 100 MG capsule, Take 100 mg by mouth 3 (Three) Times a Day. Takes 2 tablets tid, Disp: , Rfl:   •  hydrOXYzine pamoate (VISTARIL) 25 MG capsule, Take 25 mg by mouth 3 (Three) Times a Day As Needed., Disp: , Rfl:   •  loratadine (CLARITIN) 10 MG tablet, 10 mg Daily., Disp: , Rfl:   •  meloxicam (Mobic) 15 MG tablet, Take 1 tablet by mouth Daily., Disp: 14 tablet, Rfl: 0  •  methocarbamol (ROBAXIN) 500 MG tablet, Take 500 mg by mouth Every Night., Disp: , Rfl:   •  montelukast (SINGULAIR) 10 MG tablet, Take 10 mg by mouth Every Night., Disp: , Rfl:   •  rizatriptan (MAXALT) 10 MG tablet, Take 10 mg by mouth 1 (One) Time As Needed for Migraine. May repeat in 2 hours if needed, Disp: , Rfl:   •  topiramate (TOPAMAX) 25 MG tablet, Take 25 mg by mouth Every Night., Disp: , Rfl:   •  triamcinolone (KENALOG) 0.1 % cream, , Disp: , Rfl:   •  vitamin D (ERGOCALCIFEROL) 1.25 MG (69387 UT) capsule capsule, Take  50,000 Units by mouth Every 7 (Seven) Days., Disp: , Rfl:     Allergies   Allergen Reactions   • Peanut-Containing Drug Products Anaphylaxis     AND PEANUTS IN FOODS   • Latex Hives   • Shrimp Swelling     Facial swelling     • Milk-Related Compounds Nausea And Vomiting   • Sulfa Antibiotics Rash     The following portions of the patient's history were reviewed and updated as appropriate: allergies, current medications, past family history, past medical history, past social history, past surgical history and problem list.    Objective     Physical Exam  Vitals reviewed.   Constitutional:       General: She is not in acute distress.     Appearance: Normal appearance. She is well-developed. She is not ill-appearing or diaphoretic.   HENT:      Head: Normocephalic and atraumatic.      Right Ear: External ear normal.      Left Ear: External ear normal.      Nose: Nose normal.      Mouth/Throat:      Comments: Wearing a mask  Eyes:      General: No scleral icterus.        Right eye: No discharge.         Left eye: No discharge.      Conjunctiva/sclera: Conjunctivae normal.   Neck:      Vascular: No JVD.   Cardiovascular:      Rate and Rhythm: Normal rate and regular rhythm.      Heart sounds: Normal heart sounds. No murmur heard.    No friction rub. No gallop.   Pulmonary:      Effort: Pulmonary effort is normal. No respiratory distress.      Breath sounds: Normal breath sounds. No wheezing or rales.   Chest:      Chest wall: No tenderness.   Abdominal:      General: Bowel sounds are normal. There is no distension.      Palpations: Abdomen is soft. There is no mass.      Tenderness: There is abdominal tenderness (generalized, mild). There is no guarding.   Musculoskeletal:         General: No deformity. Normal range of motion.      Cervical back: Normal range of motion.   Skin:     General: Skin is warm and dry.      Findings: No erythema or rash.   Neurological:      Mental Status: She is alert and oriented to person,  "place, and time.      Coordination: Coordination normal.   Psychiatric:         Mood and Affect: Mood normal.         Behavior: Behavior normal.         Thought Content: Thought content normal.         Judgment: Judgment normal.       Vitals:    07/12/22 1305   BP: 118/78   Weight: 77.2 kg (170 lb 3.2 oz)   Height: 149.9 cm (59\")       Results Review:   I reviewed the patient's new clinical results.    Lab on 06/03/2022   Component Date Value Ref Range Status   • Vitamin B-12 06/03/2022 799  211 - 946 pg/mL Final   • Lyme Total Antibody EIA 06/03/2022 Negative  Negative Final    Lyme Antibody Negative  No laboratory evidence of infection with B. burgdorferi (Lyme disease).  Negative results may occur in patients recently infected (greater than  or equal to 14 days) with B. burgdorferi.  If recent infection is  suspected, repeat testing on a new sample collected in 7 to 14 days is  recommended.   • Folate 06/03/2022 16.30  4.78 - 24.20 ng/mL Final   • RPR 06/03/2022 Non Reactive  Non Reactive Final   • TSH 06/03/2022 2.360  0.270 - 4.200 uIU/mL Final        EGD was completed on 2/1/2021 by Dr. Murdock.  Findings were short segment Márquez's esophagus with patchy involvement of the distal 1 to 2 cm of the esophagus, normal stomach except for small sliding hiatal hernia, normal duodenum.  Pathology shows benign duodenal mucosa, no H. pylori, distal esophageal biopsy shows chronic inflammation, features of reflux and multifocal intestinal metaplasia, negative for dysplasia.     EGD and colonoscopy were completed on 3/9/2020 by Dr. Murdock.  Findings were patchy salmon-colored mucosa in the distal esophagus, small sliding hiatal hernia, normal stomach, normal duodenum, normal terminal ileum, evidence of prior rectal surgery, 6 mm colon polyp in the transverse and sigmoid colon, left-sided diverticulosis.  Pathology showed normal duodenum, minimal superficial chronic inflammation of the stomach, negative H. " pylori, esophageal biopsy showed features of reflux, multifocal intestinal metaplasia negative for dysplasia, transverse colon polyp was sessile serrated adenoma and sigmoid colon polyp was hyperplastic.     CTAP with contrast 8/2021   FINDINGS:    LUNG BASES:  Unremarkable.  No mass.  No consolidation.   ABDOMEN:    LIVER:  Unremarkable.  No mass.    GALLBLADDER AND BILE DUCTS:  Cholecystectomy clips.  No ductal  dilation.    PANCREAS:  Unremarkable.  No mass.  No ductal dilation.    SPLEEN:  Unremarkable.  No splenomegaly.    ADRENALS:  Unremarkable.  No mass.    KIDNEYS AND URETERS:  Unremarkable.  No solid mass.  No  hydronephrosis.    STOMACH AND BOWEL:  Unremarkable.  No obstruction.  No mucosal  thickening.   PELVIS:    APPENDIX:  No findings to suggest acute appendicitis.    BLADDER:  Unremarkable.  No mass.    REPRODUCTIVE:  Unremarkable as visualized.   ABDOMEN and PELVIS:    INTRAPERITONEAL SPACE:  Unremarkable.  No free air.  No significant  fluid collection.    BONES/JOINTS:  No acute fracture.  No dislocation.    SOFT TISSUES:  Unremarkable.    VASCULATURE:  Unremarkable.  No abdominal aortic aneurysm.    LYMPH NODES:  Unremarkable.  No enlarged lymph nodes.  IMPRESSION:    No acute findings in the abdomen or pelvis.     Assessment / Plan      1. Irritable bowel syndrome with diarrhea  She is still having symptoms of generalized abdominal pain, having 6-7 stools per day now with fecal urgency and intermittent fecal incontinence. She took Xifaxan therapy for treatment of IBS x2 (12/2021 and 2/2022) and had temporary improvements in symptoms each time.  Dicyclomine has helped some to relieve abdominal pain, will continue.  Recent labs showed CMP, TSH and celiac testing all unremarkable. CTAP 8/2021 showed no GI pathology. Colonoscopy in 2020 showed left sided diverticulosis and colon polyps. No random colon biopsies taken. She has IBS diarrhea based on history.     Continue taking bentyl as needed  Low  FODMAP diet   Work on weight loss with diet modification and exercise as tolerated  Start colestipol 1g tab up to BID for diarrhea, may increase to 3 times daily if no improvement in the next 1-2 weeks      - colestipol (COLESTID) 1 g tablet; Take 1 tablet by mouth Daily.  Dispense: 30 tablet; Refill: 2                 Prior History:  Gastroesophageal reflux disease without esophagitis  Hiatal hernia  Márquez's esophagus without dysplasia  Long standing history of GERD, taking Dexilant 60 mg once daily with reasonable control. She needs to work on avoiding foods that make her GERD worse, discussed weight loss which may help with this. Did have multifocal intestinal metaplasia of the esophageal biopsy. Will need another EGD for monitoring of dysplasia related to her Márquez's esophagus, due 2/2024.     Weight gain, abnormal  Noted weight gain of 20 lbs over past 1 year. Will check TSH today. Weight stable now since last visit.     History of adenomatous colon polyp  Colonoscopy in 3/2020 showed sessile serrated adenoma of transverse colon polyp, repeat colonoscopy recommended in 5 years, due 03/2025.       Follow Up:   Return in about 3 months (around 10/12/2022) for recheck IBS.      Naomi Aguiar PA-C  [facility]  7/12/2022  17:13 EDT    Please note that portions of this note may have been completed with a voice recognition program. Efforts were made to edit the dictations, but occasionally words are mistranscribed.

## 2022-07-13 ENCOUNTER — TREATMENT (OUTPATIENT)
Dept: PHYSICAL THERAPY | Facility: CLINIC | Age: 32
End: 2022-07-13

## 2022-07-13 DIAGNOSIS — Z98.890 S/P RIGHT KNEE ARTHROSCOPY: Primary | ICD-10-CM

## 2022-07-13 DIAGNOSIS — M25.661 DECREASED RANGE OF MOTION (ROM) OF RIGHT KNEE: ICD-10-CM

## 2022-07-13 PROCEDURE — 97110 THERAPEUTIC EXERCISES: CPT | Performed by: PHYSICAL THERAPIST

## 2022-07-13 PROCEDURE — 97014 ELECTRIC STIMULATION THERAPY: CPT | Performed by: PHYSICAL THERAPIST

## 2022-07-13 PROCEDURE — 97530 THERAPEUTIC ACTIVITIES: CPT | Performed by: PHYSICAL THERAPIST

## 2022-07-13 NOTE — PROGRESS NOTES
Physical Therapy Daily Treatment Note      Patient: Pili Webster   : 1990  Referring practitioner: IVELISSE Jj  Date of Initial Visit: Type: THERAPY  Noted: 2022  Today's Date: 2022  Patient seen for 14 sessions       Visit Diagnoses:    ICD-10-CM ICD-9-CM   1. S/P right knee arthroscopy  Z98.890 V45.89   2. Decreased range of motion (ROM) of right knee  M25.661 719.56       Subjective   Patient reports that she is continuing have burning pain in her right knee. Patient states that she trouble with stepping down steps. Patient reports that she has been busy this weekend and is planning on following up with her MD tomorrow. Patient reports that she is having 7/10 pain in her right knee. Patient states that she is having increased weakness and pain on the inside of the right knee.    Objective   See Exercise, Manual, and Modality Logs for complete treatment.       Assessment/Plan  Patient tolerated treatment session well with rest breaks taken as needed by the patient. Treatment session consisted of exercises to improve strength and ROM to the right knee. Educated patient to perform therex per her tolerance, patient verbalized understanding. No adverse reactions with modalities or treatment session. Exercises modified secondary to patient's increased pain in the right knee. Decrease in reps performed due to pain. Patient educated on calling MD for follow up due to increased pain, patient verbalized understanding. Decrease in pain noted following treatment session, 5/10 pain. Continue per PT's POC, progress exercises per the patient's tolerance.    Timed:         Manual Therapy:         mins  10114;     Therapeutic Exercise:    27    mins  04538;     Neuromuscular Vladimir:        mins  15480;    Therapeutic Activity:     12     mins  08001;     Gait Training:           mins  24543;     Ultrasound:          mins  67595;    Ionto                                   mins   49740  Self  Care                            mins   61094  Canalith Repos         mins 86239      Un-Timed:  Electrical Stimulation:    15     mins  05403 ( );  Dry Needling          mins self-pay  Traction          mins 08011      Timed Treatment:   39   mins   Total Treatment:     54   mins    Ina Paez, DAVID  [place] License: [id number]

## 2022-07-19 ENCOUNTER — TREATMENT (OUTPATIENT)
Dept: PHYSICAL THERAPY | Facility: CLINIC | Age: 32
End: 2022-07-19

## 2022-07-19 DIAGNOSIS — M25.661 DECREASED RANGE OF MOTION (ROM) OF RIGHT KNEE: ICD-10-CM

## 2022-07-19 DIAGNOSIS — Z98.890 S/P RIGHT KNEE ARTHROSCOPY: Primary | ICD-10-CM

## 2022-07-19 DIAGNOSIS — R29.898 DECREASED STRENGTH OF LOWER EXTREMITY: ICD-10-CM

## 2022-07-19 PROCEDURE — 97014 ELECTRIC STIMULATION THERAPY: CPT | Performed by: PHYSICAL THERAPIST

## 2022-07-19 PROCEDURE — 97110 THERAPEUTIC EXERCISES: CPT | Performed by: PHYSICAL THERAPIST

## 2022-07-19 PROCEDURE — 97530 THERAPEUTIC ACTIVITIES: CPT | Performed by: PHYSICAL THERAPIST

## 2022-07-19 NOTE — PROGRESS NOTES
"Physical Therapy Daily Treatment Note      Patient: Pili Webster   : 1990  Referring practitioner: IVELISSE Jj  Date of Initial Visit: Type: THERAPY  Noted: 2022  Today's Date: 2022  Patient seen for 15 sessions       Visit Diagnoses:    ICD-10-CM ICD-9-CM   1. S/P right knee arthroscopy  Z98.890 V45.89   2. Decreased range of motion (ROM) of right knee  M25.661 719.56   3. Decreased strength of lower extremity  R29.898 729.89       Subjective Evaluation    Pain  At best pain ratin  At worst pain ratin      :  Patient arrives to therapy w/ reports of 6/10 right knee pain.  Pt states she is scheduled to f/u with referring ortho on , however she plans to stop by their office today to try to get an earlier appointment.  Pt reports she continues to have a \"burning\" sensation along the inside of the knee, which she notes MD is aware. Pt states she feels she has reached max gains w/ therapy, and wishes to continue w/ home program at this time.        Objective          Active Range of Motion     Right Knee   Flexion: 128 degrees   Extension: 0 degrees     Strength/Myotome Testing     Left Hip   Planes of Motion   Flexion: 4+    Right Hip   Planes of Motion   Flexion: 4    Left Knee   Flexion: 4+  Extension: 4+    Right Knee   Flexion: 3+  Extension: 4-    Right Ankle/Foot   Dorsiflexion: 4  Plantar flexion: 4+      See Exercise, Manual, and Modality Logs for complete treatment.       Assessment/Plan:  Discussed pt's subjective reports with supervising therapist, Pili Walker, PT, DPT.  PT is agreeable w/ patient discharging from therapy services at this time due to max gains achieved.  PT and PTA advised patient to f/u with referring ortho, and continue w/ home program; pt verbalized understanding.  Patient responded well to today's session w/ reports of slight decrease in knee pain following, 5/10.  Treatment consisted of therex and therapeutic activities as " listed w/ continued focus on improved right knee ROM, improved right LE strength, and knee stability.  Pt demonstrated good mechanics, w/ home program.  Pt educated to discontinue any exercise w/ home program if becomes bothersome.  Pt verbalized understanding.  Cryotherapy w/ Estim applied at conclusion.  PT will complete discharge.  No signs of distress or adverse reactions observed during, and/ or following tx.  Patient will be discharged from PT at this time per patient request.      Timed:         Manual Therapy:         mins  96326;     Therapeutic Exercise:   34     mins  41070;     Neuromuscular Vladimir:        mins  64228;    Therapeutic Activity:    10      mins  31608;     Gait Training:           mins  13730;     Ultrasound:          mins  86383;    Ionto                                   mins   35877  Self Care                            mins   40899  Canalith Repos         mins 83309      Un-Timed:  Electrical Stimulation:   10      mins  14075 ( );  Dry Needling          mins self-pay  Traction          mins 27099      Timed Treatment:  44    mins   Total Treatment:    54   mins    Janet Lisa. DAVID Parikh  [place] License: [id number]

## 2022-07-20 ENCOUNTER — HOSPITAL ENCOUNTER (EMERGENCY)
Facility: HOSPITAL | Age: 32
Discharge: HOME | End: 2022-07-20
Attending: EMERGENCY MEDICINE | Admitting: EMERGENCY MEDICINE

## 2022-07-20 ENCOUNTER — APPOINTMENT (OUTPATIENT)
Dept: CT IMAGING | Facility: HOSPITAL | Age: 32
End: 2022-07-20

## 2022-07-20 VITALS
HEART RATE: 90 BPM | DIASTOLIC BLOOD PRESSURE: 80 MMHG | TEMPERATURE: 96.9 F | HEIGHT: 59 IN | WEIGHT: 169 LBS | SYSTOLIC BLOOD PRESSURE: 120 MMHG | RESPIRATION RATE: 18 BRPM | BODY MASS INDEX: 34.07 KG/M2 | OXYGEN SATURATION: 98 %

## 2022-07-20 DIAGNOSIS — N20.1 URETERAL STONE: Primary | ICD-10-CM

## 2022-07-20 LAB
ALBUMIN SERPL-MCNC: 4.51 G/DL (ref 3.5–5.2)
ALBUMIN/GLOB SERPL: 1.8 G/DL
ALP SERPL-CCNC: 73 U/L (ref 39–117)
ALT SERPL W P-5'-P-CCNC: 12 U/L (ref 1–33)
AMORPH URATE CRY URNS QL MICRO: ABNORMAL /HPF
ANION GAP SERPL CALCULATED.3IONS-SCNC: 14.1 MMOL/L (ref 5–15)
AST SERPL-CCNC: 13 U/L (ref 1–32)
BACTERIA UR QL AUTO: ABNORMAL /HPF
BASOPHILS # BLD AUTO: 0.04 10*3/MM3 (ref 0–0.2)
BASOPHILS NFR BLD AUTO: 0.3 % (ref 0–1.5)
BILIRUB SERPL-MCNC: 0.3 MG/DL (ref 0–1.2)
BILIRUB UR QL STRIP: ABNORMAL
BUN SERPL-MCNC: 11 MG/DL (ref 6–20)
BUN/CREAT SERPL: 11 (ref 7–25)
CALCIUM SPEC-SCNC: 9.6 MG/DL (ref 8.6–10.5)
CHLORIDE SERPL-SCNC: 104 MMOL/L (ref 98–107)
CLARITY UR: ABNORMAL
CO2 SERPL-SCNC: 14.9 MMOL/L (ref 22–29)
COLOR UR: ABNORMAL
CREAT SERPL-MCNC: 1 MG/DL (ref 0.57–1)
CRP SERPL-MCNC: 1.1 MG/DL (ref 0–0.5)
DEPRECATED RDW RBC AUTO: 40 FL (ref 37–54)
EGFRCR SERPLBLD CKD-EPI 2021: 76.4 ML/MIN/1.73
EOSINOPHIL # BLD AUTO: 0.17 10*3/MM3 (ref 0–0.4)
EOSINOPHIL NFR BLD AUTO: 1.3 % (ref 0.3–6.2)
ERYTHROCYTE [DISTWIDTH] IN BLOOD BY AUTOMATED COUNT: 12.3 % (ref 12.3–15.4)
ERYTHROCYTE [SEDIMENTATION RATE] IN BLOOD: 14 MM/HR (ref 0–20)
GLOBULIN UR ELPH-MCNC: 2.5 GM/DL
GLUCOSE SERPL-MCNC: 112 MG/DL (ref 65–99)
GLUCOSE UR STRIP-MCNC: NEGATIVE MG/DL
HCG SERPL QL: NEGATIVE
HCT VFR BLD AUTO: 40 % (ref 34–46.6)
HGB BLD-MCNC: 13.5 G/DL (ref 12–15.9)
HGB UR QL STRIP.AUTO: ABNORMAL
HOLD SPECIMEN: NORMAL
HYALINE CASTS UR QL AUTO: ABNORMAL /LPF
IMM GRANULOCYTES # BLD AUTO: 0.05 10*3/MM3 (ref 0–0.05)
IMM GRANULOCYTES NFR BLD AUTO: 0.4 % (ref 0–0.5)
KETONES UR QL STRIP: ABNORMAL
LEUKOCYTE ESTERASE UR QL STRIP.AUTO: ABNORMAL
LYMPHOCYTES # BLD AUTO: 2.95 10*3/MM3 (ref 0.7–3.1)
LYMPHOCYTES NFR BLD AUTO: 22.1 % (ref 19.6–45.3)
MCH RBC QN AUTO: 30 PG (ref 26.6–33)
MCHC RBC AUTO-ENTMCNC: 33.8 G/DL (ref 31.5–35.7)
MCV RBC AUTO: 88.9 FL (ref 79–97)
MONOCYTES # BLD AUTO: 0.55 10*3/MM3 (ref 0.1–0.9)
MONOCYTES NFR BLD AUTO: 4.1 % (ref 5–12)
NEUTROPHILS NFR BLD AUTO: 71.8 % (ref 42.7–76)
NEUTROPHILS NFR BLD AUTO: 9.61 10*3/MM3 (ref 1.7–7)
NITRITE UR QL STRIP: NEGATIVE
NRBC BLD AUTO-RTO: 0 /100 WBC (ref 0–0.2)
PH UR STRIP.AUTO: 5.5 [PH] (ref 5–8)
PLATELET # BLD AUTO: 322 10*3/MM3 (ref 140–450)
PMV BLD AUTO: 9 FL (ref 6–12)
POTASSIUM SERPL-SCNC: 4.3 MMOL/L (ref 3.5–5.2)
PROT SERPL-MCNC: 7 G/DL (ref 6–8.5)
PROT UR QL STRIP: ABNORMAL
RBC # BLD AUTO: 4.5 10*6/MM3 (ref 3.77–5.28)
RBC # UR STRIP: ABNORMAL /HPF
REF LAB TEST METHOD: ABNORMAL
SODIUM SERPL-SCNC: 133 MMOL/L (ref 136–145)
SP GR UR STRIP: 1.03 (ref 1–1.03)
SQUAMOUS #/AREA URNS HPF: ABNORMAL /HPF
UROBILINOGEN UR QL STRIP: ABNORMAL
WBC # UR STRIP: ABNORMAL /HPF
WBC NRBC COR # BLD: 13.37 10*3/MM3 (ref 3.4–10.8)
WHOLE BLOOD HOLD COAG: NORMAL
WHOLE BLOOD HOLD SPECIMEN: NORMAL

## 2022-07-20 PROCEDURE — 84703 CHORIONIC GONADOTROPIN ASSAY: CPT | Performed by: PHYSICIAN ASSISTANT

## 2022-07-20 PROCEDURE — 85025 COMPLETE CBC W/AUTO DIFF WBC: CPT | Performed by: PHYSICIAN ASSISTANT

## 2022-07-20 PROCEDURE — 96375 TX/PRO/DX INJ NEW DRUG ADDON: CPT

## 2022-07-20 PROCEDURE — 25010000002 CEFTRIAXONE PER 250 MG: Performed by: PHYSICIAN ASSISTANT

## 2022-07-20 PROCEDURE — 63710000001 ONDANSETRON ODT 4 MG TABLET DISPERSIBLE: Performed by: PHYSICIAN ASSISTANT

## 2022-07-20 PROCEDURE — 36415 COLL VENOUS BLD VENIPUNCTURE: CPT

## 2022-07-20 PROCEDURE — 74176 CT ABD & PELVIS W/O CONTRAST: CPT | Performed by: RADIOLOGY

## 2022-07-20 PROCEDURE — 86140 C-REACTIVE PROTEIN: CPT | Performed by: PHYSICIAN ASSISTANT

## 2022-07-20 PROCEDURE — 96365 THER/PROPH/DIAG IV INF INIT: CPT

## 2022-07-20 PROCEDURE — 99283 EMERGENCY DEPT VISIT LOW MDM: CPT

## 2022-07-20 PROCEDURE — 96374 THER/PROPH/DIAG INJ IV PUSH: CPT

## 2022-07-20 PROCEDURE — 25010000002 MORPHINE PER 10 MG: Performed by: EMERGENCY MEDICINE

## 2022-07-20 PROCEDURE — 81001 URINALYSIS AUTO W/SCOPE: CPT | Performed by: PHYSICIAN ASSISTANT

## 2022-07-20 PROCEDURE — 85652 RBC SED RATE AUTOMATED: CPT | Performed by: PHYSICIAN ASSISTANT

## 2022-07-20 PROCEDURE — 25010000002 KETOROLAC TROMETHAMINE PER 15 MG: Performed by: PHYSICIAN ASSISTANT

## 2022-07-20 PROCEDURE — 74176 CT ABD & PELVIS W/O CONTRAST: CPT

## 2022-07-20 PROCEDURE — 87086 URINE CULTURE/COLONY COUNT: CPT | Performed by: PHYSICIAN ASSISTANT

## 2022-07-20 PROCEDURE — 80053 COMPREHEN METABOLIC PANEL: CPT | Performed by: PHYSICIAN ASSISTANT

## 2022-07-20 RX ORDER — KETOROLAC TROMETHAMINE 30 MG/ML
30 INJECTION, SOLUTION INTRAMUSCULAR; INTRAVENOUS ONCE
Status: COMPLETED | OUTPATIENT
Start: 2022-07-20 | End: 2022-07-20

## 2022-07-20 RX ORDER — HYDROCODONE BITARTRATE AND ACETAMINOPHEN 7.5; 325 MG/1; MG/1
1 TABLET ORAL EVERY 6 HOURS PRN
Qty: 12 TABLET | Refills: 0 | Status: SHIPPED | OUTPATIENT
Start: 2022-07-20 | End: 2022-09-29

## 2022-07-20 RX ORDER — CEFDINIR 300 MG/1
300 CAPSULE ORAL 2 TIMES DAILY
Qty: 20 CAPSULE | Refills: 0 | Status: SHIPPED | OUTPATIENT
Start: 2022-07-20 | End: 2022-07-30

## 2022-07-20 RX ORDER — ONDANSETRON 4 MG/1
4 TABLET, ORALLY DISINTEGRATING ORAL ONCE
Status: COMPLETED | OUTPATIENT
Start: 2022-07-20 | End: 2022-07-20

## 2022-07-20 RX ORDER — SODIUM CHLORIDE 0.9 % (FLUSH) 0.9 %
10 SYRINGE (ML) INJECTION AS NEEDED
Status: DISCONTINUED | OUTPATIENT
Start: 2022-07-20 | End: 2022-07-20 | Stop reason: HOSPADM

## 2022-07-20 RX ORDER — ONDANSETRON 4 MG/1
4 TABLET, ORALLY DISINTEGRATING ORAL EVERY 6 HOURS PRN
Qty: 12 TABLET | Refills: 0 | Status: SHIPPED | OUTPATIENT
Start: 2022-07-20 | End: 2022-09-29

## 2022-07-20 RX ADMIN — MORPHINE SULFATE 4 MG: 4 INJECTION, SOLUTION INTRAMUSCULAR; INTRAVENOUS at 17:47

## 2022-07-20 RX ADMIN — ONDANSETRON 4 MG: 4 TABLET, ORALLY DISINTEGRATING ORAL at 17:47

## 2022-07-20 RX ADMIN — SODIUM CHLORIDE 1000 ML: 9 INJECTION, SOLUTION INTRAVENOUS at 17:47

## 2022-07-20 RX ADMIN — CEFTRIAXONE 1 G: 1 INJECTION, POWDER, FOR SOLUTION INTRAMUSCULAR; INTRAVENOUS at 18:16

## 2022-07-20 RX ADMIN — KETOROLAC TROMETHAMINE 30 MG: 30 INJECTION, SOLUTION INTRAMUSCULAR; INTRAVENOUS at 18:16

## 2022-07-20 NOTE — ED NOTES
MEDICAL SCREENING:    Reason for Visit: Right flank pain with nausea vomiting.  History kidney stones.  Patient initially seen in triage.  The patient was advised further evaluation and diagnostic testing will be needed, some of the treatment and testing will be initiated in the lobby in order to begin the process.  The patient will be returned to the waiting area for the time being and possibly be re-assessed by a subsequent ED provider.  The patient will be brought back to the treatment area in as timely manner as possible.         [place], PA  07/20/22 1521

## 2022-07-20 NOTE — ED PROVIDER NOTES
Subjective   30 yo female patient presents to the ED with complaints of right flank pain, nausea, vomiting x 2 days. No fevers. Pt reports hx of kidney stones. Patient denies any worsening or alleviating factors.        History provided by:  Patient   used: No        Review of Systems   Constitutional: Negative.    HENT: Negative.    Eyes: Negative.    Respiratory: Negative.    Cardiovascular: Negative.    Gastrointestinal: Positive for nausea and vomiting.   Endocrine: Negative.    Genitourinary: Positive for dysuria and flank pain.   Skin: Negative.    Allergic/Immunologic: Negative.    Neurological: Negative.    Hematological: Negative.    Psychiatric/Behavioral: Negative.    All other systems reviewed and are negative.      Past Medical History:   Diagnosis Date   • Abdominal pain    • Abnormal uterine bleeding (AUB)    • Anxiety and depression    • Arthritis    • Asthma     mild   • Cholecystitis    • Constipation    • Endometriosis    • Frequent UTI    • GERD (gastroesophageal reflux disease)    • Heartburn    • Hemorrhoids    • IBS (irritable bowel syndrome)    • Kidney stones    • Lesion of breast    • Lump     RIGHT BREAST   • Nausea & vomiting    • PCOS (polycystic ovarian syndrome)    • PONV (postoperative nausea and vomiting)    • Sjogren's disease (HCC)    • Sleep apnea     no cpap   • Snores    • Tachycardia    • Wrist fracture     RIGHT ARM      PATIENT UNSURE IF FRACTURED       Allergies   Allergen Reactions   • Peanut-Containing Drug Products Anaphylaxis     AND PEANUTS IN FOODS   • Latex Hives   • Shrimp Swelling     Facial swelling     • Milk-Related Compounds Nausea And Vomiting   • Sulfa Antibiotics Rash       Past Surgical History:   Procedure Laterality Date   • ABDOMINAL SURGERY     • BREAST BIOPSY Right 11/29/2018    Procedure: breast biopsy ;  Surgeon: Shiv Overton MD;  Location: [facility];  Service: General   • CHOLECYSTECTOMY N/A 8/25/2017    Procedure:  CHOLECYSTECTOMY LAPAROSCOPIC;  Surgeon: Franco Mari MD;  Location: [facility];  Service:    • COLONOSCOPY N/A 3/9/2020    Procedure: COLONOSCOPY CPT CODE: 07162;  Surgeon: Jeremiah Murdock MD;  Location: [facility] OR;  Service: Gastroenterology;  Laterality: N/A;   • DENTAL PROCEDURE     • DIAGNOSTIC LAPAROSCOPY      X5   • DILATATION AND CURETTAGE     • ENDOSCOPY N/A 3/9/2020    Procedure: ESOPHAGOGASTRODUODENOSCOPY WITH BIOPSY CPT CODE: 39565;  Surgeon: Jeremiah Murdock MD;  Location: [facility] OR;  Service: Gastroenterology;  Laterality: N/A;   • ENDOSCOPY N/A 2/1/2021    Procedure: ESOPHAGOGASTRODUODENOSCOPY WITH BIOPSY CPT CODE: 08099;  Surgeon: Jeremiah Murdock MD;  Location: [facility];  Service: Gastroenterology;  Laterality: N/A;   • HEMORRHOIDECTOMY N/A 11/14/2019    Procedure: HEMORRHOID STAPLING;  Surgeon: Franco Mari MD;  Location: [facility];  Service: General   • KNEE ARTHROSCOPY W/ MENISCECTOMY Right 4/11/2022    Procedure: KNEE ARTHROSCOPIC DEBRIDEMENT, PRP INJECTION AND medial femoral condraplasty MENISCAL DEBRIDEMENT;  Surgeon: Musa Yoon MD;  Location: [facility];  Service: Orthopedics;  Laterality: Right;   • URETEROSCOPY LASER LITHOTRIPSY WITH STENT INSERTION Right 1/9/2019    Procedure: URETEROSCOPY LASER LITHOTRIPSY retrograde pyelogram;  Surgeon: Ahmet Barrow MD;  Location: [facility];  Service: Urology   • WISDOM TOOTH EXTRACTION         Family History   Problem Relation Age of Onset   • Breast cancer Maternal Aunt    • Alcohol abuse Paternal Grandfather    • Heart disease Paternal Grandfather    • Cancer Maternal Grandfather    • Hypertension Maternal Grandfather    • Colon cancer Neg Hx    • Liver cancer Neg Hx        Social History     Socioeconomic History   • Marital status:    Tobacco Use   • Smoking status: Current Every Day Smoker     Packs/day: 0.25     Years: 16.00     Pack years: 4.00     Types: Cigarettes     Start date:  2003   • Smokeless tobacco: Never Used   Vaping Use   • Vaping Use: Former   • Substances: Nicotine, Flavoring   Substance and Sexual Activity   • Alcohol use: Yes     Comment: RARELY   • Drug use: No   • Sexual activity: Defer           Objective   Physical Exam  Vitals and nursing note reviewed.   Constitutional:       Appearance: Normal appearance. She is normal weight.   HENT:      Head: Normocephalic and atraumatic.      Right Ear: External ear normal.      Left Ear: External ear normal.      Nose: Nose normal.      Mouth/Throat:      Mouth: Mucous membranes are moist.      Pharynx: Oropharynx is clear.   Eyes:      Extraocular Movements: Extraocular movements intact.      Conjunctiva/sclera: Conjunctivae normal.      Pupils: Pupils are equal, round, and reactive to light.   Cardiovascular:      Rate and Rhythm: Normal rate and regular rhythm.      Pulses: Normal pulses.      Heart sounds: Normal heart sounds.   Pulmonary:      Effort: Pulmonary effort is normal.      Breath sounds: Normal breath sounds.   Abdominal:      General: Abdomen is flat. Bowel sounds are normal.      Palpations: Abdomen is soft.   Musculoskeletal:         General: Normal range of motion.      Cervical back: Normal range of motion and neck supple.   Skin:     General: Skin is warm and dry.      Capillary Refill: Capillary refill takes less than 2 seconds.   Neurological:      General: No focal deficit present.      Mental Status: She is alert and oriented to person, place, and time. Mental status is at baseline.   Psychiatric:         Mood and Affect: Mood normal.         Behavior: Behavior normal.         Thought Content: Thought content normal.         Judgment: Judgment normal.         Procedures           ED Course  ED Course as of 07/20/22 1930 Wed Jul 20, 2022   8460 CT Abdomen Pelvis Without Contrast  1. Obstructive uropathy on the right due to a 3.3 mm distal right  ureteral stone     2.Other findings as above                     This report was finalized on 7/20/2022 5:46 PM by Dr. Sidney Pepe MD.             Specimen Collected: 07/20/22 17:44 Last Resulted: 07/20/22 17:46         [ML]   1800 Pt is feeling better. Instructions to f/u with urology. Discussed sx and red flags that would warrant return to the ED.  [ML]      ED Course User Index  [ML] Josiane Delvalle PA MDM  Number of Diagnoses or Management Options     Amount and/or Complexity of Data Reviewed  Clinical lab tests: reviewed and ordered  Tests in the radiology section of CPT®: ordered and reviewed    Risk of Complications, Morbidity, and/or Mortality  Presenting problems: low  Diagnostic procedures: low  Management options: low    Patient Progress  Patient progress: improved      Final diagnoses:   Ureteral stone       ED Disposition  ED Disposition     ED Disposition   Discharge    Condition   Stable    Comment   --             Osvaldo Madrid MD  [address] [id number] [phone]    In 1 day      Mirtha Graff MD  [address]  [place] [address]  [phone]    Schedule an appointment as soon as possible for a visit in 1 day           Medication List      New Prescriptions    cefdinir 300 MG capsule  Commonly known as: OMNICEF  Take 1 capsule by mouth 2 (Two) Times a Day for 10 days.     HYDROcodone-acetaminophen 7.5-325 MG per tablet  Commonly known as: NORCO  Take 1 tablet by mouth Every 6 (Six) Hours As Needed for Moderate Pain .     ondansetron ODT 4 MG disintegrating tablet  Commonly known as: ZOFRAN-ODT  Place 1 tablet on the tongue Every 6 (Six) Hours As Needed for Vomiting.           Where to Get Your Medications      These medications were sent to [facility] - [address] - [phone]  - [phone] FX  [address] [place] [address]    Phone: [phone]   · cefdinir 300 MG capsule  · HYDROcodone-acetaminophen  7.5-325 MG per tablet  · ondansetron ODT 4 MG disintegrating tablet          Josiane Delvalle PA  07/20/22 1802       Josiane Delvalle PA  07/20/22 1930     no

## 2022-07-20 NOTE — ED NOTES
Pt states that her ride should be here anytime. Pt denied any needs at this time and was on her phone.

## 2022-07-21 LAB — BACTERIA SPEC AEROBE CULT: NORMAL

## 2022-07-25 DIAGNOSIS — K58.0 IRRITABLE BOWEL SYNDROME WITH DIARRHEA: ICD-10-CM

## 2022-07-25 RX ORDER — DICYCLOMINE HCL 20 MG
TABLET ORAL
Qty: 120 TABLET | Refills: 2 | Status: SHIPPED | OUTPATIENT
Start: 2022-07-25 | End: 2022-11-01

## 2022-08-11 ENCOUNTER — HOSPITAL ENCOUNTER (OUTPATIENT)
Dept: HOSPITAL 79 - KOH-I | Age: 32
End: 2022-08-11
Attending: GENERAL PRACTICE
Payer: COMMERCIAL

## 2022-08-11 DIAGNOSIS — S83.511A: Primary | ICD-10-CM

## 2022-08-11 DIAGNOSIS — M25.461: ICD-10-CM

## 2022-08-17 ENCOUNTER — HOSPITAL ENCOUNTER (OUTPATIENT)
Dept: GENERAL RADIOLOGY | Facility: HOSPITAL | Age: 32
Discharge: HOME | End: 2022-08-17

## 2022-08-17 ENCOUNTER — OFFICE VISIT (OUTPATIENT)
Dept: UROLOGY | Facility: CLINIC | Age: 32
End: 2022-08-17

## 2022-08-17 VITALS — BODY MASS INDEX: 34.07 KG/M2 | HEIGHT: 59 IN | WEIGHT: 169 LBS

## 2022-08-17 DIAGNOSIS — N39.0 URINARY TRACT INFECTION WITHOUT HEMATURIA, SITE UNSPECIFIED: ICD-10-CM

## 2022-08-17 DIAGNOSIS — R35.0 FREQUENCY OF URINATION: Primary | ICD-10-CM

## 2022-08-17 DIAGNOSIS — N20.0 KIDNEY STONE: ICD-10-CM

## 2022-08-17 LAB
BILIRUB BLD-MCNC: NEGATIVE MG/DL
CLARITY, POC: CLEAR
COLOR UR: YELLOW
EXPIRATION DATE: ABNORMAL
GLUCOSE UR STRIP-MCNC: NEGATIVE MG/DL
KETONES UR QL: NEGATIVE
LEUKOCYTE EST, POC: ABNORMAL
Lab: ABNORMAL
NITRITE UR-MCNC: NEGATIVE MG/ML
PH UR: 6 [PH] (ref 5–8)
PROT UR STRIP-MCNC: NEGATIVE MG/DL
RBC # UR STRIP: NEGATIVE /UL
SP GR UR: 1.02 (ref 1–1.03)
UROBILINOGEN UR QL: NORMAL

## 2022-08-17 PROCEDURE — 74018 RADEX ABDOMEN 1 VIEW: CPT | Performed by: RADIOLOGY

## 2022-08-17 PROCEDURE — 87086 URINE CULTURE/COLONY COUNT: CPT

## 2022-08-17 PROCEDURE — 74018 RADEX ABDOMEN 1 VIEW: CPT

## 2022-08-17 PROCEDURE — 99214 OFFICE O/P EST MOD 30 MIN: CPT

## 2022-08-17 RX ORDER — NITROFURANTOIN 25; 75 MG/1; MG/1
100 CAPSULE ORAL 2 TIMES DAILY
Qty: 10 CAPSULE | Refills: 0 | Status: SHIPPED | OUTPATIENT
Start: 2022-08-17 | End: 2022-09-16

## 2022-08-17 RX ORDER — TAMSULOSIN HYDROCHLORIDE 0.4 MG/1
1 CAPSULE ORAL DAILY
Qty: 30 CAPSULE | Refills: 1 | Status: SHIPPED | OUTPATIENT
Start: 2022-08-17 | End: 2022-10-12

## 2022-08-17 RX ORDER — ARIPIPRAZOLE 5 MG/1
1 TABLET ORAL DAILY
COMMUNITY
Start: 2022-07-06

## 2022-08-17 RX ORDER — DOXEPIN HYDROCHLORIDE 25 MG/1
25 CAPSULE ORAL NIGHTLY
COMMUNITY
Start: 2022-08-15

## 2022-08-18 LAB — BACTERIA SPEC AEROBE CULT: NO GROWTH

## 2022-09-16 ENCOUNTER — OFFICE VISIT (OUTPATIENT)
Dept: UROLOGY | Facility: CLINIC | Age: 32
End: 2022-09-16

## 2022-09-16 VITALS — HEIGHT: 59 IN | BODY MASS INDEX: 34.09 KG/M2 | WEIGHT: 169.09 LBS

## 2022-09-16 DIAGNOSIS — N20.0 KIDNEY STONE: Primary | ICD-10-CM

## 2022-09-16 DIAGNOSIS — N39.0 RECURRENT URINARY TRACT INFECTION: ICD-10-CM

## 2022-09-16 DIAGNOSIS — B37.31 VAGINAL YEAST INFECTION: ICD-10-CM

## 2022-09-16 LAB
BILIRUB BLD-MCNC: NEGATIVE MG/DL
CLARITY, POC: CLEAR
COLOR UR: YELLOW
EXPIRATION DATE: ABNORMAL
GLUCOSE UR STRIP-MCNC: NEGATIVE MG/DL
KETONES UR QL: NEGATIVE
LEUKOCYTE EST, POC: ABNORMAL
Lab: ABNORMAL
NITRITE UR-MCNC: NEGATIVE MG/ML
PH UR: 6.5 [PH] (ref 5–8)
PROT UR STRIP-MCNC: NEGATIVE MG/DL
RBC # UR STRIP: NEGATIVE /UL
SP GR UR: 1.01 (ref 1–1.03)
UROBILINOGEN UR QL: NORMAL

## 2022-09-16 PROCEDURE — 99213 OFFICE O/P EST LOW 20 MIN: CPT

## 2022-09-16 RX ORDER — NITROFURANTOIN 25; 75 MG/1; MG/1
CAPSULE ORAL
Qty: 40 CAPSULE | Refills: 3 | Status: SHIPPED | OUTPATIENT
Start: 2022-09-16 | End: 2022-09-29

## 2022-09-16 RX ORDER — FLUCONAZOLE 100 MG/1
100 TABLET ORAL DAILY
Qty: 3 TABLET | Refills: 0 | Status: SHIPPED | OUTPATIENT
Start: 2022-09-16 | End: 2022-09-19

## 2022-09-16 NOTE — PROGRESS NOTES
"Chief Complaint:    Chief Complaint   Patient presents with   • Flank Pain       Vital Signs:   Ht 149.9 cm (59.02\")   Wt 76.7 kg (169 lb 1.5 oz)   BMI 34.13 kg/m²   Body mass index is 34.13 kg/m².      HPI:  Pili Webster is a 32 y.o. female who presents today for follow up    History of Present Illness  Mrs. Webster presents to the clinic for a one month follow up for nephrolithiasis and UTI. She has a medical history significant for mutliple UTIs thorughout the year. She still complains of flank pain, difficulty urinating, hematuria, and dysuria. She had a CT scan completed on 07/20 that revealed a right sided 3.3mm ureteral stone. Last KUB on 08/17/22 was unremarkable. Her last urine culture showed no growth and UA today is normal with only 1+ Leuks.       Past Medical History:  Past Medical History:   Diagnosis Date   • Abdominal pain    • Abnormal uterine bleeding (AUB)    • Anxiety and depression    • Arthritis    • Asthma     mild   • Cholecystitis    • Constipation    • Endometriosis    • Frequent UTI    • GERD (gastroesophageal reflux disease)    • Heartburn    • Hemorrhoids    • IBS (irritable bowel syndrome)    • Kidney stones    • Lesion of breast    • Lump     RIGHT BREAST   • Nausea & vomiting    • PCOS (polycystic ovarian syndrome)    • PONV (postoperative nausea and vomiting)    • Sjogren's disease (HCC)    • Sleep apnea     no cpap   • Snores    • Tachycardia    • Wrist fracture     RIGHT ARM      PATIENT UNSURE IF FRACTURED       Current Meds:  Current Outpatient Medications   Medication Sig Dispense Refill   • albuterol sulfate  (90 Base) MCG/ACT inhaler Inhale 1 puff 4 (Four) Times a Day.     • ARIPiprazole (ABILIFY) 5 MG tablet Take 1 tablet by mouth Daily.     • colestipol (COLESTID) 1 g tablet Take 1 tablet by mouth Daily. 30 tablet 2   • Dexilant 60 MG capsule TAKE ONE CAPSULE BY MOUTH DAILY 90 capsule 3   • dicyclomine (BENTYL) 20 MG tablet TAKE ONE TABLET BY MOUTH FOUR " TIMES A DAY (BEFORE MEALS AND AT BEDTIME) 120 tablet 2   • doxepin (SINEquan) 25 MG capsule      • Erenumab-aooe (AIMOVIG) 140 MG/ML prefilled syringe Inject 140 mg under the skin into the appropriate area as directed 1 (One) Time.     • famotidine (PEPCID) 20 MG tablet Take 20 mg by mouth 2 (Two) Times a Day.     • fluconazole (Diflucan) 100 MG tablet Take 1 tablet by mouth Daily for 3 days. 3 tablet 0   • FLUoxetine (PROzac) 40 MG capsule Take 40 mg by mouth Daily.     • folic acid (FOLVITE) 1 MG tablet Take 1 mg by mouth Daily.     • gabapentin (NEURONTIN) 100 MG capsule Take 100 mg by mouth 3 (Three) Times a Day. Takes 2 tablets tid     • HYDROcodone-acetaminophen (NORCO) 7.5-325 MG per tablet Take 1 tablet by mouth Every 6 (Six) Hours As Needed for Moderate Pain . 12 tablet 0   • hydrOXYzine pamoate (VISTARIL) 25 MG capsule Take 25 mg by mouth 3 (Three) Times a Day As Needed.     • loratadine (CLARITIN) 10 MG tablet 10 mg Daily.     • meloxicam (Mobic) 15 MG tablet Take 1 tablet by mouth Daily. 14 tablet 0   • methocarbamol (ROBAXIN) 500 MG tablet Take 500 mg by mouth Every Night.     • montelukast (SINGULAIR) 10 MG tablet Take 10 mg by mouth Every Night.     • nitrofurantoin, macrocrystal-monohydrate, (Macrobid) 100 MG capsule Take 2 tablets by mouth twice daily for 5 days; then 1 tablet by mouth nightly 40 capsule 3   • ondansetron ODT (ZOFRAN-ODT) 4 MG disintegrating tablet Place 1 tablet on the tongue Every 6 (Six) Hours As Needed for Vomiting. 12 tablet 0   • rizatriptan (MAXALT) 10 MG tablet Take 10 mg by mouth 1 (One) Time As Needed for Migraine. May repeat in 2 hours if needed     • tamsulosin (FLOMAX) 0.4 MG capsule 24 hr capsule Take 1 capsule by mouth Daily. 30 capsule 1   • topiramate (TOPAMAX) 25 MG tablet Take 25 mg by mouth Every Night.     • triamcinolone (KENALOG) 0.1 % cream      • vitamin D (ERGOCALCIFEROL) 1.25 MG (46557 UT) capsule capsule Take 50,000 Units by mouth Every 7 (Seven) Days.        No current facility-administered medications for this visit.        Allergies:   Allergies   Allergen Reactions   • Peanut-Containing Drug Products Anaphylaxis     AND PEANUTS IN FOODS   • Latex Hives   • Shrimp Swelling     Facial swelling     • Milk-Related Compounds Nausea And Vomiting   • Sulfa Antibiotics Rash        Past Surgical History:  Past Surgical History:   Procedure Laterality Date   • ABDOMINAL SURGERY     • BREAST BIOPSY Right 11/29/2018    Procedure: breast biopsy ;  Surgeon: Shiv Overton MD;  Location: [facility] OR;  Service: General   • CHOLECYSTECTOMY N/A 8/25/2017    Procedure: CHOLECYSTECTOMY LAPAROSCOPIC;  Surgeon: Franco Mari MD;  Location: [facility] OR;  Service:    • COLONOSCOPY N/A 3/9/2020    Procedure: COLONOSCOPY CPT CODE: 13139;  Surgeon: Jeremiah Murdock MD;  Location: [facility] OR;  Service: Gastroenterology;  Laterality: N/A;   • DENTAL PROCEDURE     • DIAGNOSTIC LAPAROSCOPY      X5   • DILATATION AND CURETTAGE     • ENDOSCOPY N/A 3/9/2020    Procedure: ESOPHAGOGASTRODUODENOSCOPY WITH BIOPSY CPT CODE: 26870;  Surgeon: Jeremiah Murdock MD;  Location: [facility] OR;  Service: Gastroenterology;  Laterality: N/A;   • ENDOSCOPY N/A 2/1/2021    Procedure: ESOPHAGOGASTRODUODENOSCOPY WITH BIOPSY CPT CODE: 06572;  Surgeon: Jeremiah Murdock MD;  Location: [facility] OR;  Service: Gastroenterology;  Laterality: N/A;   • HEMORRHOIDECTOMY N/A 11/14/2019    Procedure: HEMORRHOID STAPLING;  Surgeon: Franco Mari MD;  Location: [facility] OR;  Service: General   • KNEE ARTHROSCOPY W/ MENISCECTOMY Right 4/11/2022    Procedure: KNEE ARTHROSCOPIC DEBRIDEMENT, PRP INJECTION AND medial femoral condraplasty MENISCAL DEBRIDEMENT;  Surgeon: Musa Yoon MD;  Location: [facility] OR;  Service: Orthopedics;  Laterality: Right;   • URETEROSCOPY LASER LITHOTRIPSY WITH STENT INSERTION Right 1/9/2019    Procedure: URETEROSCOPY LASER LITHOTRIPSY retrograde pyelogram;  Surgeon:  Ahmet Barrow MD;  Location: [facility];  Service: Urology   • WISDOM TOOTH EXTRACTION         Social History:  Social History     Socioeconomic History   • Marital status:    Tobacco Use   • Smoking status: Current Every Day Smoker     Packs/day: 0.25     Years: 16.00     Pack years: 4.00     Types: Cigarettes     Start date: 2003   • Smokeless tobacco: Never Used   Vaping Use   • Vaping Use: Former   • Substances: Nicotine, Flavoring   Substance and Sexual Activity   • Alcohol use: Yes     Comment: RARELY   • Drug use: No   • Sexual activity: Defer       Family History:  Family History   Problem Relation Age of Onset   • Breast cancer Maternal Aunt    • Alcohol abuse Paternal Grandfather    • Heart disease Paternal Grandfather    • Cancer Maternal Grandfather    • Hypertension Maternal Grandfather    • Colon cancer Neg Hx    • Liver cancer Neg Hx        Review of Systems:  Review of Systems   Constitutional: Negative for fatigue and fever.   HENT: Negative for ear pain and sinus pressure.    Respiratory: Negative for shortness of breath and wheezing.    Cardiovascular: Negative for chest pain.   Genitourinary: Positive for difficulty urinating, dysuria, flank pain, hematuria and vaginal discharge. Negative for frequency, pelvic pain, urgency and vaginal pain.   Musculoskeletal: Positive for back pain.   Psychiatric/Behavioral: Negative for agitation.       Physical Exam:  Physical Exam  Constitutional:       General: She is not in acute distress.     Appearance: Normal appearance.   HENT:      Head: Normocephalic and atraumatic.      Nose: Nose normal.      Mouth/Throat:      Mouth: Mucous membranes are moist.   Eyes:      Conjunctiva/sclera: Conjunctivae normal.   Cardiovascular:      Rate and Rhythm: Normal rate and regular rhythm.      Pulses: Normal pulses.      Heart sounds: Normal heart sounds.   Pulmonary:      Effort: Pulmonary effort is normal.      Breath sounds: Normal breath sounds.    Abdominal:      General: Bowel sounds are normal.      Palpations: Abdomen is soft.   Genitourinary:     General: Normal vulva.      Vagina: No vaginal discharge.   Musculoskeletal:         General: Normal range of motion.      Cervical back: Normal range of motion.   Skin:     General: Skin is warm.   Neurological:      General: No focal deficit present.      Mental Status: She is alert and oriented to person, place, and time.   Psychiatric:         Mood and Affect: Mood normal.         Behavior: Behavior normal.         Thought Content: Thought content normal.         Judgment: Judgment normal.         Recent Image (CT and/or KUB):   CT Abdomen and Pelvis: No results found for this or any previous visit.     CT Stone Protocol: Results for orders placed during the hospital encounter of 01/12/20    CT Abdomen Pelvis Stone Protocol    Narrative  CT Abdomen Pelvis WO    INDICATION:  29-year-old female with low back pain and flank pain for one day.    TECHNIQUE:  CT of the abdomen and pelvis without IV contrast. Coronal and sagittal reconstructions were obtained.  Radiation dose reduction techniques included automated exposure control or exposure modulation based on body size. Count of known CT and cardiac nuc  med studies performed in previous 12 months: 5.    COMPARISON:  CT abdomen pelvis 11/23/2019    FINDINGS:  Visualized lung bases are unremarkable.    Abdomen:  The liver is within normal limits. The spleen and pancreas are normal. Cholecystectomy. Both adrenal glands are normal. Nonobstructing right intrarenal stone again noted. No obstructing ureteral calculi or hydronephrosis. Abdominal aorta normal in course  and caliber. Small bowel is unremarkable without obstruction. Normal appendix. The colon is unremarkable. No free fluid or free air.    Pelvis:  The urinary bladder is unremarkable.  The uterus and adnexa are within normal limits. No free pelvic fluid.    No acute osseous abnormality.    Impression  1.  Nonobstructing right intrarenal stone again noted. No obstructing ureteral calculi or hydronephrosis.  2. Normal appendix.  3. Cholecystectomy.          Signer Name: Jason Garcia MD  Signed: 1/13/2020 1:13 AM  Workstation Name: [id number]  [facility]     KUB: Results for orders placed in visit on 08/17/22    XR abdomen kub    Narrative  EXAM:  XR Abdomen, 1 View    EXAM DATE:  8/17/2022 1:43 PM    CLINICAL HISTORY:  Stone; N20.0-Calculus of kidney    TECHNIQUE:  Frontal supine view of the abdomen/pelvis.    COMPARISON:  No relevant prior studies available.    FINDINGS:  GASTROINTESTINAL TRACT:  Unremarkable.  No dilation.  BONES/JOINTS:  Unremarkable.    Impression  Unremarkable abdominal x-ray.    This report was finalized on 8/17/2022 2:00 PM by Dr. Sidney Pepe MD.       Labs:  Brief Urine Lab Results  (Last result in the past 365 days)      Color   Clarity   Blood   Leuk Est   Nitrite   Protein   CREAT   Urine HCG        09/16/22 1147 Yellow   Clear   Negative   Small (1+)   Negative   Negative               Office Visit on 09/16/2022   Component Date Value Ref Range Status   • Color 09/16/2022 Yellow  Yellow, Straw, Dark Yellow, Mimi Final   • Clarity, UA 09/16/2022 Clear  Clear Final   • Specific Gravity  09/16/2022 1.010  1.005 - 1.030 Final   • pH, Urine 09/16/2022 6.5  5.0 - 8.0 Final   • Leukocytes 09/16/2022 Small (1+) (A) Negative Final   • Nitrite, UA 09/16/2022 Negative  Negative Final   • Protein, POC 09/16/2022 Negative  Negative mg/dL Final   • Glucose, UA 09/16/2022 Negative  Negative mg/dL Final   • Ketones, UA 09/16/2022 Negative  Negative Final   • Urobilinogen, UA 09/16/2022 Normal  Normal, 0.2 E.U./dL Final   • Bilirubin 09/16/2022 Negative  Negative Final   • Blood, UA 09/16/2022 Negative  Negative Final   • Lot Number 09/16/2022 [id number]   Final   • Expiration Date 09/16/2022 [id number]   Final   Office Visit on 08/17/2022   Component Date Value Ref Range Status    • Color 08/17/2022 Yellow  Yellow, Straw, Dark Yellow, Mimi Final   • Clarity, UA 08/17/2022 Clear  Clear Final   • Specific Gravity  08/17/2022 1.025  1.005 - 1.030 Final   • pH, Urine 08/17/2022 6.0  5.0 - 8.0 Final   • Leukocytes 08/17/2022 Small (1+) (A) Negative Final   • Nitrite, UA 08/17/2022 Negative  Negative Final   • Protein, POC 08/17/2022 Negative  Negative mg/dL Final   • Glucose, UA 08/17/2022 Negative  Negative mg/dL Final   • Ketones, UA 08/17/2022 Negative  Negative Final   • Urobilinogen, UA 08/17/2022 Normal  Normal Final   • Bilirubin 08/17/2022 Negative  Negative Final   • Blood, UA 08/17/2022 Negative  Negative Final   • Lot Number 08/17/2022 [id number]   Final   • Expiration Date 08/17/2022 12/21/2023   Final   • Urine Culture 08/17/2022 No growth   Final   Admission on 07/20/2022, Discharged on 07/20/2022   Component Date Value Ref Range Status   • Glucose 07/20/2022 112 (A) 65 - 99 mg/dL Final   • BUN 07/20/2022 11  6 - 20 mg/dL Final   • Creatinine 07/20/2022 1.00  0.57 - 1.00 mg/dL Final   • Sodium 07/20/2022 133 (A) 136 - 145 mmol/L Final   • Potassium 07/20/2022 4.3  3.5 - 5.2 mmol/L Final   • Chloride 07/20/2022 104  98 - 107 mmol/L Final   • CO2 07/20/2022 14.9 (A) 22.0 - 29.0 mmol/L Final   • Calcium 07/20/2022 9.6  8.6 - 10.5 mg/dL Final   • Total Protein 07/20/2022 7.0  6.0 - 8.5 g/dL Final   • Albumin 07/20/2022 4.51  3.50 - 5.20 g/dL Final   • ALT (SGPT) 07/20/2022 12  1 - 33 U/L Final   • AST (SGOT) 07/20/2022 13  1 - 32 U/L Final   • Alkaline Phosphatase 07/20/2022 73  39 - 117 U/L Final   • Total Bilirubin 07/20/2022 0.3  0.0 - 1.2 mg/dL Final   • Globulin 07/20/2022 2.5  gm/dL Final   • A/G Ratio 07/20/2022 1.8  g/dL Final   • BUN/Creatinine Ratio 07/20/2022 11.0  7.0 - 25.0 Final   • Anion Gap 07/20/2022 14.1  5.0 - 15.0 mmol/L Final   • eGFR 07/20/2022 76.4  >60.0 mL/min/1.73 Final    National Kidney Foundation and American Society of Nephrology (ASN) Task Force  recommended calculation based on the Chronic Kidney Disease Epidemiology Collaboration (CKD-EPI) equation refit without adjustment for race.   • Color, UA 07/20/2022 Dark Yellow (A) Yellow, Straw Final   • Appearance, UA 07/20/2022 Turbid (A) Clear Final   • pH, UA 07/20/2022 5.5  5.0 - 8.0 Final   • Specific Gravity, UA 07/20/2022 1.028  1.005 - 1.030 Final   • Glucose, UA 07/20/2022 Negative  Negative Final   • Ketones, UA 07/20/2022 Trace (A) Negative Final   • Bilirubin, UA 07/20/2022 Small (1+) (A) Negative Final   • Blood, UA 07/20/2022 Large (3+) (A) Negative Final   • Protein, UA 07/20/2022 100 mg/dL (2+) (A) Negative Final   • Leuk Esterase, UA 07/20/2022 Moderate (2+) (A) Negative Final   • Nitrite, UA 07/20/2022 Negative  Negative Final   • Urobilinogen, UA 07/20/2022 1.0 E.U./dL  0.2 - 1.0 E.U./dL Final   • HCG Qualitative 07/20/2022 Negative  Negative Final   • C-Reactive Protein 07/20/2022 1.10 (A) 0.00 - 0.50 mg/dL Final   • Sed Rate 07/20/2022 14  0 - 20 mm/hr Final   • WBC 07/20/2022 13.37 (A) 3.40 - 10.80 10*3/mm3 Final   • RBC 07/20/2022 4.50  3.77 - 5.28 10*6/mm3 Final   • Hemoglobin 07/20/2022 13.5  12.0 - 15.9 g/dL Final   • Hematocrit 07/20/2022 40.0  34.0 - 46.6 % Final   • MCV 07/20/2022 88.9  79.0 - 97.0 fL Final   • MCH 07/20/2022 30.0  26.6 - 33.0 pg Final   • MCHC 07/20/2022 33.8  31.5 - 35.7 g/dL Final   • RDW 07/20/2022 12.3  12.3 - 15.4 % Final   • RDW-SD 07/20/2022 40.0  37.0 - 54.0 fl Final   • MPV 07/20/2022 9.0  6.0 - 12.0 fL Final   • Platelets 07/20/2022 322  140 - 450 10*3/mm3 Final   • Neutrophil % 07/20/2022 71.8  42.7 - 76.0 % Final   • Lymphocyte % 07/20/2022 22.1  19.6 - 45.3 % Final   • Monocyte % 07/20/2022 4.1 (A) 5.0 - 12.0 % Final   • Eosinophil % 07/20/2022 1.3  0.3 - 6.2 % Final   • Basophil % 07/20/2022 0.3  0.0 - 1.5 % Final   • Immature Grans % 07/20/2022 0.4  0.0 - 0.5 % Final   • Neutrophils, Absolute 07/20/2022 9.61 (A) 1.70 - 7.00 10*3/mm3 Final   •  Lymphocytes, Absolute 07/20/2022 2.95  0.70 - 3.10 10*3/mm3 Final   • Monocytes, Absolute 07/20/2022 0.55  0.10 - 0.90 10*3/mm3 Final   • Eosinophils, Absolute 07/20/2022 0.17  0.00 - 0.40 10*3/mm3 Final   • Basophils, Absolute 07/20/2022 0.04  0.00 - 0.20 10*3/mm3 Final   • Immature Grans, Absolute 07/20/2022 0.05  0.00 - 0.05 10*3/mm3 Final   • nRBC 07/20/2022 0.0  0.0 - 0.2 /100 WBC Final   • Extra Tube 07/20/2022 Hold for add-ons.   Final    Auto resulted.   • Extra Tube 07/20/2022 hold for add-on   Final    Auto resulted   • Extra Tube 07/20/2022 Hold for add-ons.   Final    Auto resulted   • RBC, UA 07/20/2022 21-30 (A) None Seen, 0-2 /HPF Final   • WBC, UA 07/20/2022 6-12 (A) None Seen, 0-2 /HPF Final   • Bacteria, UA 07/20/2022 1+ (A) None Seen /HPF Final   • Squamous Epithelial Cells, UA 07/20/2022 7-12 (A) None Seen, 0-2 /HPF Final   • Hyaline Casts, UA 07/20/2022 None Seen  None Seen /LPF Final   • Amorphous Crystals, UA 07/20/2022 Large/3+  None Seen /HPF Final   • Methodology 07/20/2022 Automated Microscopy   Final   • Urine Culture 07/20/2022 <25,000 CFU/mL Mixed Kimberly Isolated   Final        Procedure: None  Procedures     I have reviewed and agree with the above PMH, PSH, FMH, social history, medications, allergies, and labs.     Assessment/Plan:   Problem List Items Addressed This Visit        Genitourinary and Reproductive     Kidney stone - Primary    Relevant Orders    POC Urinalysis Dipstick, Automated (Completed)    CT Abdomen Pelvis Stone Protocol      Other Visit Diagnoses     Recurrent urinary tract infection        Relevant Medications    nitrofurantoin, macrocrystal-monohydrate, (Macrobid) 100 MG capsule    Vaginal yeast infection        Relevant Medications    fluconazole (Diflucan) 100 MG tablet          Health Maintenance:   Health Maintenance Due   Topic Date Due   • ANNUAL PHYSICAL  Never done   • Pneumococcal Vaccine 0-64 (1 - PCV) Never done   • TDAP/TD VACCINES (1 - Tdap) Never  done   • HEPATITIS C SCREENING  Never done   • PAP SMEAR  Never done        Smoking Counseling: Patient is a current everyday smoker. Counseling was given however patient is not ready to quit at this time.     Urine Incontinence: Patient reports that she is not currently experiencing any symptoms of urinary incontinence.    Patient was given instructions and counseling regarding her condition or for health maintenance advice. Please see specific information pulled into the AVS if appropriate.    Patient Education:   Kidney Stone - given the patient current symptoms I recommended the use a repeat CT scan to look for any possible stones. I advised patient to increase water intake daily and continue flomax as need.  UTI - I discussed with the patient was to help prevent UTI's. I discussed proper hygiene. We discussed the use of a probiotic daily to help with growth and control of normal urogenital franklin. I advised the need to increase water intake daily. I discussed that symptoms could be caused by interstitial cystitis. I discussed the physiology of this conditions. I informed the patient to keep a daily diary of intake to determine what may aggravate symptoms and eliminate these factors. I informed the use of cystoscopy to help identify IC. I advised the use of Macrobid daily to help prevent UTI's. Patient would like to try taking Macrobid nightly for UTI prophylaxis. She also believes she is getting a yeast infection so I will send in some diflucan for her. Patient will have a CT scan completed and I will follow up with her in 1 month or sooner if needed.    Visit Diagnoses:    ICD-10-CM ICD-9-CM   1. Kidney stone  N20.0 592.0   2. Recurrent urinary tract infection  N39.0 599.0   3. Vaginal yeast infection  B37.3 112.1       Meds Ordered During Visit:  New Medications Ordered This Visit   Medications   • nitrofurantoin, macrocrystal-monohydrate, (Macrobid) 100 MG capsule     Sig: Take 2 tablets by mouth twice daily  for 5 days; then 1 tablet by mouth nightly     Dispense:  40 capsule     Refill:  3   • fluconazole (Diflucan) 100 MG tablet     Sig: Take 1 tablet by mouth Daily for 3 days.     Dispense:  3 tablet     Refill:  0       Follow Up Appointment: 1 month  No follow-ups on file.      This document has been electronically signed by Mati Rankin PA-C   September 18, 2022 20:42 EDT    Part of this note may be an electronic transcription/translation of spoken language to printed text using the Dragon Dictation System.

## 2022-09-27 ENCOUNTER — OFFICE VISIT (OUTPATIENT)
Dept: SURGERY | Facility: CLINIC | Age: 32
End: 2022-09-27

## 2022-09-27 VITALS — BODY MASS INDEX: 34.07 KG/M2 | WEIGHT: 169 LBS | HEIGHT: 59 IN

## 2022-09-27 DIAGNOSIS — Z87.19 STATUS POST HEMORRHOIDECTOMY: Primary | ICD-10-CM

## 2022-09-27 DIAGNOSIS — K62.89 ANAL PAIN: ICD-10-CM

## 2022-09-27 DIAGNOSIS — Z98.890 STATUS POST HEMORRHOIDECTOMY: Primary | ICD-10-CM

## 2022-09-27 PROCEDURE — 99213 OFFICE O/P EST LOW 20 MIN: CPT | Performed by: SURGERY

## 2022-09-27 NOTE — PROGRESS NOTES
Subjective   Pili Webster is a 32 y.o. female.     Chief Complaint: anal pain    History of Present Illness She had a hemorrhoid stapling about 3 years ago and had been fine until the last couple of weeks. She has had some swelling and anal pain with occasional spotting. She had a colonoscopy that was ok 2 years ago. She does have irritable bowel with frequent diarrhea.    The following portions of the patient's history were reviewed and updated as appropriate: current medications, past family history, past medical history, past social history, past surgical history and problem list.    Review of Systems   Constitutional: Negative for activity change, appetite change, chills, fever and unexpected weight change.   HENT: Negative for congestion, facial swelling and sore throat.    Eyes: Negative for photophobia and visual disturbance.   Respiratory: Negative for chest tightness, shortness of breath and wheezing.    Cardiovascular: Negative for chest pain, palpitations and leg swelling.   Gastrointestinal: Positive for anal bleeding, diarrhea and rectal pain. Negative for abdominal distention, abdominal pain, blood in stool, constipation, nausea and vomiting.   Endocrine: Negative for cold intolerance, heat intolerance, polydipsia and polyuria.   Genitourinary: Negative for difficulty urinating, dysuria, flank pain and urgency.   Musculoskeletal: Negative for back pain and myalgias.   Skin: Negative for rash and wound.   Allergic/Immunologic: Negative for immunocompromised state.   Neurological: Negative for dizziness, seizures, syncope, light-headedness, numbness and headaches.   Hematological: Negative for adenopathy. Does not bruise/bleed easily.   Psychiatric/Behavioral: Negative for behavioral problems and confusion. The patient is not nervous/anxious.        Objective   Physical Exam  Vitals reviewed.   Constitutional:       General: She is not in acute distress.     Appearance: She is well-developed.  She is not ill-appearing.       HENT:      Head: Normocephalic. No laceration. Hair is normal.      Right Ear: Hearing and ear canal normal.      Left Ear: Hearing and ear canal normal.      Nose: Nose normal.      Right Sinus: No maxillary sinus tenderness or frontal sinus tenderness.      Left Sinus: No maxillary sinus tenderness or frontal sinus tenderness.   Eyes:      General: Lids are normal.      Conjunctiva/sclera: Conjunctivae normal.      Pupils: Pupils are equal, round, and reactive to light.   Neck:      Thyroid: No thyroid mass or thyromegaly.      Vascular: No JVD.      Trachea: No tracheal tenderness or tracheal deviation.   Cardiovascular:      Rate and Rhythm: Normal rate and regular rhythm.      Heart sounds: No murmur heard.    No gallop.   Pulmonary:      Effort: Pulmonary effort is normal.      Breath sounds: Normal breath sounds. No stridor. No wheezing.   Chest:      Chest wall: No tenderness.   Breasts:      Right: No supraclavicular adenopathy.      Left: No supraclavicular adenopathy.       Abdominal:      General: Bowel sounds are normal. There is no distension.      Palpations: Abdomen is soft. There is no mass.      Tenderness: There is no abdominal tenderness. There is no guarding or rebound.      Hernia: No hernia is present.   Musculoskeletal:         General: No deformity.      Cervical back: Normal range of motion.   Lymphadenopathy:      Cervical: No cervical adenopathy.      Upper Body:      Right upper body: No supraclavicular adenopathy.      Left upper body: No supraclavicular adenopathy.   Skin:     General: Skin is warm and dry.      Coloration: Skin is not pale.      Findings: No erythema or rash.   Neurological:      Mental Status: She is alert and oriented to person, place, and time.      Motor: No abnormal muscle tone.   Psychiatric:         Behavior: Behavior normal.         Thought Content: Thought content normal.         Past Medical History:   Diagnosis Date   •  Abdominal pain    • Abnormal uterine bleeding (AUB)    • Anxiety and depression    • Arthritis    • Asthma     mild   • Cholecystitis    • Constipation    • Endometriosis    • Frequent UTI    • GERD (gastroesophageal reflux disease)    • Heartburn    • Hemorrhoids    • IBS (irritable bowel syndrome)    • Kidney stones    • Lesion of breast    • Lump     RIGHT BREAST   • Nausea & vomiting    • PCOS (polycystic ovarian syndrome)    • PONV (postoperative nausea and vomiting)    • Sjogren's disease (HCC)    • Sleep apnea     no cpap   • Snores    • Tachycardia    • Wrist fracture     RIGHT ARM      PATIENT UNSURE IF FRACTURED       Family History   Problem Relation Age of Onset   • Breast cancer Maternal Aunt    • Alcohol abuse Paternal Grandfather    • Heart disease Paternal Grandfather    • Cancer Maternal Grandfather    • Hypertension Maternal Grandfather    • Colon cancer Neg Hx    • Liver cancer Neg Hx        Social History     Tobacco Use   • Smoking status: Current Every Day Smoker     Packs/day: 0.25     Years: 16.00     Pack years: 4.00     Types: Cigarettes     Start date: 2003   • Smokeless tobacco: Never Used   Vaping Use   • Vaping Use: Former   • Substances: Nicotine, Flavoring   Substance Use Topics   • Alcohol use: Yes     Comment: RARELY   • Drug use: No       Past Surgical History:   Procedure Laterality Date   • ABDOMINAL SURGERY     • BREAST BIOPSY Right 11/29/2018    Procedure: breast biopsy ;  Surgeon: Shiv Overton MD;  Location: [facility];  Service: General   • CHOLECYSTECTOMY N/A 8/25/2017    Procedure: CHOLECYSTECTOMY LAPAROSCOPIC;  Surgeon: Franco Mari MD;  Location: [facility];  Service:    • COLONOSCOPY N/A 3/9/2020    Procedure: COLONOSCOPY CPT CODE: 86467;  Surgeon: Jeremiah Murdock MD;  Location: [facility];  Service: Gastroenterology;  Laterality: N/A;   • DENTAL PROCEDURE     • DIAGNOSTIC LAPAROSCOPY      X5   • DILATATION AND CURETTAGE     • ENDOSCOPY N/A  3/9/2020    Procedure: ESOPHAGOGASTRODUODENOSCOPY WITH BIOPSY CPT CODE: 56407;  Surgeon: Jeremiah Murdock MD;  Location: [facility] OR;  Service: Gastroenterology;  Laterality: N/A;   • ENDOSCOPY N/A 2/1/2021    Procedure: ESOPHAGOGASTRODUODENOSCOPY WITH BIOPSY CPT CODE: 41184;  Surgeon: Jeremiah Murdock MD;  Location: [facility] OR;  Service: Gastroenterology;  Laterality: N/A;   • HEMORRHOIDECTOMY N/A 11/14/2019    Procedure: HEMORRHOID STAPLING;  Surgeon: Franco Mari MD;  Location: [facility] OR;  Service: General   • KNEE ARTHROSCOPY W/ MENISCECTOMY Right 4/11/2022    Procedure: KNEE ARTHROSCOPIC DEBRIDEMENT, PRP INJECTION AND medial femoral condraplasty MENISCAL DEBRIDEMENT;  Surgeon: Musa Yoon MD;  Location: [facility] OR;  Service: Orthopedics;  Laterality: Right;   • URETEROSCOPY LASER LITHOTRIPSY WITH STENT INSERTION Right 1/9/2019    Procedure: URETEROSCOPY LASER LITHOTRIPSY retrograde pyelogram;  Surgeon: Ahmet Barrow MD;  Location: [facility] OR;  Service: Urology   • WISDOM TOOTH EXTRACTION         Current Outpatient Medications   Medication Instructions   • albuterol sulfate  (90 Base) MCG/ACT inhaler 1 puff, Inhalation, 4 Times Daily - RT   • ARIPiprazole (ABILIFY) 5 MG tablet 1 tablet, Oral, Daily   • colestipol (COLESTID) 1 g, Oral, Daily   • Dexilant 60 MG capsule TAKE ONE CAPSULE BY MOUTH DAILY   • dicyclomine (BENTYL) 20 MG tablet TAKE ONE TABLET BY MOUTH FOUR TIMES A DAY (BEFORE MEALS AND AT BEDTIME)   • doxepin (SINEquan) 25 MG capsule No dose, route, or frequency recorded.   • Erenumab-aooe (AIMOVIG) 140 mg, Subcutaneous, Once   • famotidine (PEPCID) 20 mg, Oral, 2 Times Daily   • FLUoxetine (PROZAC) 40 mg, Oral, Daily   • folic acid (FOLVITE) 1 mg, Oral, Daily   • gabapentin (NEURONTIN) 100 mg, Oral, 3 Times Daily, Takes 2 tablets tid   • HYDROcodone-acetaminophen (NORCO) 7.5-325 MG per tablet 1 tablet, Oral, Every 6 Hours PRN   • hydrOXYzine pamoate  (VISTARIL) 25 mg, Oral, 3 Times Daily PRN   • loratadine (CLARITIN) 10 mg, Daily   • meloxicam (MOBIC) 15 mg, Oral, Daily   • methocarbamol (ROBAXIN) 500 mg, Oral, Nightly   • montelukast (SINGULAIR) 10 mg, Oral, Nightly   • nitrofurantoin, macrocrystal-monohydrate, (Macrobid) 100 MG capsule Take 2 tablets by mouth twice daily for 5 days; then 1 tablet by mouth nightly   • ondansetron ODT (ZOFRAN-ODT) 4 mg, Translingual, Every 6 Hours PRN   • rizatriptan (MAXALT) 10 mg, Oral, Once As Needed, May repeat in 2 hours if needed   • tamsulosin (FLOMAX) 0.4 mg, Oral, Daily   • topiramate (TOPAMAX) 25 mg, Oral, Nightly   • triamcinolone (KENALOG) 0.1 % cream No dose, route, or frequency recorded.   • vitamin D (ERGOCALCIFEROL) 50,000 Units, Oral, Every 7 Days         Assessment & Plan   Diagnoses and all orders for this visit:    1. Status post hemorrhoidectomy (Primary)    2. Anal pain    anoscopy

## 2022-09-28 ENCOUNTER — TELEPHONE (OUTPATIENT)
Dept: SURGERY | Facility: CLINIC | Age: 32
End: 2022-09-28

## 2022-09-29 ENCOUNTER — PRE-ADMISSION TESTING (OUTPATIENT)
Dept: PREADMISSION TESTING | Facility: HOSPITAL | Age: 32
End: 2022-09-29

## 2022-09-29 LAB
ANION GAP SERPL CALCULATED.3IONS-SCNC: 13.4 MMOL/L (ref 5–15)
BUN SERPL-MCNC: 13 MG/DL (ref 6–20)
BUN/CREAT SERPL: 14.8 (ref 7–25)
CALCIUM SPEC-SCNC: 9 MG/DL (ref 8.6–10.5)
CHLORIDE SERPL-SCNC: 105 MMOL/L (ref 98–107)
CO2 SERPL-SCNC: 19.6 MMOL/L (ref 22–29)
CREAT SERPL-MCNC: 0.88 MG/DL (ref 0.57–1)
DEPRECATED RDW RBC AUTO: 43.5 FL (ref 37–54)
EGFRCR SERPLBLD CKD-EPI 2021: 88.6 ML/MIN/1.73
ERYTHROCYTE [DISTWIDTH] IN BLOOD BY AUTOMATED COUNT: 12.6 % (ref 12.3–15.4)
GLUCOSE SERPL-MCNC: 89 MG/DL (ref 65–99)
HCT VFR BLD AUTO: 39.2 % (ref 34–46.6)
HGB BLD-MCNC: 13.1 G/DL (ref 12–15.9)
MCH RBC QN AUTO: 31.3 PG (ref 26.6–33)
MCHC RBC AUTO-ENTMCNC: 33.4 G/DL (ref 31.5–35.7)
MCV RBC AUTO: 93.8 FL (ref 79–97)
PLATELET # BLD AUTO: 352 10*3/MM3 (ref 140–450)
PMV BLD AUTO: 9.4 FL (ref 6–12)
POTASSIUM SERPL-SCNC: 4.1 MMOL/L (ref 3.5–5.2)
RBC # BLD AUTO: 4.18 10*6/MM3 (ref 3.77–5.28)
SODIUM SERPL-SCNC: 138 MMOL/L (ref 136–145)
WBC NRBC COR # BLD: 10.82 10*3/MM3 (ref 3.4–10.8)

## 2022-09-29 PROCEDURE — 80048 BASIC METABOLIC PNL TOTAL CA: CPT

## 2022-09-29 PROCEDURE — 85027 COMPLETE CBC AUTOMATED: CPT

## 2022-09-29 PROCEDURE — 36415 COLL VENOUS BLD VENIPUNCTURE: CPT

## 2022-09-29 RX ORDER — NADOLOL 40 MG/1
40 TABLET ORAL DAILY
COMMUNITY

## 2022-09-30 ENCOUNTER — HOSPITAL ENCOUNTER (OUTPATIENT)
Facility: HOSPITAL | Age: 32
Setting detail: HOSPITAL OUTPATIENT SURGERY
Discharge: HOME | End: 2022-09-30
Attending: SURGERY | Admitting: SURGERY

## 2022-09-30 ENCOUNTER — APPOINTMENT (OUTPATIENT)
Dept: CT IMAGING | Facility: HOSPITAL | Age: 32
End: 2022-09-30

## 2022-09-30 ENCOUNTER — ANESTHESIA (OUTPATIENT)
Dept: PERIOP | Facility: HOSPITAL | Age: 32
End: 2022-09-30

## 2022-09-30 ENCOUNTER — ANESTHESIA EVENT (OUTPATIENT)
Dept: PERIOP | Facility: HOSPITAL | Age: 32
End: 2022-09-30

## 2022-09-30 VITALS
WEIGHT: 172.4 LBS | SYSTOLIC BLOOD PRESSURE: 124 MMHG | OXYGEN SATURATION: 97 % | BODY MASS INDEX: 34.76 KG/M2 | HEIGHT: 59 IN | DIASTOLIC BLOOD PRESSURE: 79 MMHG | TEMPERATURE: 97.8 F | HEART RATE: 84 BPM | RESPIRATION RATE: 18 BRPM

## 2022-09-30 DIAGNOSIS — Z98.890 STATUS POST HEMORRHOIDECTOMY: ICD-10-CM

## 2022-09-30 DIAGNOSIS — K62.89 ANAL PAIN: ICD-10-CM

## 2022-09-30 DIAGNOSIS — Z87.19 STATUS POST HEMORRHOIDECTOMY: ICD-10-CM

## 2022-09-30 PROCEDURE — 0 BUPIVACAINE LIPOSOME 1.3 % SUSPENSION: Performed by: SURGERY

## 2022-09-30 PROCEDURE — 25010000002 ONDANSETRON PER 1 MG: Performed by: NURSE ANESTHETIST, CERTIFIED REGISTERED

## 2022-09-30 PROCEDURE — 81025 URINE PREGNANCY TEST: CPT | Performed by: ANESTHESIOLOGY

## 2022-09-30 PROCEDURE — 25010000002 PROPOFOL 10 MG/ML EMULSION: Performed by: NURSE ANESTHETIST, CERTIFIED REGISTERED

## 2022-09-30 PROCEDURE — 25010000002 FENTANYL CITRATE (PF) 50 MCG/ML SOLUTION: Performed by: NURSE ANESTHETIST, CERTIFIED REGISTERED

## 2022-09-30 PROCEDURE — C9290 INJ, BUPIVACAINE LIPOSOME: HCPCS | Performed by: SURGERY

## 2022-09-30 PROCEDURE — 46250 REMOVE EXT HEM GROUPS 2+: CPT | Performed by: SURGERY

## 2022-09-30 PROCEDURE — 25010000002 MIDAZOLAM PER 1 MG: Performed by: NURSE ANESTHETIST, CERTIFIED REGISTERED

## 2022-09-30 DEVICE — ABSORBABLE HEMOSTAT (OXIDIZED REGENERATED CELLULOSE)
Type: IMPLANTABLE DEVICE | Site: RECTUM | Status: FUNCTIONAL
Brand: SURGICEL NU-KNIT

## 2022-09-30 RX ORDER — HYDROCODONE BITARTRATE AND ACETAMINOPHEN 7.5; 325 MG/1; MG/1
1 TABLET ORAL EVERY 4 HOURS PRN
Status: DISCONTINUED | OUTPATIENT
Start: 2022-09-30 | End: 2022-09-30 | Stop reason: HOSPADM

## 2022-09-30 RX ORDER — SODIUM CHLORIDE, SODIUM LACTATE, POTASSIUM CHLORIDE, CALCIUM CHLORIDE 600; 310; 30; 20 MG/100ML; MG/100ML; MG/100ML; MG/100ML
100 INJECTION, SOLUTION INTRAVENOUS ONCE AS NEEDED
Status: DISCONTINUED | OUTPATIENT
Start: 2022-09-30 | End: 2022-09-30 | Stop reason: HOSPADM

## 2022-09-30 RX ORDER — FENTANYL CITRATE 50 UG/ML
50 INJECTION, SOLUTION INTRAMUSCULAR; INTRAVENOUS
Status: DISCONTINUED | OUTPATIENT
Start: 2022-09-30 | End: 2022-09-30 | Stop reason: HOSPADM

## 2022-09-30 RX ORDER — ONDANSETRON 2 MG/ML
4 INJECTION INTRAMUSCULAR; INTRAVENOUS AS NEEDED
Status: DISCONTINUED | OUTPATIENT
Start: 2022-09-30 | End: 2022-09-30 | Stop reason: HOSPADM

## 2022-09-30 RX ORDER — MEPERIDINE HYDROCHLORIDE 25 MG/ML
12.5 INJECTION INTRAMUSCULAR; INTRAVENOUS; SUBCUTANEOUS
Status: DISCONTINUED | OUTPATIENT
Start: 2022-09-30 | End: 2022-09-30 | Stop reason: HOSPADM

## 2022-09-30 RX ORDER — ASPIRIN 81 MG/1
81 TABLET ORAL DAILY
COMMUNITY

## 2022-09-30 RX ORDER — MAGNESIUM HYDROXIDE 1200 MG/15ML
LIQUID ORAL AS NEEDED
Status: DISCONTINUED | OUTPATIENT
Start: 2022-09-30 | End: 2022-09-30 | Stop reason: HOSPADM

## 2022-09-30 RX ORDER — FENTANYL CITRATE 50 UG/ML
INJECTION, SOLUTION INTRAMUSCULAR; INTRAVENOUS AS NEEDED
Status: DISCONTINUED | OUTPATIENT
Start: 2022-09-30 | End: 2022-09-30 | Stop reason: SURG

## 2022-09-30 RX ORDER — ONDANSETRON 2 MG/ML
INJECTION INTRAMUSCULAR; INTRAVENOUS AS NEEDED
Status: DISCONTINUED | OUTPATIENT
Start: 2022-09-30 | End: 2022-09-30 | Stop reason: SURG

## 2022-09-30 RX ORDER — PROPOFOL 10 MG/ML
VIAL (ML) INTRAVENOUS AS NEEDED
Status: DISCONTINUED | OUTPATIENT
Start: 2022-09-30 | End: 2022-09-30 | Stop reason: SURG

## 2022-09-30 RX ORDER — OXYCODONE HYDROCHLORIDE AND ACETAMINOPHEN 5; 325 MG/1; MG/1
1 TABLET ORAL ONCE AS NEEDED
Status: DISCONTINUED | OUTPATIENT
Start: 2022-09-30 | End: 2022-09-30 | Stop reason: HOSPADM

## 2022-09-30 RX ORDER — SODIUM CHLORIDE, SODIUM LACTATE, POTASSIUM CHLORIDE, CALCIUM CHLORIDE 600; 310; 30; 20 MG/100ML; MG/100ML; MG/100ML; MG/100ML
125 INJECTION, SOLUTION INTRAVENOUS ONCE
Status: COMPLETED | OUTPATIENT
Start: 2022-09-30 | End: 2022-09-30

## 2022-09-30 RX ORDER — MIDAZOLAM HYDROCHLORIDE 1 MG/ML
1 INJECTION INTRAMUSCULAR; INTRAVENOUS
Status: DISCONTINUED | OUTPATIENT
Start: 2022-09-30 | End: 2022-09-30 | Stop reason: HOSPADM

## 2022-09-30 RX ORDER — MIDAZOLAM HYDROCHLORIDE 1 MG/ML
INJECTION INTRAMUSCULAR; INTRAVENOUS AS NEEDED
Status: DISCONTINUED | OUTPATIENT
Start: 2022-09-30 | End: 2022-09-30 | Stop reason: SURG

## 2022-09-30 RX ORDER — IPRATROPIUM BROMIDE AND ALBUTEROL SULFATE 2.5; .5 MG/3ML; MG/3ML
3 SOLUTION RESPIRATORY (INHALATION) ONCE AS NEEDED
Status: DISCONTINUED | OUTPATIENT
Start: 2022-09-30 | End: 2022-09-30 | Stop reason: HOSPADM

## 2022-09-30 RX ORDER — HYDROCODONE BITARTRATE AND ACETAMINOPHEN 7.5; 325 MG/1; MG/1
1 TABLET ORAL EVERY 4 HOURS PRN
Qty: 18 TABLET | Refills: 0 | Status: SHIPPED | OUTPATIENT
Start: 2022-09-30 | End: 2022-10-10

## 2022-09-30 RX ORDER — SODIUM CHLORIDE 0.9 % (FLUSH) 0.9 %
10 SYRINGE (ML) INJECTION AS NEEDED
Status: DISCONTINUED | OUTPATIENT
Start: 2022-09-30 | End: 2022-09-30 | Stop reason: HOSPADM

## 2022-09-30 RX ORDER — KETOROLAC TROMETHAMINE 30 MG/ML
30 INJECTION, SOLUTION INTRAMUSCULAR; INTRAVENOUS EVERY 6 HOURS PRN
Status: DISCONTINUED | OUTPATIENT
Start: 2022-09-30 | End: 2022-09-30 | Stop reason: HOSPADM

## 2022-09-30 RX ORDER — FAMOTIDINE 10 MG/ML
INJECTION, SOLUTION INTRAVENOUS AS NEEDED
Status: DISCONTINUED | OUTPATIENT
Start: 2022-09-30 | End: 2022-09-30 | Stop reason: SURG

## 2022-09-30 RX ORDER — SODIUM CHLORIDE 0.9 % (FLUSH) 0.9 %
10 SYRINGE (ML) INJECTION EVERY 12 HOURS SCHEDULED
Status: DISCONTINUED | OUTPATIENT
Start: 2022-09-30 | End: 2022-09-30 | Stop reason: HOSPADM

## 2022-09-30 RX ADMIN — SODIUM CHLORIDE, POTASSIUM CHLORIDE, SODIUM LACTATE AND CALCIUM CHLORIDE: 600; 310; 30; 20 INJECTION, SOLUTION INTRAVENOUS at 11:06

## 2022-09-30 RX ADMIN — ONDANSETRON 4 MG: 2 INJECTION INTRAMUSCULAR; INTRAVENOUS at 12:11

## 2022-09-30 RX ADMIN — MIDAZOLAM 2 MG: 1 INJECTION INTRAMUSCULAR; INTRAVENOUS at 11:03

## 2022-09-30 RX ADMIN — PROPOFOL 140 MG: 10 INJECTION, EMULSION INTRAVENOUS at 11:07

## 2022-09-30 RX ADMIN — FENTANYL CITRATE 50 MCG: 50 INJECTION INTRAMUSCULAR; INTRAVENOUS at 11:20

## 2022-09-30 RX ADMIN — FENTANYL CITRATE 50 MCG: 50 INJECTION INTRAMUSCULAR; INTRAVENOUS at 11:28

## 2022-09-30 RX ADMIN — ONDANSETRON 4 MG: 2 INJECTION INTRAMUSCULAR; INTRAVENOUS at 11:03

## 2022-09-30 RX ADMIN — FAMOTIDINE 20 MG: 10 INJECTION INTRAVENOUS at 11:03

## 2022-09-30 RX ADMIN — FENTANYL CITRATE 100 MCG: 50 INJECTION INTRAMUSCULAR; INTRAVENOUS at 11:03

## 2022-09-30 NOTE — ANESTHESIA POSTPROCEDURE EVALUATION
Patient: Pili Webster    Procedure Summary     Date: 09/30/22 Room / [facility]:  [facility] /  [facility]    Anesthesia Start: 1104 Anesthesia Stop: 1132    Procedure: ANAL SCOPE (N/A ) Diagnosis:       Status post hemorrhoidectomy      Anal pain      (Status post hemorrhoidectomy [Z98.890, Z87.19])      (Anal pain [K62.89])    Surgeons: Franco Mari MD Provider: Zoltan Steve MD    Anesthesia Type: general ASA Status: 2          Anesthesia Type: general    Vitals  Vitals Value Taken Time   /83 09/30/22 1203   Temp 97.6 °F (36.4 °C) 09/30/22 1133   Pulse 86 09/30/22 1203   Resp 18 09/30/22 1203   SpO2 96 % 09/30/22 1203           Post Anesthesia Care and Evaluation    Patient location during evaluation: PHASE II  Patient participation: complete - patient participated  Level of consciousness: awake and alert  Pain score: 0  Pain management: adequate    Airway patency: patent  Anesthetic complications: No anesthetic complications    Cardiovascular status: acceptable  Respiratory status: acceptable  Hydration status: acceptable

## 2022-09-30 NOTE — ANESTHESIA PREPROCEDURE EVALUATION
Anesthesia Evaluation     Patient summary reviewed and Nursing notes reviewed   history of anesthetic complications: PONV  NPO Solid Status: > 8 hours  NPO Liquid Status: > 8 hours           Airway   Mallampati: II  TM distance: >3 FB  Neck ROM: full  Dental    (+) edentulous    Pulmonary     breath sounds clear to auscultation  (+) a smoker Current Abstained day of surgery, asthma,sleep apnea,   Cardiovascular   Exercise tolerance: good (4-7 METS)    Rhythm: regular  Rate: normal        Neuro/Psych  (+) psychiatric history Anxiety,    GI/Hepatic/Renal/Endo    (+) obesity,  hiatal hernia, GERD,  renal disease stones,     Musculoskeletal     Abdominal   (+) obese,     Abdomen: soft.   Substance History - negative use     OB/GYN negative ob/gyn ROS         Other   arthritis,                        Anesthesia Plan    ASA 2     general     intravenous induction     Anesthetic plan, risks, benefits, and alternatives have been provided, discussed and informed consent has been obtained with: patient.    Use of blood products discussed with consented to blood products.   Plan discussed with CRNA.        CODE STATUS:

## 2022-09-30 NOTE — ANESTHESIA PROCEDURE NOTES
Airway  Urgency: elective    Date/Time: 9/30/2022 11:07 AM  Airway not difficult    General Information and Staff    Patient location during procedure: OR  CRNA/CAA: Isabelle Patterson CRNA    Indications and Patient Condition  Indications for airway management: airway protection    Preoxygenated: yes  Mask difficulty assessment: 0 - not attempted    Final Airway Details  Final airway type: supraglottic airway      Successful airway: unique  Size 4    Number of attempts at approach: 1  Assessment: lips, teeth, and gum same as pre-op

## 2022-10-03 LAB — REF LAB TEST METHOD: NORMAL

## 2022-10-06 ENCOUNTER — TELEPHONE (OUTPATIENT)
Dept: SURGERY | Facility: CLINIC | Age: 32
End: 2022-10-06

## 2022-10-06 ENCOUNTER — LAB (OUTPATIENT)
Dept: LAB | Facility: HOSPITAL | Age: 32
End: 2022-10-06

## 2022-10-06 ENCOUNTER — TRANSCRIBE ORDERS (OUTPATIENT)
Dept: ADMINISTRATIVE | Facility: HOSPITAL | Age: 32
End: 2022-10-06

## 2022-10-06 DIAGNOSIS — R76.0 ANTICARDIOLIPIN ANTIBODY POSITIVE: ICD-10-CM

## 2022-10-06 DIAGNOSIS — Z87.19 STATUS POST HEMORRHOIDECTOMY: Primary | ICD-10-CM

## 2022-10-06 DIAGNOSIS — N96 HISTORY OF RECURRENT MISCARRIAGES: ICD-10-CM

## 2022-10-06 DIAGNOSIS — M25.50 PAIN IN JOINT, MULTIPLE SITES: Primary | ICD-10-CM

## 2022-10-06 DIAGNOSIS — M79.10 MYALGIA: ICD-10-CM

## 2022-10-06 DIAGNOSIS — M25.50 PAIN IN JOINT, MULTIPLE SITES: ICD-10-CM

## 2022-10-06 DIAGNOSIS — Z98.890 STATUS POST HEMORRHOIDECTOMY: Primary | ICD-10-CM

## 2022-10-06 DIAGNOSIS — R53.82 CHRONIC FATIGUE: ICD-10-CM

## 2022-10-06 PROCEDURE — 85705 THROMBOPLASTIN INHIBITION: CPT

## 2022-10-06 PROCEDURE — 86200 CCP ANTIBODY: CPT

## 2022-10-06 PROCEDURE — 85670 THROMBIN TIME PLASMA: CPT

## 2022-10-06 PROCEDURE — 82552 ASSAY OF CPK IN BLOOD: CPT

## 2022-10-06 PROCEDURE — 85652 RBC SED RATE AUTOMATED: CPT

## 2022-10-06 PROCEDURE — 83520 IMMUNOASSAY QUANT NOS NONAB: CPT

## 2022-10-06 PROCEDURE — 85732 THROMBOPLASTIN TIME PARTIAL: CPT

## 2022-10-06 PROCEDURE — 82085 ASSAY OF ALDOLASE: CPT

## 2022-10-06 PROCEDURE — 82728 ASSAY OF FERRITIN: CPT

## 2022-10-06 PROCEDURE — 86147 CARDIOLIPIN ANTIBODY EA IG: CPT

## 2022-10-06 PROCEDURE — 81374 HLA I TYPING 1 ANTIGEN LR: CPT

## 2022-10-06 PROCEDURE — 82550 ASSAY OF CK (CPK): CPT

## 2022-10-06 PROCEDURE — 85613 RUSSELL VIPER VENOM DILUTED: CPT

## 2022-10-06 PROCEDURE — 86431 RHEUMATOID FACTOR QUANT: CPT

## 2022-10-06 PROCEDURE — 86140 C-REACTIVE PROTEIN: CPT

## 2022-10-06 PROCEDURE — 86146 BETA-2 GLYCOPROTEIN ANTIBODY: CPT

## 2022-10-06 PROCEDURE — 36415 COLL VENOUS BLD VENIPUNCTURE: CPT

## 2022-10-06 NOTE — TELEPHONE ENCOUNTER
Patient called in with pain post anal scope and hemorrhoidectomy. Xylocaine jelly sent to pharmacy per Damien Martinez.    TS

## 2022-10-07 LAB
ALDOLASE SERPL-CCNC: 4.6 U/L (ref 3.3–10.3)
CARDIOLIPIN IGA SER IA-ACNC: <9 APL U/ML (ref 0–11)
CARDIOLIPIN IGG SER IA-ACNC: <9 GPL U/ML (ref 0–14)
CARDIOLIPIN IGM SER IA-ACNC: 13 MPL U/ML (ref 0–12)
CHROMATIN AB SERPL-ACNC: <10 IU/ML (ref 0–14)
CRP SERPL-MCNC: 1.76 MG/DL (ref 0–0.5)
ERYTHROCYTE [SEDIMENTATION RATE] IN BLOOD: 38 MM/HR (ref 0–20)
FERRITIN SERPL-MCNC: 36.6 NG/ML (ref 13–150)

## 2022-10-08 DIAGNOSIS — K58.0 IRRITABLE BOWEL SYNDROME WITH DIARRHEA: ICD-10-CM

## 2022-10-08 LAB
APTT SCREEN TO CONFIRM RATIO: 1.18 RATIO (ref 0–1.34)
CCP IGA+IGG SERPL IA-ACNC: 4 UNITS (ref 0–19)
CONFIRM APTT/NORMAL: 31.1 SEC (ref 0–47.6)
LA 2 SCREEN W REFLEX-IMP: NORMAL
SCREEN APTT: 34.5 SEC (ref 0–51.9)
SCREEN DRVVT: 40.5 SEC (ref 0–47)
THROMBIN TIME: 16.1 SEC (ref 0–23)

## 2022-10-09 LAB
B2 GLYCOPROT1 IGA SER-ACNC: <9 GPI IGA UNITS (ref 0–25)
B2 GLYCOPROT1 IGG SER-ACNC: <9 GPI IGG UNITS (ref 0–20)
B2 GLYCOPROT1 IGM SER-ACNC: 24 GPI IGM UNITS (ref 0–32)

## 2022-10-10 ENCOUNTER — OFFICE VISIT (OUTPATIENT)
Dept: SURGERY | Facility: CLINIC | Age: 32
End: 2022-10-10

## 2022-10-10 VITALS — WEIGHT: 172 LBS | BODY MASS INDEX: 34.68 KG/M2 | HEIGHT: 59 IN

## 2022-10-10 DIAGNOSIS — Z98.890 STATUS POST HEMORRHOIDECTOMY: Primary | ICD-10-CM

## 2022-10-10 DIAGNOSIS — Z87.19 STATUS POST HEMORRHOIDECTOMY: Primary | ICD-10-CM

## 2022-10-10 LAB
CK BB CFR SERPL ELPH: 0 %
CK MACRO1 CFR SERPL: 0 %
CK MACRO2 CFR SERPL: 0 %
CK MB CFR SERPL ELPH: 0 % (ref 0–3)
CK MM CFR SERPL ELPH: 100 % (ref 97–100)
CK SERPL-CCNC: 58 U/L (ref 32–182)

## 2022-10-10 PROCEDURE — 99024 POSTOP FOLLOW-UP VISIT: CPT | Performed by: SURGERY

## 2022-10-10 RX ORDER — MONTELUKAST SODIUM 4 MG/1
TABLET, CHEWABLE ORAL
Qty: 30 TABLET | Refills: 2 | Status: SHIPPED | OUTPATIENT
Start: 2022-10-10

## 2022-10-10 NOTE — PROGRESS NOTES
Eliel Webster is a 32 y.o. female.     Chief Complaint: post hemorrhoid excision    History of Present Illness She had one column of hemorrhoids posteriorly excised and oversewn. She is doing ok with resolving soreness. Her bowels are moving.    The following portions of the patient's history were reviewed and updated as appropriate: current medications, past family history, past medical history, past social history, past surgical history and problem list.    Review of Systems    Objective   Physical Exam no more bruising and resolving swelling    Past Medical History:   Diagnosis Date   • Abdominal pain    • Abnormal uterine bleeding (AUB)    • Anxiety and depression    • Arthritis    • Asthma     mild   • Cholecystitis    • Constipation    • Endometriosis    • Frequent UTI    • GERD (gastroesophageal reflux disease)    • Heartburn    • Hemorrhoids    • IBS (irritable bowel syndrome)    • Kidney stones    • Lesion of breast    • Lump     RIGHT BREAST   • Nausea & vomiting    • PCOS (polycystic ovarian syndrome)    • PONV (postoperative nausea and vomiting)    • Sjogren's disease (HCC)    • Sleep apnea     no cpap   • Snores    • Tachycardia    • Wrist fracture     RIGHT ARM      PATIENT UNSURE IF FRACTURED       Family History   Problem Relation Age of Onset   • Breast cancer Maternal Aunt    • Alcohol abuse Paternal Grandfather    • Heart disease Paternal Grandfather    • Cancer Maternal Grandfather    • Hypertension Maternal Grandfather    • Colon cancer Neg Hx    • Liver cancer Neg Hx        Social History     Tobacco Use   • Smoking status: Every Day     Packs/day: 0.25     Years: 16.00     Pack years: 4.00     Types: Cigarettes     Start date: 2003   • Smokeless tobacco: Never   Vaping Use   • Vaping Use: Former   • Substances: Nicotine, Flavoring   Substance Use Topics   • Alcohol use: Yes     Comment: RARELY   • Drug use: No       Past Surgical History:   Procedure Laterality Date   •  ABDOMINAL SURGERY     • ANAL SCOPE N/A 9/30/2022    Procedure: ANAL SCOPE;  Surgeon: Franco Mari MD;  [facility];  Service: General;  Laterality: N/A;   • BREAST BIOPSY Right 11/29/2018    Procedure: breast biopsy ;  Surgeon: Shiv Overton MD;  [facility];  Service: General   • CHOLECYSTECTOMY N/A 8/25/2017    Procedure: CHOLECYSTECTOMY LAPAROSCOPIC;  Surgeon: Franco Mari MD;  [facility];  Service:    • COLONOSCOPY N/A 3/9/2020    Procedure: COLONOSCOPY CPT CODE: 06702;  Surgeon: Jeremiah Murdock MD;  [facility];  Service: Gastroenterology;  Laterality: N/A;   • DENTAL PROCEDURE     • DIAGNOSTIC LAPAROSCOPY      X5   • DILATATION AND CURETTAGE     • ENDOSCOPY N/A 3/9/2020    Procedure: ESOPHAGOGASTRODUODENOSCOPY WITH BIOPSY CPT CODE: 15513;  Surgeon: Jeremiah Murdock MD;  Location: [facility] OR;  Service: Gastroenterology;  Laterality: N/A;   • ENDOSCOPY N/A 2/1/2021    Procedure: ESOPHAGOGASTRODUODENOSCOPY WITH BIOPSY CPT CODE: 78244;  Surgeon: Jeremiah Murdock MD;  Location: [facility] OR;  Service: Gastroenterology;  Laterality: N/A;   • HEMORRHOIDECTOMY N/A 11/14/2019    Procedure: HEMORRHOID STAPLING;  Surgeon: Franco Mari MD;  Location: [facility] OR;  Service: General   • KNEE ARTHROSCOPY W/ MENISCECTOMY Right 4/11/2022    Procedure: KNEE ARTHROSCOPIC DEBRIDEMENT, PRP INJECTION AND medial femoral condraplasty MENISCAL DEBRIDEMENT;  Surgeon: Musa Yoon MD;  Location: [facility] OR;  Service: Orthopedics;  Laterality: Right;   • URETEROSCOPY LASER LITHOTRIPSY WITH STENT INSERTION Right 1/9/2019    Procedure: URETEROSCOPY LASER LITHOTRIPSY retrograde pyelogram;  Surgeon: Ahmet Barrow MD;  Location: [facility] OR;  Service: Urology   • WISDOM TOOTH EXTRACTION         Current Outpatient Medications   Medication Instructions   • albuterol sulfate  (90 Base) MCG/ACT inhaler 1 puff, Inhalation, 4 Times Daily - RT   •  ARIPiprazole (ABILIFY) 5 MG tablet 1 tablet, Oral, Daily   • aspirin 81 mg, Oral, Daily   • colestipol (COLESTID) 1 g, Oral, Daily   • Dexilant 60 MG capsule TAKE ONE CAPSULE BY MOUTH DAILY   • dicyclomine (BENTYL) 20 MG tablet TAKE ONE TABLET BY MOUTH FOUR TIMES A DAY (BEFORE MEALS AND AT BEDTIME)   • doxepin (SINEQUAN) 25 mg, Oral, Nightly   • Erenumab-aooe (AIMOVIG) 140 mg, Subcutaneous, Every 30 Days   • famotidine (PEPCID) 20 mg, Oral, 2 Times Daily   • FLUoxetine (PROZAC) 40 mg, Oral, Daily   • folic acid (FOLVITE) 1 mg, Oral, Daily   • gabapentin (NEURONTIN) 100 mg, Oral, 3 Times Daily, Takes 2 tablets tid   • HYDROcodone-acetaminophen (NORCO) 7.5-325 MG per tablet 1 tablet, Oral, Every 4 Hours PRN   • hydrOXYzine pamoate (VISTARIL) 25 mg, Oral, 3 Times Daily PRN   • lidocaine (XYLOCAINE) 2 % jelly Apply to affected area daily prn   • loratadine (CLARITIN) 10 mg, Oral, Daily   • meloxicam (MOBIC) 15 mg, Oral, Daily   • methocarbamol (ROBAXIN) 500 mg, Oral, Nightly   • montelukast (SINGULAIR) 10 mg, Oral, Nightly   • nadolol (CORGARD) 40 mg, Oral, Daily   • rizatriptan (MAXALT) 10 mg, Oral, Once As Needed, May repeat in 2 hours if needed   • tamsulosin (FLOMAX) 0.4 mg, Oral, Daily   • topiramate (TOPAMAX) 25 mg, Oral, Nightly   • triamcinolone (KENALOG) 0.1 % cream No dose, route, or frequency recorded.   • vitamin D (ERGOCALCIFEROL) 50,000 Units, Oral, Every 7 Days         Assessment & Plan   Diagnoses and all orders for this visit:    1. Status post hemorrhoidectomy (Primary)    return prn

## 2022-10-12 DIAGNOSIS — N20.0 KIDNEY STONE: ICD-10-CM

## 2022-10-12 LAB — HLA-B27 QL NAA+PROBE: POSITIVE

## 2022-10-12 RX ORDER — TAMSULOSIN HYDROCHLORIDE 0.4 MG/1
CAPSULE ORAL
Qty: 30 CAPSULE | Refills: 1 | Status: SHIPPED | OUTPATIENT
Start: 2022-10-12 | End: 2022-12-14

## 2022-10-13 LAB — 14-3-3 ETA AG SER IA-MCNC: <0.2 NG/ML

## 2022-10-17 ENCOUNTER — OFFICE VISIT (OUTPATIENT)
Dept: GASTROENTEROLOGY | Facility: CLINIC | Age: 32
End: 2022-10-17

## 2022-10-17 VITALS
HEART RATE: 108 BPM | BODY MASS INDEX: 35.02 KG/M2 | DIASTOLIC BLOOD PRESSURE: 78 MMHG | OXYGEN SATURATION: 98 % | WEIGHT: 173.4 LBS | SYSTOLIC BLOOD PRESSURE: 112 MMHG

## 2022-10-17 DIAGNOSIS — K21.9 GASTROESOPHAGEAL REFLUX DISEASE WITHOUT ESOPHAGITIS: ICD-10-CM

## 2022-10-17 DIAGNOSIS — K58.0 IRRITABLE BOWEL SYNDROME WITH DIARRHEA: Primary | ICD-10-CM

## 2022-10-17 PROBLEM — L40.50 PSORIATIC ARTHRITIS: Status: ACTIVE | Noted: 2022-10-17

## 2022-10-17 PROCEDURE — 99214 OFFICE O/P EST MOD 30 MIN: CPT | Performed by: PHYSICIAN ASSISTANT

## 2022-10-17 RX ORDER — ELAGOLIX 150 MG/1
TABLET, FILM COATED ORAL
COMMUNITY
Start: 2022-09-13

## 2022-10-17 RX ORDER — DROSPIRENONE AND ETHINYL ESTRADIOL 0.02-3(28)
KIT ORAL
COMMUNITY

## 2022-10-17 RX ORDER — ADALIMUMAB 40MG/0.4ML
40 KIT SUBCUTANEOUS
COMMUNITY
Start: 2022-10-17

## 2022-10-17 RX ORDER — ONDANSETRON 4 MG/1
4 TABLET, ORALLY DISINTEGRATING ORAL
COMMUNITY
Start: 2022-07-20

## 2022-10-17 NOTE — PROGRESS NOTES
Follow Up Note     Date: 10/17/2022   Patient Name: Pili Webster  MRN: [id number]  : 1990     Primary Care Provider: Osvaldo Madrid MD     Chief Complaint   Patient presents with   • Irritable Bowel Syndrome     History of present illness:   10/17/2022  Pili Webster is a 32 y.o. female who is here today for follow up regarding Irritable Bowel Syndrome.    Her symptoms are mostly the same as previous. Dicyclomine helps some with abdominal pain. She feels that the colestipol 1 g tab once daily has helped more with diarrhea than any other medication that she has tried. Now having 1-2 stools daily, still sometimes with fecal urgency and fecal incontinence.     Was recently diagnosed with RA and plans to start treatments soon.     GERD still severe, taking dexilant 60 mg once daily and pepcid 20 mg BID. She does not feel that the pepcid is helping.     Interval History:  2018  Over the past 6 weeks, she has lost her appetite and has diarrhea or vomiting within a few minutes of eating. She has lost about 10 lbs over the past 6 weeks. She was seen in the ED for evaluation recently and was told that she was dehydrated and related it to her kidney stones. She follows with Dr. Barrow and has planned follow up in a few weeks. Bowel movements are described as watery in consistency. During this time of her illness, she has skipped 1-2 days between bowel movements intermittently. No obvious rectal bleeding. Sometimes the stool will be very light or very dark. She will have 2-3 episodes of vomiting per day intermittently. Usually, the vomitus is the food that she has eaten. She takes Zofran as needed for relief of nausea and vomiting with only mild relief. Protonix 40 mg once daily was given by her PCP only a few days ago. Previously, she was taking ranitidine daily without good relief. Reports heartburn which is constant and worse with any PO intake. She states that she has had GERD  complaints since age 10. No recent stool testing. S/p cholecystectomy over 1 year ago. Abdominal pain is generalized but mostly located on right side, described as stabbing in nature and constant. She points to the right flank as the area of the most discomfort. Bloating seems to cause the most discomfort per her report. Associated symptoms are back pain, chills, fatigue and intermittent fever. No known family history of GI disease including IBD, color or stomach cancer. No personal history of EGD or colonoscopy. She had miscarriage in Jan 2018. She had surgery in May 2018 due to endometriosis and she was told that her bowels were connected in areas which was abnormal and this was repaired. She does not currently take any oral contraceptive    Subjective     Past Medical History:   Diagnosis Date   • Abdominal pain    • Abnormal uterine bleeding (AUB)    • Anxiety and depression    • Arthritis    • Asthma     mild   • Cholecystitis    • Constipation    • Endometriosis    • Frequent UTI    • GERD (gastroesophageal reflux disease)    • Heartburn    • Hemorrhoids    • IBS (irritable bowel syndrome)    • Kidney stones    • Lesion of breast    • Lump     RIGHT BREAST   • Nausea & vomiting    • PCOS (polycystic ovarian syndrome)    • PONV (postoperative nausea and vomiting)    • Psoriatic arthritis (HCC)    • Sjogren's disease (HCC)    • Sleep apnea     no cpap   • Snores    • Tachycardia    • Wrist fracture     RIGHT ARM      PATIENT UNSURE IF FRACTURED     Past Surgical History:   Procedure Laterality Date   • ABDOMINAL SURGERY     • ANAL SCOPE N/A 9/30/2022    Procedure: ANAL SCOPE;  Surgeon: Franco Mari MD;  Location: [facility];  Service: General;  Laterality: N/A;   • BREAST BIOPSY Right 11/29/2018    Procedure: breast biopsy ;  Surgeon: Shiv Overton MD;  Location: [facility] OR;  Service: General   • CHOLECYSTECTOMY N/A 8/25/2017    Procedure: CHOLECYSTECTOMY LAPAROSCOPIC;  Surgeon: Franco Mari MD;   Location: [facility] OR;  Service:    • COLONOSCOPY N/A 3/9/2020    Procedure: COLONOSCOPY CPT CODE: 59633;  Surgeon: Jeremiah Murdock MD;  Location: [facility] OR;  Service: Gastroenterology;  Laterality: N/A;   • DENTAL PROCEDURE     • DIAGNOSTIC LAPAROSCOPY      X5   • DILATATION AND CURETTAGE     • ENDOSCOPY N/A 3/9/2020    Procedure: ESOPHAGOGASTRODUODENOSCOPY WITH BIOPSY CPT CODE: 43711;  Surgeon: Jeremiah Murdock MD;  Location: [facility] OR;  Service: Gastroenterology;  Laterality: N/A;   • ENDOSCOPY N/A 2/1/2021    Procedure: ESOPHAGOGASTRODUODENOSCOPY WITH BIOPSY CPT CODE: 75172;  Surgeon: Jeremiah Murdock MD;  Location: [facility] OR;  Service: Gastroenterology;  Laterality: N/A;   • HEMORRHOIDECTOMY N/A 11/14/2019    Procedure: HEMORRHOID STAPLING;  Surgeon: Franco Mari MD;  Location: [facility] OR;  Service: General   • KNEE ARTHROSCOPY W/ MENISCECTOMY Right 4/11/2022    Procedure: KNEE ARTHROSCOPIC DEBRIDEMENT, PRP INJECTION AND medial femoral condraplasty MENISCAL DEBRIDEMENT;  Surgeon: Musa Yoon MD;  Location: [facility] OR;  Service: Orthopedics;  Laterality: Right;   • URETEROSCOPY LASER LITHOTRIPSY WITH STENT INSERTION Right 1/9/2019    Procedure: URETEROSCOPY LASER LITHOTRIPSY retrograde pyelogram;  Surgeon: Ahmet Barrow MD;  Location: [facility];  Service: Urology   • WISDOM TOOTH EXTRACTION       Family History   Problem Relation Age of Onset   • Breast cancer Maternal Aunt    • Alcohol abuse Paternal Grandfather    • Heart disease Paternal Grandfather    • Cancer Maternal Grandfather    • Hypertension Maternal Grandfather    • Colon cancer Neg Hx    • Liver cancer Neg Hx      Social History     Socioeconomic History   • Marital status:    Tobacco Use   • Smoking status: Every Day     Packs/day: 0.25     Years: 16.00     Pack years: 4.00     Types: Cigarettes     Start date: 2003   • Smokeless tobacco: Never   Vaping Use   • Vaping Use: Former   •  Substances: Nicotine, Flavoring   Substance and Sexual Activity   • Alcohol use: Yes     Comment: RARELY   • Drug use: No   • Sexual activity: Defer       Current Outpatient Medications:   •  Adalimumab (Humira Pen) 40 MG/0.4ML Pen-injector Kit, Inject 40 mg under the skin into the appropriate area as directed., Disp: , Rfl:   •  albuterol sulfate  (90 Base) MCG/ACT inhaler, Inhale 1 puff 4 (Four) Times a Day., Disp: , Rfl:   •  ARIPiprazole (ABILIFY) 5 MG tablet, Take 1 tablet by mouth Daily., Disp: , Rfl:   •  aspirin 81 MG EC tablet, Take 81 mg by mouth Daily., Disp: , Rfl:   •  colestipol (COLESTID) 1 g tablet, TAKE ONE TABLET BY MOUTH DAILY, Disp: 30 tablet, Rfl: 2  •  Dexilant 60 MG capsule, TAKE ONE CAPSULE BY MOUTH DAILY, Disp: 90 capsule, Rfl: 3  •  dicyclomine (BENTYL) 20 MG tablet, TAKE ONE TABLET BY MOUTH FOUR TIMES A DAY (BEFORE MEALS AND AT BEDTIME), Disp: 120 tablet, Rfl: 2  •  doxepin (SINEquan) 25 MG capsule, Take 25 mg by mouth Every Night., Disp: , Rfl:   •  drospirenone-ethinyl estradiol (CARLA,GIANVI) 3-0.02 MG per tablet, Take  by mouth., Disp: , Rfl:   •  Erenumab-aooe (AIMOVIG) 140 MG/ML prefilled syringe, Inject 140 mg under the skin into the appropriate area as directed Every 30 (Thirty) Days., Disp: , Rfl:   •  famotidine (PEPCID) 20 MG tablet, Take 20 mg by mouth 2 (Two) Times a Day., Disp: , Rfl:   •  FLUoxetine (PROzac) 40 MG capsule, Take 40 mg by mouth Daily., Disp: , Rfl:   •  folic acid (FOLVITE) 1 MG tablet, Take 1 mg by mouth Daily., Disp: , Rfl:   •  gabapentin (NEURONTIN) 100 MG capsule, Take 100 mg by mouth 3 (Three) Times a Day. Takes 2 tablets tid, Disp: , Rfl:   •  hydrOXYzine pamoate (VISTARIL) 25 MG capsule, Take 25 mg by mouth 3 (Three) Times a Day As Needed., Disp: , Rfl:   •  lidocaine (XYLOCAINE) 2 % jelly, Apply to affected area daily prn, Disp: 28.33 g, Rfl: 0  •  loratadine (CLARITIN) 10 MG tablet, Take 10 mg by mouth Daily., Disp: , Rfl:   •  meloxicam (Mobic)  15 MG tablet, Take 1 tablet by mouth Daily., Disp: 14 tablet, Rfl: 0  •  methocarbamol (ROBAXIN) 500 MG tablet, Take 500 mg by mouth Every Night., Disp: , Rfl:   •  montelukast (SINGULAIR) 10 MG tablet, Take 10 mg by mouth Every Night., Disp: , Rfl:   •  nadolol (CORGARD) 40 MG tablet, Take 40 mg by mouth Daily., Disp: , Rfl:   •  ondansetron ODT (ZOFRAN-ODT) 4 MG disintegrating tablet, Place 1 tablet under the tongue., Disp: , Rfl:   •  Orilissa 150 MG tablet, , Disp: , Rfl:   •  rizatriptan (MAXALT) 10 MG tablet, Take 10 mg by mouth 1 (One) Time As Needed for Migraine. May repeat in 2 hours if needed, Disp: , Rfl:   •  tamsulosin (FLOMAX) 0.4 MG capsule 24 hr capsule, TAKE ONE CAPSULE BY MOUTH DAILY, Disp: 30 capsule, Rfl: 1  •  topiramate (TOPAMAX) 25 MG tablet, Take 25 mg by mouth Every Night., Disp: , Rfl:   •  triamcinolone (KENALOG) 0.1 % cream, , Disp: , Rfl:   •  vitamin D (ERGOCALCIFEROL) 1.25 MG (91884 UT) capsule capsule, Take 50,000 Units by mouth Every 7 (Seven) Days., Disp: , Rfl:     Allergies   Allergen Reactions   • Peanut-Containing Drug Products Anaphylaxis     AND PEANUTS IN FOODS   • Latex Hives   • Shrimp Swelling     Facial swelling     • Milk-Related Compounds Nausea And Vomiting   • Sulfa Antibiotics Rash       The following portions of the patient's history were reviewed and updated as appropriate: allergies, current medications, past family history, past medical history, past social history, past surgical history and problem list.    Objective     Physical Exam  Constitutional:       General: She is not in acute distress.     Appearance: Normal appearance. She is well-developed. She is obese. She is not diaphoretic.   HENT:      Head: Normocephalic and atraumatic.      Right Ear: External ear normal.      Left Ear: External ear normal.      Nose: Nose normal.      Mouth/Throat:      Comments: Wearing a mask  Eyes:      General: No scleral icterus.        Right eye: No discharge.          Left eye: No discharge.      Comments: Conjunctival hemorrhage right   Neck:      Trachea: No tracheal deviation.   Pulmonary:      Effort: Pulmonary effort is normal. No respiratory distress.   Musculoskeletal:         General: Normal range of motion.      Cervical back: Normal range of motion.   Skin:     Coloration: Skin is not pale.      Findings: No erythema or rash.   Neurological:      Mental Status: She is alert and oriented to person, place, and time.      Coordination: Coordination normal.   Psychiatric:         Mood and Affect: Mood normal.         Behavior: Behavior normal.         Thought Content: Thought content normal.         Judgment: Judgment normal.       Vitals:    10/17/22 1333   BP: 112/78   Pulse: 108   SpO2: 98%   Weight: 78.7 kg (173 lb 6.4 oz)     Results Review:   I reviewed the patient's new clinical results.    Pre-Admission Testing on 09/29/2022   Component Date Value Ref Range Status   • Glucose 09/29/2022 89  65 - 99 mg/dL Final   • BUN 09/29/2022 13  6 - 20 mg/dL Final   • Creatinine 09/29/2022 0.88  0.57 - 1.00 mg/dL Final   • Sodium 09/29/2022 138  136 - 145 mmol/L Final   • Potassium 09/29/2022 4.1  3.5 - 5.2 mmol/L Final   • Chloride 09/29/2022 105  98 - 107 mmol/L Final   • CO2 09/29/2022 19.6 (L)  22.0 - 29.0 mmol/L Final   • Calcium 09/29/2022 9.0  8.6 - 10.5 mg/dL Final   • BUN/Creatinine Ratio 09/29/2022 14.8  7.0 - 25.0 Final   • Anion Gap 09/29/2022 13.4  5.0 - 15.0 mmol/L Final   • eGFR 09/29/2022 88.6  >60.0 mL/min/1.73 Final    National Kidney Foundation and American Society of Nephrology (ASN) Task Force recommended calculation based on the Chronic Kidney Disease Epidemiology Collaboration (CKD-EPI) equation refit without adjustment for race.   • WBC 09/29/2022 10.82 (H)  3.40 - 10.80 10*3/mm3 Final   • RBC 09/29/2022 4.18  3.77 - 5.28 10*6/mm3 Final   • Hemoglobin 09/29/2022 13.1  12.0 - 15.9 g/dL Final   • Hematocrit 09/29/2022 39.2  34.0 - 46.6 % Final   • MCV  09/29/2022 93.8  79.0 - 97.0 fL Final   • MCH 09/29/2022 31.3  26.6 - 33.0 pg Final   • MCHC 09/29/2022 33.4  31.5 - 35.7 g/dL Final   • RDW 09/29/2022 12.6  12.3 - 15.4 % Final   • RDW-SD 09/29/2022 43.5  37.0 - 54.0 fl Final   • MPV 09/29/2022 9.4  6.0 - 12.0 fL Final   • Platelets 09/29/2022 352  140 - 450 10*3/mm3 Final   Admission on 07/20/2022, Discharged on 07/20/2022   Component Date Value Ref Range Status   • Glucose 07/20/2022 112 (H)  65 - 99 mg/dL Final   • BUN 07/20/2022 11  6 - 20 mg/dL Final   • Creatinine 07/20/2022 1.00  0.57 - 1.00 mg/dL Final   • Sodium 07/20/2022 133 (L)  136 - 145 mmol/L Final   • Potassium 07/20/2022 4.3  3.5 - 5.2 mmol/L Final   • Chloride 07/20/2022 104  98 - 107 mmol/L Final   • CO2 07/20/2022 14.9 (L)  22.0 - 29.0 mmol/L Final   • Calcium 07/20/2022 9.6  8.6 - 10.5 mg/dL Final   • Total Protein 07/20/2022 7.0  6.0 - 8.5 g/dL Final   • Albumin 07/20/2022 4.51  3.50 - 5.20 g/dL Final   • ALT (SGPT) 07/20/2022 12  1 - 33 U/L Final   • AST (SGOT) 07/20/2022 13  1 - 32 U/L Final   • Alkaline Phosphatase 07/20/2022 73  39 - 117 U/L Final   • Total Bilirubin 07/20/2022 0.3  0.0 - 1.2 mg/dL Final   • Globulin 07/20/2022 2.5  gm/dL Final   • A/G Ratio 07/20/2022 1.8  g/dL Final   • BUN/Creatinine Ratio 07/20/2022 11.0  7.0 - 25.0 Final   • Anion Gap 07/20/2022 14.1  5.0 - 15.0 mmol/L Final   • eGFR 07/20/2022 76.4  >60.0 mL/min/1.73 Final   • HCG Qualitative 07/20/2022 Negative  Negative Final   • C-Reactive Protein 07/20/2022 1.10 (H)  0.00 - 0.50 mg/dL Final   • Sed Rate 07/20/2022 14  0 - 20 mm/hr Final   • WBC 07/20/2022 13.37 (H)  3.40 - 10.80 10*3/mm3 Final   • RBC 07/20/2022 4.50  3.77 - 5.28 10*6/mm3 Final   • Hemoglobin 07/20/2022 13.5  12.0 - 15.9 g/dL Final   • Hematocrit 07/20/2022 40.0  34.0 - 46.6 % Final   • MCV 07/20/2022 88.9  79.0 - 97.0 fL Final   • MCH 07/20/2022 30.0  26.6 - 33.0 pg Final   • MCHC 07/20/2022 33.8  31.5 - 35.7 g/dL Final   • RDW 07/20/2022 12.3   12.3 - 15.4 % Final   • RDW-SD 07/20/2022 40.0  37.0 - 54.0 fl Final   • MPV 07/20/2022 9.0  6.0 - 12.0 fL Final   • Platelets 07/20/2022 322  140 - 450 10*3/mm3 Final      CT Abdomen Pelvis Without Contrast  Result Date: 7/20/2022   1. Obstructive uropathy on the right due to a 3.3 mm distal right ureteral stone  2.Other findings as above        EGD was completed on 2/1/2021 by Dr. Murdock.  Findings were short segment Márquez's esophagus with patchy involvement of the distal 1 to 2 cm of the esophagus, normal stomach except for small sliding hiatal hernia, normal duodenum.  Pathology shows benign duodenal mucosa, no H. pylori, distal esophageal biopsy shows chronic inflammation, features of reflux and multifocal intestinal metaplasia, negative for dysplasia.     EGD and colonoscopy were completed on 3/9/2020 by Dr. Murdock.  Findings were patchy salmon-colored mucosa in the distal esophagus, small sliding hiatal hernia, normal stomach, normal duodenum, normal terminal ileum, evidence of prior rectal surgery, 6 mm colon polyp in the transverse and sigmoid colon, left-sided diverticulosis.  Pathology showed normal duodenum, minimal superficial chronic inflammation of the stomach, negative H. pylori, esophageal biopsy showed features of reflux, multifocal intestinal metaplasia negative for dysplasia, transverse colon polyp was sessile serrated adenoma and sigmoid colon polyp was hyperplastic.     CTAP with contrast 8/2021   FINDINGS:    LUNG BASES:  Unremarkable.  No mass.  No consolidation.   ABDOMEN:    LIVER:  Unremarkable.  No mass.    GALLBLADDER AND BILE DUCTS:  Cholecystectomy clips.  No ductal  dilation.    PANCREAS:  Unremarkable.  No mass.  No ductal dilation.    SPLEEN:  Unremarkable.  No splenomegaly.    ADRENALS:  Unremarkable.  No mass.    KIDNEYS AND URETERS:  Unremarkable.  No solid mass.  No  hydronephrosis.    STOMACH AND BOWEL:  Unremarkable.  No obstruction.  No  mucosal  thickening.   PELVIS:    APPENDIX:  No findings to suggest acute appendicitis.    BLADDER:  Unremarkable.  No mass.    REPRODUCTIVE:  Unremarkable as visualized.   ABDOMEN and PELVIS:    INTRAPERITONEAL SPACE:  Unremarkable.  No free air.  No significant  fluid collection.    BONES/JOINTS:  No acute fracture.  No dislocation.    SOFT TISSUES:  Unremarkable.    VASCULATURE:  Unremarkable.  No abdominal aortic aneurysm.    LYMPH NODES:  Unremarkable.  No enlarged lymph nodes.  IMPRESSION:    No acute findings in the abdomen or pelvis.     Assessment / Plan      1. Irritable bowel syndrome with diarrhea  She has noticed some improvements in her long standing symptoms of generalized abdominal pain and diarrhea. Was previously having 6-7 stools per day with fecal urgency and intermittent fecal incontinence, now improved with colestipol 1 g tab once daily. She took Xifaxan therapy for treatment of IBS x2 (12/2021 and 2/2022) and had temporary improvements in symptoms each time.  Dicyclomine has helped some to relieve abdominal pain, will continue.  Recent labs showed CMP, TSH and celiac testing all unremarkable. CTAP 8/2021 showed no GI pathology. Colonoscopy in 2020 showed left sided diverticulosis and colon polyps. No random colon biopsies taken. She has IBS diarrhea based on history.      Continue taking bentyl as needed for relief of abdominal pain  Continue low FODMAP diet   Work on weight loss with diet modification and exercise as tolerated  Continue colestipol 1g tab once daily, may take up to BID for diarrhea  Call with concerns    2. Gastroesophageal reflux disease without esophagitis  Long standing history of GERD, taking Dexilant 60 mg once daily with reasonable control. She needs to work on avoiding foods that make her GERD worse, discussed weight loss which may help with this. Did have multifocal intestinal metaplasia of the esophageal biopsy.    Repeat EGD for monitoring of dysplasia related to her  Márquez's esophagus, due 2/2024  Continue PPI daily  Discontinue Pepcid  Acid reflux measures               Prior history:  Weight gain, abnormal  Noted weight gain of 20 lbs over past 1 year. Will check TSH today. Weight stable now since last visit.     History of adenomatous colon polyp  Colonoscopy in 3/2020 showed sessile serrated adenoma of transverse colon polyp, repeat colonoscopy recommended in 5 years, due 03/2025.       Follow Up:   Return in about 6 months (around 4/17/2023) for recheck IBS (ok for telehealth visit).      Naomi Aguiar PA-C  [facility] [place]  10/17/2022  16:33 EDT    Dictated Utilizing Dragon Dictation: Part of this note may be an electronic transcription/translation of spoken language to printed text using the Dragon Dictation System.

## 2022-10-21 ENCOUNTER — LAB (OUTPATIENT)
Dept: LAB | Facility: HOSPITAL | Age: 32
End: 2022-10-21

## 2022-10-21 ENCOUNTER — TRANSCRIBE ORDERS (OUTPATIENT)
Dept: LAB | Facility: HOSPITAL | Age: 32
End: 2022-10-21

## 2022-10-21 DIAGNOSIS — R76.0 RAISED ANTIBODY TITER: ICD-10-CM

## 2022-10-21 DIAGNOSIS — M47.819 SPONDYLOARTHRITIS: ICD-10-CM

## 2022-10-21 DIAGNOSIS — R76.0 ANTICARDIOLIPIN ANTIBODY POSITIVE: ICD-10-CM

## 2022-10-21 DIAGNOSIS — M79.10 MYALGIA: ICD-10-CM

## 2022-10-21 DIAGNOSIS — R53.83 OTHER FATIGUE: ICD-10-CM

## 2022-10-21 DIAGNOSIS — M25.50 PAIN IN JOINT, MULTIPLE SITES: ICD-10-CM

## 2022-10-21 DIAGNOSIS — M25.50 PAIN IN JOINT, MULTIPLE SITES: Primary | ICD-10-CM

## 2022-10-21 LAB — HCV AB SER DONR QL: NORMAL

## 2022-10-21 PROCEDURE — 36415 COLL VENOUS BLD VENIPUNCTURE: CPT

## 2022-10-21 PROCEDURE — 86480 TB TEST CELL IMMUN MEASURE: CPT

## 2022-10-21 PROCEDURE — 86803 HEPATITIS C AB TEST: CPT

## 2022-10-21 PROCEDURE — 86704 HEP B CORE ANTIBODY TOTAL: CPT

## 2022-10-22 LAB — HBV CORE AB SERPL QL IA: NEGATIVE

## 2022-10-24 LAB
GAMMA INTERFERON BACKGROUND BLD IA-ACNC: 0.07 IU/ML
M TB IFN-G BLD-IMP: NEGATIVE
M TB IFN-G CD4+ BCKGRND COR BLD-ACNC: 0.07 IU/ML
M TB IFN-G CD4+CD8+ BCKGRND COR BLD-ACNC: 0.08 IU/ML
MITOGEN IGNF BLD-ACNC: >10 IU/ML
QUANTIFERON INCUBATION: NORMAL
SERVICE CMNT-IMP: NORMAL

## 2022-10-28 ENCOUNTER — HOSPITAL ENCOUNTER (OUTPATIENT)
Dept: CT IMAGING | Facility: HOSPITAL | Age: 32
Discharge: HOME | End: 2022-10-28

## 2022-10-28 DIAGNOSIS — N20.0 KIDNEY STONE: ICD-10-CM

## 2022-10-28 PROCEDURE — 74176 CT ABD & PELVIS W/O CONTRAST: CPT | Performed by: RADIOLOGY

## 2022-10-28 PROCEDURE — 74176 CT ABD & PELVIS W/O CONTRAST: CPT

## 2022-11-01 DIAGNOSIS — K58.0 IRRITABLE BOWEL SYNDROME WITH DIARRHEA: ICD-10-CM

## 2022-11-01 RX ORDER — DICYCLOMINE HCL 20 MG
TABLET ORAL
Qty: 120 TABLET | Refills: 2 | Status: SHIPPED | OUTPATIENT
Start: 2022-11-01 | End: 2023-02-01

## 2022-11-02 ENCOUNTER — OFFICE VISIT (OUTPATIENT)
Dept: UROLOGY | Facility: CLINIC | Age: 32
End: 2022-11-02

## 2022-11-02 VITALS — BODY MASS INDEX: 34.88 KG/M2 | HEIGHT: 59 IN | WEIGHT: 173 LBS

## 2022-11-02 DIAGNOSIS — N20.0 KIDNEY STONE: Primary | ICD-10-CM

## 2022-11-02 DIAGNOSIS — N32.89 BLADDER SPASMS: ICD-10-CM

## 2022-11-02 PROCEDURE — 99213 OFFICE O/P EST LOW 20 MIN: CPT

## 2022-11-02 RX ORDER — OXYBUTYNIN CHLORIDE 5 MG/1
5 TABLET, EXTENDED RELEASE ORAL DAILY
Qty: 30 TABLET | Refills: 2 | Status: SHIPPED | OUTPATIENT
Start: 2022-11-02 | End: 2023-02-01

## 2022-11-02 NOTE — PROGRESS NOTES
"Chief Complaint:    Chief Complaint   Patient presents with   • kidney stone        Vital Signs:   Ht 149.9 cm (59.02\")   Wt 78.5 kg (173 lb)   BMI 34.92 kg/m²   Body mass index is 34.92 kg/m².      HPI:  Pili Webster is a 32 y.o. female who presents today for follow up    History of Present Illness  Ms. Webster presents to the clinic for a 1 month follow-up for nephrolithiasis.  CT scan was completed on 10/28/2022 that was only remarkable for a right adnexal cyst measuring 2.6 cm.  There were no evidence of stones throughout urinary tract system.  Patient reports that she is following up with her OB/GYN for discussion of a hysterectomy or tubal ligation.  She still complains of some intermittent right flank and back pain.  She reports that she has frequency and urgency as well.  I will start the patient on oxybutynin for bladder spasms, urgency, and frequency and follow-up with her in 2 months for reevaluation of symptoms.      Past Medical History:  Past Medical History:   Diagnosis Date   • Abdominal pain    • Abnormal uterine bleeding (AUB)    • Anxiety and depression    • Arthritis    • Asthma     mild   • Cholecystitis    • Constipation    • Endometriosis    • Frequent UTI    • GERD (gastroesophageal reflux disease)    • Heartburn    • Hemorrhoids    • IBS (irritable bowel syndrome)    • Kidney stones    • Lesion of breast    • Lump     RIGHT BREAST   • Nausea & vomiting    • PCOS (polycystic ovarian syndrome)    • PONV (postoperative nausea and vomiting)    • Psoriatic arthritis (HCC)    • Sjogren's disease (HCC)    • Sleep apnea     no cpap   • Snores    • Tachycardia    • Wrist fracture     RIGHT ARM      PATIENT UNSURE IF FRACTURED       Current Meds:  Current Outpatient Medications   Medication Sig Dispense Refill   • Adalimumab (Humira Pen) 40 MG/0.4ML Pen-injector Kit Inject 40 mg under the skin into the appropriate area as directed.     • albuterol sulfate  (90 Base) MCG/ACT " inhaler Inhale 1 puff 4 (Four) Times a Day.     • ARIPiprazole (ABILIFY) 5 MG tablet Take 1 tablet by mouth Daily.     • aspirin 81 MG EC tablet Take 81 mg by mouth Daily.     • colestipol (COLESTID) 1 g tablet TAKE ONE TABLET BY MOUTH DAILY 30 tablet 2   • Dexilant 60 MG capsule TAKE ONE CAPSULE BY MOUTH DAILY 90 capsule 3   • dicyclomine (BENTYL) 20 MG tablet TAKE ONE TABLET BY MOUTH FOUR TIMES A DAY BEFORE MEALS AND AT BEDTIME 120 tablet 2   • doxepin (SINEquan) 25 MG capsule Take 25 mg by mouth Every Night.     • drospirenone-ethinyl estradiol (CARLA,GIANVI) 3-0.02 MG per tablet Take  by mouth.     • Erenumab-aooe (AIMOVIG) 140 MG/ML prefilled syringe Inject 140 mg under the skin into the appropriate area as directed Every 30 (Thirty) Days.     • FLUoxetine (PROzac) 40 MG capsule Take 40 mg by mouth Daily.     • folic acid (FOLVITE) 1 MG tablet Take 1 mg by mouth Daily.     • gabapentin (NEURONTIN) 100 MG capsule Take 100 mg by mouth 3 (Three) Times a Day. Takes 2 tablets tid     • hydrOXYzine pamoate (VISTARIL) 25 MG capsule Take 25 mg by mouth 3 (Three) Times a Day As Needed.     • lidocaine (XYLOCAINE) 2 % jelly Apply to affected area daily prn 28.33 g 0   • loratadine (CLARITIN) 10 MG tablet Take 10 mg by mouth Daily.     • meloxicam (Mobic) 15 MG tablet Take 1 tablet by mouth Daily. 14 tablet 0   • methocarbamol (ROBAXIN) 500 MG tablet Take 500 mg by mouth Every Night.     • montelukast (SINGULAIR) 10 MG tablet Take 10 mg by mouth Every Night.     • nadolol (CORGARD) 40 MG tablet Take 40 mg by mouth Daily.     • ondansetron ODT (ZOFRAN-ODT) 4 MG disintegrating tablet Place 1 tablet under the tongue.     • Orilissa 150 MG tablet      • rizatriptan (MAXALT) 10 MG tablet Take 10 mg by mouth 1 (One) Time As Needed for Migraine. May repeat in 2 hours if needed     • tamsulosin (FLOMAX) 0.4 MG capsule 24 hr capsule TAKE ONE CAPSULE BY MOUTH DAILY 30 capsule 1   • topiramate (TOPAMAX) 25 MG tablet Take 25 mg by  mouth Every Night.     • triamcinolone (KENALOG) 0.1 % cream      • vitamin D (ERGOCALCIFEROL) 1.25 MG (19955 UT) capsule capsule Take 50,000 Units by mouth Every 7 (Seven) Days.     • oxybutynin XL (DITROPAN-XL) 5 MG 24 hr tablet Take 1 tablet by mouth Daily. 30 tablet 2     No current facility-administered medications for this visit.        Allergies:   Allergies   Allergen Reactions   • Peanut-Containing Drug Products Anaphylaxis     AND PEANUTS IN FOODS   • Latex Hives   • Shrimp Swelling     Facial swelling     • Milk-Related Compounds Nausea And Vomiting   • Sulfa Antibiotics Rash        Past Surgical History:  Past Surgical History:   Procedure Laterality Date   • ABDOMINAL SURGERY     • ANAL SCOPE N/A 9/30/2022    Procedure: ANAL SCOPE;  Surgeon: Franco Mari MD;  Location: [facility] OR;  Service: General;  Laterality: N/A;   • BREAST BIOPSY Right 11/29/2018    Procedure: breast biopsy ;  Surgeon: Shiv Overton MD;  Location: [facility] OR;  Service: General   • CHOLECYSTECTOMY N/A 8/25/2017    Procedure: CHOLECYSTECTOMY LAPAROSCOPIC;  Surgeon: Franco Mari MD;  Location: [facility] OR;  Service:    • COLONOSCOPY N/A 3/9/2020    Procedure: COLONOSCOPY CPT CODE: 70767;  Surgeon: Jeremiah Murdock MD;  Location: [facility] OR;  Service: Gastroenterology;  Laterality: N/A;   • DENTAL PROCEDURE     • DIAGNOSTIC LAPAROSCOPY      X5   • DILATATION AND CURETTAGE     • ENDOSCOPY N/A 3/9/2020    Procedure: ESOPHAGOGASTRODUODENOSCOPY WITH BIOPSY CPT CODE: 83622;  Surgeon: Jeremiah Murdock MD;  Location: [facility] OR;  Service: Gastroenterology;  Laterality: N/A;   • ENDOSCOPY N/A 2/1/2021    Procedure: ESOPHAGOGASTRODUODENOSCOPY WITH BIOPSY CPT CODE: 42374;  Surgeon: Jeremiah Murdock MD;  Location: [facility] OR;  Service: Gastroenterology;  Laterality: N/A;   • HEMORRHOIDECTOMY N/A 11/14/2019    Procedure: HEMORRHOID STAPLING;  Surgeon: Franco Mari MD;  Location: [facility] OR;   Service: General   • KNEE ARTHROSCOPY W/ MENISCECTOMY Right 4/11/2022    Procedure: KNEE ARTHROSCOPIC DEBRIDEMENT, PRP INJECTION AND medial femoral condraplasty MENISCAL DEBRIDEMENT;  Surgeon: Musa Yoon MD;  Location: [facility];  Service: Orthopedics;  Laterality: Right;   • URETEROSCOPY LASER LITHOTRIPSY WITH STENT INSERTION Right 1/9/2019    Procedure: URETEROSCOPY LASER LITHOTRIPSY retrograde pyelogram;  Surgeon: Ahmet Barrow MD;  Location: [facility];  Service: Urology   • WISDOM TOOTH EXTRACTION         Social History:  Social History     Socioeconomic History   • Marital status:    Tobacco Use   • Smoking status: Every Day     Packs/day: 0.25     Years: 16.00     Pack years: 4.00     Types: Cigarettes     Start date: 2003   • Smokeless tobacco: Never   Vaping Use   • Vaping Use: Former   • Substances: Nicotine, Flavoring   Substance and Sexual Activity   • Alcohol use: Yes     Comment: RARELY   • Drug use: No   • Sexual activity: Defer       Family History:  Family History   Problem Relation Age of Onset   • Breast cancer Maternal Aunt    • Alcohol abuse Paternal Grandfather    • Heart disease Paternal Grandfather    • Cancer Maternal Grandfather    • Hypertension Maternal Grandfather    • Colon cancer Neg Hx    • Liver cancer Neg Hx        Review of Systems:  Review of Systems   Constitutional: Negative for fatigue, fever and unexpected weight change.   HENT: Negative for ear pain and sinus pressure.    Respiratory: Negative for chest tightness, shortness of breath and wheezing.    Cardiovascular: Negative for chest pain.   Gastrointestinal: Negative for abdominal pain, constipation, diarrhea, nausea and vomiting.   Genitourinary: Positive for flank pain, frequency and urgency. Negative for difficulty urinating, dysuria, hematuria, pelvic pain, vaginal discharge and vaginal pain.   Musculoskeletal: Positive for back pain.   Skin: Negative for rash.   Psychiatric/Behavioral: Negative for  agitation, confusion and suicidal ideas.       Physical Exam:  Physical Exam  Constitutional:       General: She is not in acute distress.     Appearance: Normal appearance.   HENT:      Head: Normocephalic and atraumatic.      Nose: Nose normal.      Mouth/Throat:      Mouth: Mucous membranes are moist.   Eyes:      Conjunctiva/sclera: Conjunctivae normal.   Cardiovascular:      Rate and Rhythm: Normal rate and regular rhythm.      Pulses: Normal pulses.      Heart sounds: Normal heart sounds.   Pulmonary:      Effort: Pulmonary effort is normal.      Breath sounds: Normal breath sounds.   Abdominal:      General: Bowel sounds are normal.      Palpations: Abdomen is soft.      Tenderness: There is no right CVA tenderness or left CVA tenderness.   Musculoskeletal:         General: Normal range of motion.      Cervical back: Normal range of motion.   Skin:     General: Skin is warm.   Neurological:      General: No focal deficit present.      Mental Status: She is alert and oriented to person, place, and time.   Psychiatric:         Mood and Affect: Mood normal.         Behavior: Behavior normal.         Thought Content: Thought content normal.         Judgment: Judgment normal.           Recent Image (CT and/or KUB):   CT Abdomen and Pelvis: No results found for this or any previous visit.     CT Stone Protocol: Results for orders placed during the hospital encounter of 10/28/22    CT Abdomen Pelvis Stone Protocol    Narrative  EXAM:  CT Abdomen and Pelvis Without Intravenous Contrast    EXAM DATE:  10/28/2022 3:27 PM    CLINICAL HISTORY:  Nephrolithiasis; N20.0-Calculus of kidney    TECHNIQUE:  Axial computed tomography images of the abdomen and pelvis without  intravenous contrast.  Sagittal and coronal reformatted images were  created and reviewed.  This CT exam was performed using one or more of  the following dose reduction techniques:  automated exposure control,  adjustment of the mA and/or kV according to  patient size, and/or use of  iterative reconstruction technique.    COMPARISON:  CT ABDOMEN PELVIS STONE PROTOCOL- dated 07/20/2022    FINDINGS:  LUNG BASES:  Unremarkable.  No mass.  No consolidation.    ABDOMEN:  LIVER:  Unremarkable.  GALLBLADDER AND BILE DUCTS:  Cholecystectomy clips.  No ductal  dilation.  PANCREAS:  Unremarkable.  No ductal dilation.  SPLEEN:  Unremarkable.  No splenomegaly.  ADRENALS:  Unremarkable.  No mass.  KIDNEYS AND URETERS:  Unremarkable.  No obstructing stones.  No  hydronephrosis.  STOMACH AND BOWEL:  Unremarkable.  No obstruction.  No mucosal  thickening.    PELVIS:  APPENDIX:  No findings to suggest acute appendicitis.  BLADDER:  Unremarkable.  No stones.  REPRODUCTIVE:  Right adnexal region cyst measuring 2.6 cm.    ABDOMEN and PELVIS:  INTRAPERITONEAL SPACE:  Unremarkable.  No free air.  No significant  fluid collection.  BONES/JOINTS:  No acute fracture.  No dislocation.  SOFT TISSUES:  Unremarkable.  VASCULATURE:  Unremarkable.  No abdominal aortic aneurysm.  LYMPH NODES:  Unremarkable.  No enlarged lymph nodes.    Impression  Right adnexal region cyst measuring 2.6 cm.    This report was finalized on 10/28/2022 3:52 PM by Dr. Henrry Matthews MD.     KUB: Results for orders placed in visit on 08/17/22    XR abdomen kub    Narrative  EXAM:  XR Abdomen, 1 View    EXAM DATE:  8/17/2022 1:43 PM    CLINICAL HISTORY:  Stone; N20.0-Calculus of kidney    TECHNIQUE:  Frontal supine view of the abdomen/pelvis.    COMPARISON:  No relevant prior studies available.    FINDINGS:  GASTROINTESTINAL TRACT:  Unremarkable.  No dilation.  BONES/JOINTS:  Unremarkable.    Impression  Unremarkable abdominal x-ray.    This report was finalized on 8/17/2022 2:00 PM by Dr. Sidney Pepe MD.       Labs:  Brief Urine Lab Results  (Last result in the past 365 days)      Color   Clarity   Blood   Leuk Est   Nitrite   Protein   CREAT   Urine HCG        09/30/22 1054               Negative           Lab on  10/21/2022   Component Date Value Ref Range Status   • Hep B Core Total Ab 10/21/2022 Negative  Negative Final   • Hepatitis C Ab 10/21/2022 Non-Reactive  Non-Reactive Final   • QuantiFERON Incubation 10/21/2022 Incubation performed.   Final   • QUANTIFERON-TB GOLD PLUS 10/21/2022 Negative  Negative Final    No response to M tuberculosis antigens detected.  Infection with M tuberculosis is unlikely, but high risk  individuals should be considered for additional testing  (ATS/IDSA/CDC Clinical Practice Guidelines, 2017). The  reference range is an Antigen minus Nil result of <0.35 IU/mL.  Chemiluminescence immunoassay methodology   • QuantiFERON Criteria 10/21/2022 Comment   Final    QuantiFERON-TB Gold Plus is a qualitative indirect test for  M tuberculosis infection (including disease) and is intended for use  in conjunction with risk assessment, radiography, and other medical  and diagnostic evaluations. The QuantiFERON-TB Gold Plus result is  determined by subtracting the Nil value from either TB antigen (Ag)  value. The Mitogen tube serves as a control for the test.   • QUANTIFERON TB1 AG VALUE 10/21/2022 0.07  IU/mL Final   • QUANTIFERON TB2 AG VALUE 10/21/2022 0.08  IU/mL Final   • QuantiFERON Nil Value 10/21/2022 0.07  IU/mL Final   • QuantiFERON Mitogen Value 10/21/2022 >10.00  IU/mL Final   Lab on 10/06/2022   Component Date Value Ref Range Status   • HLA B27 10/06/2022 Positive   Final    HLA-B*27 Positive  This patient is positive for HLA-B*27. This procedure rules  out the B*27:06 and 27:09 alleles, which the literature  suggests are not associated with spondyloarthropathies.  B27 allele interpretation for all loci based on IMGT/HLA  database version 3.44  This test was developed and its performance characteristics  determined by [facility].  It has not been cleared or approved  by the Food and Drug Administration.  [facility] CLIA ID Number [id number]  This test was performed using PCR (Polymerase Chain  Reaction)/SSOP  (Sequence Specific Oligonucleotide Probes) technique.  SBT (Sequence  Based Typing) and/or SSP (Sequence Specific Primers) may be used as  supplemental methods when necessary.  Please contact HLA Customer  Service at [phone] if you have any questions.   Director of [facility] Laboratory   Dr Kit Nuno, PhD   • Sed Rate 10/06/2022 38 (H)  0 - 20 mm/hr Final   • C-Reactive Protein 10/06/2022 1.76 (H)  0.00 - 0.50 mg/dL Final   • Rheumatoid Factor Quantitative 10/06/2022 <10.0  0.0 - 14.0 IU/mL Final   • Ferritin 10/06/2022 36.60  13.00 - 150.00 ng/mL Final   • Dilute Prothrombin Time(dPT) 10/06/2022 31.1  0.0 - 47.6 sec Final   • dPT Confirm Ratio 10/06/2022 1.18  0.00 - 1.34 Ratio Final   • Thrombin Time 10/06/2022 16.1  0.0 - 23.0 sec Final   • PTT Lupus Anticoagulant 10/06/2022 34.5  0.0 - 51.9 sec Final   • Dilute Viper Venom Time 10/06/2022 40.5  0.0 - 47.0 sec Final   • Lupus Anticoagulant Reflex 10/06/2022 Comment:   Final    No lupus anticoagulant was detected.   • Anticardiolipin IgG 10/06/2022 <9  0 - 14 GPL U/mL Final                              Negative:              <15                            Indeterminate:     15 - 20                            Low-Med Positive: >20 - 80                            High Positive:         >80   • Anticardiolipin IgM 10/06/2022 13 (H)  0 - 12 MPL U/mL Final                              Negative:              <13                            Indeterminate:     13 - 20                            Low-Med Positive: >20 - 80                            High Positive:         >80   • Anticardiolipin IgA 10/06/2022 <9  0 - 11 APL U/mL Final                              Negative:              <12                            Indeterminate:     12 - 20                            Low-Med Positive: >20 - 80                            High Positive:         >80   • Beta-2 Glyco 1 IgG 10/06/2022 <9  0 - 20 GPI IgG units Final    The reference interval reflects a 3SD  or 99th percentile interval,  which is thought to represent a potentially clinically significant  result in accordance with the International Consensus Statement on  the classification criteria for definitive antiphospholipid syndrome  (APS). J Thromb Haem 2006;4:295-306.   • Beta-2 Glyco 1 IgA 10/06/2022 <9  0 - 25 GPI IgA units Final    The reference interval reflects a 3SD or 99th percentile interval,  which is thought to represent a potentially clinically significant  result in accordance with the International Consensus Statement on  the classification criteria for definitive antiphospholipid syndrome  (APS). J Thromb Haem 2006;4:295-306.   • Beta-2 Glyco 1 IgM 10/06/2022 24  0 - 32 GPI IgM units Final    The reference interval reflects a 3SD or 99th percentile interval,  which is thought to represent a potentially clinically significant  result in accordance with the International Consensus Statement on  the classification criteria for definitive antiphospholipid syndrome  (APS). J Thromb Haem 2006;4:295-306.   • Creatine Kinase 10/06/2022 58  32 - 182 U/L Final   • Macro Type 2 10/06/2022 0  Not Observed % Final   • CKMM 10/06/2022 100  97 - 100 % Final   • Macro Type 1 10/06/2022 0  Not Observed % Final   • CKMB 10/06/2022 0  0 - 3 % Final   • CKBB 10/06/2022 0  0 % Final   • 14.3.3 ETA, Rheum. Arthritis 10/06/2022 <0.20  ng/mL Final    Comment: Reference Range: < 0.20  Comments:  14-3-3 eta protein is a joint-derived, proinflammatory   that is implicated in the joint erosion process  and pathogenesis of RA.  Serum 14-3-3 eta is elevated in  both early and established RA.  - Diagnostic value:    14-3-3 eta is highly specific for RA.    Serum 14-3-3 eta may be especially helpful in identifying    patients with early RA where it provides a 15% incremental    benefit to the diagnostic sensitivity of markers,    Rheumatoid Arthritis (RA) Factor and Cyclic Citrullinated    Peptide (CCP) Antibodies, i.e.  An additional 15% of early    RA patients may be detected by 14-3-3 eta (1).  - Correlation with radiographic evidence of joint damage.    Positive serum 14-3-3 eta levels are associated with    higher rates of joint damage as measured by radiographic    assessments (Sharp/van chris Heijde Score) (2).  - Serum 14-3-3 eta levels above a threshold of 0.50 ng/ml    identify RA patients who will have more rapid radiographic                               progression, even those who may be in SDAI remission (1).  References:  1. Mae N, et al. Serum levels of 14-3-3 eta protein     supplement C-reactive protein and rheumatoid arthritis-     associated antibodies to predict clinical and     radiographic outcomes in a  prospective cohort of     patients with recent-onset inflammatory polyarthritis.     Arthritis Res Ther 2016;18:37  2. Tereza LUNA, et al. 14-3-3 eta is a novel      associated with the pathogenesis of rheumatoid arthritis     and joint damage. Arthritis Res Ther 2014;16:R99.  This test was developed and its performance characteristics  determined by [facility]. It has not been cleared or approved  by the Food and Drug Administration.   • CCP Antibodies IgG/IgA 10/06/2022 4  0 - 19 units Final                              Negative               <20                            Weak positive      20 - 39                            Moderate positive  40 - 59                            Strong positive        >59   • Aldolase 10/06/2022 4.6  3.3 - 10.3 U/L Final   Admission on 09/30/2022, Discharged on 09/30/2022   Component Date Value Ref Range Status   • HCG, Urine, QL 09/30/2022 Negative  Negative Final   • Lot Number 09/30/2022 [id number]   Final   • Internal Positive Control 09/30/2022 Positive  Positive, Passed Final   • Internal Negative Control 09/30/2022 Negative  Negative, Passed Final   • Expiration Date 09/30/2022 [id number]   Final   • Reference Lab Report 09/30/2022    Final                    " Value:[facility]  [address] [place] [address]  Phone: [phone] or [phone]  Fax: [phone]  Sjuit Kaur M.D., Medical Director    PATIENT NAME                           LABORATORY NO.  [id number]  BECKIE ALLEN.             [id number]  [id number]                         AGE              SEX  SSN           CLIENT REF #  [facility]N              32      1990  F    [id number]   [id number]    [address] [place] [address]                     REQUESTING M.D.     ATTENDING [place] [address]                   REJI MEG  DATE COLLECTED      DATE RECEIVED      DATE REPORTED  2022          2022         10/03/2022    DIAGNOSIS:  HEMORRHOIDS:  External hemorrhoids    CAM    CLINICAL HISTORY:  Status post hemorrhoidectomy, anal pain    SPECIMENS RECEIVED:  HEMORRHOIDS    MICROSCOPIC DESCRIPTION:  Tissue blocks are prepared and slides are examined microscopically. See  diagnosis for details.    Professional                           interpretation rendered by Pippa Morales M.D., SID.C.A.P. at  [facility], [address] [place] [address].    GROSS DESCRIPTION:  Received in formalin labeled with \"hemorrhoids\" is a 2.5 x 0.9 x 0.5 cm  unoriented irregular portion of tan-red, smooth , focally hemorrhagic, focally  cauterized mucosa.  The surgical margin is inked blue.  The specimen is serially  sectioned to reveal tan-red, smooth, homogenous cut surfaces with pinpoint  torturous vessels.  No masses or lesions identified.  Representative sections are  submitted in A1.  MJP    REVIEWED, DIAGNOSED AND ELECTRONICALLY  SIGNED BY:    Pippa Morales M.D., F.C.A.P.  CPT CODES:  96529     Pre-Admission Testing on 2022   Component Date Value Ref Range Status   • Glucose 2022 89  65 - 99 mg/dL Final   • BUN 2022 13  6 - 20 mg/dL Final   • Creatinine 2022 0.88  0.57 - 1.00 mg/dL Final   • Sodium 2022 138  " 136 - 145 mmol/L Final   • Potassium 09/29/2022 4.1  3.5 - 5.2 mmol/L Final   • Chloride 09/29/2022 105  98 - 107 mmol/L Final   • CO2 09/29/2022 19.6 (L)  22.0 - 29.0 mmol/L Final   • Calcium 09/29/2022 9.0  8.6 - 10.5 mg/dL Final   • BUN/Creatinine Ratio 09/29/2022 14.8  7.0 - 25.0 Final   • Anion Gap 09/29/2022 13.4  5.0 - 15.0 mmol/L Final   • eGFR 09/29/2022 88.6  >60.0 mL/min/1.73 Final    National Kidney Foundation and American Society of Nephrology (ASN) Task Force recommended calculation based on the Chronic Kidney Disease Epidemiology Collaboration (CKD-EPI) equation refit without adjustment for race.   • WBC 09/29/2022 10.82 (H)  3.40 - 10.80 10*3/mm3 Final   • RBC 09/29/2022 4.18  3.77 - 5.28 10*6/mm3 Final   • Hemoglobin 09/29/2022 13.1  12.0 - 15.9 g/dL Final   • Hematocrit 09/29/2022 39.2  34.0 - 46.6 % Final   • MCV 09/29/2022 93.8  79.0 - 97.0 fL Final   • MCH 09/29/2022 31.3  26.6 - 33.0 pg Final   • MCHC 09/29/2022 33.4  31.5 - 35.7 g/dL Final   • RDW 09/29/2022 12.6  12.3 - 15.4 % Final   • RDW-SD 09/29/2022 43.5  37.0 - 54.0 fl Final   • MPV 09/29/2022 9.4  6.0 - 12.0 fL Final   • Platelets 09/29/2022 352  140 - 450 10*3/mm3 Final   Office Visit on 09/16/2022   Component Date Value Ref Range Status   • Color 09/16/2022 Yellow  Yellow, Straw, Dark Yellow, Mimi Final   • Clarity, UA 09/16/2022 Clear  Clear Final   • Specific Gravity  09/16/2022 1.010  1.005 - 1.030 Final   • pH, Urine 09/16/2022 6.5  5.0 - 8.0 Final   • Leukocytes 09/16/2022 Small (1+) (A)  Negative Final   • Nitrite, UA 09/16/2022 Negative  Negative Final   • Protein, POC 09/16/2022 Negative  Negative mg/dL Final   • Glucose, UA 09/16/2022 Negative  Negative mg/dL Final   • Ketones, UA 09/16/2022 Negative  Negative Final   • Urobilinogen, UA 09/16/2022 Normal  Normal, 0.2 E.U./dL Final   • Bilirubin 09/16/2022 Negative  Negative Final   • Blood, UA 09/16/2022 Negative  Negative Final   • Lot Number 09/16/2022 [id number]   Final    • Expiration Date 09/16/2022 [id number]   Final   Office Visit on 08/17/2022   Component Date Value Ref Range Status   • Color 08/17/2022 Yellow  Yellow, Straw, Dark Yellow, Mimi Final   • Clarity, UA 08/17/2022 Clear  Clear Final   • Specific Gravity  08/17/2022 1.025  1.005 - 1.030 Final   • pH, Urine 08/17/2022 6.0  5.0 - 8.0 Final   • Leukocytes 08/17/2022 Small (1+) (A)  Negative Final   • Nitrite, UA 08/17/2022 Negative  Negative Final   • Protein, POC 08/17/2022 Negative  Negative mg/dL Final   • Glucose, UA 08/17/2022 Negative  Negative mg/dL Final   • Ketones, UA 08/17/2022 Negative  Negative Final   • Urobilinogen, UA 08/17/2022 Normal  Normal Final   • Bilirubin 08/17/2022 Negative  Negative Final   • Blood, UA 08/17/2022 Negative  Negative Final   • Lot Number 08/17/2022 [id number]   Final   • Expiration Date 08/17/2022 12/21/2023   Final   • Urine Culture 08/17/2022 No growth   Final        Procedure: None  Procedures     I have reviewed and agree with the above PMH, PSH, FMH, social history, medications, allergies, and labs.     Assessment/Plan:   Problem List Items Addressed This Visit        Genitourinary and Reproductive     Kidney stone - Primary   Other Visit Diagnoses     Bladder spasms        Relevant Medications    oxybutynin XL (DITROPAN-XL) 5 MG 24 hr tablet          Health Maintenance:   Health Maintenance Due   Topic Date Due   • ANNUAL PHYSICAL  Never done   • Pneumococcal Vaccine 0-64 (1 - PCV) Never done   • TDAP/TD VACCINES (1 - Tdap) Never done   • PAP SMEAR  Never done   • COVID-19 Vaccine (4 - Booster for Moderna series) 02/14/2022        Smoking Counseling: Patient is a current everyday smoker.  She has never used smokeless tobacco.  Counseling is given however she is not ready to quit at this time.    Urine Incontinence: Patient reports that she is not currently experiencing any symptoms of urinary incontinence.    Patient was given instructions and counseling regarding her  condition or for health maintenance advice. Please see specific information pulled into the AVS if appropriate.    Patient Education:   Nephrolithiasis -CT scan completed on 10/28/2022 shows no evidence of stones throughout the urinary tract system.  There was evidence of a 2.6 cm right adnexal cyst.  Patient is scheduled to follow-up with her OB/GYN for discussion of tubal ligation and possible hysterectomy.  Have printed out and gave the patient a copy of her CT results to discuss with her OB/GYN.  I will start the patient on oxybutynin 5 mg every 24 hours for bladder spasms, frequency, and urgency.  Advised the risk and side effects of this medication.  Educated patient that she may experience dry mouth, dry eyes, increasing constipation, or retention of urine.  Advised patient to discontinue medication if she begins to experience more difficulty urinating or urinary retention.  I will follow-up with her in 2 months for reevaluation of symptoms.  Patient verbalizes understanding and agrees to plan of care.    Visit Diagnoses:    ICD-10-CM ICD-9-CM   1. Kidney stone  N20.0 592.0   2. Bladder spasms  N32.89 596.89       Meds Ordered During Visit:  New Medications Ordered This Visit   Medications   • oxybutynin XL (DITROPAN-XL) 5 MG 24 hr tablet     Sig: Take 1 tablet by mouth Daily.     Dispense:  30 tablet     Refill:  2       Follow Up Appointment: 2 months  No follow-ups on file.      This document has been electronically signed by Mati Rankin PA-C   November 3, 2022 08:02 EDT    Part of this note may be an electronic transcription/translation of spoken language to printed text using the Dragon Dictation System.

## 2022-12-14 DIAGNOSIS — N20.0 KIDNEY STONE: ICD-10-CM

## 2022-12-14 RX ORDER — TAMSULOSIN HYDROCHLORIDE 0.4 MG/1
CAPSULE ORAL
Qty: 30 CAPSULE | Refills: 1 | Status: SHIPPED | OUTPATIENT
Start: 2022-12-14 | End: 2023-02-15

## 2022-12-22 ENCOUNTER — TRANSCRIBE ORDERS (OUTPATIENT)
Dept: OTHER | Facility: OTHER | Age: 32
End: 2022-12-22

## 2022-12-22 ENCOUNTER — HOSPITAL ENCOUNTER (OUTPATIENT)
Dept: GENERAL RADIOLOGY | Facility: HOSPITAL | Age: 32
Discharge: HOME | End: 2022-12-22
Admitting: FAMILY MEDICINE

## 2022-12-22 DIAGNOSIS — M25.532 LEFT WRIST PAIN: Primary | ICD-10-CM

## 2022-12-22 DIAGNOSIS — M25.532 LEFT WRIST PAIN: ICD-10-CM

## 2022-12-22 PROCEDURE — 73100 X-RAY EXAM OF WRIST: CPT | Performed by: RADIOLOGY

## 2022-12-22 PROCEDURE — 73100 X-RAY EXAM OF WRIST: CPT

## 2023-02-01 DIAGNOSIS — N32.89 BLADDER SPASMS: ICD-10-CM

## 2023-02-01 DIAGNOSIS — K58.0 IRRITABLE BOWEL SYNDROME WITH DIARRHEA: ICD-10-CM

## 2023-02-01 RX ORDER — DICYCLOMINE HCL 20 MG
TABLET ORAL
Qty: 120 TABLET | Refills: 2 | Status: SHIPPED | OUTPATIENT
Start: 2023-02-01

## 2023-02-01 RX ORDER — OXYBUTYNIN CHLORIDE 5 MG/1
TABLET, EXTENDED RELEASE ORAL
Qty: 30 TABLET | Refills: 2 | Status: SHIPPED | OUTPATIENT
Start: 2023-02-01 | End: 2023-03-09

## 2023-02-15 DIAGNOSIS — N20.0 KIDNEY STONE: ICD-10-CM

## 2023-02-15 RX ORDER — TAMSULOSIN HYDROCHLORIDE 0.4 MG/1
CAPSULE ORAL
Qty: 30 CAPSULE | Refills: 1 | Status: SHIPPED | OUTPATIENT
Start: 2023-02-15

## 2023-03-09 ENCOUNTER — HOSPITAL ENCOUNTER (OUTPATIENT)
Dept: GENERAL RADIOLOGY | Facility: HOSPITAL | Age: 33
Discharge: HOME | End: 2023-03-09
Payer: COMMERCIAL

## 2023-03-09 ENCOUNTER — OFFICE VISIT (OUTPATIENT)
Dept: UROLOGY | Facility: CLINIC | Age: 33
End: 2023-03-09
Payer: COMMERCIAL

## 2023-03-09 VITALS
SYSTOLIC BLOOD PRESSURE: 112 MMHG | HEIGHT: 59 IN | WEIGHT: 173.06 LBS | DIASTOLIC BLOOD PRESSURE: 78 MMHG | BODY MASS INDEX: 34.89 KG/M2

## 2023-03-09 DIAGNOSIS — R35.0 FREQUENCY OF URINATION: ICD-10-CM

## 2023-03-09 DIAGNOSIS — N39.0 RECURRENT URINARY TRACT INFECTION: ICD-10-CM

## 2023-03-09 DIAGNOSIS — N32.89 BLADDER SPASMS: ICD-10-CM

## 2023-03-09 DIAGNOSIS — N20.0 KIDNEY STONE: Primary | ICD-10-CM

## 2023-03-09 DIAGNOSIS — N20.0 KIDNEY STONE: ICD-10-CM

## 2023-03-09 LAB
BILIRUB BLD-MCNC: ABNORMAL MG/DL
CLARITY, POC: CLEAR
COLOR UR: YELLOW
EXPIRATION DATE: ABNORMAL
GLUCOSE UR STRIP-MCNC: NEGATIVE MG/DL
KETONES UR QL: ABNORMAL
LEUKOCYTE EST, POC: ABNORMAL
Lab: ABNORMAL
NITRITE UR-MCNC: NEGATIVE MG/ML
PH UR: 5.5 [PH] (ref 5–8)
PROT UR STRIP-MCNC: ABNORMAL MG/DL
RBC # UR STRIP: NEGATIVE /UL
SP GR UR: 1.02 (ref 1–1.03)
UROBILINOGEN UR QL: NORMAL

## 2023-03-09 PROCEDURE — 87086 URINE CULTURE/COLONY COUNT: CPT

## 2023-03-09 PROCEDURE — 74018 RADEX ABDOMEN 1 VIEW: CPT

## 2023-03-09 PROCEDURE — 74018 RADEX ABDOMEN 1 VIEW: CPT | Performed by: RADIOLOGY

## 2023-03-09 PROCEDURE — 1159F MED LIST DOCD IN RCRD: CPT

## 2023-03-09 PROCEDURE — 99213 OFFICE O/P EST LOW 20 MIN: CPT

## 2023-03-09 PROCEDURE — 1160F RVW MEDS BY RX/DR IN RCRD: CPT

## 2023-03-09 RX ORDER — OXYBUTYNIN CHLORIDE 10 MG/1
10 TABLET, EXTENDED RELEASE ORAL DAILY
Qty: 30 TABLET | Refills: 3 | Status: SHIPPED | OUTPATIENT
Start: 2023-03-09

## 2023-03-09 NOTE — PROGRESS NOTES
"Chief Complaint:    Chief Complaint   Patient presents with   • Flank Pain       Vital Signs:   /78   Ht 149.9 cm (59.02\")   Wt 78.5 kg (173 lb 1 oz)   BMI 34.94 kg/m²   Body mass index is 34.94 kg/m².      HPI:  Pili Webster is a 32 y.o. female who presents today for follow up    History of Present Illness  Ms. Webster presents to the clinic for follow-up for flank pain.  I previously seen her a few months ago where we had completed a CT scan that showed no significant urological abnormalities however there was a significant 2.5 cm right adnexal cyst.  Patient states that she is scheduled to undergo a complete hysterectomy at [facility] in [facility] on 3/22/2023.  She still reports some minor right-sided back and flank pain.  Repeated KUB today in office that shows no significant stones identified.  There is moderate constipation on the right side.  UA today shows trace leukocytes and 2+ bilirubin.  I will send the patient's urine off for culture analysis.  Her last culture showed no growth.  She still complains of bladder spasms so we will increase oxybutynin to 10 mg every 24 hours.      Past Medical History:  Past Medical History:   Diagnosis Date   • Abdominal pain    • Abnormal uterine bleeding (AUB)    • Anxiety and depression    • Arthritis    • Asthma     mild   • Cholecystitis    • Constipation    • Endometriosis    • Frequent UTI    • GERD (gastroesophageal reflux disease)    • Heartburn    • Hemorrhoids    • IBS (irritable bowel syndrome)    • Kidney stones    • Lesion of breast    • Lump     RIGHT BREAST   • Nausea & vomiting    • PCOS (polycystic ovarian syndrome)    • PONV (postoperative nausea and vomiting)    • Psoriatic arthritis (HCC)    • Sjogren's disease (HCC)    • Sleep apnea     no cpap   • Snores    • Tachycardia    • Wrist fracture     RIGHT ARM      PATIENT UNSURE IF FRACTURED       Current Meds:  Current Outpatient Medications   Medication Sig Dispense Refill "   • Adalimumab (Humira Pen) 40 MG/0.4ML Pen-injector Kit Inject 40 mg under the skin into the appropriate area as directed.     • albuterol sulfate  (90 Base) MCG/ACT inhaler Inhale 1 puff 4 (Four) Times a Day.     • ARIPiprazole (ABILIFY) 5 MG tablet Take 1 tablet by mouth Daily.     • aspirin 81 MG EC tablet Take 81 mg by mouth Daily.     • colestipol (COLESTID) 1 g tablet TAKE ONE TABLET BY MOUTH DAILY 30 tablet 2   • Dexilant 60 MG capsule TAKE ONE CAPSULE BY MOUTH DAILY 90 capsule 3   • dicyclomine (BENTYL) 20 MG tablet TAKE ONE TABLET BY MOUTH FOUR TIMES A DAY (BEFORE MEALS AND AT BEDTIME) 120 tablet 2   • doxepin (SINEquan) 25 MG capsule Take 25 mg by mouth Every Night.     • drospirenone-ethinyl estradiol (CARLA,GIANVI) 3-0.02 MG per tablet Take  by mouth.     • Erenumab-aooe (AIMOVIG) 140 MG/ML prefilled syringe Inject 140 mg under the skin into the appropriate area as directed Every 30 (Thirty) Days.     • FLUoxetine (PROzac) 40 MG capsule Take 40 mg by mouth Daily.     • folic acid (FOLVITE) 1 MG tablet Take 1 mg by mouth Daily.     • gabapentin (NEURONTIN) 100 MG capsule Take 100 mg by mouth 3 (Three) Times a Day. Takes 2 tablets tid     • hydrOXYzine pamoate (VISTARIL) 25 MG capsule Take 25 mg by mouth 3 (Three) Times a Day As Needed.     • lidocaine (XYLOCAINE) 2 % jelly Apply to affected area daily prn 28.33 g 0   • loratadine (CLARITIN) 10 MG tablet Take 10 mg by mouth Daily.     • meloxicam (Mobic) 15 MG tablet Take 1 tablet by mouth Daily. 14 tablet 0   • methocarbamol (ROBAXIN) 500 MG tablet Take 500 mg by mouth Every Night.     • montelukast (SINGULAIR) 10 MG tablet Take 10 mg by mouth Every Night.     • nadolol (CORGARD) 40 MG tablet Take 40 mg by mouth Daily.     • ondansetron ODT (ZOFRAN-ODT) 4 MG disintegrating tablet Place 1 tablet under the tongue.     • Orilissa 150 MG tablet      • oxybutynin XL (DITROPAN-XL) 10 MG 24 hr tablet Take 1 tablet by mouth Daily. 30 tablet 3   •  rizatriptan (MAXALT) 10 MG tablet Take 10 mg by mouth 1 (One) Time As Needed for Migraine. May repeat in 2 hours if needed     • tamsulosin (FLOMAX) 0.4 MG capsule 24 hr capsule TAKE ONE CAPSULE BY MOUTH DAILY 30 capsule 1   • topiramate (TOPAMAX) 25 MG tablet Take 25 mg by mouth Every Night.     • triamcinolone (KENALOG) 0.1 % cream      • vitamin D (ERGOCALCIFEROL) 1.25 MG (16202 UT) capsule capsule Take 50,000 Units by mouth Every 7 (Seven) Days.       No current facility-administered medications for this visit.        Allergies:   Allergies   Allergen Reactions   • Peanut-Containing Drug Products Anaphylaxis     AND PEANUTS IN FOODS   • Latex Hives   • Shrimp Swelling     Facial swelling     • Milk-Related Compounds Nausea And Vomiting   • Sulfa Antibiotics Rash        Past Surgical History:  Past Surgical History:   Procedure Laterality Date   • ABDOMINAL SURGERY     • ANAL SCOPE N/A 9/30/2022    Procedure: ANAL SCOPE;  Surgeon: Franco Mari MD;  Location: [facility] OR;  Service: General;  Laterality: N/A;   • BREAST BIOPSY Right 11/29/2018    Procedure: breast biopsy ;  Surgeon: Shiv Overton MD;  Location: [facility] OR;  Service: General   • CHOLECYSTECTOMY N/A 8/25/2017    Procedure: CHOLECYSTECTOMY LAPAROSCOPIC;  Surgeon: Franco Mari MD;  Location: [facility] OR;  Service:    • COLONOSCOPY N/A 3/9/2020    Procedure: COLONOSCOPY CPT CODE: 88918;  Surgeon: Jeremiah Murdock MD;  Location: [facility] OR;  Service: Gastroenterology;  Laterality: N/A;   • DENTAL PROCEDURE     • DIAGNOSTIC LAPAROSCOPY      X5   • DILATATION AND CURETTAGE     • ENDOSCOPY N/A 3/9/2020    Procedure: ESOPHAGOGASTRODUODENOSCOPY WITH BIOPSY CPT CODE: 83510;  Surgeon: Jeremiah Murdock MD;  Location: [facility] OR;  Service: Gastroenterology;  Laterality: N/A;   • ENDOSCOPY N/A 2/1/2021    Procedure: ESOPHAGOGASTRODUODENOSCOPY WITH BIOPSY CPT CODE: 07935;  Surgeon: Jeremiah Murdock MD;  Location:   [facility] OR;  Service: Gastroenterology;  Laterality: N/A;   • HEMORRHOIDECTOMY N/A 11/14/2019    Procedure: HEMORRHOID STAPLING;  Surgeon: Franco Mari MD;  Location: [facility] OR;  Service: General   • KNEE ARTHROSCOPY W/ MENISCECTOMY Right 4/11/2022    Procedure: KNEE ARTHROSCOPIC DEBRIDEMENT, PRP INJECTION AND medial femoral condraplasty MENISCAL DEBRIDEMENT;  Surgeon: Musa Yoon MD;  Location: [facility] OR;  Service: Orthopedics;  Laterality: Right;   • URETEROSCOPY LASER LITHOTRIPSY WITH STENT INSERTION Right 1/9/2019    Procedure: URETEROSCOPY LASER LITHOTRIPSY retrograde pyelogram;  Surgeon: Ahmet Barrow MD;  Location: [facility] OR;  Service: Urology   • WISDOM TOOTH EXTRACTION         Social History:  Social History     Socioeconomic History   • Marital status:    Tobacco Use   • Smoking status: Every Day     Packs/day: 0.25     Years: 16.00     Pack years: 4.00     Types: Cigarettes     Start date: 2003   • Smokeless tobacco: Never   Vaping Use   • Vaping Use: Former   • Substances: Nicotine, Flavoring   Substance and Sexual Activity   • Alcohol use: Yes     Comment: RARELY   • Drug use: No   • Sexual activity: Defer       Family History:  Family History   Problem Relation Age of Onset   • Breast cancer Maternal Aunt    • Alcohol abuse Paternal Grandfather    • Heart disease Paternal Grandfather    • Cancer Maternal Grandfather    • Hypertension Maternal Grandfather    • Colon cancer Neg Hx    • Liver cancer Neg Hx        Review of Systems:  Review of Systems   Constitutional: Negative for fatigue, fever and unexpected weight change.   HENT: Negative for drooling, rhinorrhea and sore throat.    Respiratory: Negative for chest tightness and shortness of breath.    Cardiovascular: Negative for chest pain.   Gastrointestinal: Negative for abdominal pain, constipation, diarrhea, nausea and vomiting.   Genitourinary: Positive for flank pain and frequency. Negative for difficulty urinating, dysuria,  menstrual problem and urgency.   Musculoskeletal: Negative for back pain and joint swelling.   Skin: Negative for rash.   Neurological: Negative for seizures and headaches.   Psychiatric/Behavioral: Negative for agitation, confusion and suicidal ideas.       Physical Exam:  Physical Exam  Constitutional:       General: She is not in acute distress.     Appearance: Normal appearance.   HENT:      Head: Normocephalic and atraumatic.      Nose: Nose normal.      Mouth/Throat:      Mouth: Mucous membranes are moist.   Eyes:      Conjunctiva/sclera: Conjunctivae normal.   Cardiovascular:      Rate and Rhythm: Normal rate and regular rhythm.      Pulses: Normal pulses.      Heart sounds: Normal heart sounds.   Pulmonary:      Effort: Pulmonary effort is normal.      Breath sounds: Normal breath sounds.   Abdominal:      General: Bowel sounds are normal.      Palpations: Abdomen is soft.   Musculoskeletal:         General: Normal range of motion.      Cervical back: Normal range of motion.   Skin:     General: Skin is warm.   Neurological:      General: No focal deficit present.      Mental Status: She is alert and oriented to person, place, and time.   Psychiatric:         Mood and Affect: Mood normal.         Behavior: Behavior normal.         Thought Content: Thought content normal.         Judgment: Judgment normal.       Recent Image (CT and/or KUB):   CT Abdomen and Pelvis: No results found for this or any previous visit.     CT Stone Protocol: Results for orders placed during the hospital encounter of 10/28/22    CT Abdomen Pelvis Stone Protocol    Narrative  EXAM:  CT Abdomen and Pelvis Without Intravenous Contrast    EXAM DATE:  10/28/2022 3:27 PM    CLINICAL HISTORY:  Nephrolithiasis; N20.0-Calculus of kidney    TECHNIQUE:  Axial computed tomography images of the abdomen and pelvis without  intravenous contrast.  Sagittal and coronal reformatted images were  created and reviewed.  This CT exam was performed using  one or more of  the following dose reduction techniques:  automated exposure control,  adjustment of the mA and/or kV according to patient size, and/or use of  iterative reconstruction technique.    COMPARISON:  CT ABDOMEN PELVIS STONE PROTOCOL- dated 07/20/2022    FINDINGS:  LUNG BASES:  Unremarkable.  No mass.  No consolidation.    ABDOMEN:  LIVER:  Unremarkable.  GALLBLADDER AND BILE DUCTS:  Cholecystectomy clips.  No ductal  dilation.  PANCREAS:  Unremarkable.  No ductal dilation.  SPLEEN:  Unremarkable.  No splenomegaly.  ADRENALS:  Unremarkable.  No mass.  KIDNEYS AND URETERS:  Unremarkable.  No obstructing stones.  No  hydronephrosis.  STOMACH AND BOWEL:  Unremarkable.  No obstruction.  No mucosal  thickening.    PELVIS:  APPENDIX:  No findings to suggest acute appendicitis.  BLADDER:  Unremarkable.  No stones.  REPRODUCTIVE:  Right adnexal region cyst measuring 2.6 cm.    ABDOMEN and PELVIS:  INTRAPERITONEAL SPACE:  Unremarkable.  No free air.  No significant  fluid collection.  BONES/JOINTS:  No acute fracture.  No dislocation.  SOFT TISSUES:  Unremarkable.  VASCULATURE:  Unremarkable.  No abdominal aortic aneurysm.  LYMPH NODES:  Unremarkable.  No enlarged lymph nodes.    Impression  Right adnexal region cyst measuring 2.6 cm.    This report was finalized on 10/28/2022 3:52 PM by Dr. Henrry Matthews MD.     KUB: Results for orders placed in visit on 08/17/22    XR abdomen kub    Narrative  EXAM:  XR Abdomen, 1 View    EXAM DATE:  8/17/2022 1:43 PM    CLINICAL HISTORY:  Stone; N20.0-Calculus of kidney    TECHNIQUE:  Frontal supine view of the abdomen/pelvis.    COMPARISON:  No relevant prior studies available.    FINDINGS:  GASTROINTESTINAL TRACT:  Unremarkable.  No dilation.  BONES/JOINTS:  Unremarkable.    Impression  Unremarkable abdominal x-ray.    This report was finalized on 8/17/2022 2:00 PM by Dr. Sidney Pepe MD.       Labs:  Brief Urine Lab Results  (Last result in the past 365 days)      Color    Clarity   Blood   Leuk Est   Nitrite   Protein   CREAT   Urine HCG        03/09/23 1144 Yellow   Clear   Negative   Trace   Negative   1+               Office Visit on 03/09/2023   Component Date Value Ref Range Status   • Color 03/09/2023 Yellow  Yellow, Straw, Dark Yellow, Mimi Final   • Clarity, UA 03/09/2023 Clear  Clear Final   • Specific Gravity  03/09/2023 1.025  1.005 - 1.030 Final   • pH, Urine 03/09/2023 5.5  5.0 - 8.0 Final   • Leukocytes 03/09/2023 Trace (A)  Negative Final   • Nitrite, UA 03/09/2023 Negative  Negative Final   • Protein, POC 03/09/2023 1+ (A)  Negative mg/dL Final   • Glucose, UA 03/09/2023 Negative  Negative mg/dL Final   • Ketones, UA 03/09/2023 Trace (A)  Negative Final   • Urobilinogen, UA 03/09/2023 Normal  Normal, 0.2 E.U./dL Final   • Bilirubin 03/09/2023 Moderate (2+) (A)  Negative Final   • Blood, UA 03/09/2023 Negative  Negative Final   • Lot Number 03/09/2023 [id number]   Final   • Expiration Date 03/09/2023 2/8/24   Final        Procedure: None  Procedures     I have reviewed and agree with the above PMH, PSH, FMH, social history, medications, allergies, and labs.      Assessment/Plan:   Problem List Items Addressed This Visit        Genitourinary and Reproductive     Kidney stone - Primary    Relevant Medications    oxybutynin XL (DITROPAN-XL) 10 MG 24 hr tablet    Other Relevant Orders    XR abdomen kub (Completed)    POC Urinalysis Dipstick, Automated (Completed)   Other Visit Diagnoses     Recurrent urinary tract infection        Relevant Orders    Urine Culture - Urine, Urine, Random Void    Frequency of urination        Relevant Medications    oxybutynin XL (DITROPAN-XL) 10 MG 24 hr tablet    Bladder spasms        Relevant Medications    oxybutynin XL (DITROPAN-XL) 10 MG 24 hr tablet          Health Maintenance:   Health Maintenance Due   Topic Date Due   • Pneumococcal Vaccine 0-64 (1 - PCV) Never done   • TDAP/TD VACCINES (1 - Tdap) Never done   • ANNUAL PHYSICAL   Never done   • PAP SMEAR  Never done   • COVID-19 Vaccine (4 - Booster for Moderna series) 02/14/2022        Smoking Counseling: Patient is a current everyday smoker.  Never used smokeless tobacco.  Counseling given however not ready to quit at this time.    Urine Incontinence: Patient reports that she is not currently experiencing any symptoms of urinary incontinence.    Patient was given instructions and counseling regarding her condition or for health maintenance advice. Please see specific information pulled into the AVS if appropriate.    Patient Education:   Kidney stones -patient has had several CTs and KUBs with no significant abnormalities.  She has no kidney stones on KUB completed today.  There was moderate constipation.  Advised patient to take a stool softener daily and increase water intake to 2 to 3 L/day.  We will continue Flomax as needed if she feels that she is passing small stones.  I believe that the majority of her flank and back pain is associated to her chronic endometriosis and adnexal cysts.  Advised patient that hysterectomy may resolve symptoms.  Advised her that if she continues to have problems then we can complete a cystoscopy in office for further evaluation.  Urinary tract infection -I discussed with the patient ways to help prevent urinary tract infection.  Advised patient to continue to increase water to 2 to 3 L/day.  Advised patient to take an over-the-counter probiotic to help with growth and control of normal urogenital franklin.  Discussed with the patient the use of a nightly antibiotic to help prevent urinary tract infections.  Patient's last culture showed no growth.  UA today shows trace leukocytes.  I will send her urine off for culture analysis and follow-up with her in 3 months for reevaluation.  Bladder spasms -patient continues to have bladder spasms.  She has been on oxybutynin 5 mg once daily with minimal improvement in symptoms.  At this time I am recommending to  increase oxybutynin to 10 mg daily.  Discussed the risk and side effects of this medication.  Advised patient to discontinue if she begins to have an increase in difficulty urinating.  Advised to return to clinic sooner if needed.  Otherwise I will follow-up with her in 3 months with reevaluation of symptoms.  Patient verbalizes understanding and agrees to plan of care.    Visit Diagnoses:    ICD-10-CM ICD-9-CM   1. Kidney stone  N20.0 592.0   2. Recurrent urinary tract infection  N39.0 599.0   3. Frequency of urination  R35.0 788.41   4. Bladder spasms  N32.89 596.89       Meds Ordered During Visit:  New Medications Ordered This Visit   Medications   • oxybutynin XL (DITROPAN-XL) 10 MG 24 hr tablet     Sig: Take 1 tablet by mouth Daily.     Dispense:  30 tablet     Refill:  3       Follow Up Appointment: 3 months  No follow-ups on file.      This document has been electronically signed by Mati Rankin PA-C   March 9, 2023 13:15 EST    Part of this note may be an electronic transcription/translation of spoken language to printed text using the Dragon Dictation System.

## 2023-03-10 LAB — BACTERIA SPEC AEROBE CULT: NO GROWTH

## 2023-04-01 DIAGNOSIS — K22.70 BARRETT'S ESOPHAGUS WITHOUT DYSPLASIA: ICD-10-CM

## 2023-04-01 DIAGNOSIS — K44.9 HIATAL HERNIA: ICD-10-CM

## 2023-04-03 RX ORDER — DEXLANSOPRAZOLE 60 MG/1
CAPSULE, DELAYED RELEASE ORAL
Qty: 90 CAPSULE | Refills: 1 | Status: SHIPPED | OUTPATIENT
Start: 2023-04-03 | End: 2023-04-17 | Stop reason: SDUPTHER

## 2023-04-17 ENCOUNTER — OFFICE VISIT (OUTPATIENT)
Dept: GASTROENTEROLOGY | Facility: CLINIC | Age: 33
End: 2023-04-17
Payer: COMMERCIAL

## 2023-04-17 VITALS
OXYGEN SATURATION: 98 % | HEART RATE: 73 BPM | WEIGHT: 175 LBS | SYSTOLIC BLOOD PRESSURE: 118 MMHG | DIASTOLIC BLOOD PRESSURE: 68 MMHG | BODY MASS INDEX: 35.33 KG/M2

## 2023-04-17 DIAGNOSIS — K58.0 IRRITABLE BOWEL SYNDROME WITH DIARRHEA: ICD-10-CM

## 2023-04-17 DIAGNOSIS — Z87.19 HISTORY OF BARRETT'S ESOPHAGUS: Primary | ICD-10-CM

## 2023-04-17 DIAGNOSIS — R13.19 ESOPHAGEAL DYSPHAGIA: ICD-10-CM

## 2023-04-17 DIAGNOSIS — K44.9 HIATAL HERNIA: ICD-10-CM

## 2023-04-17 PROCEDURE — 1160F RVW MEDS BY RX/DR IN RCRD: CPT | Performed by: PHYSICIAN ASSISTANT

## 2023-04-17 PROCEDURE — 1159F MED LIST DOCD IN RCRD: CPT | Performed by: PHYSICIAN ASSISTANT

## 2023-04-17 PROCEDURE — 99214 OFFICE O/P EST MOD 30 MIN: CPT | Performed by: PHYSICIAN ASSISTANT

## 2023-04-17 RX ORDER — ARIPIPRAZOLE 10 MG/1
TABLET ORAL
COMMUNITY
Start: 2023-03-20

## 2023-04-17 RX ORDER — DICYCLOMINE HCL 20 MG
20 TABLET ORAL 2 TIMES DAILY PRN
Qty: 180 TABLET | Refills: 3 | Status: SHIPPED | OUTPATIENT
Start: 2023-04-17

## 2023-04-17 RX ORDER — ESTRADIOL 0.1 MG/D
FILM, EXTENDED RELEASE TRANSDERMAL
COMMUNITY
Start: 2023-03-20

## 2023-04-17 RX ORDER — MONTELUKAST SODIUM 4 MG/1
1 TABLET, CHEWABLE ORAL DAILY
Qty: 90 TABLET | Refills: 3 | Status: SHIPPED | OUTPATIENT
Start: 2023-04-17

## 2023-04-17 RX ORDER — SODIUM CHLORIDE 9 MG/ML
30 INJECTION, SOLUTION INTRAVENOUS CONTINUOUS PRN
OUTPATIENT
Start: 2023-04-17

## 2023-04-17 RX ORDER — FLUOXETINE HYDROCHLORIDE 40 MG/1
80 CAPSULE ORAL
COMMUNITY
Start: 2023-01-02

## 2023-04-17 RX ORDER — DICYCLOMINE HCL 20 MG
20 TABLET ORAL 4 TIMES DAILY
COMMUNITY
Start: 2023-01-02 | End: 2023-04-17

## 2023-04-17 RX ORDER — DEXLANSOPRAZOLE 60 MG/1
1 CAPSULE, DELAYED RELEASE ORAL DAILY
Qty: 90 CAPSULE | Refills: 3 | Status: SHIPPED | OUTPATIENT
Start: 2023-04-17

## 2023-04-17 RX ORDER — TOPIRAMATE 100 MG/1
TABLET, FILM COATED ORAL
COMMUNITY
Start: 2023-04-05

## 2023-04-17 NOTE — PROGRESS NOTES
Follow Up Note     Date: 2023   Patient Name: Pili Webster  MRN: [id number]  : 1990     Primary Care Provider: Osvaldo Madrid MD     Chief Complaint   Patient presents with   • Irritable Bowel Syndrome     History of present illness:   2023  Pili Webster is a 32 y.o. female who is here today for follow up regarding Irritable Bowel Syndrome.    She continues with same complaints as previous. Still has some worse days with diarrhea than others. Mostly having 2-3 stools per day on current treatment. Still with GERD but controlled with Dexilant daily. Has developed dysphagia with medications over the past couple of months. Had hysterectomy recently.     Interval History:  2018  Over the past 6 weeks, she has lost her appetite and has diarrhea or vomiting within a few minutes of eating. She has lost about 10 lbs over the past 6 weeks. She was seen in the ED for evaluation recently and was told that she was dehydrated and related it to her kidney stones. She follows with Dr. Barrow and has planned follow up in a few weeks. Bowel movements are described as watery in consistency. During this time of her illness, she has skipped 1-2 days between bowel movements intermittently. No obvious rectal bleeding. Sometimes the stool will be very light or very dark. She will have 2-3 episodes of vomiting per day intermittently. Usually, the vomitus is the food that she has eaten. She takes Zofran as needed for relief of nausea and vomiting with only mild relief. Protonix 40 mg once daily was given by her PCP only a few days ago. Previously, she was taking ranitidine daily without good relief. Reports heartburn which is constant and worse with any PO intake. She states that she has had GERD complaints since age 10. No recent stool testing. S/p cholecystectomy over 1 year ago. Abdominal pain is generalized but mostly located on right side, described as stabbing in nature and  constant. She points to the right flank as the area of the most discomfort. Bloating seems to cause the most discomfort per her report. Associated symptoms are back pain, chills, fatigue and intermittent fever. No known family history of GI disease including IBD, color or stomach cancer. No personal history of EGD or colonoscopy. She had miscarriage in Jan 2018. She had surgery in May 2018 due to endometriosis and she was told that her bowels were connected in areas which was abnormal and this was repaired. She does not currently take any oral contraceptive.    Subjective     Past Medical History:   Diagnosis Date   • Abdominal pain    • Abnormal uterine bleeding (AUB)    • Anxiety and depression    • Arthritis    • Asthma     mild   • Cholecystitis    • Constipation    • Endometriosis    • Frequent UTI    • GERD (gastroesophageal reflux disease)    • Heartburn    • Hemorrhoids    • IBS (irritable bowel syndrome)    • Kidney stones    • Lesion of breast    • Lump     RIGHT BREAST   • Nausea & vomiting    • PCOS (polycystic ovarian syndrome)    • PONV (postoperative nausea and vomiting)    • Psoriatic arthritis    • Sjogren's disease    • Sleep apnea     no cpap   • Snores    • Tachycardia    • Wrist fracture     RIGHT ARM      PATIENT UNSURE IF FRACTURED     Past Surgical History:   Procedure Laterality Date   • ABDOMINAL SURGERY     • ANAL SCOPE N/A 09/30/2022    Procedure: ANAL SCOPE;  Surgeon: Franco Mari MD;  Location: [facility] OR;  Service: General;  Laterality: N/A;   • BREAST BIOPSY Right 11/29/2018    Procedure: breast biopsy ;  Surgeon: Shiv Overton MD;  Location: [facility] OR;  Service: General   • CHOLECYSTECTOMY N/A 08/25/2017    Procedure: CHOLECYSTECTOMY LAPAROSCOPIC;  Surgeon: Franco Mari MD;  Location: [facility] OR;  Service:    • COLONOSCOPY N/A 03/09/2020    Procedure: COLONOSCOPY CPT CODE: 91414;  Surgeon: Jeremiah Murdock MD;  Location: [facility] OR;  Service:  Gastroenterology;  Laterality: N/A;   • DENTAL PROCEDURE     • DIAGNOSTIC LAPAROSCOPY      X5   • DILATATION AND CURETTAGE     • ENDOSCOPY N/A 03/09/2020    Procedure: ESOPHAGOGASTRODUODENOSCOPY WITH BIOPSY CPT CODE: 33090;  Surgeon: Jeremiah Murdock MD;  Location: [facility];  Service: Gastroenterology;  Laterality: N/A;   • ENDOSCOPY N/A 02/01/2021    Procedure: ESOPHAGOGASTRODUODENOSCOPY WITH BIOPSY CPT CODE: 35575;  Surgeon: Jeremiah Murdock MD;  Location: [facility];  Service: Gastroenterology;  Laterality: N/A;   • HEMORRHOIDECTOMY N/A 11/14/2019    Procedure: HEMORRHOID STAPLING;  Surgeon: Franco Mari MD;  Location: [facility];  Service: General   • HYSTERECTOMY  03/22/2023    Total   • KNEE ARTHROSCOPY W/ MENISCECTOMY Right 04/11/2022    Procedure: KNEE ARTHROSCOPIC DEBRIDEMENT, PRP INJECTION AND medial femoral condraplasty MENISCAL DEBRIDEMENT;  Surgeon: Musa Yoon MD;  Location: [facility];  Service: Orthopedics;  Laterality: Right;   • URETEROSCOPY LASER LITHOTRIPSY WITH STENT INSERTION Right 01/09/2019    Procedure: URETEROSCOPY LASER LITHOTRIPSY retrograde pyelogram;  Surgeon: Ahmet Barrow MD;  Location: [facility];  Service: Urology   • WISDOM TOOTH EXTRACTION       Family History   Problem Relation Age of Onset   • Breast cancer Maternal Aunt    • Alcohol abuse Paternal Grandfather    • Heart disease Paternal Grandfather    • Cancer Maternal Grandfather    • Hypertension Maternal Grandfather    • Colon cancer Neg Hx    • Liver cancer Neg Hx      Social History     Socioeconomic History   • Marital status:    Tobacco Use   • Smoking status: Every Day     Packs/day: 0.25     Years: 16.00     Pack years: 4.00     Types: Cigarettes     Start date: 2003   • Smokeless tobacco: Never   Vaping Use   • Vaping Use: Former   • Substances: Nicotine, Flavoring   Substance and Sexual Activity   • Alcohol use: Yes     Comment: RARELY   • Drug use: No   • Sexual  activity: Defer       Current Outpatient Medications:   •  Adalimumab (Humira Pen) 40 MG/0.4ML Pen-injector Kit, Inject 40 mg under the skin into the appropriate area as directed., Disp: , Rfl:   •  albuterol sulfate  (90 Base) MCG/ACT inhaler, Inhale 1 puff 4 (Four) Times a Day., Disp: , Rfl:   •  ARIPiprazole (ABILIFY) 10 MG tablet, , Disp: , Rfl:   •  aspirin 81 MG EC tablet, Take 1 tablet by mouth Daily., Disp: , Rfl:   •  colestipol (COLESTID) 1 g tablet, TAKE ONE TABLET BY MOUTH DAILY, Disp: 30 tablet, Rfl: 2  •  dexlansoprazole (DEXILANT) 60 MG capsule, TAKE ONE CAPSULE BY MOUTH DAILY, Disp: 90 capsule, Rfl: 1  •  dicyclomine (BENTYL) 20 MG tablet, TAKE ONE TABLET BY MOUTH FOUR TIMES A DAY (BEFORE MEALS AND AT BEDTIME), Disp: 120 tablet, Rfl: 2  •  dicyclomine (BENTYL) 20 MG tablet, Take 1 tablet by mouth 4 (Four) Times a Day., Disp: , Rfl:   •  doxepin (SINEquan) 25 MG capsule, Take 1 capsule by mouth Every Night., Disp: , Rfl:   •  Erenumab-aooe (AIMOVIG) 140 MG/ML prefilled syringe, Inject 1 mL under the skin into the appropriate area as directed Every 30 (Thirty) Days., Disp: , Rfl:   •  estradiol (VIVELLE-DOT) 0.1 MG/24HR patch, , Disp: , Rfl:   •  FLUoxetine (PROzac) 40 MG capsule, Take 2 capsules by mouth., Disp: , Rfl:   •  folic acid (FOLVITE) 1 MG tablet, Take 1 tablet by mouth Daily., Disp: , Rfl:   •  gabapentin (NEURONTIN) 100 MG capsule, Take 1 capsule by mouth 3 (Three) Times a Day. Takes 2 tablets tid, Disp: , Rfl:   •  hydrOXYzine pamoate (VISTARIL) 25 MG capsule, Take 1 capsule by mouth 3 (Three) Times a Day As Needed., Disp: , Rfl:   •  loratadine (CLARITIN) 10 MG tablet, Take 1 tablet by mouth Daily., Disp: , Rfl:   •  methocarbamol (ROBAXIN) 500 MG tablet, Take 1 tablet by mouth Every Night., Disp: , Rfl:   •  montelukast (SINGULAIR) 10 MG tablet, Take 1 tablet by mouth Every Night., Disp: , Rfl:   •  nadolol (CORGARD) 40 MG tablet, Take 1 tablet by mouth Daily., Disp: , Rfl:   •   ondansetron ODT (ZOFRAN-ODT) 4 MG disintegrating tablet, Place 1 tablet under the tongue., Disp: , Rfl:   •  oxybutynin XL (DITROPAN-XL) 10 MG 24 hr tablet, Take 1 tablet by mouth Daily., Disp: 30 tablet, Rfl: 3  •  rizatriptan (MAXALT) 10 MG tablet, Take 1 tablet by mouth 1 (One) Time As Needed for Migraine. May repeat in 2 hours if needed, Disp: , Rfl:   •  topiramate (TOPAMAX) 100 MG tablet, , Disp: , Rfl:   •  vitamin D (ERGOCALCIFEROL) 1.25 MG (68475 UT) capsule capsule, Take 1 capsule by mouth Every 7 (Seven) Days., Disp: , Rfl:     Allergies   Allergen Reactions   • Peanut-Containing Drug Products Anaphylaxis     AND PEANUTS IN FOODS   • Latex Hives   • Shrimp Swelling     Facial swelling     • Milk-Related Compounds Nausea And Vomiting   • Sulfa Antibiotics Rash     The following portions of the patient's history were reviewed and updated as appropriate: allergies, current medications, past family history, past medical history, past social history, past surgical history and problem list.    Objective     Physical Exam  Constitutional:       General: She is not in acute distress.     Appearance: Normal appearance. She is well-developed. She is not diaphoretic.   HENT:      Head: Normocephalic and atraumatic.      Right Ear: External ear normal.      Left Ear: External ear normal.      Nose: Nose normal.   Eyes:      General: No scleral icterus.        Right eye: No discharge.         Left eye: No discharge.      Conjunctiva/sclera: Conjunctivae normal.   Neck:      Trachea: No tracheal deviation.   Pulmonary:      Effort: Pulmonary effort is normal. No respiratory distress.   Musculoskeletal:         General: Normal range of motion.      Cervical back: Normal range of motion.   Skin:     Coloration: Skin is not pale.      Findings: No erythema or rash.   Neurological:      Mental Status: She is alert and oriented to person, place, and time.      Coordination: Coordination normal.   Psychiatric:         Mood  and Affect: Mood normal.         Behavior: Behavior normal.         Thought Content: Thought content normal.         Judgment: Judgment normal.       Vitals:    04/17/23 1400   BP: 118/68   Pulse: 73   SpO2: 98%   Weight: 79.4 kg (175 lb)     Results Review:   I reviewed the patient's new clinical results.    CT Abdomen Pelvis Without Contrast  Result Date: 7/20/2022   1. Obstructive uropathy on the right due to a 3.3 mm distal right ureteral stone  2.Other findings as above         EGD was completed on 2/1/2021 by Dr. Murdock.  Findings were short segment Márquez's esophagus with patchy involvement of the distal 1 to 2 cm of the esophagus, normal stomach except for small sliding hiatal hernia, normal duodenum.  Pathology shows benign duodenal mucosa, no H. pylori, distal esophageal biopsy shows chronic inflammation, features of reflux and multifocal intestinal metaplasia, negative for dysplasia.     EGD and colonoscopy were completed on 3/9/2020 by Dr. Murdock.  Findings were patchy salmon-colored mucosa in the distal esophagus, small sliding hiatal hernia, normal stomach, normal duodenum, normal terminal ileum, evidence of prior rectal surgery, 6 mm colon polyp in the transverse and sigmoid colon, left-sided diverticulosis.  Pathology showed normal duodenum, minimal superficial chronic inflammation of the stomach, negative H. pylori, esophageal biopsy showed features of reflux, multifocal intestinal metaplasia negative for dysplasia, transverse colon polyp was sessile serrated adenoma and sigmoid colon polyp was hyperplastic.     CTAP with contrast 8/2021   FINDINGS:    LUNG BASES:  Unremarkable.  No mass.  No consolidation.   ABDOMEN:    LIVER:  Unremarkable.  No mass.    GALLBLADDER AND BILE DUCTS:  Cholecystectomy clips.  No ductal  dilation.    PANCREAS:  Unremarkable.  No mass.  No ductal dilation.    SPLEEN:  Unremarkable.  No splenomegaly.    ADRENALS:  Unremarkable.  No mass.    KIDNEYS AND  URETERS:  Unremarkable.  No solid mass.  No  hydronephrosis.    STOMACH AND BOWEL:  Unremarkable.  No obstruction.  No mucosal  thickening.   PELVIS:    APPENDIX:  No findings to suggest acute appendicitis.    BLADDER:  Unremarkable.  No mass.    REPRODUCTIVE:  Unremarkable as visualized.   ABDOMEN and PELVIS:    INTRAPERITONEAL SPACE:  Unremarkable.  No free air.  No significant  fluid collection.    BONES/JOINTS:  No acute fracture.  No dislocation.    SOFT TISSUES:  Unremarkable.    VASCULATURE:  Unremarkable.  No abdominal aortic aneurysm.    LYMPH NODES:  Unremarkable.  No enlarged lymph nodes.  IMPRESSION:    No acute findings in the abdomen or pelvis.      Assessment / Plan      1. History of Márquez's esophagus  2. Esophageal dysphagia  3. Hiatal hernia  Long standing history of GERD, taking Dexilant 60 mg once daily with reasonable control. She needs to work on avoiding foods that make her GERD worse, discussed weight loss which may help with this. Did have multifocal intestinal metaplasia of the esophageal biopsy 2021. Now with esophageal dysphagia with medications only, worsening over the past couple of months.      Continue PPI daily, refills given  Acid reflux measures  EGD will be arranged    - dexlansoprazole (DEXILANT) 60 MG capsule; Take 1 capsule by mouth Daily.  Dispense: 90 capsule; Refill: 3    She will need an esophagogastroduodenoscopy performed with monitored anesthesia care. The indications, technique, alternatives and potential risk and complications were discussed with the patient including but not limited to bleeding, intestinal perforations, missing lesions and anesthetic complications. The patient understands and wishes to proceed with the procedure and has given their verbal consent. Written patient education information was given to the patient. She should follow up in the office after this procedure to discuss the results and further recommendations can be made at that time. The  patient will call if they have further questions before procedure.  - Case Request    4. Irritable bowel syndrome with diarrhea  Continues with long standing symptoms of generalized abdominal pain and diarrhea. Was previously having 6-7 stools per day with fecal urgency and intermittent fecal incontinence, now improved with colestipol 1 g tab once daily. She took Xifaxan therapy for treatment of IBS x2 (12/2021 and 2/2022) and had temporary improvements in symptoms each time. Dicyclomine has helped some to relieve abdominal pain, will continue.  Recent labs showed CMP, TSH and celiac testing all unremarkable. CTAP 8/2021 showed no GI pathology. Colonoscopy in 2020 showed left sided diverticulosis and colon polyps. No random colon biopsies taken. She has IBS diarrhea based on history.      Continue taking bentyl as needed for relief of abdominal pain  Continue low FODMAP diet   Work on weight loss with diet modification and exercise as tolerated  Continue colestipol 1g tab once daily, may take up to BID for diarrhea  Call with concerns     - colestipol (COLESTID) 1 g tablet; Take 1 tablet by mouth Daily.  Dispense: 90 tablet; Refill: 3  - dicyclomine (BENTYL) 20 MG tablet; Take 1 tablet by mouth 2 (Two) Times a Day As Needed (abdominal pain).  Dispense: 180 tablet; Refill: 3       Prior history  History of adenomatous colon polyp  Colonoscopy in 3/2020 showed sessile serrated adenoma of transverse colon polyp, repeat colonoscopy recommended in 5 years, due 03/2025.     Follow Up:   Return for follow up after procedure to discuss results.      Naomi Aguiar PA-C  [facility] [place]  4/17/2023  16:44 EDT    Dictated Utilizing Dragon Dictation: Part of this note may be an electronic transcription/translation of spoken language to printed text using the Dragon Dictation System.

## 2023-04-18 PROBLEM — R13.19 ESOPHAGEAL DYSPHAGIA: Status: ACTIVE | Noted: 2023-04-18

## 2023-04-18 PROBLEM — Z87.19 HISTORY OF BARRETT'S ESOPHAGUS: Status: ACTIVE | Noted: 2023-04-18

## 2023-04-25 DIAGNOSIS — N20.0 KIDNEY STONE: Primary | ICD-10-CM

## 2023-04-25 RX ORDER — TAMSULOSIN HYDROCHLORIDE 0.4 MG/1
CAPSULE ORAL
Qty: 30 CAPSULE | Refills: 3 | Status: SHIPPED | OUTPATIENT
Start: 2023-04-25

## 2023-05-03 DIAGNOSIS — N32.89 BLADDER SPASMS: Primary | ICD-10-CM

## 2023-05-03 RX ORDER — OXYBUTYNIN CHLORIDE 5 MG/1
TABLET, EXTENDED RELEASE ORAL
Qty: 90 TABLET | Refills: 3 | Status: SHIPPED | OUTPATIENT
Start: 2023-05-03

## 2023-05-15 DIAGNOSIS — K58.0 IRRITABLE BOWEL SYNDROME WITH DIARRHEA: ICD-10-CM

## 2023-05-15 RX ORDER — DICYCLOMINE HCL 20 MG
TABLET ORAL
Qty: 120 TABLET | Refills: 3 | Status: SHIPPED | OUTPATIENT
Start: 2023-05-15

## 2023-06-09 ENCOUNTER — OFFICE VISIT (OUTPATIENT)
Dept: UROLOGY | Facility: CLINIC | Age: 33
End: 2023-06-09
Payer: COMMERCIAL

## 2023-06-09 VITALS
DIASTOLIC BLOOD PRESSURE: 88 MMHG | WEIGHT: 172.8 LBS | SYSTOLIC BLOOD PRESSURE: 122 MMHG | HEART RATE: 98 BPM | BODY MASS INDEX: 34.84 KG/M2 | HEIGHT: 59 IN

## 2023-06-09 DIAGNOSIS — N32.89 BLADDER SPASMS: ICD-10-CM

## 2023-06-09 DIAGNOSIS — N20.0 KIDNEY STONE: Primary | ICD-10-CM

## 2023-06-09 DIAGNOSIS — N39.41 URGE INCONTINENCE OF URINE: ICD-10-CM

## 2023-06-09 LAB
BILIRUB BLD-MCNC: NEGATIVE MG/DL
CLARITY, POC: CLEAR
COLOR UR: YELLOW
EXPIRATION DATE: NORMAL
GLUCOSE UR STRIP-MCNC: NEGATIVE MG/DL
KETONES UR QL: NEGATIVE
LEUKOCYTE EST, POC: NEGATIVE
Lab: NORMAL
NITRITE UR-MCNC: NEGATIVE MG/ML
PH UR: 5.5 [PH] (ref 5–8)
PROT UR STRIP-MCNC: NEGATIVE MG/DL
RBC # UR STRIP: NEGATIVE /UL
SP GR UR: 1.01 (ref 1–1.03)
UROBILINOGEN UR QL: NORMAL

## 2023-06-09 RX ORDER — MELOXICAM 15 MG/1
15 TABLET ORAL DAILY
COMMUNITY
Start: 2023-04-25

## 2023-06-09 RX ORDER — SUMATRIPTAN 100 MG/1
100 TABLET, FILM COATED ORAL ONCE
COMMUNITY
Start: 2023-05-23

## 2023-06-09 NOTE — PROGRESS NOTES
"Chief Complaint:    Chief Complaint   Patient presents with   • Flank Pain     3 month follow up       Vital Signs:   /88   Pulse 98   Ht 149.9 cm (59.02\")   Wt 78.4 kg (172 lb 12.8 oz)   BMI 34.88 kg/m²   Body mass index is 34.88 kg/m².      HPI:  Piil Webster is a 32 y.o. female who presents today for follow up    History of Present Illness  Ms. Webster presents to the clinic for follow-up for flank pain and urinary incontinence.  She has had recent x-ray completed roughly 3 months ago that showed no evidence of kidney stones.  She did have a CT scan completed in October that showed tiny right-sided intrarenal calculi.  She still reports some dysuria, pelvic pain, and right back/flank pain.  She was tried on oxybutynin 5 mg extended release however there is no improvement of symptoms and incontinence.  This was increased to 10 mg and patient still reports significant urge and stress incontinence requiring 1-2 pads.  UA today is completely negative.  Flank Pain  Associated symptoms include dysuria and pelvic pain. Pertinent negatives include no abdominal pain, chest pain or fever.       Past Medical History:  Past Medical History:   Diagnosis Date   • Abdominal pain    • Abnormal uterine bleeding (AUB)    • Anxiety and depression    • Arthritis    • Asthma     mild   • Cholecystitis    • Constipation    • Endometriosis    • Frequent UTI    • GERD (gastroesophageal reflux disease)    • Heartburn    • Hemorrhoids    • IBS (irritable bowel syndrome)    • Kidney stones    • Lesion of breast    • Lump     RIGHT BREAST   • Nausea & vomiting    • PCOS (polycystic ovarian syndrome)    • PONV (postoperative nausea and vomiting)    • Psoriatic arthritis    • Sjogren's disease    • Sleep apnea     no cpap   • Snores    • Tachycardia    • Wrist fracture     RIGHT ARM      PATIENT UNSURE IF FRACTURED       Current Meds:  Current Outpatient Medications   Medication Sig Dispense Refill   • Adalimumab (Humira " Pen) 40 MG/0.4ML Pen-injector Kit Inject 40 mg under the skin into the appropriate area as directed.     • albuterol sulfate  (90 Base) MCG/ACT inhaler Inhale 1 puff 4 (Four) Times a Day.     • ARIPiprazole (ABILIFY) 10 MG tablet      • aspirin 81 MG EC tablet Take 1 tablet by mouth Daily.     • colestipol (COLESTID) 1 g tablet Take 1 tablet by mouth Daily. 90 tablet 3   • dexlansoprazole (DEXILANT) 60 MG capsule Take 1 capsule by mouth Daily. 90 capsule 3   • dicyclomine (BENTYL) 20 MG tablet TAKE ONE TABLET BY MOUTH FOUR TIMES A DAY BEFORE MEALS AND AT BEDTIME 120 tablet 3   • doxepin (SINEquan) 25 MG capsule Take 1 capsule by mouth Every Night.     • Erenumab-aooe (AIMOVIG) 140 MG/ML prefilled syringe Inject 1 mL under the skin into the appropriate area as directed Every 30 (Thirty) Days.     • estradiol (VIVELLE-DOT) 0.1 MG/24HR patch      • FLUoxetine (PROzac) 40 MG capsule Take 2 capsules by mouth.     • folic acid (FOLVITE) 1 MG tablet Take 1 tablet by mouth Daily.     • gabapentin (NEURONTIN) 100 MG capsule Take 1 capsule by mouth 3 (Three) Times a Day. Takes 2 tablets tid     • hydrOXYzine pamoate (VISTARIL) 25 MG capsule Take 1 capsule by mouth 3 (Three) Times a Day As Needed.     • loratadine (CLARITIN) 10 MG tablet Take 1 tablet by mouth Daily.     • meloxicam (MOBIC) 15 MG tablet Take 1 tablet by mouth Daily.     • methocarbamol (ROBAXIN) 500 MG tablet Take 1 tablet by mouth Every Night.     • montelukast (SINGULAIR) 10 MG tablet Take 1 tablet by mouth Every Night.     • nadolol (CORGARD) 40 MG tablet Take 1 tablet by mouth Daily.     • ondansetron ODT (ZOFRAN-ODT) 4 MG disintegrating tablet Place 1 tablet under the tongue.     • oxybutynin XL (DITROPAN-XL) 10 MG 24 hr tablet Take 1 tablet by mouth Daily. 30 tablet 3   • rizatriptan (MAXALT) 10 MG tablet Take 1 tablet by mouth 1 (One) Time As Needed for Migraine. May repeat in 2 hours if needed     • SUMAtriptan (IMITREX) 100 MG tablet Take 1  tablet by mouth 1 (One) Time.     • tamsulosin (FLOMAX) 0.4 MG capsule 24 hr capsule TAKE ONE CAPSULE BY MOUTH DAILY 30 capsule 3   • topiramate (TOPAMAX) 100 MG tablet      • vitamin D (ERGOCALCIFEROL) 1.25 MG (28347 UT) capsule capsule Take 1 capsule by mouth Every 7 (Seven) Days.       No current facility-administered medications for this visit.        Allergies:   Allergies   Allergen Reactions   • Peanut-Containing Drug Products Anaphylaxis     AND PEANUTS IN FOODS   • Latex Hives   • Shrimp Swelling     Facial swelling     • Milk-Related Compounds Nausea And Vomiting   • Sulfa Antibiotics Rash        Past Surgical History:  Past Surgical History:   Procedure Laterality Date   • ABDOMINAL SURGERY     • ANAL SCOPE N/A 09/30/2022    Procedure: ANAL SCOPE;  Surgeon: Franco Mari MD;  Location: [facility] OR;  Service: General;  Laterality: N/A;   • BREAST BIOPSY Right 11/29/2018    Procedure: breast biopsy ;  Surgeon: Shiv Overton MD;  Location: [facility] OR;  Service: General   • CHOLECYSTECTOMY N/A 08/25/2017    Procedure: CHOLECYSTECTOMY LAPAROSCOPIC;  Surgeon: Franco Mari MD;  Location: [facility] OR;  Service:    • COLONOSCOPY N/A 03/09/2020    Procedure: COLONOSCOPY CPT CODE: 40844;  Surgeon: Jeremiah Murdock MD;  Location: [facility] OR;  Service: Gastroenterology;  Laterality: N/A;   • DENTAL PROCEDURE     • DIAGNOSTIC LAPAROSCOPY      X5   • DILATATION AND CURETTAGE     • ENDOSCOPY N/A 03/09/2020    Procedure: ESOPHAGOGASTRODUODENOSCOPY WITH BIOPSY CPT CODE: 01927;  Surgeon: Jeremiah Murdock MD;  Location: [facility] OR;  Service: Gastroenterology;  Laterality: N/A;   • ENDOSCOPY N/A 02/01/2021    Procedure: ESOPHAGOGASTRODUODENOSCOPY WITH BIOPSY CPT CODE: 41397;  Surgeon: Jeremiah Murdock MD;  Location: [facility] OR;  Service: Gastroenterology;  Laterality: N/A;   • HEMORRHOIDECTOMY N/A 11/14/2019    Procedure: HEMORRHOID STAPLING;  Surgeon: Franco Mari MD;   Location: [facility];  Service: General   • HYSTERECTOMY  03/22/2023    Total   • KNEE ARTHROSCOPY W/ MENISCECTOMY Right 04/11/2022    Procedure: KNEE ARTHROSCOPIC DEBRIDEMENT, PRP INJECTION AND medial femoral condraplasty MENISCAL DEBRIDEMENT;  Surgeon: Musa Yoon MD;  Location: [facility];  Service: Orthopedics;  Laterality: Right;   • URETEROSCOPY LASER LITHOTRIPSY WITH STENT INSERTION Right 01/09/2019    Procedure: URETEROSCOPY LASER LITHOTRIPSY retrograde pyelogram;  Surgeon: Ahmet Barrow MD;  Location: [facility];  Service: Urology   • WISDOM TOOTH EXTRACTION         Social History:  Social History     Socioeconomic History   • Marital status:    Tobacco Use   • Smoking status: Every Day     Packs/day: 0.25     Years: 16.00     Pack years: 4.00     Types: Cigarettes     Start date: 2003   • Smokeless tobacco: Never   Vaping Use   • Vaping Use: Former   • Substances: Nicotine, Flavoring   Substance and Sexual Activity   • Alcohol use: Yes     Comment: RARELY   • Drug use: No   • Sexual activity: Defer       Family History:  Family History   Problem Relation Age of Onset   • Breast cancer Maternal Aunt    • Alcohol abuse Paternal Grandfather    • Heart disease Paternal Grandfather    • Cancer Maternal Grandfather    • Hypertension Maternal Grandfather    • Colon cancer Neg Hx    • Liver cancer Neg Hx        Review of Systems:  Review of Systems   Constitutional: Negative for fatigue, fever and unexpected weight change.   Respiratory: Negative for chest tightness and shortness of breath.    Cardiovascular: Negative for chest pain.   Gastrointestinal: Negative for abdominal pain, constipation, diarrhea, nausea and vomiting.   Genitourinary: Positive for dysuria, flank pain, pelvic pain and urgency. Negative for difficulty urinating and frequency.   Skin: Negative for rash.   Psychiatric/Behavioral: Negative for confusion and suicidal ideas.       Physical Exam:  Physical Exam  Constitutional:        General: She is not in acute distress.     Appearance: Normal appearance.   HENT:      Head: Normocephalic and atraumatic.      Nose: Nose normal.      Mouth/Throat:      Mouth: Mucous membranes are moist.   Eyes:      Conjunctiva/sclera: Conjunctivae normal.   Cardiovascular:      Rate and Rhythm: Normal rate and regular rhythm.      Pulses: Normal pulses.      Heart sounds: Normal heart sounds.   Pulmonary:      Effort: Pulmonary effort is normal.      Breath sounds: Normal breath sounds.   Abdominal:      General: Bowel sounds are normal.      Palpations: Abdomen is soft.      Tenderness: There is no right CVA tenderness or left CVA tenderness.   Musculoskeletal:         General: Normal range of motion.      Cervical back: Normal range of motion.   Skin:     General: Skin is warm.   Neurological:      General: No focal deficit present.      Mental Status: She is alert and oriented to person, place, and time.   Psychiatric:         Mood and Affect: Mood normal.         Behavior: Behavior normal.         Thought Content: Thought content normal.         Judgment: Judgment normal.       Recent Image (CT and/or KUB):   CT Abdomen and Pelvis: No results found for this or any previous visit.     CT Stone Protocol: Results for orders placed during the hospital encounter of 10/28/22    CT Abdomen Pelvis Stone Protocol    Narrative  EXAM:  CT Abdomen and Pelvis Without Intravenous Contrast    EXAM DATE:  10/28/2022 3:27 PM    CLINICAL HISTORY:  Nephrolithiasis; N20.0-Calculus of kidney    TECHNIQUE:  Axial computed tomography images of the abdomen and pelvis without  intravenous contrast.  Sagittal and coronal reformatted images were  created and reviewed.  This CT exam was performed using one or more of  the following dose reduction techniques:  automated exposure control,  adjustment of the mA and/or kV according to patient size, and/or use of  iterative reconstruction technique.    COMPARISON:  CT ABDOMEN PELVIS  STONE PROTOCOL- dated 07/20/2022    FINDINGS:  LUNG BASES:  Unremarkable.  No mass.  No consolidation.    ABDOMEN:  LIVER:  Unremarkable.  GALLBLADDER AND BILE DUCTS:  Cholecystectomy clips.  No ductal  dilation.  PANCREAS:  Unremarkable.  No ductal dilation.  SPLEEN:  Unremarkable.  No splenomegaly.  ADRENALS:  Unremarkable.  No mass.  KIDNEYS AND URETERS:  Unremarkable.  No obstructing stones.  No  hydronephrosis.  STOMACH AND BOWEL:  Unremarkable.  No obstruction.  No mucosal  thickening.    PELVIS:  APPENDIX:  No findings to suggest acute appendicitis.  BLADDER:  Unremarkable.  No stones.  REPRODUCTIVE:  Right adnexal region cyst measuring 2.6 cm.    ABDOMEN and PELVIS:  INTRAPERITONEAL SPACE:  Unremarkable.  No free air.  No significant  fluid collection.  BONES/JOINTS:  No acute fracture.  No dislocation.  SOFT TISSUES:  Unremarkable.  VASCULATURE:  Unremarkable.  No abdominal aortic aneurysm.  LYMPH NODES:  Unremarkable.  No enlarged lymph nodes.    Impression  Right adnexal region cyst measuring 2.6 cm.    This report was finalized on 10/28/2022 3:52 PM by Dr. Henrry Matthews MD.     KUB: Results for orders placed during the hospital encounter of 03/09/23    XR Abdomen KUB    Narrative  EXAM:  XR Abdomen, 1 View    EXAM DATE:  3/9/2023 11:25 AM    CLINICAL HISTORY:  kidney stone; N20.0-Calculus of kidney    TECHNIQUE:  Frontal supine view of the abdomen/pelvis.    COMPARISON:  08/17/2022    FINDINGS:  GASTROINTESTINAL TRACT:  Unremarkable.  No dilation.  ORGANS:  Possible tiny left renal calculi.  BONES/JOINTS:  Unremarkable.    Impression  No acute findings.    This report was finalized on 3/9/2023 12:30 PM by Dr. Henrry Matthews MD.       Labs:  Brief Urine Lab Results  (Last result in the past 365 days)      Color   Clarity   Blood   Leuk Est   Nitrite   Protein   CREAT   Urine HCG        06/09/23 1342 Yellow   Clear   Negative   Negative   Negative   Negative               Office Visit on 06/09/2023    Component Date Value Ref Range Status   • Color 06/09/2023 Yellow  Yellow, Straw, Dark Yellow, Mimi Final   • Clarity, UA 06/09/2023 Clear  Clear Final   • Specific Gravity  06/09/2023 1.015  1.005 - 1.030 Final   • pH, Urine 06/09/2023 5.5  5.0 - 8.0 Final   • Leukocytes 06/09/2023 Negative  Negative Final   • Nitrite, UA 06/09/2023 Negative  Negative Final   • Protein, POC 06/09/2023 Negative  Negative mg/dL Final   • Glucose, UA 06/09/2023 Negative  Negative mg/dL Final   • Ketones, UA 06/09/2023 Negative  Negative Final   • Urobilinogen, UA 06/09/2023 Normal  Normal, 0.2 E.U./dL Final   • Bilirubin 06/09/2023 Negative  Negative Final   • Blood, UA 06/09/2023 Negative  Negative Final   • Lot Number 06/09/2023 [id number]   Final   • Expiration Date 06/09/2023 10/25/2024   Final        Procedure: None  Procedures     I have reviewed and agree with the above PMH, PSH, FMH, social history, medications, allergies, and labs.     Assessment/Plan:   Problem List Items Addressed This Visit        Genitourinary and Reproductive     Kidney stone - Primary    Relevant Orders    POC Urinalysis Dipstick, Automated (Completed)    Urine Culture - Urine, Urine, Clean Catch   Other Visit Diagnoses     Frequency of urination        Bladder spasms              Health Maintenance:   Health Maintenance Due   Topic Date Due   • Pneumococcal Vaccine 0-64 (1 - PCV) Never done   • TDAP/TD VACCINES (1 - Tdap) Never done   • ANNUAL PHYSICAL  Never done   • PAP SMEAR  Never done   • COVID-19 Vaccine (4 - Moderna series) 02/14/2022        Smoking Counseling: Patient is an everyday smoker.  Never used smokeless tobacco.  Counseling given however not ready to quit at this time.    Urine Incontinence: Patient reports that she is experiencing significant bladder spasms and urge incontinence with no improvement on oxybutynin.  Uses roughly 2 pads per day.    Patient was given instructions and counseling regarding her condition or for health  maintenance advice. Please see specific information pulled into the AVS if appropriate.    Patient Education:   Nephrolithiasis -patient had a recent KUB that was negative for any stones.  Advised patient to discontinue use of any factors that make it soda, sweet or unsweet tea, heavy intake of calcium products.  Advised patient to increase water intake to 2 to 3 L/day.  Advised patient to take over-the-counter MiraLAX to help with constipation.  Advised her to take over-the-counter stool softener as needed for hard bowel movements.  Advised patient to take Flomax as needed if she feels she is passing calculi.  Bladder spasms/incontinence - discussed with the patient the causes of urinary incontinence which can include but are not limited to detrusor instability, overactive bladder, urge incontinence, stress incontinence, mixed incontinence, urinary tract infections, overflow incontinence, bladder prolapse, or other urological abnormalities.  I discussed ways to help treat urinary incontinence which include but are not limited to medications versus surgery.  Patient has tried oxybutynin 5 mg and 10 mg extended release with no improvement of symptoms.  This time recommend trial of a beta 3 agonist such as Myrbetriq versus Gemtesa.  Discussed the risks and side effects of these medications.  Advised patient to discontinue medication if she begins experiencing increase in difficulty urinating or decreased urinary output.  We will give this patient a sample of Gemtesa once nightly for 3 months and see her back for reevaluation.  Patient verbalized understanding and agreed to plan of care.    Visit Diagnoses:    ICD-10-CM ICD-9-CM   1. Kidney stone  N20.0 592.0   2. Frequency of urination  R35.0 788.41   3. Bladder spasms  N32.89 596.89       Meds Ordered During Visit:  No orders of the defined types were placed in this encounter.      Follow Up Appointment: 3 months  No follow-ups on file.      This document has been  electronically signed by Mati Rankin PA-C   June 9, 2023 21:27 EDT    Part of this note may be an electronic transcription/translation of spoken language to printed text using the Dragon Dictation System.

## 2023-06-12 ENCOUNTER — TELEPHONE (OUTPATIENT)
Dept: UROLOGY | Facility: CLINIC | Age: 33
End: 2023-06-12
Payer: COMMERCIAL

## 2023-06-12 NOTE — TELEPHONE ENCOUNTER
PA for Gemtesa has been APPROVED!           Approved today  The request has been approved. The authorization is effective for a maximum of 12 fills from 06/12/2023 to 06/11/2024, as long as the member is enrolled in their current health plan. The request was approved as submitted. A written notification letter will follow with additional details.  Drug  Gemtesa 75MG tablets  Form  [employer] [place] Medicaid ePA Form 2017 NCPDP  Original Claim Info  75

## 2023-06-12 NOTE — TELEPHONE ENCOUNTER
PA for Gemtesa has been sent to patient's insurance company and APPROVED!                     Approved today  The request has been approved. The authorization is effective for a maximum of 12 fills from 06/12/2023 to 06/11/2024, as long as the member is enrolled in their current health plan. The request was approved as submitted. A written notification letter will follow with additional details.  Drug  Gemtesa 75MG tablets  Form  [employer] [place] Medicaid ePA Form 2017 NCPDP  Original Claim Info  75

## 2023-08-02 DIAGNOSIS — N20.0 KIDNEY STONE: ICD-10-CM

## 2023-08-02 RX ORDER — TAMSULOSIN HYDROCHLORIDE 0.4 MG/1
CAPSULE ORAL
Qty: 30 CAPSULE | Refills: 3 | Status: SHIPPED | OUTPATIENT
Start: 2023-08-02

## 2023-08-03 ENCOUNTER — TELEPHONE (OUTPATIENT)
Dept: GASTROENTEROLOGY | Facility: CLINIC | Age: 33
End: 2023-08-03
Payer: COMMERCIAL

## 2023-08-16 ENCOUNTER — OFFICE VISIT (OUTPATIENT)
Dept: GASTROENTEROLOGY | Facility: CLINIC | Age: 33
End: 2023-08-16
Payer: COMMERCIAL

## 2023-08-16 VITALS
OXYGEN SATURATION: 96 % | WEIGHT: 171 LBS | BODY MASS INDEX: 34.54 KG/M2 | DIASTOLIC BLOOD PRESSURE: 78 MMHG | SYSTOLIC BLOOD PRESSURE: 122 MMHG | HEART RATE: 78 BPM

## 2023-08-16 DIAGNOSIS — K20.0 ESOPHAGITIS, EOSINOPHILIC: Primary | ICD-10-CM

## 2023-08-16 DIAGNOSIS — K22.70 BARRETT'S ESOPHAGUS WITHOUT DYSPLASIA: ICD-10-CM

## 2023-08-16 DIAGNOSIS — K44.9 HIATAL HERNIA: ICD-10-CM

## 2023-08-16 DIAGNOSIS — R13.19 ESOPHAGEAL DYSPHAGIA: ICD-10-CM

## 2023-08-16 DIAGNOSIS — K21.00 GASTROESOPHAGEAL REFLUX DISEASE WITH ESOPHAGITIS WITHOUT HEMORRHAGE: ICD-10-CM

## 2023-08-16 DIAGNOSIS — K58.0 IRRITABLE BOWEL SYNDROME WITH DIARRHEA: ICD-10-CM

## 2023-08-16 PROCEDURE — 99214 OFFICE O/P EST MOD 30 MIN: CPT | Performed by: PHYSICIAN ASSISTANT

## 2023-08-16 PROCEDURE — 1160F RVW MEDS BY RX/DR IN RCRD: CPT | Performed by: PHYSICIAN ASSISTANT

## 2023-08-16 PROCEDURE — 1159F MED LIST DOCD IN RCRD: CPT | Performed by: PHYSICIAN ASSISTANT

## 2023-08-16 RX ORDER — PAROXETINE 10 MG/1
10 TABLET, FILM COATED ORAL DAILY
COMMUNITY
Start: 2023-07-26

## 2023-08-16 RX ORDER — AMITRIPTYLINE HYDROCHLORIDE 25 MG/1
TABLET, FILM COATED ORAL EVERY 24 HOURS
COMMUNITY
Start: 2023-07-26

## 2023-08-16 NOTE — PROGRESS NOTES
Follow Up Note     Date: 2023   Patient Name: Pili Webster  MRN: [id number]  : 1990     Primary Care Provider: Osvaldo Madrid MD     Chief Complaint   Patient presents with    Results     Upper GI Endoscopy      History of present illness:   2023  Pili Webster is a 33 y.o. female who is here today for follow up regarding Results (Upper GI Endoscopy).    She had EGD since last visit and would like to discuss those results.  She has been taking Dupixent weekly injection for treatment of her EOE since 8/3/2023.  Since then, she has been unable to eat very spicy foods due to significant burning of her tongue.  She has continued Dexilant 60 mg once daily.  Acid reflux symptoms are at her baseline.  Her dysphagia is the same as previous. IBS symptoms same as previous, still with diarrhea 3-4 times per week. Taking colestipol daily as directed.     Interval History:  2018  Over the past 6 weeks, she has lost her appetite and has diarrhea or vomiting within a few minutes of eating. She has lost about 10 lbs over the past 6 weeks. She was seen in the ED for evaluation recently and was told that she was dehydrated and related it to her kidney stones. She follows with Dr. Barrow and has planned follow up in a few weeks. Bowel movements are described as watery in consistency. During this time of her illness, she has skipped 1-2 days between bowel movements intermittently. No obvious rectal bleeding. Sometimes the stool will be very light or very dark. She will have 2-3 episodes of vomiting per day intermittently. Usually, the vomitus is the food that she has eaten. She takes Zofran as needed for relief of nausea and vomiting with only mild relief. Protonix 40 mg once daily was given by her PCP only a few days ago. Previously, she was taking ranitidine daily without good relief. Reports heartburn which is constant and worse with any PO intake. She states that she has had  GERD complaints since age 10. No recent stool testing. S/p cholecystectomy over 1 year ago. Abdominal pain is generalized but mostly located on right side, described as stabbing in nature and constant. She points to the right flank as the area of the most discomfort. Bloating seems to cause the most discomfort per her report. Associated symptoms are back pain, chills, fatigue and intermittent fever. No known family history of GI disease including IBD, color or stomach cancer. No personal history of EGD or colonoscopy. She had miscarriage in Jan 2018. She had surgery in May 2018 due to endometriosis and she was told that her bowels were connected in areas which was abnormal and this was repaired. She does not currently take any oral contraceptive.    Subjective     Past Medical History:   Diagnosis Date    Abdominal pain     Abnormal uterine bleeding (AUB)     Anxiety and depression     Arthritis     Asthma     mild    Cholecystitis     Constipation     Endometriosis     Frequent UTI     GERD (gastroesophageal reflux disease)     Heartburn     Hemorrhoids     IBS (irritable bowel syndrome)     Kidney stones     Lesion of breast     Lump     RIGHT BREAST    Nausea & vomiting     PCOS (polycystic ovarian syndrome)     PONV (postoperative nausea and vomiting)     Psoriatic arthritis     Sjogren's disease     Sleep apnea     no cpap    Snores     Tachycardia     Wrist fracture     RIGHT ARM      PATIENT UNSURE IF FRACTURED     Past Surgical History:   Procedure Laterality Date    ABDOMINAL SURGERY      ANAL SCOPE N/A 09/30/2022    Procedure: ANAL SCOPE;  Surgeon: Franco Mari MD;  Location: [facility] OR;  Service: General;  Laterality: N/A;    BREAST BIOPSY Right 11/29/2018    Procedure: breast biopsy ;  Surgeon: Shiv Overton MD;  Location: [facility] OR;  Service: General    CHOLECYSTECTOMY N/A 08/25/2017    Procedure: CHOLECYSTECTOMY LAPAROSCOPIC;  Surgeon: Franco Mari MD;  Location: [facility] OR;  Service:      COLONOSCOPY N/A 03/09/2020    Procedure: COLONOSCOPY CPT CODE: 60920;  Surgeon: Jeremiah Murdock MD;  Location: [facility] OR;  Service: Gastroenterology;  Laterality: N/A;    DENTAL PROCEDURE      DIAGNOSTIC LAPAROSCOPY      X5    DILATATION AND CURETTAGE      ENDOSCOPY N/A 03/09/2020    Procedure: ESOPHAGOGASTRODUODENOSCOPY WITH BIOPSY CPT CODE: 10470;  Surgeon: Jeremiah Murdock MD;  Location: [facility] OR;  Service: Gastroenterology;  Laterality: N/A;    ENDOSCOPY N/A 02/01/2021    Procedure: ESOPHAGOGASTRODUODENOSCOPY WITH BIOPSY CPT CODE: 14314;  Surgeon: Jeremiah Murdock MD;  Location: [facility] OR;  Service: Gastroenterology;  Laterality: N/A;    ENDOSCOPY N/A 6/20/2023    Procedure: ESOPHAGOGASTRODUODENOSCOPY WITH BIOPSY CPT CODE: 63413;  Surgeon: Beto Mejia MD;  Location: [facility] ENDOSCOPY;  Service: Gastroenterology;  Laterality: N/A;    HEMORRHOIDECTOMY N/A 11/14/2019    Procedure: HEMORRHOID STAPLING;  Surgeon: Franco Mari MD;  Location: [facility] OR;  Service: General    HYSTERECTOMY  03/22/2023    Total    KNEE ARTHROSCOPY W/ MENISCECTOMY Right 04/11/2022    Procedure: KNEE ARTHROSCOPIC DEBRIDEMENT, PRP INJECTION AND medial femoral condraplasty MENISCAL DEBRIDEMENT;  Surgeon: Musa Yoon MD;  Location: [facility] OR;  Service: Orthopedics;  Laterality: Right;    URETEROSCOPY LASER LITHOTRIPSY WITH STENT INSERTION Right 01/09/2019    Procedure: URETEROSCOPY LASER LITHOTRIPSY retrograde pyelogram;  Surgeon: Ahmet Barrow MD;  Location: [facility] OR;  Service: Urology    WISDOM TOOTH EXTRACTION       Family History   Problem Relation Age of Onset    Breast cancer Maternal Aunt     Alcohol abuse Paternal Grandfather     Heart disease Paternal Grandfather     Cancer Maternal Grandfather     Hypertension Maternal Grandfather     Colon cancer Neg Hx     Liver cancer Neg Hx      Social History     Socioeconomic History    Marital status:    Tobacco Use     Smoking status: Every Day     Packs/day: 0.50     Years: 16.00     Pack years: 8.00     Types: Cigarettes     Start date: 2003    Smokeless tobacco: Never   Vaping Use    Vaping Use: Former    Substances: Nicotine, Flavoring   Substance and Sexual Activity    Alcohol use: Yes     Comment: RARELY    Drug use: No    Sexual activity: Defer     Current Outpatient Medications:     albuterol sulfate  (90 Base) MCG/ACT inhaler, Inhale 1 puff As Needed., Disp: , Rfl:     amitriptyline (ELAVIL) 25 MG tablet, Take  by mouth Daily., Disp: , Rfl:     ARIPiprazole (ABILIFY) 10 MG tablet, Take 1 tablet by mouth Daily., Disp: , Rfl:     aspirin 81 MG EC tablet, Take 1 tablet by mouth Daily., Disp: , Rfl:     colestipol (COLESTID) 1 g tablet, Take 1 tablet by mouth Daily., Disp: 90 tablet, Rfl: 3    Cosentyx Sensoready, 300 MG, 150 MG/ML solution auto-injector, 1 dose Every 30 (Thirty) Days., Disp: , Rfl:     dexlansoprazole (DEXILANT) 60 MG capsule, Take 1 capsule by mouth Daily., Disp: 90 capsule, Rfl: 3    dicyclomine (BENTYL) 20 MG tablet, TAKE ONE TABLET BY MOUTH FOUR TIMES A DAY BEFORE MEALS AND AT BEDTIME, Disp: 120 tablet, Rfl: 3    Dupilumab (Dupixent) 300 MG/2ML solution pen-injector, Inject 2 mL under the skin into the appropriate area as directed 1 (One) Time Per Week., Disp: 8 mL, Rfl: 11    Erenumab-aooe (AIMOVIG) 140 MG/ML prefilled syringe, Inject 1 mL under the skin into the appropriate area as directed Every 30 (Thirty) Days., Disp: , Rfl:     estradiol (VIVELLE-DOT) 0.1 MG/24HR patch, , Disp: , Rfl:     folic acid (FOLVITE) 1 MG tablet, Take 1 tablet by mouth Daily., Disp: , Rfl:     gabapentin (NEURONTIN) 100 MG capsule, Take 1 capsule by mouth 3 (Three) Times a Day. Takes 2 tablets tid, Disp: , Rfl:     hydrOXYzine pamoate (VISTARIL) 25 MG capsule, Take 1 capsule by mouth 3 (Three) Times a Day As Needed., Disp: , Rfl:     loratadine (CLARITIN) 10 MG tablet, Take 1 tablet by mouth Daily., Disp: , Rfl:      meloxicam (MOBIC) 15 MG tablet, Take 1 tablet by mouth Daily., Disp: , Rfl:     methocarbamol (ROBAXIN) 500 MG tablet, Take 1 tablet by mouth Every Night., Disp: , Rfl:     montelukast (SINGULAIR) 10 MG tablet, Take 1 tablet by mouth Every Night., Disp: , Rfl:     nadolol (CORGARD) 40 MG tablet, Take 1.25 tablets by mouth Daily., Disp: , Rfl:     ondansetron ODT (ZOFRAN-ODT) 4 MG disintegrating tablet, Place 1 tablet under the tongue., Disp: , Rfl:     PARoxetine (PAXIL) 10 MG tablet, Take 1 tablet by mouth Daily., Disp: , Rfl:     rizatriptan (MAXALT) 10 MG tablet, Take 1 tablet by mouth 1 (One) Time As Needed for Migraine. May repeat in 2 hours if needed, Disp: , Rfl:     tamsulosin (FLOMAX) 0.4 MG capsule 24 hr capsule, TAKE ONE CAPSULE BY MOUTH DAILY, Disp: 30 capsule, Rfl: 3    topiramate (TOPAMAX) 100 MG tablet, Take 1 tablet by mouth Every Night., Disp: , Rfl:     Vibegron 75 MG tablet, Take 1 tablet by mouth Every Night., Disp: 30 tablet, Rfl: 2    vitamin D (ERGOCALCIFEROL) 1.25 MG (91761 UT) capsule capsule, Take 1 capsule by mouth Every 7 (Seven) Days., Disp: , Rfl:     Allergies   Allergen Reactions    Peanut-Containing Drug Products Anaphylaxis     AND PEANUTS IN FOODS    Latex Hives    Shrimp Swelling     Facial swelling      Milk-Related Compounds Nausea And Vomiting    Sulfa Antibiotics Rash     The following portions of the patient's history were reviewed and updated as appropriate: allergies, current medications, past family history, past medical history, past social history, past surgical history and problem list.    Objective     Physical Exam  Constitutional:       General: She is not in acute distress.     Appearance: Normal appearance. She is well-developed. She is not diaphoretic.   HENT:      Head: Normocephalic and atraumatic.      Right Ear: External ear normal.      Left Ear: External ear normal.      Nose: Nose normal.   Eyes:      General: No scleral icterus.        Right eye: No  discharge.         Left eye: No discharge.      Conjunctiva/sclera: Conjunctivae normal.   Neck:      Trachea: No tracheal deviation.   Pulmonary:      Effort: Pulmonary effort is normal. No respiratory distress.   Musculoskeletal:         General: Normal range of motion.      Cervical back: Normal range of motion.   Skin:     Coloration: Skin is not pale.      Findings: No erythema or rash.   Neurological:      Mental Status: She is alert and oriented to person, place, and time.      Coordination: Coordination normal.   Psychiatric:         Mood and Affect: Mood normal.         Behavior: Behavior normal.         Thought Content: Thought content normal.         Judgment: Judgment normal.     Vitals:    08/16/23 1444   BP: 122/78   Pulse: 78   SpO2: 96%   Weight: 77.6 kg (171 lb)     Results Review:   I reviewed the patient's new clinical results.    CT Abdomen Pelvis Without Contrast  Result Date: 7/20/2022   1. Obstructive uropathy on the right due to a 3.3 mm distal right ureteral stone  2.Other findings as above         EGD was completed on 2/1/2021 by Dr. Murdock.  Findings were short segment Márquez's esophagus with patchy involvement of the distal 1 to 2 cm of the esophagus, normal stomach except for small sliding hiatal hernia, normal duodenum.  Pathology shows benign duodenal mucosa, no H. pylori, distal esophageal biopsy shows chronic inflammation, features of reflux and multifocal intestinal metaplasia, negative for dysplasia.     EGD and colonoscopy were completed on 3/9/2020 by Dr. Murdock.  Findings were patchy salmon-colored mucosa in the distal esophagus, small sliding hiatal hernia, normal stomach, normal duodenum, normal terminal ileum, evidence of prior rectal surgery, 6 mm colon polyp in the transverse and sigmoid colon, left-sided diverticulosis.  Pathology showed normal duodenum, minimal superficial chronic inflammation of the stomach, negative H. pylori, esophageal biopsy showed  features of reflux, multifocal intestinal metaplasia negative for dysplasia, transverse colon polyp was sessile serrated adenoma and sigmoid colon polyp was hyperplastic.     CTAP with contrast 8/2021   FINDINGS:    LUNG BASES:  Unremarkable.  No mass.  No consolidation.   ABDOMEN:    LIVER:  Unremarkable.  No mass.    GALLBLADDER AND BILE DUCTS:  Cholecystectomy clips.  No ductal  dilation.    PANCREAS:  Unremarkable.  No mass.  No ductal dilation.    SPLEEN:  Unremarkable.  No splenomegaly.    ADRENALS:  Unremarkable.  No mass.    KIDNEYS AND URETERS:  Unremarkable.  No solid mass.  No  hydronephrosis.    STOMACH AND BOWEL:  Unremarkable.  No obstruction.  No mucosal  thickening.   PELVIS:    APPENDIX:  No findings to suggest acute appendicitis.    BLADDER:  Unremarkable.  No mass.    REPRODUCTIVE:  Unremarkable as visualized.   ABDOMEN and PELVIS:    INTRAPERITONEAL SPACE:  Unremarkable.  No free air.  No significant  fluid collection.    BONES/JOINTS:  No acute fracture.  No dislocation.    SOFT TISSUES:  Unremarkable.    VASCULATURE:  Unremarkable.  No abdominal aortic aneurysm.    LYMPH NODES:  Unremarkable.  No enlarged lymph nodes.  IMPRESSION:    No acute findings in the abdomen or pelvis.      EGD 6/20/2023 by Dr. Mejia  - Normal oropharynx.  - Z-line regular, 33 cm from the incisors.  - LA Grade A reflux esophagitis GEJ / Distal esophagus with no bleeding. Biopsied.  - No obvious Márquez's segment identified  - 1-2 cm hiatal hernia.  - Small mucosal nodule found in the esophagus. Biopsied.  - Erythematous mucosa in the posterior wall of the stomach, antrum and prepyloric region of the stomach. Biopsied.  - Normal duodenal bulb, first portion of the duodenum, second portion of the duodenum and third portion of the duodenum. Biopsied.  Pathology DIAGNOSIS:  A.   DUODENUM, BIOPSY:  Small bowel mucosa with no significant pathologic abnormality  B.   ANTRUM AND BODY BIOPSIES:  Reactive gastropathy  No  Helicobacter pylori like organisms identified on H&E stained slide  No intestinal metaplasia or dysplasia identified  C.   GE JUNCTION:  Squamocolumnar junctional mucosa with eosinophillic esophagitis  (greater than 50 intraepithelial eosinophils identified per high-power  field), see comment  Reflux related/reactive changes are present  No evidence of dysplasia identified  D.   RANDOM ESOPHAGUS:  Eosinophilic esophagitis (greater than 21 intraepithelial eosinophils  identified per high-power field, see comment  No intestinal metaplasia or dysplasia identified  E.   ESOPHAGUS, BIOPSY, DISTAL NODULE:  Squamous mucosa with mild reactive/reflux related changes (7  intraepithelial eosinophils identified per high-power field)  No intestinal metaplasia or dysplasia identified  COMMENT:  Specimen C: In the correct clinical endoscopic setting the  findings are compatible with Márquez's esophagus.  Correlation is required.    Assessment / Plan      1. Esophagitis, eosinophilic  2. Márquez's esophagus without dysplasia  3. Hiatal hernia  4. Gastroesophageal reflux disease with esophagitis without hemorrhage  5. Esophageal dysphagia  Long standing history of GERD, taking Dexilant 60 mg once daily with reasonable control. She needs to work on avoiding foods that make her GERD worse, discussed weight loss which may help with this. Did have multifocal intestinal metaplasia of the esophageal biopsy 2021. Now with esophageal dysphagia with medications only, worsening over the past couple of months.      Continue PPI daily  Acid reflux measures  Anti-reflux diet  Avoid smoking  Continue Dupixent inj once weekly     - FL Esophagram Complete; Future    6. Irritable bowel syndrome with diarrhea  Continues with long standing symptoms of generalized abdominal pain and diarrhea. Was previously having 6-7 stools per day with fecal urgency and intermittent fecal incontinence, now improved with colestipol 1 g tab once daily. She took Xifaxan  therapy for treatment of IBS x2 (12/2021 and 2/2022) and had temporary improvements in symptoms each time. Dicyclomine has helped some to relieve abdominal pain, will continue.  Recent labs showed CMP, TSH and celiac testing all unremarkable. CTAP 8/2021 showed no GI pathology. Colonoscopy in 2020 showed left sided diverticulosis and colon polyps. No random colon biopsies taken. She has IBS diarrhea based on history.      Continue taking bentyl as needed for relief of abdominal pain  Continue low FODMAP diet   Work on weight loss with diet modification and exercise as tolerated  Continue colestipol 1g tab once daily, may take up to BID for diarrhea      Prior history  History of adenomatous colon polyp  Colonoscopy in 3/2020 showed sessile serrated adenoma of transverse colon polyp, repeat colonoscopy recommended in 5 years, due 03/2025.      Follow Up:   Return in about 6 months (around 2/16/2024) for recheck GERD and EOE.    Naomi Aguiar PA-C  [facility]  8/16/2023  16:42 EDT    Dictated Utilizing Dragon Dictation: Part of this note may be an electronic transcription/translation of spoken language to printed text using the Dragon Dictation System.

## 2023-08-21 ENCOUNTER — TRANSCRIBE ORDERS (OUTPATIENT)
Dept: PHYSICAL THERAPY | Facility: CLINIC | Age: 33
End: 2023-08-21
Payer: COMMERCIAL

## 2023-08-21 DIAGNOSIS — L40.50 PSORIATIC ARTHRITIS: ICD-10-CM

## 2023-08-21 DIAGNOSIS — M25.661 KNEE STIFF, RIGHT: ICD-10-CM

## 2023-08-21 DIAGNOSIS — M25.561 ACUTE PAIN OF RIGHT KNEE: ICD-10-CM

## 2023-08-21 DIAGNOSIS — M23.8X9 CHONDRAL DEFECT OF FEMORAL CONDYLE, UNSPECIFIED LATERALITY: Primary | ICD-10-CM

## 2023-08-21 DIAGNOSIS — M62.81 QUADRICEPS WEAKNESS: ICD-10-CM

## 2023-08-21 DIAGNOSIS — M62.559 ATROPHY OF QUADRICEPS FEMORIS MUSCLE: ICD-10-CM

## 2023-08-31 ENCOUNTER — HOSPITAL ENCOUNTER (OUTPATIENT)
Dept: GENERAL RADIOLOGY | Facility: HOSPITAL | Age: 33
Discharge: HOME | End: 2023-08-31
Admitting: PHYSICIAN ASSISTANT
Payer: COMMERCIAL

## 2023-08-31 DIAGNOSIS — R13.19 ESOPHAGEAL DYSPHAGIA: ICD-10-CM

## 2023-08-31 PROCEDURE — 74220 X-RAY XM ESOPHAGUS 1CNTRST: CPT

## 2023-09-05 ENCOUNTER — TREATMENT (OUTPATIENT)
Dept: PHYSICAL THERAPY | Facility: CLINIC | Age: 33
End: 2023-09-05
Payer: COMMERCIAL

## 2023-09-05 DIAGNOSIS — R29.898 WEAKNESS OF RIGHT LEG: ICD-10-CM

## 2023-09-05 DIAGNOSIS — M25.661 DECREASED RANGE OF MOTION (ROM) OF RIGHT KNEE: ICD-10-CM

## 2023-09-05 DIAGNOSIS — G89.29 CHRONIC PAIN OF RIGHT KNEE: Primary | ICD-10-CM

## 2023-09-05 DIAGNOSIS — M25.561 CHRONIC PAIN OF RIGHT KNEE: Primary | ICD-10-CM

## 2023-09-05 PROCEDURE — 97162 PT EVAL MOD COMPLEX 30 MIN: CPT | Performed by: PHYSICAL THERAPIST
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
## 2023-09-09 DIAGNOSIS — N39.41 URGE INCONTINENCE OF URINE: ICD-10-CM

## 2023-09-09 DIAGNOSIS — N32.89 BLADDER SPASMS: ICD-10-CM

## 2023-09-11 RX ORDER — VIBEGRON 75 MG/1
TABLET, FILM COATED ORAL
Qty: 30 TABLET | Refills: 2 | Status: SHIPPED | OUTPATIENT
Start: 2023-09-11

## 2023-09-12 ENCOUNTER — TREATMENT (OUTPATIENT)
Dept: PHYSICAL THERAPY | Facility: CLINIC | Age: 33
End: 2023-09-12
Payer: COMMERCIAL

## 2023-09-12 DIAGNOSIS — M25.561 CHRONIC PAIN OF RIGHT KNEE: Primary | ICD-10-CM

## 2023-09-12 DIAGNOSIS — M25.661 DECREASED RANGE OF MOTION (ROM) OF RIGHT KNEE: ICD-10-CM

## 2023-09-12 DIAGNOSIS — R29.898 WEAKNESS OF RIGHT LEG: ICD-10-CM

## 2023-09-12 DIAGNOSIS — G89.29 CHRONIC PAIN OF RIGHT KNEE: Primary | ICD-10-CM

## 2023-09-12 PROCEDURE — 97110 THERAPEUTIC EXERCISES: CPT | Performed by: PHYSICAL THERAPIST

## 2023-09-12 NOTE — PROGRESS NOTES
Physical Therapy Daily Treatment Note      Patient: Pili Webster   : 1990  Referring practitioner: Dee Dee Dc PA-C  Date of Initial Visit: Type: THERAPY  Noted: 2023  Today's Date: 2023  Patient seen for 2 sessions       Visit Diagnoses:    ICD-10-CM ICD-9-CM   1. Chronic pain of right knee  M25.561 719.46    G89.29 338.29   2. Decreased range of motion (ROM) of right knee  M25.661 719.56   3. Weakness of right leg  R29.898 729.89       Subjective Evaluation    History of Present Illness    Subjective comment: Pt reports having 7/10 pain today.     Objective   See Exercise, Manual, and Modality Logs for complete treatment.       Assessment & Plan       Assessment  Assessment details: Tx today consisted of exercises for improved hip and knee stability in supine and sitting followed by ice to knee.  Pt demonstrated good effort today yet reported distress with most exercises due to pain.  Pt reported 6/10 post pain.    Plan  Plan details: Will follow progressing knee stability and improved functional mobility.        Timed:         Manual Therapy:         mins  81273;     Therapeutic Exercise:    27     mins  76125;     Neuromuscular Vladimir:        mins  36978;    Therapeutic Activity:          mins  59821;     Gait Training:           mins  06096;     Ultrasound:          mins  89395;    Ionto                                   mins   18100  Self Care                            mins   37737  Canalith Repos         mins 12605      Un-Timed:  Electrical Stimulation:         mins  51771 ( );  Dry Needling          mins self-pay  Traction          mins 41472      Timed Treatment:   27   mins   Total Treatment:     33   mins(6min ice)    Peter Thomas PT  [place] License: [id number]      Electronically signed by Peter Thomas PT, 23, 3:36 PM EDT

## 2023-09-15 ENCOUNTER — TREATMENT (OUTPATIENT)
Dept: PHYSICAL THERAPY | Facility: CLINIC | Age: 33
End: 2023-09-15
Payer: COMMERCIAL

## 2023-09-15 DIAGNOSIS — M25.661 DECREASED RANGE OF MOTION (ROM) OF RIGHT KNEE: ICD-10-CM

## 2023-09-15 DIAGNOSIS — G89.29 CHRONIC PAIN OF RIGHT KNEE: Primary | ICD-10-CM

## 2023-09-15 DIAGNOSIS — R29.898 WEAKNESS OF RIGHT LEG: ICD-10-CM

## 2023-09-15 DIAGNOSIS — M25.561 CHRONIC PAIN OF RIGHT KNEE: Primary | ICD-10-CM

## 2023-09-15 PROCEDURE — 97110 THERAPEUTIC EXERCISES: CPT | Performed by: PHYSICAL THERAPIST

## 2023-09-15 NOTE — PROGRESS NOTES
Physical Therapy Daily Treatment Note      Patient: Pili Webster   : 1990  Referring practitioner: Dee Dee Dc PA-C  Date of Initial Visit: Type: THERAPY  Noted: 2023  Today's Date: 9/15/2023  Patient seen for 3 sessions       Visit Diagnoses:    ICD-10-CM ICD-9-CM   1. Chronic pain of right knee  M25.561 719.46    G89.29 338.29   2. Decreased range of motion (ROM) of right knee  M25.661 719.56   3. 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  Pt has 5/10 pain today.     Objective   See Exercise, Manual, and Modality Logs for complete treatment.       Assessment & Plan       Assessment  Assessment details: Tx today consisted of exercises progressed in reps for most exercises.  Pt cont to report distress and mild pain with most exercises and was noted to have antalgic gait during session. Pt required frequent rest breaks during session and reported 6/10 post pain.    Plan  Plan details: Will follow progressing knee stability and decreased pain.        Timed:         Manual Therapy:         mins  76355;     Therapeutic Exercise:    28     mins  38813;     Neuromuscular Vladimir:        mins  02712;    Therapeutic Activity:          mins  34204;     Gait Training:           mins  94345;     Ultrasound:          mins  88378;    Ionto                                   mins   01564  Self Care                            mins   76297  Canalith Repos         mins 41283      Un-Timed:  Electrical Stimulation:         mins  35205 (MC );  Dry Needling          mins self-pay  Traction          mins 28543      Timed Treatment:   28   mins   Total Treatment:     28   mins    Peter Thomas PT  [place] License: [id number]      Electronically signed by Peter Thomas PT, 09/15/23, 9:46 AM EDT

## 2023-09-19 ENCOUNTER — TREATMENT (OUTPATIENT)
Dept: PHYSICAL THERAPY | Facility: CLINIC | Age: 33
End: 2023-09-19
Payer: COMMERCIAL

## 2023-09-19 DIAGNOSIS — M25.561 CHRONIC PAIN OF RIGHT KNEE: Primary | ICD-10-CM

## 2023-09-19 DIAGNOSIS — G89.29 CHRONIC PAIN OF RIGHT KNEE: Primary | ICD-10-CM

## 2023-09-19 DIAGNOSIS — M25.661 DECREASED RANGE OF MOTION (ROM) OF RIGHT KNEE: ICD-10-CM

## 2023-09-19 DIAGNOSIS — R29.898 WEAKNESS OF RIGHT LEG: ICD-10-CM

## 2023-09-19 PROCEDURE — 97110 THERAPEUTIC EXERCISES: CPT | Performed by: PHYSICAL THERAPIST

## 2023-09-19 PROCEDURE — 97035 APP MDLTY 1+ULTRASOUND EA 15: CPT | Performed by: PHYSICAL THERAPIST

## 2023-09-19 NOTE — PROGRESS NOTES
Physical Therapy Daily Treatment Note      Patient: Pili Webster   : 1990  Referring practitioner: Dee Dee Dc PA-C  Date of Initial Visit: Type: THERAPY  Noted: 2023  Today's Date: 2023  Patient seen for 4 sessions       Visit Diagnoses:    ICD-10-CM ICD-9-CM   1. Chronic pain of right knee  M25.561 719.46    G89.29 338.29   2. Decreased range of motion (ROM) of right knee  M25.661 719.56   3. Weakness of right leg  R29.898 729.89       Subjective: Patient reports /10 right knee pain prior to tx. Pt states her knee swelled yesterday, but she feels it could have been due to more standing and changes in weather.      Objective   See Exercise, Manual, and Modality Logs for complete treatment.       Assessment/Plan: Patient completed today's session with reports of slight decrease in pain following, 5/10. Treatment consisted of therex per flow sheet for right knee with continued focus on improved range of motion, improved quad/ VMO strength and stability. Pulsed US and cryotherapy performed following. Activities progressed with additional strengthening and ROM exercises added including static knee extension, supine clams and bridge. Pt observed with good tolerance and was provided with cues and demonstration for form and improved quad facilitation. Pt will be progressed with therapy as tolerated to address goals, reduce pain and improve functional mobility. No adverse reactions observed during, and/ or following tx. Continue with PT's POC.       Timed:         Manual Therapy:         mins  45609;     Therapeutic Exercise:    45     mins  69191;     Neuromuscular Vladimir:        mins  99870;    Therapeutic Activity:          mins  40858;     Gait Training:           mins  55765;     Ultrasound:     8     mins  66015;    Ionto                                   mins   42285  Self Care                            mins   58548      Un-Timed:  Electrical Stimulation:         mins  16766 (  );  Dry Needling          mins self-pay  Traction          mins 15692  Canalith Repos         mins 95722    Timed Treatment:   53   mins   Total Treatment:    59    mins (CP: x 6 min)     Janet Lisa. DAVID Parikh  [place] License: [id number]

## 2023-09-26 ENCOUNTER — TREATMENT (OUTPATIENT)
Dept: PHYSICAL THERAPY | Facility: CLINIC | Age: 33
End: 2023-09-26
Payer: COMMERCIAL

## 2023-09-26 DIAGNOSIS — M25.561 CHRONIC PAIN OF RIGHT KNEE: Primary | ICD-10-CM

## 2023-09-26 DIAGNOSIS — R29.898 WEAKNESS OF RIGHT LEG: ICD-10-CM

## 2023-09-26 DIAGNOSIS — M25.661 DECREASED RANGE OF MOTION (ROM) OF RIGHT KNEE: ICD-10-CM

## 2023-09-26 DIAGNOSIS — G89.29 CHRONIC PAIN OF RIGHT KNEE: Primary | ICD-10-CM

## 2023-09-26 PROCEDURE — 97035 APP MDLTY 1+ULTRASOUND EA 15: CPT | Performed by: PHYSICAL THERAPIST

## 2023-09-26 PROCEDURE — 97110 THERAPEUTIC EXERCISES: CPT | Performed by: PHYSICAL THERAPIST

## 2023-09-26 NOTE — PROGRESS NOTES
Physical Therapy Daily Treatment Note      Patient: Pili Webster   : 1990  Referring practitioner: Dee Dee Dc PA-C  Date of Initial Visit: Type: THERAPY  Noted: 2023  Today's Date: 2023  Patient seen for 5 sessions       Visit Diagnoses:    ICD-10-CM ICD-9-CM   1. Chronic pain of right knee  M25.561 719.46    G89.29 338.29   2. Decreased range of motion (ROM) of right knee  M25.661 719.56   3. Weakness of right leg  R29.898 729.89       Subjective: Patient reports 7/10 right knee pain prior to tx. Pt states she tolerated last session well with no complaints.       Objective   See Exercise, Manual, and Modality Logs for complete treatment.       Assessment/Plan: Today's therapy session consisted of therex per flow sheet for right knee f/b pulsed US; pt denied cyrotherapy at conclusion. Exercise performed with continued focus on improved LE strength, improved ROM and to assist with ease of difficulty performing ADL's including negotiating stairs. Activities progressed with strengthening reps increased as tolerated and seated hip abduction with theraband initiated. Pt observed with some fatigue; however tolerable. Pt reported episode of crepitus in knee while performing sidelying hip abduction; however, no increase in pain. Pt will be progressed with therapy as tolerated to address goals, reduce pain and improve strength. No adverse reactions observed during, and/ or following tx. Continue with PT's POC.     Initiation of tx assisted by Peter Thomas, PT    Timed:         Manual Therapy:         mins  97445;     Therapeutic Exercise:   46      mins  85964;     Neuromuscular Vladimir:        mins  58444;    Therapeutic Activity:          mins  75625;     Gait Training:           mins  97830;     Ultrasound:     8     mins  45509;    Ionto                                   mins   67208  Self Care                            mins   42640      Un-Timed:  Electrical Stimulation:         mins   92710 ( );  Dry Needling          mins self-pay  Traction          mins 36412  Canalith Repos         mins 93159    Timed Treatment:   54   mins   Total Treatment:    54    mins    Janet Lisa. DAVID Parikh  [place] License: [id number]

## 2023-09-28 ENCOUNTER — HOSPITAL ENCOUNTER (OUTPATIENT)
Dept: GENERAL RADIOLOGY | Facility: HOSPITAL | Age: 33
Discharge: HOME | End: 2023-09-28
Payer: COMMERCIAL

## 2023-09-28 ENCOUNTER — OFFICE VISIT (OUTPATIENT)
Dept: UROLOGY | Facility: CLINIC | Age: 33
End: 2023-09-28
Payer: COMMERCIAL

## 2023-09-28 VITALS
HEART RATE: 82 BPM | BODY MASS INDEX: 35.28 KG/M2 | HEIGHT: 59 IN | WEIGHT: 175 LBS | DIASTOLIC BLOOD PRESSURE: 90 MMHG | SYSTOLIC BLOOD PRESSURE: 125 MMHG

## 2023-09-28 DIAGNOSIS — R10.9 FLANK PAIN: ICD-10-CM

## 2023-09-28 DIAGNOSIS — N20.0 KIDNEY STONE: Primary | ICD-10-CM

## 2023-09-28 DIAGNOSIS — N32.89 BLADDER SPASMS: ICD-10-CM

## 2023-09-28 DIAGNOSIS — R35.0 FREQUENCY OF URINATION: ICD-10-CM

## 2023-09-28 DIAGNOSIS — N39.41 URGE INCONTINENCE OF URINE: ICD-10-CM

## 2023-09-28 LAB
BILIRUB BLD-MCNC: NEGATIVE MG/DL
CLARITY, POC: CLEAR
COLOR UR: YELLOW
EXPIRATION DATE: NORMAL
GLUCOSE UR STRIP-MCNC: NEGATIVE MG/DL
KETONES UR QL: NEGATIVE
LEUKOCYTE EST, POC: NEGATIVE
Lab: NORMAL
NITRITE UR-MCNC: NEGATIVE MG/ML
PH UR: 6 [PH] (ref 5–8)
PROT UR STRIP-MCNC: NEGATIVE MG/DL
RBC # UR STRIP: NEGATIVE /UL
SP GR UR: 1 (ref 1–1.03)
UROBILINOGEN UR QL: NORMAL

## 2023-09-28 PROCEDURE — 74018 RADEX ABDOMEN 1 VIEW: CPT

## 2023-09-28 RX ORDER — KETOROLAC TROMETHAMINE 10 MG/1
10 TABLET, FILM COATED ORAL EVERY 6 HOURS PRN
Qty: 16 TABLET | Refills: 1 | Status: SHIPPED | OUTPATIENT
Start: 2023-09-28

## 2023-09-28 RX ORDER — KETOROLAC TROMETHAMINE 30 MG/ML
30 INJECTION, SOLUTION INTRAMUSCULAR; INTRAVENOUS ONCE
Status: COMPLETED | OUTPATIENT
Start: 2023-09-28 | End: 2023-09-28

## 2023-09-28 RX ORDER — PROMETHAZINE HYDROCHLORIDE 25 MG/1
25 TABLET ORAL EVERY 6 HOURS PRN
Qty: 21 TABLET | Refills: 2 | Status: SHIPPED | OUTPATIENT
Start: 2023-09-28

## 2023-09-28 RX ADMIN — KETOROLAC TROMETHAMINE 30 MG: 30 INJECTION, SOLUTION INTRAMUSCULAR; INTRAVENOUS at 15:41

## 2023-09-28 NOTE — PROGRESS NOTES
"Chief Complaint:    Chief Complaint   Patient presents with    Urinary Incontinence     3 month follow up       Vital Signs:   /90   Pulse 82   Ht 149.9 cm (59.02\")   Wt 79.4 kg (175 lb)   BMI 35.33 kg/m²   Body mass index is 35.33 kg/m².      HPI:  Pili Webster is a 33 y.o. female who presents today for follow up    History of Present Illness  Ms. Webster presents to the clinic for follow-up for urinary incontinence and nephrolithiasis.  At last office visit patient was started on Gemtesa 75 mg and has had significant improvement in urgent continence and frequency and urgency.  She does report that over the weekend she had an increase in right-sided back and flank pain.  She was unsure if she was passing a kidney stone.  States she felt like she was hit by a baseball bat.  She denies any gross hematuria, fever, chills, or inability urinate.  She does endorse nausea, vomiting, dysuria, and fatigue.  Urine today is completely negative.  Did review the patient's CT scan completed earlier this year that showed degenerative disc disease of the lumbar and thoracic spine.     Past Medical History:  Past Medical History:   Diagnosis Date    Abdominal pain     Abnormal uterine bleeding (AUB)     Anxiety and depression     Arthritis     Asthma     mild    Cholecystitis     Constipation     Endometriosis     Frequent UTI     GERD (gastroesophageal reflux disease)     Heartburn     Hemorrhoids     IBS (irritable bowel syndrome)     Kidney stones     Lesion of breast     Lump     RIGHT BREAST    Nausea & vomiting     PCOS (polycystic ovarian syndrome)     PONV (postoperative nausea and vomiting)     Psoriatic arthritis     Sjogren's disease     Sleep apnea     no cpap    Snores     Tachycardia     Wrist fracture     RIGHT ARM      PATIENT UNSURE IF FRACTURED       Current Meds:  Current Outpatient Medications   Medication Sig Dispense Refill    albuterol sulfate  (90 Base) MCG/ACT inhaler Inhale 1 " puff As Needed.      amitriptyline (ELAVIL) 25 MG tablet Take  by mouth Daily.      ARIPiprazole (ABILIFY) 10 MG tablet Take 1 tablet by mouth Daily.      aspirin 81 MG EC tablet Take 1 tablet by mouth Daily.      colestipol (COLESTID) 1 g tablet Take 1 tablet by mouth Daily. 90 tablet 3    Cosentyx Sensoready, 300 MG, 150 MG/ML solution auto-injector 1 dose Every 30 (Thirty) Days.      dexlansoprazole (DEXILANT) 60 MG capsule Take 1 capsule by mouth Daily. 90 capsule 3    dicyclomine (BENTYL) 20 MG tablet TAKE ONE TABLET BY MOUTH FOUR TIMES A DAY BEFORE MEALS AND AT BEDTIME 120 tablet 3    Dupilumab (Dupixent) 300 MG/2ML solution pen-injector Inject 2 mL under the skin into the appropriate area as directed 1 (One) Time Per Week. 8 mL 11    Erenumab-aooe (AIMOVIG) 140 MG/ML prefilled syringe Inject 1 mL under the skin into the appropriate area as directed Every 30 (Thirty) Days.      estradiol (VIVELLE-DOT) 0.1 MG/24HR patch       folic acid (FOLVITE) 1 MG tablet Take 1 tablet by mouth Daily.      gabapentin (NEURONTIN) 100 MG capsule Take 1 capsule by mouth 3 (Three) Times a Day. Takes 2 tablets tid      Gemtesa 75 MG tablet TAKE ONE TABLET BY MOUTH ONCE NIGHTLY 30 tablet 2    hydrOXYzine pamoate (VISTARIL) 25 MG capsule Take 1 capsule by mouth 3 (Three) Times a Day As Needed.      loratadine (CLARITIN) 10 MG tablet Take 1 tablet by mouth Daily.      meloxicam (MOBIC) 15 MG tablet Take 1 tablet by mouth Daily.      methocarbamol (ROBAXIN) 500 MG tablet Take 1 tablet by mouth Every Night.      montelukast (SINGULAIR) 10 MG tablet Take 1 tablet by mouth Every Night.      nadolol (CORGARD) 40 MG tablet Take 1.25 tablets by mouth Daily.      ondansetron ODT (ZOFRAN-ODT) 4 MG disintegrating tablet Place 1 tablet under the tongue.      PARoxetine (PAXIL) 10 MG tablet Take 1 tablet by mouth Daily.      rizatriptan (MAXALT) 10 MG tablet Take 1 tablet by mouth 1 (One) Time As Needed for Migraine. May repeat in 2 hours if  needed      tamsulosin (FLOMAX) 0.4 MG capsule 24 hr capsule TAKE ONE CAPSULE BY MOUTH DAILY 30 capsule 3    topiramate (TOPAMAX) 100 MG tablet Take 1 tablet by mouth Every Night.      vitamin D (ERGOCALCIFEROL) 1.25 MG (47671 UT) capsule capsule Take 1 capsule by mouth Every 7 (Seven) Days.      ketorolac (TORADOL) 10 MG tablet Take 1 tablet by mouth Every 6 (Six) Hours As Needed for Moderate Pain. 16 tablet 1    promethazine (PHENERGAN) 25 MG tablet Take 1 tablet by mouth Every 6 (Six) Hours As Needed for Nausea or Vomiting. 21 tablet 2     No current facility-administered medications for this visit.        Allergies:   Allergies   Allergen Reactions    Peanut-Containing Drug Products Anaphylaxis     AND PEANUTS IN FOODS    Latex Hives    Shrimp Swelling     Facial swelling      Milk-Related Compounds Nausea And Vomiting    Sulfa Antibiotics Rash        Past Surgical History:  Past Surgical History:   Procedure Laterality Date    ABDOMINAL SURGERY      ANAL SCOPE N/A 09/30/2022    Procedure: ANAL SCOPE;  Surgeon: Franco Mari MD;  Location: [facility] OR;  Service: General;  Laterality: N/A;    BREAST BIOPSY Right 11/29/2018    Procedure: breast biopsy ;  Surgeon: Shiv Overton MD;  Location: [facility] OR;  Service: General    CHOLECYSTECTOMY N/A 08/25/2017    Procedure: CHOLECYSTECTOMY LAPAROSCOPIC;  Surgeon: Franco Mari MD;  Location: [facility] OR;  Service:     COLONOSCOPY N/A 03/09/2020    Procedure: COLONOSCOPY CPT CODE: 34474;  Surgeon: Jeremiah Murdock MD;  Location: [facility] OR;  Service: Gastroenterology;  Laterality: N/A;    DENTAL PROCEDURE      DIAGNOSTIC LAPAROSCOPY      X5    DILATATION AND CURETTAGE      ENDOSCOPY N/A 03/09/2020    Procedure: ESOPHAGOGASTRODUODENOSCOPY WITH BIOPSY CPT CODE: 53137;  Surgeon: Jeremiah Murdock MD;  Location: [facility] OR;  Service: Gastroenterology;  Laterality: N/A;    ENDOSCOPY N/A 02/01/2021    Procedure: ESOPHAGOGASTRODUODENOSCOPY WITH  BIOPSY CPT CODE: 37875;  Surgeon: Jeremiah Murdock MD;  Location: [facility] OR;  Service: Gastroenterology;  Laterality: N/A;    ENDOSCOPY N/A 6/20/2023    Procedure: ESOPHAGOGASTRODUODENOSCOPY WITH BIOPSY CPT CODE: 20411;  Surgeon: Beto Mejia MD;  Location: [facility] ENDOSCOPY;  Service: Gastroenterology;  Laterality: N/A;    HEMORRHOIDECTOMY N/A 11/14/2019    Procedure: HEMORRHOID STAPLING;  Surgeon: Franco Mari MD;  Location: [facility] OR;  Service: General    HYSTERECTOMY  03/22/2023    Total    KNEE ARTHROSCOPY W/ MENISCECTOMY Right 04/11/2022    Procedure: KNEE ARTHROSCOPIC DEBRIDEMENT, PRP INJECTION AND medial femoral condraplasty MENISCAL DEBRIDEMENT;  Surgeon: Musa Yoon MD;  Location: [facility] OR;  Service: Orthopedics;  Laterality: Right;    URETEROSCOPY LASER LITHOTRIPSY WITH STENT INSERTION Right 01/09/2019    Procedure: URETEROSCOPY LASER LITHOTRIPSY retrograde pyelogram;  Surgeon: Ahmet Barrow MD;  Location: [facility];  Service: Urology    WISDOM TOOTH EXTRACTION         Social History:  Social History     Socioeconomic History    Marital status:    Tobacco Use    Smoking status: Every Day     Packs/day: 0.50     Years: 16.00     Pack years: 8.00     Types: Cigarettes     Start date: 2003    Smokeless tobacco: Never   Vaping Use    Vaping Use: Former    Substances: Nicotine, Flavoring   Substance and Sexual Activity    Alcohol use: Yes     Comment: RARELY    Drug use: No    Sexual activity: Defer       Family History:  Family History   Problem Relation Age of Onset    Breast cancer Maternal Aunt     Alcohol abuse Paternal Grandfather     Heart disease Paternal Grandfather     Cancer Maternal Grandfather     Hypertension Maternal Grandfather     Colon cancer Neg Hx     Liver cancer Neg Hx        Review of Systems:  Review of Systems   Constitutional:  Positive for fatigue. Negative for fever and unexpected weight change.   Respiratory:  Negative for chest  tightness and shortness of breath.    Cardiovascular:  Negative for chest pain.   Gastrointestinal:  Positive for nausea and vomiting. Negative for abdominal pain, constipation and diarrhea.   Genitourinary:  Positive for dysuria and flank pain. Negative for difficulty urinating, frequency, hematuria and urgency.   Skin:  Negative for rash.   Psychiatric/Behavioral:  Negative for confusion and suicidal ideas.      Physical Exam:  Physical Exam  Constitutional:       General: She is not in acute distress.     Appearance: Normal appearance.   HENT:      Head: Normocephalic and atraumatic.      Nose: Nose normal.      Mouth/Throat:      Mouth: Mucous membranes are moist.   Eyes:      Conjunctiva/sclera: Conjunctivae normal.   Cardiovascular:      Rate and Rhythm: Normal rate and regular rhythm.      Pulses: Normal pulses.      Heart sounds: Normal heart sounds.   Pulmonary:      Effort: Pulmonary effort is normal.      Breath sounds: Normal breath sounds.   Abdominal:      General: Bowel sounds are normal.      Palpations: Abdomen is soft.   Musculoskeletal:         General: Normal range of motion.      Cervical back: Normal range of motion.   Skin:     General: Skin is warm.   Neurological:      General: No focal deficit present.      Mental Status: She is alert and oriented to person, place, and time.   Psychiatric:         Mood and Affect: Mood normal.         Behavior: Behavior normal.         Thought Content: Thought content normal.         Judgment: Judgment normal.       Recent Image (CT and/or KUB):   CT Abdomen and Pelvis: No results found for this or any previous visit.     CT Stone Protocol: Results for orders placed during the hospital encounter of 10/28/22    CT Abdomen Pelvis Stone Protocol    Narrative  EXAM:  CT Abdomen and Pelvis Without Intravenous Contrast    EXAM DATE:  10/28/2022 3:27 PM    CLINICAL HISTORY:  Nephrolithiasis; N20.0-Calculus of kidney    TECHNIQUE:  Axial computed tomography images  of the abdomen and pelvis without  intravenous contrast.  Sagittal and coronal reformatted images were  created and reviewed.  This CT exam was performed using one or more of  the following dose reduction techniques:  automated exposure control,  adjustment of the mA and/or kV according to patient size, and/or use of  iterative reconstruction technique.    COMPARISON:  CT ABDOMEN PELVIS STONE PROTOCOL- dated 07/20/2022    FINDINGS:  LUNG BASES:  Unremarkable.  No mass.  No consolidation.    ABDOMEN:  LIVER:  Unremarkable.  GALLBLADDER AND BILE DUCTS:  Cholecystectomy clips.  No ductal  dilation.  PANCREAS:  Unremarkable.  No ductal dilation.  SPLEEN:  Unremarkable.  No splenomegaly.  ADRENALS:  Unremarkable.  No mass.  KIDNEYS AND URETERS:  Unremarkable.  No obstructing stones.  No  hydronephrosis.  STOMACH AND BOWEL:  Unremarkable.  No obstruction.  No mucosal  thickening.    PELVIS:  APPENDIX:  No findings to suggest acute appendicitis.  BLADDER:  Unremarkable.  No stones.  REPRODUCTIVE:  Right adnexal region cyst measuring 2.6 cm.    ABDOMEN and PELVIS:  INTRAPERITONEAL SPACE:  Unremarkable.  No free air.  No significant  fluid collection.  BONES/JOINTS:  No acute fracture.  No dislocation.  SOFT TISSUES:  Unremarkable.  VASCULATURE:  Unremarkable.  No abdominal aortic aneurysm.  LYMPH NODES:  Unremarkable.  No enlarged lymph nodes.    Impression  Right adnexal region cyst measuring 2.6 cm.    This report was finalized on 10/28/2022 3:52 PM by Dr. Henrry Matthews MD.     KUB: Results for orders placed during the hospital encounter of 03/09/23    XR Abdomen KUB    Narrative  EXAM:  XR Abdomen, 1 View    EXAM DATE:  3/9/2023 11:25 AM    CLINICAL HISTORY:  kidney stone; N20.0-Calculus of kidney    TECHNIQUE:  Frontal supine view of the abdomen/pelvis.    COMPARISON:  08/17/2022    FINDINGS:  GASTROINTESTINAL TRACT:  Unremarkable.  No dilation.  ORGANS:  Possible tiny left renal calculi.  BONES/JOINTS:   Unremarkable.    Impression  No acute findings.    This report was finalized on 3/9/2023 12:30 PM by Dr. Henrry Matthews MD.       Labs:  Brief Urine Lab Results  (Last result in the past 365 days)        Color   Clarity   Blood   Leuk Est   Nitrite   Protein   CREAT   Urine HCG        09/28/23 1508 Yellow   Clear   Negative   Negative   Negative   Negative                 Office Visit on 09/28/2023   Component Date Value Ref Range Status    Color 09/28/2023 Yellow  Yellow, Straw, Dark Yellow, Mimi Final    Clarity, UA 09/28/2023 Clear  Clear Final    Specific Gravity  09/28/2023 1.005  1.005 - 1.030 Final    pH, Urine 09/28/2023 6.0  5.0 - 8.0 Final    Leukocytes 09/28/2023 Negative  Negative Final    Nitrite, UA 09/28/2023 Negative  Negative Final    Protein, POC 09/28/2023 Negative  Negative mg/dL Final    Glucose, UA 09/28/2023 Negative  Negative mg/dL Final    Ketones, UA 09/28/2023 Negative  Negative Final    Urobilinogen, UA 09/28/2023 Normal  Normal, 0.2 E.U./dL Final    Bilirubin 09/28/2023 Negative  Negative Final    Blood, UA 09/28/2023 Negative  Negative Final    Lot Number 09/28/2023 [id number]   Final    Expiration Date 09/28/2023 10/25/2024   Final        Procedure: None  Procedures     I have reviewed and agree with the above PMH, PSH, FMH, social history, medications, allergies, and labs.     Assessment/Plan:   Problem List Items Addressed This Visit          Genitourinary and Reproductive     Kidney stone - Primary    Relevant Medications    promethazine (PHENERGAN) 25 MG tablet    ketorolac (TORADOL) 10 MG tablet    ketorolac (TORADOL) injection 30 mg (Completed)    Other Relevant Orders    XR abdomen kub    Urge incontinence of urine    Bladder spasms     Other Visit Diagnoses       Flank pain        Relevant Medications    ketorolac (TORADOL) injection 30 mg (Completed)    Frequency of urination        Relevant Orders    POC Urinalysis Dipstick, Automated (Completed)            Health  Maintenance:   Health Maintenance Due   Topic Date Due    BMI FOLLOWUP  Never done    Pneumococcal Vaccine 0-64 (1 - PCV) Never done    TDAP/TD VACCINES (1 - Tdap) Never done    ANNUAL PHYSICAL  Never done    PAP SMEAR  Never done    COVID-19 Vaccine (4 - 2023-24 season) 09/01/2023        Smoking Counseling: Everyday smoker.  Never used smokeless tobacco.  Counseling given however not ready to quit at this time.    Urine Incontinence: Patient reports that she symptoms of urge incontinence have improved with Gemtesa.    Patient was given instructions and counseling regarding her condition or for health maintenance advice. Please see specific information pulled into the AVS if appropriate.    Patient Education:   Nephrolithiasis -patient's last KUB in office revealed no acute abnormalities.  She is having some right-sided back and flank pain is concern for kidney stone.  I will send her down to have a KUB completed after office visit.  I will call her with results tomorrow once obtained.  I will send in Phenergan to take as needed for nausea and vomiting.  Discussed the risk and benefits of this medication with the patient.  I will also give the patient a shot of Toradol 30 mg in office today.  Advised patient not to take any other ibuprofen or nonsteroidal anti-inflammatory medications for the rest of the day.  I will also send in Toradol 10 mg tablets to take as needed for moderate to severe pain.  Advised her to alternate with Tylenol.  Advised her to take every 4-6 hours apart from other nonsteroidal anti-inflammatory medications.  Patient verbalized understanding.  Urge incontinence/bladder spasms -patient has been doing well on Gemtesa 75 mg daily.  We will have her continue medication as prescribed.  Advised her to call with any questions or concerns.  Otherwise we will see her back in 6 months for reevaluation.    Visit Diagnoses:    ICD-10-CM ICD-9-CM   1. Kidney stone  N20.0 592.0   2. Flank pain  R10.9  789.09   3. Frequency of urination  R35.0 788.41   4. Urge incontinence of urine  N39.41 788.31   5. Bladder spasms  N32.89 596.89       Meds Ordered During Visit:  New Medications Ordered This Visit   Medications    promethazine (PHENERGAN) 25 MG tablet     Sig: Take 1 tablet by mouth Every 6 (Six) Hours As Needed for Nausea or Vomiting.     Dispense:  21 tablet     Refill:  2    ketorolac (TORADOL) 10 MG tablet     Sig: Take 1 tablet by mouth Every 6 (Six) Hours As Needed for Moderate Pain.     Dispense:  16 tablet     Refill:  1    ketorolac (TORADOL) injection 30 mg       Follow Up Appointment: 6 months pending test results  No follow-ups on file.      This document has been electronically signed by Mati Rankin PA-C   September 28, 2023 16:31 EDT    Part of this note may be an electronic transcription/translation of spoken language to printed text using the Dragon Dictation System.

## 2023-09-29 ENCOUNTER — TREATMENT (OUTPATIENT)
Dept: PHYSICAL THERAPY | Facility: CLINIC | Age: 33
End: 2023-09-29
Payer: COMMERCIAL

## 2023-09-29 DIAGNOSIS — M25.561 CHRONIC PAIN OF RIGHT KNEE: Primary | ICD-10-CM

## 2023-09-29 DIAGNOSIS — G89.29 CHRONIC PAIN OF RIGHT KNEE: Primary | ICD-10-CM

## 2023-09-29 DIAGNOSIS — M25.661 DECREASED RANGE OF MOTION (ROM) OF RIGHT KNEE: ICD-10-CM

## 2023-09-29 DIAGNOSIS — R29.898 WEAKNESS OF RIGHT LEG: ICD-10-CM

## 2023-09-29 PROCEDURE — 97035 APP MDLTY 1+ULTRASOUND EA 15: CPT | Performed by: PHYSICAL THERAPIST

## 2023-09-29 PROCEDURE — 97530 THERAPEUTIC ACTIVITIES: CPT | Performed by: PHYSICAL THERAPIST

## 2023-09-29 PROCEDURE — 97110 THERAPEUTIC EXERCISES: CPT | Performed by: PHYSICAL THERAPIST

## 2023-09-29 NOTE — PROGRESS NOTES
Physical Therapy Daily Treatment Note      Patient: Pili Webster   : 1990  Referring practitioner: Dee Dee Dc PA-C  Date of Initial Visit: Type: THERAPY  Noted: 2023  Today's Date: 2023  Patient seen for 6 sessions       Visit Diagnoses:    ICD-10-CM ICD-9-CM   1. Chronic pain of right knee  M25.561 719.46    G89.29 338.29   2. Decreased range of motion (ROM) of right knee  M25.661 719.56   3. Weakness of right leg  R29.898 729.89       Subjective: Patient reports 7/10 right knee pain prior to tx. Patient states she goes Monday to have a nerve block done on her knee.     Objective   See Exercise, Manual, and Modality Logs for complete treatment.       Assessment/Plan: Patient completed today's session with 6/10 right knee pain reported following. Treatment included therex and therapeutic activities per flow sheet f/b pulsed US. Pt denied cryotherapy at conclusion. Exercise progressed with standing strengthening activities initiated including heel raises and hip abduction. Pt observed with good tolerance and was provided with demonstration for form. Pt limited with overall progression of activities due to continued elevated pain levels. Pt will be progressed with therapy as tolerated to address goals, reduce pain and improve ability to perform functional task. No adverse reactions observed during, and/ or following tx. Continue with PT's POC.       Timed:         Manual Therapy:         mins  48676;     Therapeutic Exercise:    37     mins  95117;     Neuromuscular Vladimir:        mins  86554;    Therapeutic Activity:     10     mins  79424;     Gait Training:           mins  82715;     Ultrasound:     8     mins  62178;    Ionto                                   mins   11144  Self Care                            mins   09032      Un-Timed:  Electrical Stimulation:         mins  90092 ( );  Dry Needling          mins self-pay  Traction          mins 27878  [facility]          mins 90775    Timed Treatment:  55    mins   Total Treatment:     55   mins    Janet Lisa. DAVID Parikh  [place] License: [id number]

## 2023-10-10 ENCOUNTER — TREATMENT (OUTPATIENT)
Dept: PHYSICAL THERAPY | Facility: CLINIC | Age: 33
End: 2023-10-10
Payer: COMMERCIAL

## 2023-10-10 DIAGNOSIS — M25.561 CHRONIC PAIN OF RIGHT KNEE: Primary | ICD-10-CM

## 2023-10-10 DIAGNOSIS — G89.29 CHRONIC PAIN OF RIGHT KNEE: Primary | ICD-10-CM

## 2023-10-10 DIAGNOSIS — R29.898 WEAKNESS OF RIGHT LEG: ICD-10-CM

## 2023-10-10 DIAGNOSIS — M25.661 DECREASED RANGE OF MOTION (ROM) OF RIGHT KNEE: ICD-10-CM

## 2023-10-10 PROCEDURE — 97035 APP MDLTY 1+ULTRASOUND EA 15: CPT | Performed by: PHYSICAL THERAPIST

## 2023-10-10 PROCEDURE — 97110 THERAPEUTIC EXERCISES: CPT | Performed by: PHYSICAL THERAPIST

## 2023-10-10 NOTE — PROGRESS NOTES
Physical Therapy Re Certification Of Plan of Care  Patient: Pili Webster   : 1990  Diagnosis/ICD-10 Code:  Chronic pain of right knee [M25.561, G89.29]  Referring practitioner: Dee Dee Dc PA-C  Date of Initial Visit: Type: THERAPY  Noted: 2023  Today's Date: 10/10/2023  Patient seen for 7 sessions         Visit Diagnoses:    ICD-10-CM ICD-9-CM   1. Chronic pain of right knee  M25.561 719.46    G89.29 338.29   2. Decreased range of motion (ROM) of right knee  M25.661 719.56   3. Weakness of right leg  R29.898 729.89         Pili Webster reports:   Subjective Questionnaire: LEFS: 76%  Clinical Progress: unchanged  Home Program Compliance: Yes  Treatment has included: therapeutic exercise, neuromuscular re-education, manual therapy, therapeutic activity, gait training, electrical stimulation, ultrasound, moist heat, and cryotherapy      Subjective Evaluation    History of Present Illness    Subjective comment: Pt reports no significant change in knee pain or mobility.  Pt cont to have problems with squatting.Pain  Current pain ratin  At best pain rating: 3  At worst pain rating: 10  Location: right knee             Objective          Active Range of Motion     Right Knee   Flexion: 120 degrees     Additional Active Range of Motion Details  Right knee lacks 3 degrees from neutral    Strength/Myotome Testing     Right Knee   Flexion: 3  Extension: 3+          Assessment & Plan       Assessment  Impairments: abnormal coordination, abnormal gait, abnormal muscle firing, abnormal muscle tone, abnormal or restricted ROM, activity intolerance, impaired balance, impaired physical strength, lacks appropriate home exercise program, pain with function, safety issue and weight-bearing intolerance   Functional limitations: lifting, walking, uncomfortable because of pain, standing and unable to perform repetitive tasks   Assessment details: Pt is a 32 y/o female referred to therapy for  treatment of right knee pain.  Pt has attended 7 sessions with noted improved knee ROM.  Pt cont to report similar pain with ADLs.  Pt responded well to tx with 5/10 post pain.  Prognosis: fair    Goals  Plan Goals: STG 6 weeks    1 Pt will be instructed in a HEP.progressing  2 Pt will report pain no greater than 6/10.not met  3 Pt will improve her LEFs to less than 50%.not met    LTG 12 weeks    1 Pt will improve her right knee ROM to 5-115 degrees.met  2 Pt will demonstrate 4/5 gross right knee strength.not met  3 Pt will improve her Quick Dash to less than 30%.not met  4 Pt will report right knee pain 4/10 or less.not met    Plan  Therapy options: will be seen for skilled therapy services  Planned modality interventions: cryotherapy, TENS, thermotherapy (hydrocollator packs) and ultrasound  Planned therapy interventions: ADL retraining, balance/weight-bearing training, body mechanics training, flexibility, functional ROM exercises, gait training, home exercise program, IADL retraining, joint mobilization, manual therapy, neuromuscular re-education, soft tissue mobilization, spinal/joint mobilization, strengthening, stretching and therapeutic activities  Frequency: 2x week  Duration in weeks: 8  Treatment plan discussed with: patient  Plan details: Will follow for optimal gains.    Moderate Evaluation  23121  Re-evaluation   87247  Therapeutic exercise  51283  Therapeutic activity    49999  Neuromuscular re-education   46792  Manual therapy   74087  Gait training  63219  Unattended e-stim (Medicaid/Medicare)     Moist heat/cryotherapy 33638   Ultrasound   20536               Recommendations: Continue as planned  Timeframe: 2 months  Prognosis to achieve goals: fair      Timed:         Manual Therapy:         mins  22959;     Therapeutic Exercise:    46     mins  10152;     Neuromuscular Vladimir:        mins  83890;    Therapeutic Activity:          mins  02419;     Gait Training:           mins  80105;      Ultrasound:     8     mins  02123;    Ionto                                   mins   85359  Self Care                            mins   75657    Un-Timed:  Electrical Stimulation:         mins  20766 ( );  Dry Needling          mins self-pay  Traction          mins 11587  Re-Eval                               mins  96534  Canalith Repos         mins 15935    Timed Treatment:   54   mins   Total Treatment:     54   mins      Assisted byJanet Lisa. DAVID Parikh     PT: Peter Thomas PT     [place] License:  [id number]    Electronically signed by Peter Thomas PT, 10/10/23, 12:25 PM EDT    Certification Period: 10/10/2023 thru 1/7/2024  I certify that the therapy services are furnished while this patient is under my care.  The services outlined above are required by this patient, and will be reviewed every 90 days.         Physician Signature:__________________________________________________    PHYSICIAN: Dee Dee Dc PA-C  NPI: [id number]                                      DATE:  :     Please sign and return via fax to [email or web address] . Thank you, [facility] Physical Therapy

## 2023-10-12 NOTE — THERAPY TREATMENT NOTE
Outpatient Physical Therapy Ortho Treatment Note   [place]     Patient Name: Pili Webster  : 1990  MRN: [id number]  Today's Date: 2018      Visit Date: 2018    Visit Dx:    ICD-10-CM ICD-9-CM   1. Chronic pain of right knee M25.561 719.46    G89.29 338.29       Patient Active Problem List   Diagnosis   • External hemorrhoid, thrombosed   • Status post laparoscopic cholecystectomy        Past Medical History:   Diagnosis Date   • Abdominal pain    • Anxiety and depression    • Arthritis    • Cholecystitis    • Constipation    • Frequent UTI    • Hemorrhoids    • Kidney stones    • Nausea & vomiting    • PCOS (polycystic ovarian syndrome)    • PONV (postoperative nausea and vomiting)    • Tachycardia         Past Surgical History:   Procedure Laterality Date   • ABDOMINAL SURGERY     • CHOLECYSTECTOMY N/A 2017    Procedure: CHOLECYSTECTOMY LAPAROSCOPIC;  Surgeon: Franco Mari MD;  Location: [facility];  Service:    • DIAGNOSTIC LAPAROSCOPY      x2   • DILATATION AND CURETTAGE     • WISDOM TOOTH EXTRACTION               PT Ortho       18 1200    Subjective Comments    Subjective Comments Patient arrives to therapy w/ reports of 5/10 R) knee pain.  Pt states she wishes to discharge from therapy following today's session and f/u with referring MD.  Pt states she feels comfortable w/ home program.   -JEANNINE    Subjective Pain    Able to rate subjective pain? yes  -JEANNINE    Pre-Treatment Pain Level 5  -JEANNINE    Post-Treatment Pain Level 3  -JEANNINE    Myotomal Screen- Lower Quarter Clearing    Hip flexion (L2) Right:;4 (Good)  -JEANNINE    Knee extension (L3) Right:;4- (Good -)  -JEANNINE    Knee flexion (S2) Right:;4- (Good -)  -JEANNINE      User Key  (r) = Recorded By, (t) = Taken By, (c) = Cosigned By    Initials Name Provider Type    JEANNINE Parikh PTA Physical Therapy Assistant                            PT Assessment/Plan       18 4274       PT Assessment    Assessment Comments  Patient tolerated treatment well today w/ reports of decreased pain following session, 3/10.  Patient wishes to discharge therapy services following today's session, continue home program, and f/u with referring MD.  Patient achieved 2/3 STG and 1/3 LTG while in therapy.  Pt displayed good mechanics during today's session, and reports being independent w/ home program.  Pt educated to perform home program w/ in pain free range; pt verbalized understanding.  Patient attended at total of 7 visits including Initial Evaluation.  Thank you for your referral.   -JEANNINE     PT Plan    PT Plan Comments Patient discharged from therapy services at this time per patient request, and secondary to pt utilizing approved insurance visits.   -JEANNINE       User Key  (r) = Recorded By, (t) = Taken By, (c) = Cosigned By    Initials Name Provider Type    JEANNINE Parikh PTA Physical Therapy Assistant                Modalities       02/27/18 1200          Moist Heat    MH Applied Yes   no redness noted following mH  -JEANNINE      Location right knee  -JEANNINE      Rx Minutes 10 mins  -JEANNINE      MH Prior to Rx Yes   w/ estim in supine   -JEANNINE      Ultrasound 09424    Location R) medial knee  -JEANNINE      Rx Minutes 8 min  -JEANNINE      Duty Cycle 50  -JEANNINE      Frequency --   3.3MHz  -JEANNINE      Intensity - Wts/cm 1.2  -JEANNINE      ELECTRICAL STIMULATION    Attended/Unattended Unattended   no skin irritation observed following estim  -JEANNINE      Stimulation Type Pre-Mod  -JEANNINE      Max mAmp --   as to pt's tolerance  -JEANNINE      Location/Electrode Placement/Other R) knee  -JEANNINE      Rx Minutes 10 mins  -JEANNINE        User Key  (r) = Recorded By, (t) = Taken By, (c) = Cosigned By    Initials Name Provider Type    JEANNINE Parikh PTA Physical Therapy Assistant                Exercises       02/27/18 1200          Subjective Comments    Subjective Comments Patient arrives to therapy w/ reports of 5/10 R) knee pain.  Pt states she wishes to discharge from therapy following  "today's session and f/u with referring MD.  Pt states she feels comfortable w/ home program.   -JEANNINE      Subjective Pain    Able to rate subjective pain? yes  -JEANNINE      Pre-Treatment Pain Level 5  -JEANNINE      Post-Treatment Pain Level 3  -JEANNINE      Exercise 1    Exercise Name 1 gastroc stretch 3x20\", ham stretch 3x20\", QS 15x2, SLR 10x2, LAQ 15x2, ball squeeze 15x2, knee flex w/ tband (red) 15x2  -JEANNINE      Cueing 1 Verbal;Tactile;Demo  -JEANNINE      Time (Minutes) 1 35 min  -JEANNINE        User Key  (r) = Recorded By, (t) = Taken By, (c) = Cosigned By    Initials Name Provider Type    JEANNINE Parikh PTA Physical Therapy Assistant                               PT OP Goals       02/27/18 1200       PT Short Term Goals    STG Date to Achieve 02/13/18  -JEANNINE     STG 1 Pt will be instructed in a HEP.  -JEANNINE     STG 1 Progress Met  -JEANNINE     STG 2 Pt will improve right knee flexion to 130degrees.  -JEANNINE     STG 2 Progress Met  -JEANNINE     STG 2 Progress Comments Patient displayed 135 degrees of R) knee flexion AROM.  -JEANNINE     STG 3 Pt will report pain no greater than 5/10.  -JEANNINE     STG 3 Progress Not Met  -JEANNINE     STG 3 Progress Comments Patient reports 5/10 current pain, 3/10 pain at best, and 9/10 pain at worst.   -JEANNINE     Long Term Goals    LTG 1 Pt will be able to stand for 30min without pain.  -JEANNINE     LTG 1 Progress Met  -JEANNINE     LTG 1 Progress Comments Patient reports she is able to stand approx 30-45 minutes before increase in knee pain.   -JEANNINE     LTG 2 Pt will improve LEFS to 30% or less to demonstrate improved mobility.  -JEANNINE     LTG 2 Progress Not Met  -JEANNINE     LTG 3 Pt will report pain no greater than 3/10.  -JEANNINE     LTG 3 Progress Not Met  -JEANNINE       User Key  (r) = Recorded By, (t) = Taken By, (c) = Cosigned By    Initials Name Provider Type    JEANNINE Parikh PTA Physical Therapy Assistant          Therapy Education  Given: HEP, Symptoms/condition management, Pain management  Program: Reinforced  How Provided: Verbal, " Demonstration  Provided to: Patient  Level of Understanding: Verbalized, Demonstrated, Teach back education performed    Outcome Measure Options: Lower Extremity Functional Scale (LEFS)  Lower Extremity Functional Index  Any of your usual work, housework or school activities: A little bit of difficulty  Your usual hobbies, recreational or sporting activities: Moderate difficulty  Getting into or out of the bath: A little bit of difficulty  Walking between rooms: No difficulty  Putting on your shoes or socks: A little bit of difficulty  Squatting: Moderate difficulty  Lifting an object, like a bag of groceries from the floor: Moderate difficulty  Performing light activities around your home: A little bit of difficulty  Performing heavy activities around your home: Quite a bit of difficulty  Getting into or out of a car: A little bit of difficulty  Walking 2 blocks: Moderate difficulty  Walking a mile: Quite a bit of difficulty  Going up or down 10 stairs (about 1 flight of stairs): Quite a bit of difficulty  Standing for 1 hour: Quite a bit of difficulty  Sitting for 1 hour: A little bit of difficulty  Running on even ground: Quite a bit of difficulty  Running on uneven ground: Quite a bit of difficulty  Making sharp turns while running fast: Quite a bit of difficulty  Hopping: Moderate difficulty  Rolling over in bed: A little bit of difficulty  Total: 42      Time Calculation:   Start Time: 1100  Stop Time: 1155  Time Calculation (min): 55 min    Therapy Charges for Today     Code Description Service Date Service Provider Modifiers Qty    70766780694 HC PT THER PROC EA 15 MIN 2/27/2018 Janet Parikh PTA GP 2    40817542340 HC PT ULTRASOUND EA 15 MIN 2/27/2018 Janet Parikh PTA GP 1    65900139657 HC PT ELECTRICAL STIM UNATTENDED 2/27/2018 Janet Parikh PTA  1          PT G-Codes  Outcome Measure Options: Lower Extremity Functional Scale (LEFS)         Janet Parikh  PTA  2/27/2018      .

## 2023-10-13 ENCOUNTER — TREATMENT (OUTPATIENT)
Dept: PHYSICAL THERAPY | Facility: CLINIC | Age: 33
End: 2023-10-13
Payer: COMMERCIAL

## 2023-10-13 DIAGNOSIS — M25.661 DECREASED RANGE OF MOTION (ROM) OF RIGHT KNEE: ICD-10-CM

## 2023-10-13 DIAGNOSIS — M25.561 CHRONIC PAIN OF RIGHT KNEE: Primary | ICD-10-CM

## 2023-10-13 DIAGNOSIS — R29.898 WEAKNESS OF RIGHT LEG: ICD-10-CM

## 2023-10-13 DIAGNOSIS — G89.29 CHRONIC PAIN OF RIGHT KNEE: Primary | ICD-10-CM

## 2023-10-13 PROCEDURE — 97110 THERAPEUTIC EXERCISES: CPT | Performed by: PHYSICAL THERAPIST

## 2023-10-13 PROCEDURE — 97035 APP MDLTY 1+ULTRASOUND EA 15: CPT | Performed by: PHYSICAL THERAPIST

## 2023-10-13 NOTE — PROGRESS NOTES
Physical Therapy Daily Treatment Note      Patient: Pili Webster   : 1990  Referring practitioner: Dee Dee Dc PA-C  Date of Initial Visit: Type: THERAPY  Noted: 2023  Today's Date: 10/13/2023  Patient seen for 8 sessions       Visit Diagnoses:    ICD-10-CM ICD-9-CM   1. Chronic pain of right knee  M25.561 719.46    G89.29 338.29   2. Decreased range of motion (ROM) of right knee  M25.661 719.56   3. Weakness of right leg  R29.898 729.89       Subjective Evaluation    History of Present Illness    Subjective comment: Patient reports that she has 7/10 pain today.  She mentions that it feels like she has pulled a muscle in the top of her leg, pointing to her quad, but is unsure what she has done.Pain  Current pain ratin         Objective   See Exercise, Manual, and Modality Logs for complete treatment.       Assessment & Plan       Assessment  Assessment details: Therapy session consisted of there ex, followed by pulsed US.  No adverse reactions were noted with modalities.  There ex addressed knee ROM, LE strengthening, and stabilization.  Patient educated to perform exercises per her tolerance, with patient verbalizing understanding.  Patient displayed facial grimaces throughout session, but reported that she was able to continue with exercises.  Fatigue was also noted following exercise.  Patient reported a decrease in pain to 5/10 post-tx.  She will continue to be progressed per her tolerance and POC.      Timed:         Manual Therapy:         mins  75223;     Therapeutic Exercise:    47     mins  69651;     Neuromuscular Vladimir:        mins  23537;    Therapeutic Activity:          mins  23124;     Gait Training:           mins  78967;     Ultrasound:     8     mins  66366;    Ionto                                   mins   05323  Self Care                            mins   67251      Un-Timed:  Electrical Stimulation:         mins  70012 ( );  Dry Needling          mins  self-pay  Traction          mins 02976  [facility]         mins 66638    Timed Treatment:   55   mins   Total Treatment:     55   mins    Pili Walker, PT  [place] License: [id number]  Electronically signed by Pili Walker PT, 10/13/23, 10:52 AM EDT.

## 2023-10-19 ENCOUNTER — TREATMENT (OUTPATIENT)
Dept: PHYSICAL THERAPY | Facility: CLINIC | Age: 33
End: 2023-10-19
Payer: COMMERCIAL

## 2023-10-19 DIAGNOSIS — G89.29 CHRONIC PAIN OF RIGHT KNEE: Primary | ICD-10-CM

## 2023-10-19 DIAGNOSIS — M25.561 CHRONIC PAIN OF RIGHT KNEE: Primary | ICD-10-CM

## 2023-10-19 PROCEDURE — 97110 THERAPEUTIC EXERCISES: CPT | Performed by: PHYSICAL THERAPIST

## 2023-10-19 NOTE — PROGRESS NOTES
Physical Therapy Daily Treatment Note      Patient: Pili Webster   : 1990  Referring practitioner: Dee Dee Dc PA-C  Date of Initial Visit: Type: THERAPY  Noted: 2023  Today's Date: 10/19/2023  Patient seen for 9 sessions       Visit Diagnoses:    ICD-10-CM ICD-9-CM   1. Chronic pain of right knee  M25.561 719.46    G89.29 338.29       Subjective Evaluation    History of Present Illness    Subjective comment: Pt reports 5/10 right knee pain prior to today's session.Pain  Current pain ratin         Objective   See Exercise, Manual, and Modality Logs for complete treatment.       Assessment & Plan       Assessment  Assessment details: Therapeutic exercises were performed for improved right lower extremity range of motion and strength. Tactile and verbal cues were provided for proper form. The patient reported fatigue with SLR. The session concluded with therapeutic ultrasound to the right knee, with no skin irritation observed. The patient reported 4/10 right knee pain following today's session.    Plan  Plan details: Progress as indicated.      Timed:         Manual Therapy:         mins  34696;     Therapeutic Exercise:     40    mins  48308;     Neuromuscular Vladimir:        mins  22994;    Therapeutic Activity:          mins  98690;     Gait Training:           mins  61628;     Ultrasound:     8     mins  96502;    Ionto                                   mins   06587  Self Care                            mins   55435      Un-Timed:  Electrical Stimulation:         mins  94019 ( );  Dry Needling          mins self-pay  Traction          mins 28569  [facility]         mins 22704    Timed Treatment:   48   mins   Total Treatment:     48   mins    Ashley Claudene Dalton, PT  [place] License: [id number]

## 2023-10-20 ENCOUNTER — TREATMENT (OUTPATIENT)
Dept: PHYSICAL THERAPY | Facility: CLINIC | Age: 33
End: 2023-10-20
Payer: COMMERCIAL

## 2023-10-20 DIAGNOSIS — R29.898 WEAKNESS OF RIGHT LEG: ICD-10-CM

## 2023-10-20 DIAGNOSIS — M25.661 DECREASED RANGE OF MOTION (ROM) OF RIGHT KNEE: ICD-10-CM

## 2023-10-20 DIAGNOSIS — G89.29 CHRONIC PAIN OF RIGHT KNEE: Primary | ICD-10-CM

## 2023-10-20 DIAGNOSIS — M25.561 CHRONIC PAIN OF RIGHT KNEE: Primary | ICD-10-CM

## 2023-10-20 PROCEDURE — 97035 APP MDLTY 1+ULTRASOUND EA 15: CPT | Performed by: PHYSICAL THERAPIST

## 2023-10-20 PROCEDURE — 97530 THERAPEUTIC ACTIVITIES: CPT | Performed by: PHYSICAL THERAPIST

## 2023-10-20 PROCEDURE — 97110 THERAPEUTIC EXERCISES: CPT | Performed by: PHYSICAL THERAPIST

## 2023-10-20 NOTE — PROGRESS NOTES
Physical Therapy Daily Treatment Note      Patient: Pili Webster   : 1990  Referring practitioner: Dee Dee Dc PA-C  Date of Initial Visit: Type: THERAPY  Noted: 2023  Today's Date: 10/20/2023  Patient seen for 10 sessions       Visit Diagnoses:    ICD-10-CM ICD-9-CM   1. Chronic pain of right knee  M25.561 719.46    G89.29 338.29   2. Decreased range of motion (ROM) of right knee  M25.661 719.56   3. Weakness of right leg  R29.898 729.89       Subjective: Patient reports 6/10 right knee pain prior to tx. Pt states she feels her pain is increased slightly due to the cold, rainy weather. Pt verbalizes compliance of continuation of home and is scheduled to f/u with referring ortho in November. Pt is comfortable with discharge from PT services at this time due to utilization of approved visits.     Objective   See Exercise, Manual, and Modality Logs for complete treatment.       Assessment/Plan: Evaluating therapist, Peter Thomas, PT present and advises discharge from PT services at this time due to utilization of approved visits, and limited progress. PT and PTA advised patient to continue with home program and f/u with referring provider as scheduled. Today's treatment consisted of therex and therapeutic activities per flow sheet for right knee with continued focus on improved quad/ VMO strength, improved range of motion and stability. Pt required minimal cues for form. Pulsed US performed at conclusion; pt denied cryotherapy and noted she would apply at home as needed. Pt will be discharged from therapy services at this time and was advise to contact therapy services if needed. Pt verbalized understanding and agreed. Evaluating therapist will complete d/c. No adverse reactions observed during, and/ or following tx.       Timed:         Manual Therapy:         mins  17757;     Therapeutic Exercise:   36      mins  20679;     Neuromuscular Vladimir:        mins  72058;    Therapeutic  Activity:    10      mins  79602;     Gait Training:           mins  88181;     Ultrasound:    8      mins  69751;    Ionto                                   mins   23380  Self Care                            mins   87208      Un-Timed:  Electrical Stimulation:         mins  22326 ( );  Dry Needling          mins self-pay  Traction          mins 71670  [facility] [employer]         mins 48720    Timed Treatment:  54    mins   Total Treatment:    54    mins    Janet Lisa. DAVID Parikh  [place] License: [id number]

## 2023-12-04 NOTE — PROGRESS NOTES
"  Assessment & Plan     Type 2 diabetes mellitus without complication, without long-term current use of insulin (H)  Labs reviewed. He'll be reducing mounjaro dose.      Morphea  We will attempt to get outside records. Currently using TAC once a day. No extra derm manifestations per se.  Refer to derm.    - Adult Dermatology  Referral    Major depressive disorder, recurrent episode, moderate (H)  With chronic fatigue.  Sleep med note reviewed.  Reduce trazodone and effexor.  - venlafaxine (EFFEXOR XR) 75 MG 24 hr capsule  Dispense: 90 capsule; Refill: 1  - traZODone (DESYREL) 50 MG tablet  Dispense: 90 tablet; Refill: 1    Rtc 6M CPE       BMI:   Estimated body mass index is 25.25 kg/m  as calculated from the following:    Height as of this encounter: 1.753 m (5' 9\").    Weight as of this encounter: 77.6 kg (171 lb).           Maciej Hopkins MD  [facility] NAKITA Crabtree is a 58 year old, presenting for the following health issues:  Morphea (New condition)      DM:  Has been working with Pharm.  On Mounjaro.  Last A1c reviewed.  Actually going down on Mounjaro now.  Weight loss is noted.      Rash  On neck. Ongoing for years.  Saw dermatology.  Had Bx and was dx with morphea.  Was given TAC.  No obvious non derm manifestations.  Dx at outside derm ([place]?). We don't have records.      Fatigue:  Chronic.  Saw sleep medicine.  Given Sunosi.  There was also some thought to reducing his effexor in case that was affecting his fatigue.     Depression:  Has been on effexor for many years. As above consideration to reduce was brought up.   He has had multiple other selective serotonin reuptake inhibitor in the past.  Exhibited riccardo with some.    History of Present Illness       Reason for visit:  Morphea    He eats 0-1 servings of fruits and vegetables daily.He consumes 0 sweetened beverage(s) daily.He exercises with enough effort to increase his heart rate 10 to 19 minutes per day.  " : 1990    Chief Complaint   Patient presents with   • Constipation   • Heartburn       Pili Webster is a 29 y.o. female who presents to the office today as a follow up appointment regarding Constipation and Heartburn.    History of Present Illness:  Patient was recently diagnosed with Sjogren's syndrome.  She has been having severe left-sided abdominal pain which starts in the left upper quadrant and radiates downward.  When this pain is severe, she will have vomiting with relief of discomfort.  Severe pain is intermittent and does not seem to be related to mealtimes.  She has been having normal bowel movements per her report and feels that constipation is well controlled.  She takes MiraLAX 1 time daily.  For GERD, she takes Protonix 40 mg once daily and it seems to be controlled most of the time per her report.  She has never had EGD or colonoscopy. There is no known family history of colon cancer or colon polyps.    Review of Systems   Constitutional: Positive for fatigue. Negative for chills and fever.   HENT: Negative for trouble swallowing.    Eyes: Negative.    Respiratory: Positive for cough and shortness of breath. Negative for choking and chest tightness.    Cardiovascular: Negative for chest pain.   Gastrointestinal: Positive for abdominal distention, abdominal pain, diarrhea, nausea and vomiting. Negative for anal bleeding, blood in stool, constipation and rectal pain.   Endocrine: Negative.    Genitourinary: Negative for difficulty urinating.   Musculoskeletal: Positive for back pain. Negative for neck pain.   Skin: Negative for color change, pallor, rash and wound.   Allergic/Immunologic: Positive for environmental allergies and food allergies.   Neurological: Positive for headaches. Negative for dizziness and light-headedness.   Hematological: Bruises/bleeds easily.   Psychiatric/Behavioral: Negative.      I have reviewed and confirmed the accuracy of the ROS as documented by the  "He exercises with enough effort to increase his heart rate 5 days per week.   He is taking medications regularly.         Review of Systems         Objective    /76 (BP Location: Right arm, Patient Position: Sitting, Cuff Size: Adult Large)   Pulse 97   Resp 18   Ht 1.753 m (5' 9\")   Wt 77.6 kg (171 lb)   SpO2 100%   BMI 25.25 kg/m    Body mass index is 25.25 kg/m .  Wt Readings from Last 3 Encounters:   12/05/23 77.6 kg (171 lb)   11/03/23 80.7 kg (178 lb)   07/31/23 93.4 kg (205 lb 12.8 oz)       Physical Exam   GENERAL: healthy, alert and no distress  NECK: no adenopathy, no asymmetry, masses, or scars and thyroid normal to palpation  RESP: lungs clear to auscultation - no rales, rhonchi or wheezes  CV: regular rate and rhythm, normal S1 S2, no S3 or S4, no murmur, click or rub, no peripheral edema and peripheral pulses strong  ABDOMEN: soft, nontender, no hepatosplenomegaly, no masses and bowel sounds normal  MS: no gross musculoskeletal defects noted, no edema                      " MA/LPN/RN Naomi Aguiar PA-C    Past Medical History:   Diagnosis Date   • Abdominal pain    • Abnormal uterine bleeding (AUB)    • Anxiety and depression    • Arthritis    • Cholecystitis    • Constipation    • Endometriosis    • Frequent UTI    • GERD (gastroesophageal reflux disease)    • Heartburn    • Hemorrhoids    • Kidney stones    • Lesion of breast    • Lump     RIGHT BREAST   • Nausea & vomiting    • PCOS (polycystic ovarian syndrome)    • Sjogren's disease (CMS/HCC)    • Tachycardia    • Wrist fracture     RIGHT ARM      PATIENT UNSURE IF FRACTURED       Past Surgical History:   Procedure Laterality Date   • ABDOMINAL SURGERY     • BREAST BIOPSY Right 11/29/2018    Procedure: breast biopsy ;  Surgeon: Shiv Overton MD;  Location: [facility] OR;  Service: General   • CHOLECYSTECTOMY N/A 8/25/2017    Procedure: CHOLECYSTECTOMY LAPAROSCOPIC;  Surgeon: Franco Mari MD;  Location: [facility];  Service:    • DENTAL PROCEDURE     • DIAGNOSTIC LAPAROSCOPY      X5   • DILATATION AND CURETTAGE     • HEMORRHOIDECTOMY N/A 11/14/2019    Procedure: HEMORRHOID STAPLING;  Surgeon: Franco Mari MD;  Location: [facility] OR;  Service: General   • URETEROSCOPY LASER LITHOTRIPSY WITH STENT INSERTION Right 1/9/2019    Procedure: URETEROSCOPY LASER LITHOTRIPSY retrograde pyelogram;  Surgeon: Ahmet Barrow MD;  Location: [facility] OR;  Service: Urology   • WISDOM TOOTH EXTRACTION         Family History   Problem Relation Age of Onset   • Breast cancer Maternal Aunt    • Alcohol abuse Paternal Grandfather    • Heart disease Paternal Grandfather    • Cancer Maternal Grandfather    • Hypertension Maternal Grandfather        Social History     Socioeconomic History   • Marital status:      Spouse name: Not on file   • Number of children: Not on file   • Years of education: Not on file   • Highest education level: Not on file   Tobacco Use   • Smoking status: Current Every Day Smoker     Packs/day: 0.50      Years: 15.00     Pack years: 7.50     Types: Cigarettes   • Smokeless tobacco: Never Used   Substance and Sexual Activity   • Alcohol use: Yes     Frequency: Never     Comment: RARELY   • Drug use: No   • Sexual activity: Defer     Birth control/protection: None       Current Outpatient Medications:   •  albuterol sulfate  (90 Base) MCG/ACT inhaler, , Disp: , Rfl:   •  Albuterol Sulfate, sensor, 108 (90 Base) MCG/ACT aerosol powder , Inhale 2 puffs As Needed., Disp: , Rfl:   •  aspirin 81 MG EC tablet, Take 81 mg by mouth Daily. LAS T DOSE 11/26/2018, Disp: , Rfl:   •  atenolol (TENORMIN) 25 MG tablet, Take 25 mg by mouth Daily., Disp: , Rfl:   •  doxycycline (VIBRAMYICN) 100 MG tablet, Take 100 mg by mouth 2 (Two) Times a Day., Disp: , Rfl:   •  hydroxychloroquine (PLAQUENIL) 200 MG tablet, Take  by mouth Daily., Disp: , Rfl:   •  ibuprofen (ADVIL,MOTRIN) 600 MG tablet, Take 600 mg by mouth Every 8 (Eight) Hours As Needed for Mild Pain ., Disp: , Rfl:   •  loratadine (CLARITIN) 10 MG tablet, 10 mg Daily., Disp: , Rfl:   •  montelukast (SINGULAIR) 10 MG tablet, Take 10 mg by mouth Every Night., Disp: , Rfl:   •  pantoprazole (PROTONIX) 40 MG EC tablet, Take 40 mg by mouth Daily., Disp: , Rfl:   •  polyethylene glycol (MIRALAX) packet, Take 17 g by mouth Daily., Disp: , Rfl:   •  potassium citrate (UROCIT-K) 10 MEQ (1080 MG) CR tablet, TAKE ONE TABLET BY MOUTH THREE TIMES A DAY WITH MEALS, Disp: 60 each, Rfl: 2  •  Promethazine-Phenyleph-Codeine 6.25-5-10 MG/5ML syrup syrup, Take 5-10 mL by mouth Every 4 (Four) Hours As Needed., Disp: , Rfl:   •  QUEtiapine (SEROquel) 50 MG tablet, Take 50 mg by mouth Every Night., Disp: , Rfl:   •  rizatriptan (MAXALT) 10 MG tablet, , Disp: , Rfl:   •  rOPINIRole (REQUIP) 0.5 MG tablet, , Disp: , Rfl:   •  SUMAtriptan (IMITREX) 100 MG tablet, Take 100 mg by mouth Every 2 (Two) Hours As Needed for Migraine. Take one tablet at onset of headache. May repeat dose one time in 2  "hours if headache not relieved., Disp: , Rfl:     Allergies:   Peanut-containing drug products; Latex; Shrimp; Milk-related compounds; and Sulfa antibiotics    Vitals:  /76 (BP Location: Left arm, Patient Position: Sitting, Cuff Size: Adult)   Pulse 76   Ht 149.9 cm (59\")   Wt 69.2 kg (152 lb 9.6 oz)   SpO2 98%   BMI 30.82 kg/m²     Physical Exam   Constitutional: She is oriented to person, place, and time. She appears well-developed and well-nourished. No distress.   HENT:   Head: Normocephalic and atraumatic.   Nose: Nose normal.   Mouth/Throat: Oropharynx is clear and moist.   Eyes: Conjunctivae are normal. Right eye exhibits no discharge. Left eye exhibits no discharge. No scleral icterus.   Neck: Normal range of motion. No JVD present.   Cardiovascular: Normal rate, regular rhythm and normal heart sounds. Exam reveals no gallop and no friction rub.   No murmur heard.  Pulmonary/Chest: Effort normal and breath sounds normal. No respiratory distress. She has no wheezes. She has no rales. She exhibits no tenderness.   Abdominal: Soft. Bowel sounds are normal. She exhibits no mass. There is tenderness (generalized, mild).   Musculoskeletal: Normal range of motion. She exhibits no edema or deformity.   Neurological: She is alert and oriented to person, place, and time. Coordination normal.   Skin: Skin is warm and dry. No rash noted. She is not diaphoretic. No erythema.   Psychiatric: She has a normal mood and affect. Her behavior is normal. Judgment and thought content normal.   Vitals reviewed.    Results Review:  CT scan of the abdomen and pelvis 1/13/2020: Right intrarenal stone, nonobstructing, normal appendix, status post cholecystectomy.  (on my review, she seems to have a long colon with redundancy in the transverse colon and constipation noted)    Assessment:  1. Gastroesophageal reflux disease, esophagitis presence not specified    2. Intractable vomiting with nausea, unspecified vomiting type  "   3. Left sided abdominal pain    4. Constipation, unspecified constipation type    5. Sjogren's syndrome, with unspecified organ involvement (CMS/[facility])      Plan:  Orders Placed This Encounter   Procedures   • Follow Anesthesia Guidelines / Standing Orders   • Obtain Informed Consent     ESOPHAGOGASTRODUODENOSCOPY WITH BIOPSY CPT CODE: 74671 (N/A), COLONOSCOPY CPT CODE: 29944 (N/A)  She will need an esophagogastroduodenoscopy and colonoscopy performed with IV general sedation. All of the risks, benefits and alternatives of these procedures have been discussed with her, all of her questions have been answered and she has elected to proceed. She should follow up in the office after these procedures to discuss the results and further recommendations can be made at that time.    New Medications Ordered This Visit   Medications   • polyethylene glycol (GoLYTELY) 236 g solution     Sig: Starting at 6pm the day before procedure, drink 8 ounces every 30 minutes until all gone or stools are clear. May add flavor packet.     Dispense:  4000 mL     Refill:  0     I have asked her to increase water intake to at least 4 bottles daily, take Miralax 2 doses daily for the next few days due to constipation noted on recent CT scan. She was instructed to increase dietary fiber intake to 25-45g daily and a list of fiber foods was given. She has agreed to try to increase daily water intake and daily exercise as well.           Return for follow up after procedure to discuss results.      Electronically signed 2/11/2020 1:15 PM  Naomi Aguiar PA-C, [facility]

## 2023-12-09 DIAGNOSIS — N32.89 BLADDER SPASMS: ICD-10-CM

## 2023-12-09 DIAGNOSIS — N39.41 URGE INCONTINENCE OF URINE: ICD-10-CM

## 2023-12-11 RX ORDER — VIBEGRON 75 MG/1
TABLET, FILM COATED ORAL
Qty: 90 TABLET | Refills: 3 | Status: SHIPPED | OUTPATIENT
Start: 2023-12-11

## 2023-12-12 DIAGNOSIS — N20.0 KIDNEY STONE: ICD-10-CM

## 2023-12-12 RX ORDER — TAMSULOSIN HYDROCHLORIDE 0.4 MG/1
1 CAPSULE ORAL DAILY
Qty: 30 CAPSULE | Refills: 3 | Status: SHIPPED | OUTPATIENT
Start: 2023-12-12

## 2024-02-15 ENCOUNTER — OFFICE VISIT (OUTPATIENT)
Dept: GASTROENTEROLOGY | Facility: CLINIC | Age: 34
End: 2024-02-15
Payer: COMMERCIAL

## 2024-02-15 VITALS
OXYGEN SATURATION: 97 % | WEIGHT: 176 LBS | DIASTOLIC BLOOD PRESSURE: 60 MMHG | BODY MASS INDEX: 35.53 KG/M2 | HEART RATE: 87 BPM | SYSTOLIC BLOOD PRESSURE: 104 MMHG

## 2024-02-15 DIAGNOSIS — K22.70 BARRETT'S ESOPHAGUS WITHOUT DYSPLASIA: ICD-10-CM

## 2024-02-15 DIAGNOSIS — K20.0 ESOPHAGITIS, EOSINOPHILIC: Primary | ICD-10-CM

## 2024-02-15 DIAGNOSIS — R13.19 ESOPHAGEAL DYSPHAGIA: ICD-10-CM

## 2024-02-15 DIAGNOSIS — K58.0 IRRITABLE BOWEL SYNDROME WITH DIARRHEA: ICD-10-CM

## 2024-02-15 DIAGNOSIS — K44.9 HIATAL HERNIA: ICD-10-CM

## 2024-02-15 PROCEDURE — 1160F RVW MEDS BY RX/DR IN RCRD: CPT | Performed by: PHYSICIAN ASSISTANT

## 2024-02-15 PROCEDURE — 99214 OFFICE O/P EST MOD 30 MIN: CPT | Performed by: PHYSICIAN ASSISTANT

## 2024-02-15 PROCEDURE — 1159F MED LIST DOCD IN RCRD: CPT | Performed by: PHYSICIAN ASSISTANT

## 2024-02-15 RX ORDER — OXYBUTYNIN CHLORIDE 5 MG/1
5 TABLET, EXTENDED RELEASE ORAL DAILY
COMMUNITY
Start: 2023-11-03

## 2024-02-15 RX ORDER — CLONIDINE HYDROCHLORIDE 0.1 MG/1
TABLET ORAL
COMMUNITY
Start: 2023-10-05

## 2024-02-15 RX ORDER — GABAPENTIN 100 MG/1
200 CAPSULE ORAL 3 TIMES DAILY
COMMUNITY
Start: 2023-12-07

## 2024-02-15 RX ORDER — FLUTICASONE FUROATE AND VILANTEROL 100; 25 UG/1; UG/1
1 POWDER RESPIRATORY (INHALATION) EVERY 12 HOURS
COMMUNITY
Start: 2024-02-08

## 2024-02-15 RX ORDER — PAROXETINE HYDROCHLORIDE 20 MG/1
1 TABLET, FILM COATED ORAL DAILY
COMMUNITY
Start: 2023-11-11

## 2024-02-15 RX ORDER — ESTRADIOL 1.25 MG/1.25G
GEL TOPICAL
COMMUNITY
Start: 2024-01-30

## 2024-02-15 RX ORDER — HYDROXYZINE PAMOATE 100 MG
1 CAPSULE ORAL EVERY 6 HOURS SCHEDULED
COMMUNITY
Start: 2023-10-05

## 2024-02-15 RX ORDER — SUMATRIPTAN 100 MG/1
TABLET, FILM COATED ORAL EVERY 24 HOURS
COMMUNITY

## 2024-02-15 RX ORDER — DEXLANSOPRAZOLE 60 MG/1
1 CAPSULE, DELAYED RELEASE ORAL DAILY
Qty: 90 CAPSULE | Refills: 3 | Status: SHIPPED | OUTPATIENT
Start: 2024-02-15

## 2024-02-15 RX ORDER — NADOLOL 40 MG/1
60 TABLET ORAL DAILY
COMMUNITY
Start: 2023-09-07 | End: 2024-09-06

## 2024-02-15 RX ORDER — SODIUM CHLORIDE 9 MG/ML
30 INJECTION, SOLUTION INTRAVENOUS CONTINUOUS PRN
OUTPATIENT
Start: 2024-02-15

## 2024-02-15 RX ORDER — ERENUMAB-AOOE 70 MG/ML
70 INJECTION SUBCUTANEOUS
COMMUNITY
Start: 2023-12-07

## 2024-02-15 RX ORDER — CYCLOBENZAPRINE HCL 10 MG
TABLET ORAL EVERY 12 HOURS
COMMUNITY
Start: 2024-02-08

## 2024-02-16 PROBLEM — K20.0 ESOPHAGITIS, EOSINOPHILIC: Status: ACTIVE | Noted: 2024-02-15

## 2024-03-21 ENCOUNTER — OFFICE VISIT (OUTPATIENT)
Dept: UROLOGY | Facility: CLINIC | Age: 34
End: 2024-03-21
Payer: COMMERCIAL

## 2024-03-21 ENCOUNTER — HOSPITAL ENCOUNTER (OUTPATIENT)
Dept: GENERAL RADIOLOGY | Facility: HOSPITAL | Age: 34
Discharge: HOME | End: 2024-03-21
Payer: COMMERCIAL

## 2024-03-21 VITALS
BODY MASS INDEX: 36.25 KG/M2 | HEIGHT: 59 IN | WEIGHT: 179.8 LBS | SYSTOLIC BLOOD PRESSURE: 139 MMHG | DIASTOLIC BLOOD PRESSURE: 97 MMHG | HEART RATE: 97 BPM

## 2024-03-21 DIAGNOSIS — K58.1 IRRITABLE BOWEL SYNDROME WITH CONSTIPATION: ICD-10-CM

## 2024-03-21 DIAGNOSIS — N20.0 KIDNEY STONE: Primary | ICD-10-CM

## 2024-03-21 PROCEDURE — 74018 RADEX ABDOMEN 1 VIEW: CPT

## 2024-03-21 RX ORDER — TAMSULOSIN HYDROCHLORIDE 0.4 MG/1
1 CAPSULE ORAL DAILY
Qty: 30 CAPSULE | Refills: 5 | Status: SHIPPED | OUTPATIENT
Start: 2024-03-21

## 2024-03-21 RX ORDER — TRAMADOL HYDROCHLORIDE 50 MG/1
50 TABLET ORAL EVERY 6 HOURS PRN
Qty: 10 TABLET | Refills: 0 | Status: SHIPPED | OUTPATIENT
Start: 2024-03-21

## 2024-03-21 RX ORDER — METHOTREXATE 2.5 MG/1
TABLET ORAL
COMMUNITY
Start: 2024-03-05

## 2024-03-21 NOTE — PROGRESS NOTES
"Chief Complaint:    Chief Complaint   Patient presents with    Kidney stone       Vital Signs:   /97 (BP Location: Left arm, Patient Position: Sitting, Cuff Size: Adult)   Pulse 97   Ht 149 cm (58.66\")   Wt 81.6 kg (179 lb 12.8 oz)   BMI 36.74 kg/m²   Body mass index is 36.74 kg/m².      HPI:  Pili Webster is a 33 y.o. female who presents today for follow up    History of Present Illness  Ms. Webster presents to the clinic for follow-up for nephrolithiasis, urge incontinence, and constipation.  She reports that she has been doing well up until the past 2 weeks and began to have right-sided back or flank pain.  She reports this is a 6 out of 10.  She is unsure if she is passing another kidney stone.  She denies any significant gross hematuria, fever, chills, vomiting, or inability urinate.  She does endorse some intermittent nausea.  She was unable to urinate in office today and bladder scan was 0.  I did complete a KUB that showed a moderate amount of right colonic stool overlying the right kidney.  There was some air throughout the bowel but no obstruction visualized.  No significant renal stones or calcifications were noted.  Some phleboliths were seen on KUB.  Patient does report undergoing a bowel cleanout by gastroenterologist roughly 2 weeks ago.  She states that she has intermittent constipation but does not take anything for this.      Past Medical History:  Past Medical History:   Diagnosis Date    Abdominal pain     Abnormal uterine bleeding (AUB)     Anxiety and depression     Arthritis     Asthma     mild    Cholecystitis     Constipation     Endometriosis     Frequent UTI     GERD (gastroesophageal reflux disease)     Heartburn     Hemorrhoids     IBS (irritable bowel syndrome)     Kidney stones     Lesion of breast     Lump     RIGHT BREAST    Nausea & vomiting     PCOS (polycystic ovarian syndrome)     PONV (postoperative nausea and vomiting)     Psoriatic arthritis     Sjogren's " disease     Sleep apnea     no cpap    Snores     Tachycardia     Wrist fracture     RIGHT ARM      PATIENT UNSURE IF FRACTURED       Current Meds:  Current Outpatient Medications   Medication Sig Dispense Refill    albuterol sulfate  (90 Base) MCG/ACT inhaler Inhale 1 puff As Needed.      amitriptyline (ELAVIL) 25 MG tablet Take  by mouth Daily.      ARIPiprazole (ABILIFY) 10 MG tablet Take 1 tablet by mouth Daily.      cloNIDine (CATAPRES) 0.1 MG tablet TAKE ONE TABLET BY MOUTH EVERY NIGHT AT BEDTIME FOR NIGHTMARES/SLEEP      colestipol (COLESTID) 1 g tablet Take 1 tablet by mouth Daily. 90 tablet 3    cyclobenzaprine (FLEXERIL) 10 MG tablet Take  by mouth Every 12 (Twelve) Hours.      dexlansoprazole (DEXILANT) 60 MG capsule Take 1 capsule by mouth Daily. 90 capsule 3    dicyclomine (BENTYL) 20 MG tablet TAKE ONE TABLET BY MOUTH FOUR TIMES A DAY BEFORE MEALS AND AT BEDTIME 120 tablet 3    Dupilumab (Dupixent) 300 MG/2ML solution pen-injector Inject 2 mL under the skin into the appropriate area as directed 1 (One) Time Per Week. 8 mL 11    Erenumab-aooe (Aimovig) 70 MG/ML auto-injector Inject 1 mL under the skin into the appropriate area as directed Every 28 (Twenty-Eight) Days.      Estradiol 1.25 MG/1.25GM gel       Fluticasone Furoate-Vilanterol (Breo Ellipta) 100-25 MCG/ACT aerosol powder  Inhale 1 puff Every 12 (Twelve) Hours.      folic acid (FOLVITE) 1 MG tablet Take 1 tablet by mouth Daily.      gabapentin (NEURONTIN) 100 MG capsule Take 2 capsules by mouth 3 (Three) Times a Day.      hydrOXYzine pamoate (VISTARIL) 100 MG capsule Take 1 capsule by mouth Every 6 (Six) Hours.      ketorolac (TORADOL) 10 MG tablet Take 1 tablet by mouth Every 6 (Six) Hours As Needed for Moderate Pain. 16 tablet 1    loratadine (CLARITIN) 10 MG tablet Take 1 tablet by mouth Daily.      methotrexate 2.5 MG tablet       montelukast (SINGULAIR) 10 MG tablet Take 1 tablet by mouth Every Night.      nadolol (CORGARD) 40 MG  tablet Take 1.5 tablets by mouth Daily.      oxybutynin XL (DITROPAN-XL) 5 MG 24 hr tablet Take 1 tablet by mouth Daily.      PARoxetine (PAXIL) 20 MG tablet Take 1 tablet by mouth Daily.      promethazine (PHENERGAN) 25 MG tablet Take 1 tablet by mouth Every 6 (Six) Hours As Needed for Nausea or Vomiting. 21 tablet 2    SUMAtriptan (IMITREX) 100 MG tablet Take  by mouth Daily.      tamsulosin (FLOMAX) 0.4 MG capsule 24 hr capsule Take 1 capsule by mouth Daily. 30 capsule 5    Vibegron (Gemtesa) 75 MG tablet TAKE ONE TABLET BY MOUTH ONCE NIGHTLY 90 tablet 3    vitamin D (ERGOCALCIFEROL) 1.25 MG (87456 UT) capsule capsule Take 1 capsule by mouth Every 7 (Seven) Days.      linaclotide (LINZESS) 72 MCG capsule capsule Take 1 capsule by mouth Every Morning Before Breakfast. 12 capsule 0    traMADol (ULTRAM) 50 MG tablet Take 1 tablet by mouth Every 6 (Six) Hours As Needed for Moderate Pain. 10 tablet 0     No current facility-administered medications for this visit.        Allergies:   Allergies   Allergen Reactions    Peanut-Containing Drug Products Anaphylaxis     AND PEANUTS IN FOODS    Latex Hives    Shrimp Swelling     Facial swelling      Milk-Related Compounds Nausea And Vomiting    Sulfa Antibiotics Rash        Past Surgical History:  Past Surgical History:   Procedure Laterality Date    ABDOMINAL SURGERY      ANAL SCOPE N/A 09/30/2022    Procedure: ANAL SCOPE;  Surgeon: Franco Mari MD;  Location: [facility] OR;  Service: General;  Laterality: N/A;    BREAST BIOPSY Right 11/29/2018    Procedure: breast biopsy ;  Surgeon: Shiv Overton MD;  Location: [facility] OR;  Service: General    CHOLECYSTECTOMY N/A 08/25/2017    Procedure: CHOLECYSTECTOMY LAPAROSCOPIC;  Surgeon: Franco Mari MD;  Location: [facility] OR;  Service:     COLONOSCOPY N/A 03/09/2020    Procedure: COLONOSCOPY CPT CODE: 09978;  Surgeon: Jeremiah Murdock MD;  Location: [facility] OR;  Service: Gastroenterology;  Laterality: N/A;     DENTAL PROCEDURE      DIAGNOSTIC LAPAROSCOPY      X5    DILATATION AND CURETTAGE      ENDOSCOPY N/A 03/09/2020    Procedure: ESOPHAGOGASTRODUODENOSCOPY WITH BIOPSY CPT CODE: 48333;  Surgeon: Jeremiah Murdock MD;  Location: [facility] OR;  Service: Gastroenterology;  Laterality: N/A;    ENDOSCOPY N/A 02/01/2021    Procedure: ESOPHAGOGASTRODUODENOSCOPY WITH BIOPSY CPT CODE: 56398;  Surgeon: Jeremiah Murdock MD;  Location: [facility] OR;  Service: Gastroenterology;  Laterality: N/A;    ENDOSCOPY N/A 6/20/2023    Procedure: ESOPHAGOGASTRODUODENOSCOPY WITH BIOPSY CPT CODE: 42881;  Surgeon: Beto Mejia MD;  Location: [facility] ENDOSCOPY;  Service: Gastroenterology;  Laterality: N/A;    HEMORRHOIDECTOMY N/A 11/14/2019    Procedure: HEMORRHOID STAPLING;  Surgeon: Franco Mari MD;  Location: [facility] OR;  Service: General    HYSTERECTOMY  03/22/2023    Total    KNEE ARTHROSCOPY W/ MENISCECTOMY Right 04/11/2022    Procedure: KNEE ARTHROSCOPIC DEBRIDEMENT, PRP INJECTION AND medial femoral condraplasty MENISCAL DEBRIDEMENT;  Surgeon: Musa Yoon MD;  Location: [facility] OR;  Service: Orthopedics;  Laterality: Right;    URETEROSCOPY LASER LITHOTRIPSY WITH STENT INSERTION Right 01/09/2019    Procedure: URETEROSCOPY LASER LITHOTRIPSY retrograde pyelogram;  Surgeon: Ahmet Barrow MD;  Location: [facility];  Service: Urology    WISDOM TOOTH EXTRACTION         Social History:  Social History     Socioeconomic History    Marital status:    Tobacco Use    Smoking status: Every Day     Current packs/day: 0.50     Average packs/day: 0.5 packs/day for 21.2 years (10.6 ttl pk-yrs)     Types: Cigarettes     Start date: 2003     Passive exposure: Current    Smokeless tobacco: Never   Vaping Use    Vaping status: Former    Substances: Nicotine, Flavoring   Substance and Sexual Activity    Alcohol use: Yes     Comment: RARELY    Drug use: No    Sexual activity: Defer       Family History:  Family  History   Problem Relation Age of Onset    Breast cancer Maternal Aunt     Alcohol abuse Paternal Grandfather     Heart disease Paternal Grandfather     Cancer Maternal Grandfather     Hypertension Maternal Grandfather     Colon cancer Neg Hx     Liver cancer Neg Hx        Review of Systems:  Review of Systems   Constitutional:  Positive for fatigue. Negative for fever and unexpected weight change.   Respiratory:  Negative for chest tightness and shortness of breath.    Cardiovascular:  Negative for chest pain.   Gastrointestinal:  Negative for abdominal pain, constipation, diarrhea, nausea and vomiting.   Genitourinary:  Negative for difficulty urinating, dysuria, flank pain, frequency, hematuria and urgency.   Skin:  Negative for rash.   Psychiatric/Behavioral:  Negative for confusion and suicidal ideas.        Physical Exam:  Physical Exam      Recent Image (CT and/or KUB):   CT Abdomen and Pelvis: No results found for this or any previous visit.     CT Stone Protocol: Results for orders placed during the hospital encounter of 10/28/22    CT Abdomen Pelvis Stone Protocol    Narrative  EXAM:  CT Abdomen and Pelvis Without Intravenous Contrast    EXAM DATE:  10/28/2022 3:27 PM    CLINICAL HISTORY:  Nephrolithiasis; N20.0-Calculus of kidney    TECHNIQUE:  Axial computed tomography images of the abdomen and pelvis without  intravenous contrast.  Sagittal and coronal reformatted images were  created and reviewed.  This CT exam was performed using one or more of  the following dose reduction techniques:  automated exposure control,  adjustment of the mA and/or kV according to patient size, and/or use of  iterative reconstruction technique.    COMPARISON:  CT ABDOMEN PELVIS STONE PROTOCOL- dated 07/20/2022    FINDINGS:  LUNG BASES:  Unremarkable.  No mass.  No consolidation.    ABDOMEN:  LIVER:  Unremarkable.  GALLBLADDER AND BILE DUCTS:  Cholecystectomy clips.  No ductal  dilation.  PANCREAS:  Unremarkable.  No ductal  dilation.  SPLEEN:  Unremarkable.  No splenomegaly.  ADRENALS:  Unremarkable.  No mass.  KIDNEYS AND URETERS:  Unremarkable.  No obstructing stones.  No  hydronephrosis.  STOMACH AND BOWEL:  Unremarkable.  No obstruction.  No mucosal  thickening.    PELVIS:  APPENDIX:  No findings to suggest acute appendicitis.  BLADDER:  Unremarkable.  No stones.  REPRODUCTIVE:  Right adnexal region cyst measuring 2.6 cm.    ABDOMEN and PELVIS:  INTRAPERITONEAL SPACE:  Unremarkable.  No free air.  No significant  fluid collection.  BONES/JOINTS:  No acute fracture.  No dislocation.  SOFT TISSUES:  Unremarkable.  VASCULATURE:  Unremarkable.  No abdominal aortic aneurysm.  LYMPH NODES:  Unremarkable.  No enlarged lymph nodes.    Impression  Right adnexal region cyst measuring 2.6 cm.    This report was finalized on 10/28/2022 3:52 PM by Dr. Henrry Matthews MD.     KUB: Results for orders placed in visit on 09/28/23    XR abdomen kub    Narrative  EXAM:  XR Abdomen, 1 View    EXAM DATE:  9/28/2023 5:01 PM    CLINICAL HISTORY:  right flank pain; N20.0-Calculus of kidney    TECHNIQUE:  Frontal supine view of the abdomen/pelvis.    COMPARISON:  No relevant prior studies available.    FINDINGS:  Gastrointestinal tract:  Unremarkable as visualized.  No dilation.  Bones/joints:  Unremarkable as visualized.    Impression  Unremarkable abdominal x-ray.      This report was finalized on 9/28/2023 10:31 PM by Dr. Musa Jaime MD.       Labs:  Brief Urine Lab Results  (Last result in the past 365 days)        Color   Clarity   Blood   Leuk Est   Nitrite   Protein   CREAT   Urine HCG        09/28/23 1508 Yellow   Clear   Negative   Negative   Negative   Negative                 No visits with results within 3 Month(s) from this visit.   Latest known visit with results is:   Office Visit on 09/28/2023   Component Date Value Ref Range Status    Color 09/28/2023 Yellow  Yellow, Straw, Dark Yellow, Mimi Final    Clarity, UA 09/28/2023 Clear  Clear  Final    Specific Gravity  09/28/2023 1.005  1.005 - 1.030 Final    pH, Urine 09/28/2023 6.0  5.0 - 8.0 Final    Leukocytes 09/28/2023 Negative  Negative Final    Nitrite, UA 09/28/2023 Negative  Negative Final    Protein, POC 09/28/2023 Negative  Negative mg/dL Final    Glucose, UA 09/28/2023 Negative  Negative mg/dL Final    Ketones, UA 09/28/2023 Negative  Negative Final    Urobilinogen, UA 09/28/2023 Normal  Normal, 0.2 E.U./dL Final    Bilirubin 09/28/2023 Negative  Negative Final    Blood, UA 09/28/2023 Negative  Negative Final    Lot Number 09/28/2023 [id number]   Final    Expiration Date 09/28/2023 10/25/2024   Final        Procedure: None  Procedures     I have reviewed and agree with the above PMH, PSH, FMH, social history, medications, allergies, and labs.     Assessment/Plan:   Problem List Items Addressed This Visit          Genitourinary and Reproductive     Kidney stone - Primary    Relevant Medications    tamsulosin (FLOMAX) 0.4 MG capsule 24 hr capsule    traMADol (ULTRAM) 50 MG tablet    Other Relevant Orders    XR abdomen kub (Completed)     Other Visit Diagnoses       Irritable bowel syndrome with constipation        Relevant Medications    linaclotide (LINZESS) 72 MCG capsule capsule            Health Maintenance:   Health Maintenance Due   Topic Date Due    Pneumococcal Vaccine 0-64 (1 of 2 - PCV) Never done    TDAP/TD VACCINES (1 - Tdap) Never done    ANNUAL PHYSICAL  Never done    PAP SMEAR  Never done    INFLUENZA VACCINE  08/01/2023    COVID-19 Vaccine (4 - 2023-24 season) 09/01/2023        Smoking Counseling: Everyday smoker.  Never used smokeless tobacco.  Counseling given however not ready to quit at this time.    Urine Incontinence: Patient reports that she is having improvement on urinary continence symptoms with Gemtesa.    Patient was given instructions and counseling regarding her condition or for health maintenance advice. Please see specific information pulled into the AVS if  appropriate.    Patient Education:   Kidney stone -patient's KUB completed in office today shows no significant calcifications or renal stones.  There was some notable for weight loss.  She did have a stool burden overlying the right kidney.  Given persistent back and flank pain consistent with stone was started on Flomax 0.4 mg once daily.  Discussed the risk benefits of this medication.  Advised her to increase water intake 2 to 3 L/day.  Advised her if symptoms not improved we can proceed forward with a CT scan of the abdomen and pelvis.  Patient verbalized understanding.  Narcotic pain medication - patient has significant acute pain that I believe would be an indication for the use of narcotic pain medication. I discussed the significant risks of pain medication and the fact that this will be a short only option and I will give her no more than a three-day supply of pain medication, I will not plan long-term medication, and that this will be sent to a pain clinic if it at all becomes necessary. We discussed signing a pain medication agreement and the fact that we're going to run a state ANGEL review to be sure the patient is not getting pain medication from elsewhere. If this is the case, we will not give pain medication as part of the patient's treatment plan of there being prescribed a controlled substance with potential for abuse. This patient has been well aware of the appropriate dose of such medications including the risks for somnolence, limited ability to drive and/or safety and the significant potential for overdose. It has been made clear that these medications are for the prescribed patient only without concomitant use of alcohol or other substance unless prescribed by the medical provider. Has completed prescribing agreement detailing the terms of continue prescribing him a controlled substance including monitoring Angel reports, the possibility of urine drug screens, and pill counts. The patient  is aware that we review ANGEL reports on a regular basis and scan them into the chart. History and physical examination exhibited continued safe and appropriate use of controlled substances. We also discussed the fact that the new [place] legislation allows only a three-day prescription for pain medication. In this situation he will be referred to a chronic pain clinic.   IBS constipation -due to the patient's persistent constipation we will give her samples of Linzess 72 mg in office today.  Advised her to take this once in the morning prior to breakfast.  Discussed the risk and benefits of these medications in detail with the patient.  Advised her not take with other laxatives if she is experiencing significant diarrhea.  Patient verbalized understanding and agreed to plan of care.    Visit Diagnoses:    ICD-10-CM ICD-9-CM   1. Kidney stone  N20.0 592.0   2. Irritable bowel syndrome with constipation  K58.1 564.1       Meds Ordered During Visit:  New Medications Ordered This Visit   Medications    tamsulosin (FLOMAX) 0.4 MG capsule 24 hr capsule     Sig: Take 1 capsule by mouth Daily.     Dispense:  30 capsule     Refill:  5    traMADol (ULTRAM) 50 MG tablet     Sig: Take 1 tablet by mouth Every 6 (Six) Hours As Needed for Moderate Pain.     Dispense:  10 tablet     Refill:  0    linaclotide (LINZESS) 72 MCG capsule capsule     Sig: Take 1 capsule by mouth Every Morning Before Breakfast.     Dispense:  12 capsule     Refill:  0       Follow Up Appointment: 6 months  No follow-ups on file.      This document has been electronically signed by Mati Rankin PA-C   March 22, 2024 08:16 EDT    Part of this note may be an electronic transcription/translation of spoken language to printed text using the Dragon Dictation System.   0 = swallows foods/liquids without difficulty

## 2024-04-09 DIAGNOSIS — K58.0 IRRITABLE BOWEL SYNDROME WITH DIARRHEA: ICD-10-CM

## 2024-04-09 RX ORDER — MONTELUKAST SODIUM 4 MG/1
1 TABLET, CHEWABLE ORAL DAILY
Qty: 90 TABLET | Refills: 3 | Status: SHIPPED | OUTPATIENT
Start: 2024-04-09

## 2024-05-03 DIAGNOSIS — N20.0 KIDNEY STONE: ICD-10-CM

## 2024-05-06 RX ORDER — PROMETHAZINE HYDROCHLORIDE 25 MG/1
25 TABLET ORAL EVERY 6 HOURS PRN
Qty: 21 TABLET | Refills: 2 | Status: SHIPPED | OUTPATIENT
Start: 2024-05-06

## 2024-05-17 DIAGNOSIS — N39.41 URGE INCONTINENCE OF URINE: Primary | ICD-10-CM

## 2024-05-17 RX ORDER — OXYBUTYNIN CHLORIDE 5 MG/1
5 TABLET, EXTENDED RELEASE ORAL DAILY
Qty: 90 TABLET | Refills: 3 | Status: SHIPPED | OUTPATIENT
Start: 2024-05-17

## 2024-06-06 NOTE — PRE-PROCEDURE INSTRUCTIONS
PAT phone history completed with pt for upcoming procedure on 6/10/24, with Dr. Mejia.     PAT PASS GIVEN/REVIEWED WITH PT.  VERBALIZED UNDERSTANDING OF THE FOLLOWING:  DO NOT EAT, DRINK, SMOKE, USE SMOKELESS TOBACCO OR CHEW GUM AFTER MIDNIGHT THE NIGHT BEFORE SURGERY.  THIS ALSO INCLUDES HARD CANDIES AND MINTS.    DO NOT SHAVE THE AREA TO BE OPERATED ON AT LEAST 48 HOURS PRIOR TO THE PROCEDURE.  DO NOT WEAR MAKE UP OR NAIL POLISH.  DO NOT LEAVE IN ANY PIERCING OR WEAR JEWELRY THE DAY OF SURGERY.      DO NOT USE ADHESIVES IF YOU WEAR DENTURES.    DO NOT WEAR EYE CONTACTS; BRING IN YOUR GLASSES.    ONLY TAKE MEDICATION THE MORNING OF YOUR PROCEDURE IF INSTRUCTED BY YOUR SURGEON WITH ENOUGH WATER TO SWALLOW THE MEDICATION.  IF YOUR SURGEON DID NOT SPECIFY WHICH MEDICATIONS TO TAKE, YOU WILL NEED TO CALL THEIR OFFICE FOR FURTHER INSTRUCTIONS AND DO AS THEY INSTRUCT.    LEAVE ANYTHING YOU CONSIDER VALUABLE AT HOME.    YOU WILL NEED TO ARRANGE FOR SOMEONE TO DRIVE YOU HOME AFTER SURGERY.  IT IS RECOMMENDED THAT YOU DO NOT DRIVE, WORK, DRINK ALCOHOL OR MAKE MAJOR DECISIONS FOR AT LEAST 24 HOURS AFTER YOUR PROCEDURE IS COMPLETE.      THE DAY OF YOUR PROCEDURE, BRING IN THE FOLLOWING IF APPLICABLE:   PICTURE ID AND INSURANCE/MEDICARE OR MEDICAID CARDS/ANY CO-PAY THAT MAY BE DUE   COPY OF ADVANCED DIRECTIVE/LIVING WILL/POWER OR    CPAP/BIPAP/INHALERS   SKIN PREP SHEET   YOUR PREADMISSION TESTING PASS (IF NOT A PHONE HISTORY)    Medication instructions given to pt by RN per anesthesia protocol.  Pt referred back to surgeon for further instructions if he/she is on any blood thinners.

## 2024-06-10 ENCOUNTER — ANESTHESIA (OUTPATIENT)
Dept: GASTROENTEROLOGY | Facility: HOSPITAL | Age: 34
End: 2024-06-10
Payer: COMMERCIAL

## 2024-06-10 ENCOUNTER — ANESTHESIA EVENT (OUTPATIENT)
Dept: GASTROENTEROLOGY | Facility: HOSPITAL | Age: 34
End: 2024-06-10
Payer: COMMERCIAL

## 2024-06-10 ENCOUNTER — HOSPITAL ENCOUNTER (OUTPATIENT)
Facility: HOSPITAL | Age: 34
Setting detail: HOSPITAL OUTPATIENT SURGERY
Discharge: HOME | End: 2024-06-10
Attending: INTERNAL MEDICINE | Admitting: INTERNAL MEDICINE
Payer: COMMERCIAL

## 2024-06-10 VITALS
TEMPERATURE: 97.2 F | BODY MASS INDEX: 37.09 KG/M2 | WEIGHT: 184 LBS | RESPIRATION RATE: 16 BRPM | SYSTOLIC BLOOD PRESSURE: 134 MMHG | DIASTOLIC BLOOD PRESSURE: 93 MMHG | OXYGEN SATURATION: 96 % | HEART RATE: 93 BPM | HEIGHT: 59 IN

## 2024-06-10 DIAGNOSIS — Z87.19 HISTORY OF BARRETT'S ESOPHAGUS: ICD-10-CM

## 2024-06-10 DIAGNOSIS — R13.19 ESOPHAGEAL DYSPHAGIA: ICD-10-CM

## 2024-06-10 DIAGNOSIS — K20.0 ESOPHAGITIS, EOSINOPHILIC: ICD-10-CM

## 2024-06-10 DIAGNOSIS — K22.70 BARRETT'S ESOPHAGUS WITHOUT DYSPLASIA: ICD-10-CM

## 2024-06-10 PROCEDURE — 25010000002 ONDANSETRON PER 1 MG: Performed by: NURSE ANESTHETIST, CERTIFIED REGISTERED

## 2024-06-10 PROCEDURE — 25010000002 PROPOFOL 200 MG/20ML EMULSION: Performed by: NURSE ANESTHETIST, CERTIFIED REGISTERED

## 2024-06-10 PROCEDURE — 25010000002 MIDAZOLAM PER 1MG: Performed by: NURSE ANESTHETIST, CERTIFIED REGISTERED

## 2024-06-10 PROCEDURE — 25810000003 SODIUM CHLORIDE 0.9 % SOLUTION: Performed by: PHYSICIAN ASSISTANT

## 2024-06-10 PROCEDURE — 25010000002 GLYCOPYRROLATE PF 0.4 MG/2ML SOLUTION: Performed by: NURSE ANESTHETIST, CERTIFIED REGISTERED

## 2024-06-10 RX ORDER — MIDAZOLAM HYDROCHLORIDE 2 MG/2ML
INJECTION, SOLUTION INTRAMUSCULAR; INTRAVENOUS AS NEEDED
Status: DISCONTINUED | OUTPATIENT
Start: 2024-06-10 | End: 2024-06-10 | Stop reason: SURG

## 2024-06-10 RX ORDER — ONDANSETRON 2 MG/ML
INJECTION INTRAMUSCULAR; INTRAVENOUS AS NEEDED
Status: DISCONTINUED | OUTPATIENT
Start: 2024-06-10 | End: 2024-06-10 | Stop reason: SURG

## 2024-06-10 RX ORDER — PROPOFOL 10 MG/ML
INJECTION, EMULSION INTRAVENOUS AS NEEDED
Status: DISCONTINUED | OUTPATIENT
Start: 2024-06-10 | End: 2024-06-10 | Stop reason: SURG

## 2024-06-10 RX ORDER — LIDOCAINE HCL/PF 100 MG/5ML
SYRINGE (ML) INJECTION AS NEEDED
Status: DISCONTINUED | OUTPATIENT
Start: 2024-06-10 | End: 2024-06-10 | Stop reason: SURG

## 2024-06-10 RX ORDER — OXYCODONE HYDROCHLORIDE AND ACETAMINOPHEN 5; 325 MG/1; MG/1
1 TABLET ORAL EVERY 6 HOURS PRN
COMMUNITY

## 2024-06-10 RX ORDER — SODIUM CHLORIDE 9 MG/ML
30 INJECTION, SOLUTION INTRAVENOUS CONTINUOUS PRN
Status: DISCONTINUED | OUTPATIENT
Start: 2024-06-10 | End: 2024-06-10 | Stop reason: HOSPADM

## 2024-06-10 RX ADMIN — ONDANSETRON 4 MG: 2 INJECTION INTRAMUSCULAR; INTRAVENOUS at 10:53

## 2024-06-10 RX ADMIN — PROPOFOL 100 MG: 10 INJECTION, EMULSION INTRAVENOUS at 11:00

## 2024-06-10 RX ADMIN — SODIUM CHLORIDE 30 ML/HR: 9 INJECTION, SOLUTION INTRAVENOUS at 10:24

## 2024-06-10 RX ADMIN — PROPOFOL 50 MG: 10 INJECTION, EMULSION INTRAVENOUS at 11:03

## 2024-06-10 RX ADMIN — Medication 50 MG: at 11:00

## 2024-06-10 RX ADMIN — PROPOFOL 50 MG: 10 INJECTION, EMULSION INTRAVENOUS at 11:07

## 2024-06-10 RX ADMIN — MIDAZOLAM HYDROCHLORIDE 2 MG: 1 INJECTION, SOLUTION INTRAMUSCULAR; INTRAVENOUS at 10:53

## 2024-06-10 RX ADMIN — GLYCOPYRROLATE 0.1 MG: 0.2 INJECTION, SOLUTION INTRAMUSCULAR; INTRAVENOUS at 10:53

## 2024-06-10 RX ADMIN — PROPOFOL 50 MG: 10 INJECTION, EMULSION INTRAVENOUS at 11:05

## 2024-06-10 NOTE — H&P
[facility]  HISTORY AND PHYSICAL    Patient Name: Pili Webster  : 1990  MRN: [id number]    Chief Complaint:   For EGD      History EoE surveillance   Dysphagia    Past Medical History:   Diagnosis Date    Abdominal pain     Abnormal uterine bleeding (AUB)     Anxiety and depression     Arthritis     Asthma     mild    Cholecystitis     Constipation     Endometriosis     Frequent UTI     GERD (gastroesophageal reflux disease)     Heartburn     Hemorrhoids     Hypertension     IBS (irritable bowel syndrome)     Kidney stones     Lesion of breast     Lump     RIGHT BREAST    Nausea & vomiting     PCOS (polycystic ovarian syndrome)     PONV (postoperative nausea and vomiting)     Psoriatic arthritis     Sjogren's disease     Sleep apnea     no cpap    Snores     Tachycardia     Wrist fracture     RIGHT ARM      PATIENT UNSURE IF FRACTURED       Past Surgical History:   Procedure Laterality Date    ABDOMINAL SURGERY      ANAL SCOPE N/A 2022    Procedure: ANAL SCOPE;  Surgeon: Franco Mari MD;  Location: [facility] OR;  Service: General;  Laterality: N/A;    BLADDER REPAIR  2024    BREAST BIOPSY Right 2018    Procedure: breast biopsy ;  Surgeon: Shiv Overton MD;  Location: [facility] OR;  Service: General    CHOLECYSTECTOMY N/A 2017    Procedure: CHOLECYSTECTOMY LAPAROSCOPIC;  Surgeon: Franco Mari MD;  Location: [facility] OR;  Service:     COLONOSCOPY N/A 2020    Procedure: COLONOSCOPY CPT CODE: 52142;  Surgeon: Jeremiah Murdock MD;  Location: [facility] OR;  Service: Gastroenterology;  Laterality: N/A;    DENTAL PROCEDURE      DIAGNOSTIC LAPAROSCOPY      X5    DILATATION AND CURETTAGE      ENDOSCOPY N/A 2020    Procedure: ESOPHAGOGASTRODUODENOSCOPY WITH BIOPSY CPT CODE: 12372;  Surgeon: Jeremiah Murdock MD;  Location: [facility] OR;  Service: Gastroenterology;  Laterality: N/A;    ENDOSCOPY N/A 2021    Procedure:  ESOPHAGOGASTRODUODENOSCOPY WITH BIOPSY CPT CODE: 08621;  Surgeon: Jeremiah Murdock MD;  Location: [facility] OR;  Service: Gastroenterology;  Laterality: N/A;    ENDOSCOPY N/A 06/20/2023    Procedure: ESOPHAGOGASTRODUODENOSCOPY WITH BIOPSY CPT CODE: 25473;  Surgeon: Beto Mejia MD;  Location: [facility] ENDOSCOPY;  Service: Gastroenterology;  Laterality: N/A;    HEMORRHOIDECTOMY N/A 11/14/2019    Procedure: HEMORRHOID STAPLING;  Surgeon: Franco Mari MD;  Location: [facility] OR;  Service: General    HYSTERECTOMY  03/22/2023    Total    KNEE ARTHROSCOPY W/ MENISCECTOMY Right 04/11/2022    Procedure: KNEE ARTHROSCOPIC DEBRIDEMENT, PRP INJECTION AND medial femoral condraplasty MENISCAL DEBRIDEMENT;  Surgeon: Musa Yoon MD;  Location: [facility] OR;  Service: Orthopedics;  Laterality: Right;    URETEROSCOPY LASER LITHOTRIPSY WITH STENT INSERTION Right 01/09/2019    Procedure: URETEROSCOPY LASER LITHOTRIPSY retrograde pyelogram;  Surgeon: Ahmet Barrow MD;  Location: [facility] OR;  Service: Urology    WISDOM TOOTH EXTRACTION         Social History     Socioeconomic History    Marital status:    Tobacco Use    Smoking status: Every Day     Current packs/day: 0.50     Average packs/day: 0.5 packs/day for 21.4 years (10.7 ttl pk-yrs)     Types: Cigarettes     Start date: 2003     Passive exposure: Current    Smokeless tobacco: Never   Vaping Use    Vaping status: Former    Substances: Nicotine, Flavoring   Substance and Sexual Activity    Alcohol use: Yes     Comment: RARELY    Drug use: No    Sexual activity: Defer       Family History   Problem Relation Age of Onset    Breast cancer Maternal Aunt     Alcohol abuse Paternal Grandfather     Heart disease Paternal Grandfather     Cancer Maternal Grandfather     Hypertension Maternal Grandfather     Colon cancer Neg Hx     Liver cancer Neg Hx        Prior to Admission Medications:  Medications Prior to Admission   Medication Sig Dispense Refill  Last Dose    cloNIDine (CATAPRES) 0.1 MG tablet Take 1 tablet by mouth Every Night.   6/9/2024    colestipol (COLESTID) 1 g tablet TAKE ONE TABLET BY MOUTH DAILY 90 tablet 3 6/9/2024    cyclobenzaprine (FLEXERIL) 10 MG tablet Take 1 tablet by mouth Every 12 (Twelve) Hours.   6/9/2024    dexlansoprazole (DEXILANT) 60 MG capsule Take 1 capsule by mouth Daily. 90 capsule 3 6/9/2024    dicyclomine (BENTYL) 20 MG tablet TAKE ONE TABLET BY MOUTH FOUR TIMES A DAY BEFORE MEALS AND AT BEDTIME (Patient taking differently: Take 1 tablet by mouth 3 (Three) Times a Day.) 120 tablet 3 6/9/2024    Dupilumab (Dupixent) 300 MG/2ML solution pen-injector Inject 2 mL under the skin into the appropriate area as directed 1 (One) Time Per Week. 8 mL 11 Past Month    Erenumab-aooe (Aimovig) 70 MG/ML auto-injector Inject 1 mL under the skin into the appropriate area as directed Every 28 (Twenty-Eight) Days.   Past Month    Estradiol 1.25 MG/1.25GM gel Apply  topically to the appropriate area as directed Daily.   6/9/2024    Fluticasone Furoate-Vilanterol (Breo Ellipta) 100-25 MCG/ACT aerosol powder  Inhale 1 puff Every 12 (Twelve) Hours.   Past Week    folic acid (FOLVITE) 1 MG tablet Take 1 tablet by mouth Daily.   6/9/2024    gabapentin (NEURONTIN) 100 MG capsule Take 2 capsules by mouth 3 (Three) Times a Day.   6/9/2024    hydrOXYzine pamoate (VISTARIL) 100 MG capsule Take 1 capsule by mouth Every 6 (Six) Hours.   6/9/2024    linaclotide (LINZESS) 72 MCG capsule capsule Take 1 capsule by mouth Every Morning Before Breakfast. 12 capsule 0 6/9/2024    loratadine (CLARITIN) 10 MG tablet Take 1 tablet by mouth Daily.   6/9/2024    montelukast (SINGULAIR) 10 MG tablet Take 1 tablet by mouth Every Night.   6/9/2024    nadolol (CORGARD) 40 MG tablet Take 1.5 tablets by mouth Daily.   6/9/2024    oxybutynin XL (DITROPAN-XL) 5 MG 24 hr tablet TAKE ONE TABLET BY MOUTH DAILY 90 tablet 3 6/9/2024    oxyCODONE-acetaminophen (PERCOCET) 5-325 MG per  tablet Take 1 tablet by mouth Every 6 (Six) Hours As Needed for Moderate Pain.   6/9/2024    PARoxetine (PAXIL) 20 MG tablet Take 1 tablet by mouth Daily.   6/9/2024    SUMAtriptan (IMITREX) 100 MG tablet Take 1 tablet by mouth Daily.   Past Week    tamsulosin (FLOMAX) 0.4 MG capsule 24 hr capsule Take 1 capsule by mouth Daily. 30 capsule 5 6/9/2024    Vibegron (Gemtesa) 75 MG tablet TAKE ONE TABLET BY MOUTH ONCE NIGHTLY (Patient taking differently: Take 1 tablet by mouth Daily.) 90 tablet 3 6/9/2024    vitamin D (ERGOCALCIFEROL) 1.25 MG (93514 UT) capsule capsule Take 1 capsule by mouth Every 7 (Seven) Days.   6/9/2024    albuterol sulfate  (90 Base) MCG/ACT inhaler Inhale 1 puff As Needed. (Patient not taking: Reported on 6/6/2024)   Not Taking    amitriptyline (ELAVIL) 25 MG tablet Take  by mouth Daily. (Patient not taking: Reported on 6/6/2024)   Not Taking    ARIPiprazole (ABILIFY) 10 MG tablet Take 1 tablet by mouth Daily. (Patient not taking: Reported on 6/6/2024)   Not Taking    ketorolac (TORADOL) 10 MG tablet Take 1 tablet by mouth Every 6 (Six) Hours As Needed for Moderate Pain. (Patient not taking: Reported on 6/6/2024) 16 tablet 1 Not Taking    methotrexate 2.5 MG tablet  (Patient not taking: Reported on 6/6/2024)   Not Taking    promethazine (PHENERGAN) 25 MG tablet TAKE ONE TABLET BY MOUTH EVERY 6 HOURS AS NEEDED FOR NAUSEA OR VOMITING (Patient not taking: Reported on 6/6/2024) 21 tablet 2 Not Taking    traMADol (ULTRAM) 50 MG tablet Take 1 tablet by mouth Every 6 (Six) Hours As Needed for Moderate Pain. (Patient not taking: Reported on 6/6/2024) 10 tablet 0 Not Taking       Allergies:  Allergies   Allergen Reactions    Peanut-Containing Drug Products Anaphylaxis     AND PEANUTS IN FOODS    Latex Hives    Shrimp Swelling     Facial swelling      Milk-Related Compounds Nausea And Vomiting    Sulfa Antibiotics Rash        Vitals: Temp:  [97.2 °F (36.2 °C)] 97.2 °F (36.2 °C)  Heart Rate:  [89]  89  Resp:  [18] 18  BP: (123)/(89) 123/89    Review Of Systems:  Constitutional:  Negative for chills, fever, and unexpected weight change.  Respiratory:  Negative for cough, chest tightness, shortness of breath, and wheezing.  Cardiovascular:  Negative for chest pain, palpitations, and leg swelling.  Gastrointestinal:  Negative for abdominal distention, abdominal pain, nausea, vomiting.  Neurological:  Negative for weakness, numbness, and headaches.     Physical Exam:    General Appearance:  Alert, cooperative, in no acute distress.   Lungs:   Clear to auscultation, respirations regular, even and                 unlabored.   Heart:  Regular rhythm and normal rate.   Abdomen:   Normal bowel sounds, no masses, no organomegaly. Soft, nontender, nondistended   Neurologic: Alert and oriented x 3. Moves all four limbs equally       Assessment & Plan     Assessment:  Principal Problem:    Esophagitis, eosinophilic  Active Problems:    History of Márquez's esophagus    Esophageal dysphagia      Plan: ESOPHAGOGASTRODUODENOSCOPY WITH DILATATION CPT CODE: 67034 (N/A)     Beto Mejia MD  6/10/2024

## 2024-06-10 NOTE — ANESTHESIA POSTPROCEDURE EVALUATION
Patient: Pili Webster    Procedure Summary       Date: 06/10/24 Room / Location: [facility] ENDOSCOPY 2 / [facility] ENDOSCOPY    Anesthesia Start: 1050 Anesthesia Stop: 1118    Procedure: ESOPHAGOGASTRODUODENOSCOPY WITH BIOPSY (Esophagus) Diagnosis:       History of Márquez's esophagus      Esophagitis, eosinophilic      Esophageal dysphagia      (History of Márquez's esophagus [Z87.19])      (Esophagitis, eosinophilic [K20.0])      (Esophageal dysphagia [R13.19])    Surgeons: Beto Mejia MD Provider: Lyn Romero CRNA    Anesthesia Type: MAC ASA Status: 3            Anesthesia Type: MAC    Vitals  Vitals Value Taken Time   /93 06/10/24 1149   Temp 97.2 °F (36.2 °C) 06/10/24 1149   Pulse 93 06/10/24 1149   Resp 16 06/10/24 1149   SpO2 96 % 06/10/24 1149           Post Anesthesia Care and Evaluation    Patient location during evaluation: PHASE II  Patient participation: complete - patient participated  Level of consciousness: awake and alert  Pain score: 0  Pain management: satisfactory to patient    Airway patency: patent  Anesthetic complications: No anesthetic complications  PONV Status: none  Cardiovascular status: acceptable and stable  Respiratory status: acceptable  Hydration status: acceptable    Comments: Vitals signs as noted in nursing documentation as per protocol.

## 2024-06-10 NOTE — ANESTHESIA PREPROCEDURE EVALUATION
Anesthesia Evaluation     Patient summary reviewed and Nursing notes reviewed   history of anesthetic complications:  PONV  NPO Solid Status: > 8 hours  NPO Liquid Status: > 8 hours           Airway   Mallampati: II  TM distance: >3 FB  Neck ROM: full  Possible difficult intubation  Dental - normal exam     Pulmonary - normal exam   (+) a smoker Current, asthma,sleep apnea (no cpap)  Cardiovascular - normal exam  Exercise tolerance: good (4-7 METS)    Patient on routine beta blocker    (+) hypertension      Neuro/Psych  (+) psychiatric history Anxiety and Depression  GI/Hepatic/Renal/Endo    (+) obesity, hiatal hernia, GERD, renal disease- stones    Musculoskeletal     Abdominal   (+) obese   Substance History - negative use     OB/GYN negative ob/gyn ROS         Other   arthritis,                   Anesthesia Plan    ASA 3     MAC     (Risks and benefits discussed including risk of aspiration, recall and dental damage. All patient questions answered.    Will continue with plan of care.)  intravenous induction     Anesthetic plan, risks, benefits, and alternatives have been provided, discussed and informed consent has been obtained with: patient.  Pre-procedure education provided  Plan discussed with CRNA.      CODE STATUS:

## 2024-06-10 NOTE — DISCHARGE INSTRUCTIONS
- Discharge patient to home (ambulatory).   - Resume previous diet.   - Lifestyle and dietary changes   - Abstinece from smoking  - Follow an antireflux regimen.   - Low dose PPI  - Await pathology results.   - Return to my office in 8 weeks.     No pushing, pulling, tugging,  heavy lifting, or strenuous activity.  No major decision making, driving, or drinking alcoholic beverages for 24 hours. ( due to the medications you have  received)  Always use good hand hygiene/washing techniques.  NO driving while taking pain medications.    * if you have an incision:  Check your incision area every day for signs of infection.   Check for:  * more redness, swelling, or pain  *more fluid or blood  *warmth  *pus or bad smellTo assist you in voiding:  Drink plenty of fluids  Listen to running water while attempting to void.    If you are unable to urinate and you have an uncomfortable urge to void or it has been   6 hours since you were discharged, return to the Emergency Room

## 2024-06-11 LAB — REF LAB TEST METHOD: NORMAL

## 2024-09-03 ENCOUNTER — OFFICE VISIT (OUTPATIENT)
Dept: GASTROENTEROLOGY | Facility: CLINIC | Age: 34
End: 2024-09-03
Payer: COMMERCIAL

## 2024-09-03 VITALS
DIASTOLIC BLOOD PRESSURE: 88 MMHG | BODY MASS INDEX: 36.76 KG/M2 | HEART RATE: 121 BPM | WEIGHT: 182 LBS | SYSTOLIC BLOOD PRESSURE: 144 MMHG | OXYGEN SATURATION: 97 %

## 2024-09-03 DIAGNOSIS — Z87.19 HISTORY OF BARRETT'S ESOPHAGUS: ICD-10-CM

## 2024-09-03 DIAGNOSIS — K20.0 ESOPHAGITIS, EOSINOPHILIC: Primary | ICD-10-CM

## 2024-09-03 DIAGNOSIS — K58.0 IRRITABLE BOWEL SYNDROME WITH DIARRHEA: ICD-10-CM

## 2024-09-03 PROCEDURE — 1160F RVW MEDS BY RX/DR IN RCRD: CPT | Performed by: PHYSICIAN ASSISTANT

## 2024-09-03 PROCEDURE — 1159F MED LIST DOCD IN RCRD: CPT | Performed by: PHYSICIAN ASSISTANT

## 2024-09-03 PROCEDURE — 99213 OFFICE O/P EST LOW 20 MIN: CPT | Performed by: PHYSICIAN ASSISTANT

## 2024-09-03 RX ORDER — PAROXETINE 30 MG/1
30 TABLET, FILM COATED ORAL DAILY
COMMUNITY
Start: 2024-08-27 | End: 2024-11-25

## 2024-09-03 RX ORDER — NADOLOL 80 MG/1
80 TABLET ORAL DAILY
COMMUNITY
Start: 2024-08-29

## 2024-09-03 RX ORDER — ASPIRIN 81 MG/1
81 TABLET ORAL DAILY
COMMUNITY

## 2024-09-03 RX ORDER — HYDROXYCHLOROQUINE SULFATE 200 MG/1
200 TABLET, FILM COATED ORAL DAILY
COMMUNITY
Start: 2024-04-09

## 2024-09-03 RX ORDER — BUPROPION HYDROCHLORIDE 100 MG/1
100 TABLET, EXTENDED RELEASE ORAL
COMMUNITY
Start: 2024-05-07 | End: 2024-11-25

## 2024-09-03 RX ORDER — AMITRIPTYLINE HYDROCHLORIDE 50 MG/1
50 TABLET ORAL EVERY 24 HOURS
COMMUNITY
Start: 2024-05-07

## 2024-09-03 NOTE — PROGRESS NOTES
Follow Up Note     Date: 2024   Patient Name: Pili Webster  MRN: [id number]  : 1990     Primary Care Provider: Osvaldo Madrid MD     Chief Complaint   Patient presents with    Results     Upper GI Endoscopy     History of present illness:   9/3/2024  Pili Webster is a 34 y.o. female who is here today for follow up regarding Results (Upper GI Endoscopy).    Had EGD since last visit, would like to discuss those results. Dysphagia improved now. No current severe reflux symptoms while taking Dexilant. Some trouble with insurance coverage recently on Dupixent but otherwise getting injection weekly. Diarrhea currently controlled with colestipol.     Interval History:  2018  Over the past 6 weeks, she has lost her appetite and has diarrhea or vomiting within a few minutes of eating. She has lost about 10 lbs over the past 6 weeks. She was seen in the ED for evaluation recently and was told that she was dehydrated and related it to her kidney stones. She follows with Dr. Barrow and has planned follow up in a few weeks. Bowel movements are described as watery in consistency. During this time of her illness, she has skipped 1-2 days between bowel movements intermittently. No obvious rectal bleeding. Sometimes the stool will be very light or very dark. She will have 2-3 episodes of vomiting per day intermittently. Usually, the vomitus is the food that she has eaten. She takes Zofran as needed for relief of nausea and vomiting with only mild relief. Protonix 40 mg once daily was given by her PCP only a few days ago. Previously, she was taking ranitidine daily without good relief. Reports heartburn which is constant and worse with any PO intake. She states that she has had GERD complaints since age 10. No recent stool testing. S/p cholecystectomy over 1 year ago. Abdominal pain is generalized but mostly located on right side, described as stabbing in nature and constant. She  points to the right flank as the area of the most discomfort. Bloating seems to cause the most discomfort per her report. Associated symptoms are back pain, chills, fatigue and intermittent fever. No known family history of GI disease including IBD, color or stomach cancer. No personal history of EGD or colonoscopy. She had miscarriage in Jan 2018. She had surgery in May 2018 due to endometriosis and she was told that her bowels were connected in areas which was abnormal and this was repaired. She does not currently take any oral contraceptive.    Subjective     Past Medical History:   Diagnosis Date    Abdominal pain     Abnormal uterine bleeding (AUB)     Anxiety and depression     Arthritis     Asthma     mild    Cholecystitis     Constipation     Endometriosis     Frequent UTI     GERD (gastroesophageal reflux disease)     Heartburn     Hemorrhoids     Hypertension     IBS (irritable bowel syndrome)     Kidney stones     Lesion of breast     Lump     RIGHT BREAST    Nausea & vomiting     PCOS (polycystic ovarian syndrome)     PONV (postoperative nausea and vomiting)     Psoriatic arthritis     Sjogren's disease     Sleep apnea     no cpap    Snores     Tachycardia     Wrist fracture     RIGHT ARM      PATIENT UNSURE IF FRACTURED     Past Surgical History:   Procedure Laterality Date    ABDOMINAL SURGERY      ANAL SCOPE N/A 09/30/2022    Procedure: ANAL SCOPE;  Surgeon: Franco Mari MD;  Location: [facility] OR;  Service: General;  Laterality: N/A;    BLADDER REPAIR  06/06/2024    BREAST BIOPSY Right 11/29/2018    Procedure: breast biopsy ;  Surgeon: Shiv Overton MD;  Location: [facility] OR;  Service: General    CHOLECYSTECTOMY N/A 08/25/2017    Procedure: CHOLECYSTECTOMY LAPAROSCOPIC;  Surgeon: Franco Mari MD;  Location: [facility] OR;  Service:     COLONOSCOPY N/A 03/09/2020    Procedure: COLONOSCOPY CPT CODE: 28160;  Surgeon: Jeremiah Murdock MD;  Location: [facility] OR;  Service:  Gastroenterology;  Laterality: N/A;    DENTAL PROCEDURE      DIAGNOSTIC LAPAROSCOPY      X5    DILATATION AND CURETTAGE      ENDOSCOPY N/A 03/09/2020    Procedure: ESOPHAGOGASTRODUODENOSCOPY WITH BIOPSY CPT CODE: 87502;  Surgeon: Jeremiah Murdock MD;  Location: [facility] OR;  Service: Gastroenterology;  Laterality: N/A;    ENDOSCOPY N/A 02/01/2021    Procedure: ESOPHAGOGASTRODUODENOSCOPY WITH BIOPSY CPT CODE: 76770;  Surgeon: Jeremiah Murdock MD;  Location: [facility] OR;  Service: Gastroenterology;  Laterality: N/A;    ENDOSCOPY N/A 06/20/2023    Procedure: ESOPHAGOGASTRODUODENOSCOPY WITH BIOPSY CPT CODE: 70340;  Surgeon: Beto Mejia MD;  Location: [facility] ENDOSCOPY;  Service: Gastroenterology;  Laterality: N/A;    ENDOSCOPY N/A 6/10/2024    Procedure: ESOPHAGOGASTRODUODENOSCOPY WITH BIOPSY;  Surgeon: Beto Mejia MD;  Location: [facility] ENDOSCOPY;  Service: Gastroenterology;  Laterality: N/A;    HEMORRHOIDECTOMY N/A 11/14/2019    Procedure: HEMORRHOID STAPLING;  Surgeon: Franco Mari MD;  Location: [facility] OR;  Service: General    HYSTERECTOMY  03/22/2023    Total    KNEE ARTHROSCOPY W/ MENISCECTOMY Right 04/11/2022    Procedure: KNEE ARTHROSCOPIC DEBRIDEMENT, PRP INJECTION AND medial femoral condraplasty MENISCAL DEBRIDEMENT;  Surgeon: Musa Yoon MD;  Location: [facility] OR;  Service: Orthopedics;  Laterality: Right;    URETEROSCOPY LASER LITHOTRIPSY WITH STENT INSERTION Right 01/09/2019    Procedure: URETEROSCOPY LASER LITHOTRIPSY retrograde pyelogram;  Surgeon: Ahmet Barrow MD;  Location: [facility] OR;  Service: Urology    WISDOM TOOTH EXTRACTION       Family History   Problem Relation Age of Onset    Breast cancer Maternal Aunt     Alcohol abuse Paternal Grandfather     Heart disease Paternal Grandfather     Cancer Maternal Grandfather     Hypertension Maternal Grandfather     Colon cancer Neg Hx     Liver cancer Neg Hx      Social History     Socioeconomic  History    Marital status:    Tobacco Use    Smoking status: Every Day     Current packs/day: 0.50     Average packs/day: 0.5 packs/day for 21.7 years (10.8 ttl pk-yrs)     Types: Cigarettes     Start date: 2003     Passive exposure: Current    Smokeless tobacco: Never   Vaping Use    Vaping status: Former    Substances: Nicotine, Flavoring   Substance and Sexual Activity    Alcohol use: Yes     Comment: RARELY    Drug use: No    Sexual activity: Defer     Current Outpatient Medications:     albuterol sulfate  (90 Base) MCG/ACT inhaler, Inhale 1 puff As Needed. (Patient not taking: Reported on 6/6/2024), Disp: , Rfl:     amitriptyline (ELAVIL) 25 MG tablet, Take  by mouth Daily. (Patient not taking: Reported on 6/6/2024), Disp: , Rfl:     ARIPiprazole (ABILIFY) 10 MG tablet, Take 1 tablet by mouth Daily. (Patient not taking: Reported on 6/6/2024), Disp: , Rfl:     cloNIDine (CATAPRES) 0.1 MG tablet, Take 1 tablet by mouth Every Night., Disp: , Rfl:     colestipol (COLESTID) 1 g tablet, TAKE ONE TABLET BY MOUTH DAILY, Disp: 90 tablet, Rfl: 3    cyclobenzaprine (FLEXERIL) 10 MG tablet, Take 1 tablet by mouth Every 12 (Twelve) Hours., Disp: , Rfl:     dexlansoprazole (DEXILANT) 60 MG capsule, Take 1 capsule by mouth Daily., Disp: 90 capsule, Rfl: 3    dicyclomine (BENTYL) 20 MG tablet, TAKE ONE TABLET BY MOUTH FOUR TIMES A DAY BEFORE MEALS AND AT BEDTIME (Patient taking differently: Take 1 tablet by mouth 3 (Three) Times a Day.), Disp: 120 tablet, Rfl: 3    Dupilumab (Dupixent) 300 MG/2ML solution pen-injector, Inject 2 mL under the skin into the appropriate area as directed 1 (One) Time Per Week., Disp: 8 mL, Rfl: 11    Erenumab-aooe (Aimovig) 70 MG/ML auto-injector, Inject 1 mL under the skin into the appropriate area as directed Every 28 (Twenty-Eight) Days., Disp: , Rfl:     Estradiol 1.25 MG/1.25GM gel, Apply  topically to the appropriate area as directed Daily., Disp: , Rfl:     Fluticasone  Furoate-Vilanterol (Breo Ellipta) 100-25 MCG/ACT aerosol powder , Inhale 1 puff Every 12 (Twelve) Hours., Disp: , Rfl:     folic acid (FOLVITE) 1 MG tablet, Take 1 tablet by mouth Daily., Disp: , Rfl:     gabapentin (NEURONTIN) 100 MG capsule, Take 2 capsules by mouth 3 (Three) Times a Day., Disp: , Rfl:     hydrOXYzine pamoate (VISTARIL) 100 MG capsule, Take 1 capsule by mouth Every 6 (Six) Hours., Disp: , Rfl:     ketorolac (TORADOL) 10 MG tablet, Take 1 tablet by mouth Every 6 (Six) Hours As Needed for Moderate Pain. (Patient not taking: Reported on 6/6/2024), Disp: 16 tablet, Rfl: 1    linaclotide (LINZESS) 72 MCG capsule capsule, Take 1 capsule by mouth Every Morning Before Breakfast., Disp: 12 capsule, Rfl: 0    loratadine (CLARITIN) 10 MG tablet, Take 1 tablet by mouth Daily., Disp: , Rfl:     methotrexate 2.5 MG tablet, , Disp: , Rfl:     montelukast (SINGULAIR) 10 MG tablet, Take 1 tablet by mouth Every Night., Disp: , Rfl:     nadolol (CORGARD) 40 MG tablet, Take 1.5 tablets by mouth Daily., Disp: , Rfl:     oxybutynin XL (DITROPAN-XL) 5 MG 24 hr tablet, TAKE ONE TABLET BY MOUTH DAILY, Disp: 90 tablet, Rfl: 3    oxyCODONE-acetaminophen (PERCOCET) 5-325 MG per tablet, Take 1 tablet by mouth Every 6 (Six) Hours As Needed for Moderate Pain., Disp: , Rfl:     PARoxetine (PAXIL) 20 MG tablet, Take 1 tablet by mouth Daily., Disp: , Rfl:     promethazine (PHENERGAN) 25 MG tablet, TAKE ONE TABLET BY MOUTH EVERY 6 HOURS AS NEEDED FOR NAUSEA OR VOMITING (Patient not taking: Reported on 6/6/2024), Disp: 21 tablet, Rfl: 2    SUMAtriptan (IMITREX) 100 MG tablet, Take 1 tablet by mouth Daily., Disp: , Rfl:     tamsulosin (FLOMAX) 0.4 MG capsule 24 hr capsule, Take 1 capsule by mouth Daily., Disp: 30 capsule, Rfl: 5    traMADol (ULTRAM) 50 MG tablet, Take 1 tablet by mouth Every 6 (Six) Hours As Needed for Moderate Pain. (Patient not taking: Reported on 6/6/2024), Disp: 10 tablet, Rfl: 0    Vibegron (Gemtesa) 75 MG  tablet, TAKE ONE TABLET BY MOUTH ONCE NIGHTLY (Patient taking differently: Take 1 tablet by mouth Daily.), Disp: 90 tablet, Rfl: 3    vitamin D (ERGOCALCIFEROL) 1.25 MG (24209 UT) capsule capsule, Take 1 capsule by mouth Every 7 (Seven) Days., Disp: , Rfl:     Allergies   Allergen Reactions    Peanut-Containing Drug Products Anaphylaxis     AND PEANUTS IN FOODS    Latex Hives    Shrimp Swelling     Facial swelling      Milk-Related Compounds Nausea And Vomiting    Sulfa Antibiotics Rash     The following portions of the patient's history were reviewed and updated as appropriate: allergies, current medications, past family history, past medical history, past social history, past surgical history and problem list.    Objective     Physical Exam  Constitutional:       General: She is not in acute distress.     Appearance: Normal appearance. She is well-developed. She is not diaphoretic.   HENT:      Head: Normocephalic and atraumatic.      Right Ear: External ear normal.      Left Ear: External ear normal.      Nose: Nose normal.   Eyes:      General: No scleral icterus.        Right eye: No discharge.         Left eye: No discharge.      Conjunctiva/sclera: Conjunctivae normal.   Neck:      Trachea: No tracheal deviation.   Pulmonary:      Effort: Pulmonary effort is normal. No respiratory distress.   Musculoskeletal:         General: Normal range of motion.      Cervical back: Normal range of motion.   Skin:     Coloration: Skin is not pale.      Findings: No erythema or rash.   Neurological:      Mental Status: She is alert and oriented to person, place, and time.      Coordination: Coordination normal.   Psychiatric:         Mood and Affect: Mood normal.         Behavior: Behavior normal.         Thought Content: Thought content normal.         Judgment: Judgment normal.       Vitals:    09/03/24 0944   BP: 144/88   Pulse: (!) 121   SpO2: 97%   Weight: 82.6 kg (182 lb)     Results Review:   I reviewed the patient's  new clinical results.    Stool 11/21/2018:  Calprotectin normal  Pancreatic elastase normal  O&P negative  Stool culture negative     CT Abdomen Pelvis Without Contrast  Result Date: 7/20/2022   1. Obstructive uropathy on the right due to a 3.3 mm distal right ureteral stone  2.Other findings as above         EGD was completed on 2/1/2021 by Dr. Murdock.  Findings were short segment Márquez's esophagus with patchy involvement of the distal 1 to 2 cm of the esophagus, normal stomach except for small sliding hiatal hernia, normal duodenum.  Pathology shows benign duodenal mucosa, no H. pylori, distal esophageal biopsy shows chronic inflammation, features of reflux and multifocal intestinal metaplasia, negative for dysplasia.     EGD and colonoscopy were completed on 3/9/2020 by Dr. Murdock.  Findings were patchy salmon-colored mucosa in the distal esophagus, small sliding hiatal hernia, normal stomach, normal duodenum, normal terminal ileum, evidence of prior rectal surgery, 6 mm colon polyp in the transverse and sigmoid colon, left-sided diverticulosis.  Pathology showed normal duodenum, minimal superficial chronic inflammation of the stomach, negative H. pylori, esophageal biopsy showed features of reflux, multifocal intestinal metaplasia negative for dysplasia, transverse colon polyp was sessile serrated adenoma and sigmoid colon polyp was hyperplastic.     CTAP with contrast 8/2021   FINDINGS:    LUNG BASES:  Unremarkable.  No mass.  No consolidation.   ABDOMEN:    LIVER:  Unremarkable.  No mass.    GALLBLADDER AND BILE DUCTS:  Cholecystectomy clips.  No ductal  dilation.    PANCREAS:  Unremarkable.  No mass.  No ductal dilation.    SPLEEN:  Unremarkable.  No splenomegaly.    ADRENALS:  Unremarkable.  No mass.    KIDNEYS AND URETERS:  Unremarkable.  No solid mass.  No  hydronephrosis.    STOMACH AND BOWEL:  Unremarkable.  No obstruction.  No mucosal  thickening.   PELVIS:    APPENDIX:  No findings to  suggest acute appendicitis.    BLADDER:  Unremarkable.  No mass.    REPRODUCTIVE:  Unremarkable as visualized.   ABDOMEN and PELVIS:    INTRAPERITONEAL SPACE:  Unremarkable.  No free air.  No significant  fluid collection.    BONES/JOINTS:  No acute fracture.  No dislocation.    SOFT TISSUES:  Unremarkable.    VASCULATURE:  Unremarkable.  No abdominal aortic aneurysm.    LYMPH NODES:  Unremarkable.  No enlarged lymph nodes.  IMPRESSION:    No acute findings in the abdomen or pelvis.       EGD 6/20/2023 by Dr. Mejia  - Normal oropharynx.  - Z-line regular, 33 cm from the incisors.  - LA Grade A reflux esophagitis GEJ / Distal esophagus with no bleeding. Biopsied.  - No obvious Márquez's segment identified  - 1-2 cm hiatal hernia.  - Small mucosal nodule found in the esophagus. Biopsied.  - Erythematous mucosa in the posterior wall of the stomach, antrum and prepyloric region of the stomach. Biopsied.  - Normal duodenal bulb, first portion of the duodenum, second portion of the duodenum and third portion of the duodenum. Biopsied.  Pathology DIAGNOSIS:  A.   DUODENUM, BIOPSY:  Small bowel mucosa with no significant pathologic abnormality  B.   ANTRUM AND BODY BIOPSIES:  Reactive gastropathy  No Helicobacter pylori like organisms identified on H&E stained slide  No intestinal metaplasia or dysplasia identified  C.   GE JUNCTION:  Squamocolumnar junctional mucosa with eosinophillic esophagitis  (greater than 50 intraepithelial eosinophils identified per high-power  field), In the correct clinical endoscopic setting the  findings are compatible with Márquez's esophagus.  Correlation is required.    Reflux related/reactive changes are present  No evidence of dysplasia identified  D.   RANDOM ESOPHAGUS:  Eosinophilic esophagitis (greater than 21 intraepithelial eosinophils  identified per high-power field, see comment  No intestinal metaplasia or dysplasia identified  E.   ESOPHAGUS, BIOPSY, DISTAL NODULE:  Squamous  mucosa with mild reactive/reflux related changes (7  intraepithelial eosinophils identified per high-power field)  No intestinal metaplasia or dysplasia identified    EGD 6/10/2024  - Normal oropharynx. - Z-line variable, 34 cm from the incisors. Biopsied for Márquez's. - LA Grade A reflux esophagitis with no bleeding. Biopsied. - No current endoscopic esophageal abnormality to explain patient's dysphagia possible GERD and EoE. No mechanical obstruction. Biopsied. - No current endoscopic evidence of EoE - Erythematous mucosa in the antrum and prepyloric region of the stomach. Biopsied. - Normal duodenal bulb, first portion of the duodenum, second portion of the duodenum and third portion of the duodenum.  Pathology DIAGNOSIS:  A.     ANTRUM AND BODY, BIOPSY:  Antral mucosa with reactive gastropathy.  Unremarkable oxyntic mucosa.  No H. pylori organisms are identified on H&E-stained sections.  B.     GE JUNCTION:  Squamocolumnar junctional mucosa with no significant histopathology.  Negative for intestinal metaplasia and intraepithelial eosinophils.  C.     ESOPHAGUS, BIOPSY, DISTAL:  Unremarkable squamous mucosa.  Negative for intestinal metaplasia and intraepithelial eosinophils.  D.     ESOPHAGUS, BIOPSY, MID:  Unremarkable squamous mucosa.  Negative for intestinal metaplasia and intraepithelial eosinophils.  E.     ESOPHAGUS, BIOPSY, PROXIMAL:  Unremarkable squamous mucosa.  Negative for intestinal metaplasia and intraepithelial eosinophils.     Assessment / Plan      1. Esophagitis, eosinophilic  2. History of Márquez's esophagus  Long standing history of GERD, taking Dexilant 60 mg once daily with good control of symptoms. Did have multifocal intestinal metaplasia of the esophageal biopsy 2021, Márquez's noted on last EGD path 6/2023. New diagnosis of EOE 6/2023 with more than 50 eosinophils/hpf on PPI at that time, started Dupixent inj then last EGD 6/2024 with endoscopic improvements. Path with no increased  eosinophils on the Dupixent. Now with improvements in esophageal dysphagia. Esophagram 8/2023 normal.      Continue Dupixent inj once weekly  Continue PPI daily  Acid reflux measures  Anti-reflux diet  Avoid smoking  EGD again in 2 years, due 6/2026     3. Irritable bowel syndrome with diarrhea  Continues with long standing symptoms of intermittent but severe generalized abdominal cramping pain and diarrhea. Was previously having 6-7 stools per day with fecal urgency and intermittent fecal incontinence, has had persistent improved with colestipol 1 g tab 1-2 times daily. She took Xifaxan therapy for treatment of IBS x2 (12/2021 and 2/2022) and had temporary improvements in symptoms each time. Dicyclomine has helped some to relieve abdominal pain but sometimes does not help at all. Prior labs showed CMP, TSH and celiac testing all unremarkable. CTAP 8/2021 and 10/2022 showed no GI pathology. Colonoscopy in 2020 showed left sided diverticulosis and colon polyps. No random colon biopsies taken but no endoscopic signs of colitis or ileitis. Stool testing 2018 showed Calprotectin normal, Pancreatic elastase normal, O&P negative, Stool culture negative. She has IBS diarrhea based on history.      Continue taking bentyl PRN abdominal pain  Continue low FODMAP diet   Work on weight loss with diet modification and exercise as tolerated  Continue colestipol 1g tab once daily, may take up to BID for diarrhea  Consider Pillcam in the future depending on progress        Prior history  History of adenomatous colon polyp  Colonoscopy in 3/2020 showed sessile serrated adenoma of transverse colon polyp, repeat colonoscopy recommended in 5 years, due 03/2025.     Follow Up:   Return in about 6 months (around 3/3/2025) for recheck GERD, recheck IBS.    Naomi Aguiar PA-C  [facility]  9/9/2024  14:53 EDT    Dictated Utilizing Dragon Dictation: Part of this note may be an electronic transcription/translation of spoken  language to printed text using the Dragon Dictation System.

## 2024-09-18 ENCOUNTER — TELEPHONE (OUTPATIENT)
Dept: UROLOGY | Facility: CLINIC | Age: 34
End: 2024-09-18
Payer: COMMERCIAL

## 2024-09-19 ENCOUNTER — HOSPITAL ENCOUNTER (OUTPATIENT)
Dept: GENERAL RADIOLOGY | Facility: HOSPITAL | Age: 34
Discharge: HOME | End: 2024-09-19
Payer: COMMERCIAL

## 2024-09-19 ENCOUNTER — OFFICE VISIT (OUTPATIENT)
Dept: UROLOGY | Facility: CLINIC | Age: 34
End: 2024-09-19
Payer: COMMERCIAL

## 2024-09-19 VITALS
BODY MASS INDEX: 36.21 KG/M2 | SYSTOLIC BLOOD PRESSURE: 125 MMHG | HEART RATE: 101 BPM | HEIGHT: 59 IN | DIASTOLIC BLOOD PRESSURE: 88 MMHG | WEIGHT: 179.6 LBS

## 2024-09-19 DIAGNOSIS — N20.0 KIDNEY STONE: Primary | ICD-10-CM

## 2024-09-19 DIAGNOSIS — N39.41 URGE INCONTINENCE OF URINE: ICD-10-CM

## 2024-09-19 DIAGNOSIS — N20.0 KIDNEY STONE: ICD-10-CM

## 2024-09-19 PROCEDURE — 80048 BASIC METABOLIC PNL TOTAL CA: CPT

## 2024-09-19 PROCEDURE — 74018 RADEX ABDOMEN 1 VIEW: CPT

## 2024-09-19 RX ORDER — TAMSULOSIN HYDROCHLORIDE 0.4 MG/1
1 CAPSULE ORAL DAILY
Qty: 90 CAPSULE | Refills: 3 | Status: SHIPPED | OUTPATIENT
Start: 2024-09-19

## 2024-09-19 RX ORDER — KETOROLAC TROMETHAMINE 30 MG/ML
60 INJECTION, SOLUTION INTRAMUSCULAR; INTRAVENOUS ONCE
Status: COMPLETED | OUTPATIENT
Start: 2024-09-19 | End: 2024-09-19

## 2024-09-19 RX ORDER — LISINOPRIL/HYDROCHLOROTHIAZIDE 10-12.5 MG
1 TABLET ORAL DAILY
COMMUNITY
Start: 2024-09-10 | End: 2025-09-10

## 2024-09-19 RX ORDER — KETOROLAC TROMETHAMINE 10 MG/1
10 TABLET, FILM COATED ORAL EVERY 6 HOURS PRN
Qty: 16 TABLET | Refills: 1 | Status: SHIPPED | OUTPATIENT
Start: 2024-09-19

## 2024-09-19 RX ADMIN — KETOROLAC TROMETHAMINE 60 MG: 30 INJECTION, SOLUTION INTRAMUSCULAR; INTRAVENOUS at 11:35

## 2024-09-19 NOTE — H&P (VIEW-ONLY)
"Chief Complaint:    Chief Complaint   Patient presents with    Nephrolithiasis     6 month        Vital Signs:   /88   Pulse 101   Ht 149.9 cm (59.02\")   Wt 81.5 kg (179 lb 9.6 oz)   BMI 36.26 kg/m²   Body mass index is 36.26 kg/m².      HPI:  Pili Webster is a 34 y.o. female who presents today for follow up    History of Present Illness  Ms. Webster presents to the clinic today for follow-up for nephrolithiasis, urge incontinence, and IBS constipation.  She was last seen 6 months ago and had x-ray completed at that time that showed a moderate stool burden with no significant renal calcifications however stools were overlying the right kidney.  She returns today and reports that she has had intermittent right-sided back and flank pain for roughly 1 week now.  She does endorse some gross hematuria as well.  She states she is passed roughly 5 or 6 small kidney stones since her last office visit.  She continues with Flomax daily to help with passage of kidney stones.  She also is currently taking Gemtesa 75 mg once nightly for urge incontinence and oxybutynin as needed for bladder spasms.  She states these medications have been helping with symptoms.  She only uses a pad when traveling for long road trips.  Did repeat a KUB in office today that revealed similar stool burden with no obvious ureteral calcifications.  Stool is overlying the right kidney and could not confirm right intrarenal stones.  Patient does wish to undergo a right ESWL for her medullary sponge.  She has not had any recent blood work to monitor kidney function.      Past Medical History:  Past Medical History:   Diagnosis Date    Abdominal pain     Abnormal uterine bleeding (AUB)     Anxiety and depression     Arthritis     Asthma     mild    Cholecystitis     Constipation     Endometriosis     Frequent UTI     GERD (gastroesophageal reflux disease)     Heartburn     Hemorrhoids     Hypertension     IBS (irritable bowel " syndrome)     Kidney stones     Lesion of breast     Lump     RIGHT BREAST    Nausea & vomiting     PCOS (polycystic ovarian syndrome)     PONV (postoperative nausea and vomiting)     Psoriatic arthritis     Sjogren's disease     Sleep apnea     no cpap    Snores     Tachycardia     Wrist fracture     RIGHT ARM      PATIENT UNSURE IF FRACTURED       Current Meds:  Current Outpatient Medications   Medication Sig Dispense Refill    albuterol sulfate  (90 Base) MCG/ACT inhaler Inhale 1 puff As Needed.      amitriptyline (ELAVIL) 50 MG tablet Take 1 tablet by mouth Daily.      aspirin 81 MG EC tablet Take 1 tablet by mouth Daily.      buPROPion SR (WELLBUTRIN SR) 100 MG 12 hr tablet Take 1 tablet by mouth.      cloNIDine (CATAPRES) 0.1 MG tablet Take 1 tablet by mouth Every Night.      colestipol (COLESTID) 1 g tablet TAKE ONE TABLET BY MOUTH DAILY 90 tablet 3    cyclobenzaprine (FLEXERIL) 10 MG tablet Take 1 tablet by mouth Every 12 (Twelve) Hours.      dexlansoprazole (DEXILANT) 60 MG capsule Take 1 capsule by mouth Daily. 90 capsule 3    dicyclomine (BENTYL) 20 MG tablet TAKE ONE TABLET BY MOUTH FOUR TIMES A DAY BEFORE MEALS AND AT BEDTIME (Patient taking differently: Take 1 tablet by mouth 3 (Three) Times a Day.) 120 tablet 3    Dupilumab (Dupixent) 300 MG/2ML solution pen-injector Inject 2 mL under the skin into the appropriate area as directed 1 (One) Time Per Week. 8 mL 11    Erenumab-aooe (Aimovig) 70 MG/ML auto-injector Inject 1 mL under the skin into the appropriate area as directed Every 28 (Twenty-Eight) Days.      Estradiol 1.25 MG/1.25GM gel Apply  topically to the appropriate area as directed Daily.      folic acid (FOLVITE) 1 MG tablet Take 1 tablet by mouth Daily.      gabapentin (NEURONTIN) 100 MG capsule Take 2 capsules by mouth 3 (Three) Times a Day.      hydroxychloroquine (PLAQUENIL) 200 MG tablet Take 1 tablet by mouth Daily.      hydrOXYzine pamoate (VISTARIL) 100 MG capsule Take 1 capsule  by mouth Every 6 (Six) Hours.      ketorolac (TORADOL) 10 MG tablet Take 1 tablet by mouth Every 6 (Six) Hours As Needed for Moderate Pain. 16 tablet 1    lisinopril-hydrochlorothiazide (PRINZIDE,ZESTORETIC) 10-12.5 MG per tablet Take 1 tablet by mouth Daily.      loratadine (CLARITIN) 10 MG tablet Take 1 tablet by mouth Daily.      montelukast (SINGULAIR) 10 MG tablet Take 1 tablet by mouth Every Night.      nadolol (CORGARD) 80 MG tablet Take 1 tablet by mouth Daily.      oxybutynin XL (DITROPAN-XL) 5 MG 24 hr tablet TAKE ONE TABLET BY MOUTH DAILY 90 tablet 3    PARoxetine (PAXIL) 30 MG tablet Take 1 tablet by mouth Daily.      promethazine (PHENERGAN) 25 MG tablet TAKE ONE TABLET BY MOUTH EVERY 6 HOURS AS NEEDED FOR NAUSEA OR VOMITING 21 tablet 2    SUMAtriptan (IMITREX) 100 MG tablet Take 1 tablet by mouth Daily.      tamsulosin (FLOMAX) 0.4 MG capsule 24 hr capsule Take 1 capsule by mouth Daily. 90 capsule 3    Vibegron (Gemtesa) 75 MG tablet TAKE ONE TABLET BY MOUTH ONCE NIGHTLY (Patient taking differently: Take 1 tablet by mouth Daily.) 90 tablet 3    vitamin D (ERGOCALCIFEROL) 1.25 MG (46447 UT) capsule capsule Take 1 capsule by mouth Every 7 (Seven) Days.       No current facility-administered medications for this visit.        Allergies:   Allergies   Allergen Reactions    Peanut-Containing Drug Products Anaphylaxis     AND PEANUTS IN FOODS    Latex Hives    Shrimp Swelling     Facial swelling      Milk-Related Compounds Nausea And Vomiting    Sulfa Antibiotics Rash        Past Surgical History:  Past Surgical History:   Procedure Laterality Date    ABDOMINAL SURGERY      ANAL SCOPE N/A 09/30/2022    Procedure: ANAL SCOPE;  Surgeon: Franco Mari MD;  Location:  [facility] OR;  Service: General;  Laterality: N/A;    BLADDER REPAIR  06/06/2024    BREAST BIOPSY Right 11/29/2018    Procedure: breast biopsy ;  Surgeon: Shiv Overton MD;  Location: [facility] OR;  Service: General    CHOLECYSTECTOMY N/A  08/25/2017    Procedure: CHOLECYSTECTOMY LAPAROSCOPIC;  Surgeon: Franco Mari MD;  Location: [facility] OR;  Service:     COLONOSCOPY N/A 03/09/2020    Procedure: COLONOSCOPY CPT CODE: 34823;  Surgeon: Jeremiah Murdock MD;  Location: [facility] OR;  Service: Gastroenterology;  Laterality: N/A;    DENTAL PROCEDURE      DIAGNOSTIC LAPAROSCOPY      X5    DILATATION AND CURETTAGE      ENDOSCOPY N/A 03/09/2020    Procedure: ESOPHAGOGASTRODUODENOSCOPY WITH BIOPSY CPT CODE: 32400;  Surgeon: Jeremiah Murdock MD;  Location: [facility] OR;  Service: Gastroenterology;  Laterality: N/A;    ENDOSCOPY N/A 02/01/2021    Procedure: ESOPHAGOGASTRODUODENOSCOPY WITH BIOPSY CPT CODE: 47088;  Surgeon: Jeremiah Murdock MD;  Location: [facility] OR;  Service: Gastroenterology;  Laterality: N/A;    ENDOSCOPY N/A 06/20/2023    Procedure: ESOPHAGOGASTRODUODENOSCOPY WITH BIOPSY CPT CODE: 62260;  Surgeon: Beto Mejia MD;  Location: [facility] ENDOSCOPY;  Service: Gastroenterology;  Laterality: N/A;    ENDOSCOPY N/A 6/10/2024    Procedure: ESOPHAGOGASTRODUODENOSCOPY WITH BIOPSY;  Surgeon: Beto Mejia MD;  Location: [facility]COPY;  Service: Gastroenterology;  Laterality: N/A;    HEMORRHOIDECTOMY N/A 11/14/2019    Procedure: HEMORRHOID STAPLING;  Surgeon: Franco Mari MD;  Location: [facility] OR;  Service: General    HYSTERECTOMY  03/22/2023    Total    KNEE ARTHROSCOPY W/ MENISCECTOMY Right 04/11/2022    Procedure: KNEE ARTHROSCOPIC DEBRIDEMENT, PRP INJECTION AND medial femoral condraplasty MENISCAL DEBRIDEMENT;  Surgeon: Musa Yoon MD;  Location: [facility] OR;  Service: Orthopedics;  Laterality: Right;    URETEROSCOPY LASER LITHOTRIPSY WITH STENT INSERTION Right 01/09/2019    Procedure: URETEROSCOPY LASER LITHOTRIPSY retrograde pyelogram;  Surgeon: Ahmet Barrow MD;  Location: [facility] OR;  Service: Urology    WISDOM TOOTH EXTRACTION         Social History:  Social History      Socioeconomic History    Marital status:    Tobacco Use    Smoking status: Every Day     Current packs/day: 0.50     Average packs/day: 0.5 packs/day for 21.7 years (10.9 ttl pk-yrs)     Types: Cigarettes     Start date: 2003     Passive exposure: Current    Smokeless tobacco: Never   Vaping Use    Vaping status: Former    Substances: Nicotine, Flavoring   Substance and Sexual Activity    Alcohol use: Yes     Comment: RARELY    Drug use: No    Sexual activity: Defer       Family History:  Family History   Problem Relation Age of Onset    Breast cancer Maternal Aunt     Alcohol abuse Paternal Grandfather     Heart disease Paternal Grandfather     Cancer Maternal Grandfather     Hypertension Maternal Grandfather     Colon cancer Neg Hx     Liver cancer Neg Hx        Review of Systems:  Review of Systems   Constitutional:  Positive for fatigue. Negative for fever and unexpected weight change.   Respiratory:  Negative for chest tightness and shortness of breath.    Cardiovascular:  Negative for chest pain.   Gastrointestinal:  Negative for abdominal pain, constipation, diarrhea, nausea and vomiting.   Genitourinary:  Positive for flank pain. Negative for difficulty urinating, dysuria, frequency, hematuria and urgency.   Musculoskeletal:  Positive for back pain.   Skin:  Negative for rash.   Psychiatric/Behavioral:  Negative for confusion and suicidal ideas.        Physical Exam:  Physical Exam  Constitutional:       General: She is not in acute distress.     Appearance: Normal appearance.   HENT:      Head: Normocephalic and atraumatic.      Nose: Nose normal.      Mouth/Throat:      Mouth: Mucous membranes are moist.   Eyes:      Conjunctiva/sclera: Conjunctivae normal.   Cardiovascular:      Rate and Rhythm: Normal rate and regular rhythm.      Pulses: Normal pulses.      Heart sounds: Normal heart sounds.   Pulmonary:      Effort: Pulmonary effort is normal.      Breath sounds: Normal breath sounds.    Abdominal:      General: Bowel sounds are normal.      Palpations: Abdomen is soft.   Musculoskeletal:         General: Normal range of motion.      Cervical back: Normal range of motion.   Skin:     General: Skin is warm.   Neurological:      General: No focal deficit present.      Mental Status: She is alert and oriented to person, place, and time.   Psychiatric:         Mood and Affect: Mood normal.         Behavior: Behavior normal.         Thought Content: Thought content normal.         Judgment: Judgment normal.                        Recent Image (CT and/or KUB):   CT Abdomen and Pelvis: No results found for this or any previous visit.     CT Stone Protocol: Results for orders placed during the hospital encounter of 10/28/22    CT Abdomen Pelvis Stone Protocol    Narrative  EXAM:  CT Abdomen and Pelvis Without Intravenous Contrast    EXAM DATE:  10/28/2022 3:27 PM    CLINICAL HISTORY:  Nephrolithiasis; N20.0-Calculus of kidney    TECHNIQUE:  Axial computed tomography images of the abdomen and pelvis without  intravenous contrast.  Sagittal and coronal reformatted images were  created and reviewed.  This CT exam was performed using one or more of  the following dose reduction techniques:  automated exposure control,  adjustment of the mA and/or kV according to patient size, and/or use of  iterative reconstruction technique.    COMPARISON:  CT ABDOMEN PELVIS STONE PROTOCOL- dated 07/20/2022    FINDINGS:  LUNG BASES:  Unremarkable.  No mass.  No consolidation.    ABDOMEN:  LIVER:  Unremarkable.  GALLBLADDER AND BILE DUCTS:  Cholecystectomy clips.  No ductal  dilation.  PANCREAS:  Unremarkable.  No ductal dilation.  SPLEEN:  Unremarkable.  No splenomegaly.  ADRENALS:  Unremarkable.  No mass.  KIDNEYS AND URETERS:  Unremarkable.  No obstructing stones.  No  hydronephrosis.  STOMACH AND BOWEL:  Unremarkable.  No obstruction.  No mucosal  thickening.    PELVIS:  APPENDIX:  No findings to suggest acute  appendicitis.  BLADDER:  Unremarkable.  No stones.  REPRODUCTIVE:  Right adnexal region cyst measuring 2.6 cm.    ABDOMEN and PELVIS:  INTRAPERITONEAL SPACE:  Unremarkable.  No free air.  No significant  fluid collection.  BONES/JOINTS:  No acute fracture.  No dislocation.  SOFT TISSUES:  Unremarkable.  VASCULATURE:  Unremarkable.  No abdominal aortic aneurysm.  LYMPH NODES:  Unremarkable.  No enlarged lymph nodes.    Impression  Right adnexal region cyst measuring 2.6 cm.    This report was finalized on 10/28/2022 3:52 PM by Dr. Henrry Matthews MD.     KUB: Results for orders placed in visit on 03/21/24    XR abdomen kub    Narrative  PROCEDURE: XR ABDOMEN KUB-    HISTORY: RIGHT FLANK PAIN; N20.0-Calculus of kidney    COMPARISON: 9/28/2023.    FINDINGS: An AP view of the abdomen and pelvis demonstrates an  unremarkable bowel gas pattern with no evidence of obstruction. The  patient is status post cholecystectomy. No radiopaque stones are seen  overlying the renal shadows. Small calcifications in the pelvis are felt  to reflect phleboliths.    Impression  No radiopaque renal stones identified.          This report was finalized on 3/21/2024 3:28 PM by Veronique Collado M.D..       Labs:  Brief Urine Lab Results  (Last result in the past 365 days)        Color   Clarity   Blood   Leuk Est   Nitrite   Protein   CREAT   Urine HCG        09/28/23 1508 Yellow   Clear   Negative   Negative   Negative   Negative                 No visits with results within 3 Month(s) from this visit.   Latest known visit with results is:   Admission on 06/10/2024, Discharged on 06/10/2024   Component Date Value Ref Range Status    Reference Lab Report 06/10/2024    Final                    Value:[facility]  [address] [place] [address]  Phone: [phone] or [phone]  Fax: [phone]  Sujit Kaur M.D., Medical Director    PATIENT NAME                                     LABORATORY NO.  [id number]    BECKIE ALLEN KELSEY.                       [id number]  [id number]                                 AGE                    SEX   SSN              CLIENT REF #  [facility]                    33        1990      F     [id number]      [id number]    [address] [place] [address] BY-PASS                        REQUESTING M.D.           ATTENDING M.D.         COPY TO.  [address]                                [place] [address]                         [facility]  DATE COLLECTED            DATE RECEIVED          DATE REPORTED  06/10/2024                06/10/2024             2024    DIAGNOSIS:  A.     ANTRUM AND BODY, BIOPSY:  Antral mucosa with reactive gastropathy.  Unremarkable oxyntic                           mucosa.  No H. pylori organisms are identified on H&E-stained sections.  B.     GE JUNCTION:  Squamocolumnar junctional mucosa with no significant histopathology.  Negative for intestinal metaplasia and intraepithelial eosinophils.  C.     ESOPHAGUS, BIOPSY, DISTAL:  Unremarkable squamous mucosa.  Negative for intestinal metaplasia and intraepithelial eosinophils.  D.     ESOPHAGUS, BIOPSY, MID:  Unremarkable squamous mucosa.  Negative for intestinal metaplasia and intraepithelial eosinophils.  E.     ESOPHAGUS, BIOPSY, PROXIMAL:  Unremarkable squamous mucosa.  Negative for intestinal metaplasia and intraepithelial eosinophils.      CLINICAL HISTORY:  History of Márquez's esophagus, esophagitis, eosinophilic, esophageal dysphagia    SPECIMENS RECEIVED:  A.    ANTRUM AND BODY, BIOPSY  B.    GE JUNCTION  C.    ESOPHAGUS, BIOPSY, DISTAL  D.    ESOPHAGUS, BIOPSY, MID  E.    ESOPHAGUS, BIOPSY, PROXIMAL    MICROSCOPIC DESCRIPTION:  Tissue blocks are prepared and slides are examined                           microscopically on all  specimens. See diagnosis for details.    Professional interpretation rendered by Varinder Lane M.D., F.C.A.P. at [facility], [employer],  [address] [place] [address].    GROSS DESCRIPTION:  A.    Labeled gastric antrum and body are multiple portions of tan soft tissue  measuring 0.4 x 0.4 x 0.2 cm in aggregate, submitted entirely in 1 block.  MTH  B.    Labeled GE junction biopsy are 2 portions of gray soft tissue measuring  0.6 x 0.3 x 0.1 cm in aggregate, submitted entirely in 1 block.  C.    Labeled distal esophagus are 2 portions of gray soft tissue measuring 0.5 x  0.3 x 0.1 cm in aggregate, submitted entirely in 1 block.  D.    Labeled mid esophagus biopsy are 3 portions of gray soft tissue measuring  0.3 x 0.3 x 0.1 cm in aggregate, submitted entirely in 1 block.  E.    Labeled proximal esophagus biopsy are 2 portions of gray soft tissue  measuring 0.4 x 0.4 x 0.1 cm in aggregate, submitted entirely in 1 block.    REVIEWED, DIAGNOSED AND                           ELECTRONICALLY  SIGNED BY:    Varinder Lane M.D., F.C.A.P.  CPT CODES:  88305x5          Procedure: None  Procedures     I have reviewed and agree with the above PMH, PSH, FMH, social history, medications, allergies, and labs.     Assessment/Plan:   Problem List Items Addressed This Visit       Kidney stone - Primary    Relevant Medications    tamsulosin (FLOMAX) 0.4 MG capsule 24 hr capsule    ketorolac (TORADOL) 10 MG tablet    ketorolac (TORADOL) injection 60 mg (Completed)    Other Relevant Orders    XR Abdomen KUB    Basic Metabolic Panel    Case Request    Urge incontinence of urine       Health Maintenance:   Health Maintenance Due   Topic Date Due    Pneumococcal Vaccine 0-64 (1 of 2 - PCV) Never done    ANNUAL PHYSICAL  Never done    PAP SMEAR  Never done    BMI FOLLOWUP  08/21/2024    COVID-19 Vaccine (4 - 2023-24 season) 09/01/2024    INFLUENZA VACCINE  08/01/2024    COLORECTAL CANCER SCREENING  03/09/2025        Smoking Counseling: Everyday smoker.  Never used smokeless tobacco.  Counseling given.    Urine Incontinence: Patient reports that she is not currently  experiencing any symptoms of urinary incontinence.    Patient was given instructions and counseling regarding her condition or for health maintenance advice. Please see specific information pulled into the AVS if appropriate.    Patient Education:   Nephrolithiasis discussed with the patient the pathophysiology of this condition in detail.  Discussed causes which can include dietary intake, medications, -surgeries, infections, uric acid, and other urological abnormalities.  Discussed treatment methods which can include medications versus surgical interventions.  Did discuss the use of extracorporal shockwave lithotripsy.  Discussed the risk and benefits of this procedure in detail.  Patient does wish to proceed forward with surgical intervention we will get her scheduled for October 4, 2024.  I will send in a refill of Flomax for her to continue daily.  Will give the patient a shot of Toradol 60 mg in office today for pain control.  Her pain was an 8 out of 10.  Will also send in Toradol 10 mg tablets to take as needed for moderate severe pain.  I will check a BMP in office today and call with results once available.  Urge incontinence -patient has been doing much better on Gemtesa and oxybutynin.  Will continue both medications at this time and we will see the patient back for surgical intervention on 4 October    Visit Diagnoses:    ICD-10-CM ICD-9-CM   1. Kidney stone  N20.0 592.0   2. Urge incontinence of urine  N39.41 788.31     A total of 30 minutes were spent coordinating this patient’s care in clinic today; 20 minutes of which were face-to-face with the patient, reviewing medical history and counseling on the current treatment and followup plan.  All questions were answered to patient's satisfaction.    Meds Ordered During Visit:  New Medications Ordered This Visit   Medications    tamsulosin (FLOMAX) 0.4 MG capsule 24 hr capsule     Sig: Take 1 capsule by mouth Daily.     Dispense:  90 capsule     Refill:   3    ketorolac (TORADOL) 10 MG tablet     Sig: Take 1 tablet by mouth Every 6 (Six) Hours As Needed for Moderate Pain.     Dispense:  16 tablet     Refill:  1    ketorolac (TORADOL) injection 60 mg       Follow Up Appointment: October 4  No follow-ups on file.      This document has been electronically signed by Mati Rankin PA-C   September 19, 2024 12:20 EDT    Part of this note may be an electronic transcription/translation of spoken language to printed text using the Dragon Dictation System.

## 2024-09-20 ENCOUNTER — TELEPHONE (OUTPATIENT)
Dept: UROLOGY | Facility: CLINIC | Age: 34
End: 2024-09-20

## 2024-09-20 LAB
ANION GAP SERPL CALCULATED.3IONS-SCNC: 12.2 MMOL/L (ref 5–15)
BUN SERPL-MCNC: 10 MG/DL (ref 6–20)
BUN/CREAT SERPL: 11.1 (ref 7–25)
CALCIUM SPEC-SCNC: 9.9 MG/DL (ref 8.6–10.5)
CHLORIDE SERPL-SCNC: 103 MMOL/L (ref 98–107)
CO2 SERPL-SCNC: 20.8 MMOL/L (ref 22–29)
CREAT SERPL-MCNC: 0.9 MG/DL (ref 0.57–1)
EGFRCR SERPLBLD CKD-EPI 2021: 85.1 ML/MIN/1.73
GLUCOSE SERPL-MCNC: 105 MG/DL (ref 65–99)
POTASSIUM SERPL-SCNC: 4.4 MMOL/L (ref 3.5–5.2)
SODIUM SERPL-SCNC: 136 MMOL/L (ref 136–145)

## 2024-09-23 DIAGNOSIS — K20.0 ESOPHAGITIS, EOSINOPHILIC: ICD-10-CM

## 2024-09-23 RX ORDER — DUPILUMAB 300 MG/2ML
300 INJECTION, SOLUTION SUBCUTANEOUS WEEKLY
Qty: 8 ML | Refills: 5 | Status: SHIPPED | OUTPATIENT
Start: 2024-09-23

## 2024-09-25 DIAGNOSIS — N20.0 KIDNEY STONE: Primary | ICD-10-CM

## 2024-10-04 ENCOUNTER — ANESTHESIA (OUTPATIENT)
Dept: PERIOP | Facility: HOSPITAL | Age: 34
End: 2024-10-04
Payer: COMMERCIAL

## 2024-10-04 ENCOUNTER — ANESTHESIA EVENT (OUTPATIENT)
Dept: PERIOP | Facility: HOSPITAL | Age: 34
End: 2024-10-04
Payer: COMMERCIAL

## 2024-10-04 ENCOUNTER — HOSPITAL ENCOUNTER (OUTPATIENT)
Facility: HOSPITAL | Age: 34
Setting detail: HOSPITAL OUTPATIENT SURGERY
Discharge: HOME | End: 2024-10-04
Attending: UROLOGY | Admitting: UROLOGY
Payer: COMMERCIAL

## 2024-10-04 VITALS
BODY MASS INDEX: 36.89 KG/M2 | TEMPERATURE: 97.9 F | HEIGHT: 59 IN | RESPIRATION RATE: 16 BRPM | SYSTOLIC BLOOD PRESSURE: 112 MMHG | OXYGEN SATURATION: 97 % | WEIGHT: 183 LBS | DIASTOLIC BLOOD PRESSURE: 74 MMHG | HEART RATE: 92 BPM

## 2024-10-04 DIAGNOSIS — N20.0 KIDNEY STONE: Primary | ICD-10-CM

## 2024-10-04 PROCEDURE — 25010000002 MIDAZOLAM PER 1 MG: Performed by: NURSE ANESTHETIST, CERTIFIED REGISTERED

## 2024-10-04 PROCEDURE — 25010000002 LIDOCAINE PF 2% 2 % SOLUTION: Performed by: NURSE ANESTHETIST, CERTIFIED REGISTERED

## 2024-10-04 PROCEDURE — 25010000002 GENTAMICIN PER 80 MG: Performed by: UROLOGY

## 2024-10-04 PROCEDURE — 25010000002 PROPOFOL 200 MG/20ML EMULSION: Performed by: NURSE ANESTHETIST, CERTIFIED REGISTERED

## 2024-10-04 PROCEDURE — 25010000002 FENTANYL CITRATE (PF) 50 MCG/ML SOLUTION: Performed by: NURSE ANESTHETIST, CERTIFIED REGISTERED

## 2024-10-04 PROCEDURE — 25010000002 ONDANSETRON PER 1 MG: Performed by: NURSE ANESTHETIST, CERTIFIED REGISTERED

## 2024-10-04 PROCEDURE — 50590 FRAGMENTING OF KIDNEY STONE: CPT | Performed by: UROLOGY

## 2024-10-04 RX ORDER — KETOROLAC TROMETHAMINE 30 MG/ML
30 INJECTION, SOLUTION INTRAMUSCULAR; INTRAVENOUS EVERY 6 HOURS PRN
Status: DISCONTINUED | OUTPATIENT
Start: 2024-10-04 | End: 2024-10-04 | Stop reason: HOSPADM

## 2024-10-04 RX ORDER — ONDANSETRON 2 MG/ML
4 INJECTION INTRAMUSCULAR; INTRAVENOUS AS NEEDED
Status: DISCONTINUED | OUTPATIENT
Start: 2024-10-04 | End: 2024-10-04 | Stop reason: HOSPADM

## 2024-10-04 RX ORDER — PROPOFOL 10 MG/ML
INJECTION, EMULSION INTRAVENOUS AS NEEDED
Status: DISCONTINUED | OUTPATIENT
Start: 2024-10-04 | End: 2024-10-04 | Stop reason: SURG

## 2024-10-04 RX ORDER — LIDOCAINE HYDROCHLORIDE 20 MG/ML
INJECTION, SOLUTION EPIDURAL; INFILTRATION; INTRACAUDAL; PERINEURAL AS NEEDED
Status: DISCONTINUED | OUTPATIENT
Start: 2024-10-04 | End: 2024-10-04 | Stop reason: SURG

## 2024-10-04 RX ORDER — SODIUM CHLORIDE 0.9 % (FLUSH) 0.9 %
10 SYRINGE (ML) INJECTION EVERY 12 HOURS SCHEDULED
Status: DISCONTINUED | OUTPATIENT
Start: 2024-10-04 | End: 2024-10-04 | Stop reason: HOSPADM

## 2024-10-04 RX ORDER — SODIUM CHLORIDE 9 MG/ML
40 INJECTION, SOLUTION INTRAVENOUS AS NEEDED
Status: DISCONTINUED | OUTPATIENT
Start: 2024-10-04 | End: 2024-10-04 | Stop reason: HOSPADM

## 2024-10-04 RX ORDER — OXYCODONE AND ACETAMINOPHEN 5; 325 MG/1; MG/1
1 TABLET ORAL ONCE AS NEEDED
Status: COMPLETED | OUTPATIENT
Start: 2024-10-04 | End: 2024-10-04

## 2024-10-04 RX ORDER — SODIUM CHLORIDE 0.9 % (FLUSH) 0.9 %
10 SYRINGE (ML) INJECTION AS NEEDED
Status: DISCONTINUED | OUTPATIENT
Start: 2024-10-04 | End: 2024-10-04 | Stop reason: HOSPADM

## 2024-10-04 RX ORDER — GENTAMICIN SULFATE 80 MG/100ML
80 INJECTION, SOLUTION INTRAVENOUS ONCE
Status: COMPLETED | OUTPATIENT
Start: 2024-10-04 | End: 2024-10-04

## 2024-10-04 RX ORDER — FENTANYL CITRATE 50 UG/ML
INJECTION, SOLUTION INTRAMUSCULAR; INTRAVENOUS AS NEEDED
Status: DISCONTINUED | OUTPATIENT
Start: 2024-10-04 | End: 2024-10-04 | Stop reason: SURG

## 2024-10-04 RX ORDER — MEPERIDINE HYDROCHLORIDE 25 MG/ML
12.5 INJECTION INTRAMUSCULAR; INTRAVENOUS; SUBCUTANEOUS
Status: DISCONTINUED | OUTPATIENT
Start: 2024-10-04 | End: 2024-10-04 | Stop reason: HOSPADM

## 2024-10-04 RX ORDER — IPRATROPIUM BROMIDE AND ALBUTEROL SULFATE 2.5; .5 MG/3ML; MG/3ML
3 SOLUTION RESPIRATORY (INHALATION) ONCE AS NEEDED
Status: DISCONTINUED | OUTPATIENT
Start: 2024-10-04 | End: 2024-10-04 | Stop reason: HOSPADM

## 2024-10-04 RX ORDER — SODIUM CHLORIDE, SODIUM LACTATE, POTASSIUM CHLORIDE, CALCIUM CHLORIDE 600; 310; 30; 20 MG/100ML; MG/100ML; MG/100ML; MG/100ML
125 INJECTION, SOLUTION INTRAVENOUS ONCE
Status: DISCONTINUED | OUTPATIENT
Start: 2024-10-04 | End: 2024-10-04 | Stop reason: HOSPADM

## 2024-10-04 RX ORDER — MIDAZOLAM HYDROCHLORIDE 1 MG/ML
INJECTION INTRAMUSCULAR; INTRAVENOUS AS NEEDED
Status: DISCONTINUED | OUTPATIENT
Start: 2024-10-04 | End: 2024-10-04 | Stop reason: SURG

## 2024-10-04 RX ORDER — HYDROCODONE BITARTRATE AND ACETAMINOPHEN 10; 325 MG/1; MG/1
1 TABLET ORAL EVERY 6 HOURS PRN
Qty: 12 TABLET | Refills: 0 | Status: SHIPPED | OUTPATIENT
Start: 2024-10-04

## 2024-10-04 RX ORDER — ONDANSETRON 2 MG/ML
INJECTION INTRAMUSCULAR; INTRAVENOUS AS NEEDED
Status: DISCONTINUED | OUTPATIENT
Start: 2024-10-04 | End: 2024-10-04 | Stop reason: SURG

## 2024-10-04 RX ORDER — MIDAZOLAM HYDROCHLORIDE 1 MG/ML
1 INJECTION INTRAMUSCULAR; INTRAVENOUS
Status: DISCONTINUED | OUTPATIENT
Start: 2024-10-04 | End: 2024-10-04 | Stop reason: HOSPADM

## 2024-10-04 RX ORDER — FENTANYL CITRATE 50 UG/ML
50 INJECTION, SOLUTION INTRAMUSCULAR; INTRAVENOUS
Status: DISCONTINUED | OUTPATIENT
Start: 2024-10-04 | End: 2024-10-04 | Stop reason: HOSPADM

## 2024-10-04 RX ADMIN — OXYCODONE HYDROCHLORIDE AND ACETAMINOPHEN 1 TABLET: 5; 325 TABLET ORAL at 08:44

## 2024-10-04 RX ADMIN — GENTAMICIN SULFATE 80 MG: 80 INJECTION, SOLUTION INTRAVENOUS at 07:39

## 2024-10-04 RX ADMIN — FENTANYL CITRATE 100 MCG: 50 INJECTION INTRAMUSCULAR; INTRAVENOUS at 07:34

## 2024-10-04 RX ADMIN — MIDAZOLAM HYDROCHLORIDE 2 MG: 1 INJECTION, SOLUTION INTRAMUSCULAR; INTRAVENOUS at 07:34

## 2024-10-04 RX ADMIN — ONDANSETRON 4 MG: 2 INJECTION INTRAMUSCULAR; INTRAVENOUS at 07:49

## 2024-10-04 RX ADMIN — PROPOFOL 100 MG: 10 INJECTION, EMULSION INTRAVENOUS at 07:34

## 2024-10-04 RX ADMIN — LIDOCAINE HYDROCHLORIDE 60 MG: 20 INJECTION, SOLUTION EPIDURAL; INFILTRATION; INTRACAUDAL; PERINEURAL at 07:39

## 2024-10-04 NOTE — ANESTHESIA PREPROCEDURE EVALUATION
Anesthesia Evaluation     Patient summary reviewed and Nursing notes reviewed   history of anesthetic complications:  PONV  NPO Solid Status: > 8 hours  NPO Liquid Status: > 8 hours           Airway   Mallampati: I  TM distance: >3 FB  Neck ROM: full  No difficulty expected  Dental - normal exam   (+) edentulous    Pulmonary - negative pulmonary ROS   (+) asthma,sleep apnea, decreased breath sounds  Cardiovascular - negative cardio ROS and normal exam  Exercise tolerance: good (4-7 METS)    NYHA Classification: II  Rhythm: regular  Rate: normal    (+) hypertension well controlled 2 medications or greater      Neuro/Psych- negative ROS  (+) psychiatric history Anxiety and Depression  GI/Hepatic/Renal/Endo - negative ROS   (+) hiatal hernia, GERD, renal disease- stones    Musculoskeletal (-) negative ROS    Abdominal   (+) obese    Abdomen: soft.  Bowel sounds: normal.   Substance History - negative use     OB/GYN negative ob/gyn ROS         Other - negative ROS  arthritis,                 Anesthesia Plan    ASA 2     general     intravenous induction     Anesthetic plan, risks, benefits, and alternatives have been provided, discussed and informed consent has been obtained with: patient.    CODE STATUS:

## 2024-10-04 NOTE — ANESTHESIA PROCEDURE NOTES
Airway  Urgency: elective    Date/Time: 10/4/2024 7:39 AM  Airway not difficult    General Information and Staff    Patient location during procedure: OR  Anesthesiologist: Parish Cramer DO  CRNA/CAA: Cecilia Subramanian CRNA    Indications and Patient Condition  Indications for airway management: airway protection    Preoxygenated: yes  Mask difficulty assessment: 0 - not attempted    Final Airway Details  Final airway type: supraglottic airway      Successful airway: classic  Size 4     Number of attempts at approach: 1  Assessment: lips, teeth, and gum same as pre-op    Additional Comments  LMA placed with no trauma noted. Patient tolerated well. Good seal. Secured.

## 2024-10-04 NOTE — OP NOTE
EXTRACORPOREAL SHOCKWAVE LITHOTRIPSY  Procedure Note    Pili Webster  10/4/2024    Pre-op Diagnosis:   Kidney stone [N20.0]    Post-op Diagnosis:     Post-Op Diagnosis Codes:     * Kidney stone [N20.0]    Procedure/CPT® Codes:    34-year-old white female with persistent right-sided flank pain consistent with known medullary sponge kidney.  Her KUB shows just the tiniest amount of priscilla material but she is desirous of lithotripsy to break up the small pieces that are causing this intermittent severe colicky flank pain.  ESWL-the patient is a candidate for extracorporeal shockwave lithotripsy.  We discussed the type of stone and the complications associated with the procedure including, but not limited to, pain in the flank, hematoma, spontaneous renal hemorrhage, inadequate fragmentation of stones, the need for passage of the stones, the need for concomitant additional procedures in the range of 24%, the risk of a distal fragment in the range of 3% requiring ureteroscopic removal, and the fact that sometimes a stent is indicated based on the size and the density of the stone as determined on the CAT scan.  Additionally, we discussed percutaneous nephrostolithotomy.  Including the mini PERC.  With its attendant risks of anesthesia bleeding infection and the fact that is a invasive procedure with the remote possibility of a nephrectomy.  We also discussed the use of ureteroscopy which is a rigid or flexible instrument placed up into the kidney to break up stones with the laser beam and very likely a postop stent and a high likelihood of additional concomitant procedures.  Following an informed consent, he was brought to the operative suite and underwent induction of general endotracheal anesthetic.  The stone was localized at F2 and a total of 3000 shockwaves was administered without complication.  There was excellent fragmentation  He was awake and alert and returned to recovery room.        Procedure(s):  EXTRACORPOREAL SHOCKWAVE LITHOTRIPSY    Surgeon(s):  Ahmet Barrow MD    Anesthesia: see anesthesia record    Staff:   Circulator: Mary Nelson RN  Scrub Person: Jennifer Flaherty LPN; Dania Linder    Estimated Blood Loss: none  Urine Voided: * No values recorded between 10/4/2024  7:31 AM and 10/4/2024  8:06 AM *    Specimens:                None      Drains: None    Findings: Excellent fragmentation    Blood: N/A    Complications: None    Grafts and Implants: None    Ahmet Barrow MD     Date: 10/4/2024  Time: 08:22 EDT

## 2024-10-04 NOTE — ANESTHESIA POSTPROCEDURE EVALUATION
Patient: Pili Webster    Procedure Summary       Date: 10/04/24 Room / Location: [facility] OR 09 /  [facility] OR    Anesthesia Start: 0730 Anesthesia Stop: 0806    Procedure: EXTRACORPOREAL SHOCKWAVE LITHOTRIPSY (Right) Diagnosis:       Kidney stone      (Kidney stone [N20.0])    Surgeons: Ahmet Barrow MD Provider: Parish Cramer DO    Anesthesia Type: general ASA Status: 2            Anesthesia Type: general    Vitals  Vitals Value Taken Time   /69 10/04/24 0833   Temp 98.3 °F (36.8 °C) 10/04/24 0807   Pulse 92 10/04/24 0837   Resp 16 10/04/24 0807   SpO2 97 % 10/04/24 0837   Vitals shown include unfiled device data.        Post Anesthesia Care and Evaluation    Patient location during evaluation: PHASE II  Patient participation: complete - patient participated  Level of consciousness: awake and alert  Pain score: 1  Pain management: adequate    Airway patency: patent  Anesthetic complications: No anesthetic complications  PONV Status: controlled  Cardiovascular status: acceptable  Respiratory status: acceptable  Hydration status: acceptable

## 2024-10-09 ENCOUNTER — APPOINTMENT (OUTPATIENT)
Dept: CT IMAGING | Facility: HOSPITAL | Age: 34
End: 2024-10-09
Payer: COMMERCIAL

## 2024-10-09 ENCOUNTER — APPOINTMENT (OUTPATIENT)
Dept: GENERAL RADIOLOGY | Facility: HOSPITAL | Age: 34
End: 2024-10-09
Payer: COMMERCIAL

## 2024-10-09 ENCOUNTER — HOSPITAL ENCOUNTER (EMERGENCY)
Facility: HOSPITAL | Age: 34
Discharge: HOME | End: 2024-10-10
Attending: EMERGENCY MEDICINE
Payer: COMMERCIAL

## 2024-10-09 DIAGNOSIS — R07.9 CHEST PAIN, UNSPECIFIED TYPE: ICD-10-CM

## 2024-10-09 DIAGNOSIS — I10 PRIMARY HYPERTENSION: Primary | ICD-10-CM

## 2024-10-09 LAB
ALBUMIN SERPL-MCNC: 4.7 G/DL (ref 3.5–5.2)
ALBUMIN/GLOB SERPL: 1.1 G/DL
ALP SERPL-CCNC: 94 U/L (ref 39–117)
ALT SERPL W P-5'-P-CCNC: 49 U/L (ref 1–33)
AMPHET+METHAMPHET UR QL: NEGATIVE
AMPHETAMINES UR QL: NEGATIVE
ANION GAP SERPL CALCULATED.3IONS-SCNC: 19.1 MMOL/L (ref 5–15)
APTT PPP: 30 SECONDS (ref 26.5–34.5)
AST SERPL-CCNC: 27 U/L (ref 1–32)
BACTERIA UR QL AUTO: ABNORMAL /HPF
BARBITURATES UR QL SCN: NEGATIVE
BENZODIAZ UR QL SCN: NEGATIVE
BILIRUB SERPL-MCNC: 0.3 MG/DL (ref 0–1.2)
BILIRUB UR QL STRIP: NEGATIVE
BUN SERPL-MCNC: 10 MG/DL (ref 6–20)
BUN/CREAT SERPL: 9.5 (ref 7–25)
BUPRENORPHINE SERPL-MCNC: NEGATIVE NG/ML
CALCIUM SPEC-SCNC: 10.6 MG/DL (ref 8.6–10.5)
CANNABINOIDS SERPL QL: NEGATIVE
CHLORIDE SERPL-SCNC: 103 MMOL/L (ref 98–107)
CLARITY UR: CLEAR
CO2 SERPL-SCNC: 18.9 MMOL/L (ref 22–29)
COCAINE UR QL: NEGATIVE
COLOR UR: YELLOW
CREAT SERPL-MCNC: 1.05 MG/DL (ref 0.57–1)
D-LACTATE SERPL-SCNC: 2.2 MMOL/L (ref 0.5–2)
D-LACTATE SERPL-SCNC: 2.6 MMOL/L (ref 0.5–2)
DEPRECATED RDW RBC AUTO: 39.1 FL (ref 37–54)
EGFRCR SERPLBLD CKD-EPI 2021: 70.8 ML/MIN/1.73
EOSINOPHIL # BLD MANUAL: 0.29 10*3/MM3 (ref 0–0.4)
EOSINOPHIL NFR BLD MANUAL: 2 % (ref 0.3–6.2)
ERYTHROCYTE [DISTWIDTH] IN BLOOD BY AUTOMATED COUNT: 12.3 % (ref 12.3–15.4)
FENTANYL UR-MCNC: NEGATIVE NG/ML
FLUAV RNA RESP QL NAA+PROBE: NOT DETECTED
FLUBV RNA RESP QL NAA+PROBE: NOT DETECTED
GLOBULIN UR ELPH-MCNC: 4.1 GM/DL
GLUCOSE SERPL-MCNC: 114 MG/DL (ref 65–99)
GLUCOSE UR STRIP-MCNC: NEGATIVE MG/DL
HCT VFR BLD AUTO: 47.1 % (ref 34–46.6)
HGB BLD-MCNC: 16.1 G/DL (ref 12–15.9)
HGB UR QL STRIP.AUTO: ABNORMAL
HOLD SPECIMEN: NORMAL
HYALINE CASTS UR QL AUTO: ABNORMAL /LPF
INR PPP: 0.94 (ref 0.9–1.1)
KETONES UR QL STRIP: NEGATIVE
LEUKOCYTE ESTERASE UR QL STRIP.AUTO: NEGATIVE
LIPASE SERPL-CCNC: 39 U/L (ref 13–60)
LYMPHOCYTES # BLD MANUAL: 6.54 10*3/MM3 (ref 0.7–3.1)
LYMPHOCYTES NFR BLD MANUAL: 7 % (ref 5–12)
MAGNESIUM SERPL-MCNC: 2 MG/DL (ref 1.6–2.6)
MCH RBC QN AUTO: 29.9 PG (ref 26.6–33)
MCHC RBC AUTO-ENTMCNC: 34.2 G/DL (ref 31.5–35.7)
MCV RBC AUTO: 87.4 FL (ref 79–97)
METHADONE UR QL SCN: NEGATIVE
MONOCYTES # BLD: 1.02 10*3/MM3 (ref 0.1–0.9)
NEUTROPHILS # BLD AUTO: 6.68 10*3/MM3 (ref 1.7–7)
NEUTROPHILS NFR BLD MANUAL: 45 % (ref 42.7–76)
NEUTS BAND NFR BLD MANUAL: 1 % (ref 0–5)
NITRITE UR QL STRIP: NEGATIVE
NT-PROBNP SERPL-MCNC: <36 PG/ML (ref 0–450)
OPIATES UR QL: NEGATIVE
OXYCODONE UR QL SCN: NEGATIVE
PCP UR QL SCN: NEGATIVE
PH UR STRIP.AUTO: 5.5 [PH] (ref 5–8)
PLAT MORPH BLD: NORMAL
PLATELET # BLD AUTO: 351 10*3/MM3 (ref 140–450)
PMV BLD AUTO: 8.8 FL (ref 6–12)
POTASSIUM SERPL-SCNC: 3.9 MMOL/L (ref 3.5–5.2)
PROCALCITONIN SERPL-MCNC: 0.08 NG/ML (ref 0–0.25)
PROT SERPL-MCNC: 8.8 G/DL (ref 6–8.5)
PROT UR QL STRIP: NEGATIVE
PROTHROMBIN TIME: 12.7 SECONDS (ref 12.1–14.7)
RBC # BLD AUTO: 5.39 10*6/MM3 (ref 3.77–5.28)
RBC # UR STRIP: ABNORMAL /HPF
RBC MORPH BLD: NORMAL
REF LAB TEST METHOD: ABNORMAL
SARS-COV-2 RNA RESP QL NAA+PROBE: NOT DETECTED
SODIUM SERPL-SCNC: 141 MMOL/L (ref 136–145)
SP GR UR STRIP: >1.03 (ref 1–1.03)
SQUAMOUS #/AREA URNS HPF: ABNORMAL /HPF
T4 FREE SERPL-MCNC: 0.98 NG/DL (ref 0.92–1.68)
TRICYCLICS UR QL SCN: POSITIVE
TROPONIN T SERPL HS-MCNC: <6 NG/L
TSH SERPL DL<=0.05 MIU/L-ACNC: 3.37 UIU/ML (ref 0.27–4.2)
UROBILINOGEN UR QL STRIP: ABNORMAL
VARIANT LYMPHS NFR BLD MANUAL: 45 % (ref 19.6–45.3)
WBC # UR STRIP: ABNORMAL /HPF
WBC NRBC COR # BLD AUTO: 14.53 10*3/MM3 (ref 3.4–10.8)
WHOLE BLOOD HOLD COAG: NORMAL
WHOLE BLOOD HOLD COAG: NORMAL
WHOLE BLOOD HOLD SPECIMEN: NORMAL

## 2024-10-09 PROCEDURE — 85007 BL SMEAR W/DIFF WBC COUNT: CPT

## 2024-10-09 PROCEDURE — 96375 TX/PRO/DX INJ NEW DRUG ADDON: CPT

## 2024-10-09 PROCEDURE — 85730 THROMBOPLASTIN TIME PARTIAL: CPT

## 2024-10-09 PROCEDURE — 71275 CT ANGIOGRAPHY CHEST: CPT | Performed by: RADIOLOGY

## 2024-10-09 PROCEDURE — 93010 ELECTROCARDIOGRAM REPORT: CPT | Performed by: INTERNAL MEDICINE

## 2024-10-09 PROCEDURE — 83735 ASSAY OF MAGNESIUM: CPT

## 2024-10-09 PROCEDURE — 93005 ELECTROCARDIOGRAM TRACING: CPT

## 2024-10-09 PROCEDURE — 87636 SARSCOV2 & INF A&B AMP PRB: CPT

## 2024-10-09 PROCEDURE — 87040 BLOOD CULTURE FOR BACTERIA: CPT

## 2024-10-09 PROCEDURE — 84145 PROCALCITONIN (PCT): CPT

## 2024-10-09 PROCEDURE — 99285 EMERGENCY DEPT VISIT HI MDM: CPT

## 2024-10-09 PROCEDURE — 83605 ASSAY OF LACTIC ACID: CPT

## 2024-10-09 PROCEDURE — 36415 COLL VENOUS BLD VENIPUNCTURE: CPT

## 2024-10-09 PROCEDURE — 96365 THER/PROPH/DIAG IV INF INIT: CPT

## 2024-10-09 PROCEDURE — 80307 DRUG TEST PRSMV CHEM ANLYZR: CPT

## 2024-10-09 PROCEDURE — 81001 URINALYSIS AUTO W/SCOPE: CPT

## 2024-10-09 PROCEDURE — 84484 ASSAY OF TROPONIN QUANT: CPT

## 2024-10-09 PROCEDURE — 71045 X-RAY EXAM CHEST 1 VIEW: CPT | Performed by: RADIOLOGY

## 2024-10-09 PROCEDURE — 25010000002 ONDANSETRON PER 1 MG

## 2024-10-09 PROCEDURE — 83690 ASSAY OF LIPASE: CPT

## 2024-10-09 PROCEDURE — 84439 ASSAY OF FREE THYROXINE: CPT

## 2024-10-09 PROCEDURE — 83880 ASSAY OF NATRIURETIC PEPTIDE: CPT

## 2024-10-09 PROCEDURE — 25810000003 SODIUM CHLORIDE 0.9 % SOLUTION

## 2024-10-09 PROCEDURE — 85610 PROTHROMBIN TIME: CPT

## 2024-10-09 PROCEDURE — 25010000002 PIPERACILLIN SOD-TAZOBACTAM PER 1 G

## 2024-10-09 PROCEDURE — 71045 X-RAY EXAM CHEST 1 VIEW: CPT

## 2024-10-09 PROCEDURE — 80050 GENERAL HEALTH PANEL: CPT

## 2024-10-09 PROCEDURE — 25510000001 IOPAMIDOL PER 1 ML: Performed by: EMERGENCY MEDICINE

## 2024-10-09 PROCEDURE — 71275 CT ANGIOGRAPHY CHEST: CPT

## 2024-10-09 PROCEDURE — 93005 ELECTROCARDIOGRAM TRACING: CPT | Performed by: EMERGENCY MEDICINE

## 2024-10-09 RX ORDER — IOPAMIDOL 755 MG/ML
100 INJECTION, SOLUTION INTRAVASCULAR
Status: COMPLETED | OUTPATIENT
Start: 2024-10-09 | End: 2024-10-09

## 2024-10-09 RX ORDER — ASPIRIN 81 MG/1
324 TABLET, CHEWABLE ORAL ONCE
Status: COMPLETED | OUTPATIENT
Start: 2024-10-09 | End: 2024-10-09

## 2024-10-09 RX ORDER — SODIUM CHLORIDE 0.9 % (FLUSH) 0.9 %
10 SYRINGE (ML) INJECTION AS NEEDED
Status: DISCONTINUED | OUTPATIENT
Start: 2024-10-09 | End: 2024-10-10 | Stop reason: HOSPADM

## 2024-10-09 RX ORDER — ONDANSETRON 2 MG/ML
4 INJECTION INTRAMUSCULAR; INTRAVENOUS ONCE
Status: COMPLETED | OUTPATIENT
Start: 2024-10-09 | End: 2024-10-09

## 2024-10-09 RX ADMIN — IOPAMIDOL 100 ML: 755 INJECTION, SOLUTION INTRAVENOUS at 21:22

## 2024-10-09 RX ADMIN — ASPIRIN 324 MG: 81 TABLET, CHEWABLE ORAL at 20:45

## 2024-10-09 RX ADMIN — SODIUM CHLORIDE 1000 ML: 9 INJECTION, SOLUTION INTRAVENOUS at 20:55

## 2024-10-09 RX ADMIN — ONDANSETRON 4 MG: 2 INJECTION INTRAMUSCULAR; INTRAVENOUS at 20:45

## 2024-10-09 RX ADMIN — PIPERACILLIN SODIUM AND TAZOBACTAM SODIUM 3.38 G: 3; .375 INJECTION, POWDER, LYOPHILIZED, FOR SOLUTION INTRAVENOUS at 21:09

## 2024-10-10 ENCOUNTER — TRANSCRIBE ORDERS (OUTPATIENT)
Dept: ADMINISTRATIVE | Facility: HOSPITAL | Age: 34
End: 2024-10-10
Payer: COMMERCIAL

## 2024-10-10 VITALS
WEIGHT: 178 LBS | DIASTOLIC BLOOD PRESSURE: 92 MMHG | BODY MASS INDEX: 35.88 KG/M2 | HEIGHT: 59 IN | OXYGEN SATURATION: 99 % | RESPIRATION RATE: 24 BRPM | SYSTOLIC BLOOD PRESSURE: 123 MMHG | TEMPERATURE: 98.5 F | HEART RATE: 115 BPM

## 2024-10-10 DIAGNOSIS — R07.9 CHEST PAIN, UNSPECIFIED TYPE: Primary | ICD-10-CM

## 2024-10-10 LAB
A-A DO2: ABNORMAL
ARTERIAL PATENCY WRIST A: ABNORMAL
ATMOSPHERIC PRESS: 729 MMHG
BASE EXCESS BLDA CALC-SCNC: -3.4 MMOL/L (ref 0–2)
BDY SITE: ABNORMAL
CO2 BLDA-SCNC: 20.8 MMOL/L (ref 22–33)
COHGB MFR BLD: 3 % (ref 0–5)
GEN 5 2HR TROPONIN T REFLEX: 7 NG/L
HCO3 BLDA-SCNC: 19.8 MMOL/L (ref 20–26)
HCT VFR BLD CALC: 45.8 % (ref 38–51)
HGB BLDA-MCNC: 14.9 G/DL (ref 13.5–17.5)
INHALED O2 CONCENTRATION: 21 %
Lab: ABNORMAL
METHGB BLD QL: 0.4 % (ref 0–3)
MODALITY: ABNORMAL
OXYHGB MFR BLDV: 95.7 % (ref 94–99)
PCO2 BLDA: 30.4 MM HG (ref 35–45)
PCO2 TEMP ADJ BLD: ABNORMAL MM[HG]
PH BLDA: 7.42 PH UNITS (ref 7.35–7.45)
PH, TEMP CORRECTED: ABNORMAL
PO2 BLDA: 130 MM HG (ref 83–108)
PO2 TEMP ADJ BLD: ABNORMAL MM[HG]
QT INTERVAL: 326 MS
QT INTERVAL: 366 MS
QTC INTERVAL: 508 MS
QTC INTERVAL: 550 MS
SAO2 % BLDCOA: 99.1 % (ref 94–99)
TROPONIN T DELTA: NORMAL
VENTILATOR MODE: ABNORMAL

## 2024-10-10 PROCEDURE — 82375 ASSAY CARBOXYHB QUANT: CPT

## 2024-10-10 PROCEDURE — 36600 WITHDRAWAL OF ARTERIAL BLOOD: CPT

## 2024-10-10 PROCEDURE — 82805 BLOOD GASES W/O2 SATURATION: CPT

## 2024-10-10 PROCEDURE — 83050 HGB METHEMOGLOBIN QUAN: CPT

## 2024-10-10 NOTE — ED PROVIDER NOTES
"Subjective   History of Present Illness  Patient is a 34-year-old female who presents today with complaints of sudden onset left-sided chest pain that radiates to her axilla, shortness of breath, and tachycardia/hypertension at home.  Patient reports that she did not feel well earlier today and was noted to be slightly tachycardic at her psychiatric doctor appointment.  Patient reports that they told her to go home and rest.  Patient reports that she went home ate dinner and has been resting however approximately 45 minutes prior to arrival began having the chest pain and shortness of breath.  Patient presents tachycardic, hypertensive, and tachypneic.  Patient reports that she feels short of breath and \"hot all over.\"  Patient does report she is having shortness of breath but denies any chest pain at time of assessment.  Patient denies any nausea, vomiting, or any abdominal pain.  Patient has no other complaints this date.  Patient presents private vehicle.        Review of Systems   Constitutional:  Positive for fatigue. Negative for fever.   HENT: Negative.     Respiratory:  Positive for chest tightness and shortness of breath.    Cardiovascular: Negative.  Negative for chest pain.   Gastrointestinal: Negative.  Negative for abdominal pain.   Endocrine: Negative.    Genitourinary: Negative.  Negative for dysuria.   Skin: Negative.    Neurological: Negative.    Psychiatric/Behavioral: Negative.     All other systems reviewed and are negative.      Past Medical History:   Diagnosis Date    Abdominal pain     Abnormal uterine bleeding (AUB)     Anxiety and depression     Arthritis     Asthma     mild    Cholecystitis     Constipation     Endometriosis     Frequent UTI     GERD (gastroesophageal reflux disease)     Heartburn     Hemorrhoids     Hypertension     IBS (irritable bowel syndrome)     Kidney stones     Lesion of breast     Lump     RIGHT BREAST    Nausea & vomiting     PCOS (polycystic ovarian syndrome)  "    PONV (postoperative nausea and vomiting)     Psoriatic arthritis     Sjogren's disease     Sleep apnea     no cpap    Snores     Tachycardia     Wrist fracture     RIGHT ARM      PATIENT UNSURE IF FRACTURED       Allergies   Allergen Reactions    Peanut-Containing Drug Products Anaphylaxis     AND PEANUTS IN FOODS    Latex Hives    Shrimp Swelling     Facial swelling      Milk-Related Compounds Nausea And Vomiting    Sulfa Antibiotics Rash       Past Surgical History:   Procedure Laterality Date    ABDOMINAL SURGERY      ANAL SCOPE N/A 09/30/2022    Procedure: ANAL SCOPE;  Surgeon: Franco Mari MD;  Location: [facility] OR;  Service: General;  Laterality: N/A;    BLADDER REPAIR  06/06/2024    BREAST BIOPSY Right 11/29/2018    Procedure: breast biopsy ;  Surgeon: Shiv Overton MD;  Location: [facility] OR;  Service: General    CHOLECYSTECTOMY N/A 08/25/2017    Procedure: CHOLECYSTECTOMY LAPAROSCOPIC;  Surgeon: Franco Mari MD;  Location: [facility] OR;  Service:     COLONOSCOPY N/A 03/09/2020    Procedure: COLONOSCOPY CPT CODE: 19312;  Surgeon: Jeremiah Murdock MD;  Location: [facility] OR;  Service: Gastroenterology;  Laterality: N/A;    DENTAL PROCEDURE      DIAGNOSTIC LAPAROSCOPY      X5    DILATATION AND CURETTAGE      ENDOSCOPY N/A 03/09/2020    Procedure: ESOPHAGOGASTRODUODENOSCOPY WITH BIOPSY CPT CODE: 69580;  Surgeon: Jeremiah Murdock MD;  Location: [facility] OR;  Service: Gastroenterology;  Laterality: N/A;    ENDOSCOPY N/A 02/01/2021    Procedure: ESOPHAGOGASTRODUODENOSCOPY WITH BIOPSY CPT CODE: 84267;  Surgeon: Jeremiah Murdock MD;  Location: [facility] OR;  Service: Gastroenterology;  Laterality: N/A;    ENDOSCOPY N/A 06/20/2023    Procedure: ESOPHAGOGASTRODUODENOSCOPY WITH BIOPSY CPT CODE: 72510;  Surgeon: Beto Mejia MD;  Location: [facility] ENDOSCOPY;  Service: Gastroenterology;  Laterality: N/A;    ENDOSCOPY N/A 6/10/2024    Procedure:  ESOPHAGOGASTRODUODENOSCOPY WITH BIOPSY;  Surgeon: Beto Mejia MD;  Location: [facility] ENDOSCOPY;  Service: Gastroenterology;  Laterality: N/A;    EXTRACORPOREAL SHOCK WAVE LITHOTRIPSY (ESWL) Right 10/4/2024    Procedure: EXTRACORPOREAL SHOCKWAVE LITHOTRIPSY;  Surgeon: Ahmet Barrow MD;  Location: [facility] OR;  Service: Urology;  Laterality: Right;    HEMORRHOIDECTOMY N/A 11/14/2019    Procedure: HEMORRHOID STAPLING;  Surgeon: Franco Mari MD;  Location: [facility] OR;  Service: General    HYSTERECTOMY  03/22/2023    Total    KNEE ARTHROSCOPY W/ MENISCECTOMY Right 04/11/2022    Procedure: KNEE ARTHROSCOPIC DEBRIDEMENT, PRP INJECTION AND medial femoral condraplasty MENISCAL DEBRIDEMENT;  Surgeon: Musa Yoon MD;  Location: [facility] OR;  Service: Orthopedics;  Laterality: Right;    URETEROSCOPY LASER LITHOTRIPSY WITH STENT INSERTION Right 01/09/2019    Procedure: URETEROSCOPY LASER LITHOTRIPSY retrograde pyelogram;  Surgeon: Ahmet Barrow MD;  Location: [facility] OR;  Service: Urology    WISDOM TOOTH EXTRACTION         Family History   Problem Relation Age of Onset    Breast cancer Maternal Aunt     Alcohol abuse Paternal Grandfather     Heart disease Paternal Grandfather     Cancer Maternal Grandfather     Hypertension Maternal Grandfather     Colon cancer Neg Hx     Liver cancer Neg Hx        Social History     Socioeconomic History    Marital status:    Tobacco Use    Smoking status: Every Day     Current packs/day: 0.50     Average packs/day: 0.5 packs/day for 21.8 years (10.9 ttl pk-yrs)     Types: Cigarettes     Start date: 2003     Passive exposure: Current    Smokeless tobacco: Never   Vaping Use    Vaping status: Former    Substances: Nicotine, Flavoring   Substance and Sexual Activity    Alcohol use: Yes     Comment: RARELY    Drug use: No    Sexual activity: Defer           Objective   Physical Exam  Vitals and nursing note reviewed.   Constitutional:       General: She is not  in acute distress.     Appearance: She is well-developed. She is not diaphoretic.   HENT:      Head: Normocephalic and atraumatic.      Right Ear: External ear normal.      Left Ear: External ear normal.      Nose: Nose normal.   Eyes:      Conjunctiva/sclera: Conjunctivae normal.      Pupils: Pupils are equal, round, and reactive to light.   Neck:      Vascular: No JVD.      Trachea: No tracheal deviation.   Cardiovascular:      Rate and Rhythm: Regular rhythm. Tachycardia present.      Heart sounds: Normal heart sounds. No murmur heard.  Pulmonary:      Effort: Pulmonary effort is normal. Tachypnea present. No respiratory distress.      Breath sounds: Normal breath sounds. No decreased breath sounds, wheezing or rhonchi.   Abdominal:      General: Bowel sounds are normal.      Palpations: Abdomen is soft.      Tenderness: There is no abdominal tenderness.   Musculoskeletal:         General: No deformity. Normal range of motion.      Cervical back: Normal range of motion and neck supple.   Skin:     General: Skin is warm and dry.      Coloration: Skin is not pale.      Findings: No erythema or rash.   Neurological:      Mental Status: She is alert and oriented to person, place, and time.      Cranial Nerves: No cranial nerve deficit.   Psychiatric:         Behavior: Behavior normal.         Thought Content: Thought content normal.       Results for orders placed or performed during the hospital encounter of 10/09/24   COVID-19 and FLU A/B PCR, 1 HR TAT - Swab, Nasopharynx    Specimen: Nasopharynx; Swab   Result Value Ref Range    COVID19 Not Detected Not Detected - Ref. Range    Influenza A PCR Not Detected Not Detected    Influenza B PCR Not Detected Not Detected   Comprehensive Metabolic Panel    Specimen: Arm, Left; Blood   Result Value Ref Range    Glucose 114 (H) 65 - 99 mg/dL    BUN 10 6 - 20 mg/dL    Creatinine 1.05 (H) 0.57 - 1.00 mg/dL    Sodium 141 136 - 145 mmol/L    Potassium 3.9 3.5 - 5.2 mmol/L     Chloride 103 98 - 107 mmol/L    CO2 18.9 (L) 22.0 - 29.0 mmol/L    Calcium 10.6 (H) 8.6 - 10.5 mg/dL    Total Protein 8.8 (H) 6.0 - 8.5 g/dL    Albumin 4.7 3.5 - 5.2 g/dL    ALT (SGPT) 49 (H) 1 - 33 U/L    AST (SGOT) 27 1 - 32 U/L    Alkaline Phosphatase 94 39 - 117 U/L    Total Bilirubin 0.3 0.0 - 1.2 mg/dL    Globulin 4.1 gm/dL    A/G Ratio 1.1 g/dL    BUN/Creatinine Ratio 9.5 7.0 - 25.0    Anion Gap 19.1 (H) 5.0 - 15.0 mmol/L    eGFR 70.8 >60.0 mL/min/1.73   High Sensitivity Troponin T    Specimen: Arm, Left; Blood   Result Value Ref Range    HS Troponin T <6 <14 ng/L   Urine Drug Screen - Urine, Clean Catch    Specimen: Urine, Clean Catch   Result Value Ref Range    THC, Screen, Urine Negative Negative    Phencyclidine (PCP), Urine Negative Negative    Cocaine Screen, Urine Negative Negative    Methamphetamine, Ur Negative Negative    Opiate Screen Negative Negative    Amphetamine Screen, Urine Negative Negative    Benzodiazepine Screen, Urine Negative Negative    Tricyclic Antidepressants Screen Positive (A) Negative    Methadone Screen, Urine Negative Negative    Barbiturates Screen, Urine Negative Negative    Oxycodone Screen, Urine Negative Negative    Buprenorphine, Screen, Urine Negative Negative   Lipase    Specimen: Arm, Left; Blood   Result Value Ref Range    Lipase 39 13 - 60 U/L   TSH    Specimen: Arm, Left; Blood   Result Value Ref Range    TSH 3.370 0.270 - 4.200 uIU/mL   T4, Free    Specimen: Arm, Left; Blood   Result Value Ref Range    Free T4 0.98 0.92 - 1.68 ng/dL   Magnesium    Specimen: Arm, Left; Blood   Result Value Ref Range    Magnesium 2.0 1.6 - 2.6 mg/dL   BNP    Specimen: Arm, Left; Blood   Result Value Ref Range    proBNP <36.0 0.0 - 450.0 pg/mL   CBC Auto Differential    Specimen: Arm, Left; Blood   Result Value Ref Range    WBC 14.53 (H) 3.40 - 10.80 10*3/mm3    RBC 5.39 (H) 3.77 - 5.28 10*6/mm3    Hemoglobin 16.1 (H) 12.0 - 15.9 g/dL    Hematocrit 47.1 (H) 34.0 - 46.6 %    MCV 87.4  79.0 - 97.0 fL    MCH 29.9 26.6 - 33.0 pg    MCHC 34.2 31.5 - 35.7 g/dL    RDW 12.3 12.3 - 15.4 %    RDW-SD 39.1 37.0 - 54.0 fl    MPV 8.8 6.0 - 12.0 fL    Platelets 351 140 - 450 10*3/mm3   Protime-INR    Specimen: Blood   Result Value Ref Range    Protime 12.7 12.1 - 14.7 Seconds    INR 0.94 0.90 - 1.10   aPTT    Specimen: Blood   Result Value Ref Range    PTT 30.0 26.5 - 34.5 seconds   Lactic Acid, Plasma    Specimen: Arm, Right; Blood   Result Value Ref Range    Lactate 2.6 (C) 0.5 - 2.0 mmol/L   Procalcitonin    Specimen: Arm, Left; Blood   Result Value Ref Range    Procalcitonin 0.08 0.00 - 0.25 ng/mL   Urinalysis With Culture If Indicated - Urine, Clean Catch    Specimen: Urine, Clean Catch   Result Value Ref Range    Color, UA Yellow Yellow, Straw    Appearance, UA Clear Clear    pH, UA 5.5 5.0 - 8.0    Specific Gravity, UA >1.030 (H) 1.005 - 1.030    Glucose, UA Negative Negative    Ketones, UA Negative Negative    Bilirubin, UA Negative Negative    Blood, UA Trace (A) Negative    Protein, UA Negative Negative    Leuk Esterase, UA Negative Negative    Nitrite, UA Negative Negative    Urobilinogen, UA 0.2 E.U./dL 0.2 - 1.0 E.U./dL   Manual Differential    Specimen: Arm, Left; Blood   Result Value Ref Range    Neutrophil % 45.0 42.7 - 76.0 %    Lymphocyte % 45.0 19.6 - 45.3 %    Monocyte % 7.0 5.0 - 12.0 %    Eosinophil % 2.0 0.3 - 6.2 %    Bands %  1.0 0.0 - 5.0 %    Neutrophils Absolute 6.68 1.70 - 7.00 10*3/mm3    Lymphocytes Absolute 6.54 (H) 0.70 - 3.10 10*3/mm3    Monocytes Absolute 1.02 (H) 0.10 - 0.90 10*3/mm3    Eosinophils Absolute 0.29 0.00 - 0.40 10*3/mm3    RBC Morphology Normal Normal    Platelet Morphology Normal Normal   STAT Lactic Acid, Reflex    Specimen: Arm, Left; Blood   Result Value Ref Range    Lactate 2.2 (C) 0.5 - 2.0 mmol/L   High Sensitivity Troponin T 2Hr    Specimen: Arm, Left; Blood   Result Value Ref Range    HS Troponin T 7 <14 ng/L    Troponin T Delta     Fentanyl, Urine -  Urine, Clean Catch    Specimen: Urine, Clean Catch   Result Value Ref Range    Fentanyl, Urine Negative Negative   Urinalysis, Microscopic Only - Urine, Clean Catch    Specimen: Urine, Clean Catch   Result Value Ref Range    RBC, UA 3-5 (A) None Seen, 0-2 /HPF    WBC, UA 3-5 (A) None Seen, 0-2 /HPF    Bacteria, UA 1+ (A) None Seen /HPF    Squamous Epithelial Cells, UA 3-6 (A) None Seen, 0-2 /HPF    Hyaline Casts, UA None Seen None Seen /LPF    Methodology Automated Microscopy    Blood Gas, Arterial With Co-Ox    Specimen: Arterial Blood   Result Value Ref Range    Site Right Radial     Daryl's Test N/A     pH, Arterial 7.422 7.350 - 7.450 pH units    pCO2, Arterial 30.4 (L) 35.0 - 45.0 mm Hg    pO2, Arterial 130.0 (H) 83.0 - 108.0 mm Hg    HCO3, Arterial 19.8 (L) 20.0 - 26.0 mmol/L    Base Excess, Arterial -3.4 (L) 0.0 - 2.0 mmol/L    O2 Saturation, Arterial 99.1 (H) 94.0 - 99.0 %    Hemoglobin, Blood Gas 14.9 13.5 - 17.5 g/dL    Hematocrit, Blood Gas 45.8 38.0 - 51.0 %    Oxyhemoglobin 95.7 94 - 99 %    Methemoglobin 0.40 0.00 - 3.00 %    Carboxyhemoglobin 3.0 0 - 5 %    A-a DO2      CO2 Content 20.8 (L) 22 - 33 mmol/L    Barometric Pressure for Blood Gas 729 mmHg    Modality Room Air     FIO2 21 %    Ventilator Mode NA     Collected by [id number]     pH, Temp Corrected      pCO2, Temperature Corrected      pO2, Temperature Corrected     ECG 12 Lead Tachycardia   Result Value Ref Range    QT Interval 326 ms    QTC Interval 508 ms   ECG 12 Lead Other; Sepsis   Result Value Ref Range    QT Interval 366 ms    QTC Interval 550 ms   Green Top (Gel)   Result Value Ref Range    Extra Tube Hold for add-ons.    Lavender Top   Result Value Ref Range    Extra Tube hold for add-on    Gold Top - SST   Result Value Ref Range    Extra Tube Hold for add-ons.    Light Blue Top   Result Value Ref Range    Extra Tube Hold for add-ons.    Green Top (Gel)   Result Value Ref Range    Extra Tube Hold for add-ons.    Gold Top - SST   Result  Value Ref Range    Extra Tube Hold for add-ons.    Light Blue Top   Result Value Ref Range    Extra Tube Hold for add-ons.      CT Angiogram Chest Pulmonary Embolism    Result Date: 10/9/2024  Negative for pulmonary embolism.   This report was finalized on 10/9/2024 9:57 PM by Alex Pallas, DO.      XR Chest 1 View    Result Date: 10/9/2024  Subsegmental atelectasis/airspace disease in the lung bases.   This report was finalized on 10/9/2024 9:28 PM by Alex Pallas, DO.         Procedures           ED Course  ED Course as of 10/10/24 0105   Wed Oct 09, 2024   2030 EKG at 2005 shows sinus tachycardia, rate 146.  WY interval 142, QRS duration 62, QTc 508 ms.  Some baseline artifact.  Nonspecific ST-T changes.  No evidence for STEMI.  Electronically signed by Matthew Pichardo MD, 10/09/24, 8:31 PM EDT.   [CM]   2059 EKG at 2056 shows sinus tachycardia, rate 135.  WY interval 144, QRS duration 82, QTc 435 ms.  Nonspecific ST-T changes.  No evidence for STEMI.  Electronically signed by Matthew Pichardo MD, 10/09/24, 9:00 PM EDT.   [CM]   Thu Oct 10, 2024   0040 Spoke with Dr. Lai who request that patient has third troponin and reevaluate possible admission.   [KH]   0049 Offered patient admission to the hospital which she originally excepted however now would like to leave and follow-up with her family doctor tomorrow as well as cardiology in the near future. [KH]   0059 History Slightly suspicious  ECG Normal  Age <45  Risk Factors 1-2 risk factors  Troponin Less than or equal to normal limit  HEART Score 1     [KH]      ED Course User Index  [CM] Matthew Pichardo MD  [KH] Trudy Sol, APRN                HEART Score: 1                              Medical Decision Making  Patient presents with complaints of hypertension and tachycardia.  Patient reports she is also having associated shortness of breath.  Patient reports she had chest pain prior to arrival but denies any upon arrival today.  First  troponin is undetectable at less than 6, second troponin is 7.  Lactic acid is noted to be 2.6 and decreased to 2.2 after fluid administration.  Patient's blood pressure noted to be 95/64 and heart rate is 115 bpm.  Patient reports that she does feel much better now that her heart rate has decreased.  Spoke with Dr. Lai to admit patient to the hospital who states that as it stands patient does not quite meet inpatient criteria and we should obtain a third troponin to further evaluate.  Explained this to patient and states that she would rather go home and follow-up with her family doctor as well as cardiology in the next coming days.  Patient declines third troponin and reports that since she feels much better she would like to be discharged home.  Patient's heart score is a 1 which is low risk.  Outpatient stress test form given to patient and will be scheduled by the stress test scheduling department.  Patient is alert and oriented and of sound mind to make these decisions.      Amount and/or Complexity of Data Reviewed  Labs: ordered.  Radiology: ordered.  ECG/medicine tests: ordered.    Risk  OTC drugs.  Prescription drug management.        Final diagnoses:   Primary hypertension   Chest pain, unspecified type       ED Disposition  ED Disposition       ED Disposition   Discharge    Condition   Stable    Comment   --               Osvaldo Madrid MD  [address] [facility] [address]  [phone]    Schedule an appointment as soon as possible for a visit   As needed    Agustin Solis MD  [address] [address]  [phone]    Schedule an appointment as soon as possible for a visit in 3 days      [facility] EMERGENCY DEPARTMENT  [address] [facility] [id number] [phone]  Go to   If symptoms worsen         Medication List        Changed      dicyclomine 20 MG tablet  Commonly known as: BENTYL  TAKE ONE TABLET BY MOUTH FOUR TIMES A DAY BEFORE MEALS AND AT BEDTIME  What  changed: when to take this     Gemtesa 75 MG tablet  Generic drug: Vibegron  TAKE ONE TABLET BY MOUTH ONCE NIGHTLY  What changed:   how much to take  when to take this                 Trudy Sol, APRN  10/10/24 0105

## 2024-10-14 LAB
BACTERIA SPEC AEROBE CULT: NORMAL
BACTERIA SPEC AEROBE CULT: NORMAL

## 2024-10-18 ENCOUNTER — OFFICE VISIT (OUTPATIENT)
Dept: UROLOGY | Facility: CLINIC | Age: 34
End: 2024-10-18
Payer: COMMERCIAL

## 2024-10-18 ENCOUNTER — HOSPITAL ENCOUNTER (OUTPATIENT)
Dept: GENERAL RADIOLOGY | Facility: HOSPITAL | Age: 34
Discharge: HOME | End: 2024-10-18
Payer: COMMERCIAL

## 2024-10-18 VITALS
WEIGHT: 181.8 LBS | SYSTOLIC BLOOD PRESSURE: 124 MMHG | BODY MASS INDEX: 36.65 KG/M2 | HEART RATE: 87 BPM | HEIGHT: 59 IN | DIASTOLIC BLOOD PRESSURE: 89 MMHG

## 2024-10-18 DIAGNOSIS — N20.0 KIDNEY STONE: ICD-10-CM

## 2024-10-18 PROCEDURE — 74018 RADEX ABDOMEN 1 VIEW: CPT

## 2024-10-18 RX ORDER — PROMETHAZINE HYDROCHLORIDE 25 MG/1
25 TABLET ORAL EVERY 6 HOURS PRN
Qty: 21 TABLET | Refills: 1 | Status: SHIPPED | OUTPATIENT
Start: 2024-10-18

## 2024-10-18 NOTE — PROGRESS NOTES
"Chief Complaint:    Chief Complaint   Patient presents with    Nephrolithiasis     Surgery follow up     Flank Pain       Vital Signs:   /89 (BP Location: Right arm, Patient Position: Sitting, Cuff Size: Adult)   Pulse 87   Ht 149.9 cm (59\")   Wt 82.5 kg (181 lb 12.8 oz)   BMI 36.72 kg/m²   Body mass index is 36.72 kg/m².      HPI:  Pili Webster is a 34 y.o. female who presents today for follow up    History of Present Illness  Ms. Webster presents to the clinic for a postop follow-up.  She has a past medical history significant for punctate bilateral nephrolithiasis and was having persistent right-sided back and flank pain underwent a extracorporal shockwave lithotripsy by Dr. Barrow on 10/4/2024.  She reports that postsurgery she had done well up until roughly 4 to 5 days postop.  She was having no significant lower urinary tract symptoms but began to have shortness of breath with tachycardia and elevated blood pressure.  She went to the emergency department for evaluation and appropriate workup including a CT scan chest protocol that was negative for PE.  Her chest x-ray did show some mild atelectasis most likely from recent surgical intervention.  She states that she is following up with her cardiologist and was scheduled to undergo a stress test today however was rescheduled to this Monday.  She denies any dysuria, gross hematuria, frequency, urgency, or worsening lower urinary tract symptoms.  Her right back pain has improved.  She is still having intermittent nausea at times      Past Medical History:  Past Medical History:   Diagnosis Date    Abdominal pain     Abnormal uterine bleeding (AUB)     Anxiety and depression     Arthritis     Asthma     mild    Cholecystitis     Constipation     Endometriosis     Frequent UTI     GERD (gastroesophageal reflux disease)     Heartburn     Hemorrhoids     Hypertension     IBS (irritable bowel syndrome)     Kidney stones     Lesion of breast     " Lump     RIGHT BREAST    Nausea & vomiting     PCOS (polycystic ovarian syndrome)     PONV (postoperative nausea and vomiting)     Psoriatic arthritis     Sjogren's disease     Sleep apnea     no cpap    Snores     Tachycardia     Wrist fracture     RIGHT ARM      PATIENT UNSURE IF FRACTURED       Current Meds:  Current Outpatient Medications   Medication Sig Dispense Refill    albuterol sulfate  (90 Base) MCG/ACT inhaler Inhale 1 puff As Needed.      amitriptyline (ELAVIL) 50 MG tablet Take 1 tablet by mouth Daily.      aspirin 81 MG EC tablet Take 1 tablet by mouth Daily. LAST DOSE WAS 9/27/24      cloNIDine (CATAPRES) 0.1 MG tablet Take 1 tablet by mouth Every Night.      colestipol (COLESTID) 1 g tablet TAKE ONE TABLET BY MOUTH DAILY 90 tablet 3    cyclobenzaprine (FLEXERIL) 10 MG tablet Take 1 tablet by mouth Every 12 (Twelve) Hours.      dexlansoprazole (DEXILANT) 60 MG capsule Take 1 capsule by mouth Daily. 90 capsule 3    dicyclomine (BENTYL) 20 MG tablet TAKE ONE TABLET BY MOUTH FOUR TIMES A DAY BEFORE MEALS AND AT BEDTIME (Patient taking differently: Take 1 tablet by mouth 3 (Three) Times a Day.) 120 tablet 3    Dupilumab (Dupixent) 300 MG/2ML solution pen-injector Inject 2 mL under the skin into the appropriate area as directed 1 (One) Time Per Week. 8 mL 5    Erenumab-aooe (Aimovig) 70 MG/ML auto-injector Inject 1 mL under the skin into the appropriate area as directed Every 28 (Twenty-Eight) Days.      Estradiol 1.25 MG/1.25GM gel Apply  topically to the appropriate area as directed Daily.      folic acid (FOLVITE) 1 MG tablet Take 1 tablet by mouth Daily.      gabapentin (NEURONTIN) 100 MG capsule Take 2 capsules by mouth 3 (Three) Times a Day.      HYDROcodone-acetaminophen (NORCO)  MG per tablet Take 1 tablet by mouth Every 6 (Six) Hours As Needed for Moderate Pain. 12 tablet 0    hydroxychloroquine (PLAQUENIL) 200 MG tablet Take 1 tablet by mouth Daily.      hydrOXYzine pamoate  (VISTARIL) 100 MG capsule Take 1 capsule by mouth Every 6 (Six) Hours.      ketorolac (TORADOL) 10 MG tablet Take 1 tablet by mouth Every 6 (Six) Hours As Needed for Moderate Pain. 16 tablet 1    lisinopril-hydrochlorothiazide (PRINZIDE,ZESTORETIC) 10-12.5 MG per tablet Take 1 tablet by mouth Daily.      loratadine (CLARITIN) 10 MG tablet Take 1 tablet by mouth Daily.      montelukast (SINGULAIR) 10 MG tablet Take 1 tablet by mouth Every Night.      nadolol (CORGARD) 80 MG tablet Take 1 tablet by mouth Daily.      oxybutynin XL (DITROPAN-XL) 5 MG 24 hr tablet TAKE ONE TABLET BY MOUTH DAILY 90 tablet 3    PARoxetine (PAXIL) 30 MG tablet Take 1 tablet by mouth Daily.      promethazine (PHENERGAN) 25 MG tablet Take 1 tablet by mouth Every 6 (Six) Hours As Needed for Nausea or Vomiting. 21 tablet 1    SUMAtriptan (IMITREX) 100 MG tablet Take 1 tablet by mouth Daily.      tamsulosin (FLOMAX) 0.4 MG capsule 24 hr capsule Take 1 capsule by mouth Daily. 90 capsule 3    Vibegron (Gemtesa) 75 MG tablet TAKE ONE TABLET BY MOUTH ONCE NIGHTLY (Patient taking differently: Take 1 tablet by mouth Daily.) 90 tablet 3    vitamin D (ERGOCALCIFEROL) 1.25 MG (62119 UT) capsule capsule Take 1 capsule by mouth Every 7 (Seven) Days.       No current facility-administered medications for this visit.        Allergies:   Allergies   Allergen Reactions    Peanut-Containing Drug Products Anaphylaxis     AND PEANUTS IN FOODS    Latex Hives    Shrimp Swelling     Facial swelling      Milk-Related Compounds Nausea And Vomiting    Sulfa Antibiotics Rash        Past Surgical History:  Past Surgical History:   Procedure Laterality Date    ABDOMINAL SURGERY      ANAL SCOPE N/A 09/30/2022    Procedure: ANAL SCOPE;  Surgeon: Franco Mari MD;  Location: [facility] OR;  Service: General;  Laterality: N/A;    BLADDER REPAIR  06/06/2024    BREAST BIOPSY Right 11/29/2018    Procedure: breast biopsy ;  Surgeon: Shiv Overton MD;  Location: [facility] OR;   Service: General    CHOLECYSTECTOMY N/A 08/25/2017    Procedure: CHOLECYSTECTOMY LAPAROSCOPIC;  Surgeon: Franco Mari MD;  Location: [facility] OR;  Service:     COLONOSCOPY N/A 03/09/2020    Procedure: COLONOSCOPY CPT CODE: 74607;  Surgeon: Jeremiah Murdock MD;  Location: [facility] OR;  Service: Gastroenterology;  Laterality: N/A;    DENTAL PROCEDURE      DIAGNOSTIC LAPAROSCOPY      X5    DILATATION AND CURETTAGE      ENDOSCOPY N/A 03/09/2020    Procedure: ESOPHAGOGASTRODUODENOSCOPY WITH BIOPSY CPT CODE: 57033;  Surgeon: Jeremiah Murdock MD;  Location: [facility] OR;  Service: Gastroenterology;  Laterality: N/A;    ENDOSCOPY N/A 02/01/2021    Procedure: ESOPHAGOGASTRODUODENOSCOPY WITH BIOPSY CPT CODE: 22047;  Surgeon: Jeremiah Murdock MD;  Location: [facility] OR;  Service: Gastroenterology;  Laterality: N/A;    ENDOSCOPY N/A 06/20/2023    Procedure: ESOPHAGOGASTRODUODENOSCOPY WITH BIOPSY CPT CODE: 21351;  Surgeon: Beto Mejia MD;  Location: [facility] ENDOSCOPY;  Service: Gastroenterology;  Laterality: N/A;    ENDOSCOPY N/A 6/10/2024    Procedure: ESOPHAGOGASTRODUODENOSCOPY WITH BIOPSY;  Surgeon: Beto Mejia MD;  Location: [facility] ENDOSCOPY;  Service: Gastroenterology;  Laterality: N/A;    EXTRACORPOREAL SHOCK WAVE LITHOTRIPSY (ESWL) Right 10/4/2024    Procedure: EXTRACORPOREAL SHOCKWAVE LITHOTRIPSY;  Surgeon: Ahmet Barrow MD;  Location: [facility] OR;  Service: Urology;  Laterality: Right;    HEMORRHOIDECTOMY N/A 11/14/2019    Procedure: HEMORRHOID STAPLING;  Surgeon: Franco Mari MD;  Location: [facility] OR;  Service: General    HYSTERECTOMY  03/22/2023    Total    KNEE ARTHROSCOPY W/ MENISCECTOMY Right 04/11/2022    Procedure: KNEE ARTHROSCOPIC DEBRIDEMENT, PRP INJECTION AND medial femoral condraplasty MENISCAL DEBRIDEMENT;  Surgeon: Musa Yoon MD;  Location: [facility] OR;  Service: Orthopedics;  Laterality: Right;    URETEROSCOPY LASER LITHOTRIPSY WITH STENT  INSERTION Right 01/09/2019    Procedure: URETEROSCOPY LASER LITHOTRIPSY retrograde pyelogram;  Surgeon: Ahmet Barrow MD;  Location: [facility];  Service: Urology    WISDOM TOOTH EXTRACTION         Social History:  Social History     Socioeconomic History    Marital status:    Tobacco Use    Smoking status: Every Day     Current packs/day: 0.50     Average packs/day: 0.5 packs/day for 21.8 years (10.9 ttl pk-yrs)     Types: Cigarettes     Start date: 2003     Passive exposure: Current    Smokeless tobacco: Never   Vaping Use    Vaping status: Former    Substances: Nicotine, Flavoring   Substance and Sexual Activity    Alcohol use: Yes     Comment: RARELY    Drug use: No    Sexual activity: Defer       Family History:  Family History   Problem Relation Age of Onset    Breast cancer Maternal Aunt     Alcohol abuse Paternal Grandfather     Heart disease Paternal Grandfather     Cancer Maternal Grandfather     Hypertension Maternal Grandfather     Colon cancer Neg Hx     Liver cancer Neg Hx        Review of Systems:  Review of Systems   Constitutional:  Positive for fatigue. Negative for chills, fever and unexpected weight change.   HENT:  Negative for congestion and sinus pressure.    Respiratory:  Negative for chest tightness and shortness of breath.    Cardiovascular:  Negative for chest pain.   Gastrointestinal:  Negative for abdominal pain, constipation, diarrhea, nausea and vomiting.   Genitourinary:  Positive for flank pain. Negative for difficulty urinating, dysuria, frequency, hematuria and urgency.   Musculoskeletal:  Positive for back pain. Negative for neck pain.   Skin:  Negative for rash.   Neurological:  Positive for headaches. Negative for dizziness.   Hematological:  Does not bruise/bleed easily.   Psychiatric/Behavioral:  Negative for confusion and suicidal ideas. The patient is not nervous/anxious.        Physical Exam:  Physical Exam  Constitutional:       General: She is not in acute  distress.     Appearance: Normal appearance.   HENT:      Head: Normocephalic.      Mouth/Throat:      Mouth: Mucous membranes are moist.      Pharynx: Oropharynx is clear.   Eyes:      Extraocular Movements: Extraocular movements intact.      Conjunctiva/sclera: Conjunctivae normal.   Cardiovascular:      Rate and Rhythm: Normal rate and regular rhythm.      Pulses: Normal pulses.      Heart sounds: Normal heart sounds.   Pulmonary:      Effort: Pulmonary effort is normal.      Breath sounds: Normal breath sounds.   Abdominal:      General: Abdomen is flat. Bowel sounds are normal.      Palpations: Abdomen is soft.   Genitourinary:     Rectum: Normal.   Musculoskeletal:      Cervical back: Normal range of motion.   Skin:     General: Skin is warm.   Neurological:      General: No focal deficit present.      Mental Status: She is alert and oriented to person, place, and time.   Psychiatric:         Mood and Affect: Mood normal.         Thought Content: Thought content normal.         Judgment: Judgment normal.         IPSS Questionnaire (AUA-7):  IPSS Questionnaire (AUA-7):                Recent Image (CT and/or KUB):   CT Abdomen and Pelvis: No results found for this or any previous visit.     CT Stone Protocol: Results for orders placed during the hospital encounter of 10/28/22    CT Abdomen Pelvis Stone Protocol    Narrative  EXAM:  CT Abdomen and Pelvis Without Intravenous Contrast    EXAM DATE:  10/28/2022 3:27 PM    CLINICAL HISTORY:  Nephrolithiasis; N20.0-Calculus of kidney    TECHNIQUE:  Axial computed tomography images of the abdomen and pelvis without  intravenous contrast.  Sagittal and coronal reformatted images were  created and reviewed.  This CT exam was performed using one or more of  the following dose reduction techniques:  automated exposure control,  adjustment of the mA and/or kV according to patient size, and/or use of  iterative reconstruction technique.    COMPARISON:  CT ABDOMEN PELVIS  STONE PROTOCOL- dated 07/20/2022    FINDINGS:  LUNG BASES:  Unremarkable.  No mass.  No consolidation.    ABDOMEN:  LIVER:  Unremarkable.  GALLBLADDER AND BILE DUCTS:  Cholecystectomy clips.  No ductal  dilation.  PANCREAS:  Unremarkable.  No ductal dilation.  SPLEEN:  Unremarkable.  No splenomegaly.  ADRENALS:  Unremarkable.  No mass.  KIDNEYS AND URETERS:  Unremarkable.  No obstructing stones.  No  hydronephrosis.  STOMACH AND BOWEL:  Unremarkable.  No obstruction.  No mucosal  thickening.    PELVIS:  APPENDIX:  No findings to suggest acute appendicitis.  BLADDER:  Unremarkable.  No stones.  REPRODUCTIVE:  Right adnexal region cyst measuring 2.6 cm.    ABDOMEN and PELVIS:  INTRAPERITONEAL SPACE:  Unremarkable.  No free air.  No significant  fluid collection.  BONES/JOINTS:  No acute fracture.  No dislocation.  SOFT TISSUES:  Unremarkable.  VASCULATURE:  Unremarkable.  No abdominal aortic aneurysm.  LYMPH NODES:  Unremarkable.  No enlarged lymph nodes.    Impression  Right adnexal region cyst measuring 2.6 cm.    This report was finalized on 10/28/2022 3:52 PM by Dr. Henrry Matthews MD.     KUB: Results for orders placed during the hospital encounter of 09/19/24    XR Abdomen KUB    Narrative  EXAM:  XR Abdomen, 1 View    EXAM DATE:  9/19/2024 11:02 AM    CLINICAL HISTORY:  kidney stone; N20.0-Calculus of kidney    TECHNIQUE:  Frontal supine view of the abdomen/pelvis.    COMPARISON:  No relevant prior studies available.    FINDINGS:  Gastrointestinal tract:  Unremarkable as visualized.  No dilation.  Organs:  Unremarkable as visualized.  No radiographic evidence of  renal or ureteral stones.  Bones/joints:  Unremarkable as visualized.  No acute fracture.    Impression  No radiographic evidence of renal or ureteral stones.      This report was finalized on 9/19/2024 12:40 PM by Dr. Musa Jaime MD.       Labs:  Brief Urine Lab Results  (Last result in the past 365 days)        Color   Clarity   Blood   Leuk Est    Nitrite   Protein   CREAT   Urine HCG        10/09/24 2306 Yellow   Clear   Trace   Negative   Negative   Negative                 Admission on 10/09/2024, Discharged on 10/10/2024   Component Date Value Ref Range Status    QT Interval 10/09/2024 326  ms Final    QTC Interval 10/09/2024 508  ms Final    Glucose 10/09/2024 114 (H)  65 - 99 mg/dL Final    BUN 10/09/2024 10  6 - 20 mg/dL Final    Creatinine 10/09/2024 1.05 (H)  0.57 - 1.00 mg/dL Final    Sodium 10/09/2024 141  136 - 145 mmol/L Final    Potassium 10/09/2024 3.9  3.5 - 5.2 mmol/L Final    Slight hemolysis detected by analyzer. Result may be falsely elevated.    Chloride 10/09/2024 103  98 - 107 mmol/L Final    CO2 10/09/2024 18.9 (L)  22.0 - 29.0 mmol/L Final    Calcium 10/09/2024 10.6 (H)  8.6 - 10.5 mg/dL Final    Total Protein 10/09/2024 8.8 (H)  6.0 - 8.5 g/dL Final    Albumin 10/09/2024 4.7  3.5 - 5.2 g/dL Final    ALT (SGPT) 10/09/2024 49 (H)  1 - 33 U/L Final    AST (SGOT) 10/09/2024 27  1 - 32 U/L Final    Alkaline Phosphatase 10/09/2024 94  39 - 117 U/L Final    Total Bilirubin 10/09/2024 0.3  0.0 - 1.2 mg/dL Final    Globulin 10/09/2024 4.1  gm/dL Final    A/G Ratio 10/09/2024 1.1  g/dL Final    BUN/Creatinine Ratio 10/09/2024 9.5  7.0 - 25.0 Final    Anion Gap 10/09/2024 19.1 (H)  5.0 - 15.0 mmol/L Final    eGFR 10/09/2024 70.8  >60.0 mL/min/1.73 Final    HS Troponin T 10/09/2024 <6  <14 ng/L Final    THC, Screen, Urine 10/09/2024 Negative  Negative Final    Phencyclidine (PCP), Urine 10/09/2024 Negative  Negative Final    Cocaine Screen, Urine 10/09/2024 Negative  Negative Final    Methamphetamine, Ur 10/09/2024 Negative  Negative Final    Opiate Screen 10/09/2024 Negative  Negative Final    Amphetamine Screen, Urine 10/09/2024 Negative  Negative Final    Benzodiazepine Screen, Urine 10/09/2024 Negative  Negative Final    Tricyclic Antidepressants Screen 10/09/2024 Positive (A)  Negative Final    Methadone Screen, Urine 10/09/2024 Negative   Negative Final    Barbiturates Screen, Urine 10/09/2024 Negative  Negative Final    Oxycodone Screen, Urine 10/09/2024 Negative  Negative Final    Buprenorphine, Screen, Urine 10/09/2024 Negative  Negative Final    Lipase 10/09/2024 39  13 - 60 U/L Final    TSH 10/09/2024 3.370  0.270 - 4.200 uIU/mL Final    Free T4 10/09/2024 0.98  0.92 - 1.68 ng/dL Final    Magnesium 10/09/2024 2.0  1.6 - 2.6 mg/dL Final    proBNP 10/09/2024 <36.0  0.0 - 450.0 pg/mL Final    WBC 10/09/2024 14.53 (H)  3.40 - 10.80 10*3/mm3 Final    RBC 10/09/2024 5.39 (H)  3.77 - 5.28 10*6/mm3 Final    Hemoglobin 10/09/2024 16.1 (H)  12.0 - 15.9 g/dL Final    Hematocrit 10/09/2024 47.1 (H)  34.0 - 46.6 % Final    MCV 10/09/2024 87.4  79.0 - 97.0 fL Final    MCH 10/09/2024 29.9  26.6 - 33.0 pg Final    MCHC 10/09/2024 34.2  31.5 - 35.7 g/dL Final    RDW 10/09/2024 12.3  12.3 - 15.4 % Final    RDW-SD 10/09/2024 39.1  37.0 - 54.0 fl Final    MPV 10/09/2024 8.8  6.0 - 12.0 fL Final    Platelets 10/09/2024 351  140 - 450 10*3/mm3 Final    Extra Tube 10/09/2024 Hold for add-ons.   Final    Auto resulted.    Extra Tube 10/09/2024 hold for add-on   Final    Auto resulted    Extra Tube 10/09/2024 Hold for add-ons.   Final    Auto resulted.    Extra Tube 10/09/2024 Hold for add-ons.   Final    Auto resulted    QT Interval 10/09/2024 366  ms Final    QTC Interval 10/09/2024 550  ms Final    Protime 10/09/2024 12.7  12.1 - 14.7 Seconds Final    INR 10/09/2024 0.94  0.90 - 1.10 Final    PTT 10/09/2024 30.0  26.5 - 34.5 seconds Final    Lactate 10/09/2024 2.6 (C)  0.5 - 2.0 mmol/L Final    Procalcitonin 10/09/2024 0.08  0.00 - 0.25 ng/mL Final    Blood Culture 10/09/2024 No growth at 5 days   Final    Blood Culture 10/09/2024 No growth at 5 days   Final    Extra Tube 10/09/2024 Hold for add-ons.   Final    Auto resulted.    Extra Tube 10/09/2024 Hold for add-ons.   Final    Auto resulted.    Extra Tube 10/09/2024 Hold for add-ons.   Final    Auto resulted     Color, UA 10/09/2024 Yellow  Yellow, Straw Final    Appearance, UA 10/09/2024 Clear  Clear Final    pH, UA 10/09/2024 5.5  5.0 - 8.0 Final    Specific Gravity, UA 10/09/2024 >1.030 (H)  1.005 - 1.030 Final    Glucose, UA 10/09/2024 Negative  Negative Final    Ketones, UA 10/09/2024 Negative  Negative Final    Bilirubin, UA 10/09/2024 Negative  Negative Final    Blood, UA 10/09/2024 Trace (A)  Negative Final    Protein, UA 10/09/2024 Negative  Negative Final    Leuk Esterase, UA 10/09/2024 Negative  Negative Final    Nitrite, UA 10/09/2024 Negative  Negative Final    Urobilinogen, UA 10/09/2024 0.2 E.U./dL  0.2 - 1.0 E.U./dL Final    Neutrophil % 10/09/2024 45.0  42.7 - 76.0 % Final    Lymphocyte % 10/09/2024 45.0  19.6 - 45.3 % Final    Monocyte % 10/09/2024 7.0  5.0 - 12.0 % Final    Eosinophil % 10/09/2024 2.0  0.3 - 6.2 % Final    Bands %  10/09/2024 1.0  0.0 - 5.0 % Final    Neutrophils Absolute 10/09/2024 6.68  1.70 - 7.00 10*3/mm3 Final    Lymphocytes Absolute 10/09/2024 6.54 (H)  0.70 - 3.10 10*3/mm3 Final    Monocytes Absolute 10/09/2024 1.02 (H)  0.10 - 0.90 10*3/mm3 Final    Eosinophils Absolute 10/09/2024 0.29  0.00 - 0.40 10*3/mm3 Final    RBC Morphology 10/09/2024 Normal  Normal Final    Platelet Morphology 10/09/2024 Normal  Normal Final    COVID19 10/09/2024 Not Detected  Not Detected - Ref. Range Final    Influenza A PCR 10/09/2024 Not Detected  Not Detected Final    Influenza B PCR 10/09/2024 Not Detected  Not Detected Final    Lactate 10/09/2024 2.2 (C)  0.5 - 2.0 mmol/L Final    HS Troponin T 10/09/2024 7  <14 ng/L Final    Troponin T Delta 10/09/2024    Final    Unable to calculate.    Fentanyl, Urine 10/09/2024 Negative  Negative Final    RBC, UA 10/09/2024 3-5 (A)  None Seen, 0-2 /HPF Final    WBC, UA 10/09/2024 3-5 (A)  None Seen, 0-2 /HPF Final    Urine culture not indicated.    Bacteria, UA 10/09/2024 1+ (A)  None Seen /HPF Final    Squamous Epithelial Cells, UA 10/09/2024 3-6 (A)  None  Seen, 0-2 /HPF Final    Hyaline Casts, UA 10/09/2024 None Seen  None Seen /LPF Final    Methodology 10/09/2024 Automated Microscopy   Final    Site 10/10/2024 Right Radial   Final    Daryl's Test 10/10/2024 N/A   Final    pH, Arterial 10/10/2024 7.422  7.350 - 7.450 pH units Final    pCO2, Arterial 10/10/2024 30.4 (L)  35.0 - 45.0 mm Hg Final    pO2, Arterial 10/10/2024 130.0 (H)  83.0 - 108.0 mm Hg Final    83 Value above reference range    HCO3, Arterial 10/10/2024 19.8 (L)  20.0 - 26.0 mmol/L Final    84 Value below reference range    Base Excess, Arterial 10/10/2024 -3.4 (L)  0.0 - 2.0 mmol/L Final    O2 Saturation, Arterial 10/10/2024 99.1 (H)  94.0 - 99.0 % Final    Hemoglobin, Blood Gas 10/10/2024 14.9  13.5 - 17.5 g/dL Final    Hematocrit, Blood Gas 10/10/2024 45.8  38.0 - 51.0 % Final    Oxyhemoglobin 10/10/2024 95.7  94 - 99 % Final    Methemoglobin 10/10/2024 0.40  0.00 - 3.00 % Final    Carboxyhemoglobin 10/10/2024 3.0  0 - 5 % Final    A-a DO2 10/10/2024    Final    UNABLE TO CALCULATE    CO2 Content 10/10/2024 20.8 (L)  22 - 33 mmol/L Final    Barometric Pressure for Blood Gas 10/10/2024 729  mmHg Final    Modality 10/10/2024 Room Air   Final    FIO2 10/10/2024 21  % Final    Ventilator Mode 10/10/2024 NA   Final    Collected by 10/10/2024 [id number]   Final    Meter: [id number]     :  [id number]   Office Visit on 09/19/2024   Component Date Value Ref Range Status    Glucose 09/19/2024 105 (H)  65 - 99 mg/dL Final    BUN 09/19/2024 10  6 - 20 mg/dL Final    Creatinine 09/19/2024 0.90  0.57 - 1.00 mg/dL Final    Sodium 09/19/2024 136  136 - 145 mmol/L Final    Potassium 09/19/2024 4.4  3.5 - 5.2 mmol/L Final    Slight hemolysis detected by analyzer. Result may be falsely elevated.    Chloride 09/19/2024 103  98 - 107 mmol/L Final    CO2 09/19/2024 20.8 (L)  22.0 - 29.0 mmol/L Final    Calcium 09/19/2024 9.9  8.6 - 10.5 mg/dL Final    BUN/Creatinine Ratio 09/19/2024 11.1  7.0 - 25.0 Final     Anion Gap 09/19/2024 12.2  5.0 - 15.0 mmol/L Final    eGFR 09/19/2024 85.1  >60.0 mL/min/1.73 Final        Procedure: None  Procedures     I have reviewed and agree with the above PMH, PSH, FMH, social history, medications, allergies, and labs.     Assessment/Plan:   Problem List Items Addressed This Visit       Kidney stone    Relevant Medications    promethazine (PHENERGAN) 25 MG tablet       Health Maintenance:   Health Maintenance Due   Topic Date Due    Pneumococcal Vaccine 0-64 (1 of 2 - PCV) Never done    ANNUAL PHYSICAL  Never done    PAP SMEAR  Never done    INFLUENZA VACCINE  08/01/2024    BMI FOLLOWUP  08/21/2024    COVID-19 Vaccine (4 - 2023-24 season) 09/01/2024    COLORECTAL CANCER SCREENING  03/09/2025        Smoking Counseling: Everyday smoker.  Never used smokeless tobacco.  Counseling given.    Urine Incontinence: Patient reports that she is not currently experiencing any symptoms of urinary incontinence.    Patient was given instructions and counseling regarding her condition or for health maintenance advice. Please see specific information pulled into the AVS if appropriate.    Patient Education:   Nephrolithiasis -patient is roughly 2 weeks post extracorporal shockwave of the trips by Dr. Barrow with significant improvement in right sided back and flank pain.  We will continue with observation at this time.  Patient is having some intermittent nausea and I will send in Phenergan to take as needed.  Advised to follow-up with cardiologist for tachycardic and tachypnea.  Educated to discontinue for degree and if symptoms worsen.  She verbalized understanding    Visit Diagnoses:    ICD-10-CM ICD-9-CM   1. Kidney stone  N20.0 592.0     A total of 20 minutes were spent coordinating this patient’s care in clinic today; 12 minutes of which were face-to-face with the patient, reviewing medical history and counseling on the current treatment and followup plan.  All questions were answered to patient's  satisfaction.    Meds Ordered During Visit:  New Medications Ordered This Visit   Medications    promethazine (PHENERGAN) 25 MG tablet     Sig: Take 1 tablet by mouth Every 6 (Six) Hours As Needed for Nausea or Vomiting.     Dispense:  21 tablet     Refill:  1       Follow Up Appointment: 3 months  No follow-ups on file.      This document has been electronically signed by Mati Rankin PA-C   October 18, 2024 17:27 EDT    Part of this note may be an electronic transcription/translation of spoken language to printed text using the Dragon Dictation System.

## 2024-10-21 DIAGNOSIS — N20.0 KIDNEY STONE: ICD-10-CM

## 2024-10-21 RX ORDER — TAMSULOSIN HYDROCHLORIDE 0.4 MG/1
1 CAPSULE ORAL DAILY
Qty: 90 CAPSULE | Refills: 3 | Status: SHIPPED | OUTPATIENT
Start: 2024-10-21

## 2024-10-22 ENCOUNTER — TRANSCRIBE ORDERS (OUTPATIENT)
Dept: ADMINISTRATIVE | Facility: HOSPITAL | Age: 34
End: 2024-10-22
Payer: COMMERCIAL

## 2024-10-22 DIAGNOSIS — R00.0 SINUS TACHYCARDIA: Primary | ICD-10-CM

## 2025-01-22 ENCOUNTER — HOSPITAL ENCOUNTER (OUTPATIENT)
Dept: PHYSICAL THERAPY | Facility: HOSPITAL | Age: 35
Setting detail: THERAPIES SERIES
Discharge: HOME | End: 2025-01-22
Payer: COMMERCIAL

## 2025-01-22 DIAGNOSIS — M54.6 ACUTE BILATERAL THORACIC BACK PAIN: Primary | ICD-10-CM

## 2025-01-22 DIAGNOSIS — N20.0 KIDNEY STONE: Primary | ICD-10-CM

## 2025-01-22 PROCEDURE — 97162 PT EVAL MOD COMPLEX 30 MIN: CPT | Performed by: PHYSICAL THERAPIST

## 2025-01-22 NOTE — THERAPY EVALUATION
"    Physical Therapy Initial Evaluation and Plan of Care    Patient: Pili Webster   : 1990  Diagnosis/ICD-10 Code:  [unfilled]  Referring practitioner: Osvaldo Madrid MD  Date of Initial Visit: 2025  Today's Date: 2025  Patient seen for 1 session         Visit Diagnoses:    ICD-10-CM ICD-9-CM   1. Acute bilateral thoracic back pain  M54.6 724.1         Subjective Questionnaire: Modified Oswestry: 35/50      Subjective Evaluation    History of Present Illness  Onset date: 1.5 years.  Mechanism of injury: Patient reports that she has been experiencing thoracic pain for approximately 1.5 years, but notes that it has been getting progressively worse.  Patient is unable to recall any specific mechanism of injury.  She mentions that she saw an orthopedic, who recommended she try seeing a chiropractor; she notes that she did not have any improvement with her thoracic pain.  Patient reports increased pain with sitting, estimating a sitting tolerance of 30 minutes.        Patient Occupation: Unemployed Pain  Current pain ratin  At best pain ratin  At worst pain ratin  Location: Thoracic  Quality: dull ache, sharp and knife-like  Relieving factors: change in position, rest, ice, heat and medications  Aggravating factors: standing, movement, lifting, ambulation, prolonged positioning, repetitive movement and sleeping    Diagnostic Tests  Abnormal x-ray: \"spine is starting to curve, thinning between the vertebrae\" per patient report.    Patient Goals  Patient goals for therapy: decreased pain           Objective          Static Posture     Head  Forward.    Shoulders  Rounded.    Lumbar Spine   Decreased lordosis.     Palpation   Left   Tenderness of the erector spinae, latissimus, lower trapezius, lumbar paraspinals, middle trapezius and rhomboids.     Right Tenderness of the erector spinae, latissimus, lower trapezius, lumbar paraspinals, middle trapezius and rhomboids.     Tenderness "   Cervical Spine   Tenderness in the spinous process (T10-T12).     Active Range of Motion     Lumbar   Flexion: Active lumbar flexion: 50%   Extension: Active lumbar extension: 50%   Left rotation: Active left lumbar rotation: 50%   Right rotation: Active right lumbar rotation: 75%     Strength/Myotome Testing     Left Shoulder     Planes of Motion   Flexion: 4   Abduction: 4-     Right Shoulder     Planes of Motion   Flexion: 4   Abduction: 4     Left Elbow   Flexion: 4  Extension: 4    Right Elbow   Flexion: 4-  Extension: 4-    Left Hip   Planes of Motion   Flexion: 4  Abduction: 4-  Adduction: 4-    Right Hip   Planes of Motion   Flexion: 3+  Abduction: 3+  Adduction: 3+    Left Knee   Flexion: 4  Extension: 4    Right Knee   Flexion: 3+  Extension: 3+          Assessment & Plan       Assessment  Impairments: abnormal gait, abnormal muscle firing, abnormal or restricted ROM, activity intolerance, impaired physical strength, lacks appropriate home exercise program and pain with function   Functional limitations: lifting, sleeping, walking, pulling, pushing, uncomfortable because of pain, moving in bed, sitting, standing, stooping and unable to perform repetitive tasks   Assessment details: Patient is a 34 year old female who comes to physical therapy for thoracic pain. The patient presents with increased pain, decreased lumbar ROM, decreased UE strength, decreased LE strength, and impaired posture. Patient reports a 70% functional mobility impairment, based on the patient's response to the Modified Oswestry.  Patient will benefit from skilled PT, so that patient can achieve maximum level of function.     Prognosis: good    Goals  Plan Goals: SHORT TERM GOALS:     4 weeks  1. Patient will be independent/compliant with HEP.  2. Patient will report pain no greater than 6/10 when performing self-care activities.  3. Patient will report pain no greater than 6/10 when sitting to travel community distances.    LONG  TERM GOALS:   8 weeks  1. Patient will show a 25% improvement of lumbar AROM to show improved ability to perform functional activities.  2. Bilateral LE strength will improve to at least 4/5 to allow for greater ease with daily tasks.  3. Bilateral UE strength will improve to at least 4+/5 to allow for improved ease with lifting.  4. Patient to report less than 50% impairment on the Modified Oswestry for improved functional mobility.  5. Patient will report worst pain no greater than 4/10 pain when performing household chores.    Plan  Therapy options: will be seen for skilled therapy services  Planned modality interventions: cryotherapy, electrical stimulation/Russian stimulation, TENS, thermotherapy (hydrocollator packs) and ultrasound  Planned therapy interventions: manual therapy, ADL retraining, neuromuscular re-education, balance/weight-bearing training, body mechanics training, postural training, soft tissue mobilization, spinal/joint mobilization, flexibility, gait training, strengthening, functional ROM exercises, stretching, home exercise program, therapeutic activities and joint mobilization  Frequency: 2x week  Duration in weeks: 8  Treatment plan discussed with: patient  Plan details: Moderate Evaluation  01933  Re-evaluation   04700    Therapeutic exercise  45357  Therapeutic activity    20721  Neuromuscular re-education   59632  Manual therapy   86303  Gait training  82947    Unattended e-stim (Medicaid/Medicare)     Moist heat/cryotherapy 11125   Ultrasound   03042            History # of Personal Factors and/or Comorbidities: MODERATE (1-2)  Examination of Body System(s): # of elements: MODERATE (3)  Clinical Presentation: STABLE   Clinical Decision Making: MODERATE      Timed:         Manual Therapy:         mins  19692;     Therapeutic Exercise:         mins  06677;     Neuromuscular Vladimir:        mins  79030;    Therapeutic Activity:          mins  30658;     Gait Training:           mins   47633;     Ultrasound:          mins  81376;    Ionto                                   mins   39974  Self Care                            mins   37784      Un-Timed:  Electrical Stimulation:         mins  66902 ( );  Dry Needling          mins self-pay  Traction          mins 04471  Low Eval          Mins 92169  Mod Eval     55     Mins 28725  High Eval                            Mins 10245  Re-Eval          Mins 00265  Canalith Repos         mins 85353      Timed Treatment:      mins   Total Treatment:     55   mins          PT: Pili Walker PT     License Number: [id number]  Electronically signed by Pili Walker PT, 01/22/25, 8:17 AM EST    Certification Period: 1/22/2025 thru 4/21/2025  I certify that the therapy services are furnished while this patient is under my care.  The services outlined above are required by this patient, and will be reviewed every 90 days.         Physician Signature:__________________________________________________    PHYSICIAN: Osvaldo Madrid MD  NPI: [id number]                                      DATE:      Please sign and return via fax to [email or web address] . Thank you, [facility].

## 2025-01-23 ENCOUNTER — OFFICE VISIT (OUTPATIENT)
Dept: UROLOGY | Facility: CLINIC | Age: 35
End: 2025-01-23
Payer: COMMERCIAL

## 2025-01-23 ENCOUNTER — HOSPITAL ENCOUNTER (OUTPATIENT)
Dept: GENERAL RADIOLOGY | Facility: HOSPITAL | Age: 35
Discharge: HOME | End: 2025-01-23
Admitting: UROLOGY
Payer: COMMERCIAL

## 2025-01-23 VITALS
SYSTOLIC BLOOD PRESSURE: 140 MMHG | WEIGHT: 186.4 LBS | HEIGHT: 59 IN | HEART RATE: 97 BPM | BODY MASS INDEX: 37.58 KG/M2 | DIASTOLIC BLOOD PRESSURE: 99 MMHG

## 2025-01-23 DIAGNOSIS — N20.0 KIDNEY STONE: ICD-10-CM

## 2025-01-23 DIAGNOSIS — N39.41 URGE INCONTINENCE OF URINE: ICD-10-CM

## 2025-01-23 DIAGNOSIS — N32.89 BLADDER SPASMS: ICD-10-CM

## 2025-01-23 LAB
ANION GAP SERPL CALCULATED.3IONS-SCNC: 13.8 MMOL/L (ref 5–15)
BUN SERPL-MCNC: 9 MG/DL (ref 6–20)
BUN/CREAT SERPL: 9.9 (ref 7–25)
CALCIUM SPEC-SCNC: 9.4 MG/DL (ref 8.6–10.5)
CHLORIDE SERPL-SCNC: 103 MMOL/L (ref 98–107)
CO2 SERPL-SCNC: 21.2 MMOL/L (ref 22–29)
CREAT SERPL-MCNC: 0.91 MG/DL (ref 0.57–1)
EGFRCR SERPLBLD CKD-EPI 2021: 84 ML/MIN/1.73
GLUCOSE SERPL-MCNC: 85 MG/DL (ref 65–99)
POTASSIUM SERPL-SCNC: 4.6 MMOL/L (ref 3.5–5.2)
SODIUM SERPL-SCNC: 138 MMOL/L (ref 136–145)

## 2025-01-23 PROCEDURE — 74018 RADEX ABDOMEN 1 VIEW: CPT

## 2025-01-23 PROCEDURE — 74018 RADEX ABDOMEN 1 VIEW: CPT | Performed by: RADIOLOGY

## 2025-01-23 PROCEDURE — 80048 BASIC METABOLIC PNL TOTAL CA: CPT

## 2025-01-23 RX ORDER — PROMETHAZINE HYDROCHLORIDE 25 MG/1
25 TABLET ORAL EVERY 6 HOURS PRN
Qty: 21 TABLET | Refills: 1 | Status: SHIPPED | OUTPATIENT
Start: 2025-01-23

## 2025-01-23 RX ORDER — VIBEGRON 75 MG/1
75 TABLET, FILM COATED ORAL DAILY
Qty: 90 TABLET | Refills: 3 | Status: SHIPPED | OUTPATIENT
Start: 2025-01-23

## 2025-01-23 NOTE — PROGRESS NOTES
"Chief Complaint:    Chief Complaint   Patient presents with    Nephrolithiasis       Vital Signs:   /99   Pulse 97   Ht 149.9 cm (59.02\")   Wt 84.6 kg (186 lb 6.4 oz)   BMI 37.63 kg/m²   Body mass index is 37.63 kg/m².      HPI:  Pili Webster is a 34 y.o. female who presents today for follow up    History of Present Illness  Ms. Webster presents to the clinic for a 3-month follow-up.  She has a past medical history significant for punctate bilateral nephrolithiasis and was having persistent right-sided back and flank pain underwent a extracorporal shockwave lithotripsy by Dr. Barrow on 10/4/2024.  She reports that she has been doing well since last office visit.  She does endorse some persistent upper right-sided back and flank pain.  She states she had a x-ray completed by her primary care provider yesterday that showed narrowing of her joint spaces in the thoracic and lumbar spine.  She has been referred to physical therapy to help with this.  She is unsure if this is contributing to her back pain.  She did have a KUB completed at office visit today that shows moderate amount of stool within the colon but no obvious nephrolithiasis.  Urine analysis in office today is completely unremarkable.  Last CMP in October did show slightly elevated creatinine and we will repeat a BMP today.      Past Medical History:  Past Medical History:   Diagnosis Date    Abdominal pain     Abnormal uterine bleeding (AUB)     Anxiety and depression     Arthritis     Asthma     mild    Cholecystitis     Constipation     Endometriosis     Frequent UTI     GERD (gastroesophageal reflux disease)     Heartburn     Hemorrhoids     Hypertension     IBS (irritable bowel syndrome)     Kidney stones     Lesion of breast     Lump     RIGHT BREAST    Nausea & vomiting     PCOS (polycystic ovarian syndrome)     PONV (postoperative nausea and vomiting)     Psoriatic arthritis     Sjogren's disease     Sleep apnea     no cpap    " Snores     Tachycardia     Wrist fracture     RIGHT ARM      PATIENT UNSURE IF FRACTURED       Current Meds:  Current Outpatient Medications   Medication Sig Dispense Refill    albuterol sulfate  (90 Base) MCG/ACT inhaler Inhale 1 puff As Needed.      amitriptyline (ELAVIL) 50 MG tablet Take 1 tablet by mouth Daily.      aspirin 81 MG EC tablet Take 1 tablet by mouth Daily. LAST DOSE WAS 9/27/24      cloNIDine (CATAPRES) 0.1 MG tablet Take 1 tablet by mouth Every Night.      colestipol (COLESTID) 1 g tablet TAKE ONE TABLET BY MOUTH DAILY 90 tablet 3    cyclobenzaprine (FLEXERIL) 10 MG tablet Take 1 tablet by mouth Every 12 (Twelve) Hours.      dexlansoprazole (DEXILANT) 60 MG capsule Take 1 capsule by mouth Daily. 90 capsule 3    dicyclomine (BENTYL) 20 MG tablet TAKE ONE TABLET BY MOUTH FOUR TIMES A DAY BEFORE MEALS AND AT BEDTIME (Patient taking differently: Take 1 tablet by mouth 3 (Three) Times a Day.) 120 tablet 3    Dupilumab (Dupixent) 300 MG/2ML solution pen-injector Inject 2 mL under the skin into the appropriate area as directed 1 (One) Time Per Week. 8 mL 5    Erenumab-aooe (Aimovig) 70 MG/ML auto-injector Inject 1 mL under the skin into the appropriate area as directed Every 28 (Twenty-Eight) Days.      Estradiol 1.25 MG/1.25GM gel Apply  topically to the appropriate area as directed Daily.      folic acid (FOLVITE) 1 MG tablet Take 1 tablet by mouth Daily.      gabapentin (NEURONTIN) 100 MG capsule Take 2 capsules by mouth 3 (Three) Times a Day.      HYDROcodone-acetaminophen (NORCO)  MG per tablet Take 1 tablet by mouth Every 6 (Six) Hours As Needed for Moderate Pain. 12 tablet 0    hydroxychloroquine (PLAQUENIL) 200 MG tablet Take 1 tablet by mouth Daily.      hydrOXYzine pamoate (VISTARIL) 100 MG capsule Take 1 capsule by mouth Every 6 (Six) Hours.      ketorolac (TORADOL) 10 MG tablet Take 1 tablet by mouth Every 6 (Six) Hours As Needed for Moderate Pain. 16 tablet 1     lisinopril-hydrochlorothiazide (PRINZIDE,ZESTORETIC) 10-12.5 MG per tablet Take 1 tablet by mouth Daily.      loratadine (CLARITIN) 10 MG tablet Take 1 tablet by mouth Daily.      montelukast (SINGULAIR) 10 MG tablet Take 1 tablet by mouth Every Night.      nadolol (CORGARD) 80 MG tablet Take 1 tablet by mouth Daily.      promethazine (PHENERGAN) 25 MG tablet Take 1 tablet by mouth Every 6 (Six) Hours As Needed for Nausea or Vomiting. 21 tablet 1    SUMAtriptan (IMITREX) 100 MG tablet Take 1 tablet by mouth Daily.      tamsulosin (FLOMAX) 0.4 MG capsule 24 hr capsule TAKE 1 CAPSULE BY MOUTH DAILY 90 capsule 3    Vibegron (Gemtesa) 75 MG tablet Take 1 tablet by mouth Daily. 90 tablet 3    vitamin D (ERGOCALCIFEROL) 1.25 MG (59412 UT) capsule capsule Take 1 capsule by mouth Every 7 (Seven) Days.      PARoxetine (PAXIL) 30 MG tablet Take 1 tablet by mouth Daily.       No current facility-administered medications for this visit.        Allergies:   Allergies   Allergen Reactions    Peanut-Containing Drug Products Anaphylaxis     AND PEANUTS IN FOODS    Latex Hives    Shrimp Swelling     Facial swelling      Milk-Related Compounds Nausea And Vomiting    Sulfa Antibiotics Rash        Past Surgical History:  Past Surgical History:   Procedure Laterality Date    ABDOMINAL SURGERY      ANAL SCOPE N/A 09/30/2022    Procedure: ANAL SCOPE;  Surgeon: Franco Mari MD;  Location: [facility];  Service: General;  Laterality: N/A;    BLADDER REPAIR  06/06/2024    BREAST BIOPSY Right 11/29/2018    Procedure: breast biopsy ;  Surgeon: Shiv Overton MD;  Location: [facility] OR;  Service: General    CHOLECYSTECTOMY N/A 08/25/2017    Procedure: CHOLECYSTECTOMY LAPAROSCOPIC;  Surgeon: Franco Mari MD;  Location: [facility] OR;  Service:     COLONOSCOPY N/A 03/09/2020    Procedure: COLONOSCOPY CPT CODE: 36383;  Surgeon: Jeremiah Murdock MD;  Location: [facility] OR;  Service: Gastroenterology;  Laterality: N/A;    DENTAL  PROCEDURE      DIAGNOSTIC LAPAROSCOPY      X5    DILATATION AND CURETTAGE      ENDOSCOPY N/A 03/09/2020    Procedure: ESOPHAGOGASTRODUODENOSCOPY WITH BIOPSY CPT CODE: 03951;  Surgeon: Jeremiah Murdock MD;  Location: [facility] OR;  Service: Gastroenterology;  Laterality: N/A;    ENDOSCOPY N/A 02/01/2021    Procedure: ESOPHAGOGASTRODUODENOSCOPY WITH BIOPSY CPT CODE: 73132;  Surgeon: Jeremiah Murdock MD;  Location: [facility] OR;  Service: Gastroenterology;  Laterality: N/A;    ENDOSCOPY N/A 06/20/2023    Procedure: ESOPHAGOGASTRODUODENOSCOPY WITH BIOPSY CPT CODE: 37963;  Surgeon: Beto Mejia MD;  Location: [facility] ENDOSCOPY;  Service: Gastroenterology;  Laterality: N/A;    ENDOSCOPY N/A 6/10/2024    Procedure: ESOPHAGOGASTRODUODENOSCOPY WITH BIOPSY;  Surgeon: Beto Mejia MD;  Location: [facility] ENDOSCOPY;  Service: Gastroenterology;  Laterality: N/A;    EXTRACORPOREAL SHOCK WAVE LITHOTRIPSY (ESWL) Right 10/4/2024    Procedure: EXTRACORPOREAL SHOCKWAVE LITHOTRIPSY;  Surgeon: Ahmet Barrow MD;  Location: [facility] OR;  Service: Urology;  Laterality: Right;    HEMORRHOIDECTOMY N/A 11/14/2019    Procedure: HEMORRHOID STAPLING;  Surgeon: Franco Mari MD;  Location: [facility] OR;  Service: General    HYSTERECTOMY  03/22/2023    Total    KNEE ARTHROSCOPY W/ MENISCECTOMY Right 04/11/2022    Procedure: KNEE ARTHROSCOPIC DEBRIDEMENT, PRP INJECTION AND medial femoral condraplasty MENISCAL DEBRIDEMENT;  Surgeon: Musa Yoon MD;  Location: [facility] OR;  Service: Orthopedics;  Laterality: Right;    URETEROSCOPY LASER LITHOTRIPSY WITH STENT INSERTION Right 01/09/2019    Procedure: URETEROSCOPY LASER LITHOTRIPSY retrograde pyelogram;  Surgeon: Ahmet Barrow MD;  Location: [facility] OR;  Service: Urology    WISDOM TOOTH EXTRACTION         Social History:  Social History     Socioeconomic History    Marital status:    Tobacco Use    Smoking status: Every Day     Current  packs/day: 0.50     Average packs/day: 0.5 packs/day for 22.1 years (11.0 ttl pk-yrs)     Types: Cigarettes     Start date: 2003     Passive exposure: Current    Smokeless tobacco: Never   Vaping Use    Vaping status: Former    Substances: Nicotine, Flavoring   Substance and Sexual Activity    Alcohol use: Yes     Comment: RARELY    Drug use: No    Sexual activity: Defer       Family History:  Family History   Problem Relation Age of Onset    Breast cancer Maternal Aunt     Alcohol abuse Paternal Grandfather     Heart disease Paternal Grandfather     Cancer Maternal Grandfather     Hypertension Maternal Grandfather     Colon cancer Neg Hx     Liver cancer Neg Hx        Review of Systems:  Review of Systems   Constitutional:  Positive for fatigue. Negative for chills, fever and unexpected weight change.   HENT:  Negative for congestion and sinus pressure.    Respiratory:  Negative for chest tightness and shortness of breath.    Cardiovascular:  Negative for chest pain.   Gastrointestinal:  Negative for abdominal pain, constipation, diarrhea, nausea and vomiting.   Genitourinary:  Positive for flank pain. Negative for difficulty urinating, dysuria, frequency, hematuria and urgency.   Musculoskeletal:  Positive for back pain. Negative for neck pain.   Skin:  Negative for rash.   Neurological:  Positive for headaches. Negative for dizziness.   Hematological:  Does not bruise/bleed easily.   Psychiatric/Behavioral:  Negative for confusion and suicidal ideas. The patient is not nervous/anxious.        Physical Exam:  Physical Exam  Constitutional:       General: She is not in acute distress.     Appearance: Normal appearance.   HENT:      Head: Normocephalic and atraumatic.      Nose: Nose normal.      Mouth/Throat:      Mouth: Mucous membranes are moist.      Pharynx: Oropharynx is clear.   Eyes:      Extraocular Movements: Extraocular movements intact.      Conjunctiva/sclera: Conjunctivae normal.   Cardiovascular:       Rate and Rhythm: Normal rate and regular rhythm.      Pulses: Normal pulses.      Heart sounds: Normal heart sounds.   Pulmonary:      Effort: Pulmonary effort is normal.      Breath sounds: Normal breath sounds.   Abdominal:      General: Abdomen is flat. Bowel sounds are normal.      Palpations: Abdomen is soft.      Tenderness: There is no abdominal tenderness.      Comments: Mild tenderness to palpation of the right CVA area.   Genitourinary:     Rectum: Normal.   Musculoskeletal:         General: Normal range of motion.      Cervical back: Normal range of motion.   Skin:     General: Skin is warm.   Neurological:      General: No focal deficit present.      Mental Status: She is alert and oriented to person, place, and time.   Psychiatric:         Mood and Affect: Mood normal.         Behavior: Behavior normal.         Thought Content: Thought content normal.         Judgment: Judgment normal.            Recent Image (CT and/or KUB):   CT Abdomen and Pelvis: No results found for this or any previous visit.     CT Stone Protocol: Results for orders placed during the hospital encounter of 10/28/22    CT Abdomen Pelvis Stone Protocol    Narrative  EXAM:  CT Abdomen and Pelvis Without Intravenous Contrast    EXAM DATE:  10/28/2022 3:27 PM    CLINICAL HISTORY:  Nephrolithiasis; N20.0-Calculus of kidney    TECHNIQUE:  Axial computed tomography images of the abdomen and pelvis without  intravenous contrast.  Sagittal and coronal reformatted images were  created and reviewed.  This CT exam was performed using one or more of  the following dose reduction techniques:  automated exposure control,  adjustment of the mA and/or kV according to patient size, and/or use of  iterative reconstruction technique.    COMPARISON:  CT ABDOMEN PELVIS STONE PROTOCOL- dated 07/20/2022    FINDINGS:  LUNG BASES:  Unremarkable.  No mass.  No consolidation.    ABDOMEN:  LIVER:  Unremarkable.  GALLBLADDER AND BILE DUCTS:  Cholecystectomy  clips.  No ductal  dilation.  PANCREAS:  Unremarkable.  No ductal dilation.  SPLEEN:  Unremarkable.  No splenomegaly.  ADRENALS:  Unremarkable.  No mass.  KIDNEYS AND URETERS:  Unremarkable.  No obstructing stones.  No  hydronephrosis.  STOMACH AND BOWEL:  Unremarkable.  No obstruction.  No mucosal  thickening.    PELVIS:  APPENDIX:  No findings to suggest acute appendicitis.  BLADDER:  Unremarkable.  No stones.  REPRODUCTIVE:  Right adnexal region cyst measuring 2.6 cm.    ABDOMEN and PELVIS:  INTRAPERITONEAL SPACE:  Unremarkable.  No free air.  No significant  fluid collection.  BONES/JOINTS:  No acute fracture.  No dislocation.  SOFT TISSUES:  Unremarkable.  VASCULATURE:  Unremarkable.  No abdominal aortic aneurysm.  LYMPH NODES:  Unremarkable.  No enlarged lymph nodes.    Impression  Right adnexal region cyst measuring 2.6 cm.    This report was finalized on 10/28/2022 3:52 PM by Dr. Henrry Matthews MD.     KUB: Results for orders placed during the hospital encounter of 09/19/24    XR Abdomen KUB    Narrative  EXAM:  XR Abdomen, 1 View    EXAM DATE:  9/19/2024 11:02 AM    CLINICAL HISTORY:  kidney stone; N20.0-Calculus of kidney    TECHNIQUE:  Frontal supine view of the abdomen/pelvis.    COMPARISON:  No relevant prior studies available.    FINDINGS:  Gastrointestinal tract:  Unremarkable as visualized.  No dilation.  Organs:  Unremarkable as visualized.  No radiographic evidence of  renal or ureteral stones.  Bones/joints:  Unremarkable as visualized.  No acute fracture.    Impression  No radiographic evidence of renal or ureteral stones.      This report was finalized on 9/19/2024 12:40 PM by Dr. Musa Jaime MD.       Labs:  Brief Urine Lab Results  (Last result in the past 365 days)        Color   Clarity   Blood   Leuk Est   Nitrite   Protein   CREAT   Urine HCG        10/09/24 2306 Yellow   Clear   Trace   Negative   Negative   Negative                 No visits with results within 3 Month(s) from this  visit.   Latest known visit with results is:   Admission on 10/09/2024, Discharged on 10/10/2024   Component Date Value Ref Range Status    QT Interval 10/09/2024 326  ms Final    QTC Interval 10/09/2024 508  ms Final    Glucose 10/09/2024 114 (H)  65 - 99 mg/dL Final    BUN 10/09/2024 10  6 - 20 mg/dL Final    Creatinine 10/09/2024 1.05 (H)  0.57 - 1.00 mg/dL Final    Sodium 10/09/2024 141  136 - 145 mmol/L Final    Potassium 10/09/2024 3.9  3.5 - 5.2 mmol/L Final    Slight hemolysis detected by analyzer. Result may be falsely elevated.    Chloride 10/09/2024 103  98 - 107 mmol/L Final    CO2 10/09/2024 18.9 (L)  22.0 - 29.0 mmol/L Final    Calcium 10/09/2024 10.6 (H)  8.6 - 10.5 mg/dL Final    Total Protein 10/09/2024 8.8 (H)  6.0 - 8.5 g/dL Final    Albumin 10/09/2024 4.7  3.5 - 5.2 g/dL Final    ALT (SGPT) 10/09/2024 49 (H)  1 - 33 U/L Final    AST (SGOT) 10/09/2024 27  1 - 32 U/L Final    Alkaline Phosphatase 10/09/2024 94  39 - 117 U/L Final    Total Bilirubin 10/09/2024 0.3  0.0 - 1.2 mg/dL Final    Globulin 10/09/2024 4.1  gm/dL Final    A/G Ratio 10/09/2024 1.1  g/dL Final    BUN/Creatinine Ratio 10/09/2024 9.5  7.0 - 25.0 Final    Anion Gap 10/09/2024 19.1 (H)  5.0 - 15.0 mmol/L Final    eGFR 10/09/2024 70.8  >60.0 mL/min/1.73 Final    HS Troponin T 10/09/2024 <6  <14 ng/L Final    THC, Screen, Urine 10/09/2024 Negative  Negative Final    Phencyclidine (PCP), Urine 10/09/2024 Negative  Negative Final    Cocaine Screen, Urine 10/09/2024 Negative  Negative Final    Methamphetamine, Ur 10/09/2024 Negative  Negative Final    Opiate Screen 10/09/2024 Negative  Negative Final    Amphetamine Screen, Urine 10/09/2024 Negative  Negative Final    Benzodiazepine Screen, Urine 10/09/2024 Negative  Negative Final    Tricyclic Antidepressants Screen 10/09/2024 Positive (A)  Negative Final    Methadone Screen, Urine 10/09/2024 Negative  Negative Final    Barbiturates Screen, Urine 10/09/2024 Negative  Negative Final     Oxycodone Screen, Urine 10/09/2024 Negative  Negative Final    Buprenorphine, Screen, Urine 10/09/2024 Negative  Negative Final    Lipase 10/09/2024 39  13 - 60 U/L Final    TSH 10/09/2024 3.370  0.270 - 4.200 uIU/mL Final    Free T4 10/09/2024 0.98  0.92 - 1.68 ng/dL Final    Magnesium 10/09/2024 2.0  1.6 - 2.6 mg/dL Final    proBNP 10/09/2024 <36.0  0.0 - 450.0 pg/mL Final    WBC 10/09/2024 14.53 (H)  3.40 - 10.80 10*3/mm3 Final    RBC 10/09/2024 5.39 (H)  3.77 - 5.28 10*6/mm3 Final    Hemoglobin 10/09/2024 16.1 (H)  12.0 - 15.9 g/dL Final    Hematocrit 10/09/2024 47.1 (H)  34.0 - 46.6 % Final    MCV 10/09/2024 87.4  79.0 - 97.0 fL Final    MCH 10/09/2024 29.9  26.6 - 33.0 pg Final    MCHC 10/09/2024 34.2  31.5 - 35.7 g/dL Final    RDW 10/09/2024 12.3  12.3 - 15.4 % Final    RDW-SD 10/09/2024 39.1  37.0 - 54.0 fl Final    MPV 10/09/2024 8.8  6.0 - 12.0 fL Final    Platelets 10/09/2024 351  140 - 450 10*3/mm3 Final    Extra Tube 10/09/2024 Hold for add-ons.   Final    Auto resulted.    Extra Tube 10/09/2024 hold for add-on   Final    Auto resulted    Extra Tube 10/09/2024 Hold for add-ons.   Final    Auto resulted.    Extra Tube 10/09/2024 Hold for add-ons.   Final    Auto resulted    QT Interval 10/09/2024 366  ms Final    QTC Interval 10/09/2024 550  ms Final    Protime 10/09/2024 12.7  12.1 - 14.7 Seconds Final    INR 10/09/2024 0.94  0.90 - 1.10 Final    PTT 10/09/2024 30.0  26.5 - 34.5 seconds Final    Lactate 10/09/2024 2.6 (C)  0.5 - 2.0 mmol/L Final    Procalcitonin 10/09/2024 0.08  0.00 - 0.25 ng/mL Final    Blood Culture 10/09/2024 No growth at 5 days   Final    Blood Culture 10/09/2024 No growth at 5 days   Final    Extra Tube 10/09/2024 Hold for add-ons.   Final    Auto resulted.    Extra Tube 10/09/2024 Hold for add-ons.   Final    Auto resulted.    Extra Tube 10/09/2024 Hold for add-ons.   Final    Auto resulted    Color, UA 10/09/2024 Yellow  Yellow, Straw Final    Appearance, UA 10/09/2024 Clear   Clear Final    pH, UA 10/09/2024 5.5  5.0 - 8.0 Final    Specific Gravity, UA 10/09/2024 >1.030 (H)  1.005 - 1.030 Final    Glucose, UA 10/09/2024 Negative  Negative Final    Ketones, UA 10/09/2024 Negative  Negative Final    Bilirubin, UA 10/09/2024 Negative  Negative Final    Blood, UA 10/09/2024 Trace (A)  Negative Final    Protein, UA 10/09/2024 Negative  Negative Final    Leuk Esterase, UA 10/09/2024 Negative  Negative Final    Nitrite, UA 10/09/2024 Negative  Negative Final    Urobilinogen, UA 10/09/2024 0.2 E.U./dL  0.2 - 1.0 E.U./dL Final    Neutrophil % 10/09/2024 45.0  42.7 - 76.0 % Final    Lymphocyte % 10/09/2024 45.0  19.6 - 45.3 % Final    Monocyte % 10/09/2024 7.0  5.0 - 12.0 % Final    Eosinophil % 10/09/2024 2.0  0.3 - 6.2 % Final    Bands %  10/09/2024 1.0  0.0 - 5.0 % Final    Neutrophils Absolute 10/09/2024 6.68  1.70 - 7.00 10*3/mm3 Final    Lymphocytes Absolute 10/09/2024 6.54 (H)  0.70 - 3.10 10*3/mm3 Final    Monocytes Absolute 10/09/2024 1.02 (H)  0.10 - 0.90 10*3/mm3 Final    Eosinophils Absolute 10/09/2024 0.29  0.00 - 0.40 10*3/mm3 Final    RBC Morphology 10/09/2024 Normal  Normal Final    Platelet Morphology 10/09/2024 Normal  Normal Final    COVID19 10/09/2024 Not Detected  Not Detected - Ref. Range Final    Influenza A PCR 10/09/2024 Not Detected  Not Detected Final    Influenza B PCR 10/09/2024 Not Detected  Not Detected Final    Lactate 10/09/2024 2.2 (C)  0.5 - 2.0 mmol/L Final    HS Troponin T 10/09/2024 7  <14 ng/L Final    Troponin T Numeric Delta 10/09/2024    Final    Unable to calculate.    Fentanyl, Urine 10/09/2024 Negative  Negative Final    RBC, UA 10/09/2024 3-5 (A)  None Seen, 0-2 /HPF Final    WBC, UA 10/09/2024 3-5 (A)  None Seen, 0-2 /HPF Final    Urine culture not indicated.    Bacteria, UA 10/09/2024 1+ (A)  None Seen /HPF Final    Squamous Epithelial Cells, UA 10/09/2024 3-6 (A)  None Seen, 0-2 /HPF Final    Hyaline Casts, UA 10/09/2024 None Seen  None Seen /LPF  Final    Methodology 10/09/2024 Automated Microscopy   Final    Site 10/10/2024 Right Radial   Final    Daryl's Test 10/10/2024 N/A   Final    pH, Arterial 10/10/2024 7.422  7.350 - 7.450 pH units Final    pCO2, Arterial 10/10/2024 30.4 (L)  35.0 - 45.0 mm Hg Final    pO2, Arterial 10/10/2024 130.0 (H)  83.0 - 108.0 mm Hg Final    83 Value above reference range    HCO3, Arterial 10/10/2024 19.8 (L)  20.0 - 26.0 mmol/L Final    84 Value below reference range    Base Excess, Arterial 10/10/2024 -3.4 (L)  0.0 - 2.0 mmol/L Final    O2 Saturation, Arterial 10/10/2024 99.1 (H)  94.0 - 99.0 % Final    Hemoglobin, Blood Gas 10/10/2024 14.9  13.5 - 17.5 g/dL Final    Hematocrit, Blood Gas 10/10/2024 45.8  38.0 - 51.0 % Final    Oxyhemoglobin 10/10/2024 95.7  94 - 99 % Final    Methemoglobin 10/10/2024 0.40  0.00 - 3.00 % Final    Carboxyhemoglobin 10/10/2024 3.0  0 - 5 % Final    A-a DO2 10/10/2024    Final    UNABLE TO CALCULATE    CO2 Content 10/10/2024 20.8 (L)  22 - 33 mmol/L Final    Barometric Pressure for Blood Gas 10/10/2024 729  mmHg Final    Modality 10/10/2024 Room Air   Final    FIO2 10/10/2024 21  % Final    Ventilator Mode 10/10/2024 NA   Final    Collected by 10/10/2024 [id number]   Final    Meter: [id number]     :  [id number]        Procedure: None  Procedures     I have reviewed and agree with the above PMH, PSH, FMH, social history, medications, allergies, and labs.     Assessment/Plan:   Problem List Items Addressed This Visit       Kidney stone    Relevant Medications    promethazine (PHENERGAN) 25 MG tablet    Other Relevant Orders    Basic Metabolic Panel    Urge incontinence of urine    Relevant Medications    Vibegron (Gemtesa) 75 MG tablet    Bladder spasms    Relevant Medications    Vibegron (Gemtesa) 75 MG tablet         Health Maintenance:   Health Maintenance Due   Topic Date Due    Pneumococcal Vaccine 0-64 (1 of 2 - PCV) Never done    ANNUAL PHYSICAL  Never done    PAP SMEAR  Never done     INFLUENZA VACCINE  07/01/2024    BMI FOLLOWUP  08/21/2024    COVID-19 Vaccine (4 - 2024-25 season) 09/01/2024    COLORECTAL CANCER SCREENING  03/09/2025        Smoking Counseling: Everyday smoker.  Never used smokeless tobacco.  Counseling given.    Urine Incontinence: Patient reports that she is not currently experiencing any symptoms of urinary incontinence.    Patient was given instructions and counseling regarding her condition or for health maintenance advice. Please see specific information pulled into the AVS if appropriate.    Patient Education:   Urgent continence/bladder spasms-patient is doing well on Gemtesa and we will send in a refill to her local pharmacy.  Vies her to discontinue medication if she has any increase in difficulty urinating.  She verbalized understanding.  Nephrolithiasis -patient has a history of punctate kidney stones and has continued to have right sided back and flank pain.  Given generative disc disease I am recommending appropriate workup by physical therapy first before undergoing repeat surgical intervention.  Did discuss the use of repeat CT scan of the abdomen pelvis without contrast for stone protocol.  Also discussed the use of repeat extracorporal shockwave lithotripsy as needed.  Given increased creatinine at last office visit we will repeat a BMP in office today and call patient with results once available.  I will send in a refill of Phenergan to take as needed for nausea and see the patient back in 3 months or sooner if needed.  She verbalized understanding.    Visit Diagnoses:    ICD-10-CM ICD-9-CM   1. Urge incontinence of urine  N39.41 788.31   2. Bladder spasms  N32.89 596.89   3. Kidney stone  N20.0 592.0       A total of 30 minutes were spent coordinating this patient’s care in clinic today; 20 minutes of which were face-to-face with the patient, reviewing medical history and counseling on the current treatment and followup plan.  All questions were answered to  patient's satisfaction.    Meds Ordered During Visit:  New Medications Ordered This Visit   Medications    Vibegron (Gemtesa) 75 MG tablet     Sig: Take 1 tablet by mouth Daily.     Dispense:  90 tablet     Refill:  3    promethazine (PHENERGAN) 25 MG tablet     Sig: Take 1 tablet by mouth Every 6 (Six) Hours As Needed for Nausea or Vomiting.     Dispense:  21 tablet     Refill:  1       Follow Up Appointment: 3 months  No follow-ups on file.      This document has been electronically signed by Mati Rankin PA-C   January 23, 2025 09:23 EST    Part of this note may be an electronic transcription/translation of spoken language to printed text using the Dragon Dictation System.

## 2025-01-24 ENCOUNTER — TELEPHONE (OUTPATIENT)
Dept: UROLOGY | Facility: CLINIC | Age: 35
End: 2025-01-24
Payer: COMMERCIAL

## 2025-01-24 NOTE — TELEPHONE ENCOUNTER
Call advise patient that kidney function remained stable and had improved slightly.  Recommend to keep follow-up or call back with questions or concerns.  She verbalized understanding.

## 2025-01-28 ENCOUNTER — HOSPITAL ENCOUNTER (OUTPATIENT)
Dept: PHYSICAL THERAPY | Facility: HOSPITAL | Age: 35
Setting detail: THERAPIES SERIES
Discharge: HOME | End: 2025-01-28
Payer: COMMERCIAL

## 2025-01-28 DIAGNOSIS — M54.6 ACUTE BILATERAL THORACIC BACK PAIN: Primary | ICD-10-CM

## 2025-01-28 PROCEDURE — G0283 ELEC STIM OTHER THAN WOUND: HCPCS | Performed by: PHYSICAL THERAPIST

## 2025-01-28 PROCEDURE — 97110 THERAPEUTIC EXERCISES: CPT | Performed by: PHYSICAL THERAPIST

## 2025-01-28 NOTE — PROGRESS NOTES
Physical Therapy Daily Treatment Note      Patient: Pili Webster   : 1990  Referring practitioner: Osvaldo Madrid MD  Date of Initial Visit: Type: THERAPY  Episode: Thoracic  Today's Date: 2025  Patient seen for 2 sessions       Visit Diagnoses:    ICD-10-CM ICD-9-CM   1. Acute bilateral thoracic back pain  M54.6 724.1       Subjective Evaluation    History of Present Illness    Subjective comment: Patient reports 5/10 pain today.Pain  Current pain ratin           Objective   See Exercise, Manual, and Modality Logs for complete treatment.       Assessment & Plan       Assessment  Assessment details: Therapy session initiated with there ex, followed by ice/ESTIM.  No adverse reactions were noted with modalities.  There ex focused on core strengthening, LE strengthening, scapular stabilization, and postural awareness.  Patient educated to perform exercises per her tolerance, with patient verbalizing understanding.  Patient reported a slight decrease in pain at conclusion of session, noting 4/10 pain post-tx.  She will continue to be progressed per her tolerance and POC.          Timed:         Manual Therapy:         mins  18554;     Therapeutic Exercise:    27     mins  59970;     Neuromuscular Vladimir:        mins  75592;    Therapeutic Activity:          mins  51833;     Gait Training:           mins  63677;     Ultrasound:          mins  49925;    Ionto                                   mins   24363  Self Care                            mins   03730      Un-Timed:  Electrical Stimulation:    10     mins  03390 ( );  Dry Needling          mins self-pay  Traction          mins 26461  Canalith Repos         mins 05572    Timed Treatment:   27   mins   Total Treatment:     37   mins    Pili Walker PT  [place] License: [id number]  Electronically signed by Pili Walker PT, 25, 4:05 PM EST.

## 2025-01-30 ENCOUNTER — HOSPITAL ENCOUNTER (OUTPATIENT)
Dept: PHYSICAL THERAPY | Facility: HOSPITAL | Age: 35
Setting detail: THERAPIES SERIES
Discharge: HOME | End: 2025-01-30
Payer: COMMERCIAL

## 2025-01-30 DIAGNOSIS — M54.6 ACUTE BILATERAL THORACIC BACK PAIN: Primary | ICD-10-CM

## 2025-01-30 PROCEDURE — G0283 ELEC STIM OTHER THAN WOUND: HCPCS | Performed by: PHYSICAL THERAPIST

## 2025-01-30 PROCEDURE — 97110 THERAPEUTIC EXERCISES: CPT | Performed by: PHYSICAL THERAPIST

## 2025-01-30 NOTE — PROGRESS NOTES
Physical Therapy Daily Treatment Note      Patient: Pili Webster   : 1990  Referring practitioner: Osvaldo Madrid MD  Date of Initial Visit: Type: THERAPY  Episode: Thoracic  Today's Date: 2025  Patient seen for 3 sessions       Visit Diagnoses:    ICD-10-CM ICD-9-CM   1. Acute bilateral thoracic back pain  M54.6 724.1       Subjective Evaluation    History of Present Illness    Subjective comment: Patient reports 5/10 pain today.Pain  Current pain ratin           Objective   See Exercise, Manual, and Modality Logs for complete treatment.       Assessment & Plan       Assessment  Assessment details: Therapy session consisted of there ex, followed by ice/ESTIM.  Exercises progressed to include the addition of new exercises, which focused on LE strengthening and scapular stabilization.  Patient provided with verbal and visual cues during session to ensure proper form with exercises.  She noted slight decrease in pain at conclusion of session.  She will continue to be progressed per her tolerance and POC.          Timed:         Manual Therapy:         mins  17041;     Therapeutic Exercise:    40     mins  36538;     Neuromuscular Vladimir:        mins  98349;    Therapeutic Activity:          mins  78808;     Gait Training:           mins  27484;     Ultrasound:          mins  58765;    Ionto                                   mins   39243  Self Care                            mins   58748      Un-Timed:  Electrical Stimulation:    10     mins  30243 ( );  Dry Needling          mins self-pay  Traction          mins 21901  Canalith Repos         mins 77477    Timed Treatment:   40   mins   Total Treatment:     50   mins    Pili Walker PT  [place] License: [id number]  Electronically signed by Pili Walker PT, 25, 10:54 AM EST.

## 2025-01-30 NOTE — PROGRESS NOTES
Physical Therapy Daily Treatment Note      Patient: Pili Webster   : 1990  Referring practitioner: Osvaldo Madrid MD  Date of Initial Visit: Type: THERAPY  Episode: Thoracic  Today's Date: 2025  Patient seen for 3 sessions       Visit Diagnoses:    ICD-10-CM ICD-9-CM   1. Acute bilateral thoracic back pain  M54.6 724.1       Subjective Evaluation    Pain  Current pain ratin         Objective   See Exercise, Manual, and Modality Logs for complete treatment.       Assessment/Plan      Timed:         Manual Therapy:         mins  29918;     Therapeutic Exercise:    40     mins  94317;     Neuromuscular Vladimir:        mins  53538;    Therapeutic Activity:          mins  29561;     Gait Training:           mins  72340;     Ultrasound:          mins  26754;    Ionto                                   mins   56457  Self Care                            mins   12958      Un-Timed:  Electrical Stimulation:    10     mins  47927 ( );  Dry Needling          mins self-pay  Traction          mins 05962  Canalith Repos         mins 69855    Timed Treatment:   40   mins   Total Treatment:     50   mins    Pili Walker PT  [place] License: [id number]  Electronically signed by Pili Walker PT, 25, 9:45 AM EST.

## 2025-02-04 ENCOUNTER — APPOINTMENT (OUTPATIENT)
Dept: PHYSICAL THERAPY | Facility: HOSPITAL | Age: 35
End: 2025-02-04
Payer: COMMERCIAL

## 2025-02-06 ENCOUNTER — HOSPITAL ENCOUNTER (OUTPATIENT)
Dept: PHYSICAL THERAPY | Facility: HOSPITAL | Age: 35
Setting detail: THERAPIES SERIES
Discharge: HOME | End: 2025-02-06
Payer: COMMERCIAL

## 2025-02-06 DIAGNOSIS — M54.6 ACUTE BILATERAL THORACIC BACK PAIN: Primary | ICD-10-CM

## 2025-02-06 PROCEDURE — 97110 THERAPEUTIC EXERCISES: CPT | Performed by: PHYSICAL THERAPIST

## 2025-02-06 PROCEDURE — G0283 ELEC STIM OTHER THAN WOUND: HCPCS | Performed by: PHYSICAL THERAPIST

## 2025-02-06 NOTE — PROGRESS NOTES
Physical Therapy Daily Treatment Note      Patient: Pili Webster   : 1990  Referring practitioner: Osvaldo Madrid MD  Date of Initial Visit: Type: THERAPY  Episode: Thoracic  Today's Date: 2025  Patient seen for 4 sessions       Visit Diagnoses:    ICD-10-CM ICD-9-CM   1. Acute bilateral thoracic back pain  M54.6 724.1       Subjective Evaluation    History of Present Illness    Subjective comment: Patient reports that her back pain is 5/10 today.Pain  Current pain ratin           Objective   See Exercise, Manual, and Modality Logs for complete treatment.       Assessment & Plan       Assessment  Assessment details: Patient tolerate today's session well, noting 4/10 pain post-tx.  Treatment initiated with there ex, per flow sheet.  Cues provided during treatment to encourage proper form and posture for maximum benefits and gains.  Session concluded with ice/ESITM, with no skin irritation following.  She will continue to be progressed per her tolerance and POC.          Timed:         Manual Therapy:         mins  62804;     Therapeutic Exercise:    41     mins  45502;     Neuromuscular Vladimir:        mins  74820;    Therapeutic Activity:          mins  65432;     Gait Training:           mins  23190;     Ultrasound:          mins  95143;    Ionto                                   mins   91913  Self Care                            mins   41005      Un-Timed:  Electrical Stimulation:    10     mins  40281 ( );  Dry Needling          mins self-pay  Traction          mins 56838  Canalith Repos         mins 17958    Timed Treatment:   41   mins   Total Treatment:     51   mins    Pili Walker PT  [place] License: [id number]  Electronically signed by Pili Walker PT, 25, 11:59 AM EST.

## 2025-02-11 ENCOUNTER — HOSPITAL ENCOUNTER (OUTPATIENT)
Dept: PHYSICAL THERAPY | Facility: HOSPITAL | Age: 35
Setting detail: THERAPIES SERIES
Discharge: HOME | End: 2025-02-11
Payer: COMMERCIAL

## 2025-02-11 DIAGNOSIS — M54.6 ACUTE BILATERAL THORACIC BACK PAIN: Primary | ICD-10-CM

## 2025-02-11 PROCEDURE — 97110 THERAPEUTIC EXERCISES: CPT | Performed by: PHYSICAL THERAPIST

## 2025-02-11 PROCEDURE — G0283 ELEC STIM OTHER THAN WOUND: HCPCS | Performed by: PHYSICAL THERAPIST

## 2025-02-11 NOTE — PROGRESS NOTES
Physical Therapy Daily Treatment Note      Patient: Pili Webster   : 1990  Referring practitioner: Osvaldo Madrid MD  Date of Initial Visit: Type: THERAPY  Episode: Thoracic  Today's Date: 2025  Patient seen for 5 sessions       Visit Diagnoses:    ICD-10-CM ICD-9-CM   1. Acute bilateral thoracic back pain  M54.6 724.1       Subjective Evaluation    History of Present Illness    Subjective comment: Patient reports 6/10 pain today.       Objective   See Exercise, Manual, and Modality Logs for complete treatment.       Assessment/Plan      Timed:         Manual Therapy:         mins  49409;     Therapeutic Exercise:    41     mins  87119;     Neuromuscular Vladimir:        mins  62492;    Therapeutic Activity:          mins  62047;     Gait Training:           mins  96683;     Ultrasound:          mins  40320;    Ionto                                   mins   07848  Self Care                            mins   99339      Un-Timed:  Electrical Stimulation:    10     mins  08318 ( );  Dry Needling          mins self-pay  Traction          mins 97758  Canalith Repos         mins 97499    Timed Treatment:   41   mins   Total Treatment:     ***   mins    Pili Walker PT  [place] License: [id number]  Electronically signed by Pili Walker PT, 25, 8:58 AM EST.

## 2025-02-13 ENCOUNTER — APPOINTMENT (OUTPATIENT)
Dept: PHYSICAL THERAPY | Facility: HOSPITAL | Age: 35
End: 2025-02-13
Payer: COMMERCIAL

## 2025-02-18 ENCOUNTER — HOSPITAL ENCOUNTER (OUTPATIENT)
Dept: PHYSICAL THERAPY | Facility: HOSPITAL | Age: 35
Setting detail: THERAPIES SERIES
Discharge: HOME | End: 2025-02-18
Payer: COMMERCIAL

## 2025-02-18 DIAGNOSIS — M54.6 ACUTE BILATERAL THORACIC BACK PAIN: Primary | ICD-10-CM

## 2025-02-18 PROCEDURE — G0283 ELEC STIM OTHER THAN WOUND: HCPCS | Performed by: PHYSICAL THERAPIST

## 2025-02-18 PROCEDURE — 97110 THERAPEUTIC EXERCISES: CPT | Performed by: PHYSICAL THERAPIST

## 2025-02-18 NOTE — PROGRESS NOTES
Physical Therapy Daily Treatment Note      Patient: Pili Webster   : 1990  Referring practitioner: Osvaldo Madrid MD  Date of Initial Visit: Type: THERAPY  Episode: Thoracic  Today's Date: 2025  Patient seen for 6 sessions       Visit Diagnoses:    ICD-10-CM ICD-9-CM   1. Acute bilateral thoracic back pain  M54.6 724.1       Subjective Evaluation    History of Present Illness    Subjective comment: Patient mentions that her back is hurting from having to travel in the car yesterday.       Objective   See Exercise, Manual, and Modality Logs for complete treatment.       Assessment & Plan       Assessment  Assessment details: Patient tolerated today's session well, noting 5/10 pain post-tx.  Exercises progressed to include the addition of cat camel and prayer stretch.  Exercises continue to focus on ROM, stretching, LE strengthening, scapular stabilization, and postural awareness.  Treatment concluded with ice/ESTIM.  She will continue to be progressed per her tolerance and POC.          Timed:         Manual Therapy:         mins  23996;     Therapeutic Exercise:    44     mins  71921;     Neuromuscular Vladimir:        mins  38023;    Therapeutic Activity:          mins  09905;     Gait Training:           mins  80005;     Ultrasound:          mins  05997;    Ionto                                   mins   48099  Self Care                            mins   04054      Un-Timed:  Electrical Stimulation:    10     mins  11821 ( );  Dry Needling          mins self-pay  Traction          mins 32438  Canalith Repos         mins 35659    Timed Treatment:   44   mins   Total Treatment:     54   mins    Pili Walker PT  [place] License: [id number]  Electronically signed by Pili Walker PT, 25, 10:48 AM EST.

## 2025-02-20 ENCOUNTER — APPOINTMENT (OUTPATIENT)
Dept: PHYSICAL THERAPY | Facility: HOSPITAL | Age: 35
End: 2025-02-20
Payer: COMMERCIAL

## 2025-02-21 ENCOUNTER — APPOINTMENT (OUTPATIENT)
Dept: PHYSICAL THERAPY | Facility: HOSPITAL | Age: 35
End: 2025-02-21
Payer: COMMERCIAL

## 2025-02-25 ENCOUNTER — HOSPITAL ENCOUNTER (OUTPATIENT)
Dept: PHYSICAL THERAPY | Facility: HOSPITAL | Age: 35
Setting detail: THERAPIES SERIES
Discharge: HOME | End: 2025-02-25
Payer: COMMERCIAL

## 2025-02-25 DIAGNOSIS — M54.6 ACUTE BILATERAL THORACIC BACK PAIN: Primary | ICD-10-CM

## 2025-02-25 PROCEDURE — G0283 ELEC STIM OTHER THAN WOUND: HCPCS | Performed by: PHYSICAL THERAPIST

## 2025-02-25 PROCEDURE — 97110 THERAPEUTIC EXERCISES: CPT | Performed by: PHYSICAL THERAPIST

## 2025-02-25 NOTE — THERAPY TREATMENT NOTE
Physical Therapy Daily Treatment Note      Patient: Pili Webster   : 1990  Referring practitioner: Osvaldo Madrid MD  Date of Initial Visit: Type: THERAPY  Episode: Thoracic  Today's Date: 2025  Patient seen for 7 sessions       Visit Diagnoses:    ICD-10-CM ICD-9-CM   1. Acute bilateral thoracic back pain  M54.6 724.1       Subjective Evaluation    History of Present Illness    Subjective comment: Pt reports 4.5/10 mid back pain prior to today's session.Pain  Current pain ratin           Objective   See Exercise, Manual, and Modality Logs for complete treatment.       Assessment & Plan       Assessment  Assessment details: Therapeutic exercises were modified during today's session secondary to patient twisting her right knee two days ago. Therefore, quadruped activities were held. Today's session concluded with cryotherapy combined with IFC to the thoracic region, addressing pain and inflammation, with no skin irritation observed. The patient reported 2/10 thoracic pain following today's session.    Plan  Plan details: Progress as indicated to achieve max function.          Timed:         Manual Therapy:         mins  68240;     Therapeutic Exercise:    30     mins  10553;     Neuromuscular Vladimir:        mins  29604;    Therapeutic Activity:          mins  43679;     Gait Training:           mins  69576;     Ultrasound:          mins  76405;    Ionto                                   mins   04816  Self Care                            mins   62986  Canalith Repos         mins 61839      Un-Timed:  Electrical Stimulation:    10     mins  45820 ( );  Dry Needling          mins self-pay  Traction          mins 24287      Timed Treatment:   30   mins   Total Treatment:     40   mins    Ashley Claudene Dalton, PT  [place] License: [id number]      Electronically signed by Ashley Claudene Dalton, PT, 25, 9:05 AM EST

## 2025-02-27 ENCOUNTER — APPOINTMENT (OUTPATIENT)
Dept: PHYSICAL THERAPY | Facility: HOSPITAL | Age: 35
End: 2025-02-27
Payer: COMMERCIAL

## 2025-03-04 ENCOUNTER — OFFICE VISIT (OUTPATIENT)
Dept: GASTROENTEROLOGY | Facility: CLINIC | Age: 35
End: 2025-03-04
Payer: COMMERCIAL

## 2025-03-04 ENCOUNTER — HOSPITAL ENCOUNTER (OUTPATIENT)
Dept: PHYSICAL THERAPY | Facility: HOSPITAL | Age: 35
Setting detail: THERAPIES SERIES
Discharge: HOME | End: 2025-03-04
Payer: COMMERCIAL

## 2025-03-04 VITALS
BODY MASS INDEX: 36.08 KG/M2 | WEIGHT: 179 LBS | DIASTOLIC BLOOD PRESSURE: 80 MMHG | TEMPERATURE: 98.2 F | HEIGHT: 59 IN | HEART RATE: 76 BPM | SYSTOLIC BLOOD PRESSURE: 110 MMHG

## 2025-03-04 DIAGNOSIS — K20.0 ESOPHAGITIS, EOSINOPHILIC: Primary | ICD-10-CM

## 2025-03-04 DIAGNOSIS — R10.12 LUQ ABDOMINAL PAIN: ICD-10-CM

## 2025-03-04 DIAGNOSIS — K58.0 IRRITABLE BOWEL SYNDROME WITH DIARRHEA: ICD-10-CM

## 2025-03-04 DIAGNOSIS — M54.6 ACUTE BILATERAL THORACIC BACK PAIN: Primary | ICD-10-CM

## 2025-03-04 DIAGNOSIS — K44.9 HIATAL HERNIA: ICD-10-CM

## 2025-03-04 DIAGNOSIS — K21.00 GASTROESOPHAGEAL REFLUX DISEASE WITH ESOPHAGITIS WITHOUT HEMORRHAGE: ICD-10-CM

## 2025-03-04 DIAGNOSIS — Z86.0101 HISTORY OF ADENOMATOUS AND SERRATED COLON POLYPS: ICD-10-CM

## 2025-03-04 DIAGNOSIS — K22.70 BARRETT'S ESOPHAGUS WITHOUT DYSPLASIA: ICD-10-CM

## 2025-03-04 RX ORDER — DEXLANSOPRAZOLE 60 MG/1
1 CAPSULE, DELAYED RELEASE ORAL DAILY
Qty: 90 CAPSULE | Refills: 3 | Status: SHIPPED | OUTPATIENT
Start: 2025-03-04

## 2025-03-04 RX ORDER — FLUTICASONE PROPIONATE AND SALMETEROL XINAFOATE 115; 21 UG/1; UG/1
2 AEROSOL, METERED RESPIRATORY (INHALATION)
COMMUNITY
Start: 2025-02-13

## 2025-03-04 RX ORDER — LEFLUNOMIDE 10 MG/1
10 TABLET ORAL DAILY
COMMUNITY
Start: 2025-03-03

## 2025-03-04 RX ORDER — SODIUM CHLORIDE 9 MG/ML
30 INJECTION, SOLUTION INTRAVENOUS CONTINUOUS PRN
OUTPATIENT
Start: 2025-03-04 | End: 2025-03-05

## 2025-03-04 RX ORDER — BISACODYL 5 MG/1
TABLET, DELAYED RELEASE ORAL
Qty: 4 TABLET | Refills: 0 | Status: SHIPPED | OUTPATIENT
Start: 2025-03-04

## 2025-03-04 RX ORDER — PREDNISONE 10 MG/1
10 TABLET ORAL DAILY
COMMUNITY
Start: 2025-03-03

## 2025-03-04 RX ORDER — MIRTAZAPINE 15 MG/1
7.5-15 TABLET, FILM COATED ORAL NIGHTLY
COMMUNITY
Start: 2025-01-27

## 2025-03-04 RX ORDER — IVABRADINE 5 MG/1
1 TABLET, FILM COATED ORAL 2 TIMES DAILY
COMMUNITY
Start: 2025-02-10

## 2025-03-04 NOTE — PROGRESS NOTES
Follow Up Note     Date: 2025   Patient Name: Pili Webster  MRN: [id number]  : 1990     Primary Care Provider: Osvaldo Madrid MD     Chief Complaint   Patient presents with    Irritable Bowel Syndrome    EOE     History of present illness:   3/4/2025  Pili Webster is a 34 y.o. female who is here today for follow up regarding Irritable Bowel Syndrome and EOE.    Recently having soft BMs, 2 stools per day, no skip days between. Taking Colestipol most days, does skip it if she feels that stools are getting hard. Having LUQ abdominal pain and swelling which is severe since yesterday, some better today. Still taking dexilant daily and dupixent inj (burns on injection) once weekly as recommended. Dysphagia better.     Interval History:  2018  Over the past 6 weeks, she has lost her appetite and has diarrhea or vomiting within a few minutes of eating. She has lost about 10 lbs over the past 6 weeks. She was seen in the ED for evaluation recently and was told that she was dehydrated and related it to her kidney stones. She follows with Dr. Barrow and has planned follow up in a few weeks. Bowel movements are described as watery in consistency. During this time of her illness, she has skipped 1-2 days between bowel movements intermittently. No obvious rectal bleeding. Sometimes the stool will be very light or very dark. She will have 2-3 episodes of vomiting per day intermittently. Usually, the vomitus is the food that she has eaten. She takes Zofran as needed for relief of nausea and vomiting with only mild relief. Protonix 40 mg once daily was given by her PCP only a few days ago. Previously, she was taking ranitidine daily without good relief. Reports heartburn which is constant and worse with any PO intake. She states that she has had GERD complaints since age 10. No recent stool testing. S/p cholecystectomy over 1 year ago. Abdominal pain is generalized but mostly  located on right side, described as stabbing in nature and constant. She points to the right flank as the area of the most discomfort. Bloating seems to cause the most discomfort per her report. Associated symptoms are back pain, chills, fatigue and intermittent fever. No known family history of GI disease including IBD, color or stomach cancer. No personal history of EGD or colonoscopy. She had miscarriage in Jan 2018. She had surgery in May 2018 due to endometriosis and she was told that her bowels were connected in areas which was abnormal and this was repaired. She does not currently take any oral contraceptive.     Subjective     Past Medical History:   Diagnosis Date    Abdominal pain     Abnormal uterine bleeding (AUB)     Anxiety and depression     Arthritis     Asthma     mild    Cholecystitis     Constipation     Endometriosis     Frequent UTI     GERD (gastroesophageal reflux disease)     Heartburn     Hemorrhoids     Hypertension     IBS (irritable bowel syndrome)     Kidney stones     Lesion of breast     Lump     RIGHT BREAST    Nausea & vomiting     PCOS (polycystic ovarian syndrome)     PONV (postoperative nausea and vomiting)     Psoriatic arthritis     Sjogren's disease     Sleep apnea     no cpap    Snores     Tachycardia     Wrist fracture     RIGHT ARM      PATIENT UNSURE IF FRACTURED     Past Surgical History:   Procedure Laterality Date    ABDOMINAL SURGERY      ANAL SCOPE N/A 09/30/2022    Procedure: ANAL SCOPE;  Surgeon: Franco Mari MD;  Location: [facility] OR;  Service: General;  Laterality: N/A;    BLADDER REPAIR  06/06/2024    BREAST BIOPSY Right 11/29/2018    Procedure: breast biopsy ;  Surgeon: Shiv Overton MD;  Location: [facility] OR;  Service: General    CHOLECYSTECTOMY N/A 08/25/2017    Procedure: CHOLECYSTECTOMY LAPAROSCOPIC;  Surgeon: Franco Mari MD;  Location: [facility] OR;  Service:     COLONOSCOPY N/A 03/09/2020    Procedure: COLONOSCOPY CPT CODE: 31389;  Surgeon:  Jeremiah Murdock MD;  Location: [facility] OR;  Service: Gastroenterology;  Laterality: N/A;    DENTAL PROCEDURE      DIAGNOSTIC LAPAROSCOPY      X5    DILATATION AND CURETTAGE      ENDOSCOPY N/A 03/09/2020    Procedure: ESOPHAGOGASTRODUODENOSCOPY WITH BIOPSY CPT CODE: 00269;  Surgeon: Jeremiah Murdock MD;  Location: [facility] OR;  Service: Gastroenterology;  Laterality: N/A;    ENDOSCOPY N/A 02/01/2021    Procedure: ESOPHAGOGASTRODUODENOSCOPY WITH BIOPSY CPT CODE: 51702;  Surgeon: Jeremiah Murdock MD;  Location: [facility] OR;  Service: Gastroenterology;  Laterality: N/A;    ENDOSCOPY N/A 06/20/2023    Procedure: ESOPHAGOGASTRODUODENOSCOPY WITH BIOPSY CPT CODE: 37541;  Surgeon: Beto Mejia MD;  Location: [facility] ENDOSCOPY;  Service: Gastroenterology;  Laterality: N/A;    ENDOSCOPY N/A 6/10/2024    Procedure: ESOPHAGOGASTRODUODENOSCOPY WITH BIOPSY;  Surgeon: Beto Mejia MD;  Location: [facility] ENDOSCOPY;  Service: Gastroenterology;  Laterality: N/A;    EXTRACORPOREAL SHOCK WAVE LITHOTRIPSY (ESWL) Right 10/4/2024    Procedure: EXTRACORPOREAL SHOCKWAVE LITHOTRIPSY;  Surgeon: Ahmet Barrow MD;  Location: [facility] OR;  Service: Urology;  Laterality: Right;    HEMORRHOIDECTOMY N/A 11/14/2019    Procedure: HEMORRHOID STAPLING;  Surgeon: Franco Mari MD;  Location: [facility] OR;  Service: General    HYSTERECTOMY  03/22/2023    Total    KNEE ARTHROSCOPY W/ MENISCECTOMY Right 04/11/2022    Procedure: KNEE ARTHROSCOPIC DEBRIDEMENT, PRP INJECTION AND medial femoral condraplasty MENISCAL DEBRIDEMENT;  Surgeon: Musa Yoon MD;  Location: [facility] OR;  Service: Orthopedics;  Laterality: Right;    URETEROSCOPY LASER LITHOTRIPSY WITH STENT INSERTION Right 01/09/2019    Procedure: URETEROSCOPY LASER LITHOTRIPSY retrograde pyelogram;  Surgeon: Ahmet Barrow MD;  Location: [facility] OR;  Service: Urology    WISDOM TOOTH EXTRACTION       Family History   Problem Relation  Age of Onset    Breast cancer Maternal Aunt     Alcohol abuse Paternal Grandfather     Heart disease Paternal Grandfather     Cancer Maternal Grandfather     Hypertension Maternal Grandfather     Colon cancer Neg Hx     Liver cancer Neg Hx      Social History     Socioeconomic History    Marital status:    Tobacco Use    Smoking status: Every Day     Current packs/day: 0.50     Average packs/day: 0.5 packs/day for 22.2 years (11.1 ttl pk-yrs)     Types: Cigarettes     Start date: 2003     Passive exposure: Current    Smokeless tobacco: Never   Vaping Use    Vaping status: Former    Substances: Nicotine, Flavoring   Substance and Sexual Activity    Alcohol use: Yes     Comment: RARELY    Drug use: No    Sexual activity: Defer     Current Outpatient Medications:     Advair -21 MCG/ACT inhaler, Inhale 2 puffs 2 (Two) Times a Day., Disp: , Rfl:     cloNIDine (CATAPRES) 0.1 MG tablet, Take 1 tablet by mouth Every Night., Disp: , Rfl:     colestipol (COLESTID) 1 g tablet, TAKE ONE TABLET BY MOUTH DAILY, Disp: 90 tablet, Rfl: 3    cyclobenzaprine (FLEXERIL) 10 MG tablet, Take 1 tablet by mouth Every 12 (Twelve) Hours., Disp: , Rfl:     dexlansoprazole (DEXILANT) 60 MG capsule, Take 1 capsule by mouth Daily., Disp: 90 capsule, Rfl: 3    dicyclomine (BENTYL) 20 MG tablet, TAKE ONE TABLET BY MOUTH FOUR TIMES A DAY BEFORE MEALS AND AT BEDTIME (Patient taking differently: Take 1 tablet by mouth 3 (Three) Times a Day.), Disp: 120 tablet, Rfl: 3    Dupilumab (Dupixent) 300 MG/2ML solution pen-injector, Inject 2 mL under the skin into the appropriate area as directed 1 (One) Time Per Week., Disp: 8 mL, Rfl: 5    Erenumab-aooe (Aimovig) 70 MG/ML auto-injector, Inject 1 mL under the skin into the appropriate area as directed Every 28 (Twenty-Eight) Days., Disp: , Rfl:     Estradiol 1.25 MG/1.25GM gel, Apply  topically to the appropriate area as directed Daily., Disp: , Rfl:     gabapentin (NEURONTIN) 100 MG capsule,  Take 2 capsules by mouth 3 (Three) Times a Day., Disp: , Rfl:     HYDROcodone-acetaminophen (NORCO)  MG per tablet, Take 1 tablet by mouth Every 6 (Six) Hours As Needed for Moderate Pain., Disp: 12 tablet, Rfl: 0    hydrOXYzine pamoate (VISTARIL) 100 MG capsule, Take 1 capsule by mouth Every 6 (Six) Hours., Disp: , Rfl:     ivabradine HCl (CORLANOR) 5 MG tablet tablet, Take 1 tablet by mouth 2 (Two) Times a Day., Disp: , Rfl:     ketorolac (TORADOL) 10 MG tablet, Take 1 tablet by mouth Every 6 (Six) Hours As Needed for Moderate Pain., Disp: 16 tablet, Rfl: 1    leflunomide (ARAVA) 10 MG tablet, Take 1 tablet by mouth Daily., Disp: , Rfl:     loratadine (CLARITIN) 10 MG tablet, Take 1 tablet by mouth Daily., Disp: , Rfl:     mirtazapine (REMERON) 15 MG tablet, Take 0.5-1 tablets by mouth Every Night., Disp: , Rfl:     montelukast (SINGULAIR) 10 MG tablet, Take 1 tablet by mouth Every Night., Disp: , Rfl:     nadolol (CORGARD) 80 MG tablet, Take 1 tablet by mouth Daily., Disp: , Rfl:     predniSONE (DELTASONE) 10 MG tablet, Take 1 tablet by mouth Daily., Disp: , Rfl:     promethazine (PHENERGAN) 25 MG tablet, Take 1 tablet by mouth Every 6 (Six) Hours As Needed for Nausea or Vomiting., Disp: 21 tablet, Rfl: 1    SUMAtriptan (IMITREX) 100 MG tablet, Take 1 tablet by mouth Daily., Disp: , Rfl:     tamsulosin (FLOMAX) 0.4 MG capsule 24 hr capsule, TAKE 1 CAPSULE BY MOUTH DAILY, Disp: 90 capsule, Rfl: 3    Vibegron (Gemtesa) 75 MG tablet, Take 1 tablet by mouth Daily., Disp: 90 tablet, Rfl: 3    vitamin D (ERGOCALCIFEROL) 1.25 MG (60799 UT) capsule capsule, Take 1 capsule by mouth Every 7 (Seven) Days., Disp: , Rfl:     bisacodyl (Dulcolax) 5 MG EC tablet, Follow instructions given at office, Disp: 4 tablet, Rfl: 0    polyethylene glycol (GoLYTELY) 236 g solution, Follow instructions given at office, Disp: 4000 mL, Rfl: 0    Allergies   Allergen Reactions    Peanut-Containing Drug Products Anaphylaxis     AND  "PEANUTS IN FOODS    Latex Hives    Shrimp Swelling     Facial swelling      Milk-Related Compounds Nausea And Vomiting    Sulfa Antibiotics Rash     The following portions of the patient's history were reviewed and updated as appropriate: allergies, current medications, past family history, past medical history, past social history, past surgical history and problem list.    Objective     Physical Exam  Constitutional:       General: She is not in acute distress.     Appearance: Normal appearance. She is well-developed. She is not diaphoretic.   HENT:      Head: Normocephalic and atraumatic.      Right Ear: External ear normal.      Left Ear: External ear normal.      Nose: Nose normal.   Eyes:      General: No scleral icterus.        Right eye: No discharge.         Left eye: No discharge.      Conjunctiva/sclera: Conjunctivae normal.   Neck:      Trachea: No tracheal deviation.   Pulmonary:      Effort: Pulmonary effort is normal. No respiratory distress.   Abdominal:      General: Bowel sounds are normal. There is no distension.      Palpations: Abdomen is soft. There is no mass.      Tenderness: There is abdominal tenderness (LUQ mild). There is no guarding.      Hernia: No hernia is present.   Musculoskeletal:         General: Normal range of motion.      Cervical back: Normal range of motion.   Skin:     Coloration: Skin is not pale.      Findings: No erythema or rash.   Neurological:      Mental Status: She is alert and oriented to person, place, and time.      Coordination: Coordination normal.   Psychiatric:         Mood and Affect: Mood normal.         Behavior: Behavior normal.         Thought Content: Thought content normal.         Judgment: Judgment normal.       Vitals:    03/04/25 1319   BP: 110/80   Pulse: 76   Temp: 98.2 °F (36.8 °C)   TempSrc: Infrared   Weight: 81.2 kg (179 lb)  Comment: with clothing/shoes   Height: 149.9 cm (59\")  Comment: per patient.     Results Review:   I reviewed the " patient's new clinical results.    Office Visit on 01/23/2025   Component Date Value Ref Range Status    Glucose 01/23/2025 85  65 - 99 mg/dL Final    BUN 01/23/2025 9  6 - 20 mg/dL Final    Creatinine 01/23/2025 0.91  0.57 - 1.00 mg/dL Final    Sodium 01/23/2025 138  136 - 145 mmol/L Final    Potassium 01/23/2025 4.6  3.5 - 5.2 mmol/L Final    Chloride 01/23/2025 103  98 - 107 mmol/L Final    CO2 01/23/2025 21.2 (L)  22.0 - 29.0 mmol/L Final    Calcium 01/23/2025 9.4  8.6 - 10.5 mg/dL Final    BUN/Creatinine Ratio 01/23/2025 9.9  7.0 - 25.0 Final    Anion Gap 01/23/2025 13.8  5.0 - 15.0 mmol/L Final    eGFR 01/23/2025 84.0  >60.0 mL/min/1.73 Final      Stool 11/21/2018:  Calprotectin normal  Pancreatic elastase normal  O&P negative  Stool culture negative     CT Abdomen Pelvis Without Contrast  Result Date: 7/20/2022   1. Obstructive uropathy on the right due to a 3.3 mm distal right ureteral stone  2.Other findings as above         EGD was completed on 2/1/2021 by Dr. Murdock.  Findings were short segment Márquez's esophagus with patchy involvement of the distal 1 to 2 cm of the esophagus, normal stomach except for small sliding hiatal hernia, normal duodenum.  Pathology shows benign duodenal mucosa, no H. pylori, distal esophageal biopsy shows chronic inflammation, features of reflux and multifocal intestinal metaplasia, negative for dysplasia.     EGD and colonoscopy were completed on 3/9/2020 by Dr. Murdock.  Findings were patchy salmon-colored mucosa in the distal esophagus, small sliding hiatal hernia, normal stomach, normal duodenum, normal terminal ileum, evidence of prior rectal surgery, 6 mm colon polyp in the transverse and sigmoid colon, left-sided diverticulosis.  Pathology showed normal duodenum, minimal superficial chronic inflammation of the stomach, negative H. pylori, esophageal biopsy showed features of reflux, multifocal intestinal metaplasia negative for dysplasia, transverse colon  polyp was sessile serrated adenoma and sigmoid colon polyp was hyperplastic.     CTAP with contrast 8/2021   IMPRESSION:    No acute findings in the abdomen or pelvis.       EGD 6/20/2023 by Dr. Mejia  - Normal oropharynx.  - Z-line regular, 33 cm from the incisors.  - LA Grade A reflux esophagitis GEJ / Distal esophagus with no bleeding. Biopsied.  - No obvious Márquez's segment identified  - 1-2 cm hiatal hernia.  - Small mucosal nodule found in the esophagus. Biopsied.  - Erythematous mucosa in the posterior wall of the stomach, antrum and prepyloric region of the stomach. Biopsied.  - Normal duodenal bulb, first portion of the duodenum, second portion of the duodenum and third portion of the duodenum. Biopsied.  Pathology DIAGNOSIS:  A.   DUODENUM, BIOPSY:  Small bowel mucosa with no significant pathologic abnormality  B.   ANTRUM AND BODY BIOPSIES:  Reactive gastropathy  No Helicobacter pylori like organisms identified on H&E stained slide  No intestinal metaplasia or dysplasia identified  C.   GE JUNCTION:  Squamocolumnar junctional mucosa with eosinophillic esophagitis  (greater than 50 intraepithelial eosinophils identified per high-power  field), In the correct clinical endoscopic setting the  findings are compatible with Márquez's esophagus.  Correlation is required.    Reflux related/reactive changes are present  No evidence of dysplasia identified  D.   RANDOM ESOPHAGUS:  Eosinophilic esophagitis (greater than 21 intraepithelial eosinophils  identified per high-power field, see comment  No intestinal metaplasia or dysplasia identified  E.   ESOPHAGUS, BIOPSY, DISTAL NODULE:  Squamous mucosa with mild reactive/reflux related changes (7  intraepithelial eosinophils identified per high-power field)  No intestinal metaplasia or dysplasia identified     EGD 6/10/2024  - Normal oropharynx. - Z-line variable, 34 cm from the incisors. Biopsied for Márquez's. - LA Grade A reflux esophagitis with no bleeding.  Biopsied. - No current endoscopic esophageal abnormality to explain patient's dysphagia possible GERD and EoE. No mechanical obstruction. Biopsied. - No current endoscopic evidence of EoE - Erythematous mucosa in the antrum and prepyloric region of the stomach. Biopsied. - Normal duodenal bulb, first portion of the duodenum, second portion of the duodenum and third portion of the duodenum.  Pathology DIAGNOSIS:  A.     ANTRUM AND BODY, BIOPSY:  Antral mucosa with reactive gastropathy.  Unremarkable oxyntic mucosa.  No H. pylori organisms are identified on H&E-stained sections.  B.     GE JUNCTION:  Squamocolumnar junctional mucosa with no significant histopathology.  Negative for intestinal metaplasia and intraepithelial eosinophils.  C.     ESOPHAGUS, BIOPSY, DISTAL:  Unremarkable squamous mucosa.  Negative for intestinal metaplasia and intraepithelial eosinophils.  D.     ESOPHAGUS, BIOPSY, MID:  Unremarkable squamous mucosa.  Negative for intestinal metaplasia and intraepithelial eosinophils.  E.     ESOPHAGUS, BIOPSY, PROXIMAL:  Unremarkable squamous mucosa.  Negative for intestinal metaplasia and intraepithelial eosinophils.     Assessment / Plan      1. Esophagitis, eosinophilic  2. Hiatal hernia  3. Márquez's esophagus without dysplasia  4. Gastroesophageal reflux disease with esophagitis without hemorrhage  5. LUQ abdominal pain  Long standing history of GERD, taking Dexilant 60 mg once daily with good control of reflux symptoms. Did have multifocal intestinal metaplasia of the esophageal biopsy 2021, Márquez's noted on last EGD path 6/2023. New diagnosis of EOE 6/2023 with more than 50 eosinophils/hpf on PPI at that time, started Dupixent inj then last EGD 6/2024 with endoscopic improvements. Path with no increased eosinophils on the Dupixent. Now with improvements in esophageal dysphagia. Esophagram 8/2023 normal.      Continue Dupixent inj once weekly  Continue PPI daily  Acid reflux measures  Anti-reflux  diet  Avoid smoking  EGD again in 2 years, due 6/2026  Monitor abdominal pain, if worsens, will consider repeat CTAP    - dexlansoprazole (DEXILANT) 60 MG capsule; Take 1 capsule by mouth Daily.  Dispense: 90 capsule; Refill: 3    6. Irritable bowel syndrome with diarrhea  7. History of adenomatous and serrated colon polyps  Continues with long standing symptoms of intermittent but severe generalized abdominal cramping pain and diarrhea. Was previously having 6-7 stools per day with fecal urgency and intermittent fecal incontinence, has had persistent improvements with colestipol 1 g tab 1-2 times daily. Now with 2 stools per day. She took Xifaxan therapy for treatment of IBS x2 (12/2021 and 2/2022) and had temporary improvements in symptoms each time. Dicyclomine has helped some to relieve abdominal pain but sometimes does not help at all. Prior labs showed CMP, TSH and celiac testing all unremarkable. CTAP 8/2021 and 10/2022 showed no GI pathology. Colonoscopy in 2020 showed left sided diverticulosis and serrated colon polyps. No random colon biopsies taken but no endoscopic signs of colitis or ileitis. Stool testing 2018 showed Calprotectin normal <16, Pancreatic elastase normal, O&P negative, Stool culture negative. She has IBS diarrhea based on history.     Continue taking bentyl PRN abdominal pain  Continue low FODMAP diet   Work on weight loss with diet modification and exercise as tolerated  Continue colestipol 1g tab once daily, may take up to BID for diarrhea  Consider Pillcam in the future depending on progress  Colonoscopy to be arranged now, due 5 years from last, 03/2025    She will have a colonoscopy performed with monitored anesthesia care. The indications, technique, alternatives and potential risk and complications were discussed with the patient including but not limited to bleeding, bowel perforations, missing lesions and anesthetic complications. The patient understands and wishes to proceed  with the procedure and has given their verbal consent. Written patient education information was given to the patient. She should follow up in the office after this procedure to discuss the results and further recommendations can be made at that time. The patient will call if they have further questions before procedure.  - Case Request  - polyethylene glycol (GoLYTELY) 236 g solution; Follow instructions given at office  Dispense: 4000 mL; Refill: 0  - bisacodyl (Dulcolax) 5 MG EC tablet; Follow instructions given at office  Dispense: 4 tablet; Refill: 0    Follow Up:   Return in about 6 months (around 9/4/2025) for recheck GERD and IBS, discussion of results.    Naomi Aguiar PA-C  [facility]  3/4/2025  16:53 EST

## 2025-03-04 NOTE — THERAPY EVALUATION
Physical Therapy Daily Treatment Note      Patient: Pili Webster   : 1990  Referring practitioner: Osvaldo Madrid MD  Date of Initial Visit: Type: THERAPY  Episode: Thoracic  Today's Date: 3/4/2025  Patient seen for 8 sessions       Visit Diagnoses:    ICD-10-CM ICD-9-CM   1. Acute bilateral thoracic back pain  M54.6 724.1       Subjective Evaluation    History of Present Illness    Subjective comment: Pt has 6/10 pain today.       Objective   See Exercise, Manual, and Modality Logs for complete treatment.       Assessment & Plan       Assessment  Assessment details: Tx today consisted of exercises for improved leg and postural stability; followed by ice and estim for decreased pain.  Pt responded well to added reps today and reported 5/10 post pain.       Plan  Plan details: Will follow for optimal gains progressing core stability and decreased pain.          Timed:         Manual Therapy:         mins  96155;     Therapeutic Exercise:    26     mins  22885;     Neuromuscular Vladimir:        mins  66473;    Therapeutic Activity:          mins  33557;     Gait Training:           mins  29033;     Ultrasound:          mins  87725;    Ionto                                   mins   93366  Self Care                            mins   21087  Canalith Repos         mins 97467      Un-Timed:  Electrical Stimulation:    10     mins  85982 ( );  Dry Needling          mins self-pay  Traction          mins 58413      Timed Treatment:   26   mins   Total Treatment:     36   mins    Peter Thomas PT  [place] License: [id number]      Electronically signed by Peter Thomas PT, 25, 9:04 AM EST

## 2025-03-06 ENCOUNTER — APPOINTMENT (OUTPATIENT)
Dept: PHYSICAL THERAPY | Facility: HOSPITAL | Age: 35
End: 2025-03-06
Payer: COMMERCIAL

## 2025-03-11 ENCOUNTER — HOSPITAL ENCOUNTER (OUTPATIENT)
Dept: PHYSICAL THERAPY | Facility: HOSPITAL | Age: 35
Setting detail: THERAPIES SERIES
Discharge: HOME | End: 2025-03-11
Payer: COMMERCIAL

## 2025-03-11 DIAGNOSIS — M54.6 ACUTE BILATERAL THORACIC BACK PAIN: Primary | ICD-10-CM

## 2025-03-11 PROCEDURE — G0283 ELEC STIM OTHER THAN WOUND: HCPCS | Performed by: PHYSICAL THERAPIST

## 2025-03-11 PROCEDURE — 97110 THERAPEUTIC EXERCISES: CPT | Performed by: PHYSICAL THERAPIST

## 2025-03-11 NOTE — PROGRESS NOTES
Physical Therapy Progress Note  Patient: Pili Webster   : 1990  Diagnosis/ICD-10 Code:  [unfilled]  Referring practitioner: Osvaldo Madrid MD  Date of Initial Visit: Type: THERAPY  Episode: Thoracic  Today's Date: 3/11/2025  Patient seen for 9 sessions         Visit Diagnoses:    ICD-10-CM ICD-9-CM   1. Acute bilateral thoracic back pain  M54.6 724.1         Pili Webster reports: She feels like her legs have gotten stronger, but continues to notice increased pain in her back.    Clinical Progress: improved  Home Program Compliance: Yes      Subjective Evaluation    Pain  Current pain ratin  At best pain rating: 3  At worst pain ratin           Objective          Static Posture     Head  Forward.    Shoulders  Rounded.    Lumbar Spine   Decreased lordosis.     Active Range of Motion     Lumbar   Flexion: Active lumbar flexion: 50%   Extension: Active lumbar extension: 50%   Left rotation: Active left lumbar rotation: 75%   Right rotation: Active right lumbar rotation: 75%     Strength/Myotome Testing     Left Shoulder     Planes of Motion   Flexion: 4   Abduction: 4     Right Shoulder     Planes of Motion   Flexion: 4   Abduction: 4     Left Elbow   Flexion: 4  Extension: 4    Right Elbow   Flexion: 4-  Extension: 4-    Left Hip   Planes of Motion   Flexion: 4+  Abduction: 4+  Adduction: 4-    Right Hip   Planes of Motion   Flexion: 4+  Abduction: 4+  Adduction: 4-    Left Knee   Flexion: 4+  Extension: 4+    Right Knee   Flexion: 3+  Extension: 3+          Assessment & Plan       Assessment  Impairments: abnormal gait, abnormal muscle firing, abnormal or restricted ROM, activity intolerance, impaired physical strength, lacks appropriate home exercise program and pain with function   Functional limitations: lifting, sleeping, walking, pulling, pushing, uncomfortable because of pain, moving in bed, sitting, standing, stooping and unable to perform repetitive tasks   Assessment  details: Patient has been attending physical therapy for thoracic pain; she has attended therapy for a total of 9 sessions.  Patient has shown improvements in LE strength and lumbar rotation AROM.  She continues to note elevated pain levels, reporting 3/10 pain at best and 9/10 pain at worst.  Patient has met 3/3 STGs and 0/5 LTGs. Patient will continue to benefit from skilled PT, so that patient can achieve maximum level of function; patient will be progressed towards discharge with HEP, with discharge anticipated once patient has utilized approved insurance visits.     Prognosis: good    Goals  Plan Goals: SHORT TERM GOALS:     4 weeks  1. Patient will be independent/compliant with HEP.  Met  2. Patient will report pain no greater than 6/10 when performing self-care activities.  Met-up to 6/10 with ADLs  3. Patient will report pain no greater than 6/10 when sitting to travel community distances.  Met-up to 5/10 with community travel    LONG TERM GOALS:   8 weeks  1. Patient will show a 25% improvement of lumbar AROM to show improved ability to perform functional activities.  Ongoing, progressing  2. Bilateral LE strength will improve to at least 4/5 to allow for greater ease with daily tasks.  Ongoing, progressing  3. Bilateral UE strength will improve to at least 4+/5 to allow for improved ease with lifting.  Ongoing, progressing  4. Patient to report less than 50% impairment on the Modified Oswestry for improved functional mobility.  5. Patient will report worst pain no greater than 4/10 pain when performing household chores.  Ongoing, progressing-up to 6/10 with household chores    Plan  Therapy options: will be seen for skilled therapy services  Planned modality interventions: cryotherapy, electrical stimulation/Russian stimulation, TENS, thermotherapy (hydrocollator packs) and ultrasound  Planned therapy interventions: manual therapy, ADL retraining, neuromuscular re-education, balance/weight-bearing  training, body mechanics training, postural training, soft tissue mobilization, spinal/joint mobilization, flexibility, gait training, strengthening, functional ROM exercises, stretching, home exercise program, therapeutic activities and joint mobilization  Frequency: 2x week  Duration in weeks: 8  Treatment plan discussed with: patient  Plan details: Moderate Evaluation  70973  Re-evaluation   38831    Therapeutic exercise  01005  Therapeutic activity    80468  Neuromuscular re-education   34327  Manual therapy   72402  Gait training  24763    Unattended e-stim (Medicaid/Medicare)     Moist heat/cryotherapy 88661   Ultrasound   75627               Recommendations: Continue as planned  Timeframe: 1 month  Prognosis to achieve goals: good      Timed:         Manual Therapy:         mins  54259;     Therapeutic Exercise:    33     mins  30272;     Neuromuscular Vladimir:        mins  02521;    Therapeutic Activity:          mins  58866;     Gait Training:           mins  94304;     Ultrasound:          mins  35999;    Ionto                                   mins   53280  Self Care                            mins   55702    Un-Timed:  Electrical Stimulation:    10     mins  20598 ( );  Dry Needling          mins self-pay  Traction          mins 08224  Re-Eval                               mins  82329  Canalith Repos         mins 51803    Timed Treatment:   33   mins   Total Treatment:     43   mins          PT: Pili Walker PT     [place] License:  [id number]    Electronically signed by Pili Walker PT, 03/11/25, 9:02 AM EDT    Certification Period: 3/11/2025 thru 6/8/2025  I certify that the therapy services are furnished while this patient is under my care.  The services outlined above are required by this patient, and will be reviewed every 90 days.         Physician Signature:__________________________________________________    PHYSICIAN: Osvaldo Madrid MD  NPI: [id number]                                       DATE:  :     Please sign and return via fax to .[email or web address] . Thank you, [facility]

## 2025-03-13 ENCOUNTER — LAB (OUTPATIENT)
Dept: LAB | Facility: HOSPITAL | Age: 35
End: 2025-03-13
Payer: COMMERCIAL

## 2025-03-13 ENCOUNTER — APPOINTMENT (OUTPATIENT)
Dept: PHYSICAL THERAPY | Facility: HOSPITAL | Age: 35
End: 2025-03-13
Payer: COMMERCIAL

## 2025-03-13 ENCOUNTER — TRANSCRIBE ORDERS (OUTPATIENT)
Dept: ADMINISTRATIVE | Facility: HOSPITAL | Age: 35
End: 2025-03-13
Payer: COMMERCIAL

## 2025-03-13 DIAGNOSIS — N95.1 MENOPAUSAL SYMPTOM: Primary | ICD-10-CM

## 2025-03-13 DIAGNOSIS — N95.1 MENOPAUSAL SYMPTOM: ICD-10-CM

## 2025-03-13 LAB
ESTRADIOL SERPL HS-MCNC: 25.3 PG/ML
FSH SERPL-ACNC: 60.3 MIU/ML
TESTOST SERPL-MCNC: 44.2 NG/DL (ref 8.4–48.1)

## 2025-03-13 PROCEDURE — 84403 ASSAY OF TOTAL TESTOSTERONE: CPT

## 2025-03-13 PROCEDURE — 82670 ASSAY OF TOTAL ESTRADIOL: CPT

## 2025-03-13 PROCEDURE — 36415 COLL VENOUS BLD VENIPUNCTURE: CPT

## 2025-03-13 PROCEDURE — 83001 ASSAY OF GONADOTROPIN (FSH): CPT

## 2025-03-13 PROCEDURE — 84402 ASSAY OF FREE TESTOSTERONE: CPT

## 2025-03-18 ENCOUNTER — APPOINTMENT (OUTPATIENT)
Dept: PHYSICAL THERAPY | Facility: HOSPITAL | Age: 35
End: 2025-03-18
Payer: COMMERCIAL

## 2025-03-19 ENCOUNTER — APPOINTMENT (OUTPATIENT)
Dept: PHYSICAL THERAPY | Facility: HOSPITAL | Age: 35
End: 2025-03-19
Payer: COMMERCIAL

## 2025-03-21 LAB — TESTOST FREE SERPL-MCNC: 3.4 PG/ML (ref 0–4.2)

## 2025-03-28 DIAGNOSIS — K58.0 IRRITABLE BOWEL SYNDROME WITH DIARRHEA: ICD-10-CM

## 2025-03-28 RX ORDER — DICYCLOMINE HCL 20 MG
20 TABLET ORAL 3 TIMES DAILY PRN
Qty: 90 TABLET | Refills: 3 | Status: SHIPPED | OUTPATIENT
Start: 2025-03-28

## 2025-03-31 DIAGNOSIS — K20.0 ESOPHAGITIS, EOSINOPHILIC: ICD-10-CM

## 2025-04-01 RX ORDER — DUPILUMAB 300 MG/2ML
INJECTION, SOLUTION SUBCUTANEOUS
Qty: 8 ML | Refills: 5 | Status: SHIPPED | OUTPATIENT
Start: 2025-04-01

## 2025-04-02 ENCOUNTER — LAB (OUTPATIENT)
Dept: LAB | Facility: HOSPITAL | Age: 35
End: 2025-04-02
Payer: COMMERCIAL

## 2025-04-02 ENCOUNTER — TRANSCRIBE ORDERS (OUTPATIENT)
Dept: ADMINISTRATIVE | Facility: HOSPITAL | Age: 35
End: 2025-04-02
Payer: COMMERCIAL

## 2025-04-02 DIAGNOSIS — Z79.899 POLYPHARMACY: ICD-10-CM

## 2025-04-02 DIAGNOSIS — Z79.899 POLYPHARMACY: Primary | ICD-10-CM

## 2025-04-02 LAB
ALBUMIN SERPL-MCNC: 3.9 G/DL (ref 3.5–5.2)
ALBUMIN/GLOB SERPL: 1.3 G/DL
ALP SERPL-CCNC: 96 U/L (ref 39–117)
ALT SERPL W P-5'-P-CCNC: 35 U/L (ref 1–33)
ANION GAP SERPL CALCULATED.3IONS-SCNC: 12.9 MMOL/L (ref 5–15)
AST SERPL-CCNC: 29 U/L (ref 1–32)
BASOPHILS # BLD MANUAL: 0 10*3/MM3 (ref 0–0.2)
BASOPHILS NFR BLD MANUAL: 0 % (ref 0–1.5)
BILIRUB SERPL-MCNC: 0.3 MG/DL (ref 0–1.2)
BUN SERPL-MCNC: 10 MG/DL (ref 6–20)
BUN/CREAT SERPL: 8.5 (ref 7–25)
CALCIUM SPEC-SCNC: 9 MG/DL (ref 8.6–10.5)
CHLORIDE SERPL-SCNC: 108 MMOL/L (ref 98–107)
CO2 SERPL-SCNC: 16.1 MMOL/L (ref 22–29)
CREAT SERPL-MCNC: 1.17 MG/DL (ref 0.57–1)
DEPRECATED RDW RBC AUTO: 41.6 FL (ref 37–54)
EGFRCR SERPLBLD CKD-EPI 2021: 62.1 ML/MIN/1.73
EOSINOPHIL # BLD MANUAL: 0.09 10*3/MM3 (ref 0–0.4)
EOSINOPHIL NFR BLD MANUAL: 1 % (ref 0.3–6.2)
ERYTHROCYTE [DISTWIDTH] IN BLOOD BY AUTOMATED COUNT: 12.6 % (ref 12.3–15.4)
GLOBULIN UR ELPH-MCNC: 3.1 GM/DL
GLUCOSE SERPL-MCNC: 123 MG/DL (ref 65–99)
HCT VFR BLD AUTO: 43.7 % (ref 34–46.6)
HGB BLD-MCNC: 14.4 G/DL (ref 12–15.9)
LYMPHOCYTES # BLD MANUAL: 5.72 10*3/MM3 (ref 0.7–3.1)
LYMPHOCYTES NFR BLD MANUAL: 1 % (ref 5–12)
MCH RBC QN AUTO: 30.1 PG (ref 26.6–33)
MCHC RBC AUTO-ENTMCNC: 33 G/DL (ref 31.5–35.7)
MCV RBC AUTO: 91.4 FL (ref 79–97)
MONOCYTES # BLD: 0.09 10*3/MM3 (ref 0.1–0.9)
NEUTROPHILS # BLD AUTO: 3.28 10*3/MM3 (ref 1.7–7)
NEUTROPHILS NFR BLD MANUAL: 35.7 % (ref 42.7–76)
NRBC BLD AUTO-RTO: 0 /100 WBC (ref 0–0.2)
PLAT MORPH BLD: NORMAL
PLATELET # BLD AUTO: 301 10*3/MM3 (ref 140–450)
PMV BLD AUTO: 9.8 FL (ref 6–12)
POTASSIUM SERPL-SCNC: 4 MMOL/L (ref 3.5–5.2)
PROT SERPL-MCNC: 7 G/DL (ref 6–8.5)
RBC # BLD AUTO: 4.78 10*6/MM3 (ref 3.77–5.28)
RBC MORPH BLD: NORMAL
SODIUM SERPL-SCNC: 137 MMOL/L (ref 136–145)
VARIANT LYMPHS NFR BLD MANUAL: 62.2 % (ref 19.6–45.3)
WBC MORPH BLD: NORMAL
WBC NRBC COR # BLD AUTO: 9.2 10*3/MM3 (ref 3.4–10.8)

## 2025-04-02 PROCEDURE — 36415 COLL VENOUS BLD VENIPUNCTURE: CPT

## 2025-04-02 PROCEDURE — 80053 COMPREHEN METABOLIC PANEL: CPT

## 2025-04-02 PROCEDURE — 85025 COMPLETE CBC W/AUTO DIFF WBC: CPT

## 2025-04-02 PROCEDURE — 85007 BL SMEAR W/DIFF WBC COUNT: CPT

## 2025-04-16 ENCOUNTER — TELEPHONE (OUTPATIENT)
Dept: GASTROENTEROLOGY | Facility: CLINIC | Age: 35
End: 2025-04-16
Payer: COMMERCIAL

## 2025-04-17 NOTE — PRE-PROCEDURE INSTRUCTIONS
PAT phone history completed with patient for upcoming procedure on 4/18/25, with Dr. Mejia.    PAT PASS reviewed with patient and they verbalize understanding of the following:     Do not eat or drink anything after midnight the night before procedure unless otherwise instructed by physician/surgeon's office, this includes no gum, candy, mints, tobacco products or e-cigarettes.  Do not shave the area to be operated on at least 48 hours prior to procedure.  Do not wear makeup, lotion, hair products, or nail polish.  Do not wear any jewelry and remove all piercings.  Do not wear any adhesive if you wear dentures.  Do not wear contacts; bring in glasses if needed.  Only take medications on the morning of procedure as instructed by PAT nurse per anesthesia guidelines or as instructed by physician's office.  If you are on any blood thinners reach out to the physician/surgeon's office for instructions on when/if they will need to be stopped prior to procedure.  Bring in picture ID and insurance card, advanced directive copies if applicable, CPAP/BIPAP/Inhalers if indicated morning of procedure, leave any other valuables at home.  Ensure you have arranged for someone to drive you home the day of your procedure and someone to care for you at home afterwards. It is recommended that you do not drive, drink alcohol, or make any major legal decisions for at least 24 hours after your procedure is complete.  ERAS instructions given unless otherwise instructed per surgeon's orders.    Instructions given on hospital entrance and registration location.

## 2025-04-18 ENCOUNTER — ANESTHESIA (OUTPATIENT)
Dept: GASTROENTEROLOGY | Facility: HOSPITAL | Age: 35
End: 2025-04-18
Payer: COMMERCIAL

## 2025-04-18 ENCOUNTER — ANESTHESIA EVENT (OUTPATIENT)
Dept: GASTROENTEROLOGY | Facility: HOSPITAL | Age: 35
End: 2025-04-18
Payer: COMMERCIAL

## 2025-04-18 ENCOUNTER — HOSPITAL ENCOUNTER (OUTPATIENT)
Facility: HOSPITAL | Age: 35
Setting detail: HOSPITAL OUTPATIENT SURGERY
Discharge: HOME | End: 2025-04-18
Attending: INTERNAL MEDICINE | Admitting: INTERNAL MEDICINE
Payer: COMMERCIAL

## 2025-04-18 VITALS
DIASTOLIC BLOOD PRESSURE: 73 MMHG | BODY MASS INDEX: 36.49 KG/M2 | TEMPERATURE: 97.1 F | SYSTOLIC BLOOD PRESSURE: 109 MMHG | RESPIRATION RATE: 16 BRPM | OXYGEN SATURATION: 98 % | WEIGHT: 181 LBS | HEART RATE: 71 BPM | HEIGHT: 59 IN

## 2025-04-18 DIAGNOSIS — Z86.0101 HISTORY OF ADENOMATOUS AND SERRATED COLON POLYPS: ICD-10-CM

## 2025-04-18 PROCEDURE — 25810000003 SODIUM CHLORIDE 0.9 % SOLUTION: Performed by: PHYSICIAN ASSISTANT

## 2025-04-18 PROCEDURE — 25010000002 LIDOCAINE (CARDIAC): Performed by: NURSE ANESTHETIST, CERTIFIED REGISTERED

## 2025-04-18 PROCEDURE — 45380 COLONOSCOPY AND BIOPSY: CPT | Performed by: INTERNAL MEDICINE

## 2025-04-18 PROCEDURE — 25010000002 PROPOFOL 10 MG/ML EMULSION: Performed by: NURSE ANESTHETIST, CERTIFIED REGISTERED

## 2025-04-18 PROCEDURE — 25010000002 FENTANYL CITRATE (PF) 50 MCG/ML SOLUTION PREFILLED SYRINGE: Performed by: NURSE ANESTHETIST, CERTIFIED REGISTERED

## 2025-04-18 PROCEDURE — 45385 COLONOSCOPY W/LESION REMOVAL: CPT | Performed by: INTERNAL MEDICINE

## 2025-04-18 RX ORDER — SODIUM CHLORIDE 9 MG/ML
30 INJECTION, SOLUTION INTRAVENOUS CONTINUOUS PRN
Status: DISCONTINUED | OUTPATIENT
Start: 2025-04-18 | End: 2025-04-18 | Stop reason: HOSPADM

## 2025-04-18 RX ORDER — PROPOFOL 10 MG/ML
VIAL (ML) INTRAVENOUS AS NEEDED
Status: DISCONTINUED | OUTPATIENT
Start: 2025-04-18 | End: 2025-04-18 | Stop reason: SURG

## 2025-04-18 RX ORDER — FENTANYL CITRATE 50 UG/ML
INJECTION, SOLUTION INTRAMUSCULAR; INTRAVENOUS AS NEEDED
Status: DISCONTINUED | OUTPATIENT
Start: 2025-04-18 | End: 2025-04-18 | Stop reason: SURG

## 2025-04-18 RX ORDER — SIMETHICONE 40MG/0.6ML
SUSPENSION, DROPS(FINAL DOSAGE FORM)(ML) ORAL AS NEEDED
Status: DISCONTINUED | OUTPATIENT
Start: 2025-04-18 | End: 2025-04-18 | Stop reason: HOSPADM

## 2025-04-18 RX ADMIN — PROPOFOL 170 MCG/KG/MIN: 10 INJECTION, EMULSION INTRAVENOUS at 12:39

## 2025-04-18 RX ADMIN — LIDOCAINE HYDROCHLORIDE 60 MG: 20 INJECTION, SOLUTION INTRAVENOUS at 12:38

## 2025-04-18 RX ADMIN — FENTANYL CITRATE 50 MCG: 50 INJECTION, SOLUTION INTRAMUSCULAR; INTRAVENOUS at 12:43

## 2025-04-18 RX ADMIN — SODIUM CHLORIDE 30 ML/HR: 9 INJECTION, SOLUTION INTRAVENOUS at 10:57

## 2025-04-18 RX ADMIN — PROPOFOL 100 MG: 10 INJECTION, EMULSION INTRAVENOUS at 12:38

## 2025-04-18 NOTE — DISCHARGE INSTRUCTIONS
- Discharge patient to home (ambulatory).   - Resume previous diet.   - Continue present medications.   - Await pathology results.   - Repeat colonoscopy in 3 years for surveillance.   - Return to GI office in 8 weeks.    No pushing, pulling, tugging,  heavy lifting, or strenuous activity.  No major decision making, driving, or drinking alcoholic beverages for 24 hours. ( due to the medications you have  received)  Always use good hand hygiene/washing techniques.  NO driving while taking pain medications.    * if you have an incision:  Check your incision area every day for signs of infection.   Check for:  * more redness, swelling, or pain  *more fluid or blood  *warmth  *pus or bad smellTo assist you in voiding:  Drink plenty of fluids  Listen to running water while attempting to void.    If you are unable to urinate and you have an uncomfortable urge to void or it has been   6 hours since you were discharged, return to the Emergency Room

## 2025-04-18 NOTE — ANESTHESIA POSTPROCEDURE EVALUATION
Patient: Pili Webster    Procedure Summary       Date: 04/18/25 Room / Location: [facility] ENDOSCOPY 2 / [facility] ENDOSCOPY    Anesthesia Start: 1228 Anesthesia Stop: 1317    Procedure: Colonoscopy with polypectomies x6 and biopsies Diagnosis:       History of adenomatous and serrated colon polyps      (History of adenomatous and serrated colon polyps [Z86.0101])    Surgeons: Beto Mejia MD Provider: Franco Lazo CRNA    Anesthesia Type: MAC ASA Status: 3            Anesthesia Type: MAC    Vitals  No vitals data found for the desired time range.          Post Anesthesia Care and Evaluation    Patient location during evaluation: bedside  Patient participation: complete - patient participated  Level of consciousness: awake and alert  Pain score: 0  Pain management: satisfactory to patient    Airway patency: patent  Anesthetic complications: No anesthetic complications  PONV Status: none  Cardiovascular status: acceptable and stable  Respiratory status: acceptable  Hydration status: acceptable    Comments: Vitals signs as noted in nursing documentation as per protocol.

## 2025-04-18 NOTE — H&P
[facility]  HISTORY AND PHYSICAL    Patient Name: Pili Webster  : 1990  MRN: [id number]    Chief Complaint:   For surveillance colonoscopy    History Of Presenting Illness:    Chronic diarrhea   Lower abdo pain  H/o colon polyps     Past Medical History:   Diagnosis Date    Abdominal pain     Abnormal uterine bleeding (AUB)     Anxiety and depression     Arthritis     Asthma     mild    Cholecystitis     Constipation     Endometriosis     Frequent UTI     GERD (gastroesophageal reflux disease)     Heartburn     Hemorrhoids     Hypertension     IBS (irritable bowel syndrome)     Kidney stones     Lesion of breast     Lump     RIGHT BREAST    Nausea & vomiting     PCOS (polycystic ovarian syndrome)     PONV (postoperative nausea and vomiting)     Psoriatic arthritis     Sjogren's disease     Sleep apnea     no cpap    Snores     Tachycardia     Wrist fracture     RIGHT ARM      PATIENT UNSURE IF FRACTURED       Past Surgical History:   Procedure Laterality Date    ABDOMINAL SURGERY      ANAL SCOPE N/A 2022    Procedure: ANAL SCOPE;  Surgeon: Franco Mari MD;  Location: [facility] OR;  Service: General;  Laterality: N/A;    BLADDER REPAIR  2024    BREAST BIOPSY Right 2018    Procedure: breast biopsy ;  Surgeon: Shiv Overton MD;  Location: [facility] OR;  Service: General    CHOLECYSTECTOMY N/A 2017    Procedure: CHOLECYSTECTOMY LAPAROSCOPIC;  Surgeon: Franco Mari MD;  Location: [facility] OR;  Service:     COLONOSCOPY N/A 2020    Procedure: COLONOSCOPY CPT CODE: 16766;  Surgeon: Jeremiah Murdock MD;  Location: [facility] OR;  Service: Gastroenterology;  Laterality: N/A;    DENTAL PROCEDURE      DIAGNOSTIC LAPAROSCOPY      X5    DILATATION AND CURETTAGE      ENDOSCOPY N/A 2020    Procedure: ESOPHAGOGASTRODUODENOSCOPY WITH BIOPSY CPT CODE: 24843;  Surgeon: Jeremiah Murdock MD;  Location: [facility] OR;  Service:  Gastroenterology;  Laterality: N/A;    ENDOSCOPY N/A 02/01/2021    Procedure: ESOPHAGOGASTRODUODENOSCOPY WITH BIOPSY CPT CODE: 51227;  Surgeon: Jeremiah Murdock MD;  Location: [facility] OR;  Service: Gastroenterology;  Laterality: N/A;    ENDOSCOPY N/A 06/20/2023    Procedure: ESOPHAGOGASTRODUODENOSCOPY WITH BIOPSY CPT CODE: 20808;  Surgeon: Beto Mejia MD;  Location: [facility] ENDOSCOPY;  Service: Gastroenterology;  Laterality: N/A;    ENDOSCOPY N/A 6/10/2024    Procedure: ESOPHAGOGASTRODUODENOSCOPY WITH BIOPSY;  Surgeon: Beto Mejia MD;  Location: [facility] ENDOSCOPY;  Service: Gastroenterology;  Laterality: N/A;    EXTRACORPOREAL SHOCK WAVE LITHOTRIPSY (ESWL) Right 10/4/2024    Procedure: EXTRACORPOREAL SHOCKWAVE LITHOTRIPSY;  Surgeon: Ahmet Barrow MD;  Location: [facility] OR;  Service: Urology;  Laterality: Right;    HEMORRHOIDECTOMY N/A 11/14/2019    Procedure: HEMORRHOID STAPLING;  Surgeon: Franco Mari MD;  Location: [facility] OR;  Service: General    HYSTERECTOMY  03/22/2023    Total    KNEE ARTHROSCOPY W/ MENISCECTOMY Right 04/11/2022    Procedure: KNEE ARTHROSCOPIC DEBRIDEMENT, PRP INJECTION AND medial femoral condraplasty MENISCAL DEBRIDEMENT;  Surgeon: Musa Yoon MD;  Location: [facility];  Service: Orthopedics;  Laterality: Right;    URETEROSCOPY LASER LITHOTRIPSY WITH STENT INSERTION Right 01/09/2019    Procedure: URETEROSCOPY LASER LITHOTRIPSY retrograde pyelogram;  Surgeon: Ahmet Barrow MD;  Location: [facility];  Service: Urology    WISDOM TOOTH EXTRACTION         Social History     Socioeconomic History    Marital status:    Tobacco Use    Smoking status: Every Day     Current packs/day: 0.50     Average packs/day: 0.5 packs/day for 22.3 years (11.1 ttl pk-yrs)     Types: Cigarettes     Start date: 2003     Passive exposure: Current    Smokeless tobacco: Never   Vaping Use    Vaping status: Former    Substances: Nicotine, Flavoring   Substance  and Sexual Activity    Alcohol use: Yes     Comment: RARELY    Drug use: Never    Sexual activity: Defer       Family History   Problem Relation Age of Onset    Breast cancer Maternal Aunt     Alcohol abuse Paternal Grandfather     Heart disease Paternal Grandfather     Cancer Maternal Grandfather     Hypertension Maternal Grandfather     Colon cancer Neg Hx     Liver cancer Neg Hx        Prior to Admission Medications:  Medications Prior to Admission   Medication Sig Dispense Refill Last Dose/Taking    Advair -21 MCG/ACT inhaler Inhale 2 puffs 2 (Two) Times a Day.   4/17/2025    bisacodyl (Dulcolax) 5 MG EC tablet Follow instructions given at office 4 tablet 0 4/17/2025    cloNIDine (CATAPRES) 0.1 MG tablet Take 1 tablet by mouth Every Night.   4/17/2025    colestipol (COLESTID) 1 g tablet TAKE ONE TABLET BY MOUTH DAILY 90 tablet 3 4/17/2025    cyclobenzaprine (FLEXERIL) 10 MG tablet Take 1 tablet by mouth Every 12 (Twelve) Hours.   4/17/2025    dexlansoprazole (DEXILANT) 60 MG capsule Take 1 capsule by mouth Daily. 90 capsule 3 4/17/2025    dicyclomine (BENTYL) 20 MG tablet Take 1 tablet by mouth 3 (Three) Times a Day As Needed (abdominal pain). 90 tablet 3 4/17/2025    Dupilumab (Dupixent) 300 MG/2ML solution auto-injector injection INJECT 1 PEN UNDER THE SKIN EVERY 7 DAYS (Patient taking differently: Inject 2 mL under the skin into the appropriate area as directed 1 (One) Time Per Week.) 8 mL 5 Past Week    Erenumab-aooe (Aimovig) 70 MG/ML auto-injector Inject 1 mL under the skin into the appropriate area as directed Every 28 (Twenty-Eight) Days.   4/17/2025    Estradiol 1.25 MG/1.25GM gel Apply  topically to the appropriate area as directed Daily.   4/17/2025    gabapentin (NEURONTIN) 100 MG capsule Take 2 capsules by mouth 3 (Three) Times a Day.   4/17/2025    hydrOXYzine pamoate (VISTARIL) 100 MG capsule Take 1 capsule by mouth Every 6 (Six) Hours.   4/17/2025    ivabradine HCl (CORLANOR) 5 MG tablet  tablet Take 1 tablet by mouth 2 (Two) Times a Day.   4/17/2025    leflunomide (ARAVA) 10 MG tablet Take 1 tablet by mouth Daily.   4/17/2025    loratadine (CLARITIN) 10 MG tablet Take 1 tablet by mouth Daily.   4/17/2025    mirtazapine (REMERON) 15 MG tablet Take 0.5-1 tablets by mouth Every Night.   4/17/2025    montelukast (SINGULAIR) 10 MG tablet Take 1 tablet by mouth Every Night.   4/17/2025    nadolol (CORGARD) 80 MG tablet Take 1 tablet by mouth Daily.   4/17/2025    polyethylene glycol (GoLYTELY) 236 g solution Follow instructions given at office 4000 mL 0 4/18/2025    predniSONE (DELTASONE) 10 MG tablet Take 1 tablet by mouth Daily As Needed.   4/17/2025    promethazine (PHENERGAN) 25 MG tablet Take 1 tablet by mouth Every 6 (Six) Hours As Needed for Nausea or Vomiting. 21 tablet 1 Past Week    SUMAtriptan (IMITREX) 100 MG tablet Take 1 tablet by mouth Daily.   4/17/2025    tamsulosin (FLOMAX) 0.4 MG capsule 24 hr capsule TAKE 1 CAPSULE BY MOUTH DAILY 90 capsule 3 4/17/2025    Vibegron (Gemtesa) 75 MG tablet Take 1 tablet by mouth Daily. 90 tablet 3 4/17/2025    vitamin D (ERGOCALCIFEROL) 1.25 MG (36893 UT) capsule capsule Take 1 capsule by mouth Every 7 (Seven) Days.   4/17/2025    HYDROcodone-acetaminophen (NORCO)  MG per tablet Take 1 tablet by mouth Every 6 (Six) Hours As Needed for Moderate Pain. (Patient not taking: Reported on 4/17/2025) 12 tablet 0 Not Taking    ketorolac (TORADOL) 10 MG tablet Take 1 tablet by mouth Every 6 (Six) Hours As Needed for Moderate Pain. (Patient not taking: Reported on 4/17/2025) 16 tablet 1 Not Taking       Allergies:  Allergies   Allergen Reactions    Peanut-Containing Drug Products Anaphylaxis     AND PEANUTS IN FOODS    Latex Hives    Shrimp Swelling     Facial swelling      Milk-Related Compounds Nausea And Vomiting    Sulfa Antibiotics Rash        Vitals: Temp:  [98 °F (36.7 °C)] 98 °F (36.7 °C)  Heart Rate:  [77] 77  Resp:  [18] 18  BP: (106)/(80)  106/80    Review Of Systems:  Constitutional:  Negative for chills, fever, and unexpected weight change.  Respiratory:  Negative for cough, chest tightness, shortness of breath, and wheezing.  Cardiovascular:  Negative for chest pain, palpitations, and leg swelling.  Gastrointestinal:  Negative for abdominal distention, abdominal pain, nausea, vomiting.  Neurological:  Negative for weakness, numbness, and headaches.     Physical Exam:    General Appearance:  Alert, cooperative, in no acute distress.   Lungs:   Clear to auscultation, respirations regular, even and                 unlabored.   Heart:  Regular rhythm and normal rate.   Abdomen:   Normal bowel sounds, no masses, no organomegaly. Soft, nontender, nondistended   Neurologic: Alert and oriented x 3. Moves all four limbs equally       Assessment & Plan     Assessment:  Principal Problem:    History of adenomatous and serrated colon polyps      Plan: Colonoscopy for screening (N/A)     Beto Mejia MD  4/18/2025

## 2025-04-22 LAB — REF LAB TEST METHOD: NORMAL

## 2025-04-23 ENCOUNTER — OFFICE VISIT (OUTPATIENT)
Dept: UROLOGY | Facility: CLINIC | Age: 35
End: 2025-04-23
Payer: COMMERCIAL

## 2025-04-23 VITALS
HEART RATE: 68 BPM | HEIGHT: 59 IN | BODY MASS INDEX: 35.48 KG/M2 | WEIGHT: 176 LBS | SYSTOLIC BLOOD PRESSURE: 168 MMHG | DIASTOLIC BLOOD PRESSURE: 121 MMHG

## 2025-04-23 DIAGNOSIS — N20.0 KIDNEY STONE: ICD-10-CM

## 2025-04-23 DIAGNOSIS — G43.819 OTHER MIGRAINE WITHOUT STATUS MIGRAINOSUS, INTRACTABLE: Primary | ICD-10-CM

## 2025-04-23 PROCEDURE — 80048 BASIC METABOLIC PNL TOTAL CA: CPT

## 2025-04-23 RX ORDER — KETOROLAC TROMETHAMINE 30 MG/ML
30 INJECTION, SOLUTION INTRAMUSCULAR; INTRAVENOUS ONCE
Status: COMPLETED | OUTPATIENT
Start: 2025-04-23 | End: 2025-04-23

## 2025-04-23 RX ORDER — KETOROLAC TROMETHAMINE 30 MG/ML
30 INJECTION, SOLUTION INTRAMUSCULAR; INTRAVENOUS EVERY 6 HOURS PRN
Status: CANCELLED | OUTPATIENT
Start: 2025-04-23 | End: 2025-04-28

## 2025-04-23 RX ORDER — KETOROLAC TROMETHAMINE 10 MG/1
10 TABLET, FILM COATED ORAL EVERY 6 HOURS PRN
Qty: 16 TABLET | Refills: 0 | Status: SHIPPED | OUTPATIENT
Start: 2025-04-23 | End: 2025-04-23

## 2025-04-23 RX ADMIN — KETOROLAC TROMETHAMINE 30 MG: 30 INJECTION, SOLUTION INTRAMUSCULAR; INTRAVENOUS at 09:19

## 2025-04-23 NOTE — PROGRESS NOTES
"Chief Complaint:    Chief Complaint   Patient presents with    Nephrolithiasis       Vital Signs:   BP (!) 168/121 (BP Location: Right arm, Patient Position: Sitting) Comment: pt has took BP medication this moring pt is in a lot of pain  Pulse 68   Ht 149.9 cm (59.02\")   Wt 79.8 kg (176 lb)   BMI 35.53 kg/m²   Body mass index is 35.53 kg/m².      HPI:  Pili Webster is a 34 y.o. female who presents today for follow up    History of Present Illness  Ms. Webster presents to the clinic for routine 3-month follow-up for nephrolithiasis.  She was last seen and was doing well at that time.  Patient recently underwent a colonoscopy on 4/18/2025 and is since had nausea and vomiting with severe headache.  She does have a history of migraines and is currently on sumatriptan.  She reports that her pain waxes and wanes.  She denies any significant photophobia, blurred vision, double vision, or any complaint of the worst headache of her life.  Her blood pressure is elevated in office today at 168/121.  She does report taking her blood pressure medications.  She states she has barely able to keep anything down but is tolerating some water and crackers.  She has had several surgeries previously and denies any symptoms like this post surgery.  She did try to contact her gastroenterologist but is unable to get back in with them in office for a few weeks.  She denies any back or flank pain, fever, chills, or difficulty urinating.      Past Medical History:  Past Medical History:   Diagnosis Date    Abdominal pain     Abnormal uterine bleeding (AUB)     Anxiety and depression     Arthritis     Asthma     mild    Cholecystitis     Constipation     Endometriosis     Frequent UTI     GERD (gastroesophageal reflux disease)     Heartburn     Hemorrhoids     Hypertension     IBS (irritable bowel syndrome)     Kidney stones     Lesion of breast     Lump     RIGHT BREAST    Nausea & vomiting     PCOS (polycystic ovarian " syndrome)     PONV (postoperative nausea and vomiting)     Psoriatic arthritis     Sjogren's disease     Sleep apnea     no cpap    Snores     Tachycardia     Wrist fracture     RIGHT ARM      PATIENT UNSURE IF FRACTURED       Current Meds:  Current Outpatient Medications   Medication Sig Dispense Refill    Advair -21 MCG/ACT inhaler Inhale 2 puffs 2 (Two) Times a Day.      cloNIDine (CATAPRES) 0.1 MG tablet Take 1 tablet by mouth Every Night.      colestipol (COLESTID) 1 g tablet TAKE ONE TABLET BY MOUTH DAILY 90 tablet 3    cyclobenzaprine (FLEXERIL) 10 MG tablet Take 1 tablet by mouth Every 12 (Twelve) Hours.      dexlansoprazole (DEXILANT) 60 MG capsule Take 1 capsule by mouth Daily. 90 capsule 3    dicyclomine (BENTYL) 20 MG tablet Take 1 tablet by mouth 3 (Three) Times a Day As Needed (abdominal pain). 90 tablet 3    Dupilumab (Dupixent) 300 MG/2ML solution auto-injector injection INJECT 1 PEN UNDER THE SKIN EVERY 7 DAYS (Patient taking differently: Inject 2 mL under the skin into the appropriate area as directed 1 (One) Time Per Week.) 8 mL 5    Erenumab-aooe (Aimovig) 70 MG/ML auto-injector Inject 1 mL under the skin into the appropriate area as directed Every 28 (Twenty-Eight) Days.      Estradiol 1.25 MG/1.25GM gel Apply  topically to the appropriate area as directed Daily.      gabapentin (NEURONTIN) 100 MG capsule Take 2 capsules by mouth 3 (Three) Times a Day.      hydrOXYzine pamoate (VISTARIL) 100 MG capsule Take 1 capsule by mouth Every 6 (Six) Hours.      ivabradine HCl (CORLANOR) 5 MG tablet tablet Take 1 tablet by mouth 2 (Two) Times a Day.      leflunomide (ARAVA) 10 MG tablet Take 1 tablet by mouth Daily.      loratadine (CLARITIN) 10 MG tablet Take 1 tablet by mouth Daily.      mirtazapine (REMERON) 15 MG tablet Take 0.5-1 tablets by mouth Every Night.      montelukast (SINGULAIR) 10 MG tablet Take 1 tablet by mouth Every Night.      nadolol (CORGARD) 80 MG tablet Take 1 tablet by mouth  Daily.      polyethylene glycol (GoLYTELY) 236 g solution Follow instructions given at office 4000 mL 0    predniSONE (DELTASONE) 10 MG tablet Take 1 tablet by mouth Daily As Needed.      promethazine (PHENERGAN) 25 MG tablet Take 1 tablet by mouth Every 6 (Six) Hours As Needed for Nausea or Vomiting. 21 tablet 1    SUMAtriptan (IMITREX) 100 MG tablet Take 1 tablet by mouth Daily.      tamsulosin (FLOMAX) 0.4 MG capsule 24 hr capsule TAKE 1 CAPSULE BY MOUTH DAILY 90 capsule 3    Vibegron (Gemtesa) 75 MG tablet Take 1 tablet by mouth Daily. 90 tablet 3    vitamin D (ERGOCALCIFEROL) 1.25 MG (84245 UT) capsule capsule Take 1 capsule by mouth Every 7 (Seven) Days.       No current facility-administered medications for this visit.        Allergies:   Allergies   Allergen Reactions    Peanut-Containing Drug Products Anaphylaxis     AND PEANUTS IN FOODS    Latex Hives    Shrimp Swelling     Facial swelling      Milk-Related Compounds Nausea And Vomiting    Sulfa Antibiotics Rash        Past Surgical History:  Past Surgical History:   Procedure Laterality Date    ABDOMINAL SURGERY      ANAL SCOPE N/A 09/30/2022    Procedure: ANAL SCOPE;  Surgeon: Franco Mari MD;  Location: [facility] OR;  Service: General;  Laterality: N/A;    BLADDER REPAIR  06/06/2024    BREAST BIOPSY Right 11/29/2018    Procedure: breast biopsy ;  Surgeon: Shiv Overton MD;  Location: [facility] OR;  Service: General    CHOLECYSTECTOMY N/A 08/25/2017    Procedure: CHOLECYSTECTOMY LAPAROSCOPIC;  Surgeon: Franco Mari MD;  Location: [facility] OR;  Service:     COLONOSCOPY N/A 03/09/2020    Procedure: COLONOSCOPY CPT CODE: 91184;  Surgeon: Jeremiah Murdock MD;  Location: [facility] OR;  Service: Gastroenterology;  Laterality: N/A;    COLONOSCOPY N/A 4/18/2025    Procedure: Colonoscopy with polypectomies x6 and biopsies;  Surgeon: Beto Mejia MD;  Location: [facility] ENDOSCOPY;  Service: Gastroenterology;  Laterality: N/A;    DENTAL  PROCEDURE      DIAGNOSTIC LAPAROSCOPY      X5    DILATATION AND CURETTAGE      ENDOSCOPY N/A 03/09/2020    Procedure: ESOPHAGOGASTRODUODENOSCOPY WITH BIOPSY CPT CODE: 44543;  Surgeon: Jeremiah Murdock MD;  Location: [facility] OR;  Service: Gastroenterology;  Laterality: N/A;    ENDOSCOPY N/A 02/01/2021    Procedure: ESOPHAGOGASTRODUODENOSCOPY WITH BIOPSY CPT CODE: 06448;  Surgeon: Jeremiah Murdock MD;  Location: [facility] OR;  Service: Gastroenterology;  Laterality: N/A;    ENDOSCOPY N/A 06/20/2023    Procedure: ESOPHAGOGASTRODUODENOSCOPY WITH BIOPSY CPT CODE: 79470;  Surgeon: Beto Mejia MD;  Location: [facility] ENDOSCOPY;  Service: Gastroenterology;  Laterality: N/A;    ENDOSCOPY N/A 6/10/2024    Procedure: ESOPHAGOGASTRODUODENOSCOPY WITH BIOPSY;  Surgeon: Beto Mejia MD;  Location: [facility] ENDOSCOPY;  Service: Gastroenterology;  Laterality: N/A;    EXTRACORPOREAL SHOCK WAVE LITHOTRIPSY (ESWL) Right 10/4/2024    Procedure: EXTRACORPOREAL SHOCKWAVE LITHOTRIPSY;  Surgeon: Ahmet Barrow MD;  Location: [facility] OR;  Service: Urology;  Laterality: Right;    HEMORRHOIDECTOMY N/A 11/14/2019    Procedure: HEMORRHOID STAPLING;  Surgeon: Franco Mari MD;  Location: [facility] OR;  Service: General    HYSTERECTOMY  03/22/2023    Total    KNEE ARTHROSCOPY W/ MENISCECTOMY Right 04/11/2022    Procedure: KNEE ARTHROSCOPIC DEBRIDEMENT, PRP INJECTION AND medial femoral condraplasty MENISCAL DEBRIDEMENT;  Surgeon: Musa Yoon MD;  Location: [facility] OR;  Service: Orthopedics;  Laterality: Right;    URETEROSCOPY LASER LITHOTRIPSY WITH STENT INSERTION Right 01/09/2019    Procedure: URETEROSCOPY LASER LITHOTRIPSY retrograde pyelogram;  Surgeon: Ahmet Barrow MD;  Location: [facility] OR;  Service: Urology    WISDOM TOOTH EXTRACTION         Social History:  Social History     Socioeconomic History    Marital status:    Tobacco Use    Smoking status: Every Day     Current  packs/day: 0.50     Average packs/day: 0.5 packs/day for 22.3 years (11.2 ttl pk-yrs)     Types: Cigarettes     Start date: 2003     Passive exposure: Current    Smokeless tobacco: Never   Vaping Use    Vaping status: Former    Substances: Nicotine, Flavoring   Substance and Sexual Activity    Alcohol use: Yes     Comment: RARELY    Drug use: Never    Sexual activity: Defer       Family History:  Family History   Problem Relation Age of Onset    Breast cancer Maternal Aunt     Alcohol abuse Paternal Grandfather     Heart disease Paternal Grandfather     Cancer Maternal Grandfather     Hypertension Maternal Grandfather     Colon cancer Neg Hx     Liver cancer Neg Hx        Review of Systems:  Review of Systems   Constitutional:  Positive for fatigue. Negative for chills, fever and unexpected weight change.   Respiratory:  Negative for cough, chest tightness, shortness of breath and wheezing.    Cardiovascular:  Negative for chest pain and leg swelling.   Gastrointestinal:  Positive for nausea and vomiting. Negative for abdominal pain, constipation and diarrhea.   Genitourinary:  Negative for difficulty urinating, dysuria, frequency and urgency.   Musculoskeletal:  Positive for joint swelling. Negative for back pain.   Skin:  Negative for rash.   Neurological:  Positive for headaches. Negative for dizziness.   Psychiatric/Behavioral:  Positive for confusion. Negative for suicidal ideas.        Physical Exam:  Physical Exam  Constitutional:       General: She is not in acute distress.     Appearance: Normal appearance.   HENT:      Head: Normocephalic and atraumatic.      Nose: Nose normal.      Mouth/Throat:      Mouth: Mucous membranes are moist.   Eyes:      Conjunctiva/sclera: Conjunctivae normal.   Cardiovascular:      Rate and Rhythm: Normal rate.      Pulses: Normal pulses.   Pulmonary:      Effort: Pulmonary effort is normal.   Abdominal:      Palpations: Abdomen is soft.   Musculoskeletal:         General:  Normal range of motion.      Cervical back: Normal range of motion.   Skin:     General: Skin is warm.   Neurological:      General: No focal deficit present.      Mental Status: She is alert and oriented to person, place, and time.   Psychiatric:         Mood and Affect: Mood normal.         Behavior: Behavior normal.         Thought Content: Thought content normal.         Judgment: Judgment normal.             Recent Image (CT and/or KUB):   CT Abdomen and Pelvis: No results found for this or any previous visit.     CT Stone Protocol: Results for orders placed during the hospital encounter of 10/28/22    CT Abdomen Pelvis Stone Protocol    Narrative  EXAM:  CT Abdomen and Pelvis Without Intravenous Contrast    EXAM DATE:  10/28/2022 3:27 PM    CLINICAL HISTORY:  Nephrolithiasis; N20.0-Calculus of kidney    TECHNIQUE:  Axial computed tomography images of the abdomen and pelvis without  intravenous contrast.  Sagittal and coronal reformatted images were  created and reviewed.  This CT exam was performed using one or more of  the following dose reduction techniques:  automated exposure control,  adjustment of the mA and/or kV according to patient size, and/or use of  iterative reconstruction technique.    COMPARISON:  CT ABDOMEN PELVIS STONE PROTOCOL- dated 07/20/2022    FINDINGS:  LUNG BASES:  Unremarkable.  No mass.  No consolidation.    ABDOMEN:  LIVER:  Unremarkable.  GALLBLADDER AND BILE DUCTS:  Cholecystectomy clips.  No ductal  dilation.  PANCREAS:  Unremarkable.  No ductal dilation.  SPLEEN:  Unremarkable.  No splenomegaly.  ADRENALS:  Unremarkable.  No mass.  KIDNEYS AND URETERS:  Unremarkable.  No obstructing stones.  No  hydronephrosis.  STOMACH AND BOWEL:  Unremarkable.  No obstruction.  No mucosal  thickening.    PELVIS:  APPENDIX:  No findings to suggest acute appendicitis.  BLADDER:  Unremarkable.  No stones.  REPRODUCTIVE:  Right adnexal region cyst measuring 2.6 cm.    ABDOMEN and  PELVIS:  INTRAPERITONEAL SPACE:  Unremarkable.  No free air.  No significant  fluid collection.  BONES/JOINTS:  No acute fracture.  No dislocation.  SOFT TISSUES:  Unremarkable.  VASCULATURE:  Unremarkable.  No abdominal aortic aneurysm.  LYMPH NODES:  Unremarkable.  No enlarged lymph nodes.    Impression  Right adnexal region cyst measuring 2.6 cm.    This report was finalized on 10/28/2022 3:52 PM by Dr. Henrry Matthews MD.     KUB: Results for orders placed during the hospital encounter of 25    XR Abdomen KUB    Narrative  EXAM:  XR Abdomen, 1 View    EXAM DATE:  2025 8:37 AM    CLINICAL HISTORY:  KIDNEY STONES; N20.0-Calculus of kidney    TECHNIQUE:  Frontal supine view of the abdomen/pelvis.    COMPARISON:  No relevant prior studies available.    FINDINGS:  GASTROINTESTINAL TRACT:  Moderate right colonic stool.  No dilation.  BONES/JOINTS:  Unremarkable as visualized.  No acute fracture.    Impression  Nonobstructive bowel gas pattern.    This report was finalized on 2025 10:25 AM by Dr. Henrry Matthews MD.       Labs:  Brief Urine Lab Results  (Last result in the past 365 days)        Color   Clarity   Blood   Leuk Est   Nitrite   Protein   CREAT   Urine HCG        10/09/24 2306 Yellow   Clear   Trace   Negative   Negative   Negative                 Admission on 2025, Discharged on 2025   Component Date Value Ref Range Status    Reference Lab Report 2025    Final                    Value:[facility]  [address] [place] [address]  Phone: [phone] or [phone]  Fax: [phone]  Sujit Kaur M.D., Medical Director    PATIENT NAME                           LABORATORY NO.  BECKIE SINGH.             [id number]  [id number]                         AGE              SEX  SSN           CLIENT REF #  [place] [id number]      1990  F    [id number]   [id number]    [address] [place] [address] BY-PASS                 "REQUESTING M.D.     ATTENDING MMIRTHA.     COPY TO.  [address]                        [place] [address]                 JAGANNATH  DATE COLLECTED      DATE RECEIVED      DATE REPORTED  04/18/2025 04/21/2025 04/22/2025    DIAGNOSIS:  A.   SIGMOID COLON POLYP:  Polypoid colonic mucosa with mild hyperplastic surface change  No identified dysplasia or malignancy  B.   TERMINAL ILEUM BIOPSY:  Normal villous architecture without a significant increase                           in  intraepithelial lymphocytes  No identified granulomas, dysplasia or malignancy  C.   ASCENDING COLON POLYP:  Sessile serrated adenoma  No identified cytologic dysplasia or invasive malignancy  D.   SPLENIC FLEXURE POLYP:  Hyperplastic polyp  No identified dysplasia or malignancy  E.   DESCENDING COLON POLYP:  Hyperplastic polyp  No identified dysplasia or malignancy  F.   RANDOM COLON BIOPSIES, ASCENDING, TRANSVERSE AND DESCENDING:  No significant pathologic findings  No definitive features of chronicity  No identified active colitis, granulomas, dysplasia or malignancy  G.   SIGMOID COLON POLYP, X2:  Hyperplastic polyps  No identified dysplasia or malignancy    CLINICAL HISTORY:  History of adenomatous and serrated colon polyps      SPECIMENS RECEIVED:  A.  SIGMOID COLON POLYP  B.  TERMINAL ILEUM BIOPSY  C.  ASCENDING COLON POLYP  D.  SPLENIC FLEXURE POLYP  E.  DESCENDING COLON POLYP  F.  RANDOM COLON BIOPSIES , ASCENDING, TRANSVERSE AND DESCENDING  G.  SIGMOID COLON POLYP ,                           X2    MICROSCOPIC DESCRIPTION:  Tissue blocks are prepared and slides are examined microscopically on all  specimens. See diagnosis for details.    Professional interpretation rendered by Mounika Sibley M.D. at [facility], [address] [place] [address].    GROSS DESCRIPTION:  A.  Received in formalin labeled \"sigmoid colon polyp\" is a 0.4 x 0.2 x 0.2 cm  tan soft tissue fragment, " "filtered and submitted entirely in A1.  B.  Received in formalin labeled \"terminal ileum biopsy\" is a 0.3 x 0.3 x 0.3  cm tan soft tissue fragment, filtered and submitted entirely in B1.  C.  Received in formalin labeled \"right/ascending colon\" are multiple tan  irregular soft tissue fragments and fecal material aggregating 1 x 1 x 0.2  cm, filtered and submitted entirely in C1.  D.  Received in formalin labeled \"splenic flexure\" is a 1.2 x 0.7 x 0.6 cm tan  polyp, bisected and submitted entirely in D1.  E.  Received in formalin labeled \"left/descending colon\" is a 1 x 0.4 x 0.2 cm  brown polyp                           filtered and submitted entirely in E1.  F.  Received in formalin labeled \"random ascending, transverse and  descending colon biopsies\" are 3 tan soft tissue fragments aggregating 0.6  x 0.6 x 0.2 cm, filtered and submitted entirely in F1.  G.  Received in formalin labeled \"sigmoid colon x 2\" are a few fragments of  tan soft tissue admixed with vegetative material aggregating 0.6 x 0.6 x 0.2  cm, filtered and submitted entirely in G1.  HDM    REVIEWED, DIAGNOSED AND ELECTRONICALLY  SIGNED BY:    Mounika Sibley M.D.  CPT CODES:  88305x7     Lab on 04/02/2025   Component Date Value Ref Range Status    Glucose 04/02/2025 123 (H)  65 - 99 mg/dL Final    BUN 04/02/2025 10  6 - 20 mg/dL Final    Creatinine 04/02/2025 1.17 (H)  0.57 - 1.00 mg/dL Final    Sodium 04/02/2025 137  136 - 145 mmol/L Final    Potassium 04/02/2025 4.0  3.5 - 5.2 mmol/L Final    Chloride 04/02/2025 108 (H)  98 - 107 mmol/L Final    CO2 04/02/2025 16.1 (L)  22.0 - 29.0 mmol/L Final    Calcium 04/02/2025 9.0  8.6 - 10.5 mg/dL Final    Total Protein 04/02/2025 7.0  6.0 - 8.5 g/dL Final    Albumin 04/02/2025 3.9  3.5 - 5.2 g/dL Final    ALT (SGPT) 04/02/2025 35 (H)  1 - 33 U/L Final    AST (SGOT) 04/02/2025 29  1 - 32 U/L Final    Alkaline Phosphatase 04/02/2025 96  39 - 117 U/L Final    Total Bilirubin 04/02/2025 0.3  0.0 - 1.2 mg/dL " Final    Globulin 04/02/2025 3.1  gm/dL Final    A/G Ratio 04/02/2025 1.3  g/dL Final    BUN/Creatinine Ratio 04/02/2025 8.5  7.0 - 25.0 Final    Anion Gap 04/02/2025 12.9  5.0 - 15.0 mmol/L Final    eGFR 04/02/2025 62.1  >60.0 mL/min/1.73 Final    WBC 04/02/2025 9.20  3.40 - 10.80 10*3/mm3 Final    RBC 04/02/2025 4.78  3.77 - 5.28 10*6/mm3 Final    Hemoglobin 04/02/2025 14.4  12.0 - 15.9 g/dL Final    Hematocrit 04/02/2025 43.7  34.0 - 46.6 % Final    MCV 04/02/2025 91.4  79.0 - 97.0 fL Final    MCH 04/02/2025 30.1  26.6 - 33.0 pg Final    MCHC 04/02/2025 33.0  31.5 - 35.7 g/dL Final    RDW 04/02/2025 12.6  12.3 - 15.4 % Final    RDW-SD 04/02/2025 41.6  37.0 - 54.0 fl Final    MPV 04/02/2025 9.8  6.0 - 12.0 fL Final    Platelets 04/02/2025 301  140 - 450 10*3/mm3 Final    nRBC 04/02/2025 0.0  0.0 - 0.2 /100 WBC Final    Neutrophil % 04/02/2025 35.7 (L)  42.7 - 76.0 % Final    Lymphocyte % 04/02/2025 62.2 (H)  19.6 - 45.3 % Final    Monocyte % 04/02/2025 1.0 (L)  5.0 - 12.0 % Final    Eosinophil % 04/02/2025 1.0  0.3 - 6.2 % Final    Basophil % 04/02/2025 0.0  0.0 - 1.5 % Final    Neutrophils Absolute 04/02/2025 3.28  1.70 - 7.00 10*3/mm3 Final    Lymphocytes Absolute 04/02/2025 5.72 (H)  0.70 - 3.10 10*3/mm3 Final    Monocytes Absolute 04/02/2025 0.09 (L)  0.10 - 0.90 10*3/mm3 Final    Eosinophils Absolute 04/02/2025 0.09  0.00 - 0.40 10*3/mm3 Final    Basophils Absolute 04/02/2025 0.00  0.00 - 0.20 10*3/mm3 Final    RBC Morphology 04/02/2025 Normal  Normal Final    WBC Morphology 04/02/2025 Normal  Normal Final    Platelet Morphology 04/02/2025 Normal  Normal Final   Lab on 03/13/2025   Component Date Value Ref Range Status    Estradiol 03/13/2025 25.3  pg/mL Final    Testosterone, Free 03/13/2025 3.4  0.0 - 4.2 pg/mL Final    Testosterone, Total 03/13/2025 44.20  8.40 - 48.10 ng/dL Final    FSH 03/13/2025 60.30  mIU/mL Final        Procedure: None  Procedures     I have reviewed and agree with the above PMH, PSH,  FMH, social history, medications, allergies, and labs.     Assessment/Plan:   Problem List Items Addressed This Visit       Kidney stone    Relevant Medications    ketorolac (TORADOL) injection 30 mg (Completed)    Other Relevant Orders    Basic Metabolic Panel     Other Visit Diagnoses         Other migraine without status migrainosus, intractable    -  Primary    Relevant Medications    ketorolac (TORADOL) injection 30 mg (Completed)            Health Maintenance:   Health Maintenance Due   Topic Date Due    Pneumococcal Vaccine 0-49 (1 of 2 - PCV) Never done    ANNUAL PHYSICAL  Never done    COVID-19 Vaccine (4 - 2024-25 season) 09/01/2024        Smoking Counseling: Everyday smoker.  Never used smokeless tobacco.  Counseling given.    Urine Incontinence: Patient reports that she is still having episodes of urinary incontinence.    Patient was given instructions and counseling regarding her condition or for health maintenance advice. Please see specific information pulled into the AVS if appropriate.    Patient Education:   Status migrainous -discussed with the patient the pathophysiology of status migraines.  Did advise her this could be recent surgical intervention however given significantly elevated blood pressure as well as persistent headache I did advise to go to the nearest ER if blood pressure remains elevated at home.  Advised her to check every 1-2 hours and if systolic is greater than 150 or diastolic is greater than 100 to go to the nearest ER for immediate evaluation and possible CT scan of the head.  I did give her a injection of Toradol in office today and I will send a prescription of Toradol to take as needed at home.  Advised not take with other nonsteroidal anti-inflammatory medications.  I will check a BMP given slight decrease in kidney function at last blood work on the second.  Nephrolithiasis -patient is doing well at this time and has no back or flank pain.  Will continue for with  observation  Urinary incontinence -patient still has urinary incontinence will continue with Gemtesa 75 mg once nightly.  Otherwise we will place her back in 3 months.  Patient verbalized understanding.    Visit Diagnoses:    ICD-10-CM ICD-9-CM   1. Other migraine without status migrainosus, intractable  G43.819 346.81   2. Kidney stone  N20.0 592.0     A total of 30 minutes were spent coordinating this patient’s care in clinic today; 20 minutes of which were face-to-face with the patient, reviewing medical history and counseling on the current treatment and followup plan.  All questions were answered to patient's satisfaction.    Meds Ordered During Visit:  New Medications Ordered This Visit   Medications    ketorolac (TORADOL) injection 30 mg       Follow Up Appointment: 3 months  No follow-ups on file.      This document has been electronically signed by Mati Rankin PA-C   April 23, 2025 10:39 EDT    Part of this note may be an electronic transcription/translation of spoken language to printed text using the Dragon Dictation System.

## 2025-04-24 ENCOUNTER — TELEPHONE (OUTPATIENT)
Dept: GASTROENTEROLOGY | Facility: CLINIC | Age: 35
End: 2025-04-24
Payer: COMMERCIAL

## 2025-04-24 LAB
ANION GAP SERPL CALCULATED.3IONS-SCNC: 9.3 MMOL/L (ref 5–15)
BUN SERPL-MCNC: 12 MG/DL (ref 6–20)
BUN/CREAT SERPL: 11 (ref 7–25)
CALCIUM SPEC-SCNC: 10 MG/DL (ref 8.6–10.5)
CHLORIDE SERPL-SCNC: 106 MMOL/L (ref 98–107)
CO2 SERPL-SCNC: 22.7 MMOL/L (ref 22–29)
CREAT SERPL-MCNC: 1.09 MG/DL (ref 0.57–1)
EGFRCR SERPLBLD CKD-EPI 2021: 67.7 ML/MIN/1.73
GLUCOSE SERPL-MCNC: 93 MG/DL (ref 65–99)
POTASSIUM SERPL-SCNC: 4.7 MMOL/L (ref 3.5–5.2)
SODIUM SERPL-SCNC: 138 MMOL/L (ref 136–145)

## 2025-04-24 NOTE — TELEPHONE ENCOUNTER
----- Message from Selena CONTRERAS sent at 4/24/2025  9:20 AM EDT -----  No other sx just HA and N/V. She did say that she felt some better today.  ----- Message -----  From: Naomi Aguiar PA-C  Sent: 4/23/2025   5:15 PM EDT  To: Selena Crowley MA    Can you call pt back 4/24 and ask her if she is feeling any betterPossible side effect of sedationAsk her if there is any new abdominal pain or worrisome symptoms such as fever, bleeding, etc  ----- Message -----  From: Kim Fernandez MA  Sent: 4/23/2025   1:52 PM EDT  To: Naomi Aguiar PA-C; Selena Crowley MA    The patient called:   SY:  Just TREMAYNE: please advise: Went to  this am, N/V, H//A since Saturday night, had colonoscopy on Friday,  Was concerned and did not know if there was a correlation.

## 2025-05-22 DIAGNOSIS — N39.41 URGE INCONTINENCE OF URINE: ICD-10-CM

## 2025-05-23 RX ORDER — OXYBUTYNIN CHLORIDE 5 MG/1
5 TABLET, EXTENDED RELEASE ORAL DAILY
Qty: 90 TABLET | Refills: 3 | Status: SHIPPED | OUTPATIENT
Start: 2025-05-23

## 2025-06-30 ENCOUNTER — TRANSCRIBE ORDERS (OUTPATIENT)
Dept: ADMINISTRATIVE | Facility: HOSPITAL | Age: 35
End: 2025-06-30
Payer: COMMERCIAL

## 2025-06-30 DIAGNOSIS — R06.09 DYSPNEA ON EXERTION: Primary | ICD-10-CM

## 2025-07-07 ENCOUNTER — TRANSCRIBE ORDERS (OUTPATIENT)
Dept: ADMINISTRATIVE | Facility: HOSPITAL | Age: 35
End: 2025-07-07
Payer: COMMERCIAL

## 2025-07-21 NOTE — DISCHARGE INSTRUCTIONS
0830----11/14/19  ARRIVAL TIME    TAKE the following medications the morning of surgery:  All heart or blood pressure medications    HOLD all diabetic medications the morning of surgery as ordered by physician.    Please discontinue all blood thinners and anticoagulants (except aspirin) prior to surgery as per your surgeon and cardiologist instructions.  Aspirin may be continued up to the day prior to surgery.         General Instructions:  · Do not eat or drink after midnight: includes water, mints, or gum. You may brush your teeth.  Dental appliances that are removable must be taken out day of surgery.  · Do not smoke, chew tobacco, or drink alcohol.  · Bring medications in original bottles, any inhalers and if applicable your C-PAP/BI-PAP machine.  · Bring any papers given to you in the doctor's office.  · Wear clean comfortable clothes and socks.  · Do not wear contact lenses or make-up. Bring a case for your glasses if applicable.  · Bring crutches or walker if applicable.  · Leave all other valuables and jewelry at home.    If you were given a blood bank ID arm band remember to bring it with you the day of surgery.    Preventing a Surgical Site Infection:  Shower the night before surgery (unless instructed other wise) using a fresh bar of anti-bacterial soap (such as Dial) and clean washcloth. Dry with a clean towel and dress in clean clothing.  For 2 to 3 days before surgery, avoid shaving with a razor near where you will have surgery because the razor can irritate skin and make it easier to develop an infection. Ask your surgeon if you will be receiving antibiotics prior to surgery.  Make sure you, your family, and all healthcare providers clear their hands with soap and water or an alcohol-based hand  before caring for you or your wound.  If at all possible, quit smoking as many days before surgery as you can.    Day of surgery:  Upon arrival, a Pre-op nurse and Anesthesiologist will review your  Clinic Care Coordination Contact  Community Health Worker Initial Outreach    CHW Initial Information Gathering:  Referral Source: ED Follow-Up  Current living arrangement:: Not Assessed  CHW Additional Questions  If ED/Hospital discharge, follow-up appointment scheduled as recommended?: Yes  Medication changes made following ED/Hospital discharge?: No  MyChart active?: Yes  Patient sent Social Drivers of Health questionnaire?: No    Patient accepts CC: No, Patient expressed no additional support is needed at this time. Patient will be sent Care Coordination introduction letter for future reference.     Marlene Tucker  Community Health Worker    Connected Care Resources   [facility]     *Connected Care Resources Team does NOT   follow patient ongoing. Referrals are identified   based on internal discharge report and the outreach is to ensure   Patient has understanding of their discharge instructions.       health history, obtain vital signs, and answer questions you may have. The only belongings needed at this time will be your home medications and if applicable your C-PAP/BI-PAP machine. If you are staying overnight your family can leave the rest of your belongings in the car and bring them to your room later. A Pre-op nurse will start an IV and you may receive medication in preparation for surgery, including something to help you relax. Your family will be able to see you in the Pre-op area. While you are in surgery your family should notify the waiting room  if they leave the waiting room area and provide a contact phone number.    Please be aware that surgery does come with discomfort. We want to make every effort to control your discomfort so please discuss any uncontrolled symptoms with your nurse. Your doctor will most likely have prescribed pain medications.    If you are going home after surgery you will receive individualized written care instructions before being discharged. A responsible adult must drive you to and from the hospital on the day of surgery and stay with you for 24 hours.    If you are staying overnight following surgery, you will be transported to your hospital room following the recovery period.  [facility] has all private rooms.    If you have any questions please call Pre-Admission Testing at [phone].  Deductibles and co-payments are collected on the day of service. Please be prepared to pay the required co-pay, deductible or deposit on the day of service as defined by your plan.

## 2025-07-23 ENCOUNTER — OFFICE VISIT (OUTPATIENT)
Dept: UROLOGY | Facility: CLINIC | Age: 35
End: 2025-07-23
Payer: COMMERCIAL

## 2025-07-23 VITALS
BODY MASS INDEX: 36.08 KG/M2 | HEART RATE: 109 BPM | DIASTOLIC BLOOD PRESSURE: 81 MMHG | WEIGHT: 179 LBS | SYSTOLIC BLOOD PRESSURE: 124 MMHG | HEIGHT: 59 IN

## 2025-07-23 DIAGNOSIS — N20.0 KIDNEY STONE: Primary | ICD-10-CM

## 2025-07-23 DIAGNOSIS — N39.41 URGE INCONTINENCE OF URINE: ICD-10-CM

## 2025-07-23 LAB
BILIRUB BLD-MCNC: NEGATIVE MG/DL
CLARITY, POC: CLEAR
COLOR UR: YELLOW
EXPIRATION DATE: NORMAL
GLUCOSE UR STRIP-MCNC: NEGATIVE MG/DL
KETONES UR QL: NEGATIVE
LEUKOCYTE EST, POC: NEGATIVE
Lab: NORMAL
NITRITE UR-MCNC: NEGATIVE MG/ML
PH UR: 6 [PH] (ref 5–8)
PROT UR STRIP-MCNC: NEGATIVE MG/DL
RBC # UR STRIP: NEGATIVE /UL
SP GR UR: 1.02 (ref 1–1.03)
UROBILINOGEN UR QL: NORMAL

## 2025-07-23 NOTE — PROGRESS NOTES
"Chief Complaint:    Chief Complaint   Patient presents with    Nephrolithiasis     Follow up       Vital Signs:   /81 (BP Location: Right arm, Patient Position: Sitting, Cuff Size: Adult)   Pulse 109   Ht 149.9 cm (59.02\")   Wt 81.2 kg (179 lb)   BMI 36.13 kg/m²   Body mass index is 36.13 kg/m².      HPI:  Pili Webster is a 34 y.o. female who presents today for follow up    History of Present Illness  Mr. Webster returns to clinic today for follow-up for nephrolithiasis, urge incontinence, and bladder spasms.  She continues with Gemtesa 75 mg once nightly for her urinary incontinence with overall improvement.  She does take oxybutynin 5 mg as needed for severe bladder spasms.  Last KUB in January showed no concerns of any nephrolithiasis.  She did have recent kidney function completed in June that showed similar amount of stool.  She continues stool softeners and is doing much better.  She reports some back pain but contributes this to her lupus.  She has no other complaints      Past Medical History:  Past Medical History:   Diagnosis Date    Abdominal pain     Abnormal uterine bleeding (AUB)     Anxiety and depression     Arthritis     Asthma     mild    Cholecystitis     Constipation     Endometriosis     Frequent UTI     GERD (gastroesophageal reflux disease)     Heartburn     Hemorrhoids     Hypertension     IBS (irritable bowel syndrome)     Kidney stones     Lesion of breast     Lump     RIGHT BREAST    Nausea & vomiting     PCOS (polycystic ovarian syndrome)     PONV (postoperative nausea and vomiting)     Psoriatic arthritis     Sjogren's disease     Sleep apnea     no cpap    Snores     Tachycardia     Wrist fracture     RIGHT ARM      PATIENT UNSURE IF FRACTURED       Current Meds:  Current Outpatient Medications   Medication Sig Dispense Refill    Advair -21 MCG/ACT inhaler Inhale 2 puffs 2 (Two) Times a Day.      cloNIDine (CATAPRES) 0.1 MG tablet Take 1 tablet by mouth " Every Night.      colestipol (COLESTID) 1 g tablet TAKE ONE TABLET BY MOUTH DAILY 90 tablet 3    cyclobenzaprine (FLEXERIL) 10 MG tablet Take 1 tablet by mouth Every 12 (Twelve) Hours.      dexlansoprazole (DEXILANT) 60 MG capsule Take 1 capsule by mouth Daily. 90 capsule 3    dicyclomine (BENTYL) 20 MG tablet Take 1 tablet by mouth 3 (Three) Times a Day As Needed (abdominal pain). 90 tablet 3    Dupilumab (Dupixent) 300 MG/2ML solution auto-injector injection INJECT 1 PEN UNDER THE SKIN EVERY 7 DAYS (Patient taking differently: Inject 2 mL under the skin into the appropriate area as directed 1 (One) Time Per Week.) 8 mL 5    Erenumab-aooe (Aimovig) 70 MG/ML auto-injector Inject 1 mL under the skin into the appropriate area as directed Every 28 (Twenty-Eight) Days.      Estradiol 1.25 MG/1.25GM gel Apply  topically to the appropriate area as directed Daily.      gabapentin (NEURONTIN) 100 MG capsule Take 2 capsules by mouth 3 (Three) Times a Day.      hydrOXYzine pamoate (VISTARIL) 100 MG capsule Take 1 capsule by mouth Every 6 (Six) Hours.      ivabradine HCl (CORLANOR) 5 MG tablet tablet Take 1 tablet by mouth 2 (Two) Times a Day.      leflunomide (ARAVA) 10 MG tablet Take 1 tablet by mouth Daily.      loratadine (CLARITIN) 10 MG tablet Take 1 tablet by mouth Daily.      mirtazapine (REMERON) 15 MG tablet Take 0.5-1 tablets by mouth Every Night.      montelukast (SINGULAIR) 10 MG tablet Take 1 tablet by mouth Every Night.      nadolol (CORGARD) 80 MG tablet Take 1 tablet by mouth Daily.      oxybutynin XL (DITROPAN-XL) 5 MG 24 hr tablet TAKE 1 TABLET BY MOUTH DAILY 90 tablet 3    polyethylene glycol (GoLYTELY) 236 g solution Follow instructions given at office 4000 mL 0    predniSONE (DELTASONE) 10 MG tablet Take 1 tablet by mouth Daily As Needed.      promethazine (PHENERGAN) 25 MG tablet Take 1 tablet by mouth Every 6 (Six) Hours As Needed for Nausea or Vomiting. 21 tablet 1    SUMAtriptan (IMITREX) 100 MG  tablet Take 1 tablet by mouth Daily.      tamsulosin (FLOMAX) 0.4 MG capsule 24 hr capsule TAKE 1 CAPSULE BY MOUTH DAILY 90 capsule 3    Vibegron (Gemtesa) 75 MG tablet Take 1 tablet by mouth Daily. 90 tablet 3    vitamin D (ERGOCALCIFEROL) 1.25 MG (97530 UT) capsule capsule Take 1 capsule by mouth Every 7 (Seven) Days.       No current facility-administered medications for this visit.        Allergies:   Allergies   Allergen Reactions    Peanut-Containing Drug Products Anaphylaxis     AND PEANUTS IN FOODS    Latex Hives    Shrimp Swelling     Facial swelling      Milk-Related Compounds Nausea And Vomiting    Sulfa Antibiotics Rash        Past Surgical History:  Past Surgical History:   Procedure Laterality Date    ABDOMINAL SURGERY      ANAL SCOPE N/A 09/30/2022    Procedure: ANAL SCOPE;  Surgeon: Franco Mari MD;  Location: [facility] OR;  Service: General;  Laterality: N/A;    BLADDER REPAIR  06/06/2024    BREAST BIOPSY Right 11/29/2018    Procedure: breast biopsy ;  Surgeon: Shiv Overton MD;  Location: [facility] OR;  Service: General    CHOLECYSTECTOMY N/A 08/25/2017    Procedure: CHOLECYSTECTOMY LAPAROSCOPIC;  Surgeon: Franco Mari MD;  Location: [facility] OR;  Service:     COLONOSCOPY N/A 03/09/2020    Procedure: COLONOSCOPY CPT CODE: 98359;  Surgeon: Jeremiah Murdock MD;  Location: [facility] OR;  Service: Gastroenterology;  Laterality: N/A;    COLONOSCOPY N/A 4/18/2025    Procedure: Colonoscopy with polypectomies x6 and biopsies;  Surgeon: Beto Mejia MD;  Location: [facility] ENDOSCOPY;  Service: Gastroenterology;  Laterality: N/A;    DENTAL PROCEDURE      DIAGNOSTIC LAPAROSCOPY      X5    DILATATION AND CURETTAGE      ENDOSCOPY N/A 03/09/2020    Procedure: ESOPHAGOGASTRODUODENOSCOPY WITH BIOPSY CPT CODE: 88693;  Surgeon: Jeremiah Murdock MD;  Location: [facility] OR;  Service: Gastroenterology;  Laterality: N/A;    ENDOSCOPY N/A 02/01/2021    Procedure:  ESOPHAGOGASTRODUODENOSCOPY WITH BIOPSY CPT CODE: 82373;  Surgeon: Jeremiah Murdock MD;  Location: [facility] OR;  Service: Gastroenterology;  Laterality: N/A;    ENDOSCOPY N/A 06/20/2023    Procedure: ESOPHAGOGASTRODUODENOSCOPY WITH BIOPSY CPT CODE: 82231;  Surgeon: Beto Mejia MD;  Location: [facility] ENDOSCOPY;  Service: Gastroenterology;  Laterality: N/A;    ENDOSCOPY N/A 6/10/2024    Procedure: ESOPHAGOGASTRODUODENOSCOPY WITH BIOPSY;  Surgeon: Beto Mejia MD;  Location: [facility] ENDOSCOPY;  Service: Gastroenterology;  Laterality: N/A;    EXTRACORPOREAL SHOCK WAVE LITHOTRIPSY (ESWL) Right 10/4/2024    Procedure: EXTRACORPOREAL SHOCKWAVE LITHOTRIPSY;  Surgeon: Ahmet Barrow MD;  Location: [facility] OR;  Service: Urology;  Laterality: Right;    HEMORRHOIDECTOMY N/A 11/14/2019    Procedure: HEMORRHOID STAPLING;  Surgeon: Franco Mari MD;  Location: [facility];  Service: General    HYSTERECTOMY  03/22/2023    Total    KNEE ARTHROSCOPY W/ MENISCECTOMY Right 04/11/2022    Procedure: KNEE ARTHROSCOPIC DEBRIDEMENT, PRP INJECTION AND medial femoral condraplasty MENISCAL DEBRIDEMENT;  Surgeon: Musa Yoon MD;  Location: [facility];  Service: Orthopedics;  Laterality: Right;    URETEROSCOPY LASER LITHOTRIPSY WITH STENT INSERTION Right 01/09/2019    Procedure: URETEROSCOPY LASER LITHOTRIPSY retrograde pyelogram;  Surgeon: Ahmet Barrow MD;  Location: [facility];  Service: Urology    WISDOM TOOTH EXTRACTION         Social History:  Social History     Socioeconomic History    Marital status:    Tobacco Use    Smoking status: Every Day     Current packs/day: 0.50     Average packs/day: 0.5 packs/day for 22.6 years (11.3 ttl pk-yrs)     Types: Cigarettes     Start date: 2003     Passive exposure: Current    Smokeless tobacco: Never   Vaping Use    Vaping status: Former    Substances: Nicotine, Flavoring   Substance and Sexual Activity    Alcohol use: Yes     Comment: RARELY     Drug use: Never    Sexual activity: Defer       Family History:  Family History   Problem Relation Age of Onset    Breast cancer Maternal Aunt     Alcohol abuse Paternal Grandfather     Heart disease Paternal Grandfather     Cancer Maternal Grandfather     Hypertension Maternal Grandfather     Colon cancer Neg Hx     Liver cancer Neg Hx        Review of Systems:  Review of Systems   Constitutional:  Positive for fatigue. Negative for chills, fever and unexpected weight change.   Respiratory:  Negative for cough, chest tightness, shortness of breath and wheezing.    Cardiovascular:  Negative for chest pain and leg swelling.   Gastrointestinal:  Positive for nausea and vomiting. Negative for abdominal pain, constipation and diarrhea.   Genitourinary:  Negative for difficulty urinating, dysuria, frequency and urgency.   Musculoskeletal:  Positive for back pain and joint swelling.   Skin:  Negative for rash.   Neurological:  Positive for headaches. Negative for dizziness.   Psychiatric/Behavioral:  Positive for confusion. Negative for suicidal ideas.        Physical Exam:  Physical Exam  Constitutional:       General: She is not in acute distress.     Appearance: Normal appearance.   HENT:      Head: Normocephalic and atraumatic.      Nose: Nose normal.      Mouth/Throat:      Mouth: Mucous membranes are moist.   Eyes:      Conjunctiva/sclera: Conjunctivae normal.   Cardiovascular:      Rate and Rhythm: Normal rate.      Pulses: Normal pulses.   Pulmonary:      Effort: Pulmonary effort is normal.   Abdominal:      Palpations: Abdomen is soft.   Musculoskeletal:         General: Normal range of motion.      Cervical back: Normal range of motion.   Skin:     General: Skin is warm.   Neurological:      General: No focal deficit present.      Mental Status: She is alert and oriented to person, place, and time.   Psychiatric:         Mood and Affect: Mood normal.         Behavior: Behavior normal.         Thought Content:  Thought content normal.         Judgment: Judgment normal.             Recent Image (CT and/or KUB):   CT Abdomen and Pelvis: No results found for this or any previous visit.     CT Stone Protocol: Results for orders placed during the hospital encounter of 10/28/22    CT Abdomen Pelvis Stone Protocol    Narrative  EXAM:  CT Abdomen and Pelvis Without Intravenous Contrast    EXAM DATE:  10/28/2022 3:27 PM    CLINICAL HISTORY:  Nephrolithiasis; N20.0-Calculus of kidney    TECHNIQUE:  Axial computed tomography images of the abdomen and pelvis without  intravenous contrast.  Sagittal and coronal reformatted images were  created and reviewed.  This CT exam was performed using one or more of  the following dose reduction techniques:  automated exposure control,  adjustment of the mA and/or kV according to patient size, and/or use of  iterative reconstruction technique.    COMPARISON:  CT ABDOMEN PELVIS STONE PROTOCOL- dated 07/20/2022    FINDINGS:  LUNG BASES:  Unremarkable.  No mass.  No consolidation.    ABDOMEN:  LIVER:  Unremarkable.  GALLBLADDER AND BILE DUCTS:  Cholecystectomy clips.  No ductal  dilation.  PANCREAS:  Unremarkable.  No ductal dilation.  SPLEEN:  Unremarkable.  No splenomegaly.  ADRENALS:  Unremarkable.  No mass.  KIDNEYS AND URETERS:  Unremarkable.  No obstructing stones.  No  hydronephrosis.  STOMACH AND BOWEL:  Unremarkable.  No obstruction.  No mucosal  thickening.    PELVIS:  APPENDIX:  No findings to suggest acute appendicitis.  BLADDER:  Unremarkable.  No stones.  REPRODUCTIVE:  Right adnexal region cyst measuring 2.6 cm.    ABDOMEN and PELVIS:  INTRAPERITONEAL SPACE:  Unremarkable.  No free air.  No significant  fluid collection.  BONES/JOINTS:  No acute fracture.  No dislocation.  SOFT TISSUES:  Unremarkable.  VASCULATURE:  Unremarkable.  No abdominal aortic aneurysm.  LYMPH NODES:  Unremarkable.  No enlarged lymph nodes.    Impression  Right adnexal region cyst measuring 2.6 cm.    This  report was finalized on 10/28/2022 3:52 PM by Dr. Henrry Matthews MD.     KUB: Results for orders placed during the hospital encounter of 01/23/25    XR Abdomen KUB    Narrative  EXAM:  XR Abdomen, 1 View    EXAM DATE:  1/23/2025 8:37 AM    CLINICAL HISTORY:  KIDNEY STONES; N20.0-Calculus of kidney    TECHNIQUE:  Frontal supine view of the abdomen/pelvis.    COMPARISON:  No relevant prior studies available.    FINDINGS:  GASTROINTESTINAL TRACT:  Moderate right colonic stool.  No dilation.  BONES/JOINTS:  Unremarkable as visualized.  No acute fracture.    Impression  Nonobstructive bowel gas pattern.    This report was finalized on 1/23/2025 10:25 AM by Dr. Henrry Matthews MD.       Labs:  Brief Urine Lab Results  (Last result in the past 365 days)        Color   Clarity   Blood   Leuk Est   Nitrite   Protein   CREAT   Urine HCG        07/23/25 0844 Yellow   Clear   Negative   Negative   Negative   Negative                 Office Visit on 07/23/2025   Component Date Value Ref Range Status    Color 07/23/2025 Yellow  Yellow, Straw, Dark Yellow, Mimi Final    Clarity, UA 07/23/2025 Clear  Clear Final    Specific Gravity  07/23/2025 1.025  1.005 - 1.030 Final    pH, Urine 07/23/2025 6.0  5.0 - 8.0 Final    Leukocytes 07/23/2025 Negative  Negative Final    Nitrite, UA 07/23/2025 Negative  Negative Final    Protein, POC 07/23/2025 Negative  Negative mg/dL Final    Glucose, UA 07/23/2025 Negative  Negative mg/dL Final    Ketones, UA 07/23/2025 Negative  Negative Final    Urobilinogen, UA 07/23/2025 Normal  Normal, 0.2 E.U./dL Final    Bilirubin 07/23/2025 Negative  Negative Final    Blood, UA 07/23/2025 Negative  Negative Final    Lot Number 07/23/2025 [id number]   Final    Expiration Date 07/23/2025 10/25/2025   Final        Procedure: None  Procedures     I have reviewed and agree with the above PMH, PSH, FMH, social history, medications, allergies, and labs.     Assessment/Plan:   Problem List Items Addressed This  Visit       Kidney stone - Primary    Relevant Orders    POC Urinalysis Dipstick, Automated (Completed)       Health Maintenance:   Health Maintenance Due   Topic Date Due    Pneumococcal Vaccine 0-49 (1 of 2 - PCV) Never done    ANNUAL PHYSICAL  Never done    COVID-19 Vaccine (4 - 2024-25 season) 09/01/2024        Smoking Counseling: Everyday smoker.  Never used smokeless tobacco.  Counseling given however not ready quit at this time.    Urine Incontinence: Patient reports that she is having some mild urge incontinence..    Patient was given instructions and counseling regarding her condition or for health maintenance advice. Please see specific information pulled into the AVS if appropriate.    Patient Education:   Nephrolithiasis -doing well last KUB was negative.  Did advise to call and reschedule KUB if symptoms persist or worsen.  Educated go to the nearest ER if this is a medical emergency.  She verbalized understanding.  Urinary urge incontinence and bladder spasms -patient continues on Gemtesa with overall improvement.  Will continue with oxybutynin as needed for severe bladder spasms.  Otherwise she stable we will see her back in 6 months    Visit Diagnoses:    ICD-10-CM ICD-9-CM   1. Kidney stone  N20.0 592.0     A total of 15 minutes were spent coordinating this patient’s care in clinic today; 10 minutes of which were face-to-face with the patient, reviewing medical history and counseling on the current treatment and followup plan.  All questions were answered to patient's satisfaction.    Meds Ordered During Visit:  No orders of the defined types were placed in this encounter.      Follow Up Appointment: 6 months  No follow-ups on file.      This document has been electronically signed by Mati Rankin PA-C   July 23, 2025 09:43 EDT    Part of this note may be an electronic transcription/translation of spoken language to printed text using the Dragon Dictation System.

## 2025-07-29 ENCOUNTER — TRANSCRIBE ORDERS (OUTPATIENT)
Dept: ADMINISTRATIVE | Facility: HOSPITAL | Age: 35
End: 2025-07-29
Payer: COMMERCIAL

## 2025-07-29 ENCOUNTER — HOSPITAL ENCOUNTER (OUTPATIENT)
Dept: RESPIRATORY THERAPY | Facility: HOSPITAL | Age: 35
Discharge: HOME | End: 2025-07-29
Admitting: FAMILY MEDICINE
Payer: COMMERCIAL

## 2025-07-29 DIAGNOSIS — R00.0 TACHYCARDIA, UNSPECIFIED: ICD-10-CM

## 2025-07-29 DIAGNOSIS — R00.0 TACHYCARDIA, UNSPECIFIED: Primary | ICD-10-CM

## 2025-07-29 PROCEDURE — 93246 EXT ECG>7D<15D RECORDING: CPT

## 2025-08-18 ENCOUNTER — TRANSCRIBE ORDERS (OUTPATIENT)
Dept: PHYSICAL THERAPY | Facility: HOSPITAL | Age: 35
End: 2025-08-18
Payer: COMMERCIAL

## 2025-08-18 DIAGNOSIS — M25.661 KNEE STIFFNESS, RIGHT: ICD-10-CM

## 2025-08-18 DIAGNOSIS — M62.89 QUADRICEP TIGHTNESS: ICD-10-CM

## 2025-08-18 DIAGNOSIS — M25.561 ACUTE PAIN OF RIGHT KNEE: Primary | ICD-10-CM

## 2025-08-18 DIAGNOSIS — G89.29 CHRONIC PAIN OF RIGHT KNEE: ICD-10-CM

## 2025-08-18 DIAGNOSIS — M62.81 QUADRICEPS WEAKNESS: ICD-10-CM

## 2025-08-18 DIAGNOSIS — M25.561 CHRONIC PAIN OF RIGHT KNEE: ICD-10-CM

## 2025-08-18 DIAGNOSIS — M62.89 HAMSTRING TIGHTNESS: ICD-10-CM

## 2025-08-18 DIAGNOSIS — M23.8X1 CHONDRAL DEFECT OF CONDYLE OF RIGHT FEMUR: ICD-10-CM

## 2025-08-27 LAB
CV ZIO BASELINE AVG BPM: 98
CV ZIO BASELINE BPM HIGH: 166
CV ZIO BASELINE BPM LOW: 51

## 2025-08-29 ENCOUNTER — LAB (OUTPATIENT)
Dept: LAB | Facility: HOSPITAL | Age: 35
End: 2025-08-29
Payer: COMMERCIAL

## 2025-08-29 ENCOUNTER — HOSPITAL ENCOUNTER (OUTPATIENT)
Dept: PHYSICAL THERAPY | Facility: HOSPITAL | Age: 35
Setting detail: THERAPIES SERIES
Discharge: HOME | End: 2025-08-29
Payer: COMMERCIAL

## 2025-08-29 DIAGNOSIS — R60.0 LEG EDEMA: ICD-10-CM

## 2025-08-29 DIAGNOSIS — G89.29 CHRONIC PAIN OF RIGHT KNEE: Primary | ICD-10-CM

## 2025-08-29 DIAGNOSIS — M25.561 CHRONIC PAIN OF RIGHT KNEE: Primary | ICD-10-CM

## 2025-08-29 DIAGNOSIS — R00.2 PALPITATIONS: ICD-10-CM

## 2025-08-29 DIAGNOSIS — M62.89 QUADRICEP TIGHTNESS: ICD-10-CM

## 2025-08-29 DIAGNOSIS — R29.898 WEAKNESS OF RIGHT LEG: ICD-10-CM

## 2025-08-29 DIAGNOSIS — M25.661 DECREASED RANGE OF MOTION (ROM) OF RIGHT KNEE: ICD-10-CM

## 2025-08-29 DIAGNOSIS — M62.89 HAMSTRING TIGHTNESS: ICD-10-CM

## 2025-08-29 LAB
ANION GAP SERPL CALCULATED.3IONS-SCNC: 14 MMOL/L (ref 5–15)
BUN SERPL-MCNC: 10.9 MG/DL (ref 6–20)
BUN/CREAT SERPL: 13.6 (ref 7–25)
CALCIUM SPEC-SCNC: 9.8 MG/DL (ref 8.6–10.5)
CHLORIDE SERPL-SCNC: 104 MMOL/L (ref 98–107)
CO2 SERPL-SCNC: 20 MMOL/L (ref 22–29)
CREAT SERPL-MCNC: 0.8 MG/DL (ref 0.57–1)
EGFRCR SERPLBLD CKD-EPI 2021: 97.5 ML/MIN/1.73
GLUCOSE SERPL-MCNC: 143 MG/DL (ref 65–99)
MAGNESIUM SERPL-MCNC: 2 MG/DL (ref 1.6–2.6)
POTASSIUM SERPL-SCNC: 4.2 MMOL/L (ref 3.5–5.2)
SODIUM SERPL-SCNC: 138 MMOL/L (ref 136–145)

## 2025-08-29 PROCEDURE — 80048 BASIC METABOLIC PNL TOTAL CA: CPT

## 2025-08-29 PROCEDURE — 83735 ASSAY OF MAGNESIUM: CPT

## 2025-08-29 PROCEDURE — 36415 COLL VENOUS BLD VENIPUNCTURE: CPT

## 2025-08-29 PROCEDURE — 97162 PT EVAL MOD COMPLEX 30 MIN: CPT | Performed by: PHYSICAL THERAPIST

## (undated) DEVICE — SUT VIC 2/0 UR6 27IN J602H

## (undated) DEVICE — SYR LUERLOK 30CC

## (undated) DEVICE — Device: Brand: DEFENDO AIR/WATER/SUCTION AND BIOPSY VALVE

## (undated) DEVICE — SUT VIC 2/0 TIES 18IN J111T

## (undated) DEVICE — PAD REPL POST SURG TOTL KN

## (undated) DEVICE — GLV SURG PREMIERPRO MIC LTX PF SZ7.5 BRN

## (undated) DEVICE — 4-PORT MANIFOLD: Brand: NEPTUNE 2

## (undated) DEVICE — TUBING, SUCTION, 1/4" X 20', STRAIGHT: Brand: MEDLINE INDUSTRIES, INC.

## (undated) DEVICE — GLV SURG SENSICARE W/ALOE PF LF 8.5 STRL

## (undated) DEVICE — PAD GRND REM POLYHESIVE A/ DISP

## (undated) DEVICE — PACK,LITHOTOMY,PK III,SIRUS: Brand: MEDLINE

## (undated) DEVICE — GLV SURG SENSICARE MICRO PF LF 7.5 STRL

## (undated) DEVICE — CYSTO/BLADDER IRRIGATION SET, REGULATING CLAMP

## (undated) DEVICE — TBG PENCL TELESCP MEGADYNE SMOKE EVAC 15FT

## (undated) DEVICE — TRY SKINPREP PVP SCRB W PAINT

## (undated) DEVICE — Device

## (undated) DEVICE — URETERAL DILATATION SYSTEM

## (undated) DEVICE — CONMED SCOPE SAVER BITE BLOCK, 20X27 MM: Brand: SCOPE SAVER

## (undated) DEVICE — FIBR LASR HOLMIUM SLIMLINE EZ 365U 1P/U

## (undated) DEVICE — GLW STD STR 3CM .035X150CM

## (undated) DEVICE — BLD CUT FORMLA AGGR PLS 4.0MM

## (undated) DEVICE — 3M™ STERI-STRIP™ REINFORCED ADHESIVE SKIN CLOSURES, R1547, 1/2 IN X 4 IN (12 MM X 100 MM), 6 STRIPS/ENVELOPE: Brand: 3M™ STERI-STRIP™

## (undated) DEVICE — THE BITE BLOCK MAXI, LATEX FREE STRAP IS USED TO PROTECT THE ENDOSCOPE INSERTION TUBE FROM BEING BITTEN BY THE PATIENT.

## (undated) DEVICE — SPNG VERSALON 4X4 4PLY NONSTRL LF BG/200

## (undated) DEVICE — FRCP BX RADJAW4 NDL 2.8 240CM LG OG BX40

## (undated) DEVICE — ANTIBACTERIAL UNDYED BRAIDED (POLYGLACTIN 910), SYNTHETIC ABSORBABLE SUTURE: Brand: COATED VICRYL

## (undated) DEVICE — ENDOPATH ELECTROSURGERY PROBE PLUS II 5MM RIGHT ANGLE MONOPOLAR ELECTROSURGERY SUCTION AND IRRRIGATION SHAFTS WITH RIGHT ANGLE ELECTRODE - USE ONLY WITH PROBE PLUS II HANDLES: Brand: ENDOPATH

## (undated) DEVICE — VLV SXN AIR/H2O ORCAPOD3 1P/U STRL

## (undated) DEVICE — ENCORE® LATEX MICRO SIZE 7.5, STERILE LATEX POWDER-FREE SURGICAL GLOVE: Brand: ENCORE

## (undated) DEVICE — COR LAP CHOLE: Brand: MEDLINE INDUSTRIES, INC.

## (undated) DEVICE — SUT GUT CHRM 2/0 SH 27IN G123H

## (undated) DEVICE — HYBRID CO2 TUBING/CAP SET FOR OLYMPUS® SCOPES & CO2 SOURCE: Brand: ERBE

## (undated) DEVICE — QUICK CATCH IN-LINE SUCTION POLYP TRAP IS USED FOR SUCTION RETRIEVAL OF ENDOSCOPICALLY REMOVED POLYPS.

## (undated) DEVICE — SINGLE-USE POLYPECTOMY SNARE: Brand: CAPTIVATOR II

## (undated) DEVICE — DRSNG PAD ABD 8X10IN STRL

## (undated) DEVICE — [HIGH FLOW INSUFFLATOR,  DO NOT USE IF PACKAGE IS DAMAGED,  KEEP DRY,  KEEP AWAY FROM SUNLIGHT,  PROTECT FROM HEAT AND RADIOACTIVE SOURCES.]: Brand: PNEUMOSURE

## (undated) DEVICE — JELLY,LUBE,STERILE,FLIP TOP,TUBE,4-OZ: Brand: MEDLINE

## (undated) DEVICE — PK KN ARTHSCP 70

## (undated) DEVICE — TROCAR: Brand: KII® SLEEVE

## (undated) DEVICE — PK BASIC 70

## (undated) DEVICE — SUT ETHLN 3/0 PS2 18 IN 1669H

## (undated) DEVICE — SYR LUER SLPTP 50ML

## (undated) DEVICE — BNDG ELAS ELITE V/CLOSE 6IN 5YD LF STRL

## (undated) DEVICE — HOLDER: Brand: DEROYAL

## (undated) DEVICE — BRSH ENDO CLN DBL/END W/HA DISP

## (undated) DEVICE — SPNG ENDO BEDSIDE TUB ENZYM

## (undated) DEVICE — GOWN,PREVENTION PLUS,XLONG/XLARGE,STRL: Brand: MEDLINE

## (undated) DEVICE — SUCTION CANISTER, 1500CC, RIGID: Brand: DEROYAL

## (undated) DEVICE — TROCAR: Brand: KII FIOS FIRST ENTRY

## (undated) DEVICE — SPNG GZ WOVN 4X4IN 12PLY 10/BX STRL

## (undated) DEVICE — SUT NLY 2/0 664G

## (undated) DEVICE — GLV SURG TRIUMPH MICRO PF LTX 8 STRL

## (undated) DEVICE — SKIN AFFIX SURG ADHESIVE 72/CS 0.55ML: Brand: MEDLINE

## (undated) DEVICE — BASN EMESS GLD 9IN

## (undated) DEVICE — GLV SURG SENSICARE W/ALOE PF LF 8 STRL

## (undated) DEVICE — SUT VIC 3/0 SH 27IN J416H

## (undated) DEVICE — CANISTER, RIGID, 2000CC: Brand: MEDLINE INDUSTRIES, INC.

## (undated) DEVICE — GOWN,NON-REINFORCED,SIRUS,SET IN SLV,XXL: Brand: MEDLINE

## (undated) DEVICE — SUT PROLN 3/0 8832H

## (undated) DEVICE — LUBE JELLY PK/2.75GM STRL BX/144

## (undated) DEVICE — MAT FLR ABSORBENT LG 4FT 10 2.5FT

## (undated) DEVICE — WRP COMPR KN COLD UNIV

## (undated) DEVICE — THE EXACTO COLD SNARE IS INTENDED TO BE USED WITHOUT DIATHERMIC ENERGY FOR THE ENDOSCOPIC RESECTION OF POLYP TISSUE IN THE GASTROINTESTINAL TRACT.: Brand: EXACTO

## (undated) DEVICE — ENDOGATOR AUXILIARY WATER JET CONNECTOR: Brand: ENDOGATOR

## (undated) DEVICE — APPL CHLORAPREP W/TINT 26ML ORNG

## (undated) DEVICE — SYR LL TP 10ML STRL

## (undated) DEVICE — SUT GUT CHRM 3/0 SH 27IN G122H

## (undated) DEVICE — FRCP BX RADJAW4 NDL 2.8 240 STD OG

## (undated) DEVICE — ENDOPATH ELECTROSURGERY PROBE PLUS II PISTOL HAND CONTROL PISTOL GRIP HANDLES WITH SUCTION AND IRRRIGATION FOR HAND CONTROL MONOPOLAR ELCTROSURGERY USE ONLY WITH PROBE PLUS II SHAFTS: Brand: ENDOPATH

## (undated) DEVICE — PAD LEG HLDR

## (undated) DEVICE — DRSNG WND GZ CURAD OIL EMULSION 3X8IN LF STRL 1PK

## (undated) DEVICE — SPNG LAP PREWSH SFTPK 18X18IN STRL PK/5

## (undated) DEVICE — SINGLE PORT MANIFOLD: Brand: NEPTUNE 2

## (undated) DEVICE — APPL CHLORAPREP HI/LITE 26ML ORNG

## (undated) DEVICE — CONN Y IRR DISP 1P/U

## (undated) DEVICE — PREMIUM WET SKIN PREP TRAY: Brand: MEDLINE INDUSTRIES, INC.

## (undated) DEVICE — TRY PLATLT/RICH/PLASM ANGEL ASP BONE MRRW

## (undated) DEVICE — ENDOSCOPY PORT CONNECTOR FOR OLYMPUS® SCOPES: Brand: ERBE

## (undated) DEVICE — ENDOCUT SCISSOR TIP, DISPOSABLE: Brand: RENEW

## (undated) DEVICE — NDL,TUOHY EPID,17GX3.5",PLASTIC STYLET: Brand: MEDLINE

## (undated) DEVICE — GOWN,REINF,POLY,ECL,PP SLV,XL: Brand: MEDLINE

## (undated) DEVICE — PAD,ABDOMINAL,8"X10",ST,LF: Brand: MEDLINE

## (undated) DEVICE — TBG PUMP ARTHSCP MAIN AR6400 16FT

## (undated) DEVICE — UNDRPD FLUFF 23X36

## (undated) DEVICE — DBD-DRAPE,LAP,CHOLE,W/TROUGHS,STERILE: Brand: MEDLINE

## (undated) DEVICE — ENDOPATH XCEL BLADELESS TROCARS WITH STABILITY SLEEVES: Brand: ENDOPATH XCEL

## (undated) DEVICE — SUT MNCRYL 4/0 PS2 18 IN

## (undated) DEVICE — SUT SILK B 3/0 SA84H

## (undated) DEVICE — BNDG ADHS CURAD FLX/FABRC 2X4IN STRL LF

## (undated) DEVICE — COR CYSTO: Brand: MEDLINE INDUSTRIES, INC.